# Patient Record
Sex: FEMALE | Race: WHITE | NOT HISPANIC OR LATINO | Employment: OTHER | URBAN - METROPOLITAN AREA
[De-identification: names, ages, dates, MRNs, and addresses within clinical notes are randomized per-mention and may not be internally consistent; named-entity substitution may affect disease eponyms.]

---

## 2017-01-06 RX ORDER — ALPRAZOLAM 1 MG/1
1 TABLET ORAL NIGHTLY
COMMUNITY
End: 2017-01-10 | Stop reason: SDUPTHER

## 2017-01-06 RX ORDER — VALSARTAN 160 MG/1
160 TABLET ORAL DAILY
COMMUNITY
End: 2017-01-23 | Stop reason: SDUPTHER

## 2017-01-09 ENCOUNTER — ANESTHESIA EVENT (OUTPATIENT)
Dept: PERIOP | Facility: HOSPITAL | Age: 67
End: 2017-01-09

## 2017-01-09 ENCOUNTER — ANESTHESIA (OUTPATIENT)
Dept: PERIOP | Facility: HOSPITAL | Age: 67
End: 2017-01-09

## 2017-01-09 ENCOUNTER — APPOINTMENT (OUTPATIENT)
Dept: GENERAL RADIOLOGY | Facility: HOSPITAL | Age: 67
End: 2017-01-09

## 2017-01-09 ENCOUNTER — HOSPITAL ENCOUNTER (OUTPATIENT)
Facility: HOSPITAL | Age: 67
Setting detail: HOSPITAL OUTPATIENT SURGERY
Discharge: HOME | End: 2017-01-09
Attending: UROLOGY | Admitting: UROLOGY

## 2017-01-09 VITALS
DIASTOLIC BLOOD PRESSURE: 54 MMHG | OXYGEN SATURATION: 93 % | WEIGHT: 205.3 LBS | TEMPERATURE: 97.7 F | HEART RATE: 67 BPM | BODY MASS INDEX: 32.22 KG/M2 | RESPIRATION RATE: 16 BRPM | HEIGHT: 67 IN | SYSTOLIC BLOOD PRESSURE: 122 MMHG

## 2017-01-09 DIAGNOSIS — N20.0 RENAL STONES: ICD-10-CM

## 2017-01-09 DIAGNOSIS — R52 PAIN: ICD-10-CM

## 2017-01-09 PROCEDURE — 25010000002 PROPOFOL 10 MG/ML EMULSION: Performed by: ANESTHESIOLOGY

## 2017-01-09 PROCEDURE — 25010000002 PHENYLEPHRINE PER 1 ML: Performed by: ANESTHESIOLOGY

## 2017-01-09 PROCEDURE — 25010000002 DEXAMETHASONE PER 1 MG: Performed by: ANESTHESIOLOGY

## 2017-01-09 PROCEDURE — 25010000002 GENTAMICIN PER 80 MG: Performed by: ANESTHESIOLOGY

## 2017-01-09 PROCEDURE — 76000 FLUOROSCOPY <1 HR PHYS/QHP: CPT

## 2017-01-09 PROCEDURE — 25010000002 FENTANYL CITRATE (PF) 100 MCG/2ML SOLUTION: Performed by: ANESTHESIOLOGY

## 2017-01-09 PROCEDURE — 74000 HC ABDOMEN KUB: CPT

## 2017-01-09 PROCEDURE — 25010000002 MIDAZOLAM PER 1 MG: Performed by: ANESTHESIOLOGY

## 2017-01-09 PROCEDURE — 74425 UROGRAPHY ANTEGRADE RS&I: CPT

## 2017-01-09 PROCEDURE — C1769 GUIDE WIRE: HCPCS | Performed by: UROLOGY

## 2017-01-09 PROCEDURE — C2617 STENT, NON-COR, TEM W/O DEL: HCPCS | Performed by: UROLOGY

## 2017-01-09 PROCEDURE — 25010000002 NEOSTIGMINE 10 MG/10ML SOLUTION: Performed by: ANESTHESIOLOGY

## 2017-01-09 PROCEDURE — 82360 CALCULUS ASSAY QUANT: CPT | Performed by: UROLOGY

## 2017-01-09 PROCEDURE — 25010000002 ONDANSETRON PER 1 MG: Performed by: ANESTHESIOLOGY

## 2017-01-09 DEVICE — URETERAL STENT
Type: IMPLANTABLE DEVICE | Site: KIDNEY | Status: FUNCTIONAL
Brand: CONTOUR™

## 2017-01-09 RX ORDER — GENTAMICIN SULFATE 40 MG/ML
INJECTION, SOLUTION INTRAMUSCULAR; INTRAVENOUS AS NEEDED
Status: DISCONTINUED | OUTPATIENT
Start: 2017-01-09 | End: 2017-01-09 | Stop reason: SURG

## 2017-01-09 RX ORDER — DIPHENHYDRAMINE HYDROCHLORIDE 50 MG/ML
12.5 INJECTION INTRAMUSCULAR; INTRAVENOUS
Status: DISCONTINUED | OUTPATIENT
Start: 2017-01-09 | End: 2017-01-09 | Stop reason: HOSPADM

## 2017-01-09 RX ORDER — ONDANSETRON 2 MG/ML
4 INJECTION INTRAMUSCULAR; INTRAVENOUS ONCE AS NEEDED
Status: DISCONTINUED | OUTPATIENT
Start: 2017-01-09 | End: 2017-01-09 | Stop reason: HOSPADM

## 2017-01-09 RX ORDER — NALOXONE HCL 0.4 MG/ML
0.2 VIAL (ML) INJECTION AS NEEDED
Status: DISCONTINUED | OUTPATIENT
Start: 2017-01-09 | End: 2017-01-09 | Stop reason: HOSPADM

## 2017-01-09 RX ORDER — SODIUM CHLORIDE 0.9 % (FLUSH) 0.9 %
1-10 SYRINGE (ML) INJECTION AS NEEDED
Status: DISCONTINUED | OUTPATIENT
Start: 2017-01-09 | End: 2017-01-09 | Stop reason: HOSPADM

## 2017-01-09 RX ORDER — PROMETHAZINE HYDROCHLORIDE 25 MG/ML
12.5 INJECTION, SOLUTION INTRAMUSCULAR; INTRAVENOUS ONCE AS NEEDED
Status: DISCONTINUED | OUTPATIENT
Start: 2017-01-09 | End: 2017-01-09 | Stop reason: HOSPADM

## 2017-01-09 RX ORDER — FENTANYL CITRATE 50 UG/ML
50 INJECTION, SOLUTION INTRAMUSCULAR; INTRAVENOUS
Status: DISCONTINUED | OUTPATIENT
Start: 2017-01-09 | End: 2017-01-09 | Stop reason: HOSPADM

## 2017-01-09 RX ORDER — HYDRALAZINE HYDROCHLORIDE 20 MG/ML
5 INJECTION INTRAMUSCULAR; INTRAVENOUS
Status: DISCONTINUED | OUTPATIENT
Start: 2017-01-09 | End: 2017-01-09 | Stop reason: HOSPADM

## 2017-01-09 RX ORDER — PROMETHAZINE HYDROCHLORIDE 25 MG/1
25 TABLET ORAL ONCE AS NEEDED
Status: DISCONTINUED | OUTPATIENT
Start: 2017-01-09 | End: 2017-01-09 | Stop reason: HOSPADM

## 2017-01-09 RX ORDER — LABETALOL HYDROCHLORIDE 5 MG/ML
5 INJECTION, SOLUTION INTRAVENOUS
Status: DISCONTINUED | OUTPATIENT
Start: 2017-01-09 | End: 2017-01-09 | Stop reason: HOSPADM

## 2017-01-09 RX ORDER — MIDAZOLAM HYDROCHLORIDE 1 MG/ML
2 INJECTION INTRAMUSCULAR; INTRAVENOUS
Status: DISCONTINUED | OUTPATIENT
Start: 2017-01-09 | End: 2017-01-09 | Stop reason: HOSPADM

## 2017-01-09 RX ORDER — LIDOCAINE HYDROCHLORIDE 20 MG/ML
INJECTION, SOLUTION INFILTRATION; PERINEURAL AS NEEDED
Status: DISCONTINUED | OUTPATIENT
Start: 2017-01-09 | End: 2017-01-09 | Stop reason: SURG

## 2017-01-09 RX ORDER — SODIUM CHLORIDE, SODIUM LACTATE, POTASSIUM CHLORIDE, CALCIUM CHLORIDE 600; 310; 30; 20 MG/100ML; MG/100ML; MG/100ML; MG/100ML
9 INJECTION, SOLUTION INTRAVENOUS CONTINUOUS
Status: DISCONTINUED | OUTPATIENT
Start: 2017-01-09 | End: 2017-01-09 | Stop reason: HOSPADM

## 2017-01-09 RX ORDER — PROPOFOL 10 MG/ML
VIAL (ML) INTRAVENOUS AS NEEDED
Status: DISCONTINUED | OUTPATIENT
Start: 2017-01-09 | End: 2017-01-09 | Stop reason: SURG

## 2017-01-09 RX ORDER — GENTAMICIN SULFATE 80 MG/100ML
INJECTION, SOLUTION INTRAVENOUS
Status: DISCONTINUED
Start: 2017-01-09 | End: 2017-01-09 | Stop reason: HOSPADM

## 2017-01-09 RX ORDER — FLUMAZENIL 0.1 MG/ML
0.2 INJECTION INTRAVENOUS AS NEEDED
Status: DISCONTINUED | OUTPATIENT
Start: 2017-01-09 | End: 2017-01-09 | Stop reason: HOSPADM

## 2017-01-09 RX ORDER — GENTAMICIN SULFATE 40 MG/ML
INJECTION, SOLUTION INTRAMUSCULAR; INTRAVENOUS AS NEEDED
Status: DISCONTINUED | OUTPATIENT
Start: 2017-01-09 | End: 2017-01-09

## 2017-01-09 RX ORDER — FAMOTIDINE 10 MG/ML
20 INJECTION, SOLUTION INTRAVENOUS ONCE
Status: DISCONTINUED | OUTPATIENT
Start: 2017-01-09 | End: 2017-01-09 | Stop reason: HOSPADM

## 2017-01-09 RX ORDER — PROMETHAZINE HYDROCHLORIDE 25 MG/1
25 SUPPOSITORY RECTAL ONCE AS NEEDED
Status: DISCONTINUED | OUTPATIENT
Start: 2017-01-09 | End: 2017-01-09 | Stop reason: HOSPADM

## 2017-01-09 RX ORDER — GLYCOPYRROLATE 0.2 MG/ML
INJECTION INTRAMUSCULAR; INTRAVENOUS AS NEEDED
Status: DISCONTINUED | OUTPATIENT
Start: 2017-01-09 | End: 2017-01-09 | Stop reason: SURG

## 2017-01-09 RX ORDER — LIDOCAINE HYDROCHLORIDE 40 MG/ML
SOLUTION TOPICAL AS NEEDED
Status: DISCONTINUED | OUTPATIENT
Start: 2017-01-09 | End: 2017-01-09 | Stop reason: SURG

## 2017-01-09 RX ORDER — GENTAMICIN SULFATE 80 MG/100ML
80 INJECTION, SOLUTION INTRAVENOUS ONCE
Status: DISCONTINUED | OUTPATIENT
Start: 2017-01-09 | End: 2017-01-09 | Stop reason: HOSPADM

## 2017-01-09 RX ORDER — NEOSTIGMINE METHYLSULFATE 1 MG/ML
INJECTION, SOLUTION INTRAVENOUS AS NEEDED
Status: DISCONTINUED | OUTPATIENT
Start: 2017-01-09 | End: 2017-01-09 | Stop reason: SURG

## 2017-01-09 RX ORDER — MIDAZOLAM HYDROCHLORIDE 1 MG/ML
1 INJECTION INTRAMUSCULAR; INTRAVENOUS
Status: DISCONTINUED | OUTPATIENT
Start: 2017-01-09 | End: 2017-01-09 | Stop reason: HOSPADM

## 2017-01-09 RX ORDER — DEXAMETHASONE SODIUM PHOSPHATE 10 MG/ML
INJECTION INTRAMUSCULAR; INTRAVENOUS AS NEEDED
Status: DISCONTINUED | OUTPATIENT
Start: 2017-01-09 | End: 2017-01-09 | Stop reason: SURG

## 2017-01-09 RX ORDER — ROCURONIUM BROMIDE 10 MG/ML
INJECTION, SOLUTION INTRAVENOUS AS NEEDED
Status: DISCONTINUED | OUTPATIENT
Start: 2017-01-09 | End: 2017-01-09 | Stop reason: SURG

## 2017-01-09 RX ORDER — OMEGA-3 FATTY ACIDS/FISH OIL 300-1000MG
1000 CAPSULE ORAL 2 TIMES DAILY
COMMUNITY
End: 2017-06-13

## 2017-01-09 RX ORDER — ONDANSETRON 2 MG/ML
INJECTION INTRAMUSCULAR; INTRAVENOUS AS NEEDED
Status: DISCONTINUED | OUTPATIENT
Start: 2017-01-09 | End: 2017-01-09 | Stop reason: SURG

## 2017-01-09 RX ORDER — MAGNESIUM HYDROXIDE 1200 MG/15ML
LIQUID ORAL AS NEEDED
Status: DISCONTINUED | OUTPATIENT
Start: 2017-01-09 | End: 2017-01-09 | Stop reason: HOSPADM

## 2017-01-09 RX ADMIN — LIDOCAINE HYDROCHLORIDE 80 MG: 20 INJECTION, SOLUTION INFILTRATION; PERINEURAL at 09:04

## 2017-01-09 RX ADMIN — EPHEDRINE SULFATE 20 MG: 50 INJECTION INTRAMUSCULAR; INTRAVENOUS; SUBCUTANEOUS at 09:18

## 2017-01-09 RX ADMIN — FENTANYL CITRATE 25 MCG: 50 INJECTION INTRAMUSCULAR; INTRAVENOUS at 10:06

## 2017-01-09 RX ADMIN — EPHEDRINE SULFATE 20 MG: 50 INJECTION INTRAMUSCULAR; INTRAVENOUS; SUBCUTANEOUS at 09:20

## 2017-01-09 RX ADMIN — EPHEDRINE SULFATE 10 MG: 50 INJECTION INTRAMUSCULAR; INTRAVENOUS; SUBCUTANEOUS at 10:04

## 2017-01-09 RX ADMIN — FENTANYL CITRATE 50 MCG: 50 INJECTION INTRAMUSCULAR; INTRAVENOUS at 09:04

## 2017-01-09 RX ADMIN — EPHEDRINE SULFATE 10 MG: 50 INJECTION INTRAMUSCULAR; INTRAVENOUS; SUBCUTANEOUS at 09:10

## 2017-01-09 RX ADMIN — NEOSTIGMINE METHYLSULFATE 5 MG: 1 INJECTION INTRAVENOUS at 09:59

## 2017-01-09 RX ADMIN — GENTAMICIN SULFATE 80 MG: 40 INJECTION, SOLUTION INTRAMUSCULAR; INTRAVENOUS at 09:04

## 2017-01-09 RX ADMIN — DEXAMETHASONE SODIUM PHOSPHATE 8 MG: 10 INJECTION INTRAMUSCULAR; INTRAVENOUS at 09:20

## 2017-01-09 RX ADMIN — SODIUM CHLORIDE, POTASSIUM CHLORIDE, SODIUM LACTATE AND CALCIUM CHLORIDE 9 ML/HR: 600; 310; 30; 20 INJECTION, SOLUTION INTRAVENOUS at 08:40

## 2017-01-09 RX ADMIN — LIDOCAINE HYDROCHLORIDE 1 EACH: 40 SPRAY LARYNGEAL; TRANSTRACHEAL at 09:07

## 2017-01-09 RX ADMIN — ROCURONIUM BROMIDE 50 MG: 10 INJECTION INTRAVENOUS at 09:04

## 2017-01-09 RX ADMIN — GLYCOPYRROLATE 1 MG: 0.2 INJECTION INTRAMUSCULAR; INTRAVENOUS at 09:59

## 2017-01-09 RX ADMIN — PHENYLEPHRINE HYDROCHLORIDE 100 MCG: 10 INJECTION INTRAVENOUS at 10:04

## 2017-01-09 RX ADMIN — SODIUM CHLORIDE, POTASSIUM CHLORIDE, SODIUM LACTATE AND CALCIUM CHLORIDE: 600; 310; 30; 20 INJECTION, SOLUTION INTRAVENOUS at 08:56

## 2017-01-09 RX ADMIN — FENTANYL CITRATE 25 MCG: 50 INJECTION INTRAMUSCULAR; INTRAVENOUS at 10:04

## 2017-01-09 RX ADMIN — EPHEDRINE SULFATE 20 MG: 50 INJECTION INTRAMUSCULAR; INTRAVENOUS; SUBCUTANEOUS at 10:00

## 2017-01-09 RX ADMIN — ONDANSETRON 4 MG: 2 INJECTION INTRAMUSCULAR; INTRAVENOUS at 09:59

## 2017-01-09 RX ADMIN — MIDAZOLAM HYDROCHLORIDE 2 MG: 1 INJECTION, SOLUTION INTRAMUSCULAR; INTRAVENOUS at 08:39

## 2017-01-09 RX ADMIN — EPHEDRINE SULFATE 20 MG: 50 INJECTION INTRAMUSCULAR; INTRAVENOUS; SUBCUTANEOUS at 09:39

## 2017-01-09 RX ADMIN — PROPOFOL 160 MG: 10 INJECTION, EMULSION INTRAVENOUS at 09:04

## 2017-01-09 NOTE — ANESTHESIA PROCEDURE NOTES
Airway  Urgency: elective    Airway not difficult    General Information and Staff    Patient location during procedure: OR  Anesthesiologist: NELIA BLAIR    Indications and Patient Condition  Indications for airway management: airway protection    Preoxygenated: yes  MILS maintained throughout  Mask difficulty assessment: 1 - vent by mask    Final Airway Details  Final airway type: endotracheal airway      Successful airway: ETT and reinforced tube  Cuffed: yes   Successful intubation technique: direct laryngoscopy  Endotracheal tube insertion site: oral  Blade: Cj  Blade size: #3  ETT size: 7.0 mm  Cormack-Lehane Classification: grade I - full view of glottis  Placement verified by: chest auscultation and capnometry   Measured from: lips  ETT to lips (cm): 21  Number of attempts at approach: 1

## 2017-01-09 NOTE — OP NOTE
DATE OF PROCEDURE:  01/09/2017    POSTOPERATIVE DIAGNOSIS: Left renal lower pole calculi.     POSTOPERATIVE DIAGNOSIS: Left renal lower pole calculi.     PROCEDURES PERFORMED:  1.  Percutaneous nephrostolithotomy - 2nd look  with laser lithotripsy and basket extraction of fragments.   2.  Antegrade left stent placement.     SURGEON: Mati Villegas MD     ANESTHESIA: General.     DRAINS: A 6-Yakut x 24 cm double-J stent.     COMPLICATIONS: None.      SPECIMENS:  Stone fragments.    INDICATIONS: A 66-year-old female with a large partial staghorn calculus involving the pelvis in the lower pole kidney. She has had several rounds of lithotripsy which failed to break her stone and decrease her stone burden. Last week she underwent percutaneous nephrostolithotomy which we debulked most of the lower pole stone in the pelvis. She will undergo a 2nd look procedure as there appears to be some fragments in the lower pole calyx.     DESCRIPTION OF THE PROCEDURE: The patient was taken to the operative suite and given general anesthesia. She was placed in a comfortable supine position, intubated, and then flipped over to prone. Her nephrostomy tube was removed. She was prepped and draped in sterile fashion. The flexible nephroscope was inserted into the tract in the pelvis. The upper pole was visualized and again was unremarkable. I could see down the UPJ. There was no stone fragments in the UPJ. I found the lower pole calyx with some moderate difficulty and was able to get into where I found several stone fragments. One of them was fairly sizable so we passed a 200 micron holmium fiber and the stone was then fragmented into multiple pieces that were all manageable. These were then basket extracted with a Nitinol 0 tip basket. I looked back in and could not confirm any residual stones. I saw nothing on fluoroscopy. A guidewire was passed down to the renal pelvis  down the ureter to the bladder. Using fluoroscopic guidance  a 6-Swazi x 24 cm double-J stent was then placed in an antegrade fashion. There was good distal curl.  The proximal curl was a little bit short, but I was a little concerned it was trying to push outside the kidney so I kept it on the short side and it hooked down to the lower pole. All the fluid was evacuated and the nephroscope was removed. Bulky dressing was placed in the flank incision where the nephrostomy tube was and she was awoken and taken to recovery in stable condition. She will follow up probably later this week to remove her stent. I told her she can get back on her Xarelto and aspirin once her hematuria clears.        Mati Villegas M.D.  SOTERO:sherwin  D:   01/09/2017 10:18:32  T:   01/09/2017 14:08:11  Job ID:   [id number]  Document ID:   [id number]  cc:

## 2017-01-09 NOTE — ANESTHESIA PREPROCEDURE EVALUATION
Anesthesia Evaluation      Airway   Mallampati: II  TM distance: >3 FB  Neck ROM: full  no difficulty expected  Dental - normal exam     Pulmonary - normal exam   Cardiovascular   (+) hypertension well controlled, dysrhythmias Atrial Fib,     Rhythm: irregular    Neuro/Psych  (+) psychiatric history,    GI/Hepatic/Renal/Endo    (+) obesity, morbid obesity, GERD, chronic renal disease, diabetes mellitus,     Musculoskeletal     Abdominal   (+) obese,    Substance History      OB/GYN          Other   (+) arthritis                          Anesthesia Plan    ASA 3     general     intravenous induction   Anesthetic plan and risks discussed with patient.

## 2017-01-09 NOTE — PLAN OF CARE
Problem: Patient Care Overview (Adult)  Goal: Plan of Care Review  Outcome: Ongoing (interventions implemented as appropriate)    01/09/17 0736   Coping/Psychosocial Response Interventions   Plan Of Care Reviewed With patient   Patient Care Overview   Progress no change       Goal: Adult Individualization and Mutuality  Outcome: Ongoing (interventions implemented as appropriate)                        01/09/17 0736   Individualization   Patient Specific Preferences Prefers to be called Danielle

## 2017-01-09 NOTE — H&P
[facility] History and Physical    Patient Care Team:  Ashleigh Hernandez PA-C as PCP - General (Family Medicine)  Jeremy Orozco MD as Consulting Physician (Cardiology)    Chief complaint left renal stones    Subjective     Patient is a 66 y.o. female presents with multiple stones left kidney s/p staged left PCNL for second look. Onset of symptoms was gradual starting 1 year ago.   Associated symptoms include pain and pressure.      Review of Systems   Pertinent items are noted in HPI    Past Medical History   Diagnosis Date   • Arthritis    • Controlled type 2 diabetes with renal manifestation      states borderline    • Hyperlipidemia    • Hypertension    • Kidney stone      left   • Menopausal disorder 1998     She went through menopause in 1998   • Obesity    • PAF (paroxysmal atrial fibrillation)      in the setting of urosepsis, 9/2016   • Reflux esophagitis 8/19/2016   • Sepsis due to urinary tract infection    • Urinary tract infection      Past Surgical History   Procedure Laterality Date   • Ankle surgery       Ankle Repair / Description: ORif the left ankle in July 2010. Secondary to fall   • Cholecystectomy     • Colonoscopy  2014     Done 2004 normal recheck in 10 years. Repeated February 2014 colonoscopy was normal and to be repeated in 10 years.   • Hemorrhoidectomy     • Other surgical history       Tubal Ligation   • Tubal abdominal ligation     • Cystoscopy bladder stone lithotripsy     • Breast surgery       biopsy    • Breast biopsy Right      benign   • Percutaneous nephrostolithotomy Left 12/2016     Family History   Problem Relation Age of Onset   • COPD Mother    • Heart attack Father      acute myocardial infarction   • Heart attack Son    • Kidney disease Maternal Grandmother      Social History   Substance Use Topics   • Smoking status: Never Smoker   • Smokeless tobacco: Never Used      Comment: caffeine use    • Alcohol use Yes      Comment: She uses alcohol once or twice a  year.     Prescriptions Prior to Admission   Medication Sig Dispense Refill Last Dose   • acetaminophen (TYLENOL) 650 MG 8 hr tablet Take 1 tablet by mouth every 6 (six) hours as needed. for pain   1/8/2017 at 2200   • ALPRAZolam (XANAX) 1 MG tablet Take 1 mg by mouth Every Night. anxiety   1/7/2017 at 2200   • aspirin 81 MG tablet Take 81 mg by mouth Daily.   12/21/2016   • cefuroxime (CEFTIN) 250 MG tablet Take 250 mg by mouth Daily.  3 1/8/2017 at 1730   • citalopram (CeleXA) 40 MG tablet Take 1 tablet by mouth daily. 90 tablet 3 1/8/2017 at 1000   • Metoprolol Tartrate 75 MG tablet Take 75 mg by mouth Every 12 (Twelve) Hours. 60 tablet 3 1/9/2017 at 0600   • Multiple Vitamin tablet Take 1 tablet by mouth Daily.   Past Week at Unknown time   • Omega 3 1000 MG capsule Take 1,000 mg by mouth 2 (Two) Times a Day.   12/21/2016   • omeprazole OTC (PriLOSEC OTC) 20 MG EC tablet Take 20 mg by mouth 2 (two) times a day.   1/9/2017 at 0600   • Probiotic Product (PROBIOTIC ADVANCED PO) Take 1 tablet by mouth Daily.   1/8/2017 at 1730   • rivaroxaban (XARELTO) 15 MG tablet Take 1 tablet by mouth Daily. 30 tablet 0 12/21/2016   • simvastatin (ZOCOR) 80 MG tablet Take 1 tablet by mouth daily. (Patient taking differently: Take 80 mg by mouth every night.) 90 tablet 2 1/8/2017 at 2200   • valsartan (DIOVAN) 160 MG tablet Take 160 mg by mouth Daily.   1/8/2017 at 1000   • nystatin (MYCOSTATIN) powder Apply 1 application topically 3 (Three) Times a Day As Needed. Apply sparingly to affected area(s) 3 times a day   More than a month at Unknown time     Allergies:  Morphine and related; Ciprofloxacin-ciproflox hcl er; Flomax [tamsulosin hcl]; Hydrocodone; Latex; Lortab [hydrocodone-acetaminophen]; Penicillins; Phenergan [promethazine hcl]; Sulfa antibiotics; and Amoxicillin    Objective     Vital Signs  Temp:  [98 °F (36.7 °C)] 98 °F (36.7 °C)  Heart Rate:  [63] 63  Resp:  [20] 20  BP: (140)/(70) 140/70  No intake or output data in  "the 24 hours ending 01/09/17 0819  Flowsheet Rows         First Filed Value    Admission Height  66\" (167.6 cm) Documented at 01/06/2017 1138    Admission Weight  202 lb (91.6 kg) Documented at 01/06/2017 1138           Physical Exam:      General Appearance:    Alert, cooperative, in no acute distress   Head:    Normocephalic, without obvious abnormality, atraumatic   Eyes:            Lids and lashes normal, conjunctivae and sclerae normal, no   icterus, no pallor, corneas clear, PERRLA   Ears:    Ears appear intact with no abnormalities noted   Throat:   No oral lesions, no thrush, oral mucosa moist   Neck:   No adenopathy, supple, trachea midline, no thyromegaly, no     carotid bruit, no JVD   Back:     No kyphosis present, no scoliosis present, no skin lesions,       erythema or scars, no tenderness to percussion or                   palpation,   range of motion normal   Lungs:     Clear to auscultation,respirations regular, even and                   unlabored    Heart:    Regular rhythm and normal rate, normal S1 and S2, no            murmur, no gallop, no rub, no click   Breast Exam:    Deferred   Abdomen:     Normal bowel sounds, no masses, no organomegaly, soft        non-tender, non-distended, no guarding, no rebound                 tenderness   Genitalia:    Deferred   Extremities:   Moves all extremities well, no edema, no cyanosis, no              redness   Pulses:   Pulses palpable and equal bilaterally   Skin:   No bleeding, bruising or rash   Lymph nodes:   No palpable adenopathy   Neurologic:   Cranial nerves 2 - 12 grossly intact, sensation intact, DTR        present and equal bilaterally     Results Review:    I reviewed the patient's new clinical results.  Results for orders placed or performed during the hospital encounter of 11/05/16   Urine Culture   Result Value Ref Range    Urine Culture 50,000 CFU/mL Escherichia coli (A)        Susceptibility    Escherichia coli - FARIHA     Ampicillin 8 " Susceptible ug/ml     Ampicillin + Sulbactam 4 Susceptible ug/ml     Cefazolin <=4 Susceptible ug/ml     Cefepime <=1 Susceptible ug/ml     Ceftriaxone <=1 Susceptible ug/ml     Ciprofloxacin <=0.25 Susceptible ug/ml     Ertapenem <=0.5 Susceptible ug/ml     Gentamicin <=1 Susceptible ug/ml     Levofloxacin <=0.12 Susceptible ug/ml     Nitrofurantoin <=16 Susceptible ug/ml     Piperacillin + Tazobactam <=4 Susceptible ug/ml     Tetracycline 2 Susceptible ug/ml     Trimethoprim + Sulfamethoxazole <=20 Susceptible ug/ml   Blood Culture   Result Value Ref Range    Blood Culture No growth at 5 days    Urine Culture   Result Value Ref Range    Urine Culture No growth    Comprehensive Metabolic Panel   Result Value Ref Range    Glucose 113 (H) 65 - 99 mg/dL    BUN 23 8 - 23 mg/dL    Creatinine 1.21 (H) 0.57 - 1.00 mg/dL    Sodium 136 136 - 145 mmol/L    Potassium 4.2 3.5 - 5.2 mmol/L    Chloride 100 98 - 107 mmol/L    CO2 20.9 (L) 22.0 - 29.0 mmol/L    Calcium 9.0 8.8 - 10.5 mg/dL    Total Protein 7.3 6.0 - 8.5 g/dL    Albumin 3.50 3.50 - 5.20 g/dL    ALT (SGPT) 12 5 - 33 U/L    AST (SGOT) 13 5 - 32 U/L    Alkaline Phosphatase 88 40 - 129 U/L    Total Bilirubin 0.5 0.2 - 1.2 mg/dL    eGFR Non African Amer 44 (L) >60 mL/min/1.73    Globulin 3.8 gm/dL    A/G Ratio 0.9 g/dL    BUN/Creatinine Ratio 19.0 7.0 - 25.0    Anion Gap 15.1 mmol/L   Lipase   Result Value Ref Range    Lipase 45 13 - 60 U/L   Urinalysis With / Culture If Indicated   Result Value Ref Range    Color, UA Yellow Yellow, Straw    Appearance, UA Cloudy (A) Clear    pH, UA 5.5 4.5 - 8.0    Specific Gravity, UA 1.015 1.003 - 1.030    Glucose, UA Negative Negative    Ketones, UA Negative Negative    Bilirubin, UA Negative Negative    Blood, UA Large (3+) (A) Negative    Protein, UA Trace (A) Negative    Leuk Esterase, UA Large (3+) (A) Negative    Nitrite, UA Negative Negative    Urobilinogen, UA 0.2 E.U./dL 0.2 E.U./dL, 1.0 E.U./dL   Lactic Acid, Plasma    Result Value Ref Range    Lactate 1.8 0.5 - 2.0 mmol/L   CBC Auto Differential   Result Value Ref Range    WBC 16.61 (H) 4.80 - 10.80 10*3/mm3    RBC 4.15 (L) 4.20 - 5.40 10*6/mm3    Hemoglobin 10.2 (L) 12.0 - 16.0 g/dL    Hematocrit 34.0 (L) 37.0 - 47.0 %    MCV 81.9 81.0 - 99.0 fL    MCH 24.6 (L) 27.0 - 31.0 pg    MCHC 30.0 (L) 31.0 - 37.0 g/dL    RDW 15.8 (H) 11.5 - 14.5 %    RDW-SD 47.1 37.0 - 54.0 fl    MPV 9.4 7.4 - 10.4 fL    Platelets 354 140 - 500 10*3/mm3    Neutrophil % 79.6 (H) 45.0 - 70.0 %    Lymphocyte % 9.7 (L) 20.0 - 45.0 %    Monocyte % 9.9 (H) 3.0 - 8.0 %    Eosinophil % 0.0 0.0 - 4.0 %    Basophil % 0.3 0.0 - 2.0 %    Immature Grans % 0.5 0.0 - 0.5 %    Neutrophils, Absolute 13.23 (H) 1.50 - 8.30 10*3/mm3    Lymphocytes, Absolute 1.61 0.60 - 4.80 10*3/mm3    Monocytes, Absolute 1.64 (H) 0.00 - 1.00 10*3/mm3    Eosinophils, Absolute 0.00 (L) 0.10 - 0.30 10*3/mm3    Basophils, Absolute 0.05 0.00 - 0.20 10*3/mm3    Immature Grans, Absolute 0.08 (H) 0.00 - 0.03 10*3/mm3    nRBC 0.0 0.0 - 0.0 /100 WBC   Urinalysis, Microscopic Only   Result Value Ref Range    RBC, UA 3-5 (A) None Seen /HPF    WBC, UA 31-50 (A) None Seen /HPF    Bacteria, UA Trace (A) None Seen /HPF    Squamous Epithelial Cells, UA 3-6 (A) None Seen, 0-2 /HPF    Hyaline Casts, UA None Seen None Seen /LPF    Methodology Manual Light Microscopy    Troponin   Result Value Ref Range    Troponin T <0.010 0.000 - 0.030 ng/mL   Troponin   Result Value Ref Range    Troponin T <0.010 0.000 - 0.030 ng/mL   Troponin   Result Value Ref Range    Troponin T <0.010 0.000 - 0.030 ng/mL   Basic Metabolic Panel   Result Value Ref Range    Glucose 107 (H) 65 - 99 mg/dL    BUN 16 8 - 23 mg/dL    Creatinine 1.05 (H) 0.57 - 1.00 mg/dL    Sodium 138 136 - 145 mmol/L    Potassium 4.1 3.5 - 5.2 mmol/L    Chloride 102 98 - 107 mmol/L    CO2 24.0 22.0 - 29.0 mmol/L    Calcium 8.4 (L) 8.8 - 10.5 mg/dL    eGFR Non African Amer 52 (L) >60 mL/min/1.73     BUN/Creatinine Ratio 15.2 7.0 - 25.0    Anion Gap 12.0 mmol/L   CBC (No Diff)   Result Value Ref Range    WBC 10.71 4.80 - 10.80 10*3/mm3    RBC 3.68 (L) 4.20 - 5.40 10*6/mm3    Hemoglobin 9.2 (L) 12.0 - 16.0 g/dL    Hematocrit 30.0 (L) 37.0 - 47.0 %    MCV 81.5 81.0 - 99.0 fL    MCH 25.0 (L) 27.0 - 31.0 pg    MCHC 30.7 (L) 31.0 - 37.0 g/dL    RDW 15.9 (H) 11.5 - 14.5 %    RDW-SD 48.0 37.0 - 54.0 fl    MPV 9.2 7.4 - 10.4 fL    Platelets 291 140 - 500 10*3/mm3   Urinalysis With / Culture If Indicated   Result Value Ref Range    Color, UA Yellow Yellow, Straw    Appearance, UA Slightly Cloudy (A) Clear    pH, UA 5.5 4.5 - 8.0    Specific Gravity, UA 1.020 1.003 - 1.030    Glucose, UA Negative Negative    Ketones, UA Negative Negative    Bilirubin, UA Negative Negative    Blood, UA Large (3+) (A) Negative    Protein, UA Negative Negative    Leuk Esterase, UA Moderate (2+) (A) Negative    Nitrite, UA Negative Negative    Urobilinogen, UA 0.2 E.U./dL 0.2 E.U./dL, 1.0 E.U./dL   Urinalysis, Microscopic Only   Result Value Ref Range    RBC, UA Too Numerous to Count (A) None Seen /HPF    WBC, UA Too Numerous to Count (A) None Seen /HPF    Bacteria, UA 1+ (A) None Seen /HPF    Squamous Epithelial Cells, UA 0-2 None Seen, 0-2 /HPF    Renal Epithelial Cells, UA 3-6 (A) 0 - 2 /HPF    Hyaline Casts, UA None Seen None Seen /LPF    Methodology Manual Light Microscopy    Troponin   Result Value Ref Range    Troponin T <0.010 0.000 - 0.030 ng/mL   Comprehensive Metabolic Panel   Result Value Ref Range    Glucose 125 (H) 65 - 99 mg/dL    BUN 16 8 - 23 mg/dL    Creatinine 1.05 (H) 0.57 - 1.00 mg/dL    Sodium 137 136 - 145 mmol/L    Potassium 4.1 3.5 - 5.2 mmol/L    Chloride 99 98 - 107 mmol/L    CO2 18.5 (L) 22.0 - 29.0 mmol/L    Calcium 9.0 8.8 - 10.5 mg/dL    Total Protein 6.7 6.0 - 8.5 g/dL    Albumin 3.00 (L) 3.50 - 5.20 g/dL    ALT (SGPT) 17 5 - 33 U/L    AST (SGOT) 14 5 - 32 U/L    Alkaline Phosphatase 82 40 - 129 U/L    Total  Bilirubin 0.2 0.2 - 1.2 mg/dL    eGFR Non African Amer 52 (L) >60 mL/min/1.73    Globulin 3.7 gm/dL    A/G Ratio 0.8 g/dL    BUN/Creatinine Ratio 15.2 7.0 - 25.0    Anion Gap 19.5 mmol/L   CBC Auto Differential   Result Value Ref Range    WBC 8.41 4.80 - 10.80 10*3/mm3    RBC 3.74 (L) 4.20 - 5.40 10*6/mm3    Hemoglobin 9.2 (L) 12.0 - 16.0 g/dL    Hematocrit 31.3 (L) 37.0 - 47.0 %    MCV 83.7 81.0 - 99.0 fL    MCH 24.6 (L) 27.0 - 31.0 pg    MCHC 29.4 (L) 31.0 - 37.0 g/dL    RDW 16.0 (H) 11.5 - 14.5 %    RDW-SD 49.0 37.0 - 54.0 fl    MPV 9.8 7.4 - 10.4 fL    Platelets 300 140 - 500 10*3/mm3    Neutrophil % 66.3 45.0 - 70.0 %    Lymphocyte % 22.2 20.0 - 45.0 %    Monocyte % 8.6 (H) 3.0 - 8.0 %    Eosinophil % 2.1 0.0 - 4.0 %    Basophil % 0.4 0.0 - 2.0 %    Immature Grans % 0.4 0.0 - 0.5 %    Neutrophils, Absolute 5.58 1.50 - 8.30 10*3/mm3    Lymphocytes, Absolute 1.87 0.60 - 4.80 10*3/mm3    Monocytes, Absolute 0.72 0.00 - 1.00 10*3/mm3    Eosinophils, Absolute 0.18 0.10 - 0.30 10*3/mm3    Basophils, Absolute 0.03 0.00 - 0.20 10*3/mm3    Immature Grans, Absolute 0.03 0.00 - 0.03 10*3/mm3    nRBC 0.0 0.0 - 0.0 /100 WBC        Assessment/Plan:  Assessment/Plan     Active Problems:    * No active hospital problems. *      Left Renal Stones    Plan: second look PCNL- R/C/B discussed    I discussed the patients findings and my recommendations with patient.     Mati Villegas MD  01/09/17  8:19 AM

## 2017-01-09 NOTE — IP AVS SNAPSHOT
AFTER VISIT SUMMARY             Krys Mayes           About your hospitalization     You were admitted on:  January 9, 2017 You last received care in the:  [facility] MAIN OR       Procedures & Surgeries      Procedure(s) (LRB):  NEPHROLITHOTOMY PERCUTANEOUS SECOND LOOK WITH STENT PLACEMENT AND LASER LITHOTRIPSY (Left)     1/9/2017     Surgeon(s):  Mati Villegas MD  -------------------      Medications    If you or your caregiver advised us that you are currently taking a medication and that medication is marked below as “Resume”, this simply indicates that we have reviewed those medications to make sure our new therapy recommendations do not interfere.  If you have concerns about medications other than those new ones which we are prescribing today, please consult the physician who prescribed them (or your primary physician).  Our review of your home medications is not meant to indicate that we are directing their use.             Your Medications      CHANGE how you take these medications     simvastatin 80 MG tablet   Take 1 tablet by mouth daily.   According to our records, you may have been taking this medication differently.   Commonly known as:  ZOCOR   What changed:  when to take this             CONTINUE taking these medications     acetaminophen 650 MG 8 hr tablet   Take 1 tablet by mouth every 6 (six) hours as needed. for pain   Commonly known as:  TYLENOL           ALPRAZolam 1 MG tablet   Take 1 mg by mouth Every Night. anxiety   Commonly known as:  XANAX           aspirin 81 MG tablet   Take 81 mg by mouth Daily.           cefuroxime 250 MG tablet   Take 250 mg by mouth Daily.   Commonly known as:  CEFTIN           citalopram 40 MG tablet   Take 1 tablet by mouth daily.   Commonly known as:  CeleXA           Metoprolol Tartrate 75 MG tablet   Take 75 mg by mouth Every 12 (Twelve) Hours.           Multiple Vitamin tablet   Take 1 tablet by mouth Daily.           nystatin  250046 UNIT/GM powder   Apply 1 application topically 3 (Three) Times a Day As Needed. Apply sparingly to affected area(s) 3 times a day   Commonly known as:  MYCOSTATIN           Omega 3 1000 MG capsule   Take 1,000 mg by mouth 2 (Two) Times a Day.           omeprazole OTC 20 MG EC tablet   Take 20 mg by mouth 2 (two) times a day.   Commonly known as:  PriLOSEC OTC           PROBIOTIC ADVANCED PO   Take 1 tablet by mouth Daily.           rivaroxaban 15 MG tablet   Take 1 tablet by mouth Daily.   Commonly known as:  XARELTO           valsartan 160 MG tablet   Take 160 mg by mouth Daily.   Commonly known as:  DIOVAN                      Your Medications      Your Medication List           Morning Noon Evening Bedtime As Needed    acetaminophen 650 MG 8 hr tablet   Take 1 tablet by mouth every 6 (six) hours as needed. for pain   Commonly known as:  TYLENOL                                ALPRAZolam 1 MG tablet   Take 1 mg by mouth Every Night. anxiety   Commonly known as:  XANAX                                aspirin 81 MG tablet   Take 81 mg by mouth Daily.                                cefuroxime 250 MG tablet   Take 250 mg by mouth Daily.   Commonly known as:  CEFTIN                                citalopram 40 MG tablet   Take 1 tablet by mouth daily.   Commonly known as:  CeleXA                                Metoprolol Tartrate 75 MG tablet   Take 75 mg by mouth Every 12 (Twelve) Hours.                                Multiple Vitamin tablet   Take 1 tablet by mouth Daily.                                nystatin 408176 UNIT/GM powder   Apply 1 application topically 3 (Three) Times a Day As Needed. Apply sparingly to affected area(s) 3 times a day   Commonly known as:  MYCOSTATIN                                Omega 3 1000 MG capsule   Take 1,000 mg by mouth 2 (Two) Times a Day.                                omeprazole OTC 20 MG EC tablet   Take 20 mg by mouth 2 (two) times a day.   Commonly known as:  PriLOSEC  OTC                                PROBIOTIC ADVANCED PO   Take 1 tablet by mouth Daily.                                rivaroxaban 15 MG tablet   Take 1 tablet by mouth Daily.   Commonly known as:  XARELTO                                simvastatin 80 MG tablet   Take 1 tablet by mouth daily.   Commonly known as:  ZOCOR                                valsartan 160 MG tablet   Take 160 mg by mouth Daily.   Commonly known as:  DIOVAN                                         Instructions for After Discharge        Activity Instructions     Activity as Tolerated                 Other Instructions     Remove Dressing in 24 Hours       Change if needed- remove and shower in 24 hours and leave on vera-aid or light dressing if needed.               Discharge Instructions       Outpatient Surgery Guidelines, Adult  Outpatient procedures are those for which the person having the procedure is allowed to go home the same day as the procedure. Various procedures are done on an outpatient basis. You should follow some general guidelines if you will be having an outpatient procedure.  AFTER THE  PROCEDURE  After surgery, you will be taken to a recovery area, where your progress will be monitored. If there are no complications, you will be allowed to go home when you are awake, stable, and taking fluids well. You may have numbness around the surgical site. Healing will take some time. You will have tenderness at the surgical site and may have some swelling and bruising. You may also have some nausea.  HOME CARE INSTRUCTIONS  · Do not drive for 24 hours, or as directed by your health care provider. Do not drive while taking prescription pain medicines.  · Do not drink alcohol for 24 hours.  · Do not make important decisions or sign legal documents for 24 hours.  · Plan on having a responsible adult stay with your for 24 hours following your procedure.  · You may resume a normal diet and activities as directed.  · Do not lift  anything heavier than 10 pounds (4.5 kg) or play contact sports until your health care provider says it is okay.  · Only take over-the-counter or prescription medicines as directed by your health care provider.  · Follow up with your health care provider as directed.  SEEK MEDICAL CARE IF:  · You have increased bleeding (more than a small spot) from the surgical site.  · You have redness, swelling, or increasing pain in the wound.  · You see pus coming from the wound.  · You have a fever > 101.  · You notice a bad smell coming from the wound or dressing.  · You feel lightheaded or faint.  · You develop a rash.  · You have trouble breathing.  · You develop allergies.  MAKE SURE YOU:  · Understand these instructions.  · Will watch your condition.  · Will get help right away if you are not doing well or get worse.    Discharge References/Attachments     URETERAL STENT IMPLANTATION, CARE AFTER (ENGLISH)    LITHOTRIPSY, CARE AFTER (ENGLISH)    PERCUTANEOUS NEPHROLITHOTOMY (ENGLISH)       Follow-ups for After Discharge        Follow-up Information     Follow up with Ashleigh Hernandez PA-C .    Specialty:  Family Medicine    Contact information:    [address] [facility] [id number] [phone]          Follow up with Mati Villegas MD .    Specialty:  Urology    Why:  Dr. Villegas's office will call to set up an appointment for Thursday for stent removal.    Contact information:    [address] [facility] [address] [phone]        Referrals and Follow-ups to Schedule     Return to Clinic for Follow Up    As directed    My office will call to schedule follow-up   Follow Up Details:  This week to remove stent             Scheduled Appointments     Feb 09, 2017  9:50 AM EST   [facility] (--)    [address] [facility] [id number] [phone]            Feb 16, 2017 10:30 AM EST   Office Visit with Ashleigh MELENDEZ  MANI Hernandez   [facility] (--)    [address]  [place] [address] [phone]           Arrive 15 minutes prior to appointment.            Jun 13, 2017 11:15 AM EDT   Follow Up with Jeremy Orozco MD   [facility] CARDIOLOGY (--)    [address]  [place] [address] [phone]           Arrive 15 minutes prior to appointment.              [employer] Signup     Our records indicate that you have an active [employer] account.    You can view your After Visit Summary by going to [employer]erMe and logging in with your [employer] username and password.  If you don't have a [employer] username and password but a parent or guardian has access to your record, the parent or guardian should login with their own [employer] username and password and access your record to view the After Visit Summary.    If you have questions, you can email [email or web address] or call [phone] to talk to our [employer] staff.  Remember, [employer] is NOT to be used for urgent needs.  For medical emergencies, dial 911.           Summary of Your Hospitalization        Reason for Hospitalization     Your primary diagnosis was:  Kidney Stone      Care Providers     Provider Service Role Specialty    Mati Villegas MD Urology Attending Provider Urology    Mati Villegas MD Urology Surgeon  Urology      Your Allergies  Date Reviewed: 1/9/2017    Allergen Reactions    Morphine And Related GI Intolerance         Ciprofloxacin-Ciproflox Hcl Er Rash         Flomax (Tamsulosin Hcl) Rash         Hydrocodone Rash         Latex Rash         Lortab (Hydrocodone-Acetaminophen) Rash         Penicillins Rash         Phenergan (Promethazine Hcl) GI Intolerance         Sulfa Antibiotics Rash         Amoxicillin Rash      Pending Labs     Order Current Status    Stone Analysis In process      Patient Belongings Returned      Document Return of Belongings Flowsheet     Were the patient bedside belongings sent home?   --   Belongings Retrieved from Security & Sent Home   --    Belongings Sent to Safe   --   Medications Retrieved from Pharmacy & Sent Home   --              More Information      Ureteral Stent Implantation, Care After  Refer to this sheet in the next few weeks. These instructions provide you with information on caring for yourself after your procedure. Your health care provider may also give you more specific instructions. Your treatment has been planned according to current medical practices, but problems sometimes occur. Call your health care provider if you have any problems or questions after your procedure.  WHAT TO EXPECT AFTER THE PROCEDURE  You should be back to normal activity within 48 hours after the procedure. Nausea and vomiting may occur and are commonly the result of anesthesia.  It is common to experience sharp pain in the back or lower abdomen and penis with voiding. This is caused by movement of the ends of the stent with the act of urinating. It usually goes away within minutes after you have stopped urinating.  HOME CARE INSTRUCTIONS  Make sure to drink plenty of fluids. You may have small amounts of bleeding, causing your urine to be red. This is normal. Certain movements may trigger pain or a feeling that you need to urinate. You may be given medicines to prevent infection or bladder spasms. Be sure to take all medicines as directed. Only take over-the-counter or prescription medicines for pain, discomfort, or fever as directed by your health care provider. Do not take aspirin, as this can make bleeding worse.  Your stent will be left in until the blockage is resolved. This may take 2 weeks or longer, depending on the reason for stent implantation. You may have an X-ray exam to make sure your ureter is open and that the stent has not moved out of position (migrated). The stent can be removed by your  health care provider in the office. Medicines may be given for comfort while the stent is being removed. Be sure to keep all follow-up appointments so your health care provider can check that you are healing properly.  SEEK MEDICAL CARE IF:  · You experience increasing pain.  · Your pain medicine is not working.  SEEK IMMEDIATE MEDICAL CARE IF:  · Your urine is dark red or has blood clots.  · You are leaking urine (incontinent).  · You have a fever, chills, feeling sick to your stomach (nausea), or vomiting.  · Your pain is not relieved by pain medicine.  · The end of the stent comes out of the urethra.  · You are unable to urinate.     This information is not intended to replace advice given to you by your health care provider. Make sure you discuss any questions you have with your health care provider.     Document Released: 08/20/2014 Document Revised: 12/23/2014 Document Reviewed: 07/01/2016  [employer] Interactive Patient Education ©2016 [employer] Inc.          Lithotripsy, Care After  Refer to this sheet in the next few weeks. These instructions provide you with information on caring for yourself after your procedure. Your health care provider may also give you more specific instructions. Your treatment has been planned according to current medical practices, but problems sometimes occur. Call your health care provider if you have any problems or questions after your procedure.  WHAT TO EXPECT AFTER THE PROCEDURE   · Your urine may have a red tinge for a few days after treatment. Blood loss is usually minimal.  · You may have soreness in the back or flank area. This usually goes away after a few days. The procedure can cause blotches or bruises on the back where the pressure wave enters the skin. These marks usually cause only minimal discomfort and should disappear in a short time.  · Stone fragments should begin to pass within 24 hours of treatment. However, a delayed passage is not unusual.  · You may have  "pain, discomfort, and feel sick to your stomach (nauseated) when the crushed fragments of stone are passed down the tube from the kidney to the bladder. Stone fragments can pass soon after the procedure and may last for up to 4-8 weeks.  · A small number of patients may have severe pain when stone fragments are not able to pass, which leads to an obstruction.  · If your stone is greater than 1 inch (2.5 cm) in diameter or if you have multiple stones that have a combined diameter greater than 1 inch (2.5 cm), you may require more than one treatment.  · If you had a stent placed prior to your procedure, you may experience some discomfort, especially during urination. You may experience the pain or discomfort in your flank or back, or you may experience a sharp pain or discomfort at the base of your penis or in your lower abdomen. The discomfort usually lasts only a few minutes after urinating.  HOME CARE INSTRUCTIONS   · Rest at home until you feel your energy improving.  · Only take over-the-counter or prescription medicines for pain, discomfort, or fever as directed by your health care provider. Depending on the type of lithotripsy, you may need to take antibiotics and anti-inflammatory medicines for a few days.  · Drink enough water and fluids to keep your urine clear or pale yellow. This helps \"flush\" your kidneys. It helps pass any remaining pieces of stone and prevents stones from coming back.  · Most people can resume daily activities within 1-2 days after standard lithotripsy. It can take longer to recover from laser and percutaneous lithotripsy.  · Strain all urine through the provided strainer. Keep all particulate matter and stones for your health care provider to see. The stone may be as small as a grain of salt. It is very important to use the strainer each and every time you pass your urine. Any stones that are found can be sent to a medical lab for examination.  · Visit your health care provider for a " follow-up appointment in a few weeks. Your doctor may remove your stent if you have one. Your health care provider will also check to see whether stone particles still remain.  SEEK MEDICAL CARE IF:   · Your pain is not relieved by medicine.  · You have a lasting nauseous feeling.  · You feel there is too much blood in the urine.  · You develop persistent problems with frequent or painful urination that does not at least partially improve after 2 days following the procedure.  · You have a congested cough.  · You feel lightheaded.  · You develop a rash or any other signs that might suggest an allergic problem.  · You develop any reaction or side effects to your medicine(s).  SEEK IMMEDIATE MEDICAL CARE IF:   · You experience severe back or flank pain or both.  · You see nothing but blood when you urinate.  · You cannot pass any urine at all.  · You have a fever or shaking chills.  · You develop shortness of breath, difficulty breathing, or chest pain.  · You develop vomiting that will not stop after 6-8 hours.  · You have a fainting episode.     This information is not intended to replace advice given to you by your health care provider. Make sure you discuss any questions you have with your health care provider.     Document Released: 01/06/2009 Document Revised: 09/07/2016 Document Reviewed: 07/03/2014  [employer] Interactive Patient Education ©2016 [employer]          Percutaneous Nephrolithotomy      AFTER THE PROCEDURE  · You will stay in a recovery area until the anesthesia has worn off. Your blood pressure and pulse will be checked often. You might be given more pain medication.  · You will be moved to a hospital room for the rest of your stay.  · The catheter that is taking urine out of your body will be taken out within 24 hours.  · The day after your surgery, you should be able to walk around. Walking helps prevent blood clots (thick clumps that can block the flow of blood).  · You may be asked to do some  breathing exercises.  · You will be able to have only liquids for a day or two.  · Before you go home:    The catheter that is draining fluid from the kidney area is usually taken out.    You will be taught how to care for the incision. Be sure to ask how often the dressing should be changed and when it can get wet.    You also will be told what you should and should not do while your kidney and incisions heal. For instance, you may be urged to walk to prevent blood clots.     This information is not intended to replace advice given to you by your health care provider. Make sure you discuss any questions you have with your health care provider.     Document Released: 10/15/2010 Document Revised: 01/08/2016 Document Reviewed: 06/13/2016  [employer] Interactive Patient Education ©2016 Elsevier Inc.            SYMPTOMS OF A STROKE    Call 911 or have someone take you to the Emergency Department if you have any of the following:    · Sudden numbness or weakness of your face, arm or leg especially on one side of the body  · Sudden confusion, diffiiculty speaking or trouble understanding   · Changes in your vision or loss of sight in one eye  · Sudden severe headache with no known cause  · sudden dizziness, trouble walking, loss of balance or coordination    It is important to seek emergency care right away if you have further stroke symptoms. If you get emergency help quickly, the powerful clot-dissolving medicines can reduce the disabilities caused by a stroke.     For more information:    American Stroke Association  5-200-2-STROKE  www.strokeassociation.org           IF YOU SMOKE OR USE TOBACCO PLEASE READ THE FOLLOWING:    Why is smoking bad for me?  Smoking increases the risk of heart disease, lung disease, vascular disease, stroke, and cancer.     If you smoke, STOP!    If you would like more information on quitting smoking, please visit the [facility] website:  [email or web address]   This link will provide additional resources including the QUIT line and the Beat the Pack support groups.     For more information:    American Cancer Society  (419) 432-5284    American Heart Association  1-739.728.2131               YOU ARE THE MOST IMPORTANT FACTOR IN YOUR RECOVERY.     Follow all instructions carefully.     I have reviewed my discharge instructions with my nurse, including the following information, if applicable:     Information about my illness and diagnosis   Follow up appointments (including lab draws)   Wound Care   Equipment Needs   Medications (new and continuing) along with side effects   Preventative information such as vaccines and smoking cessations   Diet   Pain   I know when to contact my Doctor's office or seek emergency care      I want my nurse to describe the side effects of my medications: YES NO   If the answer is no, I understand the side effects of my medications: YES NO   My nurse described the side effects of my medications in a way that I could understand: YES NO   I have taken my personal belongings and my own medications with me at discharge: YES NO            I have received this information and my questions have been answered. I have discussed any concerns I see with this plan with the nurse or physician. I understand these instructions.    Signature of Patient or Responsible Person: _____________________________________    Date: _________________  Time: __________________    Signature of Healthcare Provider: _______________________________________  Date: _________________  Time: __________________

## 2017-01-09 NOTE — ANESTHESIA POSTPROCEDURE EVALUATION
Patient: Krys Mayes    Procedure Summary     Date Anesthesia Start Anesthesia Stop Room / Location    01/09/17 0856 1021  [facility] 02 / [facility]       Procedure Diagnosis Surgeon Provider    NEPHROLITHOTOMY PERCUTANEOUS SECOND LOOK WITH STENT PLACEMENT AND LASER LITHOTRIPSY (Left ) Renal calculus, left MD Troy Poon MD          Anesthesia Type: general  Last vitals  /54 (01/09/17 1200)    Temp      Pulse 67 (01/09/17 1200)   Resp 16 (01/09/17 1200)    SpO2 93 % (01/09/17 1200)      Post Anesthesia Care and Evaluation    Patient location during evaluation: PACU  Patient participation: complete - patient participated  Level of consciousness: sleepy but conscious  Pain score: 0  Pain management: adequate  Airway patency: patent  Anesthetic complications: No anesthetic complications  Cardiovascular status: acceptable  Respiratory status: acceptable  Hydration status: acceptable

## 2017-01-09 NOTE — PLAN OF CARE
Problem: Perioperative Period (Adult)  Goal: Signs and Symptoms of Listed Potential Problems Will be Absent or Manageable (Perioperative Period)  Outcome: Ongoing (interventions implemented as appropriate)

## 2017-01-09 NOTE — PLAN OF CARE
Problem: Perioperative Period (Adult)  Goal: Signs and Symptoms of Listed Potential Problems Will be Absent or Manageable (Perioperative Period)  Outcome: Ongoing (interventions implemented as appropriate)    01/09/17 0702   Perioperative Period   Problems Assessed (Perioperative Period) pain;infection

## 2017-01-09 NOTE — PLAN OF CARE
Problem: Patient Care Overview (Adult)  Goal: Plan of Care Review  Outcome: Outcome(s) achieved Date Met:  01/09/17 01/09/17 1226   Coping/Psychosocial Response Interventions   Plan Of Care Reviewed With patient;spouse   Patient Care Overview   Progress progress toward functional goals as expected   Outcome Evaluation   Outcome Summary/Follow up Plan ready for discharge       Goal: Adult Individualization and Mutuality  Outcome: Outcome(s) achieved Date Met:  01/09/17  Goal: Discharge Needs Assessment  Outcome: Outcome(s) achieved Date Met:  01/09/17    Problem: Perioperative Period (Adult)  Goal: Signs and Symptoms of Listed Potential Problems Will be Absent or Manageable (Perioperative Period)  Outcome: Outcome(s) achieved Date Met:  01/09/17 01/09/17 1226   Perioperative Period   Problems Assessed (Perioperative Period) pain;wound complications;hypoxia/hypoxemia;physiologic stress response;postoperative ileus;urinary retention;situational response;perioperative injury   Problems Present (Perioperative Period) none

## 2017-01-09 NOTE — DISCHARGE INSTRUCTIONS
Outpatient Surgery Guidelines, Adult  Outpatient procedures are those for which the person having the procedure is allowed to go home the same day as the procedure. Various procedures are done on an outpatient basis. You should follow some general guidelines if you will be having an outpatient procedure.  AFTER THE  PROCEDURE  After surgery, you will be taken to a recovery area, where your progress will be monitored. If there are no complications, you will be allowed to go home when you are awake, stable, and taking fluids well. You may have numbness around the surgical site. Healing will take some time. You will have tenderness at the surgical site and may have some swelling and bruising. You may also have some nausea.  HOME CARE INSTRUCTIONS  · Do not drive for 24 hours, or as directed by your health care provider. Do not drive while taking prescription pain medicines.  · Do not drink alcohol for 24 hours.  · Do not make important decisions or sign legal documents for 24 hours.  · Plan on having a responsible adult stay with your for 24 hours following your procedure.  · You may resume a normal diet and activities as directed.  · Do not lift anything heavier than 10 pounds (4.5 kg) or play contact sports until your health care provider says it is okay.  · Only take over-the-counter or prescription medicines as directed by your health care provider.  · Follow up with your health care provider as directed.  SEEK MEDICAL CARE IF:  · You have increased bleeding (more than a small spot) from the surgical site.  · You have redness, swelling, or increasing pain in the wound.  · You see pus coming from the wound.  · You have a fever > 101.  · You notice a bad smell coming from the wound or dressing.  · You feel lightheaded or faint.  · You develop a rash.  · You have trouble breathing.  · You develop allergies.  MAKE SURE YOU:  · Understand these instructions.  · Will watch your condition.  · Will get help right away if  you are not doing well or get worse.

## 2017-01-11 RX ORDER — ALPRAZOLAM 1 MG/1
TABLET ORAL
Qty: 30 TABLET | Refills: 0 | Status: SHIPPED | OUTPATIENT
Start: 2017-01-11 | End: 2017-02-10 | Stop reason: SDUPTHER

## 2017-01-17 LAB
CA PHOS CRY STONE QL IR: 10 %
COLOR STONE: NORMAL
COM CRY STONE QL IR: 90 %
COMPN STONE: NORMAL
CONV COMMENT: NORMAL
Lab: NORMAL
Lab: NORMAL
NIDUS STONE QL: NORMAL
PATH REPORT.COMMENTS IMP SPEC: NORMAL
SIZE STONE: NORMAL MM
WT STONE: 38 MG

## 2017-01-22 DIAGNOSIS — I10 ESSENTIAL HYPERTENSION: ICD-10-CM

## 2017-01-23 RX ORDER — VALSARTAN 160 MG/1
TABLET ORAL
Qty: 90 TABLET | Refills: 3 | Status: SHIPPED | OUTPATIENT
Start: 2017-01-23 | End: 2018-01-13 | Stop reason: SDUPTHER

## 2017-02-08 DIAGNOSIS — R74.01 ELEVATED ALANINE AMINOTRANSFERASE (ALT) LEVEL: ICD-10-CM

## 2017-02-08 DIAGNOSIS — E78.5 DYSLIPIDEMIA: ICD-10-CM

## 2017-02-08 DIAGNOSIS — E55.9 VITAMIN D DEFICIENCY: Primary | ICD-10-CM

## 2017-02-08 LAB
25(OH)D3+25(OH)D2 SERPL-MCNC: 33.3 NG/ML
ALBUMIN SERPL-MCNC: 3.6 G/DL (ref 3.5–5.2)
ALBUMIN/GLOB SERPL: 1 G/DL
ALP SERPL-CCNC: 97 U/L (ref 40–129)
ALT SERPL-CCNC: 8 U/L (ref 5–33)
AST SERPL-CCNC: 12 U/L (ref 5–32)
BASOPHILS # BLD AUTO: 0.07 10*3/MM3 (ref 0–0.2)
BASOPHILS NFR BLD AUTO: 0.6 % (ref 0–2)
BILIRUB SERPL-MCNC: 0.3 MG/DL (ref 0.2–1.2)
BUN SERPL-MCNC: 19 MG/DL (ref 8–23)
BUN/CREAT SERPL: 14.8 (ref 7–25)
CALCIUM SERPL-MCNC: 9.2 MG/DL (ref 8.8–10.5)
CHLORIDE SERPL-SCNC: 99 MMOL/L (ref 98–107)
CHOLEST SERPL-MCNC: 126 MG/DL (ref 0–200)
CO2 SERPL-SCNC: 24.6 MMOL/L (ref 22–29)
CREAT SERPL-MCNC: 1.28 MG/DL (ref 0.57–1)
DIFFERENTIAL COMMENT: NORMAL
EOSINOPHIL # BLD AUTO: 0.12 10*3/MM3 (ref 0.1–0.3)
EOSINOPHIL NFR BLD AUTO: 1 % (ref 0–4)
ERYTHROCYTE [DISTWIDTH] IN BLOOD BY AUTOMATED COUNT: 14.9 % (ref 11.5–14.5)
GLOBULIN SER CALC-MCNC: 3.5 GM/DL
GLUCOSE SERPL-MCNC: 109 MG/DL (ref 65–99)
HCT VFR BLD AUTO: 32.7 % (ref 37–47)
HDLC SERPL-MCNC: 44 MG/DL (ref 40–60)
HGB BLD-MCNC: 9.7 G/DL (ref 12–16)
IMM GRANULOCYTES # BLD: 0.05 10*3/MM3 (ref 0–0.03)
IMM GRANULOCYTES NFR BLD: 0.4 % (ref 0–0.5)
LDLC SERPL CALC-MCNC: 53 MG/DL (ref 0–100)
LDLC/HDLC SERPL: 1.2 {RATIO}
LYMPHOCYTES # BLD AUTO: 1.68 10*3/MM3 (ref 0.6–4.8)
LYMPHOCYTES NFR BLD AUTO: 14.5 % (ref 20–45)
MCH RBC QN AUTO: 24 PG (ref 27–31)
MCHC RBC AUTO-ENTMCNC: 29.7 G/DL (ref 31–37)
MCV RBC AUTO: 80.7 FL (ref 81–99)
MONOCYTES # BLD AUTO: 1.2 10*3/MM3 (ref 0–1)
MONOCYTES NFR BLD AUTO: 10.3 % (ref 3–8)
NEUTROPHILS # BLD AUTO: 8.48 10*3/MM3 (ref 1.5–8.3)
NEUTROPHILS NFR BLD AUTO: 73.2 % (ref 45–70)
NRBC BLD AUTO-RTO: 0 /100 WBC (ref 0–0)
PLATELET # BLD AUTO: 657 10*3/MM3 (ref 140–500)
PLATELET BLD QL SMEAR: NORMAL
POTASSIUM SERPL-SCNC: 4.9 MMOL/L (ref 3.5–5.2)
PROT SERPL-MCNC: 7.1 G/DL (ref 6–8.5)
RBC # BLD AUTO: 4.05 10*6/MM3 (ref 4.2–5.4)
RBC MORPH BLD: NORMAL
SODIUM SERPL-SCNC: 140 MMOL/L (ref 136–145)
TRIGL SERPL-MCNC: 145 MG/DL (ref 0–150)
VLDLC SERPL CALC-MCNC: 29 MG/DL (ref 7–27)
WBC # BLD AUTO: 11.6 10*3/MM3 (ref 4.8–10.8)

## 2017-02-09 LAB
FERRITIN SERPL-MCNC: 116.6 NG/ML (ref 13–150)
HBA1C MFR BLD: 5.8 % (ref 4.8–5.6)
IRON SATN MFR SERPL: 12 %
IRON SERPL-MCNC: 26 MCG/DL (ref 37–145)
Lab: NORMAL
TIBC SERPL-MCNC: 219 MCG/DL (ref 261–478)
UIBC SERPL-MCNC: 193 MCG/DL (ref 112–346)
WRITTEN AUTHORIZATION: NORMAL

## 2017-02-10 RX ORDER — ALPRAZOLAM 1 MG/1
TABLET ORAL
Qty: 30 TABLET | Refills: 0 | Status: SHIPPED | OUTPATIENT
Start: 2017-02-10 | End: 2017-03-16 | Stop reason: SDUPTHER

## 2017-02-15 ENCOUNTER — OFFICE VISIT (OUTPATIENT)
Dept: FAMILY MEDICINE CLINIC | Facility: CLINIC | Age: 67
End: 2017-02-15

## 2017-02-15 VITALS
WEIGHT: 203 LBS | DIASTOLIC BLOOD PRESSURE: 80 MMHG | HEART RATE: 63 BPM | HEIGHT: 67 IN | SYSTOLIC BLOOD PRESSURE: 126 MMHG | RESPIRATION RATE: 16 BRPM | TEMPERATURE: 98 F | OXYGEN SATURATION: 98 % | BODY MASS INDEX: 31.86 KG/M2

## 2017-02-15 DIAGNOSIS — F41.8 MIXED ANXIETY DEPRESSIVE DISORDER: ICD-10-CM

## 2017-02-15 DIAGNOSIS — D50.9 IRON DEFICIENCY ANEMIA, UNSPECIFIED IRON DEFICIENCY ANEMIA TYPE: ICD-10-CM

## 2017-02-15 DIAGNOSIS — F41.9 ANXIETY: ICD-10-CM

## 2017-02-15 DIAGNOSIS — D72.829 LEUKOCYTOSIS, UNSPECIFIED TYPE: ICD-10-CM

## 2017-02-15 DIAGNOSIS — N39.0 URINARY TRACT INFECTION, SITE UNSPECIFIED: ICD-10-CM

## 2017-02-15 DIAGNOSIS — K21.00 REFLUX ESOPHAGITIS: ICD-10-CM

## 2017-02-15 DIAGNOSIS — R73.03 PREDIABETES: ICD-10-CM

## 2017-02-15 DIAGNOSIS — E78.5 DYSLIPIDEMIA: Primary | ICD-10-CM

## 2017-02-15 DIAGNOSIS — T75.3XXD MOTION SICKNESS, SUBSEQUENT ENCOUNTER: ICD-10-CM

## 2017-02-15 PROBLEM — T75.3XXA MOTION SICKNESS: Status: ACTIVE | Noted: 2017-02-15

## 2017-02-15 LAB
BILIRUB BLD-MCNC: NEGATIVE MG/DL
CLARITY, POC: CLEAR
COLOR UR: YELLOW
GLUCOSE UR STRIP-MCNC: NEGATIVE MG/DL
KETONES UR QL: NEGATIVE
LEUKOCYTE EST, POC: ABNORMAL
NITRITE UR-MCNC: NEGATIVE MG/ML
PH UR: 6 [PH] (ref 5–8)
PROT UR STRIP-MCNC: NEGATIVE MG/DL
RBC # UR STRIP: ABNORMAL /UL
SP GR UR: 1.01 (ref 1–1.03)
UROBILINOGEN UR QL: NORMAL

## 2017-02-15 PROCEDURE — 81002 URINALYSIS NONAUTO W/O SCOPE: CPT | Performed by: PHYSICIAN ASSISTANT

## 2017-02-15 PROCEDURE — 99214 OFFICE O/P EST MOD 30 MIN: CPT | Performed by: PHYSICIAN ASSISTANT

## 2017-02-15 RX ORDER — NITROFURANTOIN 25; 75 MG/1; MG/1
100 CAPSULE ORAL 2 TIMES DAILY
Qty: 14 CAPSULE | Refills: 0 | Status: SHIPPED | OUTPATIENT
Start: 2017-02-15 | End: 2017-05-10

## 2017-02-15 RX ORDER — SCOLOPAMINE TRANSDERMAL SYSTEM 1 MG/1
1 PATCH, EXTENDED RELEASE TRANSDERMAL
Qty: 4 PATCH | Refills: 0 | Status: SHIPPED | OUTPATIENT
Start: 2017-02-15 | End: 2018-04-18

## 2017-02-15 RX ORDER — FERROUS SULFATE TAB EC 324 MG (65 MG FE EQUIVALENT) 324 (65 FE) MG
324 TABLET DELAYED RESPONSE ORAL
Qty: 30 TABLET | Refills: 6 | Status: SHIPPED | OUTPATIENT
Start: 2017-02-15 | End: 2017-04-11 | Stop reason: SDUPTHER

## 2017-02-15 NOTE — PROGRESS NOTES
Subjective   Krystor Mayes is a 67 y.o. female.   Chief Complaint   Patient presents with   • Prediabetes   • Follow-up     Lab review   • Anemia   • Hyperlipidemia       History of Present Illness       Danielle is a 67 year old female who presents for anemia, hyperlipidemia and prediabetes management.  States she'll be seeing her urologist tomorrow for follow-up for kidney issues with kidney stones.  States she has had urine frequency but denied any urgency, dysuria, back pain, fevers or chills.  She is doing well with her medications.  Still feels slightly fatigued.  Bowel movements have been daily without dark black tarry stools.  She is not craving ice.  Denied any chest pain, shortness of breath, dizziness, vision changes or swelling of her ankles.  Her appetite has been slightly healthy and sleep has been normal.  Danielle is traveling to [place] in the next few weeks .  She gets motion sickness when riding in card for prolong periods of time.  Request patches to help with motion sickness.    The following portions of the patient's history were reviewed and updated as appropriate: allergies, current medications, past family history, past medical history, past social history and past surgical history.    Review of Systems   Constitutional: Positive for fatigue. Negative for chills and fever.   HENT: Negative.    Eyes: Negative.    Respiratory: Negative.  Negative for cough, shortness of breath and wheezing.    Cardiovascular: Negative.  Negative for chest pain, palpitations and leg swelling.   Gastrointestinal: Negative.  Negative for abdominal pain, anal bleeding, blood in stool, constipation, diarrhea, nausea, rectal pain and vomiting.   Endocrine: Negative.    Genitourinary: Positive for frequency. Negative for dysuria, flank pain, hematuria and urgency.   Musculoskeletal: Negative.    Skin: Negative.    Allergic/Immunologic: Negative.    Neurological: Negative.  Negative for dizziness,  "light-headedness and headaches.   Hematological: Negative.    Psychiatric/Behavioral: Negative.  Negative for sleep disturbance and suicidal ideas.   All other systems reviewed and are negative.    Vitals:    02/15/17 1330   BP: 126/80   BP Location: Right arm   Patient Position: Sitting   Cuff Size: Adult   Pulse: 63   Resp: 16   Temp: 98 °F (36.7 °C)   TempSrc: Oral   SpO2: 98%   Weight: 203 lb (92.1 kg)   Height: 66.5\" (168.9 cm)     Wt Readings from Last 3 Encounters:   02/15/17 203 lb (92.1 kg)   01/09/17 205 lb 4.8 oz (93.1 kg)   12/13/16 209 lb (94.8 kg)       BP Readings from Last 3 Encounters:   02/15/17 126/80   01/09/17 122/54   12/13/16 106/54     Body mass index is 32.27 kg/(m^2).    Allergies   Allergen Reactions   • Morphine And Related GI Intolerance   • Ciprofloxacin-Ciproflox Hcl Er Rash   • Flomax [Tamsulosin Hcl] Rash   • Hydrocodone Rash   • Latex Rash   • Lortab [Hydrocodone-Acetaminophen] Rash   • Penicillins Rash   • Phenergan [Promethazine Hcl] GI Intolerance   • Sulfa Antibiotics Rash   • Amoxicillin Rash       Objective   Physical Exam   Constitutional: She is oriented to person, place, and time. Vital signs are normal. She appears well-developed and well-nourished.   Looks tried and pale   HENT:   Head: Normocephalic and atraumatic.   Right Ear: Hearing, tympanic membrane, external ear and ear canal normal.   Left Ear: Hearing, tympanic membrane, external ear and ear canal normal.   Nose: Nose normal. Right sinus exhibits no maxillary sinus tenderness and no frontal sinus tenderness. Left sinus exhibits no maxillary sinus tenderness and no frontal sinus tenderness.   Mouth/Throat: Uvula is midline, oropharynx is clear and moist and mucous membranes are normal.   Eyes: Conjunctivae, EOM and lids are normal. Pupils are equal, round, and reactive to light.   Neck: Trachea normal and phonation normal. Neck supple. Carotid bruit is not present. No edema present. No thyromegaly present. "   Cardiovascular: Normal rate, regular rhythm, S1 normal, S2 normal, normal heart sounds and normal pulses.    Pulmonary/Chest: Effort normal and breath sounds normal.   Abdominal: Soft. Normal appearance, normal aorta and bowel sounds are normal. There is no hepatomegaly. There is no tenderness.   Lymphadenopathy:     She has no cervical adenopathy.   Neurological: She is alert and oriented to person, place, and time.   Skin: Skin is warm, dry and intact.   Psychiatric: She has a normal mood and affect. Her speech is normal and behavior is normal. Judgment and thought content normal. Cognition and memory are normal.         Results for orders placed or performed in visit on 02/15/17   POC Urinalysis Dipstick   Result Value Ref Range    Color Yellow Yellow, Straw, Dark Yellow, Lin    Clarity, UA Clear Clear    Glucose, UA Negative Negative, 1000 mg/dL (3+) mg/dL    Bilirubin Negative Negative    Ketones, UA Negative Negative    Specific Gravity  1.010 1.005 - 1.030    Blood, UA Small (A) Negative    pH, Urine 6.0 5.0 - 8.0    Protein, POC Negative Negative mg/dL    Urobilinogen, UA Normal Normal    Leukocytes Small (1+) (A) Negative    Nitrite, UA Negative Negative     Assessment/Plan   Krys was seen today for prediabetes, follow-up, anemia and hyperlipidemia.    Diagnoses and all orders for this visit:    Dyslipidemia    Mixed anxiety depressive disorder    Reflux esophagitis    Anxiety    Iron deficiency anemia, unspecified iron deficiency anemia type  -     ferrous sulfate 324 (65 FE) MG tablet delayed-release EC tablet; Take 1 tablet by mouth Daily With Breakfast.  -     Iron Profile; Future  -     Ferritin; Future  -     CBC & Differential; Future    Motion sickness, subsequent encounter  -     Scopolamine (TRANSDERM-SCOP, 1.5 MG,) 1.5 MG/3DAYS patch; Place 1 patch on the skin Every 72 (Seventy-Two) Hours.    Prediabetes    Leukocytosis, unspecified type  -     POC Urinalysis Dipstick    Urinary tract  infection, site unspecified  -     nitrofurantoin, macrocrystal-monohydrate, (MACROBID) 100 MG capsule; Take 1 capsule by mouth 2 (Two) Times a Day.  -     Urine Culture    1.  Chronic dyslipidemia: I have reviewed her cholesterol results with her at today's office visit.  She will continue her current medication and try to eat healthy.  Plan to repeat her fasting blood work in 6 months.  2.  Stable anxiety: She is currently doing well with her current medication.  No refills are required at this time.  3.  Chronic reflux: Danielle has been doing well with her medications and trying to decrease caffeine intake.  No refills are required at this time of medication.  4.  New Iron deficiency anemia: I have discussed Danielle's lab work with her at today's office visit.  Her hemoglobin and hematocrit have improved very slightly.  She now has iron deficiency anemia.  I have prescribed iron pills to her local pharmacy.  She will return to office in 3 months for CBC, iron profile with ferritin.  If no improvement will schedule a referral to hematologist for further evaluation and treatment.  5.  Chronic Prediabetes: Danielle's hemoglobin arm with the was slightly elevated.  I have discussed with her decreasing her carbohydrates in diet and to increase her physical activity.  She voiced understanding.  We'll repeat fasting blood work in 6 months.  6.  New leukocytosis: Her white count was slightly elevated on lab work readings at today's office visit.  She had a urinalysis collected in office that showed hematuria and white cells.  A urine culture was sent to the laboratory for further evaluation.  She was diagnosed with a urinary tract infection.  Danielle has had ongoing kidney issues including infections and kidney stones.  She was instructed to keep her appointment with urologist tomorrow.  Danielle will be notified of urine culture report when completed.  7.  Chronic motion sickness:  Danielle will be going on a trip in car in a  few weeks.  She gets car sick on long drives.  I have prescribed Trans Derm Scop patches to her pharmacy.      Orders Only on 02/08/2017   Component Date Value Ref Range Status   • Total Cholesterol 02/08/2017 126  0 - 200 mg/dL Final   • Triglycerides 02/08/2017 145  0 - 150 mg/dL Final   • HDL Cholesterol 02/08/2017 44  40 - 60 mg/dL Final   • VLDL Cholesterol 02/08/2017 29* 7 - 27 mg/dL Final   • LDL Cholesterol  02/08/2017 53  0 - 100 mg/dL Final   • LDL/HDL Ratio 02/08/2017 1.20   Final   • Glucose 02/08/2017 109* 65 - 99 mg/dL Final   • BUN 02/08/2017 19  8 - 23 mg/dL Final   • Creatinine 02/08/2017 1.28* 0.57 - 1.00 mg/dL Final   • eGFR Non  Am 02/08/2017 41* >60 mL/min/1.73 Final   • eGFR African Am 02/08/2017 50* >60 mL/min/1.73 Final   • BUN/Creatinine Ratio 02/08/2017 14.8  7.0 - 25.0 Final   • Sodium 02/08/2017 140  136 - 145 mmol/L Final   • Potassium 02/08/2017 4.9  3.5 - 5.2 mmol/L Final   • Chloride 02/08/2017 99  98 - 107 mmol/L Final   • Total CO2 02/08/2017 24.6  22.0 - 29.0 mmol/L Final   • Calcium 02/08/2017 9.2  8.8 - 10.5 mg/dL Final   • Total Protein 02/08/2017 7.1  6.0 - 8.5 g/dL Final   • Albumin 02/08/2017 3.60  3.50 - 5.20 g/dL Final   • Globulin 02/08/2017 3.5  gm/dL Final   • A/G Ratio 02/08/2017 1.0  g/dL Final   • Total Bilirubin 02/08/2017 0.3  0.2 - 1.2 mg/dL Final   • Alkaline Phosphatase 02/08/2017 97  40 - 129 U/L Final   • AST (SGOT) 02/08/2017 12  5 - 32 U/L Final   • ALT (SGPT) 02/08/2017 8  5 - 33 U/L Final   • WBC 02/08/2017 11.60* 4.80 - 10.80 10*3/mm3 Final   • RBC 02/08/2017 4.05* 4.20 - 5.40 10*6/mm3 Final   • Hemoglobin 02/08/2017 9.7* 12.0 - 16.0 g/dL Final   • Hematocrit 02/08/2017 32.7* 37.0 - 47.0 % Final   • MCV 02/08/2017 80.7* 81.0 - 99.0 fL Final   • MCH 02/08/2017 24.0* 27.0 - 31.0 pg Final   • MCHC 02/08/2017 29.7* 31.0 - 37.0 g/dL Final   • RDW 02/08/2017 14.9* 11.5 - 14.5 % Final   • Platelets 02/08/2017 657* 140 - 500 10*3/mm3 Final   • Neutrophil  Rel % 02/08/2017 73.2* 45.0 - 70.0 % Final   • Lymphocyte Rel % 02/08/2017 14.5* 20.0 - 45.0 % Final   • Monocyte Rel % 02/08/2017 10.3* 3.0 - 8.0 % Final   • Eosinophil Rel % 02/08/2017 1.0  0.0 - 4.0 % Final   • Basophil Rel % 02/08/2017 0.6  0.0 - 2.0 % Final   • Neutrophils Absolute 02/08/2017 8.48* 1.50 - 8.30 10*3/mm3 Final   • Lymphocytes Absolute 02/08/2017 1.68  0.60 - 4.80 10*3/mm3 Final   • Monocytes Absolute 02/08/2017 1.20* 0.00 - 1.00 10*3/mm3 Final   • Eosinophils Absolute 02/08/2017 0.12  0.10 - 0.30 10*3/mm3 Final   • Basophils Absolute 02/08/2017 0.07  0.00 - 0.20 10*3/mm3 Final   • Immature Granulocyte Rel % 02/08/2017 0.4  0.0 - 0.5 % Final   • Immature Grans Absolute 02/08/2017 0.05* 0.00 - 0.03 10*3/mm3 Final   • nRBC 02/08/2017 0.0  0.0 - 0.0 /100 WBC Final   • 25 Hydroxy, Vitamin D 02/08/2017 33.3  ng/mL Final    Comment: Reference Range for Total Vitamin D 25(OH)  Deficiency    <20.0 ng/mL  Insufficiency 21-29 ng/mL  Sufficiency   30-74 ng/mL     • Differential Comment 02/08/2017 Comment   Final    WBC MORPHOLOGY               Normal                      Normal     N   • Comment 02/08/2017 Comment   Final    POIKILOCYTOSIS               Slight/1+                   None Seen  N   • Plt Comment 02/08/2017 Comment   Final    PLATELET ESTIMATE            Increased                   Normal     N   • TIBC 02/08/2017 219* 261 - 478 mcg/dL Final   • UIBC 02/08/2017 193  112 - 346 mcg/dL Final   • Iron 02/08/2017 26* 37 - 145 mcg/dL Final   • Iron Saturation 02/08/2017 12  % Final   • Hemoglobin A1C 02/08/2017 5.80* 4.80 - 5.60 % Final   • Please note 02/08/2017 Comment   Final    Comment: We have received your request for additional testing or test  verification.  You will be notified if we are unable to process  your request.     • Ferritin 02/08/2017 116.60  13.00 - 150.00 ng/mL Final   • Written Authorization 02/08/2017 Comment   Final    Comment: Written Authorization Received.  Authorization  received from written request 02-  Logged by Rebecca Earl transcription disclaimer    Much of this encounter note is an electronic transcription/translation of spoken language to printed text.  The electronic translation of spoken language may permit erroneous, or at times, nonsensical words or phrases to be inadvertently transcribed.  Although I have reviewed the note for such errors, some may still exist.    Ashleigh Hernandez PA-C  Family Practice

## 2017-02-16 ENCOUNTER — TRANSCRIBE ORDERS (OUTPATIENT)
Dept: ADMINISTRATIVE | Facility: HOSPITAL | Age: 67
End: 2017-02-16

## 2017-02-16 DIAGNOSIS — N23 RENAL COLIC: ICD-10-CM

## 2017-02-16 DIAGNOSIS — N20.0 CALCULUS OF KIDNEY: Primary | ICD-10-CM

## 2017-02-16 PROBLEM — N39.0 URINARY TRACT INFECTION: Status: ACTIVE | Noted: 2017-02-16

## 2017-02-16 PROBLEM — R73.03 PREDIABETES: Status: ACTIVE | Noted: 2017-02-16

## 2017-02-17 ENCOUNTER — TELEPHONE (OUTPATIENT)
Dept: FAMILY MEDICINE CLINIC | Facility: CLINIC | Age: 67
End: 2017-02-17

## 2017-02-17 LAB
BACTERIA UR CULT: NORMAL
BACTERIA UR CULT: NORMAL

## 2017-02-17 NOTE — TELEPHONE ENCOUNTER
----- Message from Ashleigh Hernandez PA-C sent at 2/17/2017  9:06 AM EST -----  Please notify patient her urine culture showed mixed organisms.  Please finish antibiotic.

## 2017-03-13 ENCOUNTER — TELEPHONE (OUTPATIENT)
Dept: CARDIOLOGY | Facility: CLINIC | Age: 67
End: 2017-03-13

## 2017-03-13 NOTE — TELEPHONE ENCOUNTER
Will you please have her call her prescription insurance plan and ask them to price out the following options:    Eliquis 5mg twice daily, #60 per month or 180 for 3 months    Pradaxa 150mg twice daily, #60 per month or 180 for 3 months    One may be preferred and may be less expensive.    rupa corea

## 2017-03-13 NOTE — TELEPHONE ENCOUNTER
Pt called and said that she went to  here xarelto today and that it was going to be $300 a month. She wanted to know if there was something equivalent to that that would be cheaper? Pls advise or call pt at [phone].

## 2017-03-16 RX ORDER — ALPRAZOLAM 1 MG/1
1 TABLET ORAL NIGHTLY PRN
Qty: 30 TABLET | Refills: 0 | OUTPATIENT
Start: 2017-03-16

## 2017-03-16 RX ORDER — ALPRAZOLAM 1 MG/1
1 TABLET ORAL NIGHTLY PRN
Qty: 30 TABLET | Refills: 0 | Status: SHIPPED | OUTPATIENT
Start: 2017-03-16 | End: 2017-03-20 | Stop reason: SDUPTHER

## 2017-03-16 NOTE — TELEPHONE ENCOUNTER
S/w pt. She checked with insurance and she said the eliquis and pradaxa was a tier 4 and the xarelto is tier 3. So she would be better off with the xarelto. She said the reason its expensive is because she has a deductible of 2500 and she hasn't reached it yet. Then it would be cheaper. Just keep her in samples until then?

## 2017-03-20 RX ORDER — ALPRAZOLAM 1 MG/1
1 TABLET ORAL NIGHTLY PRN
Qty: 30 TABLET | Refills: 0 | Status: SHIPPED | OUTPATIENT
Start: 2017-03-20 | End: 2017-04-11 | Stop reason: SDUPTHER

## 2017-04-10 RX ORDER — ALPRAZOLAM 1 MG/1
TABLET ORAL
Qty: 30 TABLET | Refills: 0 | Status: CANCELLED | OUTPATIENT
Start: 2017-04-10

## 2017-04-11 DIAGNOSIS — D50.9 IRON DEFICIENCY ANEMIA, UNSPECIFIED IRON DEFICIENCY ANEMIA TYPE: ICD-10-CM

## 2017-04-11 RX ORDER — FERROUS SULFATE TAB EC 324 MG (65 MG FE EQUIVALENT) 324 (65 FE) MG
324 TABLET DELAYED RESPONSE ORAL
Qty: 90 TABLET | Refills: 3 | Status: SHIPPED | OUTPATIENT
Start: 2017-04-11 | End: 2018-06-12 | Stop reason: ALTCHOICE

## 2017-04-11 RX ORDER — ALPRAZOLAM 1 MG/1
1 TABLET ORAL NIGHTLY PRN
Qty: 30 TABLET | Refills: 0 | Status: SHIPPED | OUTPATIENT
Start: 2017-04-11 | End: 2017-05-10 | Stop reason: SDUPTHER

## 2017-05-01 DIAGNOSIS — E78.5 DYSLIPIDEMIA: ICD-10-CM

## 2017-05-02 RX ORDER — SIMVASTATIN 80 MG
TABLET ORAL
Qty: 90 TABLET | Refills: 0 | Status: SHIPPED | OUTPATIENT
Start: 2017-05-02 | End: 2017-08-01 | Stop reason: SDUPTHER

## 2017-05-08 ENCOUNTER — HOSPITAL ENCOUNTER (OUTPATIENT)
Dept: CT IMAGING | Facility: HOSPITAL | Age: 67
Discharge: HOME | End: 2017-05-08
Attending: UROLOGY | Admitting: UROLOGY

## 2017-05-08 DIAGNOSIS — N23 RENAL COLIC: ICD-10-CM

## 2017-05-08 DIAGNOSIS — N20.0 CALCULUS OF KIDNEY: ICD-10-CM

## 2017-05-08 PROCEDURE — 74176 CT ABD & PELVIS W/O CONTRAST: CPT

## 2017-05-10 ENCOUNTER — OFFICE VISIT (OUTPATIENT)
Dept: FAMILY MEDICINE CLINIC | Facility: CLINIC | Age: 67
End: 2017-05-10

## 2017-05-10 VITALS
HEIGHT: 67 IN | DIASTOLIC BLOOD PRESSURE: 78 MMHG | SYSTOLIC BLOOD PRESSURE: 122 MMHG | BODY MASS INDEX: 32.43 KG/M2 | OXYGEN SATURATION: 96 % | WEIGHT: 206.6 LBS | HEART RATE: 62 BPM

## 2017-05-10 DIAGNOSIS — M79.604 LOWER EXTREMITY PAIN, RIGHT: ICD-10-CM

## 2017-05-10 DIAGNOSIS — E78.5 DYSLIPIDEMIA: ICD-10-CM

## 2017-05-10 DIAGNOSIS — N39.0 URINARY TRACT INFECTION, SITE UNSPECIFIED: ICD-10-CM

## 2017-05-10 DIAGNOSIS — Z00.00 MEDICARE ANNUAL WELLNESS VISIT, SUBSEQUENT: Primary | ICD-10-CM

## 2017-05-10 DIAGNOSIS — R35.0 URINE FREQUENCY: ICD-10-CM

## 2017-05-10 DIAGNOSIS — I10 ESSENTIAL HYPERTENSION: ICD-10-CM

## 2017-05-10 DIAGNOSIS — Z23 NEED FOR PNEUMOCOCCAL VACCINE: ICD-10-CM

## 2017-05-10 DIAGNOSIS — F41.9 ANXIETY: ICD-10-CM

## 2017-05-10 DIAGNOSIS — D50.9 IRON DEFICIENCY ANEMIA, UNSPECIFIED IRON DEFICIENCY ANEMIA TYPE: ICD-10-CM

## 2017-05-10 PROCEDURE — G0439 PPPS, SUBSEQ VISIT: HCPCS | Performed by: PHYSICIAN ASSISTANT

## 2017-05-10 PROCEDURE — 99214 OFFICE O/P EST MOD 30 MIN: CPT | Performed by: PHYSICIAN ASSISTANT

## 2017-05-10 PROCEDURE — 90471 IMMUNIZATION ADMIN: CPT | Performed by: PHYSICIAN ASSISTANT

## 2017-05-10 PROCEDURE — 90670 PCV13 VACCINE IM: CPT | Performed by: PHYSICIAN ASSISTANT

## 2017-05-10 PROCEDURE — 81002 URINALYSIS NONAUTO W/O SCOPE: CPT | Performed by: PHYSICIAN ASSISTANT

## 2017-05-10 RX ORDER — NITROFURANTOIN 25; 75 MG/1; MG/1
100 CAPSULE ORAL 2 TIMES DAILY
Qty: 14 CAPSULE | Refills: 0 | Status: SHIPPED | OUTPATIENT
Start: 2017-05-10 | End: 2017-06-13

## 2017-05-10 RX ORDER — ALPRAZOLAM 1 MG/1
TABLET ORAL
Qty: 30 TABLET | Refills: 0 | Status: SHIPPED | OUTPATIENT
Start: 2017-05-10 | End: 2017-07-13 | Stop reason: SDUPTHER

## 2017-05-10 RX ORDER — ALPRAZOLAM 1 MG/1
1 TABLET ORAL NIGHTLY PRN
Qty: 30 TABLET | Refills: 0 | Status: CANCELLED
Start: 2017-05-10

## 2017-05-11 PROBLEM — M79.604 LOWER EXTREMITY PAIN, RIGHT: Status: ACTIVE | Noted: 2017-05-11

## 2017-05-11 PROBLEM — Z00.00 MEDICARE ANNUAL WELLNESS VISIT, SUBSEQUENT: Status: ACTIVE | Noted: 2017-05-11

## 2017-05-11 PROBLEM — Z23 NEED FOR PNEUMOCOCCAL VACCINE: Status: ACTIVE | Noted: 2017-05-11

## 2017-05-11 RX ORDER — ALPRAZOLAM 1 MG/1
1 TABLET ORAL NIGHTLY PRN
Qty: 30 TABLET | Refills: 0
Start: 2017-05-11 | End: 2017-06-13 | Stop reason: SDUPTHER

## 2017-05-13 LAB
BACTERIA UR CULT: ABNORMAL
BACTERIA UR CULT: ABNORMAL
OTHER ANTIBIOTIC SUSC ISLT: ABNORMAL

## 2017-05-15 ENCOUNTER — RESULTS ENCOUNTER (OUTPATIENT)
Dept: FAMILY MEDICINE CLINIC | Facility: CLINIC | Age: 67
End: 2017-05-15

## 2017-05-15 DIAGNOSIS — D50.9 IRON DEFICIENCY ANEMIA, UNSPECIFIED IRON DEFICIENCY ANEMIA TYPE: ICD-10-CM

## 2017-05-16 ENCOUNTER — TELEPHONE (OUTPATIENT)
Dept: FAMILY MEDICINE CLINIC | Facility: CLINIC | Age: 67
End: 2017-05-16

## 2017-05-17 ENCOUNTER — HOSPITAL ENCOUNTER (OUTPATIENT)
Dept: ULTRASOUND IMAGING | Facility: HOSPITAL | Age: 67
Discharge: HOME | End: 2017-05-17
Admitting: PHYSICIAN ASSISTANT

## 2017-05-17 DIAGNOSIS — M79.604 LOWER EXTREMITY PAIN, RIGHT: ICD-10-CM

## 2017-05-17 PROCEDURE — 93971 EXTREMITY STUDY: CPT

## 2017-06-01 LAB
BASOPHILS # BLD AUTO: 0.04 10*3/MM3 (ref 0–0.2)
BASOPHILS NFR BLD AUTO: 0.6 % (ref 0–2)
EOSINOPHIL # BLD AUTO: 0.17 10*3/MM3 (ref 0.1–0.3)
EOSINOPHIL NFR BLD AUTO: 2.6 % (ref 0–4)
ERYTHROCYTE [DISTWIDTH] IN BLOOD BY AUTOMATED COUNT: 14.8 % (ref 11.5–14.5)
FERRITIN SERPL-MCNC: 69.09 NG/ML (ref 13–150)
HCT VFR BLD AUTO: 41.7 % (ref 37–47)
HGB BLD-MCNC: 13.2 G/DL (ref 12–16)
IMM GRANULOCYTES # BLD: 0.01 10*3/MM3 (ref 0–0.03)
IMM GRANULOCYTES NFR BLD: 0.2 % (ref 0–0.5)
IRON SATN MFR SERPL: 37 %
IRON SERPL-MCNC: 104 MCG/DL (ref 37–145)
LYMPHOCYTES # BLD AUTO: 1.57 10*3/MM3 (ref 0.6–4.8)
LYMPHOCYTES NFR BLD AUTO: 24.1 % (ref 20–45)
MCH RBC QN AUTO: 25 PG (ref 27–31)
MCHC RBC AUTO-ENTMCNC: 31.7 G/DL (ref 31–37)
MCV RBC AUTO: 79.1 FL (ref 81–99)
MONOCYTES # BLD AUTO: 0.74 10*3/MM3 (ref 0–1)
MONOCYTES NFR BLD AUTO: 11.4 % (ref 3–8)
NEUTROPHILS # BLD AUTO: 3.98 10*3/MM3 (ref 1.5–8.3)
NEUTROPHILS NFR BLD AUTO: 61.1 % (ref 45–70)
NRBC BLD AUTO-RTO: 0 /100 WBC (ref 0–0)
PLATELET # BLD AUTO: 292 10*3/MM3 (ref 140–500)
RBC # BLD AUTO: 5.27 10*6/MM3 (ref 4.2–5.4)
TIBC SERPL-MCNC: 280 MCG/DL (ref 261–478)
UIBC SERPL-MCNC: 176 MCG/DL (ref 112–346)
WBC # BLD AUTO: 6.51 10*3/MM3 (ref 4.8–10.8)

## 2017-06-12 RX ORDER — ALPRAZOLAM 1 MG/1
TABLET ORAL
Qty: 30 TABLET | Refills: 0 | Status: SHIPPED | OUTPATIENT
Start: 2017-06-12 | End: 2017-06-13 | Stop reason: SDUPTHER

## 2017-06-13 ENCOUNTER — TRANSCRIBE ORDERS (OUTPATIENT)
Dept: ADMINISTRATIVE | Facility: HOSPITAL | Age: 67
End: 2017-06-13

## 2017-06-13 ENCOUNTER — OFFICE VISIT (OUTPATIENT)
Dept: CARDIOLOGY | Facility: CLINIC | Age: 67
End: 2017-06-13

## 2017-06-13 ENCOUNTER — HOSPITAL ENCOUNTER (OUTPATIENT)
Dept: GENERAL RADIOLOGY | Facility: HOSPITAL | Age: 67
Discharge: HOME | End: 2017-06-13
Attending: UROLOGY | Admitting: UROLOGY

## 2017-06-13 VITALS
WEIGHT: 208.9 LBS | HEART RATE: 58 BPM | HEIGHT: 67 IN | BODY MASS INDEX: 32.79 KG/M2 | DIASTOLIC BLOOD PRESSURE: 74 MMHG | SYSTOLIC BLOOD PRESSURE: 120 MMHG

## 2017-06-13 DIAGNOSIS — I10 ESSENTIAL HYPERTENSION: ICD-10-CM

## 2017-06-13 DIAGNOSIS — N20.0 CALCULUS, RENAL: ICD-10-CM

## 2017-06-13 DIAGNOSIS — N20.0 CALCULUS, RENAL: Primary | ICD-10-CM

## 2017-06-13 DIAGNOSIS — I48.0 PAF (PAROXYSMAL ATRIAL FIBRILLATION) (HCC): Primary | ICD-10-CM

## 2017-06-13 PROBLEM — N39.0 URINARY TRACT INFECTION: Status: RESOLVED | Noted: 2017-02-16 | Resolved: 2017-06-13

## 2017-06-13 PROBLEM — D72.829 LEUKOCYTOSIS: Status: RESOLVED | Noted: 2017-02-15 | Resolved: 2017-06-13

## 2017-06-13 PROCEDURE — 74000 HC ABDOMEN KUB: CPT

## 2017-06-13 PROCEDURE — 93000 ELECTROCARDIOGRAM COMPLETE: CPT | Performed by: INTERNAL MEDICINE

## 2017-06-13 PROCEDURE — 99213 OFFICE O/P EST LOW 20 MIN: CPT | Performed by: INTERNAL MEDICINE

## 2017-06-13 RX ORDER — METOPROLOL TARTRATE 50 MG/1
TABLET, FILM COATED ORAL
Refills: 2 | COMMUNITY
Start: 2017-05-23 | End: 2018-04-18 | Stop reason: SDUPTHER

## 2017-06-13 NOTE — PROGRESS NOTES
Date of Office Visit: 2017  Encounter Provider: Jeremy Orozco MD  Place of Service: [facility] CARDIOLOGY  Patient Name: Krys Mayes  :1950    Chief Complaint   Patient presents with   • Atrial Fibrillation     6 MONTH FOLLOW UP    :     HPI: Krys Mayes is a 67 y.o. female who presents today to follow up.     Prior to 2016, she had not had any cardiac issues. At that time, she was admitted with urosepsis due to kidney stones. She went into atrial fibrillation in that setting. She initially converted to NSR but went back into atrial fibrillation. She was rate controlled and placed on rivaroxaban. An echocardiogram revealed normal LV size and systolic function (EF 56%). There was moderate TR and severe pulmonary hypertension as well. When I saw her in the office soon after, she was doing well.      She was readmitted just a few weeks later with urosepsis again. She was found to have a staghorn calculus.  She did have a brief episode of atrial fibrillation during that admission. Her metoprolol dose was increased, and her rivaroxaban dose was decreased due to STEVAN.      She is now doing really well. She denies palpitations, lightheadedness, edema, orthopnea, or chest pain. She denies bleeding.      Past Medical History:   Diagnosis Date   • Arthritis    • Controlled type 2 diabetes with renal manifestation     states borderline    • Hyperlipidemia    • Hypertension    • Kidney stone     recurrent, calcium oxalate   • Menopausal disorder     She went through menopause in    • Obesity    • PAF (paroxysmal atrial fibrillation)     in the setting of urosepsis, 2016   • Reflux esophagitis 2016   • Sepsis due to urinary tract infection    • Urinary tract infection        Past Surgical History:   Procedure Laterality Date   • ANKLE SURGERY      Ankle Repair / Description: ORif the left ankle in 2010. Secondary to fall   • BREAST BIOPSY Right      benign   • BREAST SURGERY      biopsy    • CHOLECYSTECTOMY     • COLONOSCOPY  2014    Done 2004 normal recheck in 10 years. Repeated February 2014 colonoscopy was normal and to be repeated in 10 years.   • CYSTOSCOPY BLADDER STONE LITHOTRIPSY     • HEMORRHOIDECTOMY     • NEPHROLITHOTOMY Left 1/9/2017    Procedure: NEPHROLITHOTOMY PERCUTANEOUS SECOND LOOK WITH STENT PLACEMENT AND LASER LITHOTRIPSY;  Surgeon: Mati Villegas MD;  Location: [facility];  Service:    • OTHER SURGICAL HISTORY      Tubal Ligation   • PERCUTANEOUS NEPHROSTOLITHOTOMY Left 12/2016   • TUBAL ABDOMINAL LIGATION         Social History     Social History   • Marital status:      Spouse name: N/A   • Number of children: N/A   • Years of education: N/A     Occupational History   • Not on file.     Social History Main Topics   • Smoking status: Never Smoker   • Smokeless tobacco: Never Used      Comment: caffeine use /soft drinks   • Alcohol use Yes      Comment: She uses alcohol once or twice a year.   • Drug use: No   • Sexual activity: Defer     Other Topics Concern   • Not on file     Social History Narrative       Family History   Problem Relation Age of Onset   • COPD Mother    • Heart attack Father      acute myocardial infarction   • Heart attack Son    • Kidney disease Maternal Grandmother        Review of Systems   Genitourinary: Positive for flank pain.   All other systems reviewed and are negative.      Allergies   Allergen Reactions   • Morphine And Related GI Intolerance   • Ciprofloxacin-Ciproflox Hcl Er Rash   • Flomax [Tamsulosin Hcl] Rash   • Hydrocodone Rash   • Latex Rash   • Lortab [Hydrocodone-Acetaminophen] Rash   • Penicillins Rash   • Phenergan [Promethazine Hcl] GI Intolerance   • Sulfa Antibiotics Rash   • Amoxicillin Rash   • Tamsulosin Rash         Current Outpatient Prescriptions:   •  acetaminophen (TYLENOL) 650 MG 8 hr tablet, Take 1 tablet by mouth every 6 (six) hours as needed. for pain, Disp: ,  "Rfl:   •  ALPRAZolam (XANAX) 1 MG tablet, TAKE 1 TABLET BY MOUTH EVERY NIGHT AT BEDTIME AS NEEDED FOR SLEEP/ANXIETY, Disp: 30 tablet, Rfl: 0  •  aspirin 81 MG tablet, Take 81 mg by mouth Daily., Disp: , Rfl:   •  citalopram (CeleXA) 40 MG tablet, Take 1 tablet by mouth daily., Disp: 90 tablet, Rfl: 3  •  ferrous sulfate 324 (65 FE) MG tablet delayed-release EC tablet, Take 1 tablet by mouth Daily With Breakfast., Disp: 90 tablet, Rfl: 3  •  metoprolol tartrate (LOPRESSOR) 50 MG tablet, TK 1 AND 1/2 TS PO BID, Disp: , Rfl: 2  •  Multiple Vitamin tablet, Take 1 tablet by mouth Daily., Disp: , Rfl:   •  omeprazole OTC (PriLOSEC OTC) 20 MG EC tablet, Take 20 mg by mouth 2 (two) times a day., Disp: , Rfl:   •  Probiotic Product (PROBIOTIC ADVANCED PO), Take 1 tablet by mouth Daily., Disp: , Rfl:   •  rivaroxaban (XARELTO) 15 MG tablet, Take 1 tablet by mouth Daily., Disp: 30 tablet, Rfl: 0  •  Scopolamine (TRANSDERM-SCOP, 1.5 MG,) 1.5 MG/3DAYS patch, Place 1 patch on the skin Every 72 (Seventy-Two) Hours., Disp: 4 patch, Rfl: 0  •  simvastatin (ZOCOR) 80 MG tablet, TAKE 1 TABLET BY MOUTH DAILY, Disp: 90 tablet, Rfl: 0  •  valsartan (DIOVAN) 160 MG tablet, TAKE 1 TABLET BY MOUTH DAILY, Disp: 90 tablet, Rfl: 3     Objective:     Vitals:    06/13/17 1114   BP: 120/74   Pulse: 58   Weight: 208 lb 14.4 oz (94.8 kg)   Height: 66.5\" (168.9 cm)     Body mass index is 33.21 kg/(m^2).    Physical Exam   Constitutional: She is oriented to person, place, and time. She appears well-developed and well-nourished.   HENT:   Head: Normocephalic.   Nose: Nose normal.   Mouth/Throat: Oropharynx is clear and moist.   Eyes: Conjunctivae and EOM are normal. Pupils are equal, round, and reactive to light.   Neck: Normal range of motion. No JVD present.   Cardiovascular: Normal rate, regular rhythm, normal heart sounds and intact distal pulses.    No murmur heard.  Pulmonary/Chest: Effort normal and breath sounds normal.   Abdominal: Soft. She " exhibits no mass. There is no tenderness.   Musculoskeletal: Normal range of motion. She exhibits no edema.   Lymphadenopathy:     She has no cervical adenopathy.   Neurological: She is alert and oriented to person, place, and time. No cranial nerve deficit.   Skin: Skin is warm and dry. No rash noted.   Psychiatric: She has a normal mood and affect. Her behavior is normal. Judgment and thought content normal.   Vitals reviewed.        ECG 12 Lead  Date/Time: 6/13/2017 12:48 PM  Performed by: JEREMY OROZCO  Authorized by: JEREMY OROZCO   Comparison: compared with previous ECG   Similar to previous ECG  Rhythm: sinus rhythm  Conduction: conduction normal  ST Segments: ST segments normal  T Waves: T waves normal  QRS axis: normal  Other: no other findings  Clinical impression: normal ECG              Assessment:       Diagnosis Plan   1. PAF (paroxysmal atrial fibrillation)     2. Essential hypertension            Plan:       1.  Atrial Fibrillation and Atrial Flutter  Assessment  • The patient has paroxysmal atrial fibrillation  • This is non-valvular in etiology  • The patient's CHADS2-VASc score is 3  • A [id number] score of 2 or more is considered a high risk for a thromboembolic event  • Rivaroxaban prescribed    Plan  • Attempt to maintain sinus rhythm  • Continue rivaroxaban for antithrombotic therapy, bleeding issues discussed  • Continue beta blocker for rhythm control    2.  Her BP is within goal.    As always, it has been a pleasure to participate in your patient's care.      Sincerely,       Jeremy Orozco MD

## 2017-07-13 RX ORDER — ALPRAZOLAM 1 MG/1
1 TABLET ORAL NIGHTLY PRN
Qty: 90 TABLET | Refills: 0 | Status: SHIPPED | OUTPATIENT
Start: 2017-07-13 | End: 2017-10-05 | Stop reason: SDUPTHER

## 2017-08-01 ENCOUNTER — PATIENT MESSAGE (OUTPATIENT)
Dept: FAMILY MEDICINE CLINIC | Facility: CLINIC | Age: 67
End: 2017-08-01

## 2017-08-01 DIAGNOSIS — E78.5 DYSLIPIDEMIA: ICD-10-CM

## 2017-08-01 RX ORDER — CITALOPRAM 40 MG/1
40 TABLET ORAL DAILY
Qty: 90 TABLET | Refills: 0 | Status: SHIPPED | OUTPATIENT
Start: 2017-08-01 | End: 2017-10-29 | Stop reason: SDUPTHER

## 2017-08-01 RX ORDER — SIMVASTATIN 80 MG
80 TABLET ORAL NIGHTLY
Qty: 90 TABLET | Refills: 0 | Status: SHIPPED | OUTPATIENT
Start: 2017-08-01 | End: 2017-10-29 | Stop reason: SDUPTHER

## 2017-08-01 NOTE — TELEPHONE ENCOUNTER
From: Krys Mayes  To: Ashleigh Hernandez PA-C  Sent: 8/1/2017 9:25 AM EDT  Subject: Prescription Question    I need a refill for my Citalopram and Simvastatin called in to [facility]

## 2017-10-05 RX ORDER — ALPRAZOLAM 1 MG/1
TABLET ORAL
Qty: 90 TABLET | Refills: 0 | OUTPATIENT
Start: 2017-10-05

## 2017-10-05 RX ORDER — ALPRAZOLAM 1 MG/1
TABLET ORAL
Qty: 90 TABLET | Refills: 0 | Status: SHIPPED | OUTPATIENT
Start: 2017-10-05 | End: 2017-12-24 | Stop reason: SDUPTHER

## 2017-10-11 DIAGNOSIS — D50.9 IRON DEFICIENCY ANEMIA, UNSPECIFIED IRON DEFICIENCY ANEMIA TYPE: Primary | ICD-10-CM

## 2017-10-11 DIAGNOSIS — Z79.899 HIGH RISK MEDICATION USE: ICD-10-CM

## 2017-10-11 DIAGNOSIS — E78.5 DYSLIPIDEMIA: ICD-10-CM

## 2017-10-11 LAB
ALBUMIN SERPL-MCNC: 4.2 G/DL (ref 3.5–5.2)
ALBUMIN/GLOB SERPL: 1.5 G/DL
ALP SERPL-CCNC: 66 U/L (ref 40–129)
ALT SERPL-CCNC: 17 U/L (ref 5–33)
AST SERPL-CCNC: 18 U/L (ref 5–32)
BASOPHILS # BLD AUTO: 0.03 10*3/MM3 (ref 0–0.2)
BASOPHILS NFR BLD AUTO: 0.5 % (ref 0–2)
BILIRUB SERPL-MCNC: 0.2 MG/DL (ref 0.2–1.2)
BUN SERPL-MCNC: 28 MG/DL (ref 8–23)
BUN/CREAT SERPL: 23.9 (ref 7–25)
CALCIUM SERPL-MCNC: 9.5 MG/DL (ref 8.8–10.5)
CHLORIDE SERPL-SCNC: 104 MMOL/L (ref 98–107)
CHOLEST SERPL-MCNC: 165 MG/DL (ref 0–200)
CO2 SERPL-SCNC: 25.3 MMOL/L (ref 22–29)
CREAT SERPL-MCNC: 1.17 MG/DL (ref 0.57–1)
EOSINOPHIL # BLD AUTO: 0.15 10*3/MM3 (ref 0.1–0.3)
EOSINOPHIL NFR BLD AUTO: 2.4 % (ref 0–4)
ERYTHROCYTE [DISTWIDTH] IN BLOOD BY AUTOMATED COUNT: 14.4 % (ref 11.5–14.5)
GLOBULIN SER CALC-MCNC: 2.8 GM/DL
GLUCOSE SERPL-MCNC: 124 MG/DL (ref 65–99)
HCT VFR BLD AUTO: 42.5 % (ref 37–47)
HDLC SERPL-MCNC: 64 MG/DL (ref 40–60)
HGB BLD-MCNC: 13.3 G/DL (ref 12–16)
IMM GRANULOCYTES # BLD: 0.02 10*3/MM3 (ref 0–0.03)
IMM GRANULOCYTES NFR BLD: 0.3 % (ref 0–0.5)
LDLC SERPL CALC-MCNC: 52 MG/DL (ref 0–100)
LDLC/HDLC SERPL: 0.81 {RATIO}
LYMPHOCYTES # BLD AUTO: 1.5 10*3/MM3 (ref 0.6–4.8)
LYMPHOCYTES NFR BLD AUTO: 23.6 % (ref 20–45)
MCH RBC QN AUTO: 26.2 PG (ref 27–31)
MCHC RBC AUTO-ENTMCNC: 31.3 G/DL (ref 31–37)
MCV RBC AUTO: 83.8 FL (ref 81–99)
MONOCYTES # BLD AUTO: 0.65 10*3/MM3 (ref 0–1)
MONOCYTES NFR BLD AUTO: 10.2 % (ref 3–8)
NEUTROPHILS # BLD AUTO: 4.01 10*3/MM3 (ref 1.5–8.3)
NEUTROPHILS NFR BLD AUTO: 63 % (ref 45–70)
NRBC BLD AUTO-RTO: 0 /100 WBC (ref 0–0)
PLATELET # BLD AUTO: 298 10*3/MM3 (ref 140–500)
POTASSIUM SERPL-SCNC: 4.7 MMOL/L (ref 3.5–5.2)
PROT SERPL-MCNC: 7 G/DL (ref 6–8.5)
RBC # BLD AUTO: 5.07 10*6/MM3 (ref 4.2–5.4)
SODIUM SERPL-SCNC: 143 MMOL/L (ref 136–145)
TRIGL SERPL-MCNC: 245 MG/DL (ref 0–150)
VLDLC SERPL CALC-MCNC: 49 MG/DL (ref 7–27)
WBC # BLD AUTO: 6.36 10*3/MM3 (ref 4.8–10.8)

## 2017-10-13 LAB
HBA1C MFR BLD: 5.5 % (ref 4.8–5.6)
HCV AB S/CO SERPL IA: <0.1 S/CO RATIO (ref 0–0.9)
WRITTEN AUTHORIZATION: NORMAL

## 2017-10-18 ENCOUNTER — OFFICE VISIT (OUTPATIENT)
Dept: FAMILY MEDICINE CLINIC | Facility: CLINIC | Age: 67
End: 2017-10-18

## 2017-10-18 VITALS
SYSTOLIC BLOOD PRESSURE: 128 MMHG | DIASTOLIC BLOOD PRESSURE: 72 MMHG | TEMPERATURE: 97.8 F | RESPIRATION RATE: 16 BRPM | HEART RATE: 63 BPM | WEIGHT: 216 LBS | BODY MASS INDEX: 33.9 KG/M2 | HEIGHT: 67 IN | OXYGEN SATURATION: 97 %

## 2017-10-18 DIAGNOSIS — I10 ESSENTIAL HYPERTENSION: ICD-10-CM

## 2017-10-18 DIAGNOSIS — R31.9 URINARY TRACT INFECTION WITH HEMATURIA, SITE UNSPECIFIED: ICD-10-CM

## 2017-10-18 DIAGNOSIS — R35.0 URINE FREQUENCY: ICD-10-CM

## 2017-10-18 DIAGNOSIS — F41.9 ANXIETY: ICD-10-CM

## 2017-10-18 DIAGNOSIS — R73.03 PREDIABETES: Primary | ICD-10-CM

## 2017-10-18 DIAGNOSIS — N39.0 URINARY TRACT INFECTION WITH HEMATURIA, SITE UNSPECIFIED: ICD-10-CM

## 2017-10-18 LAB
BILIRUB BLD-MCNC: NEGATIVE MG/DL
CLARITY, POC: CLEAR
COLOR UR: YELLOW
GLUCOSE UR STRIP-MCNC: NEGATIVE MG/DL
KETONES UR QL: NEGATIVE
LEUKOCYTE EST, POC: ABNORMAL
NITRITE UR-MCNC: NEGATIVE MG/ML
PH UR: 6 [PH] (ref 5–8)
PROT UR STRIP-MCNC: ABNORMAL MG/DL
RBC # UR STRIP: ABNORMAL /UL
SP GR UR: 1.02 (ref 1–1.03)
UROBILINOGEN UR QL: NORMAL

## 2017-10-18 PROCEDURE — 81002 URINALYSIS NONAUTO W/O SCOPE: CPT | Performed by: PHYSICIAN ASSISTANT

## 2017-10-18 PROCEDURE — 99214 OFFICE O/P EST MOD 30 MIN: CPT | Performed by: PHYSICIAN ASSISTANT

## 2017-10-18 RX ORDER — NITROFURANTOIN 25; 75 MG/1; MG/1
100 CAPSULE ORAL 2 TIMES DAILY
Qty: 14 CAPSULE | Refills: 0 | Status: SHIPPED | OUTPATIENT
Start: 2017-10-18 | End: 2017-11-13

## 2017-10-18 NOTE — PROGRESS NOTES
"Subjective   Krys Gerber is a 67 y.o. female.   Chief Complaint   Patient presents with   • Prediabetes     management   • Hypertension     management   • Anxiety     management       History of Present Illness     Danielle is a 67-year-old female who presents for Prediabetes,hypertension and anxiety management.  She has gained 8 pounds since June 13, 2017. Danielle has had increased urine frequency.  She denied any dysuria,hesitancy,or urgency.  She drinks a lot of water.  Diet has been poor and not healthy.  Sleep has been normal.  Danielle has not been exercising.  Doing well with her medications.  Denied any chest pain,shortness of air,wheezing,URI symptoms or swelling of ankles.  Doing well with the Xanax and Celexa medications.  Denied any suicidal or homicidal thoughts.    The following portions of the patient's history were reviewed and updated as appropriate: allergies, current medications, past family history, past medical history, past social history and past surgical history.    Review of Systems   Constitutional: Negative.    HENT: Negative.    Eyes: Negative.    Respiratory: Negative.  Negative for cough, shortness of breath and wheezing.    Cardiovascular: Negative.  Negative for chest pain, palpitations and leg swelling.   Gastrointestinal: Negative.    Endocrine: Negative.    Genitourinary: Positive for frequency. Negative for urgency and vaginal discharge.   Musculoskeletal: Negative.    Skin: Negative.    Allergic/Immunologic: Negative.    Neurological: Negative.    Hematological: Negative.    Psychiatric/Behavioral: Negative for sleep disturbance and suicidal ideas. The patient is nervous/anxious.    All other systems reviewed and are negative.    Vitals:    10/18/17 1036   BP: 128/72   BP Location: Left arm   Patient Position: Sitting   Cuff Size: Large Adult   Pulse: 63   Resp: 16   Temp: 97.8 °F (36.6 °C)   TempSrc: Oral   SpO2: 97%   Weight: 216 lb (98 kg)   Height: 66.5\" (168.9 cm)       Wt " Readings from Last 3 Encounters:   10/18/17 216 lb (98 kg)   06/13/17 208 lb 14.4 oz (94.8 kg)   05/10/17 206 lb 9.6 oz (93.7 kg)       BP Readings from Last 3 Encounters:   10/18/17 128/72   06/13/17 120/74   05/10/17 122/78     Body mass index is 34.34 kg/(m^2).  Allergies   Allergen Reactions   • Morphine And Related GI Intolerance   • Ciprofloxacin-Ciproflox Hcl Er Rash   • Flomax [Tamsulosin Hcl] Rash   • Hydrocodone Rash   • Latex Rash   • Lortab [Hydrocodone-Acetaminophen] Rash   • Penicillins Rash   • Phenergan [Promethazine Hcl] GI Intolerance   • Sulfa Antibiotics Rash   • Amoxicillin Rash   • Tamsulosin Rash       Objective   Physical Exam   Constitutional: She is oriented to person, place, and time. Vital signs are normal. She appears well-developed and well-nourished.   Neck: Trachea normal and phonation normal. Neck supple. Carotid bruit is not present. No edema present.   Cardiovascular: Normal rate, regular rhythm, S1 normal, S2 normal, normal heart sounds and normal pulses.    Pulmonary/Chest: Effort normal and breath sounds normal.   Abdominal: Soft. Normal appearance and bowel sounds are normal. There is no hepatomegaly. There is no tenderness.    Krys had a diabetic foot exam performed today.   During the foot exam she had a monofilament test performed.    Vascular Status -  Her exam exhibits right foot vasculature normal. Her exam exhibits no right foot edema. Her exam exhibits left foot vasculature normal. Her exam exhibits no left foot edema.   Skin Integrity  -  Her right foot skin is intact.     Krys 's left foot skin is intact. .  Neurological: She is alert and oriented to person, place, and time.   Skin: Skin is warm, dry and intact.   Psychiatric: She has a normal mood and affect. Her speech is normal and behavior is normal. Judgment and thought content normal. Cognition and memory are normal.     Physical Exam     Feet: Diabetic foot exam performed during this visit.     Monofilament test performed  Right foot - number of sites tested - 10  Right foot - number of sites sensed - 10  Left foot number of sites tested 10  Left foot - number of sites sensed 10.  Right foot first MTP joint ROM is normal.  Left foot first MTP joint ROM is normal.  Vascular Status: right foot vasculature normal.  Left foot vasculature normal.  Right foot skin intact. Left foot skin intact.    Results for orders placed or performed in visit on 10/18/17   POC Urinalysis Dipstick   Result Value Ref Range    Color Yellow Yellow, Straw, Dark Yellow, Lin    Clarity, UA Clear Clear    Glucose, UA Negative Negative, 1000 mg/dL (3+) mg/dL    Bilirubin Negative Negative    Ketones, UA Negative Negative    Specific Gravity  1.020 1.005 - 1.030    Blood, UA Trace (A) Negative    pH, Urine 6.0 5.0 - 8.0    Protein, POC Trace (A) Negative mg/dL    Urobilinogen, UA Normal Normal    Leukocytes Trace (A) Negative    Nitrite, UA Negative Negative     Assessment/Plan   Krys was seen today for prediabetes, hypertension and anxiety.    Diagnoses and all orders for this visit:    Prediabetes  -     Ambulatory Referral for Diabetic Eye Exam-Ophthalmology  -     MicroAlbumin, Urine, Random - Urine, Clean Catch    Essential hypertension    Urine frequency  -     POC Urinalysis Dipstick  -     Urine Culture - Urine, Urine, Clean Catch  -     nitrofurantoin, macrocrystal-monohydrate, (MACROBID) 100 MG capsule; Take 1 capsule by mouth 2 (Two) Times a Day.    Anxiety    Urinary tract infection with hematuria, site unspecified  -     Urine Culture - Urine, Urine, Clean Catch  -     nitrofurantoin, macrocrystal-monohydrate, (MACROBID) 100 MG capsule; Take 1 capsule by mouth 2 (Two) Times a Day.      1.  Stable prediabetes: I have reviewed her lab work with her at today's office visit.  Her A1c is in the normal range.  Encouraged Danielle to continue decreasing her carbohydrates in diet. She will schedule follow up blood work in 6  months with medicare physical.  She was instructed to decreased carbs in diet and increase physical exercise.I have given her an eye form to take to eye doctor in January 2018.  2.  Stable Hypertension:  I have rechecked Danielle's blood pressure at today's office visit and got 120/78 in left arm.  She will continue current medications at home.  3.  New urine frequency with UTI:  Danielle had an in office urine that showed trace WBC's.  A urine culture was sent to lab for testing.  I have prescribed Macrobid antibiotic to pharmacy.  Danielle will be notified of test results when completed.  4.  Stable anxiety:  She is doing well with her medications.  No refills are required at this time.  She will follow up in 4  months for her Xanax medication.          Office Visit on 10/18/2017   Component Date Value Ref Range Status   • Color 10/18/2017 Yellow  Yellow, Straw, Dark Yellow, Lin Final   • Clarity, UA 10/18/2017 Clear  Clear Final   • Glucose, UA 10/18/2017 Negative  Negative, 1000 mg/dL (3+) mg/dL Final   • Bilirubin 10/18/2017 Negative  Negative Final   • Ketones, UA 10/18/2017 Negative  Negative Final   • Specific Gravity  10/18/2017 1.020  1.005 - 1.030 Final   • Blood, UA 10/18/2017 Trace* Negative Final   • pH, Urine 10/18/2017 6.0  5.0 - 8.0 Final   • Protein, POC 10/18/2017 Trace* Negative mg/dL Final   • Urobilinogen, UA 10/18/2017 Normal  Normal Final   • Leukocytes 10/18/2017 Trace* Negative Final   • Nitrite, UA 10/18/2017 Negative  Negative Final   Orders Only on 10/11/2017   Component Date Value Ref Range Status   • WBC 10/11/2017 6.36  4.80 - 10.80 10*3/mm3 Final   • RBC 10/11/2017 5.07  4.20 - 5.40 10*6/mm3 Final   • Hemoglobin 10/11/2017 13.3  12.0 - 16.0 g/dL Final   • Hematocrit 10/11/2017 42.5  37.0 - 47.0 % Final   • MCV 10/11/2017 83.8  81.0 - 99.0 fL Final   • MCH 10/11/2017 26.2* 27.0 - 31.0 pg Final   • MCHC 10/11/2017 31.3  31.0 - 37.0 g/dL Final   • RDW 10/11/2017 14.4  11.5 - 14.5 % Final    • Platelets 10/11/2017 298  140 - 500 10*3/mm3 Final   • Neutrophil Rel % 10/11/2017 63.0  45.0 - 70.0 % Final   • Lymphocyte Rel % 10/11/2017 23.6  20.0 - 45.0 % Final   • Monocyte Rel % 10/11/2017 10.2* 3.0 - 8.0 % Final   • Eosinophil Rel % 10/11/2017 2.4  0.0 - 4.0 % Final   • Basophil Rel % 10/11/2017 0.5  0.0 - 2.0 % Final   • Neutrophils Absolute 10/11/2017 4.01  1.50 - 8.30 10*3/mm3 Final   • Lymphocytes Absolute 10/11/2017 1.50  0.60 - 4.80 10*3/mm3 Final   • Monocytes Absolute 10/11/2017 0.65  0.00 - 1.00 10*3/mm3 Final   • Eosinophils Absolute 10/11/2017 0.15  0.10 - 0.30 10*3/mm3 Final   • Basophils Absolute 10/11/2017 0.03  0.00 - 0.20 10*3/mm3 Final   • Immature Granulocyte Rel % 10/11/2017 0.3  0.0 - 0.5 % Final   • Immature Grans Absolute 10/11/2017 0.02  0.00 - 0.03 10*3/mm3 Final   • nRBC 10/11/2017 0.0  0.0 - 0.0 /100 WBC Final   • Glucose 10/11/2017 124* 65 - 99 mg/dL Final   • BUN 10/11/2017 28* 8 - 23 mg/dL Final   • Creatinine 10/11/2017 1.17* 0.57 - 1.00 mg/dL Final   • eGFR Non  Am 10/11/2017 46* >60 mL/min/1.73 Final   • eGFR African Am 10/11/2017 56* >60 mL/min/1.73 Final   • BUN/Creatinine Ratio 10/11/2017 23.9  7.0 - 25.0 Final   • Sodium 10/11/2017 143  136 - 145 mmol/L Final   • Potassium 10/11/2017 4.7  3.5 - 5.2 mmol/L Final   • Chloride 10/11/2017 104  98 - 107 mmol/L Final   • Total CO2 10/11/2017 25.3  22.0 - 29.0 mmol/L Final   • Calcium 10/11/2017 9.5  8.8 - 10.5 mg/dL Final   • Total Protein 10/11/2017 7.0  6.0 - 8.5 g/dL Final   • Albumin 10/11/2017 4.20  3.50 - 5.20 g/dL Final   • Globulin 10/11/2017 2.8  gm/dL Final   • A/G Ratio 10/11/2017 1.5  g/dL Final   • Total Bilirubin 10/11/2017 0.2  0.2 - 1.2 mg/dL Final   • Alkaline Phosphatase 10/11/2017 66  40 - 129 U/L Final   • AST (SGOT) 10/11/2017 18  5 - 32 U/L Final   • ALT (SGPT) 10/11/2017 17  5 - 33 U/L Final   • Total Cholesterol 10/11/2017 165  0 - 200 mg/dL Final   • Triglycerides 10/11/2017 245* 0 - 150  mg/dL Final   • HDL Cholesterol 10/11/2017 64* 40 - 60 mg/dL Final   • VLDL Cholesterol 10/11/2017 49* 7 - 27 mg/dL Final   • LDL Cholesterol  10/11/2017 52  0 - 100 mg/dL Final   • LDL/HDL Ratio 10/11/2017 0.81   Final   • Hemoglobin A1C 10/11/2017 5.50  4.80 - 5.60 % Final   • Hep C Virus Ab 10/11/2017 <0.1  0.0 - 0.9 s/co ratio Final    Comment:                                   Negative:     < 0.8                               Indeterminate: 0.8 - 0.9                                    Positive:     > 0.9   The CDC recommends that a positive HCV antibody result   be followed up with a HCV Nucleic Acid Amplification   test ([id number]).     • Written Authorization 10/11/2017 Comment   Final    Comment: Written Authorization Received.  Authorization received from [employer] [facility]-  Logged by Marie Earl transcription disclaimer    Much of this encounter note is an electronic transcription/translation of spoken language to printed text.  The electronic translation of spoken language may permit erroneous, or at times, nonsensical words or phrases to be inadvertently transcribed.  Although I have reviewed the note for such errors, some may still exist.    Ashleigh Hernandez PA-C  Family Practice

## 2017-10-19 ENCOUNTER — TELEPHONE (OUTPATIENT)
Dept: FAMILY MEDICINE CLINIC | Facility: CLINIC | Age: 67
End: 2017-10-19

## 2017-10-19 LAB — MICROALBUMIN UR-MCNC: 4.6 UG/ML

## 2017-10-19 NOTE — TELEPHONE ENCOUNTER
----- Message from Ashleigh Hernandez PA-C sent at 10/19/2017 12:16 PM EDT -----  Notify patient that urine microalbumin was normal.

## 2017-10-20 ENCOUNTER — TELEPHONE (OUTPATIENT)
Dept: FAMILY MEDICINE CLINIC | Facility: CLINIC | Age: 67
End: 2017-10-20

## 2017-10-20 LAB
BACTERIA UR CULT: NORMAL
BACTERIA UR CULT: NORMAL

## 2017-10-20 NOTE — TELEPHONE ENCOUNTER
----- Message from Ashleigh Hernandez PA-C sent at 10/20/2017  6:44 AM EDT -----  Please notify patient that her urine culture showed mixed organisms.  Please finish antibiotic

## 2017-10-27 ENCOUNTER — TRANSCRIBE ORDERS (OUTPATIENT)
Dept: FAMILY MEDICINE CLINIC | Facility: CLINIC | Age: 67
End: 2017-10-27

## 2017-10-27 DIAGNOSIS — Z12.39 SCREENING BREAST EXAMINATION: Primary | ICD-10-CM

## 2017-10-29 DIAGNOSIS — E78.5 DYSLIPIDEMIA: ICD-10-CM

## 2017-10-30 RX ORDER — CITALOPRAM 40 MG/1
TABLET ORAL
Qty: 90 TABLET | Refills: 3 | Status: SHIPPED | OUTPATIENT
Start: 2017-10-30 | End: 2018-10-07 | Stop reason: SDUPTHER

## 2017-10-30 RX ORDER — SIMVASTATIN 80 MG
TABLET ORAL
Qty: 90 TABLET | Refills: 3 | Status: SHIPPED | OUTPATIENT
Start: 2017-10-30 | End: 2018-10-07 | Stop reason: SDUPTHER

## 2017-11-02 RX ORDER — RIVAROXABAN 15 MG/1
TABLET, FILM COATED ORAL
Qty: 30 TABLET | Refills: 5 | Status: SHIPPED | OUTPATIENT
Start: 2017-11-02 | End: 2018-04-23 | Stop reason: SDUPTHER

## 2017-11-05 ENCOUNTER — HOSPITAL ENCOUNTER (EMERGENCY)
Facility: HOSPITAL | Age: 67
Discharge: HOME | End: 2017-11-05
Attending: EMERGENCY MEDICINE | Admitting: EMERGENCY MEDICINE

## 2017-11-05 ENCOUNTER — APPOINTMENT (OUTPATIENT)
Dept: CT IMAGING | Facility: HOSPITAL | Age: 67
End: 2017-11-05

## 2017-11-05 VITALS
OXYGEN SATURATION: 95 % | RESPIRATION RATE: 24 BRPM | SYSTOLIC BLOOD PRESSURE: 119 MMHG | HEART RATE: 64 BPM | TEMPERATURE: 97.9 F | DIASTOLIC BLOOD PRESSURE: 46 MMHG

## 2017-11-05 DIAGNOSIS — R10.32 ACUTE ABDOMINAL PAIN IN LEFT LOWER QUADRANT: ICD-10-CM

## 2017-11-05 DIAGNOSIS — K57.32 DIVERTICULITIS OF LARGE INTESTINE WITHOUT PERFORATION OR ABSCESS WITHOUT BLEEDING: Primary | ICD-10-CM

## 2017-11-05 LAB
ALBUMIN SERPL-MCNC: 3.5 G/DL (ref 3.5–5.2)
ALBUMIN/GLOB SERPL: 1 G/DL
ALP SERPL-CCNC: 55 U/L (ref 40–129)
ALT SERPL W P-5'-P-CCNC: 11 U/L (ref 5–33)
ANION GAP SERPL CALCULATED.3IONS-SCNC: 12.1 MMOL/L
AST SERPL-CCNC: 13 U/L (ref 5–32)
BACTERIA UR QL AUTO: ABNORMAL /HPF
BASOPHILS # BLD AUTO: 0.03 10*3/MM3 (ref 0–0.2)
BASOPHILS NFR BLD AUTO: 0.3 % (ref 0–2)
BILIRUB SERPL-MCNC: 0.5 MG/DL (ref 0.2–1.2)
BILIRUB UR QL STRIP: NEGATIVE
BUN BLD-MCNC: 17 MG/DL (ref 8–23)
BUN/CREAT SERPL: 14.9 (ref 7–25)
CALCIUM SPEC-SCNC: 8.9 MG/DL (ref 8.8–10.5)
CHLORIDE SERPL-SCNC: 102 MMOL/L (ref 98–107)
CLARITY UR: ABNORMAL
CO2 SERPL-SCNC: 24.9 MMOL/L (ref 22–29)
COLOR UR: ABNORMAL
CREAT BLD-MCNC: 1.14 MG/DL (ref 0.57–1)
DEPRECATED RDW RBC AUTO: 43.3 FL (ref 37–54)
EOSINOPHIL # BLD AUTO: 0.13 10*3/MM3 (ref 0.1–0.3)
EOSINOPHIL NFR BLD AUTO: 1.4 % (ref 0–4)
ERYTHROCYTE [DISTWIDTH] IN BLOOD BY AUTOMATED COUNT: 14.4 % (ref 11.5–14.5)
GFR SERPL CREATININE-BSD FRML MDRD: 47 ML/MIN/1.73
GLOBULIN UR ELPH-MCNC: 3.5 GM/DL
GLUCOSE BLD-MCNC: 143 MG/DL (ref 65–99)
GLUCOSE UR STRIP-MCNC: ABNORMAL MG/DL
HCT VFR BLD AUTO: 38 % (ref 37–47)
HGB BLD-MCNC: 11.9 G/DL (ref 12–16)
HGB UR QL STRIP.AUTO: NEGATIVE
HYALINE CASTS UR QL AUTO: ABNORMAL /LPF
IMM GRANULOCYTES # BLD: 0.02 10*3/MM3 (ref 0–0.03)
IMM GRANULOCYTES NFR BLD: 0.2 % (ref 0–0.5)
KETONES UR QL STRIP: NEGATIVE
LEUKOCYTE ESTERASE UR QL STRIP.AUTO: ABNORMAL
LIPASE SERPL-CCNC: 31 U/L (ref 13–60)
LYMPHOCYTES # BLD AUTO: 1.47 10*3/MM3 (ref 0.6–4.8)
LYMPHOCYTES NFR BLD AUTO: 15.5 % (ref 20–45)
MCH RBC QN AUTO: 26 PG (ref 27–31)
MCHC RBC AUTO-ENTMCNC: 31.3 G/DL (ref 31–37)
MCV RBC AUTO: 83.2 FL (ref 81–99)
MONOCYTES # BLD AUTO: 0.77 10*3/MM3 (ref 0–1)
MONOCYTES NFR BLD AUTO: 8.1 % (ref 3–8)
NEUTROPHILS # BLD AUTO: 7.09 10*3/MM3 (ref 1.5–8.3)
NEUTROPHILS NFR BLD AUTO: 74.5 % (ref 45–70)
NITRITE UR QL STRIP: NEGATIVE
NRBC BLD MANUAL-RTO: 0 /100 WBC (ref 0–0)
PH UR STRIP.AUTO: 5.5 [PH] (ref 4.5–8)
PLATELET # BLD AUTO: 228 10*3/MM3 (ref 140–500)
PMV BLD AUTO: 9.4 FL (ref 7.4–10.4)
POTASSIUM BLD-SCNC: 3.9 MMOL/L (ref 3.5–5.2)
PROT SERPL-MCNC: 7 G/DL (ref 6–8.5)
PROT UR QL STRIP: ABNORMAL
RBC # BLD AUTO: 4.57 10*6/MM3 (ref 4.2–5.4)
RBC # UR: ABNORMAL /HPF
REF LAB TEST METHOD: ABNORMAL
SODIUM BLD-SCNC: 139 MMOL/L (ref 136–145)
SP GR UR STRIP: 1.02 (ref 1–1.03)
SQUAMOUS #/AREA URNS HPF: ABNORMAL /HPF
TRANS CELLS #/AREA URNS HPF: ABNORMAL /HPF
UROBILINOGEN UR QL STRIP: ABNORMAL
WBC NRBC COR # BLD: 9.51 10*3/MM3 (ref 4.8–10.8)
WBC UR QL AUTO: ABNORMAL /HPF

## 2017-11-05 PROCEDURE — 83690 ASSAY OF LIPASE: CPT | Performed by: EMERGENCY MEDICINE

## 2017-11-05 PROCEDURE — 99284 EMERGENCY DEPT VISIT MOD MDM: CPT | Performed by: EMERGENCY MEDICINE

## 2017-11-05 PROCEDURE — 85025 COMPLETE CBC W/AUTO DIFF WBC: CPT | Performed by: EMERGENCY MEDICINE

## 2017-11-05 PROCEDURE — 80053 COMPREHEN METABOLIC PANEL: CPT | Performed by: EMERGENCY MEDICINE

## 2017-11-05 PROCEDURE — 81001 URINALYSIS AUTO W/SCOPE: CPT | Performed by: EMERGENCY MEDICINE

## 2017-11-05 PROCEDURE — 99283 EMERGENCY DEPT VISIT LOW MDM: CPT

## 2017-11-05 PROCEDURE — 74176 CT ABD & PELVIS W/O CONTRAST: CPT

## 2017-11-05 RX ORDER — METRONIDAZOLE 500 MG/1
500 TABLET ORAL ONCE
Status: COMPLETED | OUTPATIENT
Start: 2017-11-05 | End: 2017-11-05

## 2017-11-05 RX ORDER — SODIUM CHLORIDE 0.9 % (FLUSH) 0.9 %
10 SYRINGE (ML) INJECTION AS NEEDED
Status: DISCONTINUED | OUTPATIENT
Start: 2017-11-05 | End: 2017-11-05 | Stop reason: HOSPADM

## 2017-11-05 RX ORDER — DOCUSATE SODIUM 100 MG/1
100 CAPSULE, LIQUID FILLED ORAL 2 TIMES DAILY PRN
Qty: 30 CAPSULE | Refills: 0 | Status: SHIPPED | OUTPATIENT
Start: 2017-11-05 | End: 2018-04-18

## 2017-11-05 RX ORDER — METRONIDAZOLE 500 MG/1
TABLET ORAL
Qty: 21 TABLET | Refills: 0 | Status: SHIPPED | OUTPATIENT
Start: 2017-11-05 | End: 2017-11-13

## 2017-11-05 RX ADMIN — METRONIDAZOLE 500 MG: 500 TABLET ORAL at 13:47

## 2017-11-05 NOTE — ED PROVIDER NOTES
Subjective   History of Present Illness     History of Present Illness    Chief complaint: Abdominal pain    Location: Left lower quadrant    Quality/Severity:  Moderate    Timing/Duration: Gradually worsening since Friday; constantly present    Modifying Factors: None identified    Associated Symptoms: No dysuria, some loose stools today.  No fever, chest pain or shortness of breath    Narrative: 67-year-old female with a history of staghorn nephrolithiasis presents the emergency department with recurrence of similar symptoms with abdominal pain in left lower quadrant.  No nausea or vomiting reported    Review of Systems  The patient has had dark stools ever since she started on iron supplementation.  All other systems reviewed and otherwise negative.    Past Medical History:   Diagnosis Date   • Arthritis    • Controlled type 2 diabetes with renal manifestation     states borderline    • Hyperlipidemia    • Hypertension    • Kidney stone     recurrent, calcium oxalate   • Menopausal disorder 1998    She went through menopause in 1998   • Obesity    • PAF (paroxysmal atrial fibrillation)     in the setting of urosepsis, 9/2016   • Reflux esophagitis 8/19/2016   • Sepsis due to urinary tract infection    • Urinary tract infection        Allergies   Allergen Reactions   • Morphine And Related GI Intolerance   • Ciprofloxacin-Ciproflox Hcl Er Rash   • Flomax [Tamsulosin Hcl] Rash   • Hydrocodone Rash   • Latex Rash   • Lortab [Hydrocodone-Acetaminophen] Rash   • Penicillins Rash   • Phenergan [Promethazine Hcl] GI Intolerance   • Sulfa Antibiotics Rash   • Amoxicillin Rash   • Tamsulosin Rash       Past Surgical History:   Procedure Laterality Date   • ANKLE SURGERY      Ankle Repair / Description: ORif the left ankle in July 2010. Secondary to fall   • BREAST BIOPSY Right     benign   • BREAST SURGERY      biopsy    • CHOLECYSTECTOMY     • COLONOSCOPY  2014    Done 2004 normal recheck in 10 years. Repeated February  2014 colonoscopy was normal and to be repeated in 10 years.   • CYSTOSCOPY BLADDER STONE LITHOTRIPSY     • HEMORRHOIDECTOMY     • NEPHROLITHOTOMY Left 1/9/2017    Procedure: NEPHROLITHOTOMY PERCUTANEOUS SECOND LOOK WITH STENT PLACEMENT AND LASER LITHOTRIPSY;  Surgeon: Mati Villegas MD;  Location: [facility] OR;  Service:    • OTHER SURGICAL HISTORY      Tubal Ligation   • PERCUTANEOUS NEPHROSTOLITHOTOMY Left 12/2016   • TUBAL ABDOMINAL LIGATION         Family History   Problem Relation Age of Onset   • COPD Mother    • Heart attack Father      acute myocardial infarction   • Heart attack Son    • Kidney disease Maternal Grandmother        Social History     Social History   • Marital status:      Spouse name: N/A   • Number of children: N/A   • Years of education: N/A     Social History Main Topics   • Smoking status: Never Smoker   • Smokeless tobacco: Never Used      Comment: caffeine use /soft drinks   • Alcohol use Yes      Comment: She uses alcohol once or twice a year.   • Drug use: No   • Sexual activity: Defer     Other Topics Concern   • None     Social History Narrative     ED Triage Vitals   Temp Heart Rate Resp BP SpO2   11/05/17 1052 11/05/17 1052 11/05/17 1052 11/05/17 1052 11/05/17 1052   98.5 °F (36.9 °C) 69 16 121/51 96 %      Temp src Heart Rate Source Patient Position BP Location FiO2 (%)   11/05/17 1052 11/05/17 1052 11/05/17 1052 11/05/17 1052 --   Oral Monitor Lying Right arm        Objective   Physical Exam   Constitutional: She is oriented to person, place, and time. She appears well-developed. No distress.   Not overtly toxic   HENT:   Head: Normocephalic.   Mouth/Throat: Oropharynx is clear and moist.   Eyes: Conjunctivae are normal. No scleral icterus.   Neck: Neck supple.   Painless movement   Cardiovascular: Normal rate and regular rhythm.    Pulmonary/Chest: Effort normal and breath sounds normal. No respiratory distress.   Abdominal: Soft.   Very large habitus.  Soft  throughout.  There is tenderness throughout but most notably in the left lower quadrant.   Musculoskeletal:   MAEE, normal strength   Neurological: She is alert and oriented to person, place, and time.   Skin: Skin is warm and dry.   Psychiatric: She has a normal mood and affect. Thought content normal.   Nursing note and vitals reviewed.      Procedures         ED Course  ED Course   Comment By Time   Patient was previously offered pain medication the emergency department however she declined it.  CT and labs reviewed with patient.  Plan discharge and outpatient follow-up with primary care. Amos Villegas MD 11/05 1333   Patient cannot recall what her allergy to ciprofloxacin days.  We will try single antibiotic regimen and this patient with close outpatient follow-up. Amos Villegas MD 11/05 4369      Results for orders placed or performed during the hospital encounter of 11/05/17   Urinalysis With / Culture If Indicated - Urine, Clean Catch   Result Value Ref Range    Color, UA Other (A) Yellow, Straw    Appearance, UA Slightly Cloudy (A) Clear    pH, UA 5.5 4.5 - 8.0    Specific Gravity, UA 1.025 1.003 - 1.030    Glucose,  mg/dL (Trace) (A) Negative    Ketones, UA Negative Negative, 80 mg/dL (3+), >=160 mg/dL (4+)    Bilirubin, UA Negative Negative    Blood, UA Negative Negative    Protein, UA 30 mg/dL (1+) (A) Negative    Leuk Esterase, UA Trace (A) Negative    Nitrite, UA Negative Negative    Urobilinogen, UA 1.0 E.U./dL 0.2 - 1.0 E.U./dL   Comprehensive Metabolic Panel   Result Value Ref Range    Glucose 143 (H) 65 - 99 mg/dL    BUN 17 8 - 23 mg/dL    Creatinine 1.14 (H) 0.57 - 1.00 mg/dL    Sodium 139 136 - 145 mmol/L    Potassium 3.9 3.5 - 5.2 mmol/L    Chloride 102 98 - 107 mmol/L    CO2 24.9 22.0 - 29.0 mmol/L    Calcium 8.9 8.8 - 10.5 mg/dL    Total Protein 7.0 6.0 - 8.5 g/dL    Albumin 3.50 3.50 - 5.20 g/dL    ALT (SGPT) 11 5 - 33 U/L    AST (SGOT) 13 5 - 32 U/L    Alkaline Phosphatase 55 40 - 129  U/L    Total Bilirubin 0.5 0.2 - 1.2 mg/dL    eGFR Non African Amer 47 (L) >60 mL/min/1.73    Globulin 3.5 gm/dL    A/G Ratio 1.0 g/dL    BUN/Creatinine Ratio 14.9 7.0 - 25.0    Anion Gap 12.1 mmol/L   CBC Auto Differential   Result Value Ref Range    WBC 9.51 4.80 - 10.80 10*3/mm3    RBC 4.57 4.20 - 5.40 10*6/mm3    Hemoglobin 11.9 (L) 12.0 - 16.0 g/dL    Hematocrit 38.0 37.0 - 47.0 %    MCV 83.2 81.0 - 99.0 fL    MCH 26.0 (L) 27.0 - 31.0 pg    MCHC 31.3 31.0 - 37.0 g/dL    RDW 14.4 11.5 - 14.5 %    RDW-SD 43.3 37.0 - 54.0 fl    MPV 9.4 7.4 - 10.4 fL    Platelets 228 140 - 500 10*3/mm3    Neutrophil % 74.5 (H) 45.0 - 70.0 %    Lymphocyte % 15.5 (L) 20.0 - 45.0 %    Monocyte % 8.1 (H) 3.0 - 8.0 %    Eosinophil % 1.4 0.0 - 4.0 %    Basophil % 0.3 0.0 - 2.0 %    Immature Grans % 0.2 0.0 - 0.5 %    Neutrophils, Absolute 7.09 1.50 - 8.30 10*3/mm3    Lymphocytes, Absolute 1.47 0.60 - 4.80 10*3/mm3    Monocytes, Absolute 0.77 0.00 - 1.00 10*3/mm3    Eosinophils, Absolute 0.13 0.10 - 0.30 10*3/mm3    Basophils, Absolute 0.03 0.00 - 0.20 10*3/mm3    Immature Grans, Absolute 0.02 0.00 - 0.03 10*3/mm3    nRBC 0.0 0.0 - 0.0 /100 WBC   Lipase   Result Value Ref Range    Lipase 31 13 - 60 U/L   Urinalysis, Microscopic Only - Urine, Clean Catch   Result Value Ref Range    RBC, UA None Seen None Seen /HPF    WBC, UA 3-5 (A) None Seen /HPF    Bacteria, UA Trace (A) None Seen /HPF    Squamous Epithelial Cells, UA 3-6 (A) None Seen, 0-2 /HPF    Transitional Epithelial Cells, UA 3-6 (A) 0 - 2 /HPF    Hyaline Casts, UA None Seen None Seen /LPF    Methodology Manual Light Microscopy      RADIOLOGY        Study: CT abdomen and pelvis without contrast    Findings: Sigmoid diverticulitis without obstruction.  Small amount of fluid but not drainable.  No nondependent free air.  Known left kidney stones but no Hydro.  Cholecystectomy.    Interpreted contemporaneously with treatment by Dr. Santizo-radiologist,              MDM    Final  diagnoses:   Diverticulitis of large intestine without perforation or abscess without bleeding   Acute abdominal pain in left lower quadrant              Medication List      New Prescriptions          docusate sodium 100 MG capsule   Commonly known as:  COLACE   Take 1 capsule by mouth 2 (Two) Times a Day As Needed for Constipation.   Take 1 tablet by mouth 2 times daily as needed for constipation       metroNIDAZOLE 500 MG tablet   Commonly known as:  FLAGYL   1 tab po TID         Changed          XARELTO 15 MG tablet   Generic drug:  rivaroxaban   TAKE 1 TABLET BY MOUTH DAILY   What changed:  Another medication with the same name was removed. Continue   taking this medication, and follow the directions you see here.         Stop          nitrofurantoin (macrocrystal-monohydrate) 100 MG capsule   Commonly known as:  MACROBID           Follow-up Information     Follow up with Ashleigh Hernandez PA-C. Schedule an appointment as soon as possible for a visit in 3 days.    Specialty:  Family Medicine    Contact information:    [address] [facility] [address] [phone]                 Amos Villegas MD  11/05/17 9281

## 2017-11-05 NOTE — ED NOTES
Charted removal of incision in error  @[id number]. Meant to chart IV removal at that time.     Nava Villegas  11/05/17 5070

## 2017-11-13 ENCOUNTER — OFFICE VISIT (OUTPATIENT)
Dept: FAMILY MEDICINE CLINIC | Facility: CLINIC | Age: 67
End: 2017-11-13

## 2017-11-13 VITALS
OXYGEN SATURATION: 97 % | RESPIRATION RATE: 16 BRPM | DIASTOLIC BLOOD PRESSURE: 60 MMHG | HEIGHT: 67 IN | HEART RATE: 72 BPM | TEMPERATURE: 97.7 F | WEIGHT: 217 LBS | SYSTOLIC BLOOD PRESSURE: 112 MMHG | BODY MASS INDEX: 34.06 KG/M2

## 2017-11-13 DIAGNOSIS — K57.32 SIGMOID DIVERTICULITIS: Primary | ICD-10-CM

## 2017-11-13 PROCEDURE — 99213 OFFICE O/P EST LOW 20 MIN: CPT | Performed by: PHYSICIAN ASSISTANT

## 2017-11-13 NOTE — PROGRESS NOTES
"Subjective   Krys Gerber is a 67 y.o. female.   Chief Complaint   Patient presents with   • Abdominal Pain     ED followup       History of Present Illness     Danielle is a 67-year-old female who presents for follow-up from [facility] emergency room on November 5, 2017.  I have reviewed her hospital ER report, imaging studies and laboratory results with her at today's office visit.  She was diagnosed with sigmoid diverticulitis.  She was prescribed Cipro and Flagyl antibiotic.  Danielle states she is feeling better.  She has finished her antibiotic.  She sees Dr. Oro.  Bowel movements are normal for her.  She takes Iron pills daily and has dark black stools.  Denied any fevers,chills, nausea,vomiting,diarrhea, or abdomen.  Appetite has been normal.  Sleep has been normal.    The following portions of the patient's history were reviewed and updated as appropriate: allergies, current medications, past family history, past medical history, past social history and past surgical history.    Review of Systems   Constitutional: Negative.  Negative for chills, fatigue and fever.   HENT: Negative.    Eyes: Negative.    Respiratory: Negative.  Negative for cough, shortness of breath and wheezing.    Cardiovascular: Negative.  Negative for chest pain, palpitations and leg swelling.   Gastrointestinal: Negative.  Negative for abdominal pain, constipation, diarrhea, nausea and vomiting.   Endocrine: Negative.    Genitourinary: Negative.    Musculoskeletal: Negative.    Skin: Negative.    Allergic/Immunologic: Negative.    Neurological: Negative.    Hematological: Negative.    Psychiatric/Behavioral: Negative.    All other systems reviewed and are negative.    Vitals:    11/13/17 0750   BP: 112/60   BP Location: Left arm   Patient Position: Sitting   Cuff Size: Large Adult   Pulse: 72   Resp: 16   Temp: 97.7 °F (36.5 °C)   TempSrc: Oral   SpO2: 97%   Weight: 217 lb (98.4 kg)   Height: 66.5\" (168.9 cm) "       Wt Readings from Last 3 Encounters:   11/13/17 217 lb (98.4 kg)   10/18/17 216 lb (98 kg)   06/13/17 208 lb 14.4 oz (94.8 kg)       BP Readings from Last 3 Encounters:   11/13/17 112/60   11/05/17 119/46   10/18/17 128/72     Body mass index is 34.5 kg/(m^2).  Allergies   Allergen Reactions   • Morphine And Related GI Intolerance   • Ciprofloxacin-Ciproflox Hcl Er Rash   • Flomax [Tamsulosin Hcl] Rash   • Hydrocodone Rash   • Latex Rash   • Lortab [Hydrocodone-Acetaminophen] Rash   • Penicillins Rash   • Phenergan [Promethazine Hcl] GI Intolerance   • Sulfa Antibiotics Rash   • Amoxicillin Rash   • Tamsulosin Rash       Objective   Physical Exam   Constitutional: She is oriented to person, place, and time. Vital signs are normal. She appears well-developed and well-nourished.   Neck: Trachea normal and phonation normal. Neck supple. No edema present.   Cardiovascular: Normal rate, regular rhythm, S1 normal, S2 normal, normal heart sounds and normal pulses.    Pulmonary/Chest: Effort normal and breath sounds normal.   Abdominal: Soft. Normal appearance and bowel sounds are normal. There is no hepatomegaly. There is no tenderness.   Neurological: She is alert and oriented to person, place, and time.   Skin: Skin is warm, dry and intact.   Psychiatric: She has a normal mood and affect. Her speech is normal and behavior is normal. Judgment and thought content normal. Cognition and memory are normal.       Assessment/Plan   Krys was seen today for abdominal pain.    Diagnoses and all orders for this visit:    Sigmoid diverticulitis  -     Ambulatory Referral to Gastroenterology      Mrs. Danielle Mayes was seen in office today for follow-up from [facility] emergency room on November 5, 2017.  Danielle has finished her antibiotic.  Currently doing better.  I will schedule a referral to Dr. Oro, GI specialist, for further evaluation of her diverticulitis flare up and need for colonoscopy.   Danielle was instructed to eat healthy and to increase her water intake.  She will return to office as needed.        Dragon transcription disclaimer    Much of this encounter note is an electronic transcription/translation of spoken language to printed text.  The electronic translation of spoken language may permit erroneous, or at times, nonsensical words or phrases to be inadvertently transcribed.  Although I have reviewed the note for such errors, some may still exist.    Ashleigh Hernandez PA-C  Family Practice

## 2017-12-06 ENCOUNTER — TELEPHONE (OUTPATIENT)
Dept: FAMILY MEDICINE CLINIC | Facility: CLINIC | Age: 67
End: 2017-12-06

## 2017-12-06 ENCOUNTER — HOSPITAL ENCOUNTER (OUTPATIENT)
Dept: MAMMOGRAPHY | Facility: HOSPITAL | Age: 67
Discharge: HOME | End: 2017-12-06
Admitting: PHYSICIAN ASSISTANT

## 2017-12-06 ENCOUNTER — TRANSCRIBE ORDERS (OUTPATIENT)
Dept: ADMINISTRATIVE | Facility: HOSPITAL | Age: 67
End: 2017-12-06

## 2017-12-06 ENCOUNTER — HOSPITAL ENCOUNTER (OUTPATIENT)
Dept: GENERAL RADIOLOGY | Facility: HOSPITAL | Age: 67
Discharge: HOME | End: 2017-12-06
Attending: UROLOGY

## 2017-12-06 DIAGNOSIS — N20.0 URIC ACID NEPHROLITHIASIS: Primary | ICD-10-CM

## 2017-12-06 DIAGNOSIS — N20.0 URIC ACID NEPHROLITHIASIS: ICD-10-CM

## 2017-12-06 DIAGNOSIS — Z12.39 SCREENING BREAST EXAMINATION: ICD-10-CM

## 2017-12-06 PROCEDURE — G0202 SCR MAMMO BI INCL CAD: HCPCS

## 2017-12-06 PROCEDURE — 74000 HC ABDOMEN KUB: CPT

## 2017-12-06 PROCEDURE — 77063 BREAST TOMOSYNTHESIS BI: CPT

## 2017-12-06 NOTE — TELEPHONE ENCOUNTER
----- Message from Ashleigh Hernandez PA-C sent at 12/6/2017 12:42 PM EST -----  Notify patient that mammogram was normal.  Plan to repeat in one year.

## 2017-12-20 ENCOUNTER — OFFICE VISIT (OUTPATIENT)
Dept: FAMILY MEDICINE CLINIC | Facility: CLINIC | Age: 67
End: 2017-12-20

## 2017-12-20 VITALS
SYSTOLIC BLOOD PRESSURE: 128 MMHG | HEIGHT: 67 IN | WEIGHT: 216 LBS | DIASTOLIC BLOOD PRESSURE: 80 MMHG | RESPIRATION RATE: 16 BRPM | HEART RATE: 64 BPM | BODY MASS INDEX: 33.9 KG/M2 | OXYGEN SATURATION: 96 % | TEMPERATURE: 98.1 F

## 2017-12-20 DIAGNOSIS — N90.7 SEBACEOUS CYST OF LABIA: Primary | ICD-10-CM

## 2017-12-20 DIAGNOSIS — R73.03 PREDIABETES: Primary | ICD-10-CM

## 2017-12-20 PROCEDURE — 99213 OFFICE O/P EST LOW 20 MIN: CPT | Performed by: PHYSICIAN ASSISTANT

## 2017-12-20 RX ORDER — DOXYCYCLINE HYCLATE 100 MG
100 TABLET ORAL DAILY
Qty: 20 TABLET | Refills: 0 | Status: SHIPPED | OUTPATIENT
Start: 2017-12-20 | End: 2018-04-18

## 2017-12-20 NOTE — PROGRESS NOTES
"Subjective   Krys Gerber is a 67 y.o. female.   Chief Complaint   Patient presents with   • vaginal sores       History of Present Illness     Danielle is a 67-year-old female who presents with new \"knots like\" bumps/lesion on her labia majora.  These \"knots\" have been there for the past month.  States that the area is very itching hand has been tender.  Denied any vaginal discharge,dysuria,change in detergent/lotions/soaps/foods.  Appetite and sleep has been normal.    The following portions of the patient's history were reviewed and updated as appropriate: allergies, current medications, past family history, past medical history, past social history and past surgical history.    Review of Systems   Constitutional: Negative.    HENT: Negative.    Eyes: Negative.    Respiratory: Negative.    Cardiovascular: Negative.    Gastrointestinal: Negative.    Endocrine: Negative.    Genitourinary: Negative.    Musculoskeletal: Negative.    Skin: Negative.         Bump/lesions on labia   Allergic/Immunologic: Negative.    Neurological: Negative.    Hematological: Negative.    Psychiatric/Behavioral: Negative.    All other systems reviewed and are negative.    Vitals:    12/20/17 1115   BP: 128/80   BP Location: Left arm   Patient Position: Sitting   Cuff Size: Adult   Pulse: 64   Resp: 16   Temp: 98.1 °F (36.7 °C)   TempSrc: Oral   SpO2: 96%   Weight: 98 kg (216 lb)   Height: 168.9 cm (66.5\")     Body mass index is 34.34 kg/(m^2).  Allergies   Allergen Reactions   • Morphine And Related GI Intolerance   • Ciprofloxacin-Ciproflox Hcl Er Rash   • Flomax [Tamsulosin Hcl] Rash   • Hydrocodone Rash   • Latex Rash   • Lortab [Hydrocodone-Acetaminophen] Rash   • Penicillins Rash   • Phenergan [Promethazine Hcl] GI Intolerance   • Sulfa Antibiotics Rash   • Amoxicillin Rash   • Tamsulosin Rash     Wt Readings from Last 3 Encounters:   12/20/17 98 kg (216 lb)   11/13/17 98.4 kg (217 lb)   10/18/17 98 kg (216 lb)       BP Readings " from Last 3 Encounters:   12/20/17 128/80   11/13/17 112/60   11/05/17 119/46     Objective   Physical Exam   Constitutional: She is oriented to person, place, and time. Vital signs are normal. She appears well-developed and well-nourished.   Neck: Trachea normal and phonation normal. Neck supple.   Cardiovascular: Normal rate, regular rhythm, S1 normal, S2 normal, normal heart sounds and normal pulses.    Pulmonary/Chest: Effort normal and breath sounds normal.   Abdominal: Soft. Normal appearance and bowel sounds are normal. There is no hepatomegaly. There is no tenderness.   Genitourinary:       Pelvic exam was performed with patient supine. There is lesion on the right labia. There is lesion on the left labia.   Genitourinary Comments: Exam was chaperoned by Joselyn Mckeon LPN   Neurological: She is alert and oriented to person, place, and time.   Skin: Skin is warm, dry and intact.   Psychiatric: She has a normal mood and affect. Her speech is normal and behavior is normal. Judgment and thought content normal. Cognition and memory are normal.       Assessment/Plan   Krys was seen today for vaginal sores.    Diagnoses and all orders for this visit:    Sebaceous cyst of labia  -     doxycycline (VIBRAMYICN) 100 MG tablet; Take 1 tablet by mouth Daily.      Mrs. Danielle Mayes was seen in office today with new onset sebaceous cyst of labia minora.  I have prescribed doxycycline antibiotic to pharmacy.  Danielle was instructed to purchase over-the-counter witch hazel and do witch hazel soaks 2 times a day.  She has no improvement she will return to office.        Dragon transcription disclaimer    Much of this encounter note is an electronic transcription/translation of spoken language to printed text.  The electronic translation of spoken language may permit erroneous, or at times, nonsensical words or phrases to be inadvertently transcribed.  Although I have reviewed the note for such errors, some may still  exist.    Ashleigh Hernandez PA-C  Family Practice

## 2017-12-21 ENCOUNTER — OFFICE (OUTPATIENT)
Dept: URBAN - METROPOLITAN AREA OTHER 6 | Facility: OTHER | Age: 67
End: 2017-12-21

## 2017-12-21 VITALS
DIASTOLIC BLOOD PRESSURE: 62 MMHG | HEIGHT: 66 IN | SYSTOLIC BLOOD PRESSURE: 126 MMHG | HEART RATE: 68 BPM | WEIGHT: 214 LBS

## 2017-12-21 DIAGNOSIS — K57.92 DIVERTICULITIS OF INTESTINE, PART UNSPECIFIED, WITHOUT PERFO: ICD-10-CM

## 2017-12-21 PROCEDURE — 99202 OFFICE O/P NEW SF 15 MIN: CPT

## 2017-12-27 RX ORDER — ALPRAZOLAM 1 MG/1
TABLET ORAL
Qty: 30 TABLET | Refills: 5 | OUTPATIENT
Start: 2017-12-27 | End: 2018-07-09 | Stop reason: SDUPTHER

## 2018-01-13 DIAGNOSIS — I10 ESSENTIAL HYPERTENSION: ICD-10-CM

## 2018-01-14 DIAGNOSIS — I10 ESSENTIAL HYPERTENSION: ICD-10-CM

## 2018-01-15 RX ORDER — VALSARTAN 160 MG/1
TABLET ORAL
Qty: 90 TABLET | Refills: 0 | Status: SHIPPED | OUTPATIENT
Start: 2018-01-15 | End: 2018-04-14 | Stop reason: SDUPTHER

## 2018-01-15 RX ORDER — VALSARTAN 160 MG/1
TABLET ORAL
Qty: 90 TABLET | Refills: 0 | Status: SHIPPED | OUTPATIENT
Start: 2018-01-15 | End: 2018-04-18 | Stop reason: SDUPTHER

## 2018-02-12 RX ORDER — METOPROLOL TARTRATE 50 MG/1
TABLET, FILM COATED ORAL
Qty: 270 TABLET | Refills: 1 | Status: SHIPPED | OUTPATIENT
Start: 2018-02-12 | End: 2018-06-12 | Stop reason: SDUPTHER

## 2018-02-13 ENCOUNTER — OFFICE (OUTPATIENT)
Dept: URBAN - METROPOLITAN AREA OTHER 6 | Facility: OTHER | Age: 68
End: 2018-02-13

## 2018-02-13 VITALS
SYSTOLIC BLOOD PRESSURE: 120 MMHG | HEART RATE: 64 BPM | DIASTOLIC BLOOD PRESSURE: 76 MMHG | WEIGHT: 218 LBS | HEIGHT: 66 IN

## 2018-02-13 DIAGNOSIS — K57.30 DIVERTICULOSIS OF LARGE INTESTINE WITHOUT PERFORATION OR ABS: ICD-10-CM

## 2018-02-13 DIAGNOSIS — D50.9 IRON DEFICIENCY ANEMIA, UNSPECIFIED: ICD-10-CM

## 2018-02-13 PROCEDURE — 99213 OFFICE O/P EST LOW 20 MIN: CPT

## 2018-04-09 DIAGNOSIS — D50.9 IRON DEFICIENCY ANEMIA, UNSPECIFIED IRON DEFICIENCY ANEMIA TYPE: ICD-10-CM

## 2018-04-09 DIAGNOSIS — E55.9 VITAMIN D DEFICIENCY: ICD-10-CM

## 2018-04-09 DIAGNOSIS — R73.03 PREDIABETES: ICD-10-CM

## 2018-04-09 DIAGNOSIS — E78.5 DYSLIPIDEMIA: ICD-10-CM

## 2018-04-09 DIAGNOSIS — I10 ESSENTIAL HYPERTENSION: Primary | ICD-10-CM

## 2018-04-11 DIAGNOSIS — D50.9 IRON DEFICIENCY ANEMIA, UNSPECIFIED IRON DEFICIENCY ANEMIA TYPE: Primary | ICD-10-CM

## 2018-04-11 LAB
25(OH)D3+25(OH)D2 SERPL-MCNC: 34.9 NG/ML
ALBUMIN SERPL-MCNC: 4.2 G/DL (ref 3.5–5.2)
ALBUMIN/GLOB SERPL: 1.3 G/DL
ALP SERPL-CCNC: 65 U/L (ref 40–129)
ALT SERPL-CCNC: 17 U/L (ref 5–33)
AST SERPL-CCNC: 16 U/L (ref 5–32)
BASOPHILS # BLD AUTO: 0.04 10*3/MM3 (ref 0–0.2)
BASOPHILS NFR BLD AUTO: 0.7 % (ref 0–2)
BILIRUB SERPL-MCNC: 0.4 MG/DL (ref 0.2–1.2)
BUN SERPL-MCNC: 23 MG/DL (ref 8–23)
BUN/CREAT SERPL: 20.9 (ref 7–25)
CALCIUM SERPL-MCNC: 9.5 MG/DL (ref 8.8–10.5)
CHLORIDE SERPL-SCNC: 102 MMOL/L (ref 98–107)
CHOLEST SERPL-MCNC: 159 MG/DL (ref 0–200)
CO2 SERPL-SCNC: 27.9 MMOL/L (ref 22–29)
CREAT SERPL-MCNC: 1.1 MG/DL (ref 0.57–1)
EOSINOPHIL # BLD AUTO: 0.2 10*3/MM3 (ref 0.1–0.3)
EOSINOPHIL NFR BLD AUTO: 3.5 % (ref 0–4)
ERYTHROCYTE [DISTWIDTH] IN BLOOD BY AUTOMATED COUNT: 14.3 % (ref 11.5–14.5)
FERRITIN SERPL-MCNC: 180 NG/ML (ref 13–150)
GFR SERPLBLD CREATININE-BSD FMLA CKD-EPI: 49 ML/MIN/1.73
GFR SERPLBLD CREATININE-BSD FMLA CKD-EPI: 60 ML/MIN/1.73
GLOBULIN SER CALC-MCNC: 3.2 GM/DL
GLUCOSE SERPL-MCNC: 112 MG/DL (ref 65–99)
HBA1C MFR BLD: 5.8 % (ref 4.8–5.6)
HCT VFR BLD AUTO: 44.5 % (ref 37–47)
HDLC SERPL-MCNC: 62 MG/DL (ref 40–60)
HGB BLD-MCNC: 13.7 G/DL (ref 12–16)
IMM GRANULOCYTES # BLD: 0.01 10*3/MM3 (ref 0–0.03)
IMM GRANULOCYTES NFR BLD: 0.2 % (ref 0–0.5)
IRON SATN MFR SERPL: 38 %
IRON SERPL-MCNC: 113 MCG/DL (ref 37–145)
LDLC SERPL CALC-MCNC: 56 MG/DL (ref 0–100)
LDLC/HDLC SERPL: 0.91 {RATIO}
LYMPHOCYTES # BLD AUTO: 1.47 10*3/MM3 (ref 0.6–4.8)
LYMPHOCYTES NFR BLD AUTO: 25.4 % (ref 20–45)
MCH RBC QN AUTO: 26.1 PG (ref 27–31)
MCHC RBC AUTO-ENTMCNC: 30.8 G/DL (ref 31–37)
MCV RBC AUTO: 84.9 FL (ref 81–99)
MONOCYTES # BLD AUTO: 0.68 10*3/MM3 (ref 0–1)
MONOCYTES NFR BLD AUTO: 11.7 % (ref 3–8)
NEUTROPHILS # BLD AUTO: 3.39 10*3/MM3 (ref 1.5–8.3)
NEUTROPHILS NFR BLD AUTO: 58.5 % (ref 45–70)
NRBC BLD AUTO-RTO: 0 /100 WBC (ref 0–0)
PLATELET # BLD AUTO: 274 10*3/MM3 (ref 140–500)
POTASSIUM SERPL-SCNC: 4.5 MMOL/L (ref 3.5–5.2)
PROT SERPL-MCNC: 7.4 G/DL (ref 6–8.5)
RBC # BLD AUTO: 5.24 10*6/MM3 (ref 4.2–5.4)
SODIUM SERPL-SCNC: 142 MMOL/L (ref 136–145)
TIBC SERPL-MCNC: 297 MCG/DL (ref 261–478)
TRIGL SERPL-MCNC: 204 MG/DL (ref 0–150)
UIBC SERPL-MCNC: 184 MCG/DL (ref 112–346)
VLDLC SERPL CALC-MCNC: 40.8 MG/DL (ref 7–27)
WBC # BLD AUTO: 5.79 10*3/MM3 (ref 4.8–10.8)

## 2018-04-14 DIAGNOSIS — I10 ESSENTIAL HYPERTENSION: ICD-10-CM

## 2018-04-16 RX ORDER — VALSARTAN 160 MG/1
TABLET ORAL
Qty: 90 TABLET | Refills: 0 | Status: SHIPPED | OUTPATIENT
Start: 2018-04-16 | End: 2018-07-16 | Stop reason: RX

## 2018-04-18 ENCOUNTER — OFFICE VISIT (OUTPATIENT)
Dept: FAMILY MEDICINE CLINIC | Facility: CLINIC | Age: 68
End: 2018-04-18

## 2018-04-18 VITALS
OXYGEN SATURATION: 95 % | RESPIRATION RATE: 16 BRPM | DIASTOLIC BLOOD PRESSURE: 58 MMHG | BODY MASS INDEX: 33.9 KG/M2 | HEIGHT: 67 IN | SYSTOLIC BLOOD PRESSURE: 124 MMHG | HEART RATE: 54 BPM | WEIGHT: 216 LBS | TEMPERATURE: 98.3 F

## 2018-04-18 DIAGNOSIS — Z79.899 HIGH RISK MEDICATION USE: ICD-10-CM

## 2018-04-18 DIAGNOSIS — I10 ESSENTIAL HYPERTENSION: ICD-10-CM

## 2018-04-18 DIAGNOSIS — F41.9 ANXIETY: ICD-10-CM

## 2018-04-18 DIAGNOSIS — R73.03 PREDIABETES: ICD-10-CM

## 2018-04-18 DIAGNOSIS — E78.5 DYSLIPIDEMIA: Primary | ICD-10-CM

## 2018-04-18 PROBLEM — R31.9 URINARY TRACT INFECTION WITH HEMATURIA: Status: RESOLVED | Noted: 2017-10-18 | Resolved: 2018-04-18

## 2018-04-18 PROBLEM — R35.0 URINE FREQUENCY: Status: RESOLVED | Noted: 2017-05-10 | Resolved: 2018-04-18

## 2018-04-18 PROBLEM — N39.0 URINARY TRACT INFECTION WITH HEMATURIA: Status: RESOLVED | Noted: 2017-10-18 | Resolved: 2018-04-18

## 2018-04-18 PROCEDURE — 99213 OFFICE O/P EST LOW 20 MIN: CPT | Performed by: PHYSICIAN ASSISTANT

## 2018-04-18 NOTE — PROGRESS NOTES
Subjective   Krys Gerber is a 68 y.o. female.   Chief Complaint   Patient presents with   • Hypertension     management   • Hyperlipidemia     management       History of Present Illness     Danielle is a 68-year-old female who presents for hypertension and hyperlipidemia management. She has not gained any weight since her last office visit. She is doing well with the Celexa  And Xanax.  She takes the Xanax nightly.  Denied any suicidal or homicidal thoughts.  She will bee seeing Dr. Orozco in July.  She saw Dr. Oro recently for her GI issues.  He wants a copy of labs.  Denied any chest pain,shortness of air,wheezing or swelling of ankles.  Diet has been normal.  Sleep is normal.    The following portions of the patient's history were reviewed and updated as appropriate: allergies, current medications, past family history, past medical history, past social history and past surgical history.    Review of Systems   Constitutional: Negative.    HENT: Negative.    Eyes: Negative.    Respiratory: Negative.  Negative for cough, shortness of breath and wheezing.    Cardiovascular: Negative.  Negative for chest pain, palpitations and leg swelling.   Gastrointestinal: Negative.    Endocrine: Negative.    Genitourinary: Negative.    Musculoskeletal: Negative.    Skin: Negative.    Allergic/Immunologic: Negative.    Neurological: Negative.    Hematological: Negative.    Psychiatric/Behavioral: Negative for sleep disturbance and suicidal ideas. The patient is nervous/anxious.    All other systems reviewed and are negative.      Social History     Social History   • Marital status:      Social History Main Topics   • Smoking status: Never Smoker   • Smokeless tobacco: Never Used      Comment: caffeine use /soft drinks   • Alcohol use Yes      Comment: She uses alcohol once or twice a year.   • Drug use: No   • Sexual activity: Defer     Other Topics Concern   • Not on file     Vitals:    04/18/18 0957   BP: 124/58  "  BP Location: Left arm   Patient Position: Sitting   Cuff Size: Adult   Pulse: 54   Resp: 16   Temp: 98.3 °F (36.8 °C)   TempSrc: Oral   SpO2: 95%   Weight: 98 kg (216 lb)   Height: 168.9 cm (66.5\")       Wt Readings from Last 3 Encounters:   04/18/18 98 kg (216 lb)   12/20/17 98 kg (216 lb)   11/13/17 98.4 kg (217 lb)       BP Readings from Last 3 Encounters:   04/18/18 124/58   12/20/17 128/80   11/13/17 112/60     Body mass index is 34.34 kg/m².  Allergies   Allergen Reactions   • Morphine And Related GI Intolerance   • Ciprofloxacin-Ciproflox Hcl Er Rash   • Flomax [Tamsulosin Hcl] Rash   • Hydrocodone Rash   • Latex Rash   • Lortab [Hydrocodone-Acetaminophen] Rash   • Penicillins Rash   • Phenergan [Promethazine Hcl] GI Intolerance   • Sulfa Antibiotics Rash   • Amoxicillin Rash   • Tamsulosin Rash       Objective   Physical Exam   Constitutional: She is oriented to person, place, and time. Vital signs are normal. She appears well-developed and well-nourished.   Neck: Trachea normal and phonation normal. Neck supple. Carotid bruit is not present. No edema present.   Cardiovascular: Normal rate, regular rhythm, S1 normal, S2 normal, normal heart sounds and normal pulses.    Pulmonary/Chest: Effort normal and breath sounds normal.   Abdominal: Soft. Normal appearance and bowel sounds are normal. There is no hepatomegaly. There is no tenderness.   Neurological: She is alert and oriented to person, place, and time.   Skin: Skin is warm, dry and intact. Capillary refill takes less than 2 seconds.   Psychiatric: She has a normal mood and affect. Her speech is normal and behavior is normal. Judgment and thought content normal. Cognition and memory are normal.       Assessment/Plan   Krys was seen today for hypertension and hyperlipidemia.    Diagnoses and all orders for this visit:    Dyslipidemia    Essential hypertension    Prediabetes    Anxiety  -     [id number] [facility] -    High risk medication use  -     " 441399 7 [facility] -      1.  Chronic and stable hypertension: I have rechecked her blood pressure today's office visit and got 120/64 in left arm.  Danielle will continue her current medication at home as directed.  She will schedule follow-up appointment in 6 months for reevaluation.  2.  Chronic and stable dyslipidemia with prediabetes: I have reviewed Danielle's lab work with her at today's office visit.  She was encouraged to continue eating healthy and to increase her physical activity.  Her ferritin level was slightly elevated.  She was instructed to stop her iron supplements at this time.  Continue her other medications at home as directed.  Plan to repeat fasting blood work in 4 months.  3. Chronic and stable anxiety: Danielle is doing well with her medications.  States she does take the Xanax nightly for anxiety.  Danielle has signed the appropriate agreement and consent forms for the Xanax medication for the year.  No refills required at today's office visit.  She will have a urine drug screen collected at today's office visit for verification of medication usage.  She'll be notified of test results when completed.  Plan to follow-up in 4 months.          Orders Only on 04/11/2018   Component Date Value Ref Range Status   • TIBC 04/11/2018 297  261 - 478 mcg/dL Final   • UIBC 04/11/2018 184  112 - 346 mcg/dL Final   • Iron 04/11/2018 113  37 - 145 mcg/dL Final   • Iron Saturation 04/11/2018 38  % Final   • Ferritin 04/11/2018 180.00* 13.00 - 150.00 ng/mL Final   Orders Only on 04/09/2018   Component Date Value Ref Range Status   • WBC 04/11/2018 5.79  4.80 - 10.80 10*3/mm3 Final   • RBC 04/11/2018 5.24  4.20 - 5.40 10*6/mm3 Final   • Hemoglobin 04/11/2018 13.7  12.0 - 16.0 g/dL Final   • Hematocrit 04/11/2018 44.5  37.0 - 47.0 % Final   • MCV 04/11/2018 84.9  81.0 - 99.0 fL Final   • MCH 04/11/2018 26.1* 27.0 - 31.0 pg Final   • MCHC 04/11/2018 30.8* 31.0 - 37.0 g/dL Final   • RDW 04/11/2018 14.3  11.5 - 14.5 %  Final   • Platelets 04/11/2018 274  140 - 500 10*3/mm3 Final   • Neutrophil Rel % 04/11/2018 58.5  45.0 - 70.0 % Final   • Lymphocyte Rel % 04/11/2018 25.4  20.0 - 45.0 % Final   • Monocyte Rel % 04/11/2018 11.7* 3.0 - 8.0 % Final   • Eosinophil Rel % 04/11/2018 3.5  0.0 - 4.0 % Final   • Basophil Rel % 04/11/2018 0.7  0.0 - 2.0 % Final   • Neutrophils Absolute 04/11/2018 3.39  1.50 - 8.30 10*3/mm3 Final   • Lymphocytes Absolute 04/11/2018 1.47  0.60 - 4.80 10*3/mm3 Final   • Monocytes Absolute 04/11/2018 0.68  0.00 - 1.00 10*3/mm3 Final   • Eosinophils Absolute 04/11/2018 0.20  0.10 - 0.30 10*3/mm3 Final   • Basophils Absolute 04/11/2018 0.04  0.00 - 0.20 10*3/mm3 Final   • Immature Granulocyte Rel % 04/11/2018 0.2  0.0 - 0.5 % Final   • Immature Grans Absolute 04/11/2018 0.01  0.00 - 0.03 10*3/mm3 Final   • nRBC 04/11/2018 0.0  0.0 - 0.0 /100 WBC Final   • Glucose 04/11/2018 112* 65 - 99 mg/dL Final   • BUN 04/11/2018 23  8 - 23 mg/dL Final   • Creatinine 04/11/2018 1.10* 0.57 - 1.00 mg/dL Final   • eGFR Non African Am 04/11/2018 49* >60 mL/min/1.73 Final   • eGFR African Am 04/11/2018 60* >60 mL/min/1.73 Final   • BUN/Creatinine Ratio 04/11/2018 20.9  7.0 - 25.0 Final   • Sodium 04/11/2018 142  136 - 145 mmol/L Final   • Potassium 04/11/2018 4.5  3.5 - 5.2 mmol/L Final   • Chloride 04/11/2018 102  98 - 107 mmol/L Final   • Total CO2 04/11/2018 27.9  22.0 - 29.0 mmol/L Final   • Calcium 04/11/2018 9.5  8.8 - 10.5 mg/dL Final   • Total Protein 04/11/2018 7.4  6.0 - 8.5 g/dL Final   • Albumin 04/11/2018 4.20  3.50 - 5.20 g/dL Final   • Globulin 04/11/2018 3.2  gm/dL Final   • A/G Ratio 04/11/2018 1.3  g/dL Final   • Total Bilirubin 04/11/2018 0.4  0.2 - 1.2 mg/dL Final   • Alkaline Phosphatase 04/11/2018 65  40 - 129 U/L Final   • AST (SGOT) 04/11/2018 16  5 - 32 U/L Final   • ALT (SGPT) 04/11/2018 17  5 - 33 U/L Final   • Total Cholesterol 04/11/2018 159  0 - 200 mg/dL Final   • Triglycerides 04/11/2018 204* 0 -  150 mg/dL Final   • HDL Cholesterol 04/11/2018 62* 40 - 60 mg/dL Final   • VLDL Cholesterol 04/11/2018 40.8* 7 - 27 mg/dL Final   • LDL Cholesterol  04/11/2018 56  0 - 100 mg/dL Final   • LDL/HDL Ratio 04/11/2018 0.91   Final   • 25 Hydroxy, Vitamin D 04/11/2018 34.9  ng/ml Final    Comment: Reference Range for Total Vitamin D 25(OH)  Deficiency    <20.0 ng/mL  Insufficiency 21-29 ng/mL  Sufficiency   30-74 ng/mL     • Hemoglobin A1C 04/11/2018 5.80* 4.80 - 5.60 % Final

## 2018-04-23 LAB
AMPHETAMINES UR QL SCN: NEGATIVE NG/ML
BARBITURATES UR QL SCN: NEGATIVE NG/ML
BENZODIAZ UR QL: POSITIVE
BZE UR QL: NEGATIVE NG/ML
CANNABINOIDS UR QL SCN: NEGATIVE NG/ML
OPIATES UR QL: NEGATIVE NG/ML
PCP UR QL: NEGATIVE NG/ML

## 2018-04-23 RX ORDER — RIVAROXABAN 15 MG/1
TABLET, FILM COATED ORAL
Qty: 30 TABLET | Refills: 2 | Status: SHIPPED | OUTPATIENT
Start: 2018-04-23 | End: 2018-06-12 | Stop reason: SDUPTHER

## 2018-06-11 NOTE — PROGRESS NOTES
Date of Office Visit: 2018  Encounter Provider: Jeremy Orozco MD  Place of Service: [facility] CARDIOLOGY  Patient Name: Krys Mayes  :1950    Chief Complaint   Patient presents with   • Atrial Fibrillation     1 yr follow up    :     HPI: Krys Mayes is a 68 y.o. female who presents today to follow up.     Prior to 2016, she had not had any cardiac issues. At that time, she was admitted with urosepsis due to kidney stones. She went into atrial fibrillation in that setting. She initially converted to NSR but went back into atrial fibrillation. She was rate controlled and placed on rivaroxaban. An echocardiogram revealed normal LV size and systolic function (EF 56%). There was moderate TR and severe pulmonary hypertension as well. When I saw her in the office soon after, she was doing well.      She was readmitted just a few weeks later with urosepsis again. She was found to have a staghorn calculus.  She did have a brief episode of atrial fibrillation during that admission. Her metoprolol dose was increased, and her rivaroxaban dose was decreased due to STEVAN.      She is now doing really well. She denies palpitations, lightheadedness, edema, orthopnea, or chest pain. She denies bleeding.      Past Medical History:   Diagnosis Date   • Arthritis    • Controlled type 2 diabetes with renal manifestation     states borderline    • Hyperlipidemia    • Hypertension    • Kidney stone     recurrent, calcium oxalate   • Menopausal disorder     She went through menopause in    • Obesity    • PAF (paroxysmal atrial fibrillation)     in the setting of urosepsis, 2016   • Reflux esophagitis 2016   • Sepsis due to urinary tract infection    • Urinary tract infection        Past Surgical History:   Procedure Laterality Date   • ANKLE SURGERY      Ankle Repair / Description: ORif the left ankle in 2010. Secondary to fall   • BREAST BIOPSY Right      benign   • BREAST SURGERY      biopsy    • CHOLECYSTECTOMY     • COLONOSCOPY  2014    Done 2004 normal recheck in 10 years. Repeated February 2014 colonoscopy was normal and to be repeated in 10 years.   • CYSTOSCOPY BLADDER STONE LITHOTRIPSY     • HEMORRHOIDECTOMY     • NEPHROLITHOTOMY Left 1/9/2017    Procedure: NEPHROLITHOTOMY PERCUTANEOUS SECOND LOOK WITH STENT PLACEMENT AND LASER LITHOTRIPSY;  Surgeon: Mati Villegas MD;  Location: [facility];  Service:    • OTHER SURGICAL HISTORY      Tubal Ligation   • PERCUTANEOUS NEPHROSTOLITHOTOMY Left 12/2016   • TUBAL ABDOMINAL LIGATION         Social History     Social History   • Marital status:      Spouse name: N/A   • Number of children: N/A   • Years of education: N/A     Occupational History   • Not on file.     Social History Main Topics   • Smoking status: Never Smoker   • Smokeless tobacco: Never Used      Comment: caffeine use /soft drinks   • Alcohol use Yes      Comment: She uses alcohol once or twice a year.   • Drug use: No   • Sexual activity: Defer     Other Topics Concern   • Not on file     Social History Narrative   • No narrative on file       Family History   Problem Relation Age of Onset   • COPD Mother    • Heart attack Father         acute myocardial infarction   • Heart attack Son    • Kidney disease Maternal Grandmother        Review of Systems   Constitution: Negative for malaise/fatigue.   Cardiovascular: Positive for dyspnea on exertion. Negative for chest pain and palpitations.   Genitourinary: Positive for flank pain.   Neurological: Positive for light-headedness.   All other systems reviewed and are negative.      Allergies   Allergen Reactions   • Morphine And Related GI Intolerance   • Ciprofloxacin-Ciproflox Hcl Er Rash   • Flomax [Tamsulosin Hcl] Rash   • Hydrocodone Rash   • Latex Rash   • Lortab [Hydrocodone-Acetaminophen] Rash   • Penicillins Rash   • Phenergan [Promethazine Hcl] GI Intolerance   • Sulfa  "Antibiotics Rash   • Amoxicillin Rash   • Tamsulosin Rash         Current Outpatient Prescriptions:   •  acetaminophen (TYLENOL) 650 MG 8 hr tablet, Take 1 tablet by mouth every 6 (six) hours as needed. for pain, Disp: , Rfl:   •  ALPRAZolam (XANAX) 1 MG tablet, TAKE 1 TABLET BY MOUTH EVERY NIGHT AT BEDTIME AS NEEDED ANXIETY, Disp: 30 tablet, Rfl: 5  •  aspirin 81 MG tablet, Take 81 mg by mouth Daily., Disp: , Rfl:   •  citalopram (CeleXA) 40 MG tablet, TAKE 1 TABLET BY MOUTH DAILY, Disp: 90 tablet, Rfl: 3  •  HAVRIX 1440 EL U/ML vaccine, ADM 1ML IM UTD, Disp: , Rfl: 0  •  metoprolol tartrate (LOPRESSOR) 50 MG tablet, TAKE 1 AND 1/2 TABLETS BY MOUTH TWICE DAILY, Disp: 270 tablet, Rfl: 1  •  Multiple Vitamin tablet, Take 1 tablet by mouth Daily., Disp: , Rfl:   •  omeprazole OTC (PriLOSEC OTC) 20 MG EC tablet, Take 20 mg by mouth 2 (two) times a day., Disp: , Rfl:   •  Probiotic Product (PROBIOTIC ADVANCED PO), Take 1 tablet by mouth Daily., Disp: , Rfl:   •  simvastatin (ZOCOR) 80 MG tablet, TAKE 1 TABLET BY MOUTH EVERY NIGHT, Disp: 90 tablet, Rfl: 3  •  valsartan (DIOVAN) 160 MG tablet, TAKE 1 TABLET BY MOUTH DAILY, Disp: 90 tablet, Rfl: 0  •  XARELTO 15 MG tablet, TAKE 1 TABLET BY MOUTH DAILY, Disp: 30 tablet, Rfl: 2     Objective:     Vitals:    06/12/18 1138   BP: 104/62   BP Location: Right arm   Pulse: 52   Weight: 96.9 kg (213 lb 9.6 oz)   Height: 168.9 cm (66.5\")     Body mass index is 33.96 kg/m².    Physical Exam   Constitutional: She is oriented to person, place, and time. She appears well-developed and well-nourished.   HENT:   Head: Normocephalic.   Nose: Nose normal.   Mouth/Throat: Oropharynx is clear and moist.   Eyes: Conjunctivae and EOM are normal. Pupils are equal, round, and reactive to light.   Neck: Normal range of motion. No JVD present.   Cardiovascular: Normal rate, regular rhythm, normal heart sounds and intact distal pulses.    No murmur heard.  Pulmonary/Chest: Effort normal and breath " sounds normal.   Abdominal: Soft. She exhibits no mass. There is no tenderness.   Musculoskeletal: Normal range of motion. She exhibits no edema.   Lymphadenopathy:     She has no cervical adenopathy.   Neurological: She is alert and oriented to person, place, and time. No cranial nerve deficit.   Skin: Skin is warm and dry. No rash noted.   Psychiatric: She has a normal mood and affect. Her behavior is normal. Judgment and thought content normal.   Vitals reviewed.        ECG 12 Lead  Date/Time: 6/12/2018 1:16 PM  Performed by: JEREMY OROZCO  Authorized by: JEREMY OROZCO   Comparison: compared with previous ECG   Similar to previous ECG  Rhythm: sinus rhythm  Conduction: conduction normal  ST Segments: ST segments normal  T Waves: T waves normal  QRS axis: normal  Other: no other findings  Clinical impression: normal ECG              Assessment:       Diagnosis Plan   1. PAF (paroxysmal atrial fibrillation)     2. Essential hypertension            Plan:       1.  Atrial Fibrillation and Atrial Flutter  Assessment  • The patient has paroxysmal atrial fibrillation  • This is non-valvular in etiology  • The patient's CHADS2-VASc score is 3  • A WMP4ME3-GSOg score of 2 or more is considered a high risk for a thromboembolic event  • Rivaroxaban prescribed    Plan  • Attempt to maintain sinus rhythm  • Continue rivaroxaban for antithrombotic therapy, bleeding issues discussed  • Continue beta blocker for rhythm control  • Rivaroxaban is quite expensive; she's going to check an see if apixaban would be cheaper.  She is not interested in starting warfarin.    2.  Her BP is within goal.    Sincerely,       Jeremy Orozco MD

## 2018-06-12 ENCOUNTER — OFFICE VISIT (OUTPATIENT)
Dept: CARDIOLOGY | Facility: CLINIC | Age: 68
End: 2018-06-12

## 2018-06-12 VITALS
SYSTOLIC BLOOD PRESSURE: 104 MMHG | WEIGHT: 213.6 LBS | HEART RATE: 52 BPM | BODY MASS INDEX: 33.53 KG/M2 | DIASTOLIC BLOOD PRESSURE: 62 MMHG | HEIGHT: 67 IN

## 2018-06-12 DIAGNOSIS — I10 ESSENTIAL HYPERTENSION: ICD-10-CM

## 2018-06-12 DIAGNOSIS — I48.0 PAF (PAROXYSMAL ATRIAL FIBRILLATION) (HCC): Primary | ICD-10-CM

## 2018-06-12 PROCEDURE — 93000 ELECTROCARDIOGRAM COMPLETE: CPT | Performed by: INTERNAL MEDICINE

## 2018-06-12 PROCEDURE — 99213 OFFICE O/P EST LOW 20 MIN: CPT | Performed by: INTERNAL MEDICINE

## 2018-06-12 RX ORDER — METOPROLOL TARTRATE 50 MG/1
TABLET, FILM COATED ORAL
Qty: 270 TABLET | Refills: 3 | Status: SHIPPED | OUTPATIENT
Start: 2018-06-12 | End: 2019-01-15 | Stop reason: SDUPTHER

## 2018-06-12 RX ORDER — HEPATITIS A VACCINE 1440 [IU]/ML
INJECTION, SUSPENSION INTRAMUSCULAR
Refills: 0 | COMMUNITY
Start: 2018-05-11 | End: 2018-12-12

## 2018-07-08 RX ORDER — ALPRAZOLAM 1 MG/1
TABLET ORAL
Qty: 30 TABLET | Refills: 0 | Status: CANCELLED | OUTPATIENT
Start: 2018-07-08

## 2018-07-09 RX ORDER — ALPRAZOLAM 1 MG/1
1 TABLET ORAL NIGHTLY PRN
Qty: 30 TABLET | Refills: 0 | Status: SHIPPED | OUTPATIENT
Start: 2018-07-09 | End: 2018-08-09 | Stop reason: SDUPTHER

## 2018-07-16 DIAGNOSIS — I10 ESSENTIAL HYPERTENSION: Primary | ICD-10-CM

## 2018-07-16 RX ORDER — LOSARTAN POTASSIUM 100 MG/1
100 TABLET ORAL DAILY
Qty: 30 TABLET | Refills: 2 | Status: SHIPPED | OUTPATIENT
Start: 2018-07-16 | End: 2018-10-07 | Stop reason: SDUPTHER

## 2018-07-21 DIAGNOSIS — I10 ESSENTIAL HYPERTENSION: ICD-10-CM

## 2018-07-23 RX ORDER — VALSARTAN 160 MG/1
TABLET ORAL
Qty: 90 TABLET | Refills: 0 | Status: SHIPPED | OUTPATIENT
Start: 2018-07-23 | End: 2018-08-09 | Stop reason: ALTCHOICE

## 2018-07-23 RX ORDER — RIVAROXABAN 15 MG/1
TABLET, FILM COATED ORAL
Qty: 30 TABLET | Refills: 11 | Status: SHIPPED | OUTPATIENT
Start: 2018-07-23 | End: 2018-08-09 | Stop reason: SDUPTHER

## 2018-08-06 RX ORDER — METOPROLOL TARTRATE 50 MG/1
TABLET, FILM COATED ORAL
Qty: 270 TABLET | Refills: 0 | Status: SHIPPED | OUTPATIENT
Start: 2018-08-06 | End: 2018-08-09

## 2018-08-09 ENCOUNTER — OFFICE VISIT (OUTPATIENT)
Dept: FAMILY MEDICINE CLINIC | Facility: CLINIC | Age: 68
End: 2018-08-09

## 2018-08-09 VITALS
OXYGEN SATURATION: 94 % | SYSTOLIC BLOOD PRESSURE: 124 MMHG | HEART RATE: 56 BPM | DIASTOLIC BLOOD PRESSURE: 74 MMHG | WEIGHT: 217 LBS | HEIGHT: 67 IN | BODY MASS INDEX: 34.06 KG/M2

## 2018-08-09 DIAGNOSIS — Z79.899 HIGH RISK MEDICATION USE: ICD-10-CM

## 2018-08-09 DIAGNOSIS — Z00.00 MEDICARE ANNUAL WELLNESS VISIT, SUBSEQUENT: Primary | ICD-10-CM

## 2018-08-09 DIAGNOSIS — I10 ESSENTIAL HYPERTENSION: ICD-10-CM

## 2018-08-09 DIAGNOSIS — E78.5 DYSLIPIDEMIA: ICD-10-CM

## 2018-08-09 DIAGNOSIS — F41.9 ANXIETY: ICD-10-CM

## 2018-08-09 LAB
ALBUMIN SERPL-MCNC: 4.1 G/DL (ref 3.5–5.2)
ALBUMIN/GLOB SERPL: 1.5 G/DL
ALP SERPL-CCNC: 67 U/L (ref 40–129)
ALT SERPL-CCNC: 14 U/L (ref 5–33)
AST SERPL-CCNC: 17 U/L (ref 5–32)
BASOPHILS # BLD AUTO: 0.05 10*3/MM3 (ref 0–0.2)
BASOPHILS NFR BLD AUTO: 0.7 % (ref 0–2)
BILIRUB SERPL-MCNC: 0.4 MG/DL (ref 0.2–1.2)
BUN SERPL-MCNC: 21 MG/DL (ref 8–23)
BUN/CREAT SERPL: 19.3 (ref 7–25)
CALCIUM SERPL-MCNC: 9.6 MG/DL (ref 8.8–10.5)
CHLORIDE SERPL-SCNC: 102 MMOL/L (ref 98–107)
CHOLEST SERPL-MCNC: 139 MG/DL (ref 0–200)
CO2 SERPL-SCNC: 30.1 MMOL/L (ref 22–29)
CREAT SERPL-MCNC: 1.09 MG/DL (ref 0.57–1)
EOSINOPHIL # BLD AUTO: 0.16 10*3/MM3 (ref 0.1–0.3)
EOSINOPHIL NFR BLD AUTO: 2.3 % (ref 0–4)
ERYTHROCYTE [DISTWIDTH] IN BLOOD BY AUTOMATED COUNT: 14.3 % (ref 11.5–14.5)
GLOBULIN SER CALC-MCNC: 2.7 GM/DL
GLUCOSE SERPL-MCNC: 95 MG/DL (ref 65–99)
HCT VFR BLD AUTO: 40.6 % (ref 37–47)
HDLC SERPL-MCNC: 65 MG/DL (ref 40–60)
HGB BLD-MCNC: 12.4 G/DL (ref 12–16)
IMM GRANULOCYTES # BLD: 0.02 10*3/MM3 (ref 0–0.03)
IMM GRANULOCYTES NFR BLD: 0.3 % (ref 0–0.5)
LDLC SERPL CALC-MCNC: 46 MG/DL (ref 0–100)
LDLC/HDLC SERPL: 0.71 {RATIO}
LYMPHOCYTES # BLD AUTO: 1.49 10*3/MM3 (ref 0.6–4.8)
LYMPHOCYTES NFR BLD AUTO: 21 % (ref 20–45)
MCH RBC QN AUTO: 25.9 PG (ref 27–31)
MCHC RBC AUTO-ENTMCNC: 30.5 G/DL (ref 31–37)
MCV RBC AUTO: 84.9 FL (ref 81–99)
MONOCYTES # BLD AUTO: 0.78 10*3/MM3 (ref 0–1)
MONOCYTES NFR BLD AUTO: 11 % (ref 3–8)
NEUTROPHILS # BLD AUTO: 4.61 10*3/MM3 (ref 1.5–8.3)
NEUTROPHILS NFR BLD AUTO: 64.7 % (ref 45–70)
NRBC BLD AUTO-RTO: 0 /100 WBC (ref 0–0)
PLATELET # BLD AUTO: 265 10*3/MM3 (ref 140–500)
POTASSIUM SERPL-SCNC: 4.6 MMOL/L (ref 3.5–5.2)
PROT SERPL-MCNC: 6.8 G/DL (ref 6–8.5)
RBC # BLD AUTO: 4.78 10*6/MM3 (ref 4.2–5.4)
SODIUM SERPL-SCNC: 143 MMOL/L (ref 136–145)
TRIGL SERPL-MCNC: 139 MG/DL (ref 0–150)
VLDLC SERPL CALC-MCNC: 27.8 MG/DL (ref 7–27)
WBC # BLD AUTO: 7.11 10*3/MM3 (ref 4.8–10.8)

## 2018-08-09 PROCEDURE — 99213 OFFICE O/P EST LOW 20 MIN: CPT | Performed by: PHYSICIAN ASSISTANT

## 2018-08-09 PROCEDURE — G0439 PPPS, SUBSEQ VISIT: HCPCS | Performed by: PHYSICIAN ASSISTANT

## 2018-08-09 NOTE — PROGRESS NOTES
QUICK REFERENCE INFORMATION:  The ABCs of the Annual Wellness Visit    Subsequent Medicare Wellness Visit    HEALTH RISK ASSESSMENT    1950    Recent Hospitalizations:  No hospitalization(s) within the last year..        Current Medical Providers:  Patient Care Team:  Ashleigh Hernandez PA-C as PCP - General (Family Medicine)  Ashleigh Hernandez PA-C as PCP - Claims Attributed  Jeremy Orozco MD as Consulting Physician (Cardiology)  Joss Oro MD as Consulting Physician (Gastroenterology)        Smoking Status:  History   Smoking Status   • Never Smoker   Smokeless Tobacco   • Never Used     Comment: caffeine use /soft drinks       Alcohol Consumption:  History   Alcohol Use   • Yes     Comment: She uses alcohol once or twice a year.       Depression Screen:   PHQ-2/PHQ-9 Depression Screening 8/9/2018   Little interest or pleasure in doing things 0   Feeling down, depressed, or hopeless 0   Trouble falling or staying asleep, or sleeping too much -   Feeling tired or having little energy -   Poor appetite or overeating -   Feeling bad about yourself - or that you are a failure or have let yourself or your family down -   Trouble concentrating on things, such as reading the newspaper or watching television -   Moving or speaking so slowly that other people could have noticed. Or the opposite - being so fidgety or restless that you have been moving around a lot more than usual -   Thoughts that you would be better off dead, or of hurting yourself in some way -   Total Score 0   If you checked off any problems, how difficult have these problems made it for you to do your work, take care of things at home, or get along with other people? -       Health Habits and Functional and Cognitive Screening:  No flowsheet data found.        Does the patient have evidence of cognitive impairment? No    Aspirin use counseling: Taking ASA appropriately as indicated      Recent Lab Results:  CMP:  Lab Results    Component Value Date    GLU 95 08/09/2018    BUN 21 08/09/2018    CREATININE 1.09 (H) 08/09/2018    EGFRIFNONA 50 (L) 08/09/2018    EGFRIFAFRI 60 (L) 08/09/2018    BCR 19.3 08/09/2018     08/09/2018    K 4.6 08/09/2018    CO2 30.1 (H) 08/09/2018    CALCIUM 9.6 08/09/2018    PROTENTOTREF 6.8 08/09/2018    ALBUMIN 4.10 08/09/2018    LABGLOBREF 2.7 08/09/2018    LABIL2 1.5 08/09/2018    BILITOT 0.4 08/09/2018    ALKPHOS 67 08/09/2018    AST 17 08/09/2018    ALT 14 08/09/2018     Lipid Panel:  Lab Results   Component Value Date    TRIG 139 08/09/2018    HDL 65 (H) 08/09/2018    VLDL 27.8 (H) 08/09/2018    LDLHDL 0.71 08/09/2018     HbA1c:  Lab Results   Component Value Date    HGBA1C 5.80 (H) 04/11/2018       Visual Acuity:  No exam data present    Age-appropriate Screening Schedule:  Refer to the list below for future screening recommendations based on patient's age, sex and/or medical conditions. Orders for these recommended tests are listed in the plan section. The patient has been provided with a written plan.    Health Maintenance   Topic Date Due   • ZOSTER VACCINE (2 of 3) 02/26/2008   • PNEUMOCOCCAL VACCINES (65+ LOW/MEDIUM RISK) (2 of 2 - PPSV23) 05/10/2018   • DXA SCAN  07/19/2018   • INFLUENZA VACCINE  08/01/2018   • HEMOGLOBIN A1C  10/11/2018   • URINE MICROALBUMIN  10/18/2018   • DIABETIC FOOT EXAM  10/19/2018   • DIABETIC EYE EXAM  02/01/2019   • LIPID PANEL  08/09/2019   • MAMMOGRAM  12/06/2019   • TDAP/TD VACCINES (2 - Td) 11/21/2023   • COLONOSCOPY  02/26/2024          Chief Complaint   Patient presents with   • Hypertension     Follow  Up       • Anxiety     Follow Up   • Annual Exam     AWV       Subjective   History of Present Illness    Krys Gerber is a 68 y.o. female who presents for an Subsequent Wellness Visit. Danielle is a 68 year old female who presents for anxiety and hypertension management. Her Diovan got recall and she was started on Losartan.  States she is feeling good at  office visit today. Danielle takes xanax nightly for her anxiety.  Denied any Suicidal or homicidal thoughts.  States she thinks she's had both of her pneumonia shots.  Last one was at the [facility] pharmacy.  Denied any chest pain, shortness of air, dizziness, vision changes or swelling of ankles.  She is fasting at office visit today.  Danielle states appetite has been slightly healthy.  Sleep has been normal with the Xanax.      The following portions of the patient's history were reviewed and updated as appropriate: allergies, current medications, past family history, past medical history, past social history and past surgical history.    Outpatient Medications Prior to Visit   Medication Sig Dispense Refill   • ALPRAZolam (XANAX) 1 MG tablet Take 1 tablet by mouth At Night As Needed for Anxiety or Sleep. Needs an appointment in August 30 tablet 0   • aspirin 81 MG tablet Take 81 mg by mouth Daily.     • citalopram (CeleXA) 40 MG tablet TAKE 1 TABLET BY MOUTH DAILY 90 tablet 3   • losartan (COZAAR) 100 MG tablet Take 1 tablet by mouth Daily. 30 tablet 2   • metoprolol tartrate (LOPRESSOR) 50 MG tablet TAKE ONE AND ONE HALF TABLET BY MOUTH TWICE DAILY 270 tablet 3   • Multiple Vitamin tablet Take 1 tablet by mouth Daily.     • omeprazole OTC (PriLOSEC OTC) 20 MG EC tablet Take 20 mg by mouth 2 (two) times a day.     • Probiotic Product (PROBIOTIC ADVANCED PO) Take 1 tablet by mouth Daily.     • rivaroxaban (XARELTO) 15 MG tablet Take 1 tablet by mouth Daily. 90 tablet 3   • simvastatin (ZOCOR) 80 MG tablet TAKE 1 TABLET BY MOUTH EVERY NIGHT 90 tablet 3   • acetaminophen (TYLENOL) 650 MG 8 hr tablet Take 1 tablet by mouth every 6 (six) hours as needed. for pain     • HAVRIX 1440 EL U/ML vaccine ADM 1ML IM UTD  0   • metoprolol tartrate (LOPRESSOR) 50 MG tablet TAKE 1 AND 1/2 TABLETS BY MOUTH TWICE DAILY 270 tablet 0   • valsartan (DIOVAN) 160 MG tablet TAKE 1 TABLET BY MOUTH DAILY 90 tablet 0   • XARELTO 15 MG tablet  TAKE 1 TABLET BY MOUTH DAILY 30 tablet 11     No facility-administered medications prior to visit.        Patient Active Problem List   Diagnosis   • Elevated alanine aminotransferase (ALT) level   • Mixed anxiety depressive disorder   • Dyslipidemia   • Gastroesophageal reflux disease   • Essential hypertension   • Insomnia   • Microalbuminuria   • Adult body mass index greater than 30   • Proteinuria   • Vitamin D deficiency   • Post-menopausal   • History of fracture   • Reflux esophagitis   • Fatigue   • Calculus of kidney   • PAF (paroxysmal atrial fibrillation) (CMS/[facility])   • Abnormal blood creatinine level   • Pyelonephritis   • Anxiety   • Iron deficiency anemia   • Motion sickness   • Prediabetes   • Medicare annual wellness visit, subsequent   • Need for pneumococcal vaccine   • Lower extremity pain, right   • Sigmoid diverticulitis   • Sebaceous cyst of labia   • High risk medication use       Advance Care Planning:  has an advance directive - a copy HAS NOT been provided    Identification of Risk Factors:  Risk factors include: weight .    Review of Systems   Constitutional: Negative.    HENT: Negative.    Eyes: Negative.    Respiratory: Negative.  Negative for cough, shortness of breath and wheezing.    Cardiovascular: Negative.  Negative for chest pain, palpitations and leg swelling.   Gastrointestinal: Negative.    Endocrine: Negative.    Genitourinary: Negative.    Musculoskeletal: Negative.    Skin: Negative.    Allergic/Immunologic: Negative.    Neurological: Negative.    Hematological: Negative.    Psychiatric/Behavioral: Negative.  Negative for sleep disturbance and suicidal ideas.   All other systems reviewed and are negative.      Compared to one year ago, the patient feels her physical health is worse.  Compared to one year ago, the patient feels her mental health is the same.    Objective     Physical Exam   Constitutional: She is oriented to person, place, and time. Vital signs are normal.  "She appears well-developed and well-nourished.   Neck: Trachea normal and phonation normal. Neck supple. Carotid bruit is not present. No edema present.   Cardiovascular: Normal rate, regular rhythm, S1 normal, S2 normal, normal heart sounds and normal pulses.    Pulmonary/Chest: Effort normal and breath sounds normal.   Abdominal: Soft. Normal appearance, normal aorta and bowel sounds are normal. There is no hepatomegaly. There is no tenderness.   Neurological: She is alert and oriented to person, place, and time.   Skin: Skin is warm, dry and intact. Capillary refill takes less than 2 seconds.   Psychiatric: She has a normal mood and affect. Her speech is normal and behavior is normal. Judgment and thought content normal. Cognition and memory are normal.       Vitals:    08/09/18 1028   BP: 124/74   Pulse: 56   SpO2: 94%   Weight: 98.4 kg (217 lb)   Height: 168.9 cm (66.5\")     Wt Readings from Last 3 Encounters:   08/09/18 98.4 kg (217 lb)   06/12/18 96.9 kg (213 lb 9.6 oz)   04/18/18 98 kg (216 lb)       BP Readings from Last 3 Encounters:   08/09/18 124/74   06/12/18 104/62   04/18/18 124/58       Allergies   Allergen Reactions   • Morphine And Related GI Intolerance   • Ciprofloxacin-Ciproflox Hcl Er Rash   • Flomax [Tamsulosin Hcl] Rash   • Hydrocodone Rash   • Latex Rash   • Lortab [Hydrocodone-Acetaminophen] Rash   • Penicillins Rash   • Phenergan [Promethazine Hcl] GI Intolerance   • Sulfa Antibiotics Rash   • Amoxicillin Rash   • Tamsulosin Rash       Body mass index is 34.5 kg/m².  Patient's Body mass index is 34.5 kg/m². BMI is above normal parameters. Recommendations include: exercise counseling.      Assessment/Plan   Patient Self-Management and Personalized Health Advice  The patient has been provided with information about: diet, exercise and weight management and preventive services including:   · Pneumococcal vaccine .    Visit Diagnoses:    ICD-10-CM ICD-9-CM   1. Medicare annual wellness visit, " subsequent Z00.00 V70.0   2. Anxiety F41.9 300.00   3. Essential hypertension I10 401.9   4. Dyslipidemia E78.5 272.4   5. High risk medication use Z79.899 V58.69       Orders Placed This Encounter   Procedures   • Comprehensive Metabolic Panel     Order Specific Question:   [facility] Has the patient fasted?     Answer:   Yes   • Lipid Panel With LDL / HDL Ratio     Order Specific Question:   [facility] Has the patient fasted?     Answer:   Yes   • CBC & Differential     Order Specific Question:   Manual Differential     Answer:   No     Order Specific Question:   [facility] Has the patient fasted?     Answer:   Yes   1.  Medicare physical examination with hypertension: I have rechecked blood pressure at office visit today in got 120/70 in left arm.  She is doing well with the losartan medication.  No refills are required at this time.  2.  Chronic and stable anxiety: Doing well with the Xanax medication.  Dr. Lior Piña has approved a refill.  See below for Hector information.  Danielle has been taking her citalopram daily as well.  Will follow-up in 4 months for reevaluation.  3.  Chronic and stable dyslipidemia: Danielle is fasting will have a CBC, CMP and a lipid profile.  She'll be notified of test results when completed.  I've asked her to increase physical activity and to decrease fatty food and carbohydrates in diet.  She voiced understanding.    Outpatient Encounter Prescriptions as of 8/9/2018   Medication Sig Dispense Refill   • ALPRAZolam (XANAX) 1 MG tablet Take 1 tablet by mouth At Night As Needed for Anxiety or Sleep. Needs an appointment in August 30 tablet 0   • aspirin 81 MG tablet Take 81 mg by mouth Daily.     • citalopram (CeleXA) 40 MG tablet TAKE 1 TABLET BY MOUTH DAILY 90 tablet 3   • losartan (COZAAR) 100 MG tablet Take 1 tablet by mouth Daily. 30 tablet 2   • metoprolol tartrate (LOPRESSOR) 50 MG tablet TAKE ONE AND ONE HALF TABLET BY MOUTH TWICE DAILY 270 tablet 3   • Multiple Vitamin tablet Take  1 tablet by mouth Daily.     • omeprazole OTC (PriLOSEC OTC) 20 MG EC tablet Take 20 mg by mouth 2 (two) times a day.     • Probiotic Product (PROBIOTIC ADVANCED PO) Take 1 tablet by mouth Daily.     • rivaroxaban (XARELTO) 15 MG tablet Take 1 tablet by mouth Daily. 90 tablet 3   • simvastatin (ZOCOR) 80 MG tablet TAKE 1 TABLET BY MOUTH EVERY NIGHT 90 tablet 3   • acetaminophen (TYLENOL) 650 MG 8 hr tablet Take 1 tablet by mouth every 6 (six) hours as needed. for pain     • HAVRIX 1440 EL U/ML vaccine ADM 1ML IM UTD  0   • [DISCONTINUED] metoprolol tartrate (LOPRESSOR) 50 MG tablet TAKE 1 AND 1/2 TABLETS BY MOUTH TWICE DAILY 270 tablet 0   • [DISCONTINUED] valsartan (DIOVAN) 160 MG tablet TAKE 1 TABLET BY MOUTH DAILY 90 tablet 0   • [DISCONTINUED] XARELTO 15 MG tablet TAKE 1 TABLET BY MOUTH DAILY 30 tablet 11     No facility-administered encounter medications on file as of 8/9/2018.        Reviewed use of high risk medication in the elderly: yes  Reviewed for potential of harmful drug interactions in the elderly: yes    Follow Up:  No Follow-up on file.     An After Visit Summary and PPPS with all of these plans were given to the patient.            As part of this patient's treatment plan I am prescribing controlled substances.  The patient has been made aware of appropriate use of such medications, including potential risk of somnolence. Limited ability to drive and/or work safely, and potential for dependence or overdose.  It has also been made clear that these medications are for use by this patient only, without concomitant use of alcohol or other substances unless prescribed.  SHAREE report has been reviewed and scanned into the patient's chart.  Sharee number [id number] dated August 7, 2018.

## 2018-08-09 NOTE — PROGRESS NOTES
"Subjective   Krys Gerber is a 68 y.o. female.     Chief Complaint   Patient presents with   • Hypertension     Follow  Up       • Anxiety     Follow Up       History of Present Illness   Danielle is a 68 year old female who presents for anxiety and hypertension management. Her Diovan got recall and she was strattedd on losartan.  States she is feeling good at office visit today. Danielle takes xanax nightly for her anxiety.  Deneid any sucidial or homcidal thoguhts.          The following portions of the patient's history were reviewed and updated as appropriate: allergies, current medications, past family history, past medical history, past social history and past surgical history.    Review of Systems   All other systems reviewed and are negative.    Vitals:    08/09/18 1028   BP: 124/74   Pulse: 56   SpO2: 94%   Weight: 98.4 kg (217 lb)   Height: 168.9 cm (66.5\")       Wt Readings from Last 3 Encounters:   08/09/18 98.4 kg (217 lb)   06/12/18 96.9 kg (213 lb 9.6 oz)   04/18/18 98 kg (216 lb)       BP Readings from Last 3 Encounters:   08/09/18 124/74   06/12/18 104/62   04/18/18 124/58     Allergies   Allergen Reactions   • Morphine And Related GI Intolerance   • Ciprofloxacin-Ciproflox Hcl Er Rash   • Flomax [Tamsulosin Hcl] Rash   • Hydrocodone Rash   • Latex Rash   • Lortab [Hydrocodone-Acetaminophen] Rash   • Penicillins Rash   • Phenergan [Promethazine Hcl] GI Intolerance   • Sulfa Antibiotics Rash   • Amoxicillin Rash   • Tamsulosin Rash   Body mass index is 34.5 kg/m².  Objective      Physical Exam    Assessment/Plan   There are no diagnoses linked to this encounter.           "

## 2018-08-10 RX ORDER — ALPRAZOLAM 1 MG/1
1 TABLET ORAL NIGHTLY PRN
Qty: 30 TABLET | Refills: 3
Start: 2018-08-10 | End: 2018-12-05 | Stop reason: SDUPTHER

## 2018-08-10 NOTE — PROGRESS NOTES
I have reviewed the notes, assessments, and/or procedures performed by Ashleigh MOON, I concur with her/his documentation of Krys Mayes.

## 2018-09-19 ENCOUNTER — OFFICE VISIT (OUTPATIENT)
Dept: FAMILY MEDICINE CLINIC | Facility: CLINIC | Age: 68
End: 2018-09-19

## 2018-09-19 VITALS
HEART RATE: 73 BPM | TEMPERATURE: 98.7 F | RESPIRATION RATE: 16 BRPM | BODY MASS INDEX: 33.43 KG/M2 | WEIGHT: 213 LBS | HEIGHT: 67 IN | SYSTOLIC BLOOD PRESSURE: 130 MMHG | DIASTOLIC BLOOD PRESSURE: 70 MMHG | OXYGEN SATURATION: 98 %

## 2018-09-19 DIAGNOSIS — M79.604 LOWER EXTREMITY PAIN, RIGHT: Primary | ICD-10-CM

## 2018-09-19 DIAGNOSIS — D23.4 DERMOID CYST OF SCALP: ICD-10-CM

## 2018-09-19 PROCEDURE — 99213 OFFICE O/P EST LOW 20 MIN: CPT | Performed by: PHYSICIAN ASSISTANT

## 2018-09-19 NOTE — PROGRESS NOTES
"Subjective   Krys Gerber is a 68 y.o. female.   Chief Complaint   Patient presents with   • Pain     right leg and top of head       History of Present Illness     Danielle is a 68 year old female who presents with right leg pain for the past year off and on.  She thought it was varicose veins.  Describes the pain as a numbness and tingling in her mid to lower thigh to knee area.   Her leg has not given out on her.  Denied any recent falls, or injury.  Danielle has not taken ay OTC medication.  States the leg bothers her at night.  The leg wakes her up at night.    Danielle has had \"some knots and pain around the knots\". State the pain started about 2 months ago.  Denied ay recent injury/trauma to head.  Describes the pain as a \"medium pain\" that waxes and wanes.  She has had some of the \"knots\" removed in the past by a dermatologist.  Denied ay headaches,vision changes,nausea,vomiting or dizziness.      The following portions of the patient's history were reviewed and updated as appropriate: allergies, current medications, past family history, past medical history, past social history and past surgical history.    Review of Systems   Constitutional: Negative.    HENT: Negative.    Eyes: Negative.    Respiratory: Negative.  Negative for cough, shortness of breath and wheezing.    Cardiovascular: Negative.  Negative for chest pain, palpitations and leg swelling.   Gastrointestinal: Negative.    Endocrine: Negative.    Genitourinary: Negative.    Musculoskeletal: Negative.         Right lateral leg pain above knee   Skin: Negative.         \"knots on scalp\"   Allergic/Immunologic: Negative.    Neurological: Negative.    Hematological: Negative.    Psychiatric/Behavioral: Positive for sleep disturbance. Negative for suicidal ideas.   All other systems reviewed and are negative.      Social History   Substance Use Topics   • Smoking status: Never Smoker   • Smokeless tobacco: Never Used      Comment: caffeine use /soft " "drinks   • Alcohol use Yes      Comment: She uses alcohol once or twice a year.     Vitals:    09/19/18 1002   BP: 130/70   BP Location: Left arm   Patient Position: Sitting   Cuff Size: Adult   Pulse: 73   Resp: 16   Temp: 98.7 °F (37.1 °C)   TempSrc: Oral   SpO2: (!) 56%   Weight: 96.6 kg (213 lb)   Height: 168.9 cm (66.5\")       Wt Readings from Last 3 Encounters:   09/19/18 96.6 kg (213 lb)   08/09/18 98.4 kg (217 lb)   06/12/18 96.9 kg (213 lb 9.6 oz)       BP Readings from Last 3 Encounters:   09/19/18 130/70   08/09/18 124/74   06/12/18 104/62     Body mass index is 33.86 kg/m².  Allergies   Allergen Reactions   • Morphine And Related GI Intolerance   • Ciprofloxacin-Ciproflox Hcl Er Rash   • Flomax [Tamsulosin Hcl] Rash   • Hydrocodone Rash   • Latex Rash   • Lortab [Hydrocodone-Acetaminophen] Rash   • Penicillins Rash   • Phenergan [Promethazine Hcl] GI Intolerance   • Sulfa Antibiotics Rash   • Amoxicillin Rash   • Tamsulosin Rash       Objective   Physical Exam   Constitutional: She is oriented to person, place, and time. Vital signs are normal. She appears well-developed and well-nourished.   Obese female   Neck: Trachea normal and phonation normal. Neck supple. Carotid bruit is not present. No edema present.   Cardiovascular: Normal rate, regular rhythm, S1 normal, S2 normal, normal heart sounds and normal pulses.    Pulmonary/Chest: Effort normal and breath sounds normal.   Abdominal: Soft. Normal appearance and bowel sounds are normal. There is no hepatomegaly. There is no tenderness.   Neurological: She is alert and oriented to person, place, and time.   Skin: Skin is warm, dry and intact. Lesion noted.        Psychiatric: She has a normal mood and affect. Her speech is normal and behavior is normal. Judgment and thought content normal. Cognition and memory are normal.       Assessment/Plan   Krys was seen today for pain.    Diagnoses and all orders for this visit:    Lower extremity pain, " right  -     US Venous Doppler Lower Extremity Right (duplex); Future    Dermoid cyst of scalp  -     Ambulatory Referral to General Surgery      1.  Chronic right lower extremity pain: I will schedule an ultrasound of right lower extremity at [facility] for further evaluation of her symptoms.  I suspect this may be a varicose vein in nature.  She'll be notified of test results when completed.  I have discussed his gabapentin medication for her tingling and numbness and leg.  She refused medication at this time.  2.  New cyst of scalp: She has 3 epidermal cyst of her scalp.  I will schedule a referral to general surgeon for further evaluation and treatment.  Currently she is symptomatic with the cyst.  States she has had one removed in past by her dermatologist.

## 2018-09-20 ENCOUNTER — HOSPITAL ENCOUNTER (OUTPATIENT)
Dept: ULTRASOUND IMAGING | Facility: HOSPITAL | Age: 68
Discharge: HOME | End: 2018-09-20
Admitting: PHYSICIAN ASSISTANT

## 2018-09-20 DIAGNOSIS — M79.604 LOWER EXTREMITY PAIN, RIGHT: ICD-10-CM

## 2018-09-20 PROCEDURE — 93971 EXTREMITY STUDY: CPT

## 2018-09-21 DIAGNOSIS — M79.604 LOWER EXTREMITY PAIN, RIGHT: Primary | ICD-10-CM

## 2018-09-25 ENCOUNTER — OFFICE VISIT (OUTPATIENT)
Dept: SURGERY | Facility: CLINIC | Age: 68
End: 2018-09-25

## 2018-09-25 VITALS
SYSTOLIC BLOOD PRESSURE: 122 MMHG | WEIGHT: 213 LBS | HEIGHT: 67 IN | BODY MASS INDEX: 33.43 KG/M2 | DIASTOLIC BLOOD PRESSURE: 78 MMHG

## 2018-09-25 DIAGNOSIS — L72.3 SEBACEOUS CYST: Primary | ICD-10-CM

## 2018-09-25 PROCEDURE — 99202 OFFICE O/P NEW SF 15 MIN: CPT | Performed by: SURGERY

## 2018-09-25 NOTE — PROGRESS NOTES
PATIENT INFORMATION  Krys Condhiram  MASS ON SCALP X 3, PAIN AROUND AREAS, INCREASE IN SIZE     - 1950    CHIEF COMPLAINT  Chief Complaint   Patient presents with   • Cyst       HISTORY OF PRESENT ILLNESS  HPI she complains of 3 cysts on her scalp that have been increasing in size.  She says the one on the posterior scalp is painful.  She denies any drainage from the area or any history of infection.  She did have one cyst excised in the past.        REVIEW OF SYSTEMS  Review of Systems  He is on a Xarelto for paroxysmal atrial fibrillation.  She took dose yesterday.  She is also on aspirin.  Otherwise negative    ACTIVE PROBLEMS  Patient Active Problem List    Diagnosis   • Dermoid cyst of scalp [D23.4]   • High risk medication use [Z79.899]   • Sebaceous cyst of labia [N90.7]   • Sigmoid diverticulitis [K57.32]   • Medicare annual wellness visit, subsequent [Z00.00]   • Need for pneumococcal vaccine [Z23]   • Lower extremity pain, right [M79.604]   • Prediabetes [R73.03]   • Anxiety [F41.9]   • Iron deficiency anemia [D50.9]   • Motion sickness [T75.3XXA]   • Pyelonephritis [N12]   • Abnormal blood creatinine level [R79.9]   • PAF (paroxysmal atrial fibrillation) (CMS/[facility]) [I48.0]     Overview Note:     in the setting of urosepsis, 2016     • Fatigue [R53.83]   • Calculus of kidney [N20.0]   • Reflux esophagitis [K21.0]   • Post-menopausal [Z78.0]   • History of fracture [Z87.81]   • Elevated alanine aminotransferase (ALT) level [R74.0]   • Mixed anxiety depressive disorder [F41.8]   • Dyslipidemia [E78.5]   • Gastroesophageal reflux disease [K21.9]   • Essential hypertension [I10]   • Insomnia [G47.00]   • Microalbuminuria [R80.9]   • Adult body mass index greater than 30 [SXV2695]   • Proteinuria [R80.9]   • Vitamin D deficiency [E55.9]         PAST MEDICAL HISTORY  Past Medical History:   Diagnosis Date   • Arthritis    • Controlled type 2 diabetes with renal manifestation (CMS/[facility])     states  borderline    • Hyperlipidemia    • Hypertension    • Kidney stone     recurrent, calcium oxalate   • Menopausal disorder 1998    She went through menopause in 1998   • Obesity    • PAF (paroxysmal atrial fibrillation) (CMS/HCC)     in the setting of urosepsis, 9/2016   • Reflux esophagitis 8/19/2016   • Sepsis due to urinary tract infection (CMS/HCC)    • Urinary tract infection          SURGICAL HISTORY  Past Surgical History:   Procedure Laterality Date   • ANKLE SURGERY      Ankle Repair / Description: ORif the left ankle in July 2010. Secondary to fall   • BREAST BIOPSY Right     benign   • BREAST SURGERY      biopsy    • CHOLECYSTECTOMY     • COLONOSCOPY  2014    Done 2004 normal recheck in 10 years. Repeated February 2014 colonoscopy was normal and to be repeated in 10 years.   • CYSTOSCOPY BLADDER STONE LITHOTRIPSY     • HEMORRHOIDECTOMY     • NEPHROLITHOTOMY Left 1/9/2017    Procedure: NEPHROLITHOTOMY PERCUTANEOUS SECOND LOOK WITH STENT PLACEMENT AND LASER LITHOTRIPSY;  Surgeon: Mati Villegas MD;  Location: [facility];  Service:    • OTHER SURGICAL HISTORY      Tubal Ligation   • PERCUTANEOUS NEPHROSTOLITHOTOMY Left 12/2016   • TUBAL ABDOMINAL LIGATION           FAMILY HISTORY  Family History   Problem Relation Age of Onset   • COPD Mother    • Heart attack Father         acute myocardial infarction   • Heart attack Son    • Kidney disease Maternal Grandmother          SOCIAL HISTORY  Social History     Occupational History   • Not on file.     Social History Main Topics   • Smoking status: Never Smoker   • Smokeless tobacco: Never Used      Comment: caffeine use /soft drinks   • Alcohol use Yes      Comment: She uses alcohol once or twice a year.   • Drug use: No   • Sexual activity: Defer         CURRENT MEDICATIONS    Current Outpatient Prescriptions:   •  acetaminophen (TYLENOL) 650 MG 8 hr tablet, Take 1 tablet by mouth every 6 (six) hours as needed. for pain, Disp: , Rfl:   •  ALPRAZolam  (XANAX) 1 MG tablet, Take 1 tablet by mouth At Night As Needed for Anxiety or Sleep. Needs an appointment in August, Disp: 30 tablet, Rfl: 3  •  aspirin 81 MG tablet, Take 81 mg by mouth Daily., Disp: , Rfl:   •  citalopram (CeleXA) 40 MG tablet, TAKE 1 TABLET BY MOUTH DAILY, Disp: 90 tablet, Rfl: 3  •  HAVRIX 1440 EL U/ML vaccine, ADM 1ML IM UTD, Disp: , Rfl: 0  •  losartan (COZAAR) 100 MG tablet, Take 1 tablet by mouth Daily., Disp: 30 tablet, Rfl: 2  •  metoprolol tartrate (LOPRESSOR) 50 MG tablet, TAKE ONE AND ONE HALF TABLET BY MOUTH TWICE DAILY, Disp: 270 tablet, Rfl: 3  •  Multiple Vitamin tablet, Take 1 tablet by mouth Daily., Disp: , Rfl:   •  omeprazole OTC (PriLOSEC OTC) 20 MG EC tablet, Take 20 mg by mouth 2 (two) times a day., Disp: , Rfl:   •  Probiotic Product (PROBIOTIC ADVANCED PO), Take 1 tablet by mouth Daily., Disp: , Rfl:   •  rivaroxaban (XARELTO) 15 MG tablet, Take 1 tablet by mouth Daily., Disp: 90 tablet, Rfl: 3  •  simvastatin (ZOCOR) 80 MG tablet, TAKE 1 TABLET BY MOUTH EVERY NIGHT, Disp: 90 tablet, Rfl: 3    ALLERGIES  Morphine and related; Ciprofloxacin-ciproflox hcl er; Flomax [tamsulosin hcl]; Hydrocodone; Latex; Lortab [hydrocodone-acetaminophen]; Penicillins; Phenergan [promethazine hcl]; Sulfa antibiotics; Amoxicillin; and Tamsulosin    VITALS  There were no vitals filed for this visit.    LAST RESULTS   Office Visit on 08/09/2018   Component Date Value Ref Range Status   • WBC 08/09/2018 7.11  4.80 - 10.80 10*3/mm3 Final   • RBC 08/09/2018 4.78  4.20 - 5.40 10*6/mm3 Final   • Hemoglobin 08/09/2018 12.4  12.0 - 16.0 g/dL Final   • Hematocrit 08/09/2018 40.6  37.0 - 47.0 % Final   • MCV 08/09/2018 84.9  81.0 - 99.0 fL Final   • MCH 08/09/2018 25.9* 27.0 - 31.0 pg Final   • MCHC 08/09/2018 30.5* 31.0 - 37.0 g/dL Final   • RDW 08/09/2018 14.3  11.5 - 14.5 % Final   • Platelets 08/09/2018 265  140 - 500 10*3/mm3 Final   • Neutrophil Rel % 08/09/2018 64.7  45.0 - 70.0 % Final   •  Lymphocyte Rel % 08/09/2018 21.0  20.0 - 45.0 % Final   • Monocyte Rel % 08/09/2018 11.0* 3.0 - 8.0 % Final   • Eosinophil Rel % 08/09/2018 2.3  0.0 - 4.0 % Final   • Basophil Rel % 08/09/2018 0.7  0.0 - 2.0 % Final   • Neutrophils Absolute 08/09/2018 4.61  1.50 - 8.30 10*3/mm3 Final   • Lymphocytes Absolute 08/09/2018 1.49  0.60 - 4.80 10*3/mm3 Final   • Monocytes Absolute 08/09/2018 0.78  0.00 - 1.00 10*3/mm3 Final   • Eosinophils Absolute 08/09/2018 0.16  0.10 - 0.30 10*3/mm3 Final   • Basophils Absolute 08/09/2018 0.05  0.00 - 0.20 10*3/mm3 Final   • Immature Granulocyte Rel % 08/09/2018 0.3  0.0 - 0.5 % Final   • Immature Grans Absolute 08/09/2018 0.02  0.00 - 0.03 10*3/mm3 Final   • nRBC 08/09/2018 0.0  0.0 - 0.0 /100 WBC Final   • Glucose 08/09/2018 95  65 - 99 mg/dL Final   • BUN 08/09/2018 21  8 - 23 mg/dL Final   • Creatinine 08/09/2018 1.09* 0.57 - 1.00 mg/dL Final   • eGFR Non African Am 08/09/2018 50* >60 mL/min/1.73 Final   • eGFR African Am 08/09/2018 60* >60 mL/min/1.73 Final   • BUN/Creatinine Ratio 08/09/2018 19.3  7.0 - 25.0 Final   • Sodium 08/09/2018 143  136 - 145 mmol/L Final   • Potassium 08/09/2018 4.6  3.5 - 5.2 mmol/L Final   • Chloride 08/09/2018 102  98 - 107 mmol/L Final   • Total CO2 08/09/2018 30.1* 22.0 - 29.0 mmol/L Final   • Calcium 08/09/2018 9.6  8.8 - 10.5 mg/dL Final   • Total Protein 08/09/2018 6.8  6.0 - 8.5 g/dL Final   • Albumin 08/09/2018 4.10  3.50 - 5.20 g/dL Final   • Globulin 08/09/2018 2.7  gm/dL Final   • A/G Ratio 08/09/2018 1.5  g/dL Final   • Total Bilirubin 08/09/2018 0.4  0.2 - 1.2 mg/dL Final   • Alkaline Phosphatase 08/09/2018 67  40 - 129 U/L Final   • AST (SGOT) 08/09/2018 17  5 - 32 U/L Final   • ALT (SGPT) 08/09/2018 14  5 - 33 U/L Final   • Total Cholesterol 08/09/2018 139  0 - 200 mg/dL Final   • Triglycerides 08/09/2018 139  0 - 150 mg/dL Final   • HDL Cholesterol 08/09/2018 65* 40 - 60 mg/dL Final   • VLDL Cholesterol 08/09/2018 27.8* 7 - 27 mg/dL  Final   • LDL Cholesterol  08/09/2018 46  0 - 100 mg/dL Final   • LDL/HDL Ratio 08/09/2018 0.71   Final     Us Venous Doppler Lower Extremity Right (duplex)    Result Date: 9/20/2018  Narrative: VENOUS DOPPLER ULTRASOUND, RIGHT LOWER EXTREMITY, 9/20/2018  HISTORY: Right lower extremity pain for 6 months, worsening in the last 2 weeks. Evaluate for deep vein thrombosis.  TECHNIQUE: Venous Doppler ultrasound examination of the right leg was performed using grey-scale, spectral Doppler, and color flow Doppler ultrasound imaging.  FINDINGS: The examination is negative.  There is no evidence of deep venous thrombosis from the groin to the lower calf. The greater saphenous vein is also patent.      Impression: Negative examination.  No evidence of right lower extremity DVT.  This report was finalized on 9/20/2018 4:45 PM by Dr. Ariel Castañeda MD.        PHYSICAL EXAM  Physical Exam is an elderly white female in no active distress.  She has 3 cysts on her scalp one posteriorly and 2 on her crown the one posteriorly is 1 cm below the 2 on her crown her about 8 mm each.  None of these are indurated or have any evidence of abscess.    ASSESSMENT  Sebaceous cysts      PLAN  The risks benefits and options were discussed with the patient in detail.  We will take her off her Xarelto for 3 days prior and excise them in the office next week.

## 2018-10-02 ENCOUNTER — PROCEDURE VISIT (OUTPATIENT)
Dept: SURGERY | Facility: CLINIC | Age: 68
End: 2018-10-02

## 2018-10-02 VITALS
DIASTOLIC BLOOD PRESSURE: 72 MMHG | WEIGHT: 213 LBS | SYSTOLIC BLOOD PRESSURE: 100 MMHG | BODY MASS INDEX: 33.43 KG/M2 | HEIGHT: 67 IN

## 2018-10-02 DIAGNOSIS — L72.3 SEBACEOUS CYST: Primary | ICD-10-CM

## 2018-10-02 PROCEDURE — 11422 EXC H-F-NK-SP B9+MARG 1.1-2: CPT | Performed by: SURGERY

## 2018-10-02 PROCEDURE — 11421 EXC H-F-NK-SP B9+MARG 0.6-1: CPT | Performed by: SURGERY

## 2018-10-02 NOTE — PROGRESS NOTES
exc scalp mass x 3, no complaints  The cysts are as before.  All sites were prepped draped locally infiltrated 1% lidocaine with epinephrine for a total of 12 cc.  All were elliptically excised right top of head was 1.1 cm, left top of head was 9 mm, and the left posterior scalp was 1.7 cm.  All were consistent with epidermal inclusion cysts and  discarded per patient request skin was closed in interrupted simple fashion with use of 2-0 nylon.  She had mild increase bleeding secondary to her anticoagulation.  Wound care was discussed she tolerated the procedure well she can resume her Xarelto this evening.  I will see her back in the office in one week

## 2018-10-03 ENCOUNTER — TREATMENT (OUTPATIENT)
Dept: PHYSICAL THERAPY | Facility: CLINIC | Age: 68
End: 2018-10-03

## 2018-10-03 DIAGNOSIS — M25.551 PAIN OF RIGHT HIP JOINT: ICD-10-CM

## 2018-10-03 DIAGNOSIS — M54.10 RADICULAR PAIN OF RIGHT LOWER EXTREMITY: Primary | ICD-10-CM

## 2018-10-03 DIAGNOSIS — M25.561 CHRONIC PAIN OF RIGHT KNEE: ICD-10-CM

## 2018-10-03 DIAGNOSIS — G89.29 CHRONIC PAIN OF RIGHT KNEE: ICD-10-CM

## 2018-10-03 PROCEDURE — G8978 MOBILITY CURRENT STATUS: HCPCS | Performed by: PHYSICAL THERAPIST

## 2018-10-03 PROCEDURE — 97035 APP MDLTY 1+ULTRASOUND EA 15: CPT | Performed by: PHYSICAL THERAPIST

## 2018-10-03 PROCEDURE — 97162 PT EVAL MOD COMPLEX 30 MIN: CPT | Performed by: PHYSICAL THERAPIST

## 2018-10-03 PROCEDURE — 97110 THERAPEUTIC EXERCISES: CPT | Performed by: PHYSICAL THERAPIST

## 2018-10-03 PROCEDURE — G8979 MOBILITY GOAL STATUS: HCPCS | Performed by: PHYSICAL THERAPIST

## 2018-10-03 NOTE — PROGRESS NOTES
Physical Therapy Initial Evaluation and Plan of Care        Patient: Krys Mayes   : 1950  Diagnosis/ICD-10 Code:  Radicular pain of right lower extremity [M54.10]  Referring practitioner: Ashleigh Hernandez PA-C  Date of Initial Visit: 10/3/2018  Today's Date: 10/3/2018  Patient seen for 1 sessions           Subjective Questionnaire: LEFS: 41      Subjective Evaluation    History of Present Illness  Date of onset: 4/3/2018  Mechanism of injury: Started noticing right leg numbness with prolonged sitting - then progressed to  pins and needles when sitting - occasional right hip and knee pain with sitting - broke left ankle in 2010 plates and screws       Patient Occupation: retired - volunteer at hospital  Quality of life: excellent    Pain  Current pain ratin  At best pain ratin  At worst pain ratin  Location: right thigh  Quality: stabbing.  Relieving factors: change in position  Aggravating factors: prolonged positioning (sitting)    Social Support  Lives in: multiple-level home    Diagnostic Tests  Carotid study: normal (Doppler - negative)    Treatments  Current treatment: physical therapy  Patient Goals  Patient goals for therapy: decreased edema, decreased pain, improved balance, increased motion, increased strength, independence with ADLs/IADLs and return to sport/leisure activities             Objective       Palpation     Right   Hypertonic in the gluteus michael, piriformis and TFL. Tenderness of the obturator externus.     Active Range of Motion     Lumbar   Flexion: 100 degrees   Extension: 50 degrees with pain  Left lateral flexion: 75 degrees with pain  Right lateral flexion: 75 degrees   Left rotation: 100 degrees   Right rotation: 90 degrees with pain  Left Hip   External rotation (90/90): 30 degrees   Internal rotation (90/90): 40 degrees     Right Hip   External rotation (90/90): 30 degrees with pain  Internal rotation (90/90): 40 degrees     Strength/Myotome Testing      Left Hip   Planes of Motion   Flexion: 5  Left hip abductors strength: 5-/5.  Adduction: 5  Left hip external rotation strength: 5-/5.  Internal rotation: 5    Right Hip   Planes of Motion   Flexion: 4+  Abduction: 4+  Adduction: 5  External rotation: 4+  Right hip internal rotation strength: 5-/5.    Left Knee   Flexion: 5  Extension: 5    Right Knee   Right knee flexion strength: 5-/5.  Right knee extension strength: 5-/5.    Left Ankle/Foot   Dorsiflexion: 5  Plantar flexion: 5    Right Ankle/Foot   Dorsiflexion: 5  Plantar flexion: 5    Tests       Thoracic   Negative slump.     Lumbar     Left   Negative crossed SLR.     Right   Negative crossed SLR.     Left Hip   Negative Gillet's, SI compression and SI distraction.     Right Hip   Positive TAYLOR and scour.   Negative Gillet's, SI compression and SI distraction.          Assessment & Plan     Assessment  Impairments: abnormal gait, abnormal muscle tone, abnormal or restricted ROM, activity intolerance, impaired physical strength and pain with function  Assessment details: 68 y.o female presents with 1) increased tone and tenderness right Piriformis and ITB 2) decreased right hip AROM 3) radicular s/s right LE with sitting 4) decreased right LE strength   Prognosis: good  Functional Limitations: sleeping, walking, uncomfortable because of pain, sitting, standing and stooping  Goals  Plan Goals: SHORT TERM GOALS:  6 weeks       1. Pt independent with HEP  2. Pt to demonstrate oralia hip strength 5/5 or greater to improve stability with ambulation  3. Pt to report being able to sit for 10 minutes without increasing pain in the right hip    LONG TERM GOALS:   12 weeks  1. Pt to demonstrate ability to perform full functional squat with good form and control of the hips and without increasing pain  2. Pt to return to work full duty without increased pain in the right hip(s)   3. Pt to demonstrate ability to perform step up/down 8 inch step x10 safely and without  pain in the right hip(s)     Plan  Therapy options: will be seen for skilled physical therapy services  Planned modality interventions: cryotherapy, electrical stimulation/Armenian stimulation, ultrasound, iontophoresis and thermotherapy (hydrocollator packs)  Planned therapy interventions: flexibility, functional ROM exercises, home exercise program, joint mobilization, manual therapy, neuromuscular re-education, soft tissue mobilization, spinal/joint mobilization, strengthening, stretching and therapeutic activities  Frequency: 1x week  Duration in weeks: 20  Treatment plan discussed with: patient        Manual Therapy:         mins  87093;  Therapeutic Exercise:    20     mins  70032;     Neuromuscular Cem:        mins  13546;    Therapeutic Activity:          mins  48364;     Gait Training:           mins  60261;     Ultrasound:     8     mins  66682;    Electrical Stimulation:         mins  54036 ( );  Dry Needling          mins self-pay    Timed Treatment:   28   mins   Total Treatment:     58   mins    PT SIGNATURE: Irma Nichols, PT   DATE TREATMENT INITIATED: 10/3/2018    Medicare Initial Certification  Certification Period: 1/1/2019  I certify that the therapy services are furnished while this patient is under my care.  The services outlined above are required by this patient, and will be reviewed every 90 days.     PHYSICIAN: Ashleigh Hernandez PA-C      DATE:     Please sign and return via fax to [phone].. Thank you, [facility].

## 2018-10-07 DIAGNOSIS — E78.5 DYSLIPIDEMIA: ICD-10-CM

## 2018-10-07 DIAGNOSIS — I10 ESSENTIAL HYPERTENSION: ICD-10-CM

## 2018-10-08 DIAGNOSIS — I10 ESSENTIAL HYPERTENSION: ICD-10-CM

## 2018-10-08 RX ORDER — CITALOPRAM 40 MG/1
TABLET ORAL
Qty: 90 TABLET | Refills: 0 | Status: SHIPPED | OUTPATIENT
Start: 2018-10-08 | End: 2019-01-04 | Stop reason: SDUPTHER

## 2018-10-08 RX ORDER — LOSARTAN POTASSIUM 100 MG/1
100 TABLET ORAL DAILY
Qty: 30 TABLET | Refills: 0 | Status: SHIPPED | OUTPATIENT
Start: 2018-10-08 | End: 2018-10-08 | Stop reason: SDUPTHER

## 2018-10-08 RX ORDER — LOSARTAN POTASSIUM 100 MG/1
100 TABLET ORAL DAILY
Qty: 90 TABLET | Refills: 0 | Status: SHIPPED | OUTPATIENT
Start: 2018-10-08 | End: 2019-01-24 | Stop reason: SDUPTHER

## 2018-10-08 RX ORDER — SIMVASTATIN 80 MG
TABLET ORAL
Qty: 90 TABLET | Refills: 0 | Status: SHIPPED | OUTPATIENT
Start: 2018-10-08 | End: 2019-01-04 | Stop reason: SDUPTHER

## 2018-10-09 ENCOUNTER — OFFICE VISIT (OUTPATIENT)
Dept: SURGERY | Facility: CLINIC | Age: 68
End: 2018-10-09

## 2018-10-09 DIAGNOSIS — Z09 SURGICAL FOLLOW-UP CARE: Primary | ICD-10-CM

## 2018-10-09 PROCEDURE — 99024 POSTOP FOLLOW-UP VISIT: CPT | Performed by: SURGERY

## 2018-10-09 NOTE — PROGRESS NOTES
1 wk s/p exc scalp cyst x 3, suture removal, no complaints  She is without complaints.  She is back on her Xarelto.  Incisions are healing well there is some scab associated with 2-3 incisions.  Sutures were removed wound care was discussed and I will see her back when necessary

## 2018-10-11 ENCOUNTER — TREATMENT (OUTPATIENT)
Dept: PHYSICAL THERAPY | Facility: CLINIC | Age: 68
End: 2018-10-11

## 2018-10-11 DIAGNOSIS — M25.561 CHRONIC PAIN OF RIGHT KNEE: ICD-10-CM

## 2018-10-11 DIAGNOSIS — M25.551 PAIN OF RIGHT HIP JOINT: ICD-10-CM

## 2018-10-11 DIAGNOSIS — G89.29 CHRONIC PAIN OF RIGHT KNEE: ICD-10-CM

## 2018-10-11 DIAGNOSIS — M54.10 RADICULAR PAIN OF RIGHT LOWER EXTREMITY: Primary | ICD-10-CM

## 2018-10-11 PROCEDURE — 97110 THERAPEUTIC EXERCISES: CPT | Performed by: PHYSICAL THERAPIST

## 2018-10-11 PROCEDURE — 97530 THERAPEUTIC ACTIVITIES: CPT | Performed by: PHYSICAL THERAPIST

## 2018-10-11 NOTE — PROGRESS NOTES
Physical Therapy Daily Progress Note        Visit # : 2  rKys Mayes reports: I am feeling better - easier to get in/out car    Subjective     Objective       Palpation     Right   Hypertonic in the vastus lateralis. Tenderness of the rectus femoris.      See Exercise, Manual, and Modality Logs for complete treatment.       Assessment & Plan     Assessment  Assessment details: Patient presents with improved subjective reports and right LE radicular s/s. Tenderness persists medial muscle belly vastus lateralis. Improved transitional mobility. Tolerated stretching well - pain with SLR attempts. Cont PT 2-3x per week for strength and stability - next visit attempt quad stretch and/or SAQ if tolerated.         Progress strengthening /stabilization /functional activity           Manual Therapy:         mins  93228;  Therapeutic Exercise:    15     mins  46532;     Neuromuscular Cem:        mins  14765;    Therapeutic Activity:     10     mins  91892;     Gait Training:           mins  73339;     Ultrasound:     8     mins  26493;    Electrical Stimulation:         mins  47087 ( );  Dry Needling          mins self-pay    Timed Treatment:   33   mins   Total Treatment:     33   mins    Irma Nichols, PT  Physical Therapist  [place] License # [id number]

## 2018-10-18 ENCOUNTER — TREATMENT (OUTPATIENT)
Dept: PHYSICAL THERAPY | Facility: CLINIC | Age: 68
End: 2018-10-18

## 2018-10-18 DIAGNOSIS — M25.561 CHRONIC PAIN OF RIGHT KNEE: ICD-10-CM

## 2018-10-18 DIAGNOSIS — M25.551 PAIN OF RIGHT HIP JOINT: ICD-10-CM

## 2018-10-18 DIAGNOSIS — G89.29 CHRONIC PAIN OF RIGHT KNEE: ICD-10-CM

## 2018-10-18 DIAGNOSIS — M54.10 RADICULAR PAIN OF RIGHT LOWER EXTREMITY: Primary | ICD-10-CM

## 2018-10-18 PROCEDURE — 97530 THERAPEUTIC ACTIVITIES: CPT | Performed by: PHYSICAL THERAPIST

## 2018-10-18 PROCEDURE — 97110 THERAPEUTIC EXERCISES: CPT | Performed by: PHYSICAL THERAPIST

## 2018-10-18 NOTE — PROGRESS NOTES
Physical Therapy Daily Progress Note        Visit # : 3  Krys Mayes reports: I still feel unstable coming down steps - otherwise feeling better     Subjective     Objective       Palpation     Right   Hypertonic in the TFL.     Neurological Testing     Sensation     Hip   Left Hip   Intact: light touch    Right Hip   Intact: light touch     See Exercise, Manual, and Modality Logs for complete treatment.       Assessment & Plan     Assessment  Assessment details: Patient presents with improved subjective reports and right LE radicular s/s. Tenderness persists medial muscle belly vastus lateralis. Improved transitional mobility. Tolerated strength and stabilization progressions well. Cont PT 2-3x per week for strength and stability - next visit attempt resisted knee flex/ ext and/or NuStep if tolerated.         Progress strengthening /stabilization /functional activity           Manual Therapy:         mins  27946;  Therapeutic Exercise:    17     mins  59327;     Neuromuscular Cem:        mins  40435;    Therapeutic Activity:     10     mins  12992;     Gait Training:           mins  69747;     Ultrasound:     8     mins  47910;    Electrical Stimulation:         mins  71513 ( );  Dry Needling          mins self-pay    Timed Treatment:   35   mins   Total Treatment:     35   mins    Irma Nichols PT  Physical Therapist  [place] License # [id number]

## 2018-10-25 ENCOUNTER — TREATMENT (OUTPATIENT)
Dept: PHYSICAL THERAPY | Facility: CLINIC | Age: 68
End: 2018-10-25

## 2018-10-25 DIAGNOSIS — M54.10 RADICULAR PAIN OF RIGHT LOWER EXTREMITY: Primary | ICD-10-CM

## 2018-10-25 DIAGNOSIS — G89.29 CHRONIC PAIN OF RIGHT KNEE: ICD-10-CM

## 2018-10-25 DIAGNOSIS — M25.551 PAIN OF RIGHT HIP JOINT: ICD-10-CM

## 2018-10-25 DIAGNOSIS — M25.561 CHRONIC PAIN OF RIGHT KNEE: ICD-10-CM

## 2018-10-25 PROCEDURE — 97530 THERAPEUTIC ACTIVITIES: CPT | Performed by: PHYSICAL THERAPIST

## 2018-10-25 PROCEDURE — 97110 THERAPEUTIC EXERCISES: CPT | Performed by: PHYSICAL THERAPIST

## 2018-10-25 NOTE — PROGRESS NOTES
Physical Therapy Daily Progress Note        Visit # : 4  Krys Mayes reports: I think the pain has moved down a little bit - otherwise feel the same - sore and painful on the outside of my leg after prolonged sitting     Subjective     Objective       Tenderness     Right Knee   Tenderness in the ITB.     Additional Tenderness Details  Tender right distal medial right ITB     Tests     Right Hip   Positive Jammie.   Negative TAYLOR and Gaenslen's.      See Exercise, Manual, and Modality Logs for complete treatment.       Assessment & Plan     Assessment  Assessment details: Patient presents with persistent subjective reports of right LE tenderness and radicular s/s. Tenderness persists medial muscle belly vastus lateralis and distal ITB. Tolerated strength and stabilization progressions well. Cont PT 2-3x per week for strength and stability - next visit attempt standing ITB stretch and/or NuStep if tolerated.         Progress strengthening /stabilization /functional activity           Manual Therapy:         mins  60809;  Therapeutic Exercise:    25     mins  28644;     Neuromuscular Cem:        mins  46701;    Therapeutic Activity:     1     mins  28430;     Gait Training:           mins  55929;     Ultrasound:     8     mins  90436;    Electrical Stimulation:         mins  82836 ( );  Dry Needling          mins self-pay    Timed Treatment:   43   mins   Total Treatment:     43   mins    Irma Nichols, PT  Physical Therapist  [place] License # [id number]

## 2018-11-01 ENCOUNTER — TREATMENT (OUTPATIENT)
Dept: PHYSICAL THERAPY | Facility: CLINIC | Age: 68
End: 2018-11-01

## 2018-11-01 DIAGNOSIS — G89.29 CHRONIC PAIN OF RIGHT KNEE: ICD-10-CM

## 2018-11-01 DIAGNOSIS — M54.10 RADICULAR PAIN OF RIGHT LOWER EXTREMITY: Primary | ICD-10-CM

## 2018-11-01 DIAGNOSIS — M25.551 PAIN OF RIGHT HIP JOINT: ICD-10-CM

## 2018-11-01 DIAGNOSIS — M25.561 CHRONIC PAIN OF RIGHT KNEE: ICD-10-CM

## 2018-11-01 PROCEDURE — 97530 THERAPEUTIC ACTIVITIES: CPT | Performed by: PHYSICAL THERAPIST

## 2018-11-01 PROCEDURE — 97110 THERAPEUTIC EXERCISES: CPT | Performed by: PHYSICAL THERAPIST

## 2018-11-01 NOTE — PROGRESS NOTES
Physical Therapy Daily Progress Note        Visit # : 5  Krys Mayes reports: Much better than initially - still have occasional twinge of pain in anterior thigh     Subjective     Objective       Palpation     Right   Hypertonic in the rectus femoris and vastus lateralis.      See Exercise, Manual, and Modality Logs for complete treatment.       Assessment & Plan     Assessment  Assessment details: Patient presents with improved subjective reports of right LE tenderness. Tenderness persists muscle belly vastus lateralis and distal ITB. Tolerated strength and stabilization progressions well. Cont PT 2-3x per week for strength and stability - next visit attempt NuStep if tolerated.         Progress strengthening /stabilization /functional activity           Manual Therapy:         mins  46038;  Therapeutic Exercise:    24     mins  36770;     Neuromuscular Cem:        mins  75168;    Therapeutic Activity:     10     mins  05898;     Gait Training:           mins  14089;     Ultrasound:     8     mins  49020;    Electrical Stimulation:         mins  58438 ( );  Dry Needling          mins self-pay    Timed Treatment:   42   mins   Total Treatment:     42  mins    Irma Nichols, PT  Physical Therapist  [place] License # [id number]

## 2018-11-05 ENCOUNTER — TRANSCRIBE ORDERS (OUTPATIENT)
Dept: FAMILY MEDICINE CLINIC | Facility: CLINIC | Age: 68
End: 2018-11-05

## 2018-11-05 DIAGNOSIS — Z12.39 SCREENING BREAST EXAMINATION: Primary | ICD-10-CM

## 2018-11-13 ENCOUNTER — TREATMENT (OUTPATIENT)
Dept: PHYSICAL THERAPY | Facility: CLINIC | Age: 68
End: 2018-11-13

## 2018-11-13 DIAGNOSIS — M25.561 CHRONIC PAIN OF RIGHT KNEE: ICD-10-CM

## 2018-11-13 DIAGNOSIS — G89.29 CHRONIC PAIN OF RIGHT KNEE: ICD-10-CM

## 2018-11-13 DIAGNOSIS — M54.10 RADICULAR PAIN OF RIGHT LOWER EXTREMITY: Primary | ICD-10-CM

## 2018-11-13 DIAGNOSIS — M25.551 PAIN OF RIGHT HIP JOINT: ICD-10-CM

## 2018-11-13 PROCEDURE — 97112 NEUROMUSCULAR REEDUCATION: CPT | Performed by: PHYSICAL THERAPIST

## 2018-11-13 PROCEDURE — 97530 THERAPEUTIC ACTIVITIES: CPT | Performed by: PHYSICAL THERAPIST

## 2018-11-13 PROCEDURE — 97110 THERAPEUTIC EXERCISES: CPT | Performed by: PHYSICAL THERAPIST

## 2018-11-13 NOTE — PROGRESS NOTES
Physical Therapy Daily Progress Note        Visit # : 6  Krys Mayes reports: Overall leg feeling better however, I have a new bruise and knot - not sure how I got there     Subjective     Objective       Palpation     Right Tenderness of the vastus lateralis.     Additional Palpation Details  Bruising right ITB and distal vastus lateralis      See Exercise, Manual, and Modality Logs for complete treatment.       Assessment & Plan     Assessment  Assessment details: Patient presents with improved subjective reports of right LE strength. Tenderness persists muscle belly vastus lateralis and distal ITB. Tolerated strength and stabilization progressions well. Cont PT 2-3x per week for strength and stability - next visit attempt step activity if tolerated.         Progress strengthening /stabilization /functional activity           Manual Therapy:         mins  33478;  Therapeutic Exercise:    17     mins  25351;     Neuromuscular Cem:    10    mins  33565;    Therapeutic Activity:     10     mins  78583;     Gait Training:           mins  03065;     Ultrasound:     8     mins  62332;    Electrical Stimulation:         mins  99725 ( );  Dry Needling          mins self-pay    Timed Treatment:   45   mins   Total Treatment:     45   mins    Irma Nichols PT  Physical Therapist  [place] License # [id number]

## 2018-11-27 ENCOUNTER — TREATMENT (OUTPATIENT)
Dept: PHYSICAL THERAPY | Facility: CLINIC | Age: 68
End: 2018-11-27

## 2018-11-27 DIAGNOSIS — G89.29 CHRONIC PAIN OF RIGHT KNEE: ICD-10-CM

## 2018-11-27 DIAGNOSIS — M54.10 RADICULAR PAIN OF RIGHT LOWER EXTREMITY: Primary | ICD-10-CM

## 2018-11-27 DIAGNOSIS — M25.561 CHRONIC PAIN OF RIGHT KNEE: ICD-10-CM

## 2018-11-27 DIAGNOSIS — M25.551 PAIN OF RIGHT HIP JOINT: ICD-10-CM

## 2018-11-27 PROCEDURE — 97530 THERAPEUTIC ACTIVITIES: CPT | Performed by: PHYSICAL THERAPIST

## 2018-11-27 PROCEDURE — 97035 APP MDLTY 1+ULTRASOUND EA 15: CPT | Performed by: PHYSICAL THERAPIST

## 2018-11-27 PROCEDURE — 97110 THERAPEUTIC EXERCISES: CPT | Performed by: PHYSICAL THERAPIST

## 2018-11-27 NOTE — PROGRESS NOTES
Physical Therapy Daily Progress Note        Visit # : 7  Krys Mayes reports: The bruising is gone and my leg is feeling better     Subjective     Objective       Palpation     Right   Hypertonic in the rectus femoris and vastus lateralis.      See Exercise, Manual, and Modality Logs for complete treatment.       Assessment & Plan     Assessment  Assessment details: Patient presents with improved subjective reports of right LE strength. Tenderness persists muscle belly vastus lateralis and proximal ITB. Tolerated strength and stabilization progressions well. Cont PT 2-3x per week for strength and stability - next visit attempt step up and over if tolerated.         Progress per Plan of Care           Manual Therapy:         mins  79168;  Therapeutic Exercise:    22     mins  92393;     Neuromuscular Cem:        mins  21103;    Therapeutic Activity:     10     mins  01860;     Gait Training:           mins  70754;     Ultrasound:     8     mins  84478;    Electrical Stimulation:         mins  42874 ( );  Dry Needling          mins self-pay    Timed Treatment:   40   mins   Total Treatment:     40   mins    Irma Nichols, PT  Physical Therapist  [place] License # [id number]

## 2018-12-05 DIAGNOSIS — F41.9 ANXIETY: ICD-10-CM

## 2018-12-05 RX ORDER — ALPRAZOLAM 1 MG/1
1 TABLET ORAL NIGHTLY PRN
Qty: 30 TABLET | Refills: 5 | Status: SHIPPED | OUTPATIENT
Start: 2018-12-05 | End: 2019-06-06 | Stop reason: SDUPTHER

## 2018-12-10 ENCOUNTER — HOSPITAL ENCOUNTER (OUTPATIENT)
Dept: MAMMOGRAPHY | Facility: HOSPITAL | Age: 68
Discharge: HOME | End: 2018-12-10
Admitting: PHYSICIAN ASSISTANT

## 2018-12-10 DIAGNOSIS — Z12.39 SCREENING BREAST EXAMINATION: ICD-10-CM

## 2018-12-10 PROCEDURE — 77067 SCR MAMMO BI INCL CAD: CPT

## 2018-12-10 PROCEDURE — 77063 BREAST TOMOSYNTHESIS BI: CPT

## 2018-12-11 ENCOUNTER — TREATMENT (OUTPATIENT)
Dept: PHYSICAL THERAPY | Facility: CLINIC | Age: 68
End: 2018-12-11

## 2018-12-11 DIAGNOSIS — M25.561 CHRONIC PAIN OF RIGHT KNEE: ICD-10-CM

## 2018-12-11 DIAGNOSIS — M54.10 RADICULAR PAIN OF RIGHT LOWER EXTREMITY: Primary | ICD-10-CM

## 2018-12-11 DIAGNOSIS — G89.29 CHRONIC PAIN OF RIGHT KNEE: ICD-10-CM

## 2018-12-11 DIAGNOSIS — M25.551 PAIN OF RIGHT HIP JOINT: ICD-10-CM

## 2018-12-11 PROCEDURE — 97110 THERAPEUTIC EXERCISES: CPT | Performed by: PHYSICAL THERAPIST

## 2018-12-11 PROCEDURE — 97530 THERAPEUTIC ACTIVITIES: CPT | Performed by: PHYSICAL THERAPIST

## 2018-12-11 PROCEDURE — 97116 GAIT TRAINING THERAPY: CPT | Performed by: PHYSICAL THERAPIST

## 2018-12-11 NOTE — PROGRESS NOTES
Physical Therapy Daily Progress Note        Visit # : 8  Krys Mayes reports: My leg has felt really good - 0/10 - still have a small amount of tenderness     Subjective     Objective       Palpation     Right Tenderness of the rectus femoris and TFL.      See Exercise, Manual, and Modality Logs for complete treatment.       Assessment & Plan     Assessment  Assessment details: Patient presents with improved subjective reports of right pain and mobility. Minimal tenderness persists muscle belly vastus lateralis and proximal ITB. Tolerated strength and stabilization progressions well. Cont PT 2-3x per week for strength and stability - next visit anticipate DC to HEP.         Progress strengthening /stabilization /functional activity           Manual Therapy:        mins  68401;  Therapeutic Exercise:    22     mins  25902;     Neuromuscular Cem:        mins  60251;    Therapeutic Activity:     10     mins  16672;     Gait Trainin     mins  36502;     Ultrasound:     8     mins  97020;    Electrical Stimulation:         mins  34308 ( );  Dry Needling          mins self-pay    Timed Treatment:   48   mins   Total Treatment:     48   mins    Irma Nichols, PT  Physical Therapist  [place] License # [id number]

## 2018-12-12 ENCOUNTER — OFFICE VISIT (OUTPATIENT)
Dept: FAMILY MEDICINE CLINIC | Facility: CLINIC | Age: 68
End: 2018-12-12

## 2018-12-12 VITALS
WEIGHT: 215 LBS | TEMPERATURE: 97.5 F | HEART RATE: 55 BPM | RESPIRATION RATE: 16 BRPM | OXYGEN SATURATION: 97 % | DIASTOLIC BLOOD PRESSURE: 74 MMHG | HEIGHT: 67 IN | SYSTOLIC BLOOD PRESSURE: 120 MMHG | BODY MASS INDEX: 33.74 KG/M2

## 2018-12-12 DIAGNOSIS — F41.8 MIXED ANXIETY DEPRESSIVE DISORDER: Primary | ICD-10-CM

## 2018-12-12 PROCEDURE — 99212 OFFICE O/P EST SF 10 MIN: CPT | Performed by: PHYSICIAN ASSISTANT

## 2018-12-12 NOTE — PROGRESS NOTES
"Subjective   Krys Gerber is a 68 y.o. female presents for   Chief Complaint   Patient presents with   • Anxiety     management       History of Present Illness     Danielle is a 68-year-old female who presents for anxiety management.  States she has been taking the Celexa 40 mg daily and  Xanax nightly for her anxiety. States that she choe seen an improvement with her symptoms.  Denied any suicidal or homicidal thoughts.  Appetite and sleep has been normal.      The following portions of the patient's history were reviewed and updated as appropriate: allergies, current medications, past family history, past medical history, past social history, past surgical history and problem list.    Review of Systems   Constitutional: Negative.    HENT: Negative.    Eyes: Negative.    Respiratory: Negative.  Negative for cough, shortness of breath and wheezing.    Cardiovascular: Negative.  Negative for chest pain, palpitations and leg swelling.   Gastrointestinal: Negative.    Endocrine: Negative.    Genitourinary: Negative.    Musculoskeletal: Negative.    Skin: Negative.    Allergic/Immunologic: Negative.    Neurological: Negative.    Hematological: Negative.    Psychiatric/Behavioral: Negative for sleep disturbance and suicidal ideas. The patient is nervous/anxious.    All other systems reviewed and are negative.        Vitals:    12/12/18 1026   BP: 120/74   BP Location: Left arm   Patient Position: Sitting   Cuff Size: Adult   Pulse: 55   Resp: 16   Temp: 97.5 °F (36.4 °C)   TempSrc: Oral   SpO2: 97%   Weight: 97.5 kg (215 lb)   Height: 168.9 cm (66.5\")     Wt Readings from Last 3 Encounters:   12/12/18 97.5 kg (215 lb)   10/02/18 96.6 kg (213 lb)   09/25/18 96.6 kg (213 lb)       BP Readings from Last 3 Encounters:   12/12/18 120/74   10/02/18 100/72   09/25/18 122/78     Social History     Socioeconomic History   • Marital status:      Spouse name: Not on file   • Number of children: Not on file   • Years of " education: Not on file   • Highest education level: Not on file   Social Needs   • Financial resource strain: Not on file   • Food insecurity - worry: Not on file   • Food insecurity - inability: Not on file   • Transportation needs - medical: Not on file   • Transportation needs - non-medical: Not on file   Occupational History   • Not on file   Tobacco Use   • Smoking status: Never Smoker   • Smokeless tobacco: Never Used   • Tobacco comment: caffeine use /soft drinks   Substance and Sexual Activity   • Alcohol use: Yes     Comment: She uses alcohol once or twice a year.   • Drug use: No   • Sexual activity: Defer   Other Topics Concern   • Not on file   Social History Narrative   • Not on file       Allergies   Allergen Reactions   • Morphine And Related GI Intolerance   • Ciprofloxacin-Ciproflox Hcl Er Rash   • Flomax [Tamsulosin Hcl] Rash   • Hydrocodone Rash   • Latex Rash   • Lortab [Hydrocodone-Acetaminophen] Rash   • Penicillins Rash   • Phenergan [Promethazine Hcl] GI Intolerance   • Sulfa Antibiotics Rash   • Amoxicillin Rash   • Tamsulosin Rash       Body mass index is 34.18 kg/m².    Objective   Physical Exam   Constitutional: She is oriented to person, place, and time. Vital signs are normal. She appears well-developed and well-nourished.   Neck: Trachea normal and phonation normal. Neck supple. No edema present.   Cardiovascular: Normal rate, regular rhythm, S1 normal, S2 normal, normal heart sounds and normal pulses.   Pulmonary/Chest: Effort normal and breath sounds normal.   Abdominal: Soft. Normal appearance, normal aorta and bowel sounds are normal. There is no hepatomegaly. There is no tenderness.   Neurological: She is alert and oriented to person, place, and time.   Skin: Skin is warm, dry and intact. Capillary refill takes less than 2 seconds.   Psychiatric: She has a normal mood and affect. Her speech is normal and behavior is normal. Judgment and thought content normal. Cognition and memory  are normal.       Assessment/Plan   Krys was seen today for anxiety.    Diagnoses and all orders for this visit:    Mixed anxiety depressive disorder    Mrs. Danielle Mayes was at office visit today for chronic and stable anxiety with depression.  She is doing well with the citalopram is a Xanax medication. She doesn't require.medications at office visit today.  She will return to office in 4 months for reevaluation.  She voiced understanding.    Ashleigh Hernandez PA-C    [facility]  [address] [place] [id number]  Dept: [phone]  Dept Fax: [phone]  Loc: [phone]  Loc Fax: [phone]

## 2018-12-18 ENCOUNTER — TRANSCRIBE ORDERS (OUTPATIENT)
Dept: ADMINISTRATIVE | Facility: HOSPITAL | Age: 68
End: 2018-12-18

## 2018-12-18 ENCOUNTER — HOSPITAL ENCOUNTER (OUTPATIENT)
Dept: GENERAL RADIOLOGY | Facility: HOSPITAL | Age: 68
Discharge: HOME | End: 2018-12-18
Attending: UROLOGY | Admitting: UROLOGY

## 2018-12-18 DIAGNOSIS — N20.0 RENAL CALCULUS: ICD-10-CM

## 2018-12-18 DIAGNOSIS — N20.0 RENAL CALCULUS: Primary | ICD-10-CM

## 2018-12-18 PROCEDURE — 74018 RADEX ABDOMEN 1 VIEW: CPT

## 2018-12-27 ENCOUNTER — TREATMENT (OUTPATIENT)
Dept: PHYSICAL THERAPY | Facility: CLINIC | Age: 68
End: 2018-12-27

## 2018-12-27 DIAGNOSIS — M25.551 PAIN OF RIGHT HIP JOINT: ICD-10-CM

## 2018-12-27 DIAGNOSIS — G89.29 CHRONIC PAIN OF RIGHT KNEE: ICD-10-CM

## 2018-12-27 DIAGNOSIS — M25.561 CHRONIC PAIN OF RIGHT KNEE: ICD-10-CM

## 2018-12-27 DIAGNOSIS — M54.10 RADICULAR PAIN OF RIGHT LOWER EXTREMITY: Primary | ICD-10-CM

## 2018-12-27 PROCEDURE — 97112 NEUROMUSCULAR REEDUCATION: CPT | Performed by: PHYSICAL THERAPIST

## 2018-12-27 PROCEDURE — 97530 THERAPEUTIC ACTIVITIES: CPT | Performed by: PHYSICAL THERAPIST

## 2018-12-27 PROCEDURE — 97110 THERAPEUTIC EXERCISES: CPT | Performed by: PHYSICAL THERAPIST

## 2018-12-27 NOTE — PROGRESS NOTES
Physical Therapy Daily Progress Note      Visit # : 9  Krys Mayes reports: The only thing I have discomfort with is getting up from the floor - just lack of strength     Subjective     Objective       Palpation   Left   No palpable tenderness to the adductor brevis, adductor longus, adductor debby, rectus femoris and TFL.     Strength/Myotome Testing     Right Hip   Planes of Motion   Flexion: 4+  Abduction: 4+  Adduction: 4+     See Exercise, Manual, and Modality Logs for complete treatment.       Assessment & Plan     Assessment  Assessment details: Patient presents with improved subjective reports of right LE pain and mobility. Reports difficulty with sit to stand transitions and getting up from floor. No tenderness reported today - held US Tolerated strength and stabilization progressions well. Next visit anticipate DC to HEP if able to perform sit to quadruped to half kneel without difficulty..         Progress strengthening /stabilization /functional activity           Manual Therapy:         mins  88795;  Therapeutic Exercise:    25     mins  59053;     Neuromuscular Cem:    15    mins  42997;    Therapeutic Activity:     15     mins  62165;     Gait Training:           mins  29236;     Ultrasound:         mins  12729;    Electrical Stimulation:         mins  08701 ( );  Dry Needling          mins self-pay    Timed Treatment:   55   mins   Total Treatment:     55   mins    Irma Nichols, PT  Physical Therapist  [place] License # [id number]

## 2019-01-04 DIAGNOSIS — E78.5 DYSLIPIDEMIA: ICD-10-CM

## 2019-01-04 RX ORDER — SIMVASTATIN 80 MG
TABLET ORAL
Qty: 90 TABLET | Refills: 0 | Status: SHIPPED | OUTPATIENT
Start: 2019-01-04 | End: 2019-04-02 | Stop reason: SDUPTHER

## 2019-01-04 RX ORDER — CITALOPRAM 40 MG/1
TABLET ORAL
Qty: 90 TABLET | Refills: 0 | Status: SHIPPED | OUTPATIENT
Start: 2019-01-04 | End: 2019-04-02 | Stop reason: SDUPTHER

## 2019-01-10 ENCOUNTER — TREATMENT (OUTPATIENT)
Dept: PHYSICAL THERAPY | Facility: CLINIC | Age: 69
End: 2019-01-10

## 2019-01-10 DIAGNOSIS — M54.10 RADICULAR PAIN OF RIGHT LOWER EXTREMITY: Primary | ICD-10-CM

## 2019-01-10 DIAGNOSIS — G89.29 CHRONIC PAIN OF RIGHT KNEE: ICD-10-CM

## 2019-01-10 DIAGNOSIS — M25.551 PAIN OF RIGHT HIP JOINT: ICD-10-CM

## 2019-01-10 DIAGNOSIS — M25.561 CHRONIC PAIN OF RIGHT KNEE: ICD-10-CM

## 2019-01-10 PROCEDURE — 97110 THERAPEUTIC EXERCISES: CPT | Performed by: PHYSICAL THERAPIST

## 2019-01-10 PROCEDURE — 97530 THERAPEUTIC ACTIVITIES: CPT | Performed by: PHYSICAL THERAPIST

## 2019-01-10 NOTE — PROGRESS NOTES
Physical Therapy Daily Progress Note - Discharge        Visit # : 10  Krys Mayes reports: I feel really good - not having any problems - 0/10 current pain     Subjective     Objective       Palpation     Right   No palpable tenderness to the proximal biceps femoris, proximal semimembranosus, proximal semitendinosus, rectus femoris, vastus lateralis and vastus medialis.     Active Range of Motion     Right Knee   Flexion: 115 degrees   Extension: 0 degrees     Strength/Myotome Testing     Right Hip   Planes of Motion   Flexion: 5  Abduction: 5  Adduction: 5    Right Knee   Flexion: 5  Extension: 5     See Exercise, Manual, and Modality Logs for complete treatment.       Assessment & Plan     Assessment  Assessment details: Patient presents with improved subjective reports of right LE pain and mobility.  No tenderness or reports of pain today. Reviewed HEP and printed for patient. All STG and LTG's have been met at this time. D/C formal PT at this time - patient to continue HEP compliance.         Progress strengthening /stabilization /functional activity           Manual Therapy:         mins  93650;  Therapeutic Exercise:    25     mins  28664;     Neuromuscular Cem:        mins  25382;    Therapeutic Activity:     10     mins  86504;     Gait Training:           mins  78284;     Ultrasound:          mins  60157;    Electrical Stimulation:         mins  36476 ( );  Dry Needling          mins self-pay    Timed Treatment:   35   mins   Total Treatment:     35   mins    Irma Nichols, PT  Physical Therapist  [place] License # [id number]

## 2019-01-15 RX ORDER — METOPROLOL TARTRATE 50 MG/1
TABLET, FILM COATED ORAL
Qty: 270 TABLET | Refills: 1 | Status: SHIPPED | OUTPATIENT
Start: 2019-01-15 | End: 2019-08-01 | Stop reason: SDUPTHER

## 2019-01-24 DIAGNOSIS — I10 ESSENTIAL HYPERTENSION: ICD-10-CM

## 2019-01-24 RX ORDER — LOSARTAN POTASSIUM 100 MG/1
100 TABLET ORAL DAILY
Qty: 90 TABLET | Refills: 0 | Status: SHIPPED | OUTPATIENT
Start: 2019-01-24 | End: 2019-04-23 | Stop reason: SDUPTHER

## 2019-04-02 DIAGNOSIS — E78.5 DYSLIPIDEMIA: ICD-10-CM

## 2019-04-02 RX ORDER — SIMVASTATIN 80 MG
TABLET ORAL
Qty: 90 TABLET | Refills: 0 | Status: SHIPPED | OUTPATIENT
Start: 2019-04-02 | End: 2019-06-29 | Stop reason: SDUPTHER

## 2019-04-02 RX ORDER — CITALOPRAM 40 MG/1
TABLET ORAL
Qty: 90 TABLET | Refills: 0 | Status: SHIPPED | OUTPATIENT
Start: 2019-04-02 | End: 2019-06-29 | Stop reason: SDUPTHER

## 2019-04-06 ENCOUNTER — APPOINTMENT (OUTPATIENT)
Dept: CT IMAGING | Facility: HOSPITAL | Age: 69
End: 2019-04-06

## 2019-04-06 ENCOUNTER — HOSPITAL ENCOUNTER (EMERGENCY)
Facility: HOSPITAL | Age: 69
Discharge: HOME | End: 2019-04-06
Attending: EMERGENCY MEDICINE | Admitting: EMERGENCY MEDICINE

## 2019-04-06 VITALS
BODY MASS INDEX: 34.07 KG/M2 | RESPIRATION RATE: 22 BRPM | DIASTOLIC BLOOD PRESSURE: 56 MMHG | SYSTOLIC BLOOD PRESSURE: 140 MMHG | HEIGHT: 66 IN | WEIGHT: 212 LBS | HEART RATE: 60 BPM | TEMPERATURE: 97.6 F | OXYGEN SATURATION: 97 %

## 2019-04-06 DIAGNOSIS — R42 VERTIGO: Primary | ICD-10-CM

## 2019-04-06 LAB
ALBUMIN SERPL-MCNC: 3.8 G/DL (ref 3.5–5.2)
ALBUMIN/GLOB SERPL: 1.2 G/DL
ALP SERPL-CCNC: 63 U/L (ref 39–117)
ALT SERPL W P-5'-P-CCNC: 13 U/L (ref 1–33)
ANION GAP SERPL CALCULATED.3IONS-SCNC: 17.6 MMOL/L
AST SERPL-CCNC: 14 U/L (ref 1–32)
BASOPHILS # BLD AUTO: 0.05 10*3/MM3 (ref 0–0.2)
BASOPHILS NFR BLD AUTO: 0.8 % (ref 0–1.5)
BILIRUB SERPL-MCNC: 0.5 MG/DL (ref 0.2–1.2)
BUN BLD-MCNC: 26 MG/DL (ref 8–23)
BUN/CREAT SERPL: 26 (ref 7–25)
CALCIUM SPEC-SCNC: 9.4 MG/DL (ref 8.6–10.5)
CHLORIDE SERPL-SCNC: 102 MMOL/L (ref 98–107)
CO2 SERPL-SCNC: 19.4 MMOL/L (ref 22–29)
CREAT BLD-MCNC: 1 MG/DL (ref 0.57–1)
DEPRECATED RDW RBC AUTO: 41.1 FL (ref 37–54)
EOSINOPHIL # BLD AUTO: 0.1 10*3/MM3 (ref 0–0.4)
EOSINOPHIL NFR BLD AUTO: 1.6 % (ref 0.3–6.2)
ERYTHROCYTE [DISTWIDTH] IN BLOOD BY AUTOMATED COUNT: 13.9 % (ref 12.3–15.4)
GFR SERPL CREATININE-BSD FRML MDRD: 55 ML/MIN/1.73
GLOBULIN UR ELPH-MCNC: 3.2 GM/DL
GLUCOSE BLD-MCNC: 151 MG/DL (ref 65–99)
HCT VFR BLD AUTO: 40.5 % (ref 34–46.6)
HGB BLD-MCNC: 12.8 G/DL (ref 12–15.9)
IMM GRANULOCYTES # BLD AUTO: 0.02 10*3/MM3 (ref 0–0.05)
IMM GRANULOCYTES NFR BLD AUTO: 0.3 % (ref 0–0.5)
LYMPHOCYTES # BLD AUTO: 1.14 10*3/MM3 (ref 0.7–3.1)
LYMPHOCYTES NFR BLD AUTO: 18.6 % (ref 19.6–45.3)
MCH RBC QN AUTO: 25.9 PG (ref 26.6–33)
MCHC RBC AUTO-ENTMCNC: 31.6 G/DL (ref 31.5–35.7)
MCV RBC AUTO: 82 FL (ref 79–97)
MONOCYTES # BLD AUTO: 0.45 10*3/MM3 (ref 0.1–0.9)
MONOCYTES NFR BLD AUTO: 7.3 % (ref 5–12)
NEUTROPHILS # BLD AUTO: 4.38 10*3/MM3 (ref 1.4–7)
NEUTROPHILS NFR BLD AUTO: 71.4 % (ref 42.7–76)
NRBC BLD AUTO-RTO: 0 /100 WBC (ref 0–0)
PLATELET # BLD AUTO: 266 10*3/MM3 (ref 140–450)
PMV BLD AUTO: 9.6 FL (ref 6–12)
POTASSIUM BLD-SCNC: 3.9 MMOL/L (ref 3.5–5.2)
PROT SERPL-MCNC: 7 G/DL (ref 6–8.5)
RBC # BLD AUTO: 4.94 10*6/MM3 (ref 3.77–5.28)
SODIUM BLD-SCNC: 139 MMOL/L (ref 136–145)
TROPONIN T SERPL-MCNC: <0.01 NG/ML (ref 0–0.03)
WBC NRBC COR # BLD: 6.14 10*3/MM3 (ref 3.4–10.8)

## 2019-04-06 PROCEDURE — 96374 THER/PROPH/DIAG INJ IV PUSH: CPT

## 2019-04-06 PROCEDURE — 99284 EMERGENCY DEPT VISIT MOD MDM: CPT | Performed by: EMERGENCY MEDICINE

## 2019-04-06 PROCEDURE — 93005 ELECTROCARDIOGRAM TRACING: CPT | Performed by: EMERGENCY MEDICINE

## 2019-04-06 PROCEDURE — 25010000002 ONDANSETRON PER 1 MG: Performed by: EMERGENCY MEDICINE

## 2019-04-06 PROCEDURE — 70450 CT HEAD/BRAIN W/O DYE: CPT

## 2019-04-06 PROCEDURE — 80053 COMPREHEN METABOLIC PANEL: CPT | Performed by: EMERGENCY MEDICINE

## 2019-04-06 PROCEDURE — 85025 COMPLETE CBC W/AUTO DIFF WBC: CPT | Performed by: EMERGENCY MEDICINE

## 2019-04-06 PROCEDURE — 84484 ASSAY OF TROPONIN QUANT: CPT | Performed by: EMERGENCY MEDICINE

## 2019-04-06 PROCEDURE — 93010 ELECTROCARDIOGRAM REPORT: CPT | Performed by: INTERNAL MEDICINE

## 2019-04-06 PROCEDURE — 99283 EMERGENCY DEPT VISIT LOW MDM: CPT

## 2019-04-06 PROCEDURE — 96376 TX/PRO/DX INJ SAME DRUG ADON: CPT

## 2019-04-06 RX ORDER — ONDANSETRON 2 MG/ML
4 INJECTION INTRAMUSCULAR; INTRAVENOUS ONCE
Status: COMPLETED | OUTPATIENT
Start: 2019-04-06 | End: 2019-04-06

## 2019-04-06 RX ORDER — MECLIZINE HYDROCHLORIDE 25 MG/1
25 TABLET ORAL EVERY 6 HOURS PRN
Qty: 30 TABLET | Refills: 0 | Status: SHIPPED | OUTPATIENT
Start: 2019-04-06

## 2019-04-06 RX ORDER — MECLIZINE HYDROCHLORIDE 25 MG/1
25 TABLET ORAL ONCE
Status: COMPLETED | OUTPATIENT
Start: 2019-04-06 | End: 2019-04-06

## 2019-04-06 RX ORDER — ONDANSETRON 8 MG/1
8 TABLET, ORALLY DISINTEGRATING ORAL EVERY 8 HOURS PRN
Qty: 10 TABLET | Refills: 0 | Status: SHIPPED | OUTPATIENT
Start: 2019-04-06 | End: 2019-08-22

## 2019-04-06 RX ADMIN — ONDANSETRON 4 MG: 2 INJECTION, SOLUTION INTRAMUSCULAR; INTRAVENOUS at 11:16

## 2019-04-06 RX ADMIN — SODIUM CHLORIDE 1000 ML: 9 INJECTION, SOLUTION INTRAVENOUS at 11:21

## 2019-04-06 RX ADMIN — ONDANSETRON 4 MG: 2 SOLUTION INTRAMUSCULAR; INTRAVENOUS at 13:43

## 2019-04-06 RX ADMIN — MECLIZINE HYDROCHLORIDE 25 MG: 25 TABLET ORAL at 11:44

## 2019-04-06 NOTE — DISCHARGE INSTRUCTIONS
Drink clear liquids for 24 hours, then advance diet as tolerated.  Follow-up with  in 1 week.  Return to the emergency department if there is worsening dizziness, vomiting not controlled with Zofran, worse in any way at all.

## 2019-04-06 NOTE — ED PROVIDER NOTES
Subjective   History of Present Illness  History of Present Illness    Chief complaint: Dizzy    Location: Home    Quality/Severity: Feels like room is spinning and has nausea and vomiting    Timing/Onset/Duration: The dizziness started last night, the vomiting started this morning    Modifying Factors: Patient feels better to keep her eyes shut and stay still, worse when she opens her eyes and move    Associated Symptoms: No headache.  No fever chills or cough.  No sore throat earache or nasal congestion.  No chest pain or shortness of breath.  No diaphoresis.  The patient's had nausea and vomiting.  The emesis has been nonbloody and nonbilious.  No palpitations.  No abdominal pain.  No diarrhea or burning when she urinates.  No black or discolored stools.    Narrative: This 69-year-old white female presents with dizziness that started last night.  This morning she started having nausea and vomiting. No meds or dosage changes.  She has never had anything like this before.    PCP:  Mary      Review of Systems   Constitutional: Negative for chills and fever.   HENT: Negative for ear pain and sore throat.    Eyes: Negative for discharge and redness.   Respiratory: Negative for cough, chest tightness and shortness of breath.    Cardiovascular: Negative for chest pain, palpitations and leg swelling.   Gastrointestinal: Positive for nausea and vomiting. Negative for abdominal pain, blood in stool, constipation and diarrhea.   Genitourinary: Negative for difficulty urinating and dysuria.   Musculoskeletal: Negative for back pain.   Skin: Negative for rash.   Neurological: Positive for dizziness. Negative for speech difficulty, weakness, light-headedness, numbness and headaches.   Psychiatric/Behavioral: Negative for agitation and confusion.        Medication List      ASK your doctor about these medications    acetaminophen 650 MG 8 hr tablet  Commonly known as:  TYLENOL     ALPRAZolam 1 MG tablet  Commonly known as:   XANAX  Take 1 tablet by mouth At Night As Needed for Anxiety. Need an appointment   before next refill     aspirin 81 MG tablet     citalopram 40 MG tablet  Commonly known as:  CeleXA  TAKE 1 TABLET BY MOUTH DAILY     losartan 100 MG tablet  Commonly known as:  COZAAR  TAKE 1 TABLET BY MOUTH DAILY     metoprolol tartrate 50 MG tablet  Commonly known as:  LOPRESSOR  TAKE ONE AND ONE HALF TABLET BY MOUTH TWICE DAILY     Multiple Vitamin tablet     omeprazole OTC 20 MG EC tablet  Commonly known as:  PriLOSEC OTC     PROBIOTIC ADVANCED PO     rivaroxaban 15 MG tablet  Commonly known as:  XARELTO  Take 1 tablet by mouth Daily.     simvastatin 80 MG tablet  Commonly known as:  ZOCOR  TAKE 1 TABLET BY MOUTH EVERY NIGHT          Past Medical History:   Diagnosis Date   • Allergic    • Arthritis    • Controlled type 2 diabetes with renal manifestation (CMS/Formerly [facility])     states borderline    • Hyperlipidemia    • Hypertension    • Kidney stone     recurrent, calcium oxalate   • Menopausal disorder 1998    She went through menopause in 1998   • Obesity    • PAF (paroxysmal atrial fibrillation) (CMS/Formerly [facility])     in the setting of urosepsis, 9/2016   • Reflux esophagitis 8/19/2016   • Sepsis due to urinary tract infection (CMS/Formerly [facility])    • Shortness of breath    • Urinary tract infection        Allergies   Allergen Reactions   • Morphine And Related GI Intolerance   • Ciprofloxacin-Ciproflox Hcl Er Rash   • Flomax [Tamsulosin Hcl] Rash   • Hydrocodone Rash   • Latex Rash   • Lortab [Hydrocodone-Acetaminophen] Rash   • Penicillins Rash   • Phenergan [Promethazine Hcl] GI Intolerance   • Sulfa Antibiotics Rash   • Amoxicillin Rash   • Tamsulosin Rash       Past Surgical History:   Procedure Laterality Date   • ANKLE SURGERY      Ankle Repair / Description: ORif the left ankle in July 2010. Secondary to fall   • BREAST BIOPSY Right     benign   • BREAST SURGERY      biopsy    • CHOLECYSTECTOMY     • COLONOSCOPY  2014    Done 2004 normal  recheck in 10 years. Repeated February 2014 colonoscopy was normal and to be repeated in 10 years.   • CYSTOSCOPY BLADDER STONE LITHOTRIPSY     • HEMORRHOIDECTOMY     • NEPHROLITHOTOMY Left 1/9/2017    Procedure: NEPHROLITHOTOMY PERCUTANEOUS SECOND LOOK WITH STENT PLACEMENT AND LASER LITHOTRIPSY;  Surgeon: Mati Villegas MD;  Location: [facility];  Service:    • OTHER SURGICAL HISTORY      Tubal Ligation   • PERCUTANEOUS NEPHROSTOLITHOTOMY Left 12/2016   • TUBAL ABDOMINAL LIGATION         Family History   Problem Relation Age of Onset   • COPD Mother    • Heart attack Father         acute myocardial infarction   • Heart attack Son    • Kidney disease Maternal Grandmother        Social History     Socioeconomic History   • Marital status:      Spouse name: Not on file   • Number of children: Not on file   • Years of education: Not on file   • Highest education level: Not on file   Tobacco Use   • Smoking status: Never Smoker   • Smokeless tobacco: Never Used   • Tobacco comment: caffeine use /soft drinks   Substance and Sexual Activity   • Alcohol use: Yes     Comment: She uses alcohol once or twice a year.   • Drug use: No   • Sexual activity: Defer           Objective   Physical Exam   Constitutional: She is oriented to person, place, and time. She appears well-developed and well-nourished. No distress.   ED Triage Vitals (04/06/19 1041)  Temp: 97.6 °F (36.4 °C)  Heart Rate: 58  Resp: 22  BP: 134/93  SpO2: 100 %  Temp src: Oral  Heart Rate Source: Monitor  Patient Position: Lying  BP Location: Right arm  FiO2 (%): n/a    The patient's vitals were reviewed by me.  Unless otherwise noted they are within normal limits.     HENT:   Head: Normocephalic and atraumatic.   Right Ear: External ear normal.   Left Ear: External ear normal.   Nose: Nose normal.   Mouth/Throat: Oropharynx is clear and moist. No oropharyngeal exudate.   Eyes: Conjunctivae and EOM are normal. Pupils are equal, round, and  reactive to light. Right eye exhibits no discharge. Left eye exhibits no discharge. No scleral icterus.   Neck: Normal range of motion. Neck supple. No JVD present. No tracheal deviation present. No thyromegaly present.   Cardiovascular: Normal rate, regular rhythm, normal heart sounds and intact distal pulses. Exam reveals no gallop and no friction rub.   No murmur heard.  Pulmonary/Chest: Effort normal and breath sounds normal. No stridor. No respiratory distress. She has no wheezes. She has no rales. She exhibits no tenderness.   Abdominal: Soft. Bowel sounds are normal. She exhibits no distension and no mass. There is no tenderness. There is no rebound and no guarding. No hernia.   Musculoskeletal: Normal range of motion. She exhibits no edema, tenderness or deformity.   Lymphadenopathy:     She has no cervical adenopathy.   Neurological: She is alert and oriented to person, place, and time. No cranial nerve deficit or sensory deficit. She exhibits normal muscle tone. Coordination normal.   Skin: Skin is warm and dry. No rash noted. She is not diaphoretic. No erythema. No pallor.   Psychiatric: Her behavior is normal.   Nursing note and vitals reviewed.      Procedures           ED Course  ED Course as of Apr 06 1337   Sat Apr 06, 2019   1336 The laboratory values were reviewed by me.  The serum glucose is 151.  The BUN is 26.  The CO2 is 19.4.  GFR is 55.  The laboratory values are otherwise unremarkable.  [RC]      ED Course User Index  [RC] Josias Clayton MD        11:00 AM, 04/06/19:  The EKG was obtained at 1054.  EKG was read by me at 1056.  The EKG tracing shows a sinus bradycardia with a rate of 53.  There is a normal axis with no hypertrophy.  The TX, QRS, and QT intervals are unremarkable.  There is no ectopy.  There is no acute ST elevation or depression.  The EKG today was compared to an EKG dated June 12, 2018.  It is essentially unchanged.    1:38 PM, 04/06/19:  The orthostatics are  negative.    1:42 PM, 04/06/19:  The patient was reassessed.  Her dizziness is improved.  She tolerated liquids.  She does feel a little nauseated.  I will give the patient Dilaudid 4 mg of Zofran IV.  Her vital signs were reviewed and are stable.  Neurological exam: Conscious alert and oriented x4 with no focal deficit noted.    2:31 PM, 04/06/19:  The patient was reassessed.  She is smiling.  She feels much better.  Her nausea is improved.  She has no dizziness.  Vital signs were reviewed and are stable.  Neurological exam: Conscious alert oriented x4 with no focal deficits noted.  The patient has a normal station and gait.  2:31 PM, 04/06/19:  The patient's diagnosis of vertigo was discussed with her.  The patient will be given a prescription for Zofran and Antivert.  Patient should drink clear liquids for 24 hours and then advance diet as tolerated.  The patient should follow-up with Dr. Hernandez in 1 week, and return to the emergency department if there is worsening dizziness, vomiting not controlled with Zofran, worse in any way at all.  All the patient's questions were answered she will be discharged in good condition.          MDM    No orders to display     Labs Reviewed - No data to display  No results found.    Final diagnoses:   Vertigo         ED Medications:  Medications - No data to display    New Medications:     Medication List      ASK your doctor about these medications    acetaminophen 650 MG 8 hr tablet  Commonly known as:  TYLENOL     ALPRAZolam 1 MG tablet  Commonly known as:  XANAX  Take 1 tablet by mouth At Night As Needed for Anxiety. Need an appointment   before next refill     aspirin 81 MG tablet     citalopram 40 MG tablet  Commonly known as:  CeleXA  TAKE 1 TABLET BY MOUTH DAILY     losartan 100 MG tablet  Commonly known as:  COZAAR  TAKE 1 TABLET BY MOUTH DAILY     metoprolol tartrate 50 MG tablet  Commonly known as:  LOPRESSOR  TAKE ONE AND ONE HALF TABLET BY MOUTH TWICE DAILY      Multiple Vitamin tablet     omeprazole OTC 20 MG EC tablet  Commonly known as:  PriLOSEC OTC     PROBIOTIC ADVANCED PO     rivaroxaban 15 MG tablet  Commonly known as:  XARELTO  Take 1 tablet by mouth Daily.     simvastatin 80 MG tablet  Commonly known as:  ZOCOR  TAKE 1 TABLET BY MOUTH EVERY NIGHT          Stopped Medications:     Medication List      ASK your doctor about these medications    acetaminophen 650 MG 8 hr tablet  Commonly known as:  TYLENOL     ALPRAZolam 1 MG tablet  Commonly known as:  XANAX  Take 1 tablet by mouth At Night As Needed for Anxiety. Need an appointment   before next refill     aspirin 81 MG tablet     citalopram 40 MG tablet  Commonly known as:  CeleXA  TAKE 1 TABLET BY MOUTH DAILY     losartan 100 MG tablet  Commonly known as:  COZAAR  TAKE 1 TABLET BY MOUTH DAILY     metoprolol tartrate 50 MG tablet  Commonly known as:  LOPRESSOR  TAKE ONE AND ONE HALF TABLET BY MOUTH TWICE DAILY     Multiple Vitamin tablet     omeprazole OTC 20 MG EC tablet  Commonly known as:  PriLOSEC OTC     PROBIOTIC ADVANCED PO     rivaroxaban 15 MG tablet  Commonly known as:  XARELTO  Take 1 tablet by mouth Daily.     simvastatin 80 MG tablet  Commonly known as:  ZOCOR  TAKE 1 TABLET BY MOUTH EVERY NIGHT            Final diagnoses:   Vertigo            Josias Clayton MD  04/06/19 1435       Josias Clayton MD  04/06/19 1436

## 2019-04-19 ENCOUNTER — OFFICE VISIT (OUTPATIENT)
Dept: FAMILY MEDICINE CLINIC | Facility: CLINIC | Age: 69
End: 2019-04-19

## 2019-04-19 VITALS
BODY MASS INDEX: 34.39 KG/M2 | SYSTOLIC BLOOD PRESSURE: 118 MMHG | DIASTOLIC BLOOD PRESSURE: 72 MMHG | HEART RATE: 56 BPM | HEIGHT: 66 IN | RESPIRATION RATE: 16 BRPM | TEMPERATURE: 97.9 F | WEIGHT: 214 LBS | OXYGEN SATURATION: 93 %

## 2019-04-19 DIAGNOSIS — D50.9 IRON DEFICIENCY ANEMIA, UNSPECIFIED IRON DEFICIENCY ANEMIA TYPE: ICD-10-CM

## 2019-04-19 DIAGNOSIS — H61.22 IMPACTED CERUMEN OF LEFT EAR: ICD-10-CM

## 2019-04-19 DIAGNOSIS — R73.03 PREDIABETES: ICD-10-CM

## 2019-04-19 DIAGNOSIS — R42 DIZZINESS: ICD-10-CM

## 2019-04-19 DIAGNOSIS — I10 ESSENTIAL HYPERTENSION: Primary | ICD-10-CM

## 2019-04-19 DIAGNOSIS — F41.8 MIXED ANXIETY DEPRESSIVE DISORDER: ICD-10-CM

## 2019-04-19 DIAGNOSIS — Z79.899 HIGH RISK MEDICATION USE: ICD-10-CM

## 2019-04-19 DIAGNOSIS — E78.5 DYSLIPIDEMIA: ICD-10-CM

## 2019-04-19 DIAGNOSIS — F51.01 PRIMARY INSOMNIA: ICD-10-CM

## 2019-04-19 DIAGNOSIS — F41.9 ANXIETY: ICD-10-CM

## 2019-04-19 PROCEDURE — 99214 OFFICE O/P EST MOD 30 MIN: CPT | Performed by: PHYSICIAN ASSISTANT

## 2019-04-19 PROCEDURE — 69209 REMOVE IMPACTED EAR WAX UNI: CPT | Performed by: PHYSICIAN ASSISTANT

## 2019-04-19 NOTE — PROGRESS NOTES
Subjective   Krystor Mayes is a 69 y.o. female presents for   Chief Complaint   Patient presents with   • Anxiety     maint   • Dizziness     x2 weeks   • Hyperlipidemia     Maintenance   • Insomnia     Maintenance   • Prediabetes     Maintenance       History of Present Illness     Danielle is a 69-year-old female who presents for anxiety, insomnia, prediabetes, hyperlipidemia and follow-up from [facility][1] for dizziness.  I have reviewed Danielle's ER report from [facility] on April 6, 2019.  States she was having some dizziness when she got up in the morning.  States she felt wobbly and had some nausea and vomiting.  She went to the ER for further evaluation.  She was diagnosed with vertigo and prescribed meclizine.  States she has take this medication occasionally.  She is still having some dizziness off and on but not as bad.  Now she gets dizzy when she gets up from a lying position.  States the dizziness only lasts for a few minutes.  Denied any chest pain, shortness of air, headache, upper respiratory symptoms, nausea, vomiting, diarrhea, fevers or chills.  She has been doing well with her Xanax medication.  States she takes this nightly with dinner for her insomnia.  Doing well with her citalopram 40 mg for her depression and anxiety symptoms.  Denied any suicidal or homicidal ideation.  Recently saw her eye doctor.  The ophthalmologist wanted a current A1c.  She is fasting at office visit today.  Bowel movements have been daily without dark black tarry stools.  Denied any recent falls or injuries.  Appetite has been slightly healthy.  Sleep has been good with the Xanax medication.    The following portions of the patient's history were reviewed and updated as appropriate: allergies, current medications, past family history, past medical history, past social history, past surgical history and problem list.    Review of Systems   Constitutional: Negative.    HENT: Negative.    Eyes:  "Negative.    Respiratory: Negative.  Negative for shortness of breath and wheezing.    Cardiovascular: Negative.  Negative for chest pain, palpitations and leg swelling.   Gastrointestinal: Negative.  Negative for abdominal pain, constipation, diarrhea, nausea and vomiting.   Endocrine: Negative.    Genitourinary: Negative.    Musculoskeletal: Negative.  Negative for gait problem.   Skin: Negative.    Allergic/Immunologic: Negative.    Neurological: Positive for dizziness.   Hematological: Negative.    Psychiatric/Behavioral: Negative.  Negative for sleep disturbance and suicidal ideas.   All other systems reviewed and are negative.        Vitals:    04/19/19 1026   BP: 118/72   Pulse: 56   Resp: 16   Temp: 97.9 °F (36.6 °C)   SpO2: 93%   Weight: 97.1 kg (214 lb)   Height: 167.6 cm (66\")     Wt Readings from Last 3 Encounters:   04/19/19 97.1 kg (214 lb)   04/06/19 96.2 kg (212 lb)   12/12/18 97.5 kg (215 lb)     BP Readings from Last 3 Encounters:   04/19/19 118/72   04/06/19 140/56   12/12/18 120/74     Social History     Socioeconomic History   • Marital status:      Spouse name: Not on file   • Number of children: Not on file   • Years of education: Not on file   • Highest education level: Not on file   Tobacco Use   • Smoking status: Never Smoker   • Smokeless tobacco: Never Used   • Tobacco comment: caffeine use /soft drinks   Substance and Sexual Activity   • Alcohol use: Yes     Comment: She uses alcohol once or twice a year.   • Drug use: No   • Sexual activity: Defer       Allergies   Allergen Reactions   • Morphine And Related GI Intolerance   • Ciprofloxacin-Ciproflox Hcl Er Rash   • Flomax [Tamsulosin Hcl] Rash   • Hydrocodone Rash   • Latex Rash   • Lortab [Hydrocodone-Acetaminophen] Rash   • Penicillins Rash   • Phenergan [Promethazine Hcl] GI Intolerance   • Sulfa Antibiotics Rash   • Amoxicillin Rash   • Tamsulosin Rash       Body mass index is 34.54 kg/m².    Objective   Physical Exam "   Constitutional: She is oriented to person, place, and time. Vital signs are normal. She appears well-developed and well-nourished.   HENT:   Head: Normocephalic and atraumatic.   Right Ear: Hearing, tympanic membrane, external ear and ear canal normal.   Left Ear: Hearing, tympanic membrane and external ear normal.   Nose: Nose normal. Right sinus exhibits no maxillary sinus tenderness and no frontal sinus tenderness. Left sinus exhibits no maxillary sinus tenderness and no frontal sinus tenderness.   Mouth/Throat: Uvula is midline, oropharynx is clear and moist and mucous membranes are normal.   Left ear: Cerumen impaction is noted  Left ear lavage was performed by Marlene Fragoso, medical assistant.  I have reexamined ear after ear lavage.  She had slight improvement of cerumen impaction but not totally removed.   Eyes: Conjunctivae, EOM and lids are normal.   Neck: Trachea normal and phonation normal. Neck supple. Carotid bruit is not present. No edema present. No thyroid mass and no thyromegaly present.   Cardiovascular: Normal rate, regular rhythm, S1 normal, S2 normal, normal heart sounds and normal pulses.   No murmur heard.  Pulmonary/Chest: Effort normal and breath sounds normal.   Abdominal: Soft. Normal appearance, normal aorta and bowel sounds are normal. There is no hepatomegaly. There is no tenderness.   Lymphadenopathy:     She has no cervical adenopathy.   Neurological: She is alert and oriented to person, place, and time.   Skin: Skin is warm, dry and intact. Capillary refill takes less than 2 seconds.   Psychiatric: She has a normal mood and affect. Her speech is normal and behavior is normal. Judgment and thought content normal. Cognition and memory are normal.         Ear Cerumen Removal Instrumentation  Date/Time: 4/19/2019 10:49 AM  Performed by: Ashleigh Hernandez PA-C  Authorized by: Ashleigh Hernandez PA-C   Consent: Verbal consent obtained. Written consent not obtained.  Risks and  "benefits: risks, benefits and alternatives were discussed  Consent given by: patient  Patient understanding: patient states understanding of the procedure being performed  Patient consent: the patient's understanding of the procedure matches consent given  Procedure consent: procedure consent matches procedure scheduled  Patient identity confirmed: verbally with patient  Time out: Immediately prior to procedure a \"time out\" was called to verify the correct patient, procedure, equipment, support staff and site/side marked as required.    Anesthesia:  Local Anesthetic: none  Location details: left ear  Patient tolerance: Patient tolerated the procedure well with no immediate complications  Comments: Left ear lavage was performed by Marlene Fragoso, medical assistant office visit today.  I have reexamined Danielle is here after ear lavage.  She still had some cerumen in her ear.  She was instructed to purchase over-the-counter Debrox/mineral oil and use as directed.  Procedure type: irrigation   Sedation:  Patient sedated: no          Assessment/Plan   Krys was seen today for anxiety, dizziness, hyperlipidemia, insomnia and prediabetes.    Diagnoses and all orders for this visit:    Essential hypertension    Anxiety  -     Compliance Drug Analysis, Ur - Urine, Clean Catch    Dyslipidemia  -     Lipid Panel With LDL / HDL Ratio    Primary insomnia  -     Compliance Drug Analysis, Ur - Urine, Clean Catch    Mixed anxiety depressive disorder    High risk medication use  -     Compliance Drug Analysis, Ur - Urine, Clean Catch    Iron deficiency anemia, unspecified iron deficiency anemia type  -     CBC & Differential  -     Iron Profile    Prediabetes  -     Hemoglobin A1c  -     CBC & Differential    Impacted cerumen of left ear  -     Ear Cerumen Removal Instrumentation    Dizziness    1.  Chronic primary insomnia: Doing well with the Xanax medication.  She takes this each night.  Danielle has signed the appropriate " agreement and consent forms for the Xanax medication.  She will have a urine drug screen collected at office visit today for compliance purposes.  Plan to follow-up in 4 months.  No refills required at this time.  2.  Chronic and stable anxiety with depression: Doing well with her citalopram medication.  No refills are required at this time.  Will reevaluate at next office visit.  3.  New dyslipidemia: She is fasting will have a CMP and a lipid profile collected office visit today.  Danielle will be notified of test results when completed.  I have asked her to decrease her carbohydrates and fatty food in diet.  She voiced understanding.  4.  Chronic prediabetes: She will have a CMP, A1c and lipid profile collected with lab work today.  She will be notified of test results when completed.  Will mail copy of A1c to her ophthalmologist.  5.  New left cerumen impacted ear: She has given verbal consent and will receive a left ear lavage at office visit today by Marlene Fragoso, medical assistant.  I have reexamined Danielle's ears after ear lavage.  She still had some cerumen in her ear that is too far back to get with a curette.  Asked her to purchase over-the-counter Debrox and use as directed.  She voiced understanding.  6.  Chronic iron deficiency anemia: She has been taking her iron medications as directed.  Danielle will have a CBC and iron profile collected at office today.  She will be notified of test results when completed.  7.  Follow-up from the ER/Dizziness: Danielle was seen at [facility] on April 6, 2019 for vertigo/dizziness.  I reviewed her lab work and test results with her at office visit today from [facility] on April 6, 2019.  States she was prescribed meclizine to take.  States she takes this occasionally.  States her dizziness has improved somewhat.  Suspect she may have benign positional vertigo vs anemia.  She still gets lightheaded at times when she gets up from a lying position.  Will wait  on CBC to see if this is anemia related.  If normal results, will send to vestibular rehab.  She voiced understanding.      [facility]  [address] [place] [id number]  Dept: [phone]  Dept Fax: [phone]  Loc: [phone]  Loc Fax: [phone]

## 2019-04-20 LAB
BASOPHILS # BLD AUTO: 0 X10E3/UL (ref 0–0.2)
BASOPHILS NFR BLD AUTO: 1 %
CHOLEST SERPL-MCNC: 169 MG/DL (ref 100–199)
EOSINOPHIL # BLD AUTO: 0.2 X10E3/UL (ref 0–0.4)
EOSINOPHIL NFR BLD AUTO: 3 %
ERYTHROCYTE [DISTWIDTH] IN BLOOD BY AUTOMATED COUNT: 14.3 % (ref 12.3–15.4)
HBA1C MFR BLD: 5.6 % (ref 4.8–5.6)
HCT VFR BLD AUTO: 40.6 % (ref 34–46.6)
HDLC SERPL-MCNC: 60 MG/DL
HGB BLD-MCNC: 12.6 G/DL (ref 11.1–15.9)
IMM GRANULOCYTES # BLD AUTO: 0 X10E3/UL (ref 0–0.1)
IMM GRANULOCYTES NFR BLD AUTO: 0 %
IRON SATN MFR SERPL: 30 % (ref 15–55)
IRON SERPL-MCNC: 86 UG/DL (ref 27–139)
LDLC SERPL CALC-MCNC: 71 MG/DL (ref 0–99)
LDLC/HDLC SERPL: 1.2 RATIO (ref 0–3.2)
LYMPHOCYTES # BLD AUTO: 1.4 X10E3/UL (ref 0.7–3.1)
LYMPHOCYTES NFR BLD AUTO: 21 %
MCH RBC QN AUTO: 25.6 PG (ref 26.6–33)
MCHC RBC AUTO-ENTMCNC: 31 G/DL (ref 31.5–35.7)
MCV RBC AUTO: 83 FL (ref 79–97)
MONOCYTES # BLD AUTO: 0.8 X10E3/UL (ref 0.1–0.9)
MONOCYTES NFR BLD AUTO: 12 %
NEUTROPHILS # BLD AUTO: 4.2 X10E3/UL (ref 1.4–7)
NEUTROPHILS NFR BLD AUTO: 63 %
PLATELET # BLD AUTO: 255 X10E3/UL (ref 150–379)
RBC # BLD AUTO: 4.92 X10E6/UL (ref 3.77–5.28)
TIBC SERPL-MCNC: 290 UG/DL (ref 250–450)
TRIGL SERPL-MCNC: 191 MG/DL (ref 0–149)
UIBC SERPL-MCNC: 204 UG/DL (ref 118–369)
VLDLC SERPL CALC-MCNC: 38 MG/DL (ref 5–40)
WBC # BLD AUTO: 6.6 X10E3/UL (ref 3.4–10.8)

## 2019-04-23 DIAGNOSIS — I10 ESSENTIAL HYPERTENSION: ICD-10-CM

## 2019-04-23 RX ORDER — LOSARTAN POTASSIUM 100 MG/1
100 TABLET ORAL DAILY
Qty: 90 TABLET | Refills: 0 | Status: SHIPPED | OUTPATIENT
Start: 2019-04-23 | End: 2019-07-21 | Stop reason: SDUPTHER

## 2019-04-25 LAB — DRUGS UR: NORMAL

## 2019-06-05 DIAGNOSIS — F41.9 ANXIETY: ICD-10-CM

## 2019-06-05 RX ORDER — ALPRAZOLAM 1 MG/1
TABLET ORAL
Qty: 30 TABLET | Refills: 0 | Status: CANCELLED | OUTPATIENT
Start: 2019-06-05

## 2019-06-06 DIAGNOSIS — F41.9 ANXIETY: ICD-10-CM

## 2019-06-06 RX ORDER — ALPRAZOLAM 1 MG/1
1 TABLET ORAL NIGHTLY PRN
Qty: 30 TABLET | Refills: 5 | Status: SHIPPED | OUTPATIENT
Start: 2019-06-06 | End: 2019-11-30 | Stop reason: SDUPTHER

## 2019-06-11 ENCOUNTER — OFFICE VISIT (OUTPATIENT)
Dept: CARDIOLOGY | Facility: CLINIC | Age: 69
End: 2019-06-11

## 2019-06-11 VITALS
WEIGHT: 208.6 LBS | BODY MASS INDEX: 33.52 KG/M2 | SYSTOLIC BLOOD PRESSURE: 124 MMHG | DIASTOLIC BLOOD PRESSURE: 68 MMHG | HEART RATE: 51 BPM | HEIGHT: 66 IN

## 2019-06-11 DIAGNOSIS — I10 ESSENTIAL HYPERTENSION: ICD-10-CM

## 2019-06-11 DIAGNOSIS — I48.0 PAF (PAROXYSMAL ATRIAL FIBRILLATION) (HCC): Primary | ICD-10-CM

## 2019-06-11 PROBLEM — N90.7 SEBACEOUS CYST OF LABIA: Status: RESOLVED | Noted: 2017-12-20 | Resolved: 2019-06-11

## 2019-06-11 PROBLEM — T75.3XXA MOTION SICKNESS: Status: RESOLVED | Noted: 2017-02-15 | Resolved: 2019-06-11

## 2019-06-11 PROBLEM — M79.604 LOWER EXTREMITY PAIN, RIGHT: Status: RESOLVED | Noted: 2017-05-11 | Resolved: 2019-06-11

## 2019-06-11 PROBLEM — H61.22 IMPACTED CERUMEN OF LEFT EAR: Status: RESOLVED | Noted: 2019-04-19 | Resolved: 2019-06-11

## 2019-06-11 PROBLEM — K57.32 SIGMOID DIVERTICULITIS: Status: RESOLVED | Noted: 2017-11-13 | Resolved: 2019-06-11

## 2019-06-11 PROBLEM — R42 DIZZINESS: Status: RESOLVED | Noted: 2019-04-19 | Resolved: 2019-06-11

## 2019-06-11 PROCEDURE — 93000 ELECTROCARDIOGRAM COMPLETE: CPT | Performed by: INTERNAL MEDICINE

## 2019-06-11 PROCEDURE — 99213 OFFICE O/P EST LOW 20 MIN: CPT | Performed by: INTERNAL MEDICINE

## 2019-06-11 NOTE — PROGRESS NOTES
Date of Office Visit: 2019  Encounter Provider: Jeremy Orozco MD  Place of Service: [facility] CARDIOLOGY  Patient Name: Krys Mayes  :1950    Chief Complaint   Patient presents with   • Atrial Fibrillation   :     HPI: Krys Mayes is a 69 y.o. female who presents today to follow up.     Prior to 2016, she had not had any cardiac issues. At that time, she was admitted with urosepsis due to kidney stones. She went into atrial fibrillation in that setting. She initially converted to NSR but went back into atrial fibrillation. She was rate controlled and placed on rivaroxaban. An echocardiogram revealed normal LV size and systolic function (EF 56%). There was moderate TR and severe pulmonary hypertension as well. When I saw her in the office soon after, she was doing well.      She was readmitted just a few weeks later with urosepsis again. She was found to have a staghorn calculus.  She did have a brief episode of atrial fibrillation during that admission. Her metoprolol dose was increased, and her rivaroxaban dose was decreased due to STEVAN.      She is now doing really well. She denies palpitations, lightheadedness, edema, orthopnea, or chest pain. She denies bleeding.      Past Medical History:   Diagnosis Date   • Allergic    • Arthritis    • Controlled type 2 diabetes with renal manifestation (CMS/[facility])     states borderline    • Gastroesophageal reflux disease 2016   • Hyperlipidemia    • Hypertension    • Kidney stone     recurrent, calcium oxalate   • Menopausal disorder     She went through menopause in    • Obesity    • PAF (paroxysmal atrial fibrillation) (CMS/HCC)     in the setting of urosepsis, 2016   • Reflux esophagitis 2016   • Sebaceous cyst of labia 2017   • Sepsis due to urinary tract infection (CMS/HCC)    • Sigmoid diverticulitis 2017   • Urinary tract infection    • Vitamin D deficiency 2016        Past Surgical History:   Procedure Laterality Date   • ANKLE SURGERY      Ankle Repair / Description: ORif the left ankle in July 2010. Secondary to fall   • BREAST BIOPSY Right     benign   • BREAST SURGERY      biopsy    • CHOLECYSTECTOMY     • COLONOSCOPY  2014    Done 2004 normal recheck in 10 years. Repeated February 2014 colonoscopy was normal and to be repeated in 10 years.   • CYSTOSCOPY BLADDER STONE LITHOTRIPSY     • HEMORRHOIDECTOMY     • NEPHROLITHOTOMY Left 1/9/2017    Procedure: NEPHROLITHOTOMY PERCUTANEOUS SECOND LOOK WITH STENT PLACEMENT AND LASER LITHOTRIPSY;  Surgeon: Mati Villegas MD;  Location: [facility];  Service:    • OTHER SURGICAL HISTORY      Tubal Ligation   • PERCUTANEOUS NEPHROSTOLITHOTOMY Left 12/2016   • TUBAL ABDOMINAL LIGATION         Social History     Socioeconomic History   • Marital status:      Spouse name: Not on file   • Number of children: Not on file   • Years of education: Not on file   • Highest education level: Not on file   Tobacco Use   • Smoking status: Never Smoker   • Smokeless tobacco: Never Used   • Tobacco comment: caffeine use /soft drinks   Substance and Sexual Activity   • Alcohol use: Yes     Binge frequency: Less than monthly     Comment: She uses alcohol once or twice a year.   • Drug use: No   • Sexual activity: Defer       Family History   Problem Relation Age of Onset   • COPD Mother    • Heart attack Father         acute myocardial infarction   • Heart attack Son    • Kidney disease Maternal Grandmother        Review of Systems   Constitution: Negative for malaise/fatigue.   Cardiovascular: Negative for chest pain, dyspnea on exertion and palpitations.   Genitourinary: Positive for flank pain.   Neurological: Positive for light-headedness.   All other systems reviewed and are negative.      Allergies   Allergen Reactions   • Morphine And Related GI Intolerance   • Ciprofloxacin-Ciproflox Hcl Er Rash   • Flomax [Tamsulosin Hcl]  "Rash   • Hydrocodone Rash   • Latex Rash   • Lortab [Hydrocodone-Acetaminophen] Rash   • Penicillins Rash   • Phenergan [Promethazine Hcl] GI Intolerance   • Sulfa Antibiotics Rash   • Amoxicillin Rash   • Tamsulosin Rash         Current Outpatient Medications:   •  acetaminophen (TYLENOL) 650 MG 8 hr tablet, Take 1 tablet by mouth every 6 (six) hours as needed. for pain, Disp: , Rfl:   •  ALPRAZolam (XANAX) 1 MG tablet, Take 1 tablet by mouth At Night As Needed for Anxiety. Need an appointment before next refill, Disp: 30 tablet, Rfl: 5  •  aspirin 81 MG tablet, Take 81 mg by mouth Daily., Disp: , Rfl:   •  citalopram (CeleXA) 40 MG tablet, TAKE 1 TABLET BY MOUTH DAILY, Disp: 90 tablet, Rfl: 0  •  losartan (COZAAR) 100 MG tablet, TAKE 1 TABLET BY MOUTH DAILY, Disp: 90 tablet, Rfl: 0  •  meclizine (ANTIVERT) 25 MG tablet, Take 1 tablet by mouth Every 6 (Six) Hours As Needed for dizziness or Nausea., Disp: 30 tablet, Rfl: 0  •  metoprolol tartrate (LOPRESSOR) 50 MG tablet, TAKE ONE AND ONE HALF TABLET BY MOUTH TWICE DAILY, Disp: 270 tablet, Rfl: 1  •  Multiple Vitamin tablet, Take 1 tablet by mouth Daily., Disp: , Rfl:   •  omeprazole OTC (PriLOSEC OTC) 20 MG EC tablet, Take 20 mg by mouth 2 (two) times a day., Disp: , Rfl:   •  Probiotic Product (PROBIOTIC ADVANCED PO), Take 1 tablet by mouth Daily., Disp: , Rfl:   •  rivaroxaban (XARELTO) 15 MG tablet, Take 1 tablet by mouth Daily., Disp: 90 tablet, Rfl: 3  •  simvastatin (ZOCOR) 80 MG tablet, TAKE 1 TABLET BY MOUTH EVERY NIGHT, Disp: 90 tablet, Rfl: 0  •  ondansetron ODT (ZOFRAN ODT) 8 MG disintegrating tablet, Take 1 tablet by mouth Every 8 (Eight) Hours As Needed for Nausea or Vomiting., Disp: 10 tablet, Rfl: 0     Objective:     Vitals:    06/11/19 1146   BP: 124/68   BP Location: Right arm   Pulse: 51   Weight: 94.6 kg (208 lb 9.6 oz)   Height: 167.6 cm (66\")     Body mass index is 33.67 kg/m².    Physical Exam   Constitutional: She is oriented to person, " place, and time. She appears well-developed and well-nourished.   HENT:   Head: Normocephalic.   Nose: Nose normal.   Mouth/Throat: Oropharynx is clear and moist.   Eyes: Conjunctivae and EOM are normal. Pupils are equal, round, and reactive to light.   Neck: Normal range of motion. No JVD present.   Cardiovascular: Normal rate, regular rhythm, normal heart sounds and intact distal pulses.   No murmur heard.  Pulmonary/Chest: Effort normal and breath sounds normal.   Abdominal: Soft. There is no tenderness.   Musculoskeletal: Normal range of motion. She exhibits no edema.   Lymphadenopathy:     She has no cervical adenopathy.   Neurological: She is alert and oriented to person, place, and time. No cranial nerve deficit.   Skin: Skin is warm and dry. No rash noted.   Psychiatric: She has a normal mood and affect. Her behavior is normal. Judgment and thought content normal.   Vitals reviewed.        ECG 12 Lead  Date/Time: 6/11/2019 1:25 PM  Performed by: Jeremy Orozco MD  Authorized by: Jeremy Orozco MD   Comparison: compared with previous ECG   Similar to previous ECG  Rhythm: sinus rhythm  Conduction: conduction normal  ST Segments: ST segments normal  T flattening: V3 and V4  QRS axis: normal  Other findings: non-specific ST-T wave changes    Clinical impression: non-specific ECG              Assessment:       Diagnosis Plan   1. PAF (paroxysmal atrial fibrillation) (CMS/HCC)     2. Essential hypertension            Plan:       1.  Atrial Fibrillation and Atrial Flutter  Assessment  • The patient has paroxysmal atrial fibrillation  • This is non-valvular in etiology  • The patient's CHADS2-VASc score is 3  • A [id number] score of 2 or more is considered a high risk for a thromboembolic event  • Rivaroxaban prescribed    Plan  • Attempt to maintain sinus rhythm  • Continue rivaroxaban for antithrombotic therapy, bleeding issues discussed  • Continue beta blocker for rhythm control    2.  Her BP is within  goal.    Sincerely,       Jeremy Orozco MD

## 2019-06-29 DIAGNOSIS — E78.5 DYSLIPIDEMIA: ICD-10-CM

## 2019-06-30 RX ORDER — SIMVASTATIN 80 MG
TABLET ORAL
Qty: 90 TABLET | Refills: 0 | Status: SHIPPED | OUTPATIENT
Start: 2019-06-30 | End: 2019-09-27 | Stop reason: SDUPTHER

## 2019-06-30 RX ORDER — CITALOPRAM 40 MG/1
TABLET ORAL
Qty: 90 TABLET | Refills: 0 | Status: SHIPPED | OUTPATIENT
Start: 2019-06-30 | End: 2019-09-27 | Stop reason: SDUPTHER

## 2019-07-21 DIAGNOSIS — I10 ESSENTIAL HYPERTENSION: ICD-10-CM

## 2019-07-22 DIAGNOSIS — I10 ESSENTIAL HYPERTENSION: ICD-10-CM

## 2019-07-22 RX ORDER — LOSARTAN POTASSIUM 100 MG/1
100 TABLET ORAL DAILY
Qty: 90 TABLET | Refills: 0 | Status: SHIPPED | OUTPATIENT
Start: 2019-07-22 | End: 2019-07-22 | Stop reason: SDUPTHER

## 2019-07-22 RX ORDER — LOSARTAN POTASSIUM 100 MG/1
100 TABLET ORAL DAILY
Qty: 90 TABLET | Refills: 0 | Status: SHIPPED | OUTPATIENT
Start: 2019-07-22 | End: 2019-11-06 | Stop reason: SDUPTHER

## 2019-08-01 RX ORDER — METOPROLOL TARTRATE 50 MG/1
TABLET, FILM COATED ORAL
Qty: 270 TABLET | Refills: 3 | Status: SHIPPED | OUTPATIENT
Start: 2019-08-01 | End: 2020-09-01

## 2019-08-22 ENCOUNTER — OFFICE VISIT (OUTPATIENT)
Dept: FAMILY MEDICINE CLINIC | Facility: CLINIC | Age: 69
End: 2019-08-22

## 2019-08-22 VITALS
SYSTOLIC BLOOD PRESSURE: 122 MMHG | RESPIRATION RATE: 16 BRPM | WEIGHT: 210.6 LBS | OXYGEN SATURATION: 97 % | TEMPERATURE: 97.6 F | HEART RATE: 59 BPM | HEIGHT: 66 IN | BODY MASS INDEX: 33.85 KG/M2 | DIASTOLIC BLOOD PRESSURE: 60 MMHG

## 2019-08-22 DIAGNOSIS — R73.9 HYPERGLYCEMIA: ICD-10-CM

## 2019-08-22 DIAGNOSIS — Z78.0 POSTMENOPAUSE: ICD-10-CM

## 2019-08-22 DIAGNOSIS — E66.9 OBESITY (BMI 30-39.9): ICD-10-CM

## 2019-08-22 DIAGNOSIS — F41.8 MIXED ANXIETY DEPRESSIVE DISORDER: ICD-10-CM

## 2019-08-22 DIAGNOSIS — E55.9 VITAMIN D DEFICIENCY: ICD-10-CM

## 2019-08-22 DIAGNOSIS — F41.9 ANXIETY: ICD-10-CM

## 2019-08-22 DIAGNOSIS — Z00.00 MEDICARE ANNUAL WELLNESS VISIT, SUBSEQUENT: Primary | ICD-10-CM

## 2019-08-22 DIAGNOSIS — D50.9 IRON DEFICIENCY ANEMIA, UNSPECIFIED IRON DEFICIENCY ANEMIA TYPE: ICD-10-CM

## 2019-08-22 DIAGNOSIS — I10 ESSENTIAL HYPERTENSION: ICD-10-CM

## 2019-08-22 DIAGNOSIS — E78.5 DYSLIPIDEMIA: ICD-10-CM

## 2019-08-22 LAB
25(OH)D3+25(OH)D2 SERPL-MCNC: 34.6 NG/ML (ref 30–100)
ALBUMIN SERPL-MCNC: 4.6 G/DL (ref 3.5–5.2)
ALBUMIN/GLOB SERPL: 1.8 G/DL
ALP SERPL-CCNC: 63 U/L (ref 39–117)
ALT SERPL-CCNC: 14 U/L (ref 1–33)
AST SERPL-CCNC: 17 U/L (ref 1–32)
BASOPHILS # BLD AUTO: 0.05 10*3/MM3 (ref 0–0.2)
BASOPHILS NFR BLD AUTO: 0.7 % (ref 0–1.5)
BILIRUB SERPL-MCNC: 0.4 MG/DL (ref 0.2–1.2)
BUN SERPL-MCNC: 28 MG/DL (ref 8–23)
BUN/CREAT SERPL: 22 (ref 7–25)
CALCIUM SERPL-MCNC: 9.4 MG/DL (ref 8.6–10.5)
CHLORIDE SERPL-SCNC: 102 MMOL/L (ref 98–107)
CHOLEST SERPL-MCNC: 142 MG/DL (ref 0–200)
CO2 SERPL-SCNC: 26.9 MMOL/L (ref 22–29)
CREAT SERPL-MCNC: 1.27 MG/DL (ref 0.57–1)
EOSINOPHIL # BLD AUTO: 0.13 10*3/MM3 (ref 0–0.4)
EOSINOPHIL NFR BLD AUTO: 1.8 % (ref 0.3–6.2)
ERYTHROCYTE [DISTWIDTH] IN BLOOD BY AUTOMATED COUNT: 14.3 % (ref 12.3–15.4)
GLOBULIN SER CALC-MCNC: 2.6 GM/DL
GLUCOSE SERPL-MCNC: 105 MG/DL (ref 65–99)
HBA1C MFR BLD: 5.6 % (ref 4.8–5.6)
HCT VFR BLD AUTO: 44.2 % (ref 34–46.6)
HDLC SERPL-MCNC: 62 MG/DL (ref 40–60)
HGB BLD-MCNC: 13.2 G/DL (ref 12–15.9)
IMM GRANULOCYTES # BLD AUTO: 0.03 10*3/MM3 (ref 0–0.05)
IMM GRANULOCYTES NFR BLD AUTO: 0.4 % (ref 0–0.5)
LDLC SERPL CALC-MCNC: 46 MG/DL (ref 0–100)
LDLC/HDLC SERPL: 0.75 {RATIO}
LYMPHOCYTES # BLD AUTO: 1.46 10*3/MM3 (ref 0.7–3.1)
LYMPHOCYTES NFR BLD AUTO: 20 % (ref 19.6–45.3)
MCH RBC QN AUTO: 25.2 PG (ref 26.6–33)
MCHC RBC AUTO-ENTMCNC: 29.9 G/DL (ref 31.5–35.7)
MCV RBC AUTO: 84.4 FL (ref 79–97)
MONOCYTES # BLD AUTO: 0.88 10*3/MM3 (ref 0.1–0.9)
MONOCYTES NFR BLD AUTO: 12 % (ref 5–12)
NEUTROPHILS # BLD AUTO: 4.76 10*3/MM3 (ref 1.7–7)
NEUTROPHILS NFR BLD AUTO: 65.1 % (ref 42.7–76)
NRBC BLD AUTO-RTO: 0 /100 WBC (ref 0–0.2)
PLATELET # BLD AUTO: 289 10*3/MM3 (ref 140–450)
POTASSIUM SERPL-SCNC: 4.6 MMOL/L (ref 3.5–5.2)
PROT SERPL-MCNC: 7.2 G/DL (ref 6–8.5)
RBC # BLD AUTO: 5.24 10*6/MM3 (ref 3.77–5.28)
SODIUM SERPL-SCNC: 142 MMOL/L (ref 136–145)
TRIGL SERPL-MCNC: 168 MG/DL (ref 0–150)
VLDLC SERPL CALC-MCNC: 33.6 MG/DL
WBC # BLD AUTO: 7.31 10*3/MM3 (ref 3.4–10.8)

## 2019-08-22 PROCEDURE — G0439 PPPS, SUBSEQ VISIT: HCPCS | Performed by: PHYSICIAN ASSISTANT

## 2019-08-22 NOTE — PROGRESS NOTES
The ABCs of the Annual Wellness Visit  Subsequent Medicare Wellness Visit    Chief Complaint   Patient presents with   • Medicare Wellness-subsequent     awv       Subjective   History of Present Illness:  Krys Mayes is a 69 y.o. female who presents for a Subsequent Medicare Wellness Visit.  Danielle states she is feeling well at office visit today.  States she has not been physically active.  Her diet has been slightly improved.  She is trying to cut back on carbohydrates and sugar.  Sleep is good with her next medication.  Doing well with the citalopram and the Xanax for her anxiety issues.  Denied any suicidal or homicidal ideation.  Danielle denied any chest pain, shortness of air, dizziness, vision changes or swelling of ankles.  Vision and exam is current.  She is updated on all immunizations.  She has not had a DEXA scan in several years.  Bowel movements have been daily without dark black tarry stools.  She is currently sees Dr. Joss Oro, gastroenterologist and Dr. Jeremy Orozco, cardiologist.  Danielle has no complaints at office visit today.    HEALTH RISK ASSESSMENT    Recent Hospitalizations:  No hospitalization(s) within the last year.    Current Medical Providers:  Patient Care Team:  Ashleigh Hernandez PA-C as PCP - General (Family Medicine)  Ashleigh Hernandez PA-C as PCP - Claims Attributed  Jeremy Orozco MD as Consulting Physician (Cardiology)  Joss Oro MD as Consulting Physician (Gastroenterology)    Smoking Status:  Social History     Tobacco Use   Smoking Status Never Smoker   Smokeless Tobacco Never Used   Tobacco Comment    caffeine use /soft drinks       Alcohol Consumption:  Social History     Substance and Sexual Activity   Alcohol Use Yes   • Binge frequency: Less than monthly    Comment: She uses alcohol once or twice a year.       Depression Screen:   PHQ-2/PHQ-9 Depression Screening 8/22/2019   Little interest or pleasure in doing things 1   Feeling down,  depressed, or hopeless 1   Trouble falling or staying asleep, or sleeping too much 0   Feeling tired or having little energy 0   Poor appetite or overeating 0   Feeling bad about yourself - or that you are a failure or have let yourself or your family down 1   Trouble concentrating on things, such as reading the newspaper or watching television 1   Moving or speaking so slowly that other people could have noticed. Or the opposite - being so fidgety or restless that you have been moving around a lot more than usual 0   Thoughts that you would be better off dead, or of hurting yourself in some way 0   Total Score 4   If you checked off any problems, how difficult have these problems made it for you to do your work, take care of things at home, or get along with other people? Not difficult at all       Fall Risk Screen:  JEREMY Fall Risk Assessment was completed, and patient is at LOW risk for falls.Assessment completed on:8/22/2019    Health Habits and Functional and Cognitive Screening:  No flowsheet data found.      Does the patient have evidence of cognitive impairment? No    Asprin use counseling:Taking ASA appropriately as indicated    Age-appropriate Screening Schedule:  Refer to the list below for future screening recommendations based on patient's age, sex and/or medical conditions. Orders for these recommended tests are listed in the plan section. The patient has been provided with a written plan.    Health Maintenance   Topic Date Due   • DXA SCAN  07/19/2018   • INFLUENZA VACCINE  09/16/2019 (Originally 8/1/2019)   • PNEUMOCOCCAL VACCINES (65+ LOW/MEDIUM RISK) (2 of 2 - PPSV23) 08/22/2029 (Originally 5/10/2018)   • HEMOGLOBIN A1C  02/22/2020   • DIABETIC EYE EXAM  04/18/2020   • LIPID PANEL  08/22/2020   • MAMMOGRAM  12/10/2020   • TDAP/TD VACCINES (2 - Td) 11/21/2023   • COLONOSCOPY  02/26/2024   • ZOSTER VACCINE  Completed   • DIABETIC FOOT EXAM  Discontinued   • URINE MICROALBUMIN  Discontinued           The following portions of the patient's history were reviewed and updated as appropriate:   She  has a past medical history of Allergic, Arthritis, Controlled type 2 diabetes with renal manifestation (CMS/[facility]), Gastroesophageal reflux disease (1/21/2016), Hyperlipidemia, Hypertension, Kidney stone, Menopausal disorder (1998), Obesity, PAF (paroxysmal atrial fibrillation) (CMS/[facility]), Reflux esophagitis (8/19/2016), Sebaceous cyst of labia (12/20/2017), Sepsis due to urinary tract infection (CMS/[facility]), Sigmoid diverticulitis (11/13/2017), Urinary tract infection, and Vitamin D deficiency (1/21/2016).  She does not have any pertinent problems on file.  She  has a past surgical history that includes Ankle surgery; Cholecystectomy; Colonoscopy (2014); Hemorrhoid surgery; Other surgical history; Tubal ligation; cystoscopy bladder stone lithotripsy; Breast surgery; Breast biopsy (Right); Percutaneous nephrostolithotomy (Left, 12/2016); and Nephrolithotomy (Left, 1/9/2017).  Her family history includes COPD in her mother; Heart attack in her father and son; Kidney disease in her maternal grandmother.  She  reports that she has never smoked. She has never used smokeless tobacco. She reports that she drinks alcohol. She reports that she does not use drugs.  Current Outpatient Medications   Medication Sig Dispense Refill   • acetaminophen (TYLENOL) 650 MG 8 hr tablet Take 1 tablet by mouth every 6 (six) hours as needed. for pain     • ALPRAZolam (XANAX) 1 MG tablet Take 1 tablet by mouth At Night As Needed for Anxiety. Need an appointment before next refill 30 tablet 5   • aspirin 81 MG tablet Take 81 mg by mouth Daily.     • citalopram (CeleXA) 40 MG tablet TAKE 1 TABLET BY MOUTH DAILY 90 tablet 0   • losartan (COZAAR) 100 MG tablet TAKE 1 TABLET BY MOUTH DAILY 90 tablet 0   • meclizine (ANTIVERT) 25 MG tablet Take 1 tablet by mouth Every 6 (Six) Hours As Needed for dizziness or Nausea. 30 tablet 0   • metoprolol tartrate  (LOPRESSOR) 50 MG tablet TAKE ONE AND ONE HALF TABLET BY MOUTH TWICE DAILY 270 tablet 3   • Multiple Vitamin tablet Take 1 tablet by mouth Daily.     • omeprazole OTC (PriLOSEC OTC) 20 MG EC tablet Take 20 mg by mouth 2 (two) times a day.     • Probiotic Product (PROBIOTIC ADVANCED PO) Take 1 tablet by mouth Daily.     • rivaroxaban (XARELTO) 15 MG tablet Take 1 tablet by mouth Daily. 90 tablet 3   • simvastatin (ZOCOR) 80 MG tablet TAKE 1 TABLET BY MOUTH EVERY NIGHT 90 tablet 0     No current facility-administered medications for this visit.      Current Outpatient Medications on File Prior to Visit   Medication Sig   • acetaminophen (TYLENOL) 650 MG 8 hr tablet Take 1 tablet by mouth every 6 (six) hours as needed. for pain   • ALPRAZolam (XANAX) 1 MG tablet Take 1 tablet by mouth At Night As Needed for Anxiety. Need an appointment before next refill   • aspirin 81 MG tablet Take 81 mg by mouth Daily.   • citalopram (CeleXA) 40 MG tablet TAKE 1 TABLET BY MOUTH DAILY   • losartan (COZAAR) 100 MG tablet TAKE 1 TABLET BY MOUTH DAILY   • meclizine (ANTIVERT) 25 MG tablet Take 1 tablet by mouth Every 6 (Six) Hours As Needed for dizziness or Nausea.   • metoprolol tartrate (LOPRESSOR) 50 MG tablet TAKE ONE AND ONE HALF TABLET BY MOUTH TWICE DAILY   • Multiple Vitamin tablet Take 1 tablet by mouth Daily.   • omeprazole OTC (PriLOSEC OTC) 20 MG EC tablet Take 20 mg by mouth 2 (two) times a day.   • Probiotic Product (PROBIOTIC ADVANCED PO) Take 1 tablet by mouth Daily.   • rivaroxaban (XARELTO) 15 MG tablet Take 1 tablet by mouth Daily.   • simvastatin (ZOCOR) 80 MG tablet TAKE 1 TABLET BY MOUTH EVERY NIGHT     No current facility-administered medications on file prior to visit.      She is allergic to morphine and related; ciprofloxacin-ciproflox hcl er; flomax [tamsulosin hcl]; hydrocodone; latex; lortab [hydrocodone-acetaminophen]; penicillins; phenergan [promethazine hcl]; sulfa antibiotics; amoxicillin; and  tamsulosin..    Outpatient Medications Prior to Visit   Medication Sig Dispense Refill   • acetaminophen (TYLENOL) 650 MG 8 hr tablet Take 1 tablet by mouth every 6 (six) hours as needed. for pain     • ALPRAZolam (XANAX) 1 MG tablet Take 1 tablet by mouth At Night As Needed for Anxiety. Need an appointment before next refill 30 tablet 5   • aspirin 81 MG tablet Take 81 mg by mouth Daily.     • citalopram (CeleXA) 40 MG tablet TAKE 1 TABLET BY MOUTH DAILY 90 tablet 0   • losartan (COZAAR) 100 MG tablet TAKE 1 TABLET BY MOUTH DAILY 90 tablet 0   • meclizine (ANTIVERT) 25 MG tablet Take 1 tablet by mouth Every 6 (Six) Hours As Needed for dizziness or Nausea. 30 tablet 0   • metoprolol tartrate (LOPRESSOR) 50 MG tablet TAKE ONE AND ONE HALF TABLET BY MOUTH TWICE DAILY 270 tablet 3   • Multiple Vitamin tablet Take 1 tablet by mouth Daily.     • omeprazole OTC (PriLOSEC OTC) 20 MG EC tablet Take 20 mg by mouth 2 (two) times a day.     • Probiotic Product (PROBIOTIC ADVANCED PO) Take 1 tablet by mouth Daily.     • rivaroxaban (XARELTO) 15 MG tablet Take 1 tablet by mouth Daily. 90 tablet 3   • simvastatin (ZOCOR) 80 MG tablet TAKE 1 TABLET BY MOUTH EVERY NIGHT 90 tablet 0   • ondansetron ODT (ZOFRAN ODT) 8 MG disintegrating tablet Take 1 tablet by mouth Every 8 (Eight) Hours As Needed for Nausea or Vomiting. 10 tablet 0     No facility-administered medications prior to visit.        Patient Active Problem List   Diagnosis   • Elevated alanine aminotransferase (ALT) level   • Mixed anxiety depressive disorder   • Dyslipidemia   • Gastroesophageal reflux disease   • Essential hypertension   • Insomnia   • Microalbuminuria   • Obesity (BMI 30-39.9)   • Proteinuria   • Vitamin D deficiency   • Postmenopause   • History of fracture   • Reflux esophagitis   • Calculus of kidney   • PAF (paroxysmal atrial fibrillation) (CMS/HCC)   • Pyelonephritis   • Anxiety   • Iron deficiency anemia   • Prediabetes   • Medicare annual  "wellness visit, subsequent   • Need for pneumococcal vaccine   • High risk medication use   • Dermoid cyst of scalp   • Hyperglycemia       Advanced Care Planning:  Patient has an advance directive - a copy has not been provided. Have asked the patient to send this to us to add to record    Review of Systems   Constitutional: Negative.  Negative for chills, fatigue and fever.   HENT: Negative.    Eyes: Negative.    Respiratory: Negative.  Negative for cough, shortness of breath and wheezing.    Cardiovascular: Negative.  Negative for chest pain, palpitations and leg swelling.   Gastrointestinal: Negative.  Negative for abdominal pain, anal bleeding, blood in stool, constipation, diarrhea, nausea and vomiting.   Endocrine: Negative.    Genitourinary: Negative for dysuria, frequency and urgency.   Musculoskeletal: Negative.    Skin: Negative.    Allergic/Immunologic: Negative.    Neurological: Negative.  Negative for dizziness, light-headedness and headaches.   Hematological: Negative.    Psychiatric/Behavioral: Negative.  Negative for sleep disturbance and suicidal ideas.   All other systems reviewed and are negative.      Compared to one year ago, the patient feels her physical health is the same.  Compared to one year ago, the patient feels her mental health is worse.    Reviewed chart for potential of high risk medication in the elderly: yes  Reviewed chart for potential of harmful drug interactions in the elderly:yes    Objective         Vitals:    08/22/19 1020   BP: 122/60   Pulse: 59   Resp: 16   Temp: 97.6 °F (36.4 °C)   SpO2: 97%   Weight: 95.5 kg (210 lb 9.6 oz)   Height: 167.6 cm (66\")       Body mass index is 33.99 kg/m².  Discussed the patient's BMI with her. The BMI is above average; BMI management plan is completed.    Physical Exam   Constitutional: She is oriented to person, place, and time. Vital signs are normal. She appears well-developed and well-nourished.   Obese female   Neck: Trachea normal, " normal range of motion, full passive range of motion without pain and phonation normal. Neck supple. Carotid bruit is not present. No edema present. No thyroid mass and no thyromegaly present.   Cardiovascular: Normal rate, regular rhythm, S1 normal, S2 normal, normal heart sounds and normal pulses.   No murmur heard.  Pulmonary/Chest: Effort normal and breath sounds normal.   Abdominal: Soft. Normal appearance, normal aorta and bowel sounds are normal. There is no hepatomegaly. There is no tenderness.   Neurological: She is alert and oriented to person, place, and time.   Skin: Skin is warm, dry and intact. Capillary refill takes less than 2 seconds.   Psychiatric: She has a normal mood and affect. Her speech is normal and behavior is normal. Judgment and thought content normal. Cognition and memory are normal.       Lab Results   Component Value Date     (H) 08/22/2019    CHLPL 142 08/22/2019    TRIG 168 (H) 08/22/2019    HDL 62 (H) 08/22/2019    LDL 46 08/22/2019    VLDL 33.6 08/22/2019    HGBA1C 5.60 08/22/2019        Patient has been erroneously marked as diabetic. Based on the available clinical information, she does not have diabetes and should therefore be excluded from diabetic health maintenance and quality measures for the remainder of the reporting period.  Assessment/Plan   Medicare Risks and Personalized Health Plan  CMS Preventative Services Quick Reference  dexa    The above risks/problems have been discussed with the patient.  Pertinent information has been shared with the patient in the After Visit Summary.  Follow up plans and orders are seen below in the Assessment/Plan Section.    Diagnoses and all orders for this visit:    1. Medicare annual wellness visit, subsequent (Primary)    2. Essential hypertension    3. Vitamin D deficiency  -     Vitamin D 25 Hydroxy    4. Iron deficiency anemia, unspecified iron deficiency anemia type  -     CBC & Differential    5. Mixed anxiety depressive  disorder    6. Anxiety    7. Dyslipidemia  -     Comprehensive Metabolic Panel  -     Hemoglobin A1c  -     Lipid Panel With LDL / HDL Ratio    8. Postmenopause  -     DEXA Bone Density Axial    9. Hyperglycemia  -     Hemoglobin A1c    10. Obesity (BMI 30-39.9)    1.  Annual sequential Medicare wellness examination with hyperglycemia: She is fasting will have a A1c and CMP collected at office visit today.  Danielle will be notified of test results when completed.  She will have her updated flu shot in office or at her local pharmacy when available.  2.  Chronic and stable hypertension: I have rechecked her blood pressure at office visit today and got 122/60 in left arm.  She will continue her current medications at home as directed.  3.  Chronic vitamin D deficiency: We will check a vitamin D level at office visit today.  Danielle will be notified of test results and any medication changes.  3.  History of iron deficiency anemia: We will check CBC at office visit today.  We will add iron profile with ferritin if warranted.  Encouraged her to increase her iron intake in foods.  4.  Chronic and stable anxiety: She takes Xanax and citalopram daily.  States this helps with her anxiety issues.  No refills are needed at this time.  She will follow-up in 6 months on her anxiety issues.  5.  Chronic and stable dyslipidemia: She is fasting will have a CMP and a lipid profile collected office visit.  I will send a courtesy copy of test results to her cardiologist for their review as well.  6.  Chronic Postmenopausal female: We will check a DEXA scan at the [place] office.  She was told to stop her over-the-counter calcium supplements 3 days prior to testing.  She will be notified of results when completed.  7.  Chronic obesity:  I have asked her to decreased her carb and sugar intake.  Is also encouraged to increase her physical activity.  Will reevaluate weight at next office visit.      Follow Up:  Return in about 6 months  (around 2/22/2020), or if symptoms worsen or fail to improve.     An After Visit Summary and PPPS were given to the patient.

## 2019-08-23 PROBLEM — R73.9 HYPERGLYCEMIA: Status: ACTIVE | Noted: 2019-08-23

## 2019-08-27 ENCOUNTER — APPOINTMENT (OUTPATIENT)
Dept: BONE DENSITY | Facility: HOSPITAL | Age: 69
End: 2019-08-27

## 2019-08-27 PROCEDURE — 77080 DXA BONE DENSITY AXIAL: CPT

## 2019-09-27 DIAGNOSIS — E78.5 DYSLIPIDEMIA: ICD-10-CM

## 2019-09-27 RX ORDER — SIMVASTATIN 80 MG
TABLET ORAL
Qty: 90 TABLET | Refills: 0 | Status: SHIPPED | OUTPATIENT
Start: 2019-09-27 | End: 2020-02-28 | Stop reason: SDUPTHER

## 2019-09-27 RX ORDER — CITALOPRAM 40 MG/1
TABLET ORAL
Qty: 90 TABLET | Refills: 0 | Status: SHIPPED | OUTPATIENT
Start: 2019-09-27 | End: 2019-11-29 | Stop reason: SDUPTHER

## 2019-10-21 DIAGNOSIS — I10 ESSENTIAL HYPERTENSION: ICD-10-CM

## 2019-10-21 RX ORDER — LOSARTAN POTASSIUM 100 MG/1
100 TABLET ORAL DAILY
Qty: 30 TABLET | Refills: 0 | Status: SHIPPED | OUTPATIENT
Start: 2019-10-21 | End: 2019-10-21 | Stop reason: SDUPTHER

## 2019-10-21 RX ORDER — LOSARTAN POTASSIUM 100 MG/1
100 TABLET ORAL DAILY
Qty: 90 TABLET | Refills: 0 | Status: SHIPPED | OUTPATIENT
Start: 2019-10-21 | End: 2019-11-30 | Stop reason: SDUPTHER

## 2019-10-22 RX ORDER — METOPROLOL TARTRATE 50 MG/1
TABLET, FILM COATED ORAL
Qty: 270 TABLET | Refills: 2 | Status: SHIPPED | OUTPATIENT
Start: 2019-10-22 | End: 2020-06-12 | Stop reason: SDUPTHER

## 2019-10-29 ENCOUNTER — TRANSCRIBE ORDERS (OUTPATIENT)
Dept: ADMINISTRATIVE | Facility: HOSPITAL | Age: 69
End: 2019-10-29

## 2019-10-29 DIAGNOSIS — Z12.31 VISIT FOR SCREENING MAMMOGRAM: Primary | ICD-10-CM

## 2019-11-06 DIAGNOSIS — I10 ESSENTIAL HYPERTENSION: ICD-10-CM

## 2019-11-06 RX ORDER — LOSARTAN POTASSIUM 100 MG/1
100 TABLET ORAL DAILY
Qty: 90 TABLET | Refills: 0 | Status: SHIPPED | OUTPATIENT
Start: 2019-11-06 | End: 2019-11-30 | Stop reason: SDUPTHER

## 2019-11-29 DIAGNOSIS — F41.9 ANXIETY: ICD-10-CM

## 2019-11-29 DIAGNOSIS — I10 ESSENTIAL HYPERTENSION: ICD-10-CM

## 2019-11-29 RX ORDER — ALPRAZOLAM 1 MG/1
TABLET ORAL
Qty: 30 TABLET | Refills: 0 | Status: CANCELLED | OUTPATIENT
Start: 2019-11-29

## 2019-11-30 DIAGNOSIS — F41.9 ANXIETY: ICD-10-CM

## 2019-11-30 RX ORDER — CITALOPRAM 40 MG/1
TABLET ORAL
Qty: 90 TABLET | Refills: 0 | Status: SHIPPED | OUTPATIENT
Start: 2019-11-30 | End: 2019-12-02 | Stop reason: SDUPTHER

## 2019-11-30 RX ORDER — LOSARTAN POTASSIUM 100 MG/1
100 TABLET ORAL DAILY
Qty: 30 TABLET | Refills: 3 | Status: SHIPPED | OUTPATIENT
Start: 2019-11-30 | End: 2019-12-02 | Stop reason: SDUPTHER

## 2019-12-02 DIAGNOSIS — F41.9 ANXIETY: Primary | ICD-10-CM

## 2019-12-02 DIAGNOSIS — I10 ESSENTIAL HYPERTENSION: ICD-10-CM

## 2019-12-02 RX ORDER — ALPRAZOLAM 1 MG/1
1 TABLET ORAL NIGHTLY PRN
Qty: 30 TABLET | Refills: 2 | Status: SHIPPED | OUTPATIENT
Start: 2019-12-02 | End: 2020-03-06 | Stop reason: SDUPTHER

## 2019-12-02 RX ORDER — CITALOPRAM 40 MG/1
40 TABLET ORAL DAILY
Qty: 90 TABLET | Refills: 0 | Status: SHIPPED | OUTPATIENT
Start: 2019-12-02 | End: 2020-02-27

## 2019-12-02 RX ORDER — LOSARTAN POTASSIUM 100 MG/1
100 TABLET ORAL DAILY
Qty: 30 TABLET | Refills: 3 | Status: SHIPPED | OUTPATIENT
Start: 2019-12-02 | End: 2020-01-02 | Stop reason: SDUPTHER

## 2019-12-16 ENCOUNTER — HOSPITAL ENCOUNTER (OUTPATIENT)
Dept: MAMMOGRAPHY | Facility: HOSPITAL | Age: 69
Discharge: HOME | End: 2019-12-16
Admitting: PHYSICIAN ASSISTANT

## 2019-12-16 DIAGNOSIS — Z12.31 VISIT FOR SCREENING MAMMOGRAM: ICD-10-CM

## 2019-12-16 PROCEDURE — 77067 SCR MAMMO BI INCL CAD: CPT

## 2019-12-16 PROCEDURE — 77063 BREAST TOMOSYNTHESIS BI: CPT

## 2019-12-17 ENCOUNTER — HOSPITAL ENCOUNTER (OUTPATIENT)
Dept: GENERAL RADIOLOGY | Facility: HOSPITAL | Age: 69
Discharge: HOME | End: 2019-12-17
Admitting: UROLOGY

## 2019-12-17 ENCOUNTER — TRANSCRIBE ORDERS (OUTPATIENT)
Dept: ADMINISTRATIVE | Facility: HOSPITAL | Age: 69
End: 2019-12-17

## 2019-12-17 DIAGNOSIS — N20.0 CALCULUS, RENAL: ICD-10-CM

## 2019-12-17 DIAGNOSIS — N20.0 CALCULUS, RENAL: Primary | ICD-10-CM

## 2019-12-17 PROCEDURE — 74018 RADEX ABDOMEN 1 VIEW: CPT

## 2020-01-02 DIAGNOSIS — I10 ESSENTIAL HYPERTENSION: ICD-10-CM

## 2020-01-02 RX ORDER — LOSARTAN POTASSIUM 100 MG/1
100 TABLET ORAL DAILY
Qty: 90 TABLET | Refills: 3 | Status: SHIPPED | OUTPATIENT
Start: 2020-01-02 | End: 2020-08-31

## 2020-01-26 ENCOUNTER — HOSPITAL ENCOUNTER (EMERGENCY)
Facility: HOSPITAL | Age: 70
Discharge: HOME | End: 2020-01-26
Attending: EMERGENCY MEDICINE | Admitting: EMERGENCY MEDICINE

## 2020-01-26 VITALS
WEIGHT: 205 LBS | TEMPERATURE: 97.6 F | HEIGHT: 66 IN | DIASTOLIC BLOOD PRESSURE: 59 MMHG | HEART RATE: 60 BPM | BODY MASS INDEX: 32.95 KG/M2 | OXYGEN SATURATION: 93 % | SYSTOLIC BLOOD PRESSURE: 117 MMHG | RESPIRATION RATE: 18 BRPM

## 2020-01-26 DIAGNOSIS — R42 DIZZY SPELLS: Primary | ICD-10-CM

## 2020-01-26 LAB
ALBUMIN SERPL-MCNC: 3.9 G/DL (ref 3.5–5.2)
ALBUMIN/GLOB SERPL: 1.1 G/DL
ALP SERPL-CCNC: 65 U/L (ref 39–117)
ALT SERPL W P-5'-P-CCNC: 13 U/L (ref 1–33)
ANION GAP SERPL CALCULATED.3IONS-SCNC: 14.5 MMOL/L (ref 5–15)
AST SERPL-CCNC: 17 U/L (ref 1–32)
BASOPHILS # BLD AUTO: 0.03 10*3/MM3 (ref 0–0.2)
BASOPHILS NFR BLD AUTO: 0.6 % (ref 0–1.5)
BILIRUB SERPL-MCNC: 0.4 MG/DL (ref 0.2–1.2)
BUN BLD-MCNC: 21 MG/DL (ref 8–23)
BUN/CREAT SERPL: 14.8 (ref 7–25)
CALCIUM SPEC-SCNC: 9.3 MG/DL (ref 8.6–10.5)
CHLORIDE SERPL-SCNC: 106 MMOL/L (ref 98–107)
CO2 SERPL-SCNC: 23.5 MMOL/L (ref 22–29)
CREAT BLD-MCNC: 1.42 MG/DL (ref 0.57–1)
DEPRECATED RDW RBC AUTO: 42.7 FL (ref 37–54)
EOSINOPHIL # BLD AUTO: 0.09 10*3/MM3 (ref 0–0.4)
EOSINOPHIL NFR BLD AUTO: 1.7 % (ref 0.3–6.2)
ERYTHROCYTE [DISTWIDTH] IN BLOOD BY AUTOMATED COUNT: 14 % (ref 12.3–15.4)
GFR SERPL CREATININE-BSD FRML MDRD: 36 ML/MIN/1.73
GLOBULIN UR ELPH-MCNC: 3.4 GM/DL
GLUCOSE BLD-MCNC: 122 MG/DL (ref 65–99)
HCT VFR BLD AUTO: 43.5 % (ref 34–46.6)
HGB BLD-MCNC: 13.3 G/DL (ref 12–15.9)
IMM GRANULOCYTES # BLD AUTO: 0.03 10*3/MM3 (ref 0–0.05)
IMM GRANULOCYTES NFR BLD AUTO: 0.6 % (ref 0–0.5)
LYMPHOCYTES # BLD AUTO: 0.81 10*3/MM3 (ref 0.7–3.1)
LYMPHOCYTES NFR BLD AUTO: 14.9 % (ref 19.6–45.3)
MCH RBC QN AUTO: 25.7 PG (ref 26.6–33)
MCHC RBC AUTO-ENTMCNC: 30.6 G/DL (ref 31.5–35.7)
MCV RBC AUTO: 84 FL (ref 79–97)
MONOCYTES # BLD AUTO: 0.46 10*3/MM3 (ref 0.1–0.9)
MONOCYTES NFR BLD AUTO: 8.5 % (ref 5–12)
NEUTROPHILS # BLD AUTO: 4 10*3/MM3 (ref 1.7–7)
NEUTROPHILS NFR BLD AUTO: 73.7 % (ref 42.7–76)
NRBC BLD AUTO-RTO: 0 /100 WBC (ref 0–0.2)
PLATELET # BLD AUTO: 280 10*3/MM3 (ref 140–450)
PMV BLD AUTO: 9.1 FL (ref 6–12)
POTASSIUM BLD-SCNC: 4.3 MMOL/L (ref 3.5–5.2)
PROT SERPL-MCNC: 7.3 G/DL (ref 6–8.5)
RBC # BLD AUTO: 5.18 10*6/MM3 (ref 3.77–5.28)
SODIUM BLD-SCNC: 144 MMOL/L (ref 136–145)
WBC NRBC COR # BLD: 5.42 10*3/MM3 (ref 3.4–10.8)

## 2020-01-26 PROCEDURE — 93005 ELECTROCARDIOGRAM TRACING: CPT | Performed by: EMERGENCY MEDICINE

## 2020-01-26 PROCEDURE — 99284 EMERGENCY DEPT VISIT MOD MDM: CPT | Performed by: EMERGENCY MEDICINE

## 2020-01-26 PROCEDURE — 85025 COMPLETE CBC W/AUTO DIFF WBC: CPT | Performed by: EMERGENCY MEDICINE

## 2020-01-26 PROCEDURE — 80053 COMPREHEN METABOLIC PANEL: CPT | Performed by: EMERGENCY MEDICINE

## 2020-01-26 PROCEDURE — 93010 ELECTROCARDIOGRAM REPORT: CPT | Performed by: INTERNAL MEDICINE

## 2020-01-26 PROCEDURE — 99283 EMERGENCY DEPT VISIT LOW MDM: CPT

## 2020-01-26 NOTE — ED NOTES
Pt up with minimal assist, ambulates well in er avelar,  Denies any c/o.     Jose Rafael Branch RN  01/26/20 [id number]

## 2020-01-26 NOTE — ED PROVIDER NOTES
Subjective     Dizziness   Quality:  Lightheadedness and room spinning  Severity:  Mild  Onset quality:  Gradual  Timing:  Intermittent  Progression:  Waxing and waning  Chronicity:  Recurrent  Context comment:  Says she has a h/o vertigo , today with sim symptoms, says she had a low bp reading at home and was concerned  Relieved by:  Being still  Worsened by:  Standing up  Ineffective treatments:  None tried  Associated symptoms: no blood in stool, no chest pain, no headaches, no shortness of breath, no vomiting and no weakness        Review of Systems   Respiratory: Negative for shortness of breath.    Cardiovascular: Negative for chest pain.   Gastrointestinal: Negative for blood in stool and vomiting.   Neurological: Positive for dizziness. Negative for weakness and headaches.   All other systems reviewed and are negative.      Past Medical History:   Diagnosis Date   • Allergic    • Arthritis    • Controlled type 2 diabetes with renal manifestation (CMS/[facility])     states borderline    • Gastroesophageal reflux disease 1/21/2016   • Hyperlipidemia    • Hypertension    • Kidney stone     recurrent, calcium oxalate   • Menopausal disorder 1998    She went through menopause in 1998   • Obesity    • PAF (paroxysmal atrial fibrillation) (CMS/[facility])     in the setting of urosepsis, 9/2016   • Reflux esophagitis 8/19/2016   • Sebaceous cyst of labia 12/20/2017   • Sepsis due to urinary tract infection (CMS/[facility])    • Sigmoid diverticulitis 11/13/2017   • Urinary tract infection    • Vitamin D deficiency 1/21/2016       Allergies   Allergen Reactions   • Morphine And Related GI Intolerance   • Ciprofloxacin-Ciproflox Hcl Er Rash   • Flomax [Tamsulosin Hcl] Rash   • Hydrocodone Rash   • Latex Rash   • Lortab [Hydrocodone-Acetaminophen] Rash   • Penicillins Rash   • Phenergan [Promethazine Hcl] GI Intolerance   • Sulfa Antibiotics Rash   • Amoxicillin Rash   • Tamsulosin Rash       Past Surgical History:   Procedure Laterality  Date   • ANKLE SURGERY      Ankle Repair / Description: ORif the left ankle in July 2010. Secondary to fall   • BREAST BIOPSY Right     benign   • BREAST SURGERY      biopsy    • CHOLECYSTECTOMY     • COLONOSCOPY  2014    Done 2004 normal recheck in 10 years. Repeated February 2014 colonoscopy was normal and to be repeated in 10 years.   • CYSTOSCOPY BLADDER STONE LITHOTRIPSY     • HEMORRHOIDECTOMY     • NEPHROLITHOTOMY Left 1/9/2017    Procedure: NEPHROLITHOTOMY PERCUTANEOUS SECOND LOOK WITH STENT PLACEMENT AND LASER LITHOTRIPSY;  Surgeon: Mati Villegas MD;  Location: [facility];  Service:    • OTHER SURGICAL HISTORY      Tubal Ligation   • PERCUTANEOUS NEPHROSTOLITHOTOMY Left 12/2016   • TUBAL ABDOMINAL LIGATION         Family History   Problem Relation Age of Onset   • COPD Mother    • Heart attack Father         acute myocardial infarction   • Heart attack Son    • Kidney disease Maternal Grandmother    • Breast cancer Neg Hx        Social History     Socioeconomic History   • Marital status:      Spouse name: Not on file   • Number of children: Not on file   • Years of education: Not on file   • Highest education level: Not on file   Tobacco Use   • Smoking status: Never Smoker   • Smokeless tobacco: Never Used   • Tobacco comment: caffeine use /soft drinks   Substance and Sexual Activity   • Alcohol use: Yes     Binge frequency: Less than monthly     Comment: She uses alcohol once or twice a year.   • Drug use: No   • Sexual activity: Defer           Objective   Physical Exam   Constitutional: She is oriented to person, place, and time. She appears well-developed and well-nourished. No distress.   HENT:   Head: Normocephalic and atraumatic.   Eyes: Pupils are equal, round, and reactive to light. Conjunctivae and EOM are normal.   Neck: Normal range of motion. Neck supple. No JVD present.   Cardiovascular: Normal rate, regular rhythm and normal heart sounds.   No murmur  heard.  Pulmonary/Chest: Effort normal and breath sounds normal.   Abdominal: Soft. Bowel sounds are normal. She exhibits no distension. There is no tenderness. There is no guarding.   Musculoskeletal: Normal range of motion. She exhibits no edema, tenderness or deformity.   Rt upper trap mild pain and spasm with palp and rom  No midline tend   Neurological: She is alert and oriented to person, place, and time. No cranial nerve deficit or sensory deficit. Coordination normal.   Skin: Skin is warm. She is not diaphoretic. No erythema. No pallor.   Psychiatric: She has a normal mood and affect. Her behavior is normal. Thought content normal.   Nursing note and vitals reviewed.      Procedures           ED Course  ED Course as of Jan 26 1300   Sun Jan 26, 2020   1249 Ekg by me nsr, qrs nml, no st elev    [BC]      ED Course User Index  [BC] Ang Garvin MD                Reeval, vss, appears well, w/o complaint  amb in ed ok  Ok with plan to f/u pcp                               MDM  Number of Diagnoses or Management Options  Dizzy spells:      Amount and/or Complexity of Data Reviewed  Clinical lab tests: ordered and reviewed    Risk of Complications, Morbidity, and/or Mortality  Presenting problems: moderate  Diagnostic procedures: moderate  Management options: moderate    Patient Progress  Patient progress: stable      Final diagnoses:   Dizzy spells            Ang Garvin MD  01/26/20 1302

## 2020-02-27 ENCOUNTER — OFFICE VISIT (OUTPATIENT)
Dept: FAMILY MEDICINE CLINIC | Facility: CLINIC | Age: 70
End: 2020-02-27

## 2020-02-27 VITALS
RESPIRATION RATE: 16 BRPM | DIASTOLIC BLOOD PRESSURE: 70 MMHG | OXYGEN SATURATION: 95 % | BODY MASS INDEX: 33.75 KG/M2 | HEIGHT: 66 IN | TEMPERATURE: 97.9 F | WEIGHT: 210 LBS | SYSTOLIC BLOOD PRESSURE: 130 MMHG | HEART RATE: 57 BPM

## 2020-02-27 DIAGNOSIS — I10 ESSENTIAL HYPERTENSION: Primary | ICD-10-CM

## 2020-02-27 DIAGNOSIS — F43.21 GRIEF: ICD-10-CM

## 2020-02-27 DIAGNOSIS — E78.5 DYSLIPIDEMIA: ICD-10-CM

## 2020-02-27 DIAGNOSIS — E78.2 MIXED HYPERLIPIDEMIA: ICD-10-CM

## 2020-02-27 DIAGNOSIS — Z79.899 LONG-TERM USE OF HIGH-RISK MEDICATION: ICD-10-CM

## 2020-02-27 DIAGNOSIS — E55.9 VITAMIN D DEFICIENCY: ICD-10-CM

## 2020-02-27 DIAGNOSIS — F41.9 ANXIETY: ICD-10-CM

## 2020-02-27 LAB
25(OH)D3+25(OH)D2 SERPL-MCNC: 38.9 NG/ML (ref 30–100)
ALBUMIN SERPL-MCNC: 4.4 G/DL (ref 3.5–5.2)
ALBUMIN/GLOB SERPL: 1.6 G/DL
ALP SERPL-CCNC: 66 U/L (ref 39–117)
ALT SERPL-CCNC: 15 U/L (ref 1–33)
AST SERPL-CCNC: 16 U/L (ref 1–32)
BASOPHILS # BLD AUTO: 0.04 10*3/MM3 (ref 0–0.2)
BASOPHILS NFR BLD AUTO: 0.7 % (ref 0–1.5)
BILIRUB SERPL-MCNC: 0.5 MG/DL (ref 0.2–1.2)
BUN SERPL-MCNC: 26 MG/DL (ref 8–23)
BUN/CREAT SERPL: 23.4 (ref 7–25)
CALCIUM SERPL-MCNC: 9.5 MG/DL (ref 8.6–10.5)
CHLORIDE SERPL-SCNC: 97 MMOL/L (ref 98–107)
CHOLEST SERPL-MCNC: 144 MG/DL (ref 0–200)
CO2 SERPL-SCNC: 26.1 MMOL/L (ref 22–29)
CREAT SERPL-MCNC: 1.11 MG/DL (ref 0.57–1)
EOSINOPHIL # BLD AUTO: 0.14 10*3/MM3 (ref 0–0.4)
EOSINOPHIL NFR BLD AUTO: 2.4 % (ref 0.3–6.2)
ERYTHROCYTE [DISTWIDTH] IN BLOOD BY AUTOMATED COUNT: 12.5 % (ref 12.3–15.4)
GLOBULIN SER CALC-MCNC: 2.7 GM/DL
GLUCOSE SERPL-MCNC: 101 MG/DL (ref 65–99)
HCT VFR BLD AUTO: 39.3 % (ref 34–46.6)
HDLC SERPL-MCNC: 60 MG/DL (ref 40–60)
HGB BLD-MCNC: 12.5 G/DL (ref 12–15.9)
IMM GRANULOCYTES # BLD AUTO: 0.01 10*3/MM3 (ref 0–0.05)
IMM GRANULOCYTES NFR BLD AUTO: 0.2 % (ref 0–0.5)
LDLC SERPL CALC-MCNC: 47 MG/DL (ref 0–100)
LDLC/HDLC SERPL: 0.78 {RATIO}
LYMPHOCYTES # BLD AUTO: 1.1 10*3/MM3 (ref 0.7–3.1)
LYMPHOCYTES NFR BLD AUTO: 18.7 % (ref 19.6–45.3)
MCH RBC QN AUTO: 25.6 PG (ref 26.6–33)
MCHC RBC AUTO-ENTMCNC: 31.8 G/DL (ref 31.5–35.7)
MCV RBC AUTO: 80.5 FL (ref 79–97)
MONOCYTES # BLD AUTO: 0.7 10*3/MM3 (ref 0.1–0.9)
MONOCYTES NFR BLD AUTO: 11.9 % (ref 5–12)
NEUTROPHILS # BLD AUTO: 3.88 10*3/MM3 (ref 1.7–7)
NEUTROPHILS NFR BLD AUTO: 66.1 % (ref 42.7–76)
NRBC BLD AUTO-RTO: 0 /100 WBC (ref 0–0.2)
PLATELET # BLD AUTO: 274 10*3/MM3 (ref 140–450)
POTASSIUM SERPL-SCNC: 4.2 MMOL/L (ref 3.5–5.2)
PROT SERPL-MCNC: 7.1 G/DL (ref 6–8.5)
RBC # BLD AUTO: 4.88 10*6/MM3 (ref 3.77–5.28)
SODIUM SERPL-SCNC: 137 MMOL/L (ref 136–145)
TRIGL SERPL-MCNC: 187 MG/DL (ref 0–150)
UNABLE TO VOID: NORMAL
VLDLC SERPL CALC-MCNC: 37.4 MG/DL
WBC # BLD AUTO: 5.87 10*3/MM3 (ref 3.4–10.8)

## 2020-02-27 PROCEDURE — 99214 OFFICE O/P EST MOD 30 MIN: CPT | Performed by: PHYSICIAN ASSISTANT

## 2020-02-27 NOTE — PROGRESS NOTES
Subjective   Krys Gerber is a 70 y.o. female presents for   Chief Complaint   Patient presents with   • Anxiety     management   • Hypertension     management   • Depression     grief       History of Present Illness     Danielle is a 70-year-old female who presents for hypertension and anxiety management.  She has gained 5 pounds since 2020.  Danielle states she has lost 2 close friends and a second cousin over the past 3 months.  Her second cousin drop dead of a heart attack suddenly at [facility] basketball game this month.  Two close friends have  in December and in January.  States she was very close with them.  She has noticed that she has been more depressed and having mood swings.  She has also been more tearful and short tempered with her .  She has been on the citalopram for many years.  She has been taking the Xanax nightly for years as well.  Denied any suicidal homicidal ideation.  She has seen Dr. Goodman, psychotherapist, in past which did help with her depression symptoms.    Danielle is doing well with her other medications.  Denied any chest pain, shortness of air, dizziness, vision changes or swelling of ankles.  She is fasting at office visit today.  Her appetite has been normal to slightly decreased.  She has not been as physically active.  Bowel movements have been daily without dark black tarry stools.    The following portions of the patient's history were reviewed and updated as appropriate: allergies, current medications, past family history, past medical history, past social history, past surgical history and problem list.    Review of Systems   Constitutional: Negative.    HENT: Negative.    Eyes: Negative.    Respiratory: Negative.  Negative for cough, chest tightness and shortness of breath.    Cardiovascular: Negative.  Negative for chest pain, palpitations and leg swelling.   Gastrointestinal: Negative.  Negative for abdominal pain, blood in stool,  "constipation, nausea and vomiting.   Endocrine: Negative.    Genitourinary: Negative.    Musculoskeletal: Negative.    Skin: Negative.    Allergic/Immunologic: Negative.    Neurological: Negative.  Negative for dizziness, light-headedness and headaches.   Hematological: Negative.    Psychiatric/Behavioral: Negative for sleep disturbance and suicidal ideas. The patient is nervous/anxious.         Grief depression   All other systems reviewed and are negative.        Vitals:    02/27/20 1044 02/27/20 1117   BP: 118/72 130/70   BP Location:  Left arm   Patient Position:  Sitting   Cuff Size:  Adult   Pulse: 57    Resp: 16    Temp: 97.9 °F (36.6 °C)    SpO2: 95%    Weight: 95.3 kg (210 lb)    Height: 167.6 cm (66\")      Wt Readings from Last 3 Encounters:   02/27/20 95.3 kg (210 lb)   01/26/20 93 kg (205 lb)   08/22/19 95.5 kg (210 lb 9.6 oz)     BP Readings from Last 3 Encounters:   02/27/20 130/70   01/26/20 117/59   08/22/19 122/60     Social History     Socioeconomic History   • Marital status:      Spouse name: Not on file   • Number of children: Not on file   • Years of education: Not on file   • Highest education level: Not on file   Tobacco Use   • Smoking status: Never Smoker   • Smokeless tobacco: Never Used   • Tobacco comment: caffeine use /soft drinks   Substance and Sexual Activity   • Alcohol use: Yes     Binge frequency: Less than monthly     Comment: She uses alcohol once or twice a year.   • Drug use: No   • Sexual activity: Defer       Allergies   Allergen Reactions   • Morphine And Related GI Intolerance   • Ciprofloxacin-Ciproflox Hcl Er Rash   • Flomax [Tamsulosin Hcl] Rash   • Hydrocodone Rash   • Latex Rash   • Lortab [Hydrocodone-Acetaminophen] Rash   • Penicillins Rash   • Phenergan [Promethazine Hcl] GI Intolerance   • Sulfa Antibiotics Rash   • Amoxicillin Rash   • Tamsulosin Rash       Body mass index is 33.89 kg/m².    Objective   Physical Exam   Constitutional: She is oriented to " person, place, and time. Vital signs are normal. She appears well-developed and well-nourished.   Obese female   Neck: Trachea normal and phonation normal. Neck supple. Normal carotid pulses, no hepatojugular reflux and no JVD present. No tracheal tenderness present. Carotid bruit is not present. No tracheal deviation and no edema present. No thyroid mass and no thyromegaly present.   Cardiovascular: Normal rate, regular rhythm, S1 normal, S2 normal, normal heart sounds and normal pulses.   No murmur heard.  Pulmonary/Chest: Effort normal and breath sounds normal.   Abdominal: Soft. Normal appearance, normal aorta and bowel sounds are normal. There is no hepatomegaly. There is no tenderness.   Neurological: She is alert and oriented to person, place, and time.   Skin: Skin is warm, dry and intact. Capillary refill takes less than 2 seconds.   Psychiatric: Her speech is normal and behavior is normal. Judgment and thought content normal. Cognition and memory are normal. She exhibits a depressed mood.       Assessment/Plan   Krys was seen today for anxiety, hypertension and depression.    Diagnoses and all orders for this visit:    Essential hypertension    Dyslipidemia  -     CBC & Differential  -     Lipid Panel With LDL / HDL Ratio  -     Comprehensive Metabolic Panel    Anxiety  -     Cancel: Compliance Drug Analysis, Ur - Urine, Clean Catch  -     sertraline (ZOLOFT) 50 MG tablet; Take 1 tablet by mouth Daily.    Grief  -     Cancel: Compliance Drug Analysis, Ur - Urine, Clean Catch  -     sertraline (ZOLOFT) 50 MG tablet; Take 1 tablet by mouth Daily.    Vitamin D deficiency  -     Vitamin D 25 Hydroxy    Mixed hyperlipidemia    Long-term use of high-risk medication  -     Cancel: Compliance Drug Analysis, Ur - Urine, Clean Catch    Other orders  -     Unable To Void      1.  Chronic and stable hypertension: I have rechecked blood pressure at office visit today and got 130/70 in left arm.  She will continue  her current medications at home as directed.  2.  Chronic and stable dyslipidemia: She is fasting will have a CBC, lipid profile and CMP at office visit today.  She will be notified of test results when completed.  3.  Chronic and stable vitamin D deficiency: I will check a vitamin D level at office visit today.  Danielle will be notified of results and any other medication changes.  4.  Chronic and uncontrolled anxiety with grief: She has been on the citalopram for many years.  She has had increase depression symptoms due to loss of 2 close friends and a second cousin over the past 3 months.  I have asked her to start weaning off of the citalopram by taking half a tablet daily for the next 5 days.  Will start sertraline 50 mg daily today.  She will use her Xanax as directed.  No refills are needed at this time.  Danielle has signed the appropriate agreement and consent forms for future medications of Xanax.  She was unable to leave a urine at office visit today for urine drug screen for compliance purposes.  We will collect this in future.  She will return to office in 1 month for reevaluation.  I have asked Danielle to schedule appoint with Dr. Goodman, psychotherapist, for further treatment of her depression and grieving.  She voiced understanding.      [facility]  [address] [place] [id number]  Dept: [phone]  Dept Fax: [phone]  Loc: [phone]  Loc Fax: [phone]

## 2020-02-28 DIAGNOSIS — E78.5 DYSLIPIDEMIA: ICD-10-CM

## 2020-02-28 RX ORDER — SIMVASTATIN 80 MG
80 TABLET ORAL NIGHTLY
Qty: 90 TABLET | Refills: 0 | Status: SHIPPED | OUTPATIENT
Start: 2020-02-28 | End: 2020-03-02 | Stop reason: SDUPTHER

## 2020-03-02 DIAGNOSIS — E78.5 DYSLIPIDEMIA: ICD-10-CM

## 2020-03-02 RX ORDER — SIMVASTATIN 80 MG
80 TABLET ORAL NIGHTLY
Qty: 90 TABLET | Refills: 0 | Status: SHIPPED | OUTPATIENT
Start: 2020-03-02 | End: 2020-03-02 | Stop reason: SDUPTHER

## 2020-03-02 RX ORDER — SIMVASTATIN 80 MG
80 TABLET ORAL NIGHTLY
Qty: 90 TABLET | Refills: 0 | Status: SHIPPED | OUTPATIENT
Start: 2020-03-02 | End: 2020-08-24

## 2020-03-06 DIAGNOSIS — F41.9 ANXIETY: ICD-10-CM

## 2020-03-06 RX ORDER — ALPRAZOLAM 1 MG/1
1 TABLET ORAL NIGHTLY PRN
Qty: 30 TABLET | Refills: 0 | Status: SHIPPED | OUTPATIENT
Start: 2020-03-06 | End: 2020-04-03 | Stop reason: SDUPTHER

## 2020-03-21 DIAGNOSIS — F43.21 GRIEF: ICD-10-CM

## 2020-03-21 DIAGNOSIS — F41.9 ANXIETY: ICD-10-CM

## 2020-03-23 DIAGNOSIS — F41.9 ANXIETY: ICD-10-CM

## 2020-03-23 DIAGNOSIS — F43.21 GRIEF: ICD-10-CM

## 2020-03-23 RX ORDER — ALPRAZOLAM 1 MG/1
1 TABLET ORAL NIGHTLY PRN
Qty: 30 TABLET | Refills: 0 | OUTPATIENT
Start: 2020-03-23

## 2020-03-23 NOTE — TELEPHONE ENCOUNTER
Please call patient about her Xanax medication.  This was refilled on March 6 2620.  She is not due until April

## 2020-04-03 DIAGNOSIS — F41.9 ANXIETY: ICD-10-CM

## 2020-04-03 RX ORDER — ALPRAZOLAM 1 MG/1
1 TABLET ORAL NIGHTLY PRN
Qty: 30 TABLET | Refills: 3 | Status: SHIPPED | OUTPATIENT
Start: 2020-04-03 | End: 2020-08-03 | Stop reason: SDUPTHER

## 2020-04-03 NOTE — TELEPHONE ENCOUNTER
Last OV:02/27/2020  Last UDS: 04/19/2019  Last Refill: 03/06/2020  Last Hector: 02/27/2020    Pt requests refills on script

## 2020-05-08 ENCOUNTER — OFFICE VISIT (OUTPATIENT)
Dept: FAMILY MEDICINE CLINIC | Facility: CLINIC | Age: 70
End: 2020-05-08

## 2020-05-08 ENCOUNTER — TELEPHONE (OUTPATIENT)
Dept: FAMILY MEDICINE CLINIC | Facility: CLINIC | Age: 70
End: 2020-05-08

## 2020-05-08 DIAGNOSIS — R35.0 URINE FREQUENCY: Primary | ICD-10-CM

## 2020-05-08 DIAGNOSIS — N39.0 FREQUENT UTI: ICD-10-CM

## 2020-05-08 PROCEDURE — 99441 PR PHYS/QHP TELEPHONE EVALUATION 5-10 MIN: CPT | Performed by: PHYSICIAN ASSISTANT

## 2020-05-08 RX ORDER — TRIAMCINOLONE ACETONIDE 1 MG/G
1 CREAM TOPICAL AS NEEDED
COMMUNITY
Start: 2020-03-25

## 2020-05-08 RX ORDER — NITROFURANTOIN 25; 75 MG/1; MG/1
100 CAPSULE ORAL 2 TIMES DAILY
Qty: 14 CAPSULE | Refills: 0 | Status: SHIPPED | OUTPATIENT
Start: 2020-05-08 | End: 2020-05-21

## 2020-05-08 NOTE — TELEPHONE ENCOUNTER
PT CALLING IN TO REPORT SOMETHING WHITE FLOATING IN HER URINE. SHE EXPERIENCED THIS BEFORE WHEN SHE WAS HOSPITALIZED WITH A UTI.  PT WOULD LIKE TO KNOW WHAT SHE NEEDS TO DO TO HEAD OFF GETTING ANOTHER UTI.    VERIFIED [phone]     PLEASE CALL AND ADVISE PT [phone]

## 2020-05-08 NOTE — PROGRESS NOTES
Subjective   Krystor Mayes is a 70 y.o. female presents for   Chief Complaint   Patient presents with   • Urinary Frequency     mucus-cloudy     You have chosen to receive care through a telephone visit. Do you consent to use a telephone visit for your medical care today? Yes  History of Present Illness     Danielle is a 70-year-old female who presents using telephone/telehealth medicine today.  She has noticed some urine frequency with some mucus in her urine.  Danielle states she has started having a low back pain that started today.  She has a history of frequent urinary tract infections.  She is afraid that she may be starting with urinary tract infection again.  Currently sees urologist, Dr. Mati Villegas for frequent UTI's and kidney stone issues.  Currently she denied any fevers, chills, abdominal pain, nausea, vomiting, diarrhea, vaginal discharge, dysuria, urine hesitancy, or urine urgency.  Appetite and sleep have been normal.    The following portions of the patient's history were reviewed and updated as appropriate: allergies, current medications, past family history, past medical history, past social history, past surgical history and problem list.    Review of Systems   Constitutional: Negative.  Negative for chills, fatigue and fever.   HENT: Negative.    Eyes: Negative.    Respiratory: Negative.    Cardiovascular: Negative.    Gastrointestinal: Negative.    Endocrine: Negative.    Genitourinary: Positive for frequency. Negative for decreased urine volume, dysuria, flank pain, hematuria, pelvic pain, urgency, vaginal bleeding, vaginal discharge and vaginal pain.   Musculoskeletal: Negative.    Skin: Negative.    Allergic/Immunologic: Negative.    Neurological: Negative for light-headedness and headaches.   Hematological: Negative.    Psychiatric/Behavioral: Negative.  Negative for confusion and sleep disturbance.   All other systems reviewed and are negative.    No vital signs were obtained due to  telehealth/telephone encounter.    There were no vitals filed for this visit.  Wt Readings from Last 3 Encounters:   02/27/20 95.3 kg (210 lb)   01/26/20 93 kg (205 lb)   08/22/19 95.5 kg (210 lb 9.6 oz)     BP Readings from Last 3 Encounters:   02/27/20 130/70   01/26/20 117/59   08/22/19 122/60     Social History     Socioeconomic History   • Marital status:      Spouse name: Not on file   • Number of children: Not on file   • Years of education: Not on file   • Highest education level: Not on file   Tobacco Use   • Smoking status: Never Smoker   • Smokeless tobacco: Never Used   • Tobacco comment: caffeine use /soft drinks   Substance and Sexual Activity   • Alcohol use: Yes     Binge frequency: Less than monthly     Comment: She uses alcohol once or twice a year.   • Drug use: No   • Sexual activity: Defer       Allergies   Allergen Reactions   • Morphine And Related GI Intolerance   • Ciprofloxacin-Ciproflox Hcl Er Rash   • Flomax [Tamsulosin Hcl] Rash   • Hydrocodone Rash   • Latex Rash   • Lortab [Hydrocodone-Acetaminophen] Rash   • Penicillins Rash   • Phenergan [Promethazine Hcl] GI Intolerance   • Sulfa Antibiotics Rash   • Amoxicillin Rash   • Tamsulosin Rash       There is no height or weight on file to calculate BMI.    Objective   Physical Exam   Constitutional: She is oriented to person, place, and time.   Alert and oriented female x3 in no respiratory distress   HENT:   Right Ear: Hearing normal.   Left Ear: Hearing normal.   Pulmonary/Chest: Breath sounds normal.   Wheezing sounds normal without any coughing, wheezing or breathing difficulty during our telephone encounter.   Neurological: She is alert and oriented to person, place, and time.   Psychiatric: She has a normal mood and affect. Her speech is normal and behavior is normal. Judgment and thought content normal. Cognition and memory are normal.     Physical exam was limited due to telephone/telehealth encounter.    Assessment/Plan  "  Krys was seen today for urinary frequency.    Diagnoses and all orders for this visit:    Urine frequency  -     nitrofurantoin, macrocrystal-monohydrate, (Macrobid) 100 MG capsule; Take 1 capsule by mouth 2 (Two) Times a Day.    Frequent UTI  -     nitrofurantoin, macrocrystal-monohydrate, (Macrobid) 100 MG capsule; Take 1 capsule by mouth 2 (Two) Times a Day.      Mrs. Marcano,\"Danielle\" Gerber was seen using telephone/telehealth encounter today for urine frequency with mucus in urine.  She has a history of frequent UTI's.  Under the care of Dr. Mati Villegas, urologist for kidney stones and UTI's.  I suspect she may be starting with new onset urinary tract symptoms.  Will send to pharmacy Macrobid 100 mg to take twice daily.  I have instructed Danielle if her symptoms worsen or do not improve, she will schedule appointment in the office for urinalysis and urine culture.  She will increase fluid intake and rest.  She voiced understanding.    I spent approximately 8-10 minutes discussing her urinary symptoms, history of kidney stones and medication management.    [facility]  [address] [place] [id number]  Dept: [phone]  Dept Fax: [phone]  Loc: [phone]  Loc Fax: [phone]               "

## 2020-05-21 ENCOUNTER — OFFICE VISIT (OUTPATIENT)
Dept: FAMILY MEDICINE CLINIC | Facility: CLINIC | Age: 70
End: 2020-05-21

## 2020-05-21 VITALS
OXYGEN SATURATION: 96 % | HEIGHT: 66 IN | WEIGHT: 210 LBS | BODY MASS INDEX: 33.75 KG/M2 | DIASTOLIC BLOOD PRESSURE: 74 MMHG | TEMPERATURE: 98.1 F | HEART RATE: 86 BPM | SYSTOLIC BLOOD PRESSURE: 120 MMHG | RESPIRATION RATE: 16 BRPM

## 2020-05-21 DIAGNOSIS — F43.21 GRIEF: ICD-10-CM

## 2020-05-21 DIAGNOSIS — F41.9 ANXIETY: Primary | ICD-10-CM

## 2020-05-21 DIAGNOSIS — Z79.899 LONG-TERM USE OF HIGH-RISK MEDICATION: ICD-10-CM

## 2020-05-21 PROCEDURE — 99213 OFFICE O/P EST LOW 20 MIN: CPT | Performed by: PHYSICIAN ASSISTANT

## 2020-05-21 NOTE — PROGRESS NOTES
"Subjective   Krys Gerber is a 70 y.o. female presents for   Chief Complaint   Patient presents with   • Anxiety       History of Present Illness     Danielle is a 70-year-old female who presents for anxiety management.  Seen in office and started on sertraline 50 mg daily for her anxiety.  She has been taking the Xanax nightly. She has  noticed a change for the better with her anxiety since starting the Sertraline.  Denied any suicidal or homicidal thoughts.  Appetite and sleep have been normal.  She has not been exercising.      The following portions of the patient's history were reviewed and updated as appropriate: allergies, current medications, past family history, past medical history, past social history, past surgical history and problem list.    Review of Systems   Constitutional: Negative.    HENT: Negative.    Eyes: Negative.    Respiratory: Negative.    Cardiovascular: Negative.    Gastrointestinal: Negative.    Endocrine: Negative.    Genitourinary: Negative.    Musculoskeletal: Negative.    Skin: Negative.    Allergic/Immunologic: Negative.    Neurological: Negative.    Hematological: Negative.    Psychiatric/Behavioral: Negative.  Negative for sleep disturbance and suicidal ideas.   All other systems reviewed and are negative.        Vitals:    05/21/20 1130   BP: 120/74   BP Location: Left arm   Patient Position: Sitting   Cuff Size: Adult   Pulse: 86   Resp: 16   Temp: 98.1 °F (36.7 °C)   SpO2: 96%   Weight: 95.3 kg (210 lb)   Height: 167.6 cm (66\")     Wt Readings from Last 3 Encounters:   05/21/20 95.3 kg (210 lb)   02/27/20 95.3 kg (210 lb)   01/26/20 93 kg (205 lb)     BP Readings from Last 3 Encounters:   05/21/20 120/74   02/27/20 130/70   01/26/20 117/59     Social History     Socioeconomic History   • Marital status:      Spouse name: Not on file   • Number of children: Not on file   • Years of education: Not on file   • Highest education level: Not on file   Tobacco Use   • " Smoking status: Never Smoker   • Smokeless tobacco: Never Used   • Tobacco comment: caffeine use /soft drinks   Substance and Sexual Activity   • Alcohol use: Yes     Binge frequency: Less than monthly     Comment: She uses alcohol once or twice a year.   • Drug use: No   • Sexual activity: Defer       Allergies   Allergen Reactions   • Morphine And Related GI Intolerance   • Ciprofloxacin-Ciproflox Hcl Er Rash   • Flomax [Tamsulosin Hcl] Rash   • Hydrocodone Rash   • Latex Rash   • Lortab [Hydrocodone-Acetaminophen] Rash   • Penicillins Rash   • Phenergan [Promethazine Hcl] GI Intolerance   • Sulfa Antibiotics Rash   • Amoxicillin Rash   • Tamsulosin Rash       Body mass index is 33.89 kg/m².    Objective   Physical Exam   Constitutional: She is oriented to person, place, and time. Vital signs are normal. She appears well-developed and well-nourished.   Patient sitting in chair in exam room wearing a fabric mask.   Neck: Phonation normal. Neck supple. No tracheal tenderness present. No tracheal deviation and no edema present.   Cardiovascular: Normal rate, regular rhythm, S1 normal, S2 normal, normal heart sounds and normal pulses.   No murmur heard.  Pulmonary/Chest: Effort normal and breath sounds normal.   Abdominal: Soft. Normal appearance, normal aorta and bowel sounds are normal. There is no hepatomegaly. There is no tenderness.   Neurological: She is alert and oriented to person, place, and time.   Skin: Skin is warm, dry and intact. Capillary refill takes less than 2 seconds.   Psychiatric: She has a normal mood and affect. Her speech is normal and behavior is normal. Judgment and thought content normal. Cognition and memory are normal.      I was wearing surgical mask during the entire office visit encounter.    Assessment/Plan   Krys was seen today for anxiety.    Diagnoses and all orders for this visit:    Anxiety  -     Compliance Drug Analysis, Ur - Urine, Clean Catch  -     sertraline (ZOLOFT) 50  "MG tablet; Take 1 tablet by mouth Daily.    Long-term use of high-risk medication  -     Compliance Drug Analysis, Ur - Urine, Clean Catch    Grief  -     sertraline (ZOLOFT) 50 MG tablet; Take 1 tablet by mouth Daily.      Mrs. Marcano,\"Danielle\" Gerber was seen in office today for chronic and stable anxiety, grief with long-term high risk medication usage: Doing well with her sertraline 50 mg medication daily.  I have sent a refill to her local pharmacy.  Danielle will have a urine drug screen collected for compliance purposes for the Xanax medication.  No refills of Xanax are needed at this time.  She will be notified of test results when completed.  I have asked her to schedule her Medicare wellness exam in August 2020.  She voiced understanding.  Will reevaluate her medicines at that time.    [facility]  [address] [place] [id number]  Dept: [phone]  Dept Fax: [phone]  Loc: [phone]  Loc Fax: [phone]               "

## 2020-05-25 LAB — DRUGS UR: NORMAL

## 2020-06-11 NOTE — PROGRESS NOTES
Date of Office Visit: 2020  Encounter Provider: AGUSTIN Lozada  Place of Service: [facility] CARDIOLOGY  Patient Name: Krys Mayes  :1950  Primary Cardiologist: Dr. Orozco    CC:  Annual cardiac evaluation    Dear Ashleigh    HPI: Krys Mayes is a pleasant 70 y.o. female who presents 2020 for cardiac follow up. She is a new patient to me and I have reviewed her past medical history.  She is a patient of Dr. Orozco    Prior to 2016, she had not had any cardiac issues. At that time, she was admitted with urosepsis due to kidney stones. She went into atrial fibrillation in that setting. She initially converted to NSR but went back into atrial fibrillation. She was rate controlled and placed on rivaroxaban. An echocardiogram revealed normal LV size and systolic function (EF 56%). There was moderate TR and severe pulmonary hypertension as well. When I saw her in the office soon after, she was doing well.      She was readmitted just a few weeks later with urosepsis again. She was found to have a staghorn calculus.  She did have a brief episode of atrial fibrillation during that admission. Her metoprolol dose was increased, and her rivaroxaban dose was decreased due to STEVAN.      She states she has had a good year.  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  She does miss that.  She denies any palpitations, shortness of breath, lower extremity edema.  She has had no episode of chest pain or chest tightness.  She denies any dizziness, lightheadedness, syncope or presyncopal episodes.  She denies any fatigue.  She is able to take care of herself and her needs.  She has had no leg pain, numbness or tingling her upper or lower extremities.  She states she does snore but denies any PND, cough, orthopnea.  She is taking her medications as directed.  She has had no unexplained bleeding.     Past Medical  History:   Diagnosis Date   • Allergic    • Arthritis    • Controlled type 2 diabetes with renal manifestation     states borderline    • Gastroesophageal reflux disease 1/21/2016   • Hyperlipidemia    • Hypertension    • Kidney stone     recurrent, calcium oxalate   • Menopausal disorder 1998    She went through menopause in 1998   • Obesity    • PAF (paroxysmal atrial fibrillation) (CMS/HCC)     in the setting of urosepsis, 9/2016   • Reflux esophagitis 8/19/2016   • Sebaceous cyst of labia 12/20/2017   • Sepsis due to urinary tract infection (CMS/HCC)    • Sigmoid diverticulitis 11/13/2017   • Urinary tract infection    • Vitamin D deficiency 1/21/2016       Past Surgical History:   Procedure Laterality Date   • ANKLE SURGERY      Ankle Repair / Description: ORif the left ankle in July 2010. Secondary to fall   • BREAST BIOPSY Right     benign   • BREAST SURGERY      biopsy    • CHOLECYSTECTOMY     • COLONOSCOPY  2014    Done 2004 normal recheck in 10 years. Repeated February 2014 colonoscopy was normal and to be repeated in 10 years.   • CYSTOSCOPY BLADDER STONE LITHOTRIPSY     • HEMORRHOIDECTOMY     • NEPHROLITHOTOMY Left 1/9/2017    Procedure: NEPHROLITHOTOMY PERCUTANEOUS SECOND LOOK WITH STENT PLACEMENT AND LASER LITHOTRIPSY;  Surgeon: Mati Villegas MD;  Location: [facility];  Service:    • OTHER SURGICAL HISTORY      Tubal Ligation   • PERCUTANEOUS NEPHROSTOLITHOTOMY Left 12/2016   • TUBAL ABDOMINAL LIGATION         Social History     Socioeconomic History   • Marital status:      Spouse name: Not on file   • Number of children: Not on file   • Years of education: Not on file   • Highest education level: Not on file   Tobacco Use   • Smoking status: Never Smoker   • Smokeless tobacco: Never Used   • Tobacco comment: caffeine use /soft drinks   Substance and Sexual Activity   • Alcohol use: Yes     Binge frequency: Less than monthly     Comment: She uses alcohol once or twice a year.   •  Drug use: No   • Sexual activity: Defer       Family History   Problem Relation Age of Onset   • COPD Mother    • Heart attack Father         acute myocardial infarction   • Heart attack Son    • Kidney disease Maternal Grandmother    • Breast cancer Neg Hx        The following portion of the patient's history were reviewed and updated as appropriate: past medical history, past surgical history, past social history, past family history, allergies, current medications, and problem list.    Review of Systems   Constitution: Negative for diaphoresis, fever and malaise/fatigue.   HENT: Negative for congestion, hearing loss, hoarse voice, nosebleeds and sore throat.    Eyes: Negative for photophobia, vision loss in left eye, vision loss in right eye and visual disturbance.   Cardiovascular: Negative for chest pain, dyspnea on exertion, irregular heartbeat, leg swelling, near-syncope, orthopnea, palpitations, paroxysmal nocturnal dyspnea and syncope.   Respiratory: Negative for cough, hemoptysis, shortness of breath, sleep disturbances due to breathing, snoring, sputum production and wheezing.    Endocrine: Negative for cold intolerance, heat intolerance, polydipsia, polyphagia and polyuria.   Hematologic/Lymphatic: Negative for bleeding problem. Does not bruise/bleed easily.   Skin: Negative for color change, dry skin, poor wound healing, rash and suspicious lesions.   Musculoskeletal: Negative for arthritis, back pain, falls, gout, joint pain, joint swelling, muscle cramps, muscle weakness and myalgias.   Gastrointestinal: Negative for bloating, abdominal pain, constipation, diarrhea, dysphagia, melena, nausea and vomiting.   Neurological: Negative for excessive daytime sleepiness, dizziness, headaches, light-headedness, loss of balance, numbness, paresthesias, seizures, vertigo and weakness.   Psychiatric/Behavioral: Negative for depression, memory loss and substance abuse. The patient is not nervous/anxious.   "      Allergies   Allergen Reactions   • Morphine And Related GI Intolerance   • Ciprofloxacin-Ciproflox Hcl Er Rash   • Flomax [Tamsulosin Hcl] Rash   • Hydrocodone Rash   • Latex Rash   • Lortab [Hydrocodone-Acetaminophen] Rash   • Penicillins Rash   • Phenergan [Promethazine Hcl] GI Intolerance   • Sulfa Antibiotics Rash   • Amoxicillin Rash   • Tamsulosin Rash         Current Outpatient Medications:   •  acetaminophen (TYLENOL) 650 MG 8 hr tablet, Take 1 tablet by mouth every 6 (six) hours as needed. for pain, Disp: , Rfl:   •  ALPRAZolam (XANAX) 1 MG tablet, Take 1 tablet by mouth At Night As Needed for Anxiety., Disp: 30 tablet, Rfl: 3  •  aspirin 81 MG tablet, Take 81 mg by mouth Daily., Disp: , Rfl:   •  losartan (COZAAR) 100 MG tablet, Take 1 tablet by mouth Daily., Disp: 90 tablet, Rfl: 3  •  meclizine (ANTIVERT) 25 MG tablet, Take 1 tablet by mouth Every 6 (Six) Hours As Needed for dizziness or Nausea., Disp: 30 tablet, Rfl: 0  •  metoprolol tartrate (LOPRESSOR) 50 MG tablet, TAKE ONE AND ONE HALF TABLET BY MOUTH TWICE DAILY, Disp: 270 tablet, Rfl: 3  •  Multiple Vitamin tablet, Take 1 tablet by mouth Daily., Disp: , Rfl:   •  omeprazole OTC (PriLOSEC OTC) 20 MG EC tablet, Take 20 mg by mouth 2 (two) times a day., Disp: , Rfl:   •  Probiotic Product (PROBIOTIC ADVANCED PO), Take 1 tablet by mouth Daily., Disp: , Rfl:   •  rivaroxaban (XARELTO) 15 MG tablet, Take 1 tablet by mouth Daily., Disp: 90 tablet, Rfl: 3  •  sertraline (ZOLOFT) 50 MG tablet, Take 1 tablet by mouth Daily., Disp: 90 tablet, Rfl: 1  •  simvastatin (ZOCOR) 80 MG tablet, Take 1 tablet by mouth Every Night., Disp: 90 tablet, Rfl: 0  •  triamcinolone (KENALOG) 0.1 % cream, APPLY TO AFFECTED AREA TWICE A DAY TO RASH FOR UP TO 2 WEEKS, Disp: , Rfl:         Objective:     Vitals:    06/12/20 1109   BP: 114/60   Pulse: 58   Resp: 16   SpO2: 93%   Weight: 95.3 kg (210 lb)   Height: 167.6 cm (66\")     Body mass index is 33.89 " kg/m².      Physical Exam   Constitutional: She is oriented to person, place, and time. She appears well-developed and well-nourished. No distress.   obese   HENT:   Head: Normocephalic and atraumatic.   Right Ear: External ear normal.   Left Ear: External ear normal.   Nose: Nose normal.   Eyes: Pupils are equal, round, and reactive to light. Conjunctivae are normal. Right eye exhibits no discharge. Left eye exhibits no discharge.   Neck: Normal range of motion. Neck supple. No JVD present. No tracheal deviation present. No thyromegaly present.   Cardiovascular: Normal rate, regular rhythm, normal heart sounds and intact distal pulses. Exam reveals no gallop and no friction rub.   No murmur heard.  Pulmonary/Chest: Effort normal and breath sounds normal. No respiratory distress. She has no wheezes. She has no rales. She exhibits no tenderness.   Abdominal: Soft. Bowel sounds are normal. She exhibits no distension. There is no tenderness.   Musculoskeletal: Normal range of motion. She exhibits no edema, tenderness or deformity.   Lymphadenopathy:     She has no cervical adenopathy.   Neurological: She is alert and oriented to person, place, and time. Coordination normal.   Skin: Skin is warm and dry. No rash noted. No erythema.   Psychiatric: She has a normal mood and affect. Her behavior is normal. Judgment and thought content normal.             ECG 12 Lead  Date/Time: 6/12/2020 11:16 AM  Performed by: Ashleigh Gonzalez APRN  Authorized by: Ashleigh Gonzalez APRN   Comparison: compared with previous ECG from 1/26/2020  Similar to previous ECG  Rhythm: sinus rhythm  Rate: normal  Conduction: conduction normal  ST Segments: ST segments normal  T Waves: T waves normal  QRS axis: normal    Clinical impression: normal ECG              Assessment:       Diagnosis Plan   1. PAF (paroxysmal atrial fibrillation) (CMS/HCC)     2. Essential hypertension     3. Mixed hyperlipidemia     4. Obesity (BMI 30-39.9)            Plan:        1. PAF - in SR, rate controlled.  She is on BB and Xarelto.    2. HTN - well controlled    3.  Hyperlipidemia - continue on lipid lowering therapy.  She is on simvastatin 80 mg without complaints    4. Obesity - she would benefit from a weight loss program.  She is sedentary, encouraged walking daily.    RTO in 1 year with JK    As always, it has been a pleasure to participate in your patient's care. Thank you.       Sincerely,       AGUSTIN Lozada      Current Outpatient Medications:   •  acetaminophen (TYLENOL) 650 MG 8 hr tablet, Take 1 tablet by mouth every 6 (six) hours as needed. for pain, Disp: , Rfl:   •  ALPRAZolam (XANAX) 1 MG tablet, Take 1 tablet by mouth At Night As Needed for Anxiety., Disp: 30 tablet, Rfl: 3  •  aspirin 81 MG tablet, Take 81 mg by mouth Daily., Disp: , Rfl:   •  losartan (COZAAR) 100 MG tablet, Take 1 tablet by mouth Daily., Disp: 90 tablet, Rfl: 3  •  meclizine (ANTIVERT) 25 MG tablet, Take 1 tablet by mouth Every 6 (Six) Hours As Needed for dizziness or Nausea., Disp: 30 tablet, Rfl: 0  •  metoprolol tartrate (LOPRESSOR) 50 MG tablet, TAKE ONE AND ONE HALF TABLET BY MOUTH TWICE DAILY, Disp: 270 tablet, Rfl: 3  •  Multiple Vitamin tablet, Take 1 tablet by mouth Daily., Disp: , Rfl:   •  omeprazole OTC (PriLOSEC OTC) 20 MG EC tablet, Take 20 mg by mouth 2 (two) times a day., Disp: , Rfl:   •  Probiotic Product (PROBIOTIC ADVANCED PO), Take 1 tablet by mouth Daily., Disp: , Rfl:   •  rivaroxaban (XARELTO) 15 MG tablet, Take 1 tablet by mouth Daily., Disp: 90 tablet, Rfl: 3  •  sertraline (ZOLOFT) 50 MG tablet, Take 1 tablet by mouth Daily., Disp: 90 tablet, Rfl: 1  •  simvastatin (ZOCOR) 80 MG tablet, Take 1 tablet by mouth Every Night., Disp: 90 tablet, Rfl: 0  •  triamcinolone (KENALOG) 0.1 % cream, APPLY TO AFFECTED AREA TWICE A DAY TO RASH FOR UP TO 2 WEEKS, Disp: , Rfl:     Dictated utilizing Dragon dictation

## 2020-06-12 ENCOUNTER — OFFICE VISIT (OUTPATIENT)
Dept: CARDIOLOGY | Facility: CLINIC | Age: 70
End: 2020-06-12

## 2020-06-12 VITALS
HEIGHT: 66 IN | RESPIRATION RATE: 16 BRPM | OXYGEN SATURATION: 93 % | WEIGHT: 210 LBS | HEART RATE: 58 BPM | BODY MASS INDEX: 33.75 KG/M2 | SYSTOLIC BLOOD PRESSURE: 114 MMHG | DIASTOLIC BLOOD PRESSURE: 60 MMHG

## 2020-06-12 DIAGNOSIS — I10 ESSENTIAL HYPERTENSION: ICD-10-CM

## 2020-06-12 DIAGNOSIS — I48.0 PAF (PAROXYSMAL ATRIAL FIBRILLATION) (HCC): Primary | ICD-10-CM

## 2020-06-12 DIAGNOSIS — E66.9 OBESITY (BMI 30-39.9): ICD-10-CM

## 2020-06-12 DIAGNOSIS — E78.2 MIXED HYPERLIPIDEMIA: ICD-10-CM

## 2020-06-12 PROCEDURE — 93000 ELECTROCARDIOGRAM COMPLETE: CPT | Performed by: NURSE PRACTITIONER

## 2020-06-12 PROCEDURE — 99214 OFFICE O/P EST MOD 30 MIN: CPT | Performed by: NURSE PRACTITIONER

## 2020-07-02 RX ORDER — RIVAROXABAN 15 MG/1
TABLET, FILM COATED ORAL
Qty: 90 TABLET | Refills: 3 | Status: SHIPPED | OUTPATIENT
Start: 2020-07-02 | End: 2021-06-15 | Stop reason: SDUPTHER

## 2020-08-03 DIAGNOSIS — F41.9 ANXIETY: ICD-10-CM

## 2020-08-03 RX ORDER — ALPRAZOLAM 1 MG/1
1 TABLET ORAL NIGHTLY PRN
Qty: 30 TABLET | Refills: 3 | Status: SHIPPED | OUTPATIENT
Start: 2020-08-03 | End: 2020-08-05 | Stop reason: SDUPTHER

## 2020-08-05 DIAGNOSIS — F41.9 ANXIETY: ICD-10-CM

## 2020-08-05 RX ORDER — ALPRAZOLAM 1 MG/1
1 TABLET ORAL NIGHTLY PRN
Qty: 30 TABLET | Refills: 3 | Status: SHIPPED | OUTPATIENT
Start: 2020-08-05 | End: 2020-12-21

## 2020-08-05 NOTE — TELEPHONE ENCOUNTER
Caller: Krys Mayes    Relationship: Self    Best call back number: [phone]    Medication needed:   Requested Prescriptions     Pending Prescriptions Disp Refills   • ALPRAZolam (XANAX) 1 MG tablet 30 tablet 3     Sig: Take 1 tablet by mouth At Night As Needed for Anxiety.       When do you need the refill by: 8/5/20    What details did the patient provide when requesting the medication: script went to the wrong pharmacy. Needs to go to [facility]. Patient is out of meds.     Does the patient have less than a 3 day supply:  [] Yes  [] No    What is the patient's preferred pharmacy: [id number] - [place], [place] - [address] [address] AT [address]CTION OF [address] - [phone]  - [phone] FX

## 2020-08-05 NOTE — TELEPHONE ENCOUNTER
Please notify patient that the refill request was sent to pharmacy on August 3, 2020 by Dr. Burks.

## 2020-08-20 DIAGNOSIS — I10 ESSENTIAL HYPERTENSION: ICD-10-CM

## 2020-08-20 DIAGNOSIS — E55.9 VITAMIN D DEFICIENCY: ICD-10-CM

## 2020-08-20 DIAGNOSIS — E78.2 MIXED HYPERLIPIDEMIA: ICD-10-CM

## 2020-08-20 DIAGNOSIS — R73.03 PREDIABETES: ICD-10-CM

## 2020-08-20 DIAGNOSIS — E78.5 DYSLIPIDEMIA: Primary | ICD-10-CM

## 2020-08-22 LAB
25(OH)D3+25(OH)D2 SERPL-MCNC: 38.3 NG/ML (ref 30–100)
ALBUMIN SERPL-MCNC: 4.4 G/DL (ref 3.5–5.2)
ALBUMIN/GLOB SERPL: 1.9 G/DL
ALP SERPL-CCNC: 59 U/L (ref 39–117)
ALT SERPL-CCNC: 20 U/L (ref 1–33)
AST SERPL-CCNC: 17 U/L (ref 1–32)
BASOPHILS # BLD AUTO: 0.04 10*3/MM3 (ref 0–0.2)
BASOPHILS NFR BLD AUTO: 0.6 % (ref 0–1.5)
BILIRUB SERPL-MCNC: 0.3 MG/DL (ref 0–1.2)
BUN SERPL-MCNC: 22 MG/DL (ref 8–23)
BUN/CREAT SERPL: 19.5 (ref 7–25)
CALCIUM SERPL-MCNC: 9.3 MG/DL (ref 8.6–10.5)
CHLORIDE SERPL-SCNC: 101 MMOL/L (ref 98–107)
CHOLEST SERPL-MCNC: 142 MG/DL (ref 0–200)
CO2 SERPL-SCNC: 24.2 MMOL/L (ref 22–29)
CREAT SERPL-MCNC: 1.13 MG/DL (ref 0.57–1)
EOSINOPHIL # BLD AUTO: 0.17 10*3/MM3 (ref 0–0.4)
EOSINOPHIL NFR BLD AUTO: 2.6 % (ref 0.3–6.2)
ERYTHROCYTE [DISTWIDTH] IN BLOOD BY AUTOMATED COUNT: 13.3 % (ref 12.3–15.4)
GLOBULIN SER CALC-MCNC: 2.3 GM/DL
GLUCOSE SERPL-MCNC: 99 MG/DL (ref 65–99)
HBA1C MFR BLD: 5.8 % (ref 4.8–5.6)
HCT VFR BLD AUTO: 41.7 % (ref 34–46.6)
HDLC SERPL-MCNC: 59 MG/DL (ref 40–60)
HGB BLD-MCNC: 12.6 G/DL (ref 12–15.9)
IMM GRANULOCYTES # BLD AUTO: 0.01 10*3/MM3 (ref 0–0.05)
IMM GRANULOCYTES NFR BLD AUTO: 0.2 % (ref 0–0.5)
LDLC SERPL CALC-MCNC: 35 MG/DL (ref 0–100)
LDLC/HDLC SERPL: 0.59 {RATIO}
LYMPHOCYTES # BLD AUTO: 1.29 10*3/MM3 (ref 0.7–3.1)
LYMPHOCYTES NFR BLD AUTO: 20.1 % (ref 19.6–45.3)
MCH RBC QN AUTO: 25.3 PG (ref 26.6–33)
MCHC RBC AUTO-ENTMCNC: 30.2 G/DL (ref 31.5–35.7)
MCV RBC AUTO: 83.7 FL (ref 79–97)
MONOCYTES # BLD AUTO: 0.81 10*3/MM3 (ref 0.1–0.9)
MONOCYTES NFR BLD AUTO: 12.6 % (ref 5–12)
NEUTROPHILS # BLD AUTO: 4.11 10*3/MM3 (ref 1.7–7)
NEUTROPHILS NFR BLD AUTO: 63.9 % (ref 42.7–76)
NRBC BLD AUTO-RTO: 0 /100 WBC (ref 0–0.2)
PLATELET # BLD AUTO: 286 10*3/MM3 (ref 140–450)
POTASSIUM SERPL-SCNC: 4.1 MMOL/L (ref 3.5–5.2)
PROT SERPL-MCNC: 6.7 G/DL (ref 6–8.5)
RBC # BLD AUTO: 4.98 10*6/MM3 (ref 3.77–5.28)
SODIUM SERPL-SCNC: 139 MMOL/L (ref 136–145)
TRIGL SERPL-MCNC: 240 MG/DL (ref 0–150)
TSH SERPL DL<=0.005 MIU/L-ACNC: 2.62 UIU/ML (ref 0.27–4.2)
VLDLC SERPL CALC-MCNC: 48 MG/DL (ref 5–40)
WBC # BLD AUTO: 6.43 10*3/MM3 (ref 3.4–10.8)

## 2020-08-23 DIAGNOSIS — E78.5 DYSLIPIDEMIA: ICD-10-CM

## 2020-08-24 RX ORDER — SIMVASTATIN 80 MG
TABLET ORAL
Qty: 90 TABLET | Refills: 0 | Status: SHIPPED | OUTPATIENT
Start: 2020-08-24 | End: 2020-11-04

## 2020-08-27 ENCOUNTER — OFFICE VISIT (OUTPATIENT)
Dept: FAMILY MEDICINE CLINIC | Facility: CLINIC | Age: 70
End: 2020-08-27

## 2020-08-27 VITALS
SYSTOLIC BLOOD PRESSURE: 128 MMHG | WEIGHT: 211 LBS | HEIGHT: 66 IN | DIASTOLIC BLOOD PRESSURE: 68 MMHG | OXYGEN SATURATION: 97 % | RESPIRATION RATE: 16 BRPM | HEART RATE: 67 BPM | BODY MASS INDEX: 33.91 KG/M2 | TEMPERATURE: 98 F

## 2020-08-27 DIAGNOSIS — E78.2 MIXED HYPERLIPIDEMIA: ICD-10-CM

## 2020-08-27 DIAGNOSIS — R73.03 PREDIABETES: ICD-10-CM

## 2020-08-27 DIAGNOSIS — Z00.00 MEDICARE ANNUAL WELLNESS VISIT, SUBSEQUENT: Primary | ICD-10-CM

## 2020-08-27 DIAGNOSIS — E66.9 OBESITY (BMI 30-39.9): ICD-10-CM

## 2020-08-27 DIAGNOSIS — E78.5 DYSLIPIDEMIA: ICD-10-CM

## 2020-08-27 DIAGNOSIS — Z23 NEED FOR PNEUMOCOCCAL VACCINATION: ICD-10-CM

## 2020-08-27 DIAGNOSIS — F41.9 ANXIETY: ICD-10-CM

## 2020-08-27 PROCEDURE — G0439 PPPS, SUBSEQ VISIT: HCPCS | Performed by: PHYSICIAN ASSISTANT

## 2020-08-27 PROCEDURE — 90732 PPSV23 VACC 2 YRS+ SUBQ/IM: CPT | Performed by: PHYSICIAN ASSISTANT

## 2020-08-27 PROCEDURE — G0009 ADMIN PNEUMOCOCCAL VACCINE: HCPCS | Performed by: PHYSICIAN ASSISTANT

## 2020-08-27 NOTE — PROGRESS NOTES
The ABCs of the Annual Wellness Visit  Subsequent Medicare Wellness Visit    Chief Complaint   Patient presents with   • Medicare Wellness-subsequent       Subjective   History of Present Illness:  Krys Mayes is a 70 y.o. female who presents for a Subsequent Medicare Wellness Visit.  Danielle states overall she is feeling well at office visit today.  Her diet has not been very healthy.  She has been eating more carbohydrates and sugars recently.  She does not exercise.  Sleep has been normal for her.  Bowel movements have been daily without dark black tarry stools.  Has been taking her Xanax nightly.  States she takes this with her supper.  She takes the sertraline daily as well.  Denied any suicidal or homicidal ideation.  The medication has helped with her anxiety symptoms.  Denied any current chest pain, shortness of air, dizziness, vision changes, fevers, chills, upper respiratory symptoms, or swelling of ankles.  States she will be getting a high-dose flu shot at the [facility] in September.  She has no complaints today.    HEALTH RISK ASSESSMENT    Recent Hospitalizations:  No hospitalization(s) within the last year.    Current Medical Providers:  Patient Care Team:  Ashleigh Hernandez PA-C as PCP - General (Family Medicine)  Ashleigh Hernandez PA-C as PCP - Claims Attributed  Jeremy Orozco MD as Consulting Physician (Cardiology)  Joss Oro MD as Consulting Physician (Gastroenterology)    Smoking Status:  Social History     Tobacco Use   Smoking Status Never Smoker   Smokeless Tobacco Never Used   Tobacco Comment    caffeine use /soft drinks       Alcohol Consumption:  Social History     Substance and Sexual Activity   Alcohol Use Yes   • Binge frequency: Less than monthly    Comment: She uses alcohol once or twice a year.       Depression Screen:   PHQ-2/PHQ-9 Depression Screening 8/27/2020   Little interest or pleasure in doing things 0   Feeling down, depressed, or  hopeless 0   Trouble falling or staying asleep, or sleeping too much 0   Feeling tired or having little energy 0   Poor appetite or overeating 0   Feeling bad about yourself - or that you are a failure or have let yourself or your family down 0   Trouble concentrating on things, such as reading the newspaper or watching television 0   Moving or speaking so slowly that other people could have noticed. Or the opposite - being so fidgety or restless that you have been moving around a lot more than usual 0   Thoughts that you would be better off dead, or of hurting yourself in some way 0   Total Score 0   If you checked off any problems, how difficult have these problems made it for you to do your work, take care of things at home, or get along with other people? -       Fall Risk Screen:  JEREMY Fall Risk Assessment was completed, and patient is at LOW risk for falls.Assessment completed on:8/27/2020    Health Habits and Functional and Cognitive Screening:  Functional & Cognitive Status 8/27/2020   Do you have difficulty preparing food and eating? No   Do you have difficulty bathing yourself, getting dressed or grooming yourself? No   Do you have difficulty using the toilet? No   Do you have difficulty moving around from place to place? No   Do you have trouble with steps or getting out of a bed or a chair? No   Current Diet Unhealthy Diet   Dental Exam Up to date   Eye Exam Up to date   Exercise (times per week) 0 times per week   Current Exercise Activities Include None   Do you need help using the phone?  No   Are you deaf or do you have serious difficulty hearing?  No   Do you need help with transportation? No   Do you need help shopping? No   Do you need help preparing meals?  No   Do you need help with housework?  No   Do you need help with laundry? No   Do you need help taking your medications? No   Do you need help managing money? No   Do you ever drive or ride in a car without wearing a seat belt? No   Have  you felt unusual stress, anger or loneliness in the last month? No   Who do you live with? Spouse   If you need help, do you have trouble finding someone available to you? No   Have you been bothered in the last four weeks by sexual problems? No   Do you have difficulty concentrating, remembering or making decisions? No         Does the patient have evidence of cognitive impairment? No    Asprin use counseling:Taking ASA appropriately as indicated    Age-appropriate Screening Schedule:  Refer to the list below for future screening recommendations based on patient's age, sex and/or medical conditions. Orders for these recommended tests are listed in the plan section. The patient has been provided with a written plan.    Health Maintenance   Topic Date Due   • INFLUENZA VACCINE  10/24/2020 (Originally 8/1/2020)   • HEMOGLOBIN A1C  02/21/2021   • DIABETIC EYE EXAM  08/14/2021   • LIPID PANEL  08/21/2021   • DXA SCAN  08/27/2021   • MAMMOGRAM  12/16/2021   • TDAP/TD VACCINES (2 - Td) 11/21/2023   • COLONOSCOPY  02/26/2024   • ZOSTER VACCINE  Completed   • DIABETIC FOOT EXAM  Discontinued   • URINE MICROALBUMIN  Discontinued          The following portions of the patient's history were reviewed and updated as appropriate:   She  has a past medical history of Allergic, Arthritis, Controlled type 2 diabetes with renal manifestation, Gastroesophageal reflux disease (1/21/2016), Hyperlipidemia, Hypertension, Kidney stone, Menopausal disorder (1998), Obesity, PAF (paroxysmal atrial fibrillation) (CMS/[facility]), Reflux esophagitis (8/19/2016), Sebaceous cyst of labia (12/20/2017), Sepsis due to urinary tract infection (CMS/[facility]), Sigmoid diverticulitis (11/13/2017), Urinary tract infection, and Vitamin D deficiency (1/21/2016).  She does not have any pertinent problems on file.  She  has a past surgical history that includes Ankle surgery; Cholecystectomy; Colonoscopy (2014); Hemorrhoid surgery; Other surgical history; Tubal  ligation; cystoscopy bladder stone lithotripsy; Breast surgery; Breast biopsy (Right); Percutaneous nephrostolithotomy (Left, 12/2016); and Nephrolithotomy (Left, 1/9/2017).  Her family history includes COPD in her mother; Heart attack in her father and son; Kidney disease in her maternal grandmother.  She  reports that she has never smoked. She has never used smokeless tobacco. She reports that she drinks alcohol. She reports that she does not use drugs.  Current Outpatient Medications   Medication Sig Dispense Refill   • acetaminophen (TYLENOL) 650 MG 8 hr tablet Take 1 tablet by mouth every 6 (six) hours as needed. for pain     • ALPRAZolam (XANAX) 1 MG tablet Take 1 tablet by mouth At Night As Needed for Anxiety. 30 tablet 3   • aspirin 81 MG tablet Take 81 mg by mouth Daily.     • losartan (COZAAR) 100 MG tablet Take 1 tablet by mouth Daily. 90 tablet 3   • meclizine (ANTIVERT) 25 MG tablet Take 1 tablet by mouth Every 6 (Six) Hours As Needed for dizziness or Nausea. 30 tablet 0   • metoprolol tartrate (LOPRESSOR) 50 MG tablet TAKE ONE AND ONE HALF TABLET BY MOUTH TWICE DAILY 270 tablet 3   • Multiple Vitamin tablet Take 1 tablet by mouth Daily.     • omeprazole OTC (PriLOSEC OTC) 20 MG EC tablet Take 20 mg by mouth 2 (two) times a day.     • Probiotic Product (PROBIOTIC ADVANCED PO) Take 1 tablet by mouth Daily.     • sertraline (ZOLOFT) 50 MG tablet Take 1 tablet by mouth Daily. 90 tablet 1   • simvastatin (ZOCOR) 80 MG tablet TAKE 1 TABLET BY MOUTH EVERY DAY AT NIGHT 90 tablet 0   • triamcinolone (KENALOG) 0.1 % cream APPLY TO AFFECTED AREA TWICE A DAY TO RASH FOR UP TO 2 WEEKS     • XARELTO 15 MG tablet TAKE 1 TABLET BY MOUTH EVERY DAY 90 tablet 3     No current facility-administered medications for this visit.      Current Outpatient Medications on File Prior to Visit   Medication Sig   • acetaminophen (TYLENOL) 650 MG 8 hr tablet Take 1 tablet by mouth every 6 (six) hours as needed. for pain   •  ALPRAZolam (XANAX) 1 MG tablet Take 1 tablet by mouth At Night As Needed for Anxiety.   • aspirin 81 MG tablet Take 81 mg by mouth Daily.   • losartan (COZAAR) 100 MG tablet Take 1 tablet by mouth Daily.   • meclizine (ANTIVERT) 25 MG tablet Take 1 tablet by mouth Every 6 (Six) Hours As Needed for dizziness or Nausea.   • metoprolol tartrate (LOPRESSOR) 50 MG tablet TAKE ONE AND ONE HALF TABLET BY MOUTH TWICE DAILY   • Multiple Vitamin tablet Take 1 tablet by mouth Daily.   • omeprazole OTC (PriLOSEC OTC) 20 MG EC tablet Take 20 mg by mouth 2 (two) times a day.   • Probiotic Product (PROBIOTIC ADVANCED PO) Take 1 tablet by mouth Daily.   • sertraline (ZOLOFT) 50 MG tablet Take 1 tablet by mouth Daily.   • simvastatin (ZOCOR) 80 MG tablet TAKE 1 TABLET BY MOUTH EVERY DAY AT NIGHT   • triamcinolone (KENALOG) 0.1 % cream APPLY TO AFFECTED AREA TWICE A DAY TO RASH FOR UP TO 2 WEEKS   • XARELTO 15 MG tablet TAKE 1 TABLET BY MOUTH EVERY DAY     No current facility-administered medications on file prior to visit.      She is allergic to morphine and related; ciprofloxacin-ciproflox hcl er; flomax [tamsulosin hcl]; hydrocodone; latex; lortab [hydrocodone-acetaminophen]; penicillins; phenergan [promethazine hcl]; sulfa antibiotics; amoxicillin; and tamsulosin..    Outpatient Medications Prior to Visit   Medication Sig Dispense Refill   • acetaminophen (TYLENOL) 650 MG 8 hr tablet Take 1 tablet by mouth every 6 (six) hours as needed. for pain     • ALPRAZolam (XANAX) 1 MG tablet Take 1 tablet by mouth At Night As Needed for Anxiety. 30 tablet 3   • aspirin 81 MG tablet Take 81 mg by mouth Daily.     • losartan (COZAAR) 100 MG tablet Take 1 tablet by mouth Daily. 90 tablet 3   • meclizine (ANTIVERT) 25 MG tablet Take 1 tablet by mouth Every 6 (Six) Hours As Needed for dizziness or Nausea. 30 tablet 0   • metoprolol tartrate (LOPRESSOR) 50 MG tablet TAKE ONE AND ONE HALF TABLET BY MOUTH TWICE DAILY 270 tablet 3   • Multiple  Vitamin tablet Take 1 tablet by mouth Daily.     • omeprazole OTC (PriLOSEC OTC) 20 MG EC tablet Take 20 mg by mouth 2 (two) times a day.     • Probiotic Product (PROBIOTIC ADVANCED PO) Take 1 tablet by mouth Daily.     • sertraline (ZOLOFT) 50 MG tablet Take 1 tablet by mouth Daily. 90 tablet 1   • simvastatin (ZOCOR) 80 MG tablet TAKE 1 TABLET BY MOUTH EVERY DAY AT NIGHT 90 tablet 0   • triamcinolone (KENALOG) 0.1 % cream APPLY TO AFFECTED AREA TWICE A DAY TO RASH FOR UP TO 2 WEEKS     • XARELTO 15 MG tablet TAKE 1 TABLET BY MOUTH EVERY DAY 90 tablet 3     No facility-administered medications prior to visit.        Patient Active Problem List   Diagnosis   • Elevated alanine aminotransferase (ALT) level   • Mixed anxiety depressive disorder   • Dyslipidemia   • Gastroesophageal reflux disease   • Essential hypertension   • Insomnia   • Microalbuminuria   • Obesity (BMI 30-39.9)   • Proteinuria   • Vitamin D deficiency   • Postmenopause   • History of fracture   • Reflux esophagitis   • Calculus of kidney   • PAF (paroxysmal atrial fibrillation) (CMS/[facility])   • Anxiety   • Iron deficiency anemia   • Prediabetes   • Urine frequency   • Medicare annual wellness visit, subsequent   • Need for pneumococcal vaccination   • High risk medication use   • Dermoid cyst of scalp   • Hyperglycemia   • Grief   • Mixed hyperlipidemia   • Long-term use of high-risk medication   • Frequent UTI       Advanced Care Planning:  ACP discussion was held with the patient during this visit. Patient has an advance directive (not in EMR), copy requested.    Review of Systems   Constitutional: Negative.    HENT: Negative.    Eyes: Negative.    Respiratory: Negative.  Negative for cough, chest tightness, shortness of breath and wheezing.    Cardiovascular: Negative.  Negative for chest pain, palpitations and leg swelling.   Gastrointestinal: Negative.  Negative for abdominal pain, constipation, diarrhea, nausea, rectal pain and vomiting.  "  Endocrine: Negative.    Genitourinary: Negative.  Negative for dysuria and urgency.   Musculoskeletal: Negative.    Skin: Negative.    Allergic/Immunologic: Negative.    Neurological: Negative.  Negative for dizziness, light-headedness and headaches.   Hematological: Negative.    Psychiatric/Behavioral: Negative.  Negative for sleep disturbance and suicidal ideas. The patient is not nervous/anxious.    All other systems reviewed and are negative.      Compared to one year ago, the patient feels her physical health is the same.  Compared to one year ago, the patient feels her mental health is the same.    Reviewed chart for potential of high risk medication in the elderly: yes  Reviewed chart for potential of harmful drug interactions in the elderly:yes    Objective         Vitals:    08/27/20 0942   BP: 128/68   Pulse: 67   Resp: 16   Temp: 98 °F (36.7 °C)   SpO2: 97%   Weight: 95.7 kg (211 lb)   Height: 167.6 cm (66\")       Body mass index is 34.06 kg/m².  Discussed the patient's BMI with her. The BMI is above average; BMI management plan is completed.    Physical Exam   Constitutional: She is oriented to person, place, and time. Vital signs are normal. She appears well-developed and well-nourished.   Obese female sitting on exam chair wearing facial mask   HENT:   Head: Normocephalic and atraumatic.   Neck: Trachea normal and phonation normal. Neck supple. Normal carotid pulses, no hepatojugular reflux and no JVD present. No tracheal tenderness present. Carotid bruit is not present. No tracheal deviation and no edema present. No thyroid mass and no thyromegaly present.   Cardiovascular: Normal rate, regular rhythm, S1 normal, S2 normal, normal heart sounds and normal pulses.   No murmur heard.  Pulmonary/Chest: Effort normal and breath sounds normal.   Abdominal: Soft. Normal appearance, normal aorta and bowel sounds are normal. There is no hepatomegaly. There is no tenderness.   Neurological: She is alert and " oriented to person, place, and time.   Skin: Skin is warm, dry and intact. Capillary refill takes less than 2 seconds.   Psychiatric: She has a normal mood and affect. Her speech is normal and behavior is normal. Judgment and thought content normal. Cognition and memory are normal.      I was wearing surgical mask during the entire office visit encounter.      Lab Results   Component Value Date    GLU 99 08/21/2020    CHLPL 142 08/21/2020    TRIG 240 (H) 08/21/2020    HDL 59 08/21/2020    LDL 35 08/21/2020    VLDL 48 (H) 08/21/2020    HGBA1C 5.80 (H) 08/21/2020        Assessment/Plan   Medicare Risks and Personalized Health Plan  CMS Preventative Services Quick Reference  Advance Directive Discussion  Immunizations Discussed/Encouraged (specific immunizations; Pneumococcal 23 )  Polypharmacy    The above risks/problems have been discussed with the patient.  Pertinent information has been shared with the patient in the After Visit Summary.  Follow up plans and orders are seen below in the Assessment/Plan Section.    Diagnoses and all orders for this visit:    1. Medicare annual wellness visit, subsequent (Primary)    2. Prediabetes    3. Obesity (BMI 30-39.9)    4. Dyslipidemia    5. Anxiety    6. Mixed hyperlipidemia    7. Need for pneumococcal vaccination  -     Pneumococcal Polysaccharide Vaccine 23-Valent Greater Than or Equal To 3yo Subcutaneous / IM    1.  Sequential Medicare wellness examination with prediabetes: I have reviewed her lab work that was collected on August 21, 2020 with her at office visit today.  Fasting blood sugar was 99, A1c 5.8, cholesterol 142, triglycerides 240, HDL 59 and LDL was 35.  I have stressed the importance of decreasing carbohydrates and sugar in diet.  Will repeat fasting lab work in 3 months.  2.  Chronic morbid obesity: She has gained 1 pound since last office visit on June 12, 2020.  Stressed the importance of trying to increase her physical activity to help lose weight.   Also stressed the importance of eating smaller portions and decreasing carbohydrates and sugar.  Will reevaluate her weight at next office visit.  She was given a Mediterranean diet to try at home.  3.  Chronic and stable dyslipidemia: I have reviewed her lab work from August 21, 2020.  Cholesterol was 142, triglycerides 240, HDL 59 and LDL was 35.  Rest the importance of decreasing her carbohydrates and sugar in diet.  She is currently on 80 mg of Zocor nightly.  I suspect her elevation in triglycerides is diet related.  We will recheck fasting lab work in 3 months.  4.  Chronic and stable anxiety: She does take Xanax nightly.  This has tended to help with her anxiety as well.  Taking sertraline daily.  We will continue to monitor this at next office visit in 3 months.  5.  Need for Pneumovax vaccination: She is given written consent to receive Pneumovax immunization today.  She will get her high-dose flu shot at the [facility] in September 2020.      Patient Counseling:  --Nutrition: Stressed importance of moderation in sodium/caffeine intake, saturated fat and cholesterol.  Discussed caloric balance, sufficient intake of fresh fruits, vegetables, fiber,   calcium, iron.  --Discussed the new recommendation against daily use of baby aspirin for primary prevention in low risk patients.  --Exercise: Stressed the importance of regular exercise by incorporating into daily routine.    --Substance Abuse: Discussed cessation/primary prevention of tobacco, alcohol, or other drug use; driving or other dangerous activities under the influence.    --Dental health: Discussed importance of regular tooth brushing, flossing, and dental visits.  -- Suggested having eyes and vision checked if needed or past due.  --Immunizations reviewed.  We will have updated Pneumovax today in office.  Will have high-dose flu shot at [facility] department in September.  --Discussed benefits of screening colonoscopy.  Colonoscopy is  up-to-date      Follow Up:  Return in about 6 months (around 2/27/2021).     An After Visit Summary and PPPS were given to the patient.      Orders Only on 08/20/2020   Component Date Value Ref Range Status   • WBC 08/21/2020 6.43  3.40 - 10.80 10*3/mm3 Final   • RBC 08/21/2020 4.98  3.77 - 5.28 10*6/mm3 Final   • Hemoglobin 08/21/2020 12.6  12.0 - 15.9 g/dL Final   • Hematocrit 08/21/2020 41.7  34.0 - 46.6 % Final   • MCV 08/21/2020 83.7  79.0 - 97.0 fL Final   • MCH 08/21/2020 25.3* 26.6 - 33.0 pg Final   • MCHC 08/21/2020 30.2* 31.5 - 35.7 g/dL Final   • RDW 08/21/2020 13.3  12.3 - 15.4 % Final   • Platelets 08/21/2020 286  140 - 450 10*3/mm3 Final   • Neutrophil Rel % 08/21/2020 63.9  42.7 - 76.0 % Final   • Lymphocyte Rel % 08/21/2020 20.1  19.6 - 45.3 % Final   • Monocyte Rel % 08/21/2020 12.6* 5.0 - 12.0 % Final   • Eosinophil Rel % 08/21/2020 2.6  0.3 - 6.2 % Final   • Basophil Rel % 08/21/2020 0.6  0.0 - 1.5 % Final   • Neutrophils Absolute 08/21/2020 4.11  1.70 - 7.00 10*3/mm3 Final   • Lymphocytes Absolute 08/21/2020 1.29  0.70 - 3.10 10*3/mm3 Final   • Monocytes Absolute 08/21/2020 0.81  0.10 - 0.90 10*3/mm3 Final   • Eosinophils Absolute 08/21/2020 0.17  0.00 - 0.40 10*3/mm3 Final   • Basophils Absolute 08/21/2020 0.04  0.00 - 0.20 10*3/mm3 Final   • Immature Granulocyte Rel % 08/21/2020 0.2  0.0 - 0.5 % Final   • Immature Grans Absolute 08/21/2020 0.01  0.00 - 0.05 10*3/mm3 Final   • nRBC 08/21/2020 0.0  0.0 - 0.2 /100 WBC Final   • Glucose 08/21/2020 99  65 - 99 mg/dL Final   • BUN 08/21/2020 22  8 - 23 mg/dL Final   • Creatinine 08/21/2020 1.13* 0.57 - 1.00 mg/dL Final   • eGFR Non African Am 08/21/2020 47* >60 mL/min/1.73 Final    Comment: The MDRD GFR formula is only valid for adults with stable  renal function between ages 18 and 70.     • eGFR  Am 08/21/2020 58* >60 mL/min/1.73 Final   • BUN/Creatinine Ratio 08/21/2020 19.5  7.0 - 25.0 Final   • Sodium 08/21/2020 139  136 - 145 mmol/L  Final   • Potassium 08/21/2020 4.1  3.5 - 5.2 mmol/L Final   • Chloride 08/21/2020 101  98 - 107 mmol/L Final   • Total CO2 08/21/2020 24.2  22.0 - 29.0 mmol/L Final   • Calcium 08/21/2020 9.3  8.6 - 10.5 mg/dL Final   • Total Protein 08/21/2020 6.7  6.0 - 8.5 g/dL Final   • Albumin 08/21/2020 4.40  3.50 - 5.20 g/dL Final   • Globulin 08/21/2020 2.3  gm/dL Final   • A/G Ratio 08/21/2020 1.9  g/dL Final   • Total Bilirubin 08/21/2020 0.3  0.0 - 1.2 mg/dL Final   • Alkaline Phosphatase 08/21/2020 59  39 - 117 U/L Final   • AST (SGOT) 08/21/2020 17  1 - 32 U/L Final   • ALT (SGPT) 08/21/2020 20  1 - 33 U/L Final   • Hemoglobin A1C 08/21/2020 5.80* 4.80 - 5.60 % Final    Comment: Hemoglobin A1C Ranges:  Increased Risk for Diabetes  5.7% to 6.4%  Diabetes                     >= 6.5%  Diabetic Goal                < 7.0%     • Total Cholesterol 08/21/2020 142  0 - 200 mg/dL Final   • Triglycerides 08/21/2020 240* 0 - 150 mg/dL Final   • HDL Cholesterol 08/21/2020 59  40 - 60 mg/dL Final   • VLDL Cholesterol 08/21/2020 48* 5 - 40 mg/dL Final   • LDL Cholesterol  08/21/2020 35  0 - 100 mg/dL Final   • LDL/HDL Ratio 08/21/2020 0.59   Final   • TSH 08/21/2020 2.620  0.270 - 4.200 uIU/mL Final   • 25 Hydroxy, Vitamin D 08/21/2020 38.3  30.0 - 100.0 ng/ml Final    Comment: Results may be falsely increased if patient taking Biotin.  Reference Range for Total Vitamin D 25(OH)  Deficiency <20.0 ng/mL  Insufficiency 21-29 ng/mL  Sufficiency  ng/mL  Toxicity >100 ng/ml     {

## 2020-08-31 DIAGNOSIS — I10 ESSENTIAL HYPERTENSION: ICD-10-CM

## 2020-08-31 RX ORDER — LOSARTAN POTASSIUM 100 MG/1
TABLET ORAL
Qty: 90 TABLET | Refills: 3 | Status: SHIPPED | OUTPATIENT
Start: 2020-08-31 | End: 2021-08-30

## 2020-09-01 RX ORDER — METOPROLOL TARTRATE 50 MG/1
TABLET, FILM COATED ORAL
Qty: 270 TABLET | Refills: 3 | Status: SHIPPED | OUTPATIENT
Start: 2020-09-01 | End: 2021-06-15 | Stop reason: SDUPTHER

## 2020-11-04 DIAGNOSIS — E78.5 DYSLIPIDEMIA: ICD-10-CM

## 2020-11-04 RX ORDER — SIMVASTATIN 80 MG
TABLET ORAL
Qty: 90 TABLET | Refills: 1 | Status: SHIPPED | OUTPATIENT
Start: 2020-11-04 | End: 2021-05-10

## 2020-11-17 ENCOUNTER — TRANSCRIBE ORDERS (OUTPATIENT)
Dept: ADMINISTRATIVE | Facility: HOSPITAL | Age: 70
End: 2020-11-17

## 2020-11-17 DIAGNOSIS — Z12.31 SCREENING MAMMOGRAM, ENCOUNTER FOR: Primary | ICD-10-CM

## 2020-12-04 DIAGNOSIS — F43.21 GRIEF: ICD-10-CM

## 2020-12-04 DIAGNOSIS — F41.9 ANXIETY: ICD-10-CM

## 2020-12-14 ENCOUNTER — TRANSCRIBE ORDERS (OUTPATIENT)
Dept: ADMINISTRATIVE | Facility: HOSPITAL | Age: 70
End: 2020-12-14

## 2020-12-14 ENCOUNTER — HOSPITAL ENCOUNTER (OUTPATIENT)
Dept: GENERAL RADIOLOGY | Facility: HOSPITAL | Age: 70
Discharge: HOME | End: 2020-12-14
Admitting: UROLOGY

## 2020-12-14 DIAGNOSIS — N20.0 URIC ACID NEPHROLITHIASIS: Primary | ICD-10-CM

## 2020-12-14 DIAGNOSIS — N20.0 URIC ACID NEPHROLITHIASIS: ICD-10-CM

## 2020-12-14 PROCEDURE — 74018 RADEX ABDOMEN 1 VIEW: CPT

## 2020-12-18 ENCOUNTER — HOSPITAL ENCOUNTER (OUTPATIENT)
Dept: MAMMOGRAPHY | Facility: HOSPITAL | Age: 70
Discharge: HOME | End: 2020-12-18
Admitting: PHYSICIAN ASSISTANT

## 2020-12-18 DIAGNOSIS — Z12.31 SCREENING MAMMOGRAM, ENCOUNTER FOR: ICD-10-CM

## 2020-12-18 PROCEDURE — 77063 BREAST TOMOSYNTHESIS BI: CPT

## 2020-12-18 PROCEDURE — 77067 SCR MAMMO BI INCL CAD: CPT

## 2020-12-21 DIAGNOSIS — F41.9 ANXIETY: ICD-10-CM

## 2020-12-21 RX ORDER — ALPRAZOLAM 1 MG/1
1 TABLET ORAL NIGHTLY PRN
Qty: 30 TABLET | Refills: 0 | Status: SHIPPED | OUTPATIENT
Start: 2020-12-21 | End: 2021-01-18 | Stop reason: SDUPTHER

## 2021-01-18 DIAGNOSIS — F41.9 ANXIETY: ICD-10-CM

## 2021-01-18 RX ORDER — ALPRAZOLAM 1 MG/1
1 TABLET ORAL NIGHTLY PRN
Qty: 30 TABLET | Refills: 0 | Status: SHIPPED | OUTPATIENT
Start: 2021-01-18 | End: 2021-02-25

## 2021-01-19 ENCOUNTER — IMMUNIZATION (OUTPATIENT)
Dept: VACCINE CLINIC | Facility: HOSPITAL | Age: 71
End: 2021-01-19

## 2021-01-19 PROCEDURE — 0011A: CPT | Performed by: OBSTETRICS & GYNECOLOGY

## 2021-01-19 PROCEDURE — 91301 HC SARSCO02 VAC 100MCG/0.5ML IM: CPT | Performed by: OBSTETRICS & GYNECOLOGY

## 2021-02-16 ENCOUNTER — TELEPHONE (OUTPATIENT)
Dept: FAMILY MEDICINE CLINIC | Facility: CLINIC | Age: 71
End: 2021-02-16

## 2021-02-16 DIAGNOSIS — E78.5 DYSLIPIDEMIA: ICD-10-CM

## 2021-02-17 ENCOUNTER — IMMUNIZATION (OUTPATIENT)
Dept: VACCINE CLINIC | Facility: HOSPITAL | Age: 71
End: 2021-02-17

## 2021-02-17 PROCEDURE — 91301 HC SARSCO02 VAC 100MCG/0.5ML IM: CPT | Performed by: OBSTETRICS & GYNECOLOGY

## 2021-02-17 PROCEDURE — 0012A: CPT | Performed by: OBSTETRICS & GYNECOLOGY

## 2021-02-25 ENCOUNTER — OFFICE VISIT (OUTPATIENT)
Dept: FAMILY MEDICINE CLINIC | Facility: CLINIC | Age: 71
End: 2021-02-25

## 2021-02-25 VITALS
WEIGHT: 211 LBS | BODY MASS INDEX: 33.91 KG/M2 | HEIGHT: 66 IN | OXYGEN SATURATION: 97 % | HEART RATE: 74 BPM | SYSTOLIC BLOOD PRESSURE: 120 MMHG | DIASTOLIC BLOOD PRESSURE: 60 MMHG

## 2021-02-25 DIAGNOSIS — F41.9 ANXIETY: ICD-10-CM

## 2021-02-25 DIAGNOSIS — I10 ESSENTIAL HYPERTENSION: Primary | ICD-10-CM

## 2021-02-25 DIAGNOSIS — Z79.899 LONG-TERM USE OF HIGH-RISK MEDICATION: ICD-10-CM

## 2021-02-25 DIAGNOSIS — E78.2 MIXED HYPERLIPIDEMIA: ICD-10-CM

## 2021-02-25 DIAGNOSIS — F51.01 PRIMARY INSOMNIA: ICD-10-CM

## 2021-02-25 DIAGNOSIS — F41.8 MIXED ANXIETY DEPRESSIVE DISORDER: ICD-10-CM

## 2021-02-25 DIAGNOSIS — R73.9 HYPERGLYCEMIA: ICD-10-CM

## 2021-02-25 PROCEDURE — 99214 OFFICE O/P EST MOD 30 MIN: CPT | Performed by: PHYSICIAN ASSISTANT

## 2021-02-25 RX ORDER — ALPRAZOLAM 1 MG/1
1 TABLET ORAL NIGHTLY PRN
Qty: 30 TABLET | Refills: 0 | Status: SHIPPED | OUTPATIENT
Start: 2021-02-25 | End: 2021-04-08

## 2021-02-25 NOTE — PROGRESS NOTES
"Chief Complaint  Anxiety (Management), Hyperlipidemia (Management), Hypertension (Management), and Insomnia (Management)    Subjective          Krys Gerber presents to [facility]  History of Present Illness  Danielle is a 71 year old female who presents for anxiety, insomnia, hypertension and hyperlipidemia management.  She is fasting today.  Danielle states overall she is feeling well at office visit today.  She has had her Covid vaccines.  Diet has not been as healthy.  She is not exercising.  Sleep has been good with the Xanax medication.  States she takes this nightly.  It helps calm her down and she gets good sleep.  Has been taking her sertraline medication as directed.  Denied any suicidal or homicidal ideation.  Had flu shot at the [facility] in October 2020.  Bowel movements have been daily without dark black tarry stools.  Denied any chest pain, shortness of air, dizziness, headache, or swelling of ankles.     Objective   Vital Signs:   /60 (BP Location: Left arm, Patient Position: Sitting)   Pulse 74   Ht 167.6 cm (66\")   Wt 95.7 kg (211 lb)   SpO2 97%   BMI 34.06 kg/m²     Physical Exam  Vitals signs and nursing note reviewed.   Constitutional:       Appearance: Normal appearance. She is well-developed and well-groomed. She is obese.      Interventions: Face mask in place.   HENT:      Head: Normocephalic and atraumatic.   Neck:      Musculoskeletal: Neck supple.      Thyroid: No thyroid mass, thyromegaly or thyroid tenderness.      Vascular: No carotid bruit.      Trachea: Trachea and phonation normal. No tracheal tenderness.   Cardiovascular:      Rate and Rhythm: Normal rate and regular rhythm.      Pulses: Normal pulses.      Heart sounds: Normal heart sounds, S1 normal and S2 normal. No murmur.   Pulmonary:      Effort: Pulmonary effort is normal.      Breath sounds: Normal breath sounds and air entry.   Abdominal:      General: Bowel " sounds are normal.      Palpations: Abdomen is soft. There is no hepatomegaly.      Tenderness: There is no abdominal tenderness.   Musculoskeletal:      Right lower leg: No edema.      Left lower leg: No edema.   Skin:     General: Skin is warm and dry.      Capillary Refill: Capillary refill takes less than 2 seconds.   Neurological:      Mental Status: She is alert and oriented to person, place, and time.   Psychiatric:         Attention and Perception: Attention and perception normal.         Mood and Affect: Mood and affect normal.         Speech: Speech normal.         Behavior: Behavior normal. Behavior is cooperative.         Thought Content: Thought content normal.         Cognition and Memory: Cognition and memory normal.         Judgment: Judgment normal.     I was wearing a surgical mask and face shield during the entire office visit/encounter.     Result Review :                 Assessment and Plan    Diagnoses and all orders for this visit:    1. Essential hypertension (Primary)    2. Mixed hyperlipidemia  -     CBC & Differential  -     Comprehensive Metabolic Panel  -     Lipid Panel With LDL / HDL Ratio    3. Long-term use of high-risk medication  -     Compliance Drug Analysis, Ur - Urine, Clean Catch    4. Mixed anxiety depressive disorder  -     Compliance Drug Analysis, Ur - Urine, Clean Catch    5. Primary insomnia  -     Compliance Drug Analysis, Ur - Urine, Clean Catch    6. Hyperglycemia  -     Hemoglobin A1c    7. Anxiety  -     ALPRAZolam (XANAX) 1 MG tablet; Take 1 tablet by mouth At Night As Needed for Anxiety.  Dispense: 30 tablet; Refill: 0      1.  Chronic and stable hypertension: I have rechecked blood pressure at office visit today and got 120/60 in left arm.  Doing well with her current medications.  She will keep follow-up appointments with cardiology as well.  Will return to office in 6 months for Medicare wellness exam.  2.   Chronic and stable hyperlipidemia and hyperglycemia:  She is fasting and will have CBC, CMP, A1c and lipid profile today.  Will be notified of test results when completed.  3.  Chronic and stable mixed anxiety depressive disorder with primary insomnia: Doing well with her sertraline and Xanax medication.  She will have a urine drug screen collected for compliance purposes today for the Xanax prescription.  Danielle has signed the appropriate agreement and consent forms for the medication.  See below for Sharee information.  I have sent a refill of her Xanax to pharmacy.  Will return to office in 6 months for reevaluation.      As part of this patient's treatment plan I am prescribing controlled substances.  The patient has been made aware of appropriate use of such medications, including potential risk of somnolence. Limited ability to drive and/or work safely, and potential for dependence or overdose.  It has also been made clear that these medications are for use by this patient only, without concomitant use of alcohol or other substances unless prescribed.  SHAREE report has been reviewed and scanned into the patient's chart.  Sharee number is [id number] dated February 24, 2021      Follow Up   No follow-ups on file.  Patient was given instructions and counseling regarding her condition or for health maintenance advice. Please see specific information pulled into the AVS if appropriate.     MANI [facility]  [address] [place] [id number]  Dept: [phone]  Dept Fax: [phone]  Loc: [phone]  Loc Fax: [phone]

## 2021-02-26 LAB
ALBUMIN SERPL-MCNC: 4.1 G/DL (ref 3.7–4.7)
ALBUMIN/GLOB SERPL: 1.4 {RATIO} (ref 1.2–2.2)
ALP SERPL-CCNC: 69 IU/L (ref 39–117)
ALT SERPL-CCNC: 16 IU/L (ref 0–32)
AST SERPL-CCNC: 20 IU/L (ref 0–40)
BASOPHILS # BLD AUTO: 0 X10E3/UL (ref 0–0.2)
BASOPHILS NFR BLD AUTO: 1 %
BILIRUB SERPL-MCNC: 0.3 MG/DL (ref 0–1.2)
BUN SERPL-MCNC: 25 MG/DL (ref 8–27)
BUN/CREAT SERPL: 23 (ref 12–28)
CALCIUM SERPL-MCNC: 9.6 MG/DL (ref 8.7–10.3)
CHLORIDE SERPL-SCNC: 105 MMOL/L (ref 96–106)
CHOLEST SERPL-MCNC: 149 MG/DL (ref 100–199)
CO2 SERPL-SCNC: 24 MMOL/L (ref 20–29)
CREAT SERPL-MCNC: 1.1 MG/DL (ref 0.57–1)
EOSINOPHIL # BLD AUTO: 0.2 X10E3/UL (ref 0–0.4)
EOSINOPHIL NFR BLD AUTO: 4 %
ERYTHROCYTE [DISTWIDTH] IN BLOOD BY AUTOMATED COUNT: 13 % (ref 11.7–15.4)
GLOBULIN SER CALC-MCNC: 2.9 G/DL (ref 1.5–4.5)
GLUCOSE SERPL-MCNC: 99 MG/DL (ref 65–99)
HBA1C MFR BLD: 5.8 % (ref 4.8–5.6)
HCT VFR BLD AUTO: 40.7 % (ref 34–46.6)
HDLC SERPL-MCNC: 60 MG/DL
HGB BLD-MCNC: 13 G/DL (ref 11.1–15.9)
IMM GRANULOCYTES # BLD AUTO: 0 X10E3/UL (ref 0–0.1)
IMM GRANULOCYTES NFR BLD AUTO: 0 %
LDLC SERPL CALC-MCNC: 60 MG/DL (ref 0–99)
LDLC/HDLC SERPL: 1 RATIO (ref 0–3.2)
LYMPHOCYTES # BLD AUTO: 1.2 X10E3/UL (ref 0.7–3.1)
LYMPHOCYTES NFR BLD AUTO: 22 %
MCH RBC QN AUTO: 25.6 PG (ref 26.6–33)
MCHC RBC AUTO-ENTMCNC: 31.9 G/DL (ref 31.5–35.7)
MCV RBC AUTO: 80 FL (ref 79–97)
MONOCYTES # BLD AUTO: 0.5 X10E3/UL (ref 0.1–0.9)
MONOCYTES NFR BLD AUTO: 10 %
NEUTROPHILS # BLD AUTO: 3.6 X10E3/UL (ref 1.4–7)
NEUTROPHILS NFR BLD AUTO: 63 %
PLATELET # BLD AUTO: 275 X10E3/UL (ref 150–450)
POTASSIUM SERPL-SCNC: 4.5 MMOL/L (ref 3.5–5.2)
PROT SERPL-MCNC: 7 G/DL (ref 6–8.5)
RBC # BLD AUTO: 5.07 X10E6/UL (ref 3.77–5.28)
SODIUM SERPL-SCNC: 141 MMOL/L (ref 134–144)
TRIGL SERPL-MCNC: 178 MG/DL (ref 0–149)
VLDLC SERPL CALC-MCNC: 29 MG/DL (ref 5–40)
WBC # BLD AUTO: 5.6 X10E3/UL (ref 3.4–10.8)

## 2021-02-26 NOTE — PROGRESS NOTES
I, Dr. Lior Piña, have reviewed the notes, assessments, and/or procedures performed by Ashleigh MOON, that occurred within our practice at [facility] location, I concur with her documentation of Krys Mayes. I have a current collaborative medical agreement with Ashleigh MOON.

## 2021-03-03 LAB — DRUGS UR: NORMAL

## 2021-04-08 DIAGNOSIS — F41.9 ANXIETY: ICD-10-CM

## 2021-04-08 RX ORDER — ALPRAZOLAM 1 MG/1
1 TABLET ORAL NIGHTLY PRN
Qty: 30 TABLET | Refills: 0 | Status: SHIPPED | OUTPATIENT
Start: 2021-04-08 | End: 2021-05-27

## 2021-05-10 DIAGNOSIS — E78.5 DYSLIPIDEMIA: ICD-10-CM

## 2021-05-10 RX ORDER — SIMVASTATIN 80 MG
TABLET ORAL
Qty: 90 TABLET | Refills: 2 | Status: SHIPPED | OUTPATIENT
Start: 2021-05-10 | End: 2021-06-15 | Stop reason: SDUPTHER

## 2021-05-27 DIAGNOSIS — F43.21 GRIEF: ICD-10-CM

## 2021-05-27 DIAGNOSIS — F41.9 ANXIETY: ICD-10-CM

## 2021-05-27 RX ORDER — ALPRAZOLAM 1 MG/1
1 TABLET ORAL NIGHTLY PRN
Qty: 30 TABLET | Refills: 0 | Status: SHIPPED | OUTPATIENT
Start: 2021-05-27 | End: 2021-06-28

## 2021-06-15 ENCOUNTER — OFFICE VISIT (OUTPATIENT)
Dept: CARDIOLOGY | Facility: CLINIC | Age: 71
End: 2021-06-15

## 2021-06-15 VITALS
BODY MASS INDEX: 34.22 KG/M2 | HEIGHT: 66 IN | WEIGHT: 212.9 LBS | SYSTOLIC BLOOD PRESSURE: 118 MMHG | DIASTOLIC BLOOD PRESSURE: 70 MMHG | HEART RATE: 57 BPM

## 2021-06-15 DIAGNOSIS — R06.02 SHORTNESS OF BREATH: ICD-10-CM

## 2021-06-15 DIAGNOSIS — I10 ESSENTIAL HYPERTENSION: ICD-10-CM

## 2021-06-15 DIAGNOSIS — I48.0 PAF (PAROXYSMAL ATRIAL FIBRILLATION) (HCC): Primary | ICD-10-CM

## 2021-06-15 PROCEDURE — 99214 OFFICE O/P EST MOD 30 MIN: CPT | Performed by: INTERNAL MEDICINE

## 2021-06-15 PROCEDURE — 93000 ELECTROCARDIOGRAM COMPLETE: CPT | Performed by: INTERNAL MEDICINE

## 2021-06-15 RX ORDER — SIMVASTATIN 80 MG
80 TABLET ORAL EVERY EVENING
Qty: 90 TABLET | Refills: 3 | Status: SHIPPED | OUTPATIENT
Start: 2021-06-15 | End: 2022-09-09

## 2021-06-15 RX ORDER — METOPROLOL TARTRATE 50 MG/1
TABLET, FILM COATED ORAL
Qty: 270 TABLET | Refills: 3 | Status: SHIPPED | OUTPATIENT
Start: 2021-06-15 | End: 2021-09-24 | Stop reason: SDUPTHER

## 2021-06-15 NOTE — PROGRESS NOTES
Date of Office Visit: 06/15/2021  Encounter Provider: Jeremy Orozco MD  Place of Service: [facility] CARDIOLOGY  Patient Name: Krys Mayes  :1950  Primary Cardiologist: Dr. Orozco    HPI: Krys Mayes is a pleasant 71 y.o. female who presents to follow up. I have reviewed prior notes and there are no changes except for any new updates described below. I have also reviewed any information entered into the medical record by the patient or by ancillary staff.     Prior to 2016, she had not had any cardiac issues. At that time, she was admitted with urosepsis due to kidney stones. She went into atrial fibrillation in that setting. She initially converted to NSR but went back into atrial fibrillation. She was rate controlled and placed on rivaroxaban. An echocardiogram revealed normal LV size and systolic function (EF 56%). There was moderate TR and severe pulmonary hypertension as well. When I saw her in the office soon after, she was doing well.      She was readmitted just a few weeks later with urosepsis again. She was found to have a staghorn calculus.  She did have a brief episode of atrial fibrillation during that admission. Her metoprolol dose was increased, and her rivaroxaban dose was decreased due to decreased GFR.      She's doing pretty well! She has rare palpitations that last a few seconds and resolve. She reports some exertional dyspnea but no dyspnea at rest or orthopnea or leg swelling. She does endorse being sedentary, especially so during the pandemic. She denies chest pain, lightheadedness, or syncope. She denies leg swelling. She denies bleeding, but does bruise easily.     Past Medical History:   Diagnosis Date   • Allergic    • Arthritis    • Controlled type 2 diabetes with renal manifestation     states borderline    • Gastroesophageal reflux disease 2016   • Hyperlipidemia    • Hypertension    • Kidney stone     recurrent, calcium  oxalate   • Menopausal disorder 1998    She went through menopause in 1998   • Obesity    • PAF (paroxysmal atrial fibrillation) (CMS/HCC)     in the setting of urosepsis, 9/2016   • Reflux esophagitis 8/19/2016   • Sebaceous cyst of labia 12/20/2017   • Sepsis due to urinary tract infection (CMS/HCC)    • Sigmoid diverticulitis 11/13/2017   • Urinary tract infection    • Vitamin D deficiency 1/21/2016       Past Surgical History:   Procedure Laterality Date   • ANKLE SURGERY      Ankle Repair / Description: ORif the left ankle in July 2010. Secondary to fall   • BREAST BIOPSY Right     benign   • BREAST SURGERY      biopsy    • CHOLECYSTECTOMY     • COLONOSCOPY  2014    Done 2004 normal recheck in 10 years. Repeated February 2014 colonoscopy was normal and to be repeated in 10 years.   • CYSTOSCOPY BLADDER STONE LITHOTRIPSY     • HEMORRHOIDECTOMY     • NEPHROLITHOTOMY Left 1/9/2017    Procedure: NEPHROLITHOTOMY PERCUTANEOUS SECOND LOOK WITH STENT PLACEMENT AND LASER LITHOTRIPSY;  Surgeon: Mati Villegas MD;  Location: [facility];  Service:    • OTHER SURGICAL HISTORY      Tubal Ligation   • PERCUTANEOUS NEPHROSTOLITHOTOMY Left 12/2016   • TUBAL ABDOMINAL LIGATION         Social History     Socioeconomic History   • Marital status:      Spouse name: Not on file   • Number of children: Not on file   • Years of education: Not on file   • Highest education level: Not on file   Tobacco Use   • Smoking status: Never Smoker   • Smokeless tobacco: Never Used   • Tobacco comment: caffeine use /soft drinks   Vaping Use   • Vaping Use: Never used   Substance and Sexual Activity   • Alcohol use: Yes     Comment: She uses alcohol once or twice a year.   • Drug use: No   • Sexual activity: Defer       Family History   Problem Relation Age of Onset   • COPD Mother    • Heart attack Father         acute myocardial infarction   • Heart attack Son    • Kidney disease Maternal Grandmother    • Breast cancer Neg Hx         The following portion of the patient's history were reviewed and updated as appropriate: past medical history, past surgical history, past social history, past family history, allergies, current medications, and problem list.    Review of Systems   Constitutional: Negative for diaphoresis, fever and malaise/fatigue.   HENT: Negative for congestion, hearing loss, hoarse voice, nosebleeds and sore throat.    Eyes: Negative for photophobia, vision loss in left eye, vision loss in right eye and visual disturbance.   Cardiovascular: Positive for dyspnea on exertion. Negative for chest pain, irregular heartbeat, leg swelling, near-syncope, orthopnea, palpitations, paroxysmal nocturnal dyspnea and syncope.   Respiratory: Negative for cough, hemoptysis, sleep disturbances due to breathing, snoring, sputum production and wheezing.    Endocrine: Negative for cold intolerance, heat intolerance, polydipsia, polyphagia and polyuria.   Hematologic/Lymphatic: Negative for bleeding problem. Bruises/bleeds easily.   Skin: Negative for color change, dry skin, poor wound healing, rash and suspicious lesions.   Musculoskeletal: Positive for joint pain. Negative for arthritis, back pain, falls, gout, joint swelling, muscle cramps, muscle weakness and myalgias.   Gastrointestinal: Negative for bloating, abdominal pain, constipation, diarrhea, dysphagia, melena, nausea and vomiting.   Neurological: Negative for excessive daytime sleepiness, dizziness, headaches, light-headedness, loss of balance, numbness, paresthesias, seizures, vertigo and weakness.   Psychiatric/Behavioral: Negative for depression, memory loss and substance abuse. The patient is not nervous/anxious.    All other systems reviewed and are negative.      Allergies   Allergen Reactions   • Morphine And Related GI Intolerance   • Ciprofloxacin-Ciproflox Hcl Er Rash   • Flomax [Tamsulosin Hcl] Rash   • Hydrocodone Rash   • Latex Rash   • Lortab  "[Hydrocodone-Acetaminophen] Rash   • Penicillins Rash   • Phenergan [Promethazine Hcl] GI Intolerance   • Sulfa Antibiotics Rash   • Amoxicillin Rash   • Tamsulosin Rash         Current Outpatient Medications:   •  acetaminophen (TYLENOL) 650 MG 8 hr tablet, Take 1 tablet by mouth every 6 (six) hours as needed. for pain, Disp: , Rfl:   •  ALPRAZolam (XANAX) 1 MG tablet, TAKE 1 TABLET BY MOUTH AT NIGHT AS NEEDED FOR ANXIETY, Disp: 30 tablet, Rfl: 0  •  aspirin 81 MG tablet, Take 81 mg by mouth Daily., Disp: , Rfl:   •  losartan (COZAAR) 100 MG tablet, TAKE 1 TABLET BY MOUTH EVERY DAY, Disp: 90 tablet, Rfl: 3  •  meclizine (ANTIVERT) 25 MG tablet, Take 1 tablet by mouth Every 6 (Six) Hours As Needed for dizziness or Nausea., Disp: 30 tablet, Rfl: 0  •  metoprolol tartrate (LOPRESSOR) 50 MG tablet, TAKE 1.5 TABLETS BY MOUTH TWICE A DAY, Disp: 270 tablet, Rfl: 3  •  Multiple Vitamin tablet, Take 1 tablet by mouth Daily., Disp: , Rfl:   •  omeprazole OTC (PriLOSEC OTC) 20 MG EC tablet, Take 20 mg by mouth 2 (two) times a day., Disp: , Rfl:   •  Probiotic Product (PROBIOTIC ADVANCED PO), Take 1 tablet by mouth Daily., Disp: , Rfl:   •  sertraline (ZOLOFT) 50 MG tablet, Take 1 tablet by mouth once daily, Disp: 90 tablet, Rfl: 0  •  simvastatin (ZOCOR) 80 MG tablet, Take 1 tablet by mouth in the evening, Disp: 90 tablet, Rfl: 2  •  triamcinolone (KENALOG) 0.1 % cream, APPLY TO AFFECTED AREA TWICE A DAY TO RASH FOR UP TO 2 WEEKS, Disp: , Rfl:   •  XARELTO 15 MG tablet, TAKE 1 TABLET BY MOUTH EVERY DAY, Disp: 90 tablet, Rfl: 3        Objective:     Vitals:    06/15/21 1143 06/15/21 1217   BP: 118/56 118/70   BP Location: Right arm    Pulse: 57    Weight: 96.6 kg (212 lb 14.4 oz)    Height: 167.6 cm (66\")      Body mass index is 34.36 kg/m².      Physical Exam  Constitutional:       General: She is not in acute distress.     Appearance: She is well-developed.      Comments: Overweight   HENT:      Head: Normocephalic and " atraumatic.      Right Ear: External ear normal.      Left Ear: External ear normal.      Nose: Nose normal.      Mouth/Throat:      Comments: Masked  Eyes:      Conjunctiva/sclera: Conjunctivae normal.   Neck:      Thyroid: No thyromegaly.      Vascular: No JVD.      Trachea: No tracheal deviation.   Cardiovascular:      Rate and Rhythm: Regular rhythm. Bradycardia present.      Pulses: Normal pulses and intact distal pulses.      Heart sounds: Normal heart sounds. No murmur heard.   No friction rub. No gallop.    Pulmonary:      Effort: Pulmonary effort is normal. No respiratory distress.      Breath sounds: Normal breath sounds. No wheezing or rales.   Abdominal:      General: Bowel sounds are normal.      Palpations: Abdomen is soft.      Tenderness: There is no abdominal tenderness.   Musculoskeletal:         General: No tenderness or deformity. Normal range of motion.      Cervical back: Normal range of motion and neck supple.   Lymphadenopathy:      Cervical: No cervical adenopathy.   Skin:     General: Skin is warm and dry.      Findings: No erythema or rash.   Neurological:      General: No focal deficit present.      Mental Status: She is alert and oriented to person, place, and time.      Coordination: Coordination normal.   Psychiatric:         Mood and Affect: Mood normal.         Behavior: Behavior normal.         Thought Content: Thought content normal.               ECG 12 Lead    Date/Time: 6/15/2021 12:02 PM  Performed by: Jeremy Orozco MD  Authorized by: Jeremy Orozco MD   Comparison: compared with previous ECG   Similar to previous ECG  Rhythm: sinus bradycardia  Conduction: conduction normal  ST Segments: ST segments normal  T flattening: all  QRS axis: normal  Other findings: left atrial abnormality    Clinical impression: non-specific ECG              Assessment:       Diagnosis Plan   1. PAF (paroxysmal atrial fibrillation) (CMS/[facility])  ECG 12 Lead   2. Essential hypertension     3. Shortness  of breath            Plan:       1. Atrial Fibrillation and Atrial Flutter  Assessment  • The patient has paroxysmal atrial fibrillation  • The patient's CHADS2-VASc score is 3  • A KRY4QT2-KLCc score of 2 or more is considered a high risk for a thromboembolic event  • Rivaroxaban prescribed    Plan  • Attempt to maintain sinus rhythm  • Continue rivaroxaban for antithrombotic therapy, bleeding issues discussed  • Continue beta blocker for rhythm control    Her GFR is usually 45-50, so she will remain on 15mg of rivaroxaban.    2. HTN - well controlled    3. She has mild dyspnea, which we both suspect is due to deconditioning. However, I will get an echocardiogram.    I asked her to stop aspirin as I don't see a strong indication for it.     Sincerely,       Jeremy Orozco MD

## 2021-06-26 DIAGNOSIS — F41.9 ANXIETY: ICD-10-CM

## 2021-06-28 ENCOUNTER — HOSPITAL ENCOUNTER (OUTPATIENT)
Dept: CARDIOLOGY | Facility: HOSPITAL | Age: 71
Discharge: HOME | End: 2021-06-28
Admitting: INTERNAL MEDICINE

## 2021-06-28 VITALS
HEART RATE: 105 BPM | SYSTOLIC BLOOD PRESSURE: 131 MMHG | DIASTOLIC BLOOD PRESSURE: 63 MMHG | WEIGHT: 212 LBS | HEIGHT: 66 IN | BODY MASS INDEX: 34.07 KG/M2

## 2021-06-28 DIAGNOSIS — R06.02 SHORTNESS OF BREATH: ICD-10-CM

## 2021-06-28 LAB
AORTIC DIMENSIONLESS INDEX: 0.6 (DI)
BH CV ECHO MEAS - ACS: 1.9 CM
BH CV ECHO MEAS - AI DEC SLOPE: 227 CM/SEC^2
BH CV ECHO MEAS - AI MAX PG: 49.8 MMHG
BH CV ECHO MEAS - AI MAX VEL: 353 CM/SEC
BH CV ECHO MEAS - AI P1/2T: 455.5 MSEC
BH CV ECHO MEAS - AO MAX PG: 4 MMHG
BH CV ECHO MEAS - AO MEAN PG (FULL): 1 MMHG
BH CV ECHO MEAS - AO MEAN PG: 2 MMHG
BH CV ECHO MEAS - AO ROOT AREA (BSA CORRECTED): 1.3
BH CV ECHO MEAS - AO ROOT AREA: 5.7 CM^2
BH CV ECHO MEAS - AO ROOT DIAM: 2.7 CM
BH CV ECHO MEAS - AO V2 MAX: 97 CM/SEC
BH CV ECHO MEAS - AO V2 MEAN: 75.3 CM/SEC
BH CV ECHO MEAS - AO V2 VTI: 19.8 CM
BH CV ECHO MEAS - ASC AORTA: 3.2 CM
BH CV ECHO MEAS - AVA(I,A): 1.6 CM^2
BH CV ECHO MEAS - AVA(I,D): 1.6 CM^2
BH CV ECHO MEAS - BSA(HAYCOCK): 2.2 M^2
BH CV ECHO MEAS - BSA: 2.1 M^2
BH CV ECHO MEAS - BZI_BMI: 34.2 KILOGRAMS/M^2
BH CV ECHO MEAS - BZI_METRIC_HEIGHT: 167.6 CM
BH CV ECHO MEAS - BZI_METRIC_WEIGHT: 96.2 KG
BH CV ECHO MEAS - CONTRAST EF 4CH: 63 CM2
BH CV ECHO MEAS - EDV(CUBED): 57.1 ML
BH CV ECHO MEAS - EDV(MOD-SP2): 67.5 ML
BH CV ECHO MEAS - EDV(MOD-SP4): 82.1 ML
BH CV ECHO MEAS - EDV(TEICH): 63.9 ML
BH CV ECHO MEAS - EF(CUBED): 72 %
BH CV ECHO MEAS - EF(MOD-BP): 63.2 %
BH CV ECHO MEAS - EF(MOD-SP2): 55.7 %
BH CV ECHO MEAS - EF(MOD-SP4): 69.8 %
BH CV ECHO MEAS - EF(TEICH): 64.4 %
BH CV ECHO MEAS - ESV(CUBED): 16 ML
BH CV ECHO MEAS - ESV(MOD-SP2): 29.9 ML
BH CV ECHO MEAS - ESV(MOD-SP4): 24.8 ML
BH CV ECHO MEAS - ESV(TEICH): 22.8 ML
BH CV ECHO MEAS - FS: 34.5 %
BH CV ECHO MEAS - IVS/LVPW: 0.91
BH CV ECHO MEAS - IVSD: 1 CM
BH CV ECHO MEAS - LAT PEAK E' VEL: 12 CM/SEC
BH CV ECHO MEAS - LV DIASTOLIC VOL/BSA (35-75): 40 ML/M^2
BH CV ECHO MEAS - LV MASS(C)D: 130.2 GRAMS
BH CV ECHO MEAS - LV MASS(C)DI: 63.5 GRAMS/M^2
BH CV ECHO MEAS - LV MAX PG: 3 MMHG
BH CV ECHO MEAS - LV MEAN PG: 1 MMHG
BH CV ECHO MEAS - LV SYSTOLIC VOL/BSA (12-30): 12.1 ML/M^2
BH CV ECHO MEAS - LV V1 MAX: 81 CM/SEC
BH CV ECHO MEAS - LV V1 MEAN: 49.3 CM/SEC
BH CV ECHO MEAS - LV V1 VTI: 12.5 CM
BH CV ECHO MEAS - LVIDD: 3.9 CM
BH CV ECHO MEAS - LVIDS: 2.5 CM
BH CV ECHO MEAS - LVLD AP2: 6.3 CM
BH CV ECHO MEAS - LVLD AP4: 6.7 CM
BH CV ECHO MEAS - LVLS AP2: 5.5 CM
BH CV ECHO MEAS - LVLS AP4: 5 CM
BH CV ECHO MEAS - LVOT AREA (M): 2.5 CM^2
BH CV ECHO MEAS - LVOT AREA: 2.5 CM^2
BH CV ECHO MEAS - LVOT DIAM: 1.8 CM
BH CV ECHO MEAS - LVPWD: 1.1 CM
BH CV ECHO MEAS - MED PEAK E' VEL: 9.1 CM/SEC
BH CV ECHO MEAS - MV DEC SLOPE: 347 CM/SEC^2
BH CV ECHO MEAS - MV DEC TIME: 170 SEC
BH CV ECHO MEAS - MV E MAX VEL: 63.1 CM/SEC
BH CV ECHO MEAS - MV MEAN PG: 1 MMHG
BH CV ECHO MEAS - MV P1/2T MAX VEL: 83 CM/SEC
BH CV ECHO MEAS - MV P1/2T: 70.1 MSEC
BH CV ECHO MEAS - MV V2 MEAN: 49.9 CM/SEC
BH CV ECHO MEAS - MV V2 VTI: 15.3 CM
BH CV ECHO MEAS - MVA P1/2T LCG: 2.7 CM^2
BH CV ECHO MEAS - MVA(P1/2T): 3.1 CM^2
BH CV ECHO MEAS - MVA(VTI): 2.1 CM^2
BH CV ECHO MEAS - PA ACC TIME: 0.06 SEC
BH CV ECHO MEAS - PA MAX PG: 2.8 MMHG
BH CV ECHO MEAS - PA PR(ACCEL): 50.7 MMHG
BH CV ECHO MEAS - PA V2 MAX: 84.2 CM/SEC
BH CV ECHO MEAS - PULM DIAS VEL: 45.5 CM/SEC
BH CV ECHO MEAS - PULM S/D: 0.82
BH CV ECHO MEAS - PULM SYS VEL: 37.1 CM/SEC
BH CV ECHO MEAS - QP/QS: 0.34
BH CV ECHO MEAS - RAP SYSTOLE: 3 MMHG
BH CV ECHO MEAS - RV MEAN PG: 0 MMHG
BH CV ECHO MEAS - RV V1 MEAN: 25.8 CM/SEC
BH CV ECHO MEAS - RV V1 VTI: 6.1 CM
BH CV ECHO MEAS - RVOT AREA: 1.8 CM^2
BH CV ECHO MEAS - RVOT DIAM: 1.5 CM
BH CV ECHO MEAS - RVSP: 26 MMHG
BH CV ECHO MEAS - SI(AO): 55.3 ML/M^2
BH CV ECHO MEAS - SI(CUBED): 20 ML/M^2
BH CV ECHO MEAS - SI(LVOT): 15.5 ML/M^2
BH CV ECHO MEAS - SI(MOD-SP2): 18.3 ML/M^2
BH CV ECHO MEAS - SI(MOD-SP4): 27.9 ML/M^2
BH CV ECHO MEAS - SI(TEICH): 20.1 ML/M^2
BH CV ECHO MEAS - SV(AO): 113.4 ML
BH CV ECHO MEAS - SV(CUBED): 41.1 ML
BH CV ECHO MEAS - SV(LVOT): 31.8 ML
BH CV ECHO MEAS - SV(MOD-SP2): 37.6 ML
BH CV ECHO MEAS - SV(MOD-SP4): 57.3 ML
BH CV ECHO MEAS - SV(RVOT): 10.9 ML
BH CV ECHO MEAS - SV(TEICH): 41.1 ML
BH CV ECHO MEAS - TAPSE (>1.6): 1.5 CM
BH CV ECHO MEAS - TR MAX VEL: 239 CM/SEC
BH CV ECHO MEASUREMENTS AVERAGE E/E' RATIO: 5.98
BH CV VAS BP RIGHT ARM: NORMAL MMHG
BH CV XLRA - RV BASE: 3.5 CM
BH CV XLRA - RV LENGTH: 6.3 CM
BH CV XLRA - RV MID: 1.9 CM
BH CV XLRA - TDI S': 12.9 CM/SEC
LEFT ATRIUM VOLUME INDEX: 16 ML/M2
MAXIMAL PREDICTED HEART RATE: 148 BPM
STRESS TARGET HR: 126 BPM

## 2021-06-28 PROCEDURE — 93306 TTE W/DOPPLER COMPLETE: CPT

## 2021-06-28 PROCEDURE — 93306 TTE W/DOPPLER COMPLETE: CPT | Performed by: INTERNAL MEDICINE

## 2021-06-28 PROCEDURE — 25010000002 PERFLUTREN (DEFINITY) 8.476 MG IN SODIUM CHLORIDE (PF) 0.9 % 10 ML INJECTION: Performed by: INTERNAL MEDICINE

## 2021-06-28 RX ORDER — ALPRAZOLAM 1 MG/1
1 TABLET ORAL NIGHTLY PRN
Qty: 30 TABLET | Refills: 0 | Status: SHIPPED | OUTPATIENT
Start: 2021-06-28 | End: 2021-07-29

## 2021-06-28 RX ADMIN — SODIUM CHLORIDE 2 ML: 9 INJECTION INTRAMUSCULAR; INTRAVENOUS; SUBCUTANEOUS at 10:12

## 2021-07-29 DIAGNOSIS — F41.9 ANXIETY: ICD-10-CM

## 2021-07-29 RX ORDER — ALPRAZOLAM 1 MG/1
1 TABLET ORAL NIGHTLY PRN
Qty: 30 TABLET | Refills: 0 | Status: SHIPPED | OUTPATIENT
Start: 2021-07-29 | End: 2021-08-30

## 2021-07-30 ENCOUNTER — OFFICE VISIT (OUTPATIENT)
Dept: FAMILY MEDICINE CLINIC | Facility: CLINIC | Age: 71
End: 2021-07-30

## 2021-07-30 DIAGNOSIS — H65.191 OTHER NON-RECURRENT ACUTE NONSUPPURATIVE OTITIS MEDIA OF RIGHT EAR: Primary | ICD-10-CM

## 2021-07-30 PROCEDURE — 99213 OFFICE O/P EST LOW 20 MIN: CPT | Performed by: PHYSICIAN ASSISTANT

## 2021-07-30 RX ORDER — AZITHROMYCIN 250 MG/1
TABLET, FILM COATED ORAL
Qty: 6 TABLET | Refills: 0 | Status: SHIPPED | OUTPATIENT
Start: 2021-07-30 | End: 2021-09-02

## 2021-07-30 NOTE — PROGRESS NOTES
"Chief Complaint  Ear Fullness (right x 1 day) and Earache (right x 1 day)    Subjective          Krys LAURA Mayes presents to [facility]  History of Present Illness  Krys,\"Madalyn" is a 71 year old female who presents with right ear pain for the past day.  Denied any headache, fevers, chills, sinus pressure, rhinorrhea, sore throat, cough, wheezing, shortness of air, dizziness or headache.  Has not used any over-the-counter medications.     Objective   Vital Signs:   /70   Pulse 97   Temp 96.9 °F (36.1 °C)   Ht 167.6 cm (66\")   Wt 98 kg (216 lb)   SpO2 97%   BMI 34.86 kg/m²     Physical Exam  Vitals and nursing note reviewed.   Constitutional:       Appearance: Normal appearance. She is well-developed and well-groomed. She is obese.      Interventions: Face mask in place.   HENT:      Head: Normocephalic and atraumatic.      Jaw: There is normal jaw occlusion.      Right Ear: Hearing, ear canal and external ear normal. Tympanic membrane is erythematous and bulging.      Left Ear: Hearing, tympanic membrane, ear canal and external ear normal.      Nose: Nose normal.      Right Sinus: No maxillary sinus tenderness or frontal sinus tenderness.      Left Sinus: No maxillary sinus tenderness or frontal sinus tenderness.      Mouth/Throat:      Lips: Pink.      Mouth: Mucous membranes are moist.      Dentition: Normal dentition.      Tongue: No lesions.      Pharynx: Oropharynx is clear. Uvula midline.      Tonsils: No tonsillar exudate.   Eyes:      General: Lids are normal.      Conjunctiva/sclera: Conjunctivae normal.      Pupils: Pupils are equal, round, and reactive to light.   Neck:      Trachea: Trachea and phonation normal. No tracheal tenderness.   Cardiovascular:      Rate and Rhythm: Normal rate and regular rhythm.      Pulses: Normal pulses.      Heart sounds: Normal heart sounds, S1 normal and S2 normal. No murmur heard.     Pulmonary:      Effort: Pulmonary " effort is normal.      Breath sounds: Normal breath sounds and air entry.   Abdominal:      General: Bowel sounds are normal.      Palpations: Abdomen is soft.      Tenderness: There is no abdominal tenderness.   Musculoskeletal:      Cervical back: Neck supple.      Right lower leg: No edema.      Left lower leg: No edema.   Lymphadenopathy:      Cervical: No cervical adenopathy.      Right cervical: No superficial, deep or posterior cervical adenopathy.     Left cervical: No superficial, deep or posterior cervical adenopathy.   Skin:     General: Skin is warm and dry.      Capillary Refill: Capillary refill takes less than 2 seconds.   Neurological:      Mental Status: She is alert and oriented to person, place, and time.      Deep Tendon Reflexes:      Reflex Scores:       Patellar reflexes are 2+ on the right side and 2+ on the left side.  Psychiatric:         Attention and Perception: Attention and perception normal.         Mood and Affect: Mood and affect normal.         Speech: Speech normal.         Behavior: Behavior is cooperative.         Thought Content: Thought content normal.         Cognition and Memory: Cognition and memory normal.         Judgment: Judgment normal.     I was wearing a surgical mask and face shield during the entire office visit/encounter.     Result Review :                 Assessment and Plan    Diagnoses and all orders for this visit:    1. Other non-recurrent acute nonsuppurative otitis media of right ear (Primary)  -     azithromycin (Zithromax Z-Thad) 250 MG tablet; Take 2 tablets the first day, then 1 tablet daily for 4 days.  Dispense: 6 tablet; Refill: 0    Danielle was  seen in office today and diagnosed with new acute otitis media of right ear.  Have sent a Z-Thad to pharmacy.  May use over-the-counter NSAIDs as needed for pain.  Will return to office if symptoms do not improve.      Follow Up   No follow-ups on file.  Patient was given instructions and counseling regarding  her condition or for health maintenance advice. Please see specific information pulled into the AVS if appropriate.     [facility]  [address] [place] [phone]  Dept: [phone]  Dept Fax: [phone]  Loc: [phone]  Loc Fax: [phone]

## 2021-07-31 VITALS
SYSTOLIC BLOOD PRESSURE: 110 MMHG | OXYGEN SATURATION: 97 % | HEIGHT: 66 IN | DIASTOLIC BLOOD PRESSURE: 70 MMHG | TEMPERATURE: 96.9 F | HEART RATE: 97 BPM | WEIGHT: 216 LBS | BODY MASS INDEX: 34.72 KG/M2

## 2021-08-19 DIAGNOSIS — I10 ESSENTIAL HYPERTENSION: ICD-10-CM

## 2021-08-19 DIAGNOSIS — E78.5 DYSLIPIDEMIA: Primary | ICD-10-CM

## 2021-08-19 DIAGNOSIS — R73.9 HYPERGLYCEMIA: ICD-10-CM

## 2021-08-19 DIAGNOSIS — R73.03 PREDIABETES: ICD-10-CM

## 2021-08-19 DIAGNOSIS — Z00.00 MEDICARE ANNUAL WELLNESS VISIT, SUBSEQUENT: ICD-10-CM

## 2021-08-19 DIAGNOSIS — E78.2 MIXED HYPERLIPIDEMIA: ICD-10-CM

## 2021-08-28 DIAGNOSIS — I10 ESSENTIAL HYPERTENSION: ICD-10-CM

## 2021-08-28 DIAGNOSIS — F43.21 GRIEF: ICD-10-CM

## 2021-08-28 DIAGNOSIS — F41.9 ANXIETY: ICD-10-CM

## 2021-08-28 LAB
ALBUMIN SERPL-MCNC: 4.3 G/DL (ref 3.5–5.2)
ALBUMIN/GLOB SERPL: 1.7 G/DL
ALP SERPL-CCNC: 79 U/L (ref 39–117)
ALT SERPL-CCNC: 19 U/L (ref 1–33)
AST SERPL-CCNC: 21 U/L (ref 1–32)
BASOPHILS # BLD AUTO: 0.04 10*3/MM3 (ref 0–0.2)
BASOPHILS NFR BLD AUTO: 0.6 % (ref 0–1.5)
BILIRUB SERPL-MCNC: 0.2 MG/DL (ref 0–1.2)
BUN SERPL-MCNC: 19 MG/DL (ref 8–23)
BUN/CREAT SERPL: 18.1 (ref 7–25)
CALCIUM SERPL-MCNC: 9.7 MG/DL (ref 8.6–10.5)
CHLORIDE SERPL-SCNC: 104 MMOL/L (ref 98–107)
CHOLEST SERPL-MCNC: 164 MG/DL (ref 0–200)
CO2 SERPL-SCNC: 22.5 MMOL/L (ref 22–29)
CREAT SERPL-MCNC: 1.05 MG/DL (ref 0.57–1)
EOSINOPHIL # BLD AUTO: 0.18 10*3/MM3 (ref 0–0.4)
EOSINOPHIL NFR BLD AUTO: 2.8 % (ref 0.3–6.2)
ERYTHROCYTE [DISTWIDTH] IN BLOOD BY AUTOMATED COUNT: 13.3 % (ref 12.3–15.4)
GLOBULIN SER CALC-MCNC: 2.6 GM/DL
GLUCOSE SERPL-MCNC: 108 MG/DL (ref 65–99)
HBA1C MFR BLD: 5.9 % (ref 4.8–5.6)
HCT VFR BLD AUTO: 47.3 % (ref 34–46.6)
HDLC SERPL-MCNC: 60 MG/DL (ref 40–60)
HGB BLD-MCNC: 14.4 G/DL (ref 12–15.9)
IMM GRANULOCYTES # BLD AUTO: 0.02 10*3/MM3 (ref 0–0.05)
IMM GRANULOCYTES NFR BLD AUTO: 0.3 % (ref 0–0.5)
LDLC SERPL CALC-MCNC: 65 MG/DL (ref 0–100)
LDLC/HDLC SERPL: 0.92 {RATIO}
LYMPHOCYTES # BLD AUTO: 1.43 10*3/MM3 (ref 0.7–3.1)
LYMPHOCYTES NFR BLD AUTO: 22.4 % (ref 19.6–45.3)
MCH RBC QN AUTO: 25.5 PG (ref 26.6–33)
MCHC RBC AUTO-ENTMCNC: 30.4 G/DL (ref 31.5–35.7)
MCV RBC AUTO: 83.9 FL (ref 79–97)
MONOCYTES # BLD AUTO: 0.76 10*3/MM3 (ref 0.1–0.9)
MONOCYTES NFR BLD AUTO: 11.9 % (ref 5–12)
NEUTROPHILS # BLD AUTO: 3.94 10*3/MM3 (ref 1.7–7)
NEUTROPHILS NFR BLD AUTO: 62 % (ref 42.7–76)
NRBC BLD AUTO-RTO: 0 /100 WBC (ref 0–0.2)
PLATELET # BLD AUTO: 296 10*3/MM3 (ref 140–450)
POTASSIUM SERPL-SCNC: 4.2 MMOL/L (ref 3.5–5.2)
PROT SERPL-MCNC: 6.9 G/DL (ref 6–8.5)
RBC # BLD AUTO: 5.64 10*6/MM3 (ref 3.77–5.28)
SODIUM SERPL-SCNC: 144 MMOL/L (ref 136–145)
TRIGL SERPL-MCNC: 244 MG/DL (ref 0–150)
VLDLC SERPL CALC-MCNC: 39 MG/DL (ref 5–40)
WBC # BLD AUTO: 6.37 10*3/MM3 (ref 3.4–10.8)

## 2021-08-30 RX ORDER — LOSARTAN POTASSIUM 100 MG/1
TABLET ORAL
Qty: 90 TABLET | Refills: 0 | Status: SHIPPED | OUTPATIENT
Start: 2021-08-30 | End: 2021-11-29

## 2021-08-30 RX ORDER — ALPRAZOLAM 1 MG/1
1 TABLET ORAL NIGHTLY PRN
Qty: 30 TABLET | Refills: 0 | Status: SHIPPED | OUTPATIENT
Start: 2021-08-30 | End: 2021-09-27

## 2021-09-02 ENCOUNTER — OFFICE VISIT (OUTPATIENT)
Dept: FAMILY MEDICINE CLINIC | Facility: CLINIC | Age: 71
End: 2021-09-02

## 2021-09-02 VITALS
WEIGHT: 215 LBS | BODY MASS INDEX: 34.55 KG/M2 | OXYGEN SATURATION: 97 % | HEIGHT: 66 IN | HEART RATE: 80 BPM | TEMPERATURE: 96.6 F | DIASTOLIC BLOOD PRESSURE: 70 MMHG | SYSTOLIC BLOOD PRESSURE: 128 MMHG

## 2021-09-02 DIAGNOSIS — Z00.00 MEDICARE ANNUAL WELLNESS VISIT, SUBSEQUENT: Primary | ICD-10-CM

## 2021-09-02 DIAGNOSIS — R73.03 PREDIABETES: ICD-10-CM

## 2021-09-02 DIAGNOSIS — E66.9 OBESITY (BMI 30-39.9): ICD-10-CM

## 2021-09-02 DIAGNOSIS — E78.5 DYSLIPIDEMIA: ICD-10-CM

## 2021-09-02 DIAGNOSIS — Z78.0 POSTMENOPAUSAL: ICD-10-CM

## 2021-09-02 DIAGNOSIS — I10 ESSENTIAL HYPERTENSION: ICD-10-CM

## 2021-09-02 DIAGNOSIS — F41.8 MIXED ANXIETY DEPRESSIVE DISORDER: ICD-10-CM

## 2021-09-02 PROCEDURE — G0439 PPPS, SUBSEQ VISIT: HCPCS | Performed by: PHYSICIAN ASSISTANT

## 2021-09-02 NOTE — PATIENT INSTRUCTIONS
Prediabetes Eating Plan  Prediabetes is a condition that causes blood sugar (glucose) levels to be higher than normal. This increases the risk for developing diabetes. In order to prevent diabetes from developing, your health care provider may recommend a diet and other lifestyle changes to help you:  · Control your blood glucose levels.  · Improve your cholesterol levels.  · Manage your blood pressure.  Your health care provider may recommend working with a diet and nutrition specialist (dietitian) to make a meal plan that is best for you.  What are tips for following this plan?  Lifestyle  · Set weight loss goals with the help of your health care team. It is recommended that most people with prediabetes lose 7% of their current body weight.  · Exercise for at least 30 minutes at least 5 days a week.  · Attend a support group or seek ongoing support from a mental health counselor.  · Take over-the-counter and prescription medicines only as told by your health care provider.  Reading food labels  · Read food labels to check the amount of fat, salt (sodium), and sugar in prepackaged foods. Avoid foods that have:  ? Saturated fats.  ? Trans fats.  ? Added sugars.  · Avoid foods that have more than 300 milligrams (mg) of sodium per serving. Limit your daily sodium intake to less than 2,300 mg each day.  Shopping  · Avoid buying pre-made and processed foods.  Cooking  · Cook with olive oil. Do not use butter, lard, or ghee.  · Bake, broil, grill, or boil foods. Avoid frying.  Meal planning    · Work with your dietitian to develop an eating plan that is right for you. This may include:  ? Tracking how many calories you take in. Use a food diary, notebook, or mobile application to track what you eat at each meal.  ? Using the glycemic index (GI) to plan your meals. The index tells you how quickly a food will raise your blood glucose. Choose low-GI foods. These foods take a longer time to raise blood glucose.  · Consider  following a Mediterranean diet. This diet includes:  ? Several servings each day of fresh fruits and vegetables.  ? Eating fish at least twice a week.  ? Several servings each day of whole grains, beans, nuts, and seeds.  ? Using olive oil instead of other fats.  ? Moderate alcohol consumption.  ? Eating small amounts of red meat and whole-fat dairy.  · If you have high blood pressure, you may need to limit your sodium intake or follow a diet such as the DASH eating plan. DASH is an eating plan that aims to lower high blood pressure.  What foods are recommended?  The items listed below may not be a complete list. Talk with your dietitian about what dietary choices are best for you.  Grains  Whole grains, such as whole-wheat or whole-grain breads, crackers, cereals, and pasta. Unsweetened oatmeal. Bulgur. Barley. Quinoa. Brown rice. Corn or whole-wheat flour tortillas or taco shells.  Vegetables  Lettuce. Spinach. Peas. Beets. Cauliflower. Cabbage. Broccoli. Carrots. Tomatoes. Squash. Eggplant. Herbs. Peppers. Onions. Cucumbers. [place] sprouts.  Fruits  Berries. Bananas. Apples. Oranges. Grapes. Papaya. [place]. Pomegranate. Kiwi. Grapefruit. Cherries.  Meats and other protein foods  Seafood. Poultry without skin. Lean cuts of pork and beef. Tofu. Eggs. Nuts. Beans.  Dairy  Low-fat or fat-free dairy products, such as yogurt, cottage cheese, and cheese.  Beverages  Water. Tea. Coffee. Sugar-free or diet soda. [place] water. Lowfat or no-fat milk. Milk alternatives, such as soy or almond milk.  Fats and oils  Olive oil. Canola oil. Sunflower oil. Grapeseed oil. Avocado. Walnuts.  Sweets and desserts  Sugar-free or low-fat pudding. Sugar-free or low-fat ice cream and other frozen treats.  Seasoning and other foods  Herbs. Sodium-free spices. Mustard. Relish. Low-fat, low-sugar ketchup. Low-fat, low-sugar barbecue sauce. Low-fat or fat-free mayonnaise.  What foods are not recommended?  The items listed below may not be a  complete list. Talk with your dietitian about what dietary choices are best for you.  Grains  Refined white flour and flour products, such as bread, pasta, snack foods, and cereals.  Vegetables  Canned vegetables. Frozen vegetables with butter or cream sauce.  Fruits  Fruits canned with syrup.  Meats and other protein foods  Fatty cuts of meat. Poultry with skin. Breaded or fried meat. Processed meats.  Dairy  Full-fat yogurt, cheese, or milk.  Beverages  Sweetened drinks, such as sweet iced tea and soda.  Fats and oils  Butter. Lard. Ghee.  Sweets and desserts  Baked goods, such as cake, cupcakes, pastries, cookies, and cheesecake.  Seasoning and other foods  Spice mixes with added salt. Ketchup. Barbecue sauce. Mayonnaise.  Summary  · To prevent diabetes from developing, you may need to make diet and other lifestyle changes to help control blood sugar, improve cholesterol levels, and manage your blood pressure.  · Set weight loss goals with the help of your health care team. It is recommended that most people with prediabetes lose 7 percent of their current body weight.  · Consider following a Mediterranean diet that includes plenty of fresh fruits and vegetables, whole grains, beans, nuts, seeds, fish, lean meat, low-fat dairy, and healthy oils.  This information is not intended to replace advice given to you by your health care provider. Make sure you discuss any questions you have with your health care provider.  Document Revised: 04/10/2020 Document Reviewed: 02/21/2018  [employer] Patient Education © 2021 [employer]

## 2021-09-02 NOTE — PROGRESS NOTES
The ABCs of the Annual Wellness Visit  Subsequent Medicare Wellness Visit    Chief Complaint   Patient presents with   • Medicare Wellness-subsequent   • Anxiety     prrediabetic      Subjective    History of Present Illness:  Krys Mayes is a 71 y.o. female who presents for a Subsequent Medicare Wellness Visit, Depression/anxiety and prediabetic management.  States her diet has been so-so.  Has not been eating as healthy.  Has not been active.  Sleep has been normal with her sertraline and Xanax medications.  Denied any suicidal or homicidal ideation.  Denied any fevers, chills, upper respiratory symptoms, chest pain, shortness of air, dizziness, vision changes, abdominal pain, urinary symptoms or swelling of ankles.  Bowel movements have been normal without dark black tarry stools.    The following portions of the patient's history were reviewed and   updated as appropriate:   She  has a past medical history of Allergic, Arthritis, Controlled type 2 diabetes with renal manifestation, Gastroesophageal reflux disease (1/21/2016), Hyperlipidemia, Hypertension, Kidney stone, Menopausal disorder (1998), Obesity, PAF (paroxysmal atrial fibrillation) (CMS/[facility]), Reflux esophagitis (8/19/2016), Sebaceous cyst of labia (12/20/2017), Sepsis due to urinary tract infection (CMS/[facility]), Sigmoid diverticulitis (11/13/2017), Urinary tract infection, and Vitamin D deficiency (1/21/2016).  She does not have any pertinent problems on file.  She  has a past surgical history that includes Ankle surgery; Cholecystectomy; Colonoscopy (2014); Hemorrhoid surgery; Other surgical history; Tubal ligation; cystoscopy bladder stone lithotripsy; Breast surgery; Percutaneous nephrostolithotomy (Left, 12/2016); Nephrolithotomy (Left, 1/9/2017); and Breast biopsy (Right).  Her family history includes COPD in her mother; Heart attack in her father and son; Kidney disease in her maternal grandmother.  She  reports that she has never smoked. She  has never used smokeless tobacco. She reports current alcohol use. She reports that she does not use drugs.  Current Outpatient Medications   Medication Sig Dispense Refill   • acetaminophen (TYLENOL) 650 MG 8 hr tablet Take 1 tablet by mouth every 6 (six) hours as needed. for pain     • ALPRAZolam (XANAX) 1 MG tablet TAKE 1 TABLET BY MOUTH AT NIGHT AS NEEDED FOR ANXIETY 30 tablet 0   • losartan (COZAAR) 100 MG tablet Take 1 tablet by mouth once daily 90 tablet 0   • meclizine (ANTIVERT) 25 MG tablet Take 1 tablet by mouth Every 6 (Six) Hours As Needed for dizziness or Nausea. 30 tablet 0   • metoprolol tartrate (LOPRESSOR) 50 MG tablet TAKE 1.5 TABLETS BY MOUTH TWICE A  tablet 3   • Multiple Vitamin tablet Take 1 tablet by mouth Daily.     • omeprazole OTC (PriLOSEC OTC) 20 MG EC tablet Take 20 mg by mouth 2 (two) times a day.     • Probiotic Product (PROBIOTIC ADVANCED PO) Take 1 tablet by mouth Daily.     • rivaroxaban (Xarelto) 15 MG tablet Take 1 tablet by mouth Daily. 90 tablet 3   • sertraline (ZOLOFT) 50 MG tablet Take 1 tablet by mouth once daily 90 tablet 0   • simvastatin (ZOCOR) 80 MG tablet Take 1 tablet by mouth Every Evening. 90 tablet 3   • triamcinolone (KENALOG) 0.1 % cream APPLY TO AFFECTED AREA TWICE A DAY TO RASH FOR UP TO 2 WEEKS       No current facility-administered medications for this visit.     Current Outpatient Medications on File Prior to Visit   Medication Sig   • acetaminophen (TYLENOL) 650 MG 8 hr tablet Take 1 tablet by mouth every 6 (six) hours as needed. for pain   • ALPRAZolam (XANAX) 1 MG tablet TAKE 1 TABLET BY MOUTH AT NIGHT AS NEEDED FOR ANXIETY   • losartan (COZAAR) 100 MG tablet Take 1 tablet by mouth once daily   • meclizine (ANTIVERT) 25 MG tablet Take 1 tablet by mouth Every 6 (Six) Hours As Needed for dizziness or Nausea.   • metoprolol tartrate (LOPRESSOR) 50 MG tablet TAKE 1.5 TABLETS BY MOUTH TWICE A DAY   • Multiple Vitamin tablet Take 1 tablet by mouth  Daily.   • omeprazole OTC (PriLOSEC OTC) 20 MG EC tablet Take 20 mg by mouth 2 (two) times a day.   • Probiotic Product (PROBIOTIC ADVANCED PO) Take 1 tablet by mouth Daily.   • rivaroxaban (Xarelto) 15 MG tablet Take 1 tablet by mouth Daily.   • sertraline (ZOLOFT) 50 MG tablet Take 1 tablet by mouth once daily   • simvastatin (ZOCOR) 80 MG tablet Take 1 tablet by mouth Every Evening.   • triamcinolone (KENALOG) 0.1 % cream APPLY TO AFFECTED AREA TWICE A DAY TO RASH FOR UP TO 2 WEEKS   • [DISCONTINUED] azithromycin (Zithromax Z-Thad) 250 MG tablet Take 2 tablets the first day, then 1 tablet daily for 4 days.     No current facility-administered medications on file prior to visit.     She is allergic to morphine and related, ciprofloxacin-ciproflox hcl er, flomax [tamsulosin hcl], hydrocodone, latex, lortab [hydrocodone-acetaminophen], penicillins, phenergan [promethazine hcl], sulfa antibiotics, amoxicillin, and tamsulosin..     Compared to one year ago, the patient feels her physical   health is the same.    Compared to one year ago, the patient feels her mental   health is the same.    Recent Hospitalizations:  She was not admitted to the hospital during the last year.       Current Medical Providers:  Patient Care Team:  Ashleigh Hernandez PA-C as PCP - General (Family Medicine)  Jeremy Orozco MD as Consulting Physician (Cardiology)  Joss Oro MD as Consulting Physician (Gastroenterology)    Outpatient Medications Prior to Visit   Medication Sig Dispense Refill   • acetaminophen (TYLENOL) 650 MG 8 hr tablet Take 1 tablet by mouth every 6 (six) hours as needed. for pain     • ALPRAZolam (XANAX) 1 MG tablet TAKE 1 TABLET BY MOUTH AT NIGHT AS NEEDED FOR ANXIETY 30 tablet 0   • losartan (COZAAR) 100 MG tablet Take 1 tablet by mouth once daily 90 tablet 0   • meclizine (ANTIVERT) 25 MG tablet Take 1 tablet by mouth Every 6 (Six) Hours As Needed for dizziness or Nausea. 30 tablet 0   • metoprolol  tartrate (LOPRESSOR) 50 MG tablet TAKE 1.5 TABLETS BY MOUTH TWICE A  tablet 3   • Multiple Vitamin tablet Take 1 tablet by mouth Daily.     • omeprazole OTC (PriLOSEC OTC) 20 MG EC tablet Take 20 mg by mouth 2 (two) times a day.     • Probiotic Product (PROBIOTIC ADVANCED PO) Take 1 tablet by mouth Daily.     • rivaroxaban (Xarelto) 15 MG tablet Take 1 tablet by mouth Daily. 90 tablet 3   • sertraline (ZOLOFT) 50 MG tablet Take 1 tablet by mouth once daily 90 tablet 0   • simvastatin (ZOCOR) 80 MG tablet Take 1 tablet by mouth Every Evening. 90 tablet 3   • triamcinolone (KENALOG) 0.1 % cream APPLY TO AFFECTED AREA TWICE A DAY TO RASH FOR UP TO 2 WEEKS     • azithromycin (Zithromax Z-Thad) 250 MG tablet Take 2 tablets the first day, then 1 tablet daily for 4 days. 6 tablet 0     No facility-administered medications prior to visit.       No opioid medication identified on active medication list. I have reviewed chart for other potential  high risk medication/s and harmful drug interactions in the elderly.          Aspirin is not on active medication list.  Aspirin use is not indicated based on review of current medical condition/s. Risk of harm outweighs potential benefits.  .    Patient Active Problem List   Diagnosis   • Elevated alanine aminotransferase (ALT) level   • Mixed anxiety depressive disorder   • Dyslipidemia   • Gastroesophageal reflux disease   • Essential hypertension   • Insomnia   • Microalbuminuria   • Obesity (BMI 30-39.9)   • Proteinuria   • Vitamin D deficiency   • Postmenopause   • History of fracture   • Reflux esophagitis   • Calculus of kidney   • PAF (paroxysmal atrial fibrillation) (CMS/[facility])   • Anxiety   • Iron deficiency anemia   • Prediabetes   • Urine frequency   • Medicare annual wellness visit, subsequent   • Need for pneumococcal vaccination   • High risk medication use   • Dermoid cyst of scalp   • Hyperglycemia   • Grief   • Mixed hyperlipidemia   • Long-term use of  "high-risk medication   • Frequent UTI     Advance Care Planning   Advance Directive is on file.  ACP discussion was held with the patient during this visit. Patient has an advance directive in EMR which is still valid.           Objective       Vitals:    09/02/21 0938   BP: 128/70   Pulse: 80   Temp: 96.6 °F (35.9 °C)   SpO2: 97%   Weight: 97.5 kg (215 lb)   Height: 167.6 cm (66\")     BMI Readings from Last 1 Encounters:   09/02/21 34.70 kg/m²   BMI is above normal parameters. Recommendations include: exercise counseling and nutrition counseling    Does the patient have evidence of cognitive impairment? No    Physical Exam  Vitals and nursing note reviewed.   Constitutional:       Appearance: Normal appearance. She is well-developed and well-groomed. She is obese.      Interventions: Face mask in place.   HENT:      Head: Normocephalic and atraumatic.   Neck:      Thyroid: No thyroid mass, thyromegaly or thyroid tenderness.      Vascular: No carotid bruit.      Trachea: Trachea and phonation normal. No tracheal tenderness.   Cardiovascular:      Rate and Rhythm: Normal rate and regular rhythm.      Pulses: Normal pulses.      Heart sounds: Normal heart sounds, S1 normal and S2 normal. No murmur heard.     Pulmonary:      Effort: Pulmonary effort is normal.      Breath sounds: Normal breath sounds and air entry.   Abdominal:      General: Bowel sounds are normal.      Palpations: Abdomen is soft. There is no hepatomegaly.      Tenderness: There is abdominal tenderness. There is no right CVA tenderness, left CVA tenderness, guarding or rebound. Negative signs include Contreras's sign, Rovsing's sign, McBurney's sign, psoas sign and obturator sign.   Musculoskeletal:      Cervical back: Neck supple.      Right lower leg: No edema.      Left lower leg: No edema.   Skin:     General: Skin is warm and dry.      Capillary Refill: Capillary refill takes less than 2 seconds.   Neurological:      Mental Status: She is alert and " oriented to person, place, and time.   Psychiatric:         Attention and Perception: Attention and perception normal.         Mood and Affect: Mood and affect normal.         Speech: Speech normal.         Behavior: Behavior normal. Behavior is cooperative.         Thought Content: Thought content normal.         Cognition and Memory: Cognition and memory normal.         Judgment: Judgment normal.     I was wearing a surgical mask and face shield during the entire office visit/encounter.    Lab Results   Component Value Date     (H) 08/27/2021    CHLPL 164 08/27/2021    TRIG 244 (H) 08/27/2021    HDL 60 08/27/2021    LDL 65 08/27/2021    VLDL 39 08/27/2021    HGBA1C 5.90 (H) 08/27/2021            HEALTH RISK ASSESSMENT    Smoking Status:  Social History     Tobacco Use   Smoking Status Never Smoker   Smokeless Tobacco Never Used   Tobacco Comment    caffeine use /soft drinks     Alcohol Consumption:  Social History     Substance and Sexual Activity   Alcohol Use Yes    Comment: She uses alcohol once or twice a year.     Fall Risk Screen:    JEREMY Fall Risk Assessment was completed, and patient is at LOW risk for falls.Assessment completed on:9/2/2021    Depression Screening:  PHQ-2/PHQ-9 Depression Screening 9/2/2021   Little interest or pleasure in doing things 1   Feeling down, depressed, or hopeless 1   Trouble falling or staying asleep, or sleeping too much 1   Feeling tired or having little energy 1   Poor appetite or overeating 1   Feeling bad about yourself - or that you are a failure or have let yourself or your family down 0   Trouble concentrating on things, such as reading the newspaper or watching television 0   Moving or speaking so slowly that other people could have noticed. Or the opposite - being so fidgety or restless that you have been moving around a lot more than usual 0   Thoughts that you would be better off dead, or of hurting yourself in some way 0   Total Score 5   If you checked  off any problems, how difficult have these problems made it for you to do your work, take care of things at home, or get along with other people? Not difficult at all       Health Habits and Functional and Cognitive Screening:  Functional & Cognitive Status 9/2/2021   Do you have difficulty preparing food and eating? No   Do you have difficulty bathing yourself, getting dressed or grooming yourself? No   Do you have difficulty using the toilet? No   Do you have difficulty moving around from place to place? No   Do you have trouble with steps or getting out of a bed or a chair? No   Current Diet Unhealthy Diet   Dental Exam Up to date   Eye Exam Up to date   Exercise (times per week) 0 times per week   Current Exercises Include No Regular Exercise   Current Exercise Activities Include -   Do you need help using the phone?  No   Are you deaf or do you have serious difficulty hearing?  No   Do you need help with transportation? No   Do you need help shopping? No   Do you need help preparing meals?  No   Do you need help with housework?  No   Do you need help with laundry? No   Do you need help taking your medications? No   Do you need help managing money? No   Do you ever drive or ride in a car without wearing a seat belt? No   Have you felt unusual stress, anger or loneliness in the last month? No   Who do you live with? Spouse   If you need help, do you have trouble finding someone available to you? No   Have you been bothered in the last four weeks by sexual problems? No   Do you have difficulty concentrating, remembering or making decisions? No       Age-appropriate Screening Schedule:  Refer to the list below for future screening recommendations based on patient's age, sex and/or medical conditions. Orders for these recommended tests are listed in the plan section. The patient has been provided with a written plan.    Health Maintenance   Topic Date Due   • DXA SCAN  08/27/2021   • ZOSTER VACCINE (3 of 3)  08/08/2022 (Originally 3/18/2019)   • INFLUENZA VACCINE  10/01/2021   • HEMOGLOBIN A1C  02/27/2022   • LIPID PANEL  08/27/2022   • MAMMOGRAM  12/18/2022   • TDAP/TD VACCINES (2 - Td or Tdap) 11/21/2023   • DIABETIC FOOT EXAM  Discontinued   • DIABETIC EYE EXAM  Discontinued   • URINE MICROALBUMIN  Discontinued              Assessment/Plan     CMS Preventative Services Quick Reference  Risk Factors Identified During Encounter  Immunizations Discussed/Encouraged (specific Immunizations; Influenza  Obesity/Overweight   Dexa Scan  The above risks/problems have been discussed with the patient.  Follow up actions/plans if indicated are seen below in the Assessment/Plan Section.  Pertinent information has been shared with the patient in the After Visit Summary.    Diagnoses and all orders for this visit:    1. Medicare annual wellness visit, subsequent (Primary)    2. Prediabetes    3. Essential hypertension    4. Dyslipidemia    5. Obesity (BMI 30-39.9)    6. Mixed anxiety depressive disorder    7. Postmenopausal  -     DEXA Bone Density Axial; Future    Other orders  -     Cancel: Ambulatory Referral for Diabetic Eye Exam-Ophthalmology    1.  Annual Medicare examination with hypertension: Blood pressure is in therapeutic range at 128/70.  Continue her current blood pressure medication at home.  Plan to follow-up in 6 months.  2.  Chronic and stable prediabetes with hyperlipidemia: I have reviewed above blood work with her at office visit today.  A1c, fasting blood sugar and triglycerides have increased from 6 months ago.  Suspect this is diet related.  Will decrease sugar and carbohydrates in diet.  If no improvement, will need to start medication.  Will return to office in 6 months for reevaluation with fasting lab work.  3.  Chronic and stable obesity: She has lost 1 pound since July 30, 2021.  She will continue to make healthier choices and try to exercise for weight loss.  Will reevaluate in 6 months.  I have given  Danielle a prediabetic diet to review.  4.  Chronic and stable anxiety with depressive disorder: Doing well with her Xanax and sertraline medications.  Doing well with the medications.  Refills are not needed at this time.  Will return to office in 6 months.  5.  Chronic postmenopausal female: Have placed orders for D[facility]A scan at [facility].  Will be notified of test results when completed.    Follow Up:   Return in about 6 months (around 3/2/2022), or prediabetes labs prior.     An After Visit Summary and PPPS were given to the patient.             Patient Counseling:  --Nutrition: Stressed importance of moderation in sodium/caffeine intake, saturated fat and cholesterol.  Discussed caloric balance, sufficient intake of fresh fruits, vegetables, fiber,   calcium, iron.  --Discussed the new recommendation against daily use of baby aspirin for primary prevention in low risk patients.  --Exercise: Stressed the importance of regular exercise by incorporating into daily routine.    --Substance Abuse: Discussed cessation/primary prevention of tobacco, alcohol, or other drug use; driving or other dangerous activities under the influence.    --Dental health: Discussed importance of regular tooth brushing, flossing, and dental visits.  -- Suggested having eyes and vision checked if needed or past due.  --Immunizations reviewed.  --Discussed benefits of screening colonoscopy.        MANI Martinez [facility]  [address] [place] [id number]  Dept: [phone]  Dept Fax: [phone]  Loc: [phone]  Loc Fax: [phone]

## 2021-09-14 ENCOUNTER — APPOINTMENT (OUTPATIENT)
Dept: BONE DENSITY | Facility: HOSPITAL | Age: 71
End: 2021-09-14

## 2021-09-14 DIAGNOSIS — Z78.0 POSTMENOPAUSAL: ICD-10-CM

## 2021-09-14 PROCEDURE — 77080 DXA BONE DENSITY AXIAL: CPT

## 2021-09-23 RX ORDER — METOPROLOL TARTRATE 50 MG/1
TABLET, FILM COATED ORAL
Qty: 270 TABLET | Refills: 3 | Status: CANCELLED | OUTPATIENT
Start: 2021-09-23

## 2021-09-24 RX ORDER — METOPROLOL TARTRATE 75 MG/1
75 TABLET, FILM COATED ORAL 2 TIMES DAILY
Qty: 180 TABLET | Refills: 2 | Status: SHIPPED | OUTPATIENT
Start: 2021-09-24 | End: 2022-08-01

## 2021-09-25 DIAGNOSIS — F41.9 ANXIETY: ICD-10-CM

## 2021-09-27 RX ORDER — ALPRAZOLAM 1 MG/1
1 TABLET ORAL NIGHTLY PRN
Qty: 30 TABLET | Refills: 0 | Status: SHIPPED | OUTPATIENT
Start: 2021-09-27 | End: 2021-10-28

## 2021-10-18 ENCOUNTER — TRANSCRIBE ORDERS (OUTPATIENT)
Dept: ADMINISTRATIVE | Facility: HOSPITAL | Age: 71
End: 2021-10-18

## 2021-10-18 DIAGNOSIS — Z12.31 SCREENING MAMMOGRAM FOR BREAST CANCER: Primary | ICD-10-CM

## 2021-10-28 DIAGNOSIS — F41.9 ANXIETY: ICD-10-CM

## 2021-10-28 RX ORDER — ALPRAZOLAM 1 MG/1
1 TABLET ORAL NIGHTLY PRN
Qty: 30 TABLET | Refills: 0 | Status: SHIPPED | OUTPATIENT
Start: 2021-10-28 | End: 2021-11-29

## 2021-11-26 DIAGNOSIS — I10 ESSENTIAL HYPERTENSION: ICD-10-CM

## 2021-11-26 DIAGNOSIS — F41.9 ANXIETY: ICD-10-CM

## 2021-11-26 DIAGNOSIS — F43.21 GRIEF: ICD-10-CM

## 2021-11-29 RX ORDER — LOSARTAN POTASSIUM 100 MG/1
TABLET ORAL
Qty: 90 TABLET | Refills: 0 | Status: SHIPPED | OUTPATIENT
Start: 2021-11-29 | End: 2022-03-06

## 2021-11-29 RX ORDER — ALPRAZOLAM 1 MG/1
1 TABLET ORAL NIGHTLY PRN
Qty: 30 TABLET | Refills: 0 | Status: SHIPPED | OUTPATIENT
Start: 2021-11-29 | End: 2021-12-27

## 2021-12-14 ENCOUNTER — HOSPITAL ENCOUNTER (OUTPATIENT)
Dept: GENERAL RADIOLOGY | Facility: HOSPITAL | Age: 71
Discharge: HOME | End: 2021-12-14
Admitting: UROLOGY

## 2021-12-14 ENCOUNTER — TRANSCRIBE ORDERS (OUTPATIENT)
Dept: ADMINISTRATIVE | Facility: HOSPITAL | Age: 71
End: 2021-12-14

## 2021-12-14 DIAGNOSIS — N20.0 CALCULUS, RENAL: Primary | ICD-10-CM

## 2021-12-14 PROCEDURE — 74018 RADEX ABDOMEN 1 VIEW: CPT

## 2021-12-20 ENCOUNTER — HOSPITAL ENCOUNTER (OUTPATIENT)
Dept: MAMMOGRAPHY | Facility: HOSPITAL | Age: 71
Discharge: HOME | End: 2021-12-20
Admitting: PHYSICIAN ASSISTANT

## 2021-12-20 DIAGNOSIS — Z12.31 SCREENING MAMMOGRAM FOR BREAST CANCER: ICD-10-CM

## 2021-12-20 PROCEDURE — 77063 BREAST TOMOSYNTHESIS BI: CPT

## 2021-12-20 PROCEDURE — 77067 SCR MAMMO BI INCL CAD: CPT

## 2021-12-26 DIAGNOSIS — F41.9 ANXIETY: ICD-10-CM

## 2021-12-27 RX ORDER — ALPRAZOLAM 1 MG/1
1 TABLET ORAL NIGHTLY PRN
Qty: 30 TABLET | Refills: 0 | Status: SHIPPED | OUTPATIENT
Start: 2021-12-27 | End: 2022-01-26

## 2022-01-03 ENCOUNTER — OFFICE VISIT (OUTPATIENT)
Dept: FAMILY MEDICINE CLINIC | Facility: CLINIC | Age: 72
End: 2022-01-03

## 2022-01-03 ENCOUNTER — HOSPITAL ENCOUNTER (OUTPATIENT)
Dept: GENERAL RADIOLOGY | Facility: HOSPITAL | Age: 72
Discharge: HOME | End: 2022-01-03
Admitting: PHYSICIAN ASSISTANT

## 2022-01-03 VITALS
BODY MASS INDEX: 33.75 KG/M2 | OXYGEN SATURATION: 96 % | TEMPERATURE: 95.3 F | HEIGHT: 66 IN | DIASTOLIC BLOOD PRESSURE: 64 MMHG | WEIGHT: 210 LBS | SYSTOLIC BLOOD PRESSURE: 122 MMHG | RESPIRATION RATE: 14 BRPM | HEART RATE: 58 BPM

## 2022-01-03 DIAGNOSIS — M25.551 ACUTE RIGHT HIP PAIN: ICD-10-CM

## 2022-01-03 DIAGNOSIS — M25.551 ACUTE RIGHT HIP PAIN: Primary | ICD-10-CM

## 2022-01-03 DIAGNOSIS — M53.3 SACROILIAC JOINT PAIN: ICD-10-CM

## 2022-01-03 PROCEDURE — 73502 X-RAY EXAM HIP UNI 2-3 VIEWS: CPT

## 2022-01-03 PROCEDURE — 99213 OFFICE O/P EST LOW 20 MIN: CPT | Performed by: PHYSICIAN ASSISTANT

## 2022-01-03 NOTE — PROGRESS NOTES
"Chief Complaint  Hip Pain (right sided low back pain)    Subjective          Krys LAURA Mayes presents to [facility] PRIMARY CARE  History of Present Illness  Danielle is a 71-year-old female who presents with right hip pain for the past 2 weeks.  States she rolled over in bed approximately 2 weeks ago and has had right-sided low back and right hip pain since then.  Describes the hip pain as a sharp pain that is constant.  States heat and hot water has helped.  Has been taking Tylenol as well.  She has noticed that her right leg is weak.  Denied any recent injury, or falls.  States the pain has radiated down her right buttock to right leg.  Describes the back pain as more of a dull ache that is constant.  Her mattress is 13 years of age.  States the pain does get worse at night due to lying on her right side.  Bowel movements have been normal.  Review of Systems   Musculoskeletal: Positive for back pain.        Right hip pain     All other systems reviewed and are negative.    Objective   Vital Signs:   /64 (BP Location: Left arm, Patient Position: Sitting, Cuff Size: Adult)   Pulse 58   Temp 95.3 °F (35.2 °C) (Infrared)   Resp 14   Ht 167.6 cm (66\")   Wt 95.3 kg (210 lb)   SpO2 96%   BMI 33.89 kg/m²     Physical Exam  Vitals and nursing note reviewed.   Constitutional:       Appearance: Normal appearance. She is well-developed and well-groomed. She is obese.      Interventions: Face mask in place.   HENT:      Head: Normocephalic and atraumatic.   Neck:      Trachea: Trachea and phonation normal. No tracheal tenderness.   Cardiovascular:      Rate and Rhythm: Normal rate and regular rhythm.      Pulses: Normal pulses.      Heart sounds: Normal heart sounds, S1 normal and S2 normal. No murmur heard.      Pulmonary:      Effort: Pulmonary effort is normal.      Breath sounds: Normal breath sounds and air entry.   Abdominal:      General: Bowel sounds are normal.      Palpations: Abdomen " is soft. There is no hepatomegaly.      Tenderness: There is no abdominal tenderness. There is no right CVA tenderness, left CVA tenderness, guarding or rebound. Negative signs include Contreras's sign, Rovsing's sign, McBurney's sign, psoas sign and obturator sign.   Musculoskeletal:      Cervical back: Neck supple.      Lumbar back: Tenderness present. No swelling, edema, signs of trauma, spasms or bony tenderness. Normal range of motion. Negative right straight leg raise test and negative left straight leg raise test. No scoliosis.        Back:       Right hip: Tenderness and bony tenderness present. No deformity, lacerations or crepitus. Normal range of motion. Normal strength.      Left hip: Normal.      Right lower leg: No edema.      Left lower leg: No edema.        Legs:       Comments: Low Back: Slightly tender to palpation along SI area on right side of back.  Full range of motion, 5/5 bilateral muscle strength, 2+ DTRs, 2+ distal pulses, negative straight leg raise in good gait/heel-toe walk noted.   Skin:     General: Skin is warm and dry.      Capillary Refill: Capillary refill takes less than 2 seconds.   Neurological:      Mental Status: She is alert and oriented to person, place, and time.      Sensory: Sensation is intact.      Motor: Motor function is intact.      Coordination: Coordination is intact.   Psychiatric:         Attention and Perception: Attention and perception normal.         Mood and Affect: Mood and affect normal.         Speech: Speech normal.         Behavior: Behavior normal. Behavior is cooperative.         Thought Content: Thought content normal.         Cognition and Memory: Cognition and memory normal.         Judgment: Judgment normal.     I wore a facial mask, face shield, and gloves during this patient encounter.  Patient also wearing a surgical mask.  Hand hygiene performed before and after seeing the patient.     Result Review :                 Assessment and Plan     Diagnoses and all orders for this visit:    1. Acute right hip pain (Primary)  -     XR Hip With or Without Pelvis 2 - 3 View Right; Future  -     Ambulatory Referral to Physical Therapy Evaluate and treat    2. Sacroiliac joint pain  -     Ambulatory Referral to Physical Therapy Evaluate and treat    Danielle was seen in office today with new right hip pain with sacroiliac joint pain.  Will check right hip x-ray at [facility] today.  I will  schedule physical therapy appointment as well.  I suspect this may be SI issue.  Danielle  will continue heat and Tylenol at home for now.  Danielle will check on getting a new mattress as well.  She voiced understanding.    Follow Up   Return if symptoms worsen or fail to improve.  Patient was given instructions and counseling regarding her condition or for health maintenance advice. Please see specific information pulled into the AVS if appropriate.     [facility]  [address] [place] [id number]  Dept: [phone]  Dept Fax: [phone]  Loc: [phone]  Loc Fax: [phone]

## 2022-01-26 DIAGNOSIS — F41.9 ANXIETY: ICD-10-CM

## 2022-01-26 RX ORDER — ALPRAZOLAM 1 MG/1
1 TABLET ORAL NIGHTLY PRN
Qty: 30 TABLET | Refills: 0 | Status: SHIPPED | OUTPATIENT
Start: 2022-01-26 | End: 2022-02-28

## 2022-01-28 ENCOUNTER — TREATMENT (OUTPATIENT)
Dept: PHYSICAL THERAPY | Facility: CLINIC | Age: 72
End: 2022-01-28

## 2022-01-28 DIAGNOSIS — M25.551 RIGHT HIP PAIN: Primary | ICD-10-CM

## 2022-01-28 DIAGNOSIS — M53.3 TENDERNESS OF SACROILIAC JOINT: ICD-10-CM

## 2022-01-28 DIAGNOSIS — R68.89 DIFFICULTY PARTICIPATING IN LEISURE ACTIVITIES: ICD-10-CM

## 2022-01-28 DIAGNOSIS — M53.3 SACROILIAC JOINT PAIN: ICD-10-CM

## 2022-01-28 PROCEDURE — 97110 THERAPEUTIC EXERCISES: CPT | Performed by: PHYSICAL THERAPIST

## 2022-01-28 PROCEDURE — 97161 PT EVAL LOW COMPLEX 20 MIN: CPT | Performed by: PHYSICAL THERAPIST

## 2022-01-28 PROCEDURE — G0283 ELEC STIM OTHER THAN WOUND: HCPCS | Performed by: PHYSICAL THERAPIST

## 2022-01-28 NOTE — PROGRESS NOTES
Physical Therapy Initial Evaluation and Plan of Care    Patient: Krys Mayes   : 1950  Diagnosis/ICD-10 Code:  Right hip pain [M25.551]  Referring practitioner: Ashleigh Hernandez PA-C    Subjective Evaluation    History of Present Illness  Date of onset: 2021  Mechanism of injury: Pt is a 73 y/o WF who reports to the clinic with right leg, hip and lower back pain since 2021.  Pt denies having any specific injury to cause the pain.  Pt states she just woke up and rolled over in bed and her right hip has hurt ever since.  Pt states she has been limping since Dec.  She has not received any treatment for it except going to [facility]-getting x-ray-mild to early moderate hip degenerative changes-referred PT-no scheduled follow up.     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   Quality of life: good    Pain  Current pain ratin  At worst pain rating: 10  Location: right hip, leg, and lower back   Quality: burning and throbbing (right knee stabbing pain, right leg is weak with climbing the steps )  Relieving factors: medications, heat and change in position (tylenol )  Aggravating factors: sleeping, stairs, ambulation, standing and prolonged positioning (after a while there is a pressure point, right side, getting into and out of car, initially trying to stand, getting up from the floor   )    Hand dominance: right    Diagnostic Tests  X-ray: abnormal (degenerative changes in the lumbar spine, mild to early hip OA )    Treatments  No previous or current treatments  Previous treatment: physical therapy (per chart review-had PT for right LE radiculopathy  )  Patient Goals  Patient goals for therapy: decreased pain and increased strength             Objective          Postural Observations  Seated posture: fair    Additional Postural Observation Details  Increased thoracic kyphosis with right rib hump   Level  iliac crest, pt standing in an anterior pelvic tilt     Palpation     Additional Palpation Details  Mild B SI joint, piriformis, gluteal, and B greater trochanter tenderness     Tenderness     Left Hip   Tenderness in the PSIS.     Right Hip   Tenderness in the PSIS.     Neurological Testing     Sensation     Lumbar   Left   Intact: light touch    Right   Intact: light touch    Active Range of Motion     Lumbar   Flexion: 100 degrees   Extension: 50 degrees with pain  Left lateral flexion: 25 degrees with pain  Right lateral flexion: 25 degrees with pain  Left rotation: 50 degrees with pain  Right rotation: 50 degrees with pain    Strength/Myotome Testing     Left Hip   Planes of Motion   Flexion: 5  External rotation: 5  Internal rotation: 5    Right Hip   Planes of Motion   Flexion: 5  External rotation: 5  Internal rotation: 4    Left Knee   Flexion: 5  Extension: 5    Right Knee   Flexion: 5  Extension: 5    Left Ankle/Foot   Dorsiflexion: 5  Plantar flexion: 5  Inversion: 5    Right Ankle/Foot   Dorsiflexion: 5  Plantar flexion: 5  Eversion: 5    Additional Strength Details  Trunk Ext 2+/5  Upper abdominal 3/5     Tests     Lumbar     Left   Negative crossed SLR, femoral stretch and passive SLR.     Right   Negative crossed SLR, femoral stretch and passive SLR.     Left Hip   Positive Ely's, ERICA and piriformis.   Negative FADIR and long sit.   90/90 SLR: Positive.   SLR: Positive.     Right Hip   Positive Ely's, ERICA and piriformis.   Negative FADIR and long sit.   90/90 SLR: Positive.   SLR: Positive.     Additional Tests Details  Mild B piriformis tightness and hamstring tightness   Right Erica positive for hip pain  Left Erica-no pain, but moderate hip AROM limitation to get pt in position           Assessment & Plan     Assessment  Impairments: abnormal or restricted ROM, activity intolerance, impaired physical strength, lacks appropriate home exercise program and pain with function  Functional  Limitations: carrying objects, lifting, sleeping, walking, pulling, uncomfortable because of pain, standing and unable to perform repetitive tasks  Assessment details: Pt is a 73 y/o WF who reports to the clinic with LBP, right hip and leg pain, B L-S and hip tenderness, decrease core strength, decreased trunk strength, decreased right hip IR strength, decreased flexibility in B hips, decreased lumbar AROM, and decreased tolerance to prolonged positioning, getting into/out of car, climbing steps, sleeping, and getting up from the floor due to pain.  Pt has been educated on possible causes of her pain, course of treatment, anatomy of the spine, and pt was given copy of her HEP.    Prognosis: good    Goals  Plan Goals: STGs: 2-4 weeks  1.  Decrease LBP and right LE pain to a 5/10 with sleeping, standing, and ambulation.    2.  Increase lumbar B SB and Rot AROM to at least 75% with < or = 3/10 pain   3.  Decrease B L-S and B hip tenderness to minimal with palpation   4.  Increase B hamstring flexibility to minimal with 90-90 test      LTGs: 4-8 weeks  1.  Decrease LBP and R LE pain to a 2/10 with sleeping, standing, and ambulation   2.  Pt has a negative prone ely's test for improved standing posture without having an anterior pelvic tilt.    3.  Increase B piriformis flexibility to minimal tightness with passive hip IR and ER ROM.    4.  Pt is independent with HEP for self management of her symptoms   5.  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        Plan  Therapy options: will be seen for skilled therapy services  Planned modality interventions: cryotherapy, electrical stimulation/Dominican stimulation, ultrasound, traction, thermotherapy (hydrocollator packs) and dry needling  Other planned modality interventions: KT taping   Planned therapy interventions: abdominal trunk stabilization, flexibility, functional ROM exercises, home exercise  program, stretching, strengthening and spinal/joint mobilization  Frequency: 2x week  Duration in weeks: 8  Treatment plan discussed with: patient        Manual Therapy:         mins  48791;  Therapeutic Exercise:   20      mins  81051;     Neuromuscular Cem:        mins  87081;    Therapeutic Activity:          mins  04120;     Gait Training:           mins  36066;     Ultrasound:          mins  16067;    Electrical Stimulation:  15       mins  90627 ( );   Traction                     ___  mins 22953     Timed Treatment: 20     mins   Total Treatment:     60   mins    PT SIGNATURE: Jolanta Dominguez PT [place] # [id number]  DATE TREATMENT INITIATED: 1/30/2022  Electronically signed 1/30/2022    Medicare Initial Certification  Certification Period: 1/30/2022 thru 4/29/2022  I certify that the therapy services are furnished while this patient is under my care.  The services outlined above are required by this patient, and will be reviewed every 90 days.     _______________________________________________________________________  PHYSICIAN: Ashleigh Hernandez PA-C      DATE:     Please sign and return via fax to [phone].. Thank you, [facility].

## 2022-02-07 ENCOUNTER — OFFICE VISIT (OUTPATIENT)
Dept: FAMILY MEDICINE CLINIC | Facility: CLINIC | Age: 72
End: 2022-02-07

## 2022-02-07 VITALS
BODY MASS INDEX: 32.49 KG/M2 | TEMPERATURE: 96.6 F | DIASTOLIC BLOOD PRESSURE: 70 MMHG | OXYGEN SATURATION: 97 % | SYSTOLIC BLOOD PRESSURE: 120 MMHG | HEART RATE: 69 BPM | RESPIRATION RATE: 15 BRPM | WEIGHT: 207 LBS | HEIGHT: 67 IN

## 2022-02-07 DIAGNOSIS — R31.1 BENIGN ESSENTIAL MICROSCOPIC HEMATURIA: Primary | ICD-10-CM

## 2022-02-07 DIAGNOSIS — N39.0 URINARY TRACT INFECTION WITHOUT HEMATURIA, SITE UNSPECIFIED: ICD-10-CM

## 2022-02-07 LAB
BILIRUB BLD-MCNC: NEGATIVE MG/DL
CLARITY, POC: ABNORMAL
COLOR UR: YELLOW
GLUCOSE UR STRIP-MCNC: NEGATIVE MG/DL
KETONES UR QL: NEGATIVE
LEUKOCYTE EST, POC: ABNORMAL
NITRITE UR-MCNC: NEGATIVE MG/ML
PH UR: 5 [PH] (ref 5–8)
PROT UR STRIP-MCNC: ABNORMAL MG/DL
RBC # UR STRIP: NEGATIVE /UL
SP GR UR: 1.02 (ref 1–1.03)
UROBILINOGEN UR QL: NORMAL

## 2022-02-07 PROCEDURE — 81002 URINALYSIS NONAUTO W/O SCOPE: CPT | Performed by: PHYSICIAN ASSISTANT

## 2022-02-07 PROCEDURE — 99213 OFFICE O/P EST LOW 20 MIN: CPT | Performed by: PHYSICIAN ASSISTANT

## 2022-02-07 RX ORDER — NITROFURANTOIN 25; 75 MG/1; MG/1
100 CAPSULE ORAL 2 TIMES DAILY
Qty: 10 CAPSULE | Refills: 0 | Status: SHIPPED | OUTPATIENT
Start: 2022-02-07 | End: 2022-03-10

## 2022-02-07 NOTE — PROGRESS NOTES
"Chief Complaint  UC follow-up (UTI)    Subjective          Krys LAURA Mayes presents to [facility] PR[facility]ARE  History of Present Illness    Krys,\"Danielle\" is a 72-year-old female who presents for urgent care follow-up for UTI.   She states she was having tingling sensation with urination and noticed some blood.  Has a history of kidney stone which is followed by the urologist.  She has had previous UTI's which became she became septic and was hospitalized.  She went to the urgent care for elevation.  She was diagnosed with a UTI and placed on Macrobid and Pyridium.  Urine culture showed mixed organisms. She has finished the antibiotic and is feeling better. Denied any urine frequency, dysuria, urine urgency, urine hesitancy, hematuria, fevers, chills or abdominal pain. She has been trying to drink more water. Diet and sleep have been normal.   Review of Systems   All other systems reviewed and are negative.    Objective   Vital Signs:   /70   Pulse 69   Temp 96.6 °F (35.9 °C)   Resp 15   Ht 168.9 cm (66.5\")   Wt 93.9 kg (207 lb)   SpO2 97%   BMI 32.91 kg/m²     Physical Exam  Vitals and nursing note reviewed.   Constitutional:       Appearance: Normal appearance. She is well-developed and well-groomed. She is obese.      Interventions: Face mask in place.   HENT:      Head: Normocephalic and atraumatic.   Neck:      Trachea: Trachea and phonation normal. No tracheal tenderness.   Cardiovascular:      Rate and Rhythm: Normal rate and regular rhythm.      Pulses: Normal pulses.      Heart sounds: Normal heart sounds, S1 normal and S2 normal. No murmur heard.      Pulmonary:      Effort: Pulmonary effort is normal.      Breath sounds: Normal breath sounds and air entry.   Abdominal:      General: Bowel sounds are normal.      Palpations: Abdomen is soft. There is no hepatomegaly.      Tenderness: There is no abdominal tenderness. There is no right CVA tenderness, guarding or " rebound. Negative signs include Contreras's sign, Rovsing's sign, McBurney's sign, psoas sign and obturator sign.   Musculoskeletal:      Cervical back: Neck supple.   Skin:     General: Skin is warm and dry.      Capillary Refill: Capillary refill takes less than 2 seconds.   Neurological:      Mental Status: She is alert and oriented to person, place, and time.   Psychiatric:         Attention and Perception: Attention and perception normal.         Mood and Affect: Mood and affect normal.         Speech: Speech normal.         Behavior: Behavior normal. Behavior is cooperative.         Thought Content: Thought content normal.         Cognition and Memory: Cognition and memory normal.         Judgment: Judgment normal.     I wore a facial mask, face shield, and gloves during this patient encounter.  Patient also wearing a surgical mask.  Hand hygiene performed before and after seeing the patient.     Result Review :     UA    Urinalysis 1/30/22 2/7/22   Ketones, UA Negative Negative   Leukocytes, UA Small (1+) (A) Trace (A)   (A) Abnormal value            Urine Culture    Urine Culture 1/30/22   Urine Culture Final report             Results for orders placed or performed in visit on 02/07/22   POC Urinalysis Dipstick    Specimen: Urine   Result Value Ref Range    Color Yellow Yellow, Straw, Dark Yellow, Lin    Clarity, UA Hazy (A) Clear    Glucose, UA Negative Negative, 1000 mg/dL (3+) mg/dL    Bilirubin Negative Negative    Ketones, UA Negative Negative    Specific Gravity  1.025 1.005 - 1.030    Blood, UA Negative Negative    pH, Urine 5.0 5.0 - 8.0    Protein, POC Trace (A) Negative mg/dL    Urobilinogen, UA Normal Normal    Leukocytes Trace (A) Negative    Nitrite, UA Negative Negative                 Assessment and Plan    Diagnoses and all orders for this visit:    1. Benign essential microscopic hematuria (Primary)  -     POC Urinalysis Dipstick    2. Urinary tract infection without hematuria, site  unspecified  -     Urine Culture - Urine, Urine, Clean Catch  -     nitrofurantoin, macrocrystal-monohydrate, (Macrobid) 100 MG capsule; Take 1 capsule by mouth 2 (Two) Times a Day.  Dispense: 10 capsule; Refill: 0    Danielle was seen in office today for follow-up from urgent care for UTI with hematuria. I have reviewed her urgent care culture and laboratory results with her today. Repeat urinalysis in office today showed trace amount of leukocytes. Urine culture was sent to the laboratory for further evaluation. I have sent Macrobid prescription to pharmacy to take twice daily for the next 5 days. Danielle was instructed to increase water intake. She will be notified of test results when completed.    Follow Up   Return if symptoms worsen or fail to improve.  Patient was given instructions and counseling regarding her condition or for health maintenance advice. Please see specific information pulled into the AVS if appropriate.     [facility]  [address] [address]  Dept: [phone]  Dept Fax: [phone]  Loc: [phone]  Loc Fax: [phone]

## 2022-02-08 ENCOUNTER — TREATMENT (OUTPATIENT)
Dept: PHYSICAL THERAPY | Facility: CLINIC | Age: 72
End: 2022-02-08

## 2022-02-08 DIAGNOSIS — M25.551 RIGHT HIP PAIN: Primary | ICD-10-CM

## 2022-02-08 DIAGNOSIS — M53.3 SACROILIAC JOINT PAIN: ICD-10-CM

## 2022-02-08 DIAGNOSIS — M53.3 TENDERNESS OF SACROILIAC JOINT: ICD-10-CM

## 2022-02-08 DIAGNOSIS — R68.89 DIFFICULTY PARTICIPATING IN LEISURE ACTIVITIES: ICD-10-CM

## 2022-02-08 PROCEDURE — G0283 ELEC STIM OTHER THAN WOUND: HCPCS | Performed by: PHYSICAL THERAPIST

## 2022-02-08 PROCEDURE — 97110 THERAPEUTIC EXERCISES: CPT | Performed by: PHYSICAL THERAPIST

## 2022-02-08 NOTE — PROGRESS NOTES
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       Subjective     Objective   See Exercise, Manual, and Modality Logs for complete treatment.       Assessment & Plan     Assessment    Assessment details: Pt is tolerating treatment fairly well.  Pt was able to perform all exercises and stretches without pain.  Added bridges, marches and shoulder flex stabilization exercises today for increased core strength.  Will continue to see pt 2x/week for stretching, strengthening, and modalities PRN for pain control.        Progress per Plan of Care         Manual Therapy:         mins  45521;  Therapeutic Exercise:   35      mins  77086;     Neuromuscular Cem:        mins  59400;    Therapeutic Activity:          mins  54991;     Gait Training:           mins  18115;     Ultrasound:          mins  28929;    Electrical Stimulation:    15     mins  26841 ( );  Dry Needling          mins self-pay    Timed Treatment:  35    mins   Total Treatment:    56   mins    Jolanta Dominguez, PT  Physical Therapist

## 2022-02-09 LAB
BACTERIA UR CULT: NORMAL
BACTERIA UR CULT: NORMAL

## 2022-02-10 ENCOUNTER — TREATMENT (OUTPATIENT)
Dept: PHYSICAL THERAPY | Facility: CLINIC | Age: 72
End: 2022-02-10

## 2022-02-10 DIAGNOSIS — M25.551 RIGHT HIP PAIN: Primary | ICD-10-CM

## 2022-02-10 DIAGNOSIS — M53.3 TENDERNESS OF SACROILIAC JOINT: ICD-10-CM

## 2022-02-10 DIAGNOSIS — M53.3 SACROILIAC JOINT PAIN: ICD-10-CM

## 2022-02-10 DIAGNOSIS — R68.89 DIFFICULTY PARTICIPATING IN LEISURE ACTIVITIES: ICD-10-CM

## 2022-02-10 PROCEDURE — 97110 THERAPEUTIC EXERCISES: CPT | Performed by: PHYSICAL THERAPIST

## 2022-02-10 PROCEDURE — G0283 ELEC STIM OTHER THAN WOUND: HCPCS | Performed by: PHYSICAL THERAPIST

## 2022-02-10 NOTE — PROGRESS NOTES
Physical Therapy Daily Progress Note        Patient: Krys Mayes   : 1950  Diagnosis/ICD-10 Code:  Right hip pain [M25.551]  Referring practitioner: Ashleigh Hernandez PA-C  Date of Initial Visit: Type: THERAPY  Noted: 2022  Today's Date: 2/10/2022  Patient seen for 3 sessions         Krys Mayes reports: her back is always stiff in the mornings after sleeping all night and it takes awhile to get going.  No pain with sitting but walking is a 7/10 today.         Subjective     Objective   See Exercise, Manual, and Modality Logs for complete treatment.       Assessment/Plan  Trial of LAD to assist with pain control and pt reported initial relief however after ~5 min reported if felt better when distraction was released.  Pt reported decreased pain with walking/standing after LAD.  Also added gentle hip strengthening with core stability without complaints.  Continued with estim to assist with pain relief.  Pt reported decreased symptoms at the end of the session including with standing and walking.      Progress per Plan of Care           Manual Therapy:    5     mins  66246;  Therapeutic Exercise:    31     mins  96991;     Neuromuscular Cem:        mins  53865;    Therapeutic Activity:          mins  11065;     Gait Training:           mins  01580;     Ultrasound:          mins  99204;    Electrical Stimulation:    15     mins  67958 ( );  Dry Needling          mins self-pay    Timed Treatment:   36   mins   Total Treatment:     52   mins    Flaca Villegas PTA  Physical Therapist Assistant  Jolanta Dominguez, PT   Physical Therapist

## 2022-02-15 ENCOUNTER — TREATMENT (OUTPATIENT)
Dept: PHYSICAL THERAPY | Facility: CLINIC | Age: 72
End: 2022-02-15

## 2022-02-15 DIAGNOSIS — M25.551 RIGHT HIP PAIN: Primary | ICD-10-CM

## 2022-02-15 DIAGNOSIS — R68.89 DIFFICULTY PARTICIPATING IN LEISURE ACTIVITIES: ICD-10-CM

## 2022-02-15 DIAGNOSIS — M53.3 SACROILIAC JOINT PAIN: ICD-10-CM

## 2022-02-15 DIAGNOSIS — M53.3 TENDERNESS OF SACROILIAC JOINT: ICD-10-CM

## 2022-02-15 PROCEDURE — 97140 MANUAL THERAPY 1/> REGIONS: CPT | Performed by: PHYSICAL THERAPIST

## 2022-02-15 PROCEDURE — 97110 THERAPEUTIC EXERCISES: CPT | Performed by: PHYSICAL THERAPIST

## 2022-02-15 PROCEDURE — G0283 ELEC STIM OTHER THAN WOUND: HCPCS | Performed by: PHYSICAL THERAPIST

## 2022-02-15 NOTE — PROGRESS NOTES
Physical Therapy Daily Progress Note        Patient: Krys Mayes   : 1950  Diagnosis/ICD-10 Code:  Right hip pain [M25.551]  Referring practitioner: Ashleigh Hernandez PA-C  Date of Initial Visit: Type: THERAPY  Noted: 2022  Today's Date: 2/15/2022  Patient seen for 4 sessions         Krys Mayes reports: she is about the same.  When she first wakes up she is stiff and it hurts the most at about a 7/10.  Now it is about a 4/10.  She said the pulling on her leg helped last time.         Subjective     Objective   See Exercise, Manual, and Modality Logs for complete treatment.       Assessment/Plan  Gentle progression of core strengthening this date without complaints.  Able to increase time of LAD without complaints and continued reports of relief.  Pt denied tenderness to piriformis however tenderness to palpation a greater trochanter and throughout IT band.  Tightness also noted in IT band thus added STM to address.  Instructed pt in IT band stretch and will add next session. Improved symptoms at end of session thus will continue to progress next session as tolerated.     Progress per Plan of Care           Manual Therapy:    13     mins  48180;  Therapeutic Exercise:    32     mins  01412;     Neuromuscular Cem:        mins  77771;    Therapeutic Activity:          mins  24682;     Gait Training:           mins  90932;     Ultrasound:          mins  84837;    Electrical Stimulation:    15     mins  01895 ( );  Dry Needling          mins self-pay    Timed Treatment:   45   mins   Total Treatment:     65   mins    Flaca Villegas PTA  Physical Therapist Assistant

## 2022-02-22 ENCOUNTER — TREATMENT (OUTPATIENT)
Dept: PHYSICAL THERAPY | Facility: CLINIC | Age: 72
End: 2022-02-22

## 2022-02-22 DIAGNOSIS — M25.551 RIGHT HIP PAIN: ICD-10-CM

## 2022-02-22 DIAGNOSIS — M53.3 TENDERNESS OF SACROILIAC JOINT: ICD-10-CM

## 2022-02-22 DIAGNOSIS — M53.3 SACROILIAC JOINT PAIN: Primary | ICD-10-CM

## 2022-02-22 DIAGNOSIS — R68.89 DIFFICULTY PARTICIPATING IN LEISURE ACTIVITIES: ICD-10-CM

## 2022-02-22 PROCEDURE — 97110 THERAPEUTIC EXERCISES: CPT | Performed by: PHYSICAL THERAPIST

## 2022-02-22 PROCEDURE — 97140 MANUAL THERAPY 1/> REGIONS: CPT | Performed by: PHYSICAL THERAPIST

## 2022-02-22 PROCEDURE — G0283 ELEC STIM OTHER THAN WOUND: HCPCS | Performed by: PHYSICAL THERAPIST

## 2022-02-22 NOTE — PROGRESS NOTES
Physical Therapy Daily Progress Note    Visit # : 5  Krys Mayes reports: she is feeling much better.  Reports the massage has really helped her right leg.  Pt still has pain with long periods of standing and walking, but it does not seem as bad.      Subjective     Objective   See Exercise, Manual, and Modality Logs for complete treatment.     Pt with mild right mid IT band tightness and tenderness with palpation     Assessment & Plan     Assessment    Assessment details: Pt continues to respond to treatment with decreasing c/o pain and tenderness over the right lateral hip and leg.  Pt tolerated the soft tissue massage with less c/o pain after treatment.  Pt was able to perform all exercises without pain and needed less cueing to perform PPT correctly.  Will continue to see pt 2x/week for stretching, strengthening, and modalities PRN for pain relief.        Progress per Plan of Care           Manual Therapy:    13     mins  55111;  Therapeutic Exercise:   20      mins  92252;     Neuromuscular Cem:        mins  43701;    Therapeutic Activity:          mins  11739;     Gait Training:           mins  12104;     Ultrasound:          mins  19124;    Electrical Stimulation:    15     mins  92445 ( );  Dry Needling          mins self-pay    Timed Treatment:  33    mins   Total Treatment:     66   mins    Jolanta Dominguez, PT  Physical Therapist

## 2022-02-23 DIAGNOSIS — F51.01 PRIMARY INSOMNIA: ICD-10-CM

## 2022-02-23 DIAGNOSIS — E78.5 DYSLIPIDEMIA: ICD-10-CM

## 2022-02-23 DIAGNOSIS — Z79.899 HIGH RISK MEDICATION USE: ICD-10-CM

## 2022-02-23 DIAGNOSIS — I10 ESSENTIAL HYPERTENSION: ICD-10-CM

## 2022-02-23 DIAGNOSIS — R73.9 HYPERGLYCEMIA: Primary | ICD-10-CM

## 2022-02-23 DIAGNOSIS — R73.03 PREDIABETES: ICD-10-CM

## 2022-02-23 DIAGNOSIS — E78.2 MIXED HYPERLIPIDEMIA: ICD-10-CM

## 2022-02-24 ENCOUNTER — TREATMENT (OUTPATIENT)
Dept: PHYSICAL THERAPY | Facility: CLINIC | Age: 72
End: 2022-02-24

## 2022-02-24 DIAGNOSIS — M25.551 RIGHT HIP PAIN: ICD-10-CM

## 2022-02-24 DIAGNOSIS — M53.3 SACROILIAC JOINT PAIN: Primary | ICD-10-CM

## 2022-02-24 DIAGNOSIS — M53.3 TENDERNESS OF SACROILIAC JOINT: ICD-10-CM

## 2022-02-24 PROCEDURE — 97112 NEUROMUSCULAR REEDUCATION: CPT | Performed by: PHYSICAL THERAPIST

## 2022-02-24 PROCEDURE — 97140 MANUAL THERAPY 1/> REGIONS: CPT | Performed by: PHYSICAL THERAPIST

## 2022-02-24 PROCEDURE — 97110 THERAPEUTIC EXERCISES: CPT | Performed by: PHYSICAL THERAPIST

## 2022-02-24 PROCEDURE — G0283 ELEC STIM OTHER THAN WOUND: HCPCS | Performed by: PHYSICAL THERAPIST

## 2022-02-24 NOTE — PROGRESS NOTES
Re-Assessment / Re-Certification        Patient: Krys Mayes   : 1950  Diagnosis/ICD-10 Code:  Sacroiliac joint pain [M53.3]  Referring practitioner: Ashleigh Hernandez PA-C  Date of Initial Visit: No linked episodes  Today's Date: 2022  Patient seen for Visit count could not be calculated. Make sure you are using a visit which is associated with an episode. sessions      Subjective:   Subjective Questionnaire: LEFS: 49/80  Clinical Progress: improved  Home Program Compliance: Yes  Treatment has included: therapeutic exercise, neuromuscular re-education, manual therapy, therapeutic activity, electrical stimulation and moist heat    Subjective Pt states the pain in the R lateral hip is about 50% better.  She sleeps on the R side and that wakes her at times.  Pain 8/10 at night and 3/10 currently. Not taking any meds for the pain. Pt reports a burning at the R later.  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  Decrease LBP and right LE pain to a 5/10 with sleeping, standing, and ambulation.  NOT MET  2.  Increase lumbar B SB and Rot AROM to at least 75% with < or = 3/10 pain NOT MET  3.  Decrease B L-S and B hip tenderness to minimal with palpation   4.  Increase B hamstring flexibility to minimal with 90-90 test PROGRESSING    LTGs: 4-8 weeks  1.  Decrease LBP and R LE pain to a 2/10 with sleeping, standing, and  ambulation   2.  Pt has a negative prone ely's test for improved standing posture without having an anterior pelvic tilt.    3.  Increase B piriformis flexibility to minimal tightness with passive hip IR and ER ROM.    4.  Pt is independent with [facility] for self management of her symptoms   5.  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    Signature: __________________________________  Ashleigh Hernandez PA-C    Manual Therapy:    23     mins  32773;  Therapeutic Exercise:    15     mins  00687;     Neuromuscular Cem:    10    mins  64573;    Therapeutic Activity:     0     mins  75875;     Gait Trainin     mins  73920;     Ultrasound:     0     mins  84237;    Electrical Stimulation:    15     mins  43753 ( );  Dry Needling     0     mins self-pay    Timed Treatment:   48   mins   Total Treatment:     63   mins

## 2022-02-26 DIAGNOSIS — F41.9 ANXIETY: ICD-10-CM

## 2022-02-28 RX ORDER — ALPRAZOLAM 1 MG/1
1 TABLET ORAL NIGHTLY PRN
Qty: 30 TABLET | Refills: 0 | Status: SHIPPED | OUTPATIENT
Start: 2022-02-28 | End: 2022-03-28

## 2022-03-01 ENCOUNTER — TREATMENT (OUTPATIENT)
Dept: PHYSICAL THERAPY | Facility: CLINIC | Age: 72
End: 2022-03-01

## 2022-03-01 DIAGNOSIS — M53.3 SACROILIAC JOINT PAIN: Primary | ICD-10-CM

## 2022-03-01 DIAGNOSIS — M25.551 RIGHT HIP PAIN: ICD-10-CM

## 2022-03-01 DIAGNOSIS — M53.3 TENDERNESS OF SACROILIAC JOINT: ICD-10-CM

## 2022-03-01 DIAGNOSIS — R68.89 DIFFICULTY PARTICIPATING IN LEISURE ACTIVITIES: ICD-10-CM

## 2022-03-01 PROCEDURE — G0283 ELEC STIM OTHER THAN WOUND: HCPCS | Performed by: PHYSICAL THERAPIST

## 2022-03-01 PROCEDURE — 97140 MANUAL THERAPY 1/> REGIONS: CPT | Performed by: PHYSICAL THERAPIST

## 2022-03-01 PROCEDURE — 97110 THERAPEUTIC EXERCISES: CPT | Performed by: PHYSICAL THERAPIST

## 2022-03-01 NOTE — PROGRESS NOTES
Physical Therapy Daily Progress Note        Patient: Krys Mayes   : 1950  Diagnosis/ICD-10 Code:  Sacroiliac joint pain [M53.3]  Referring practitioner: Ashleigh Hernandez PA-C  Date of Initial Visit: Type: THERAPY  Noted: 2022  Today's Date: 3/1/2022  Patient seen for 6 sessions         Krys Mayes reports: she is kind of sore.  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  She said it was bad this morning but as the day goes on it gets better.  Her pain is about a 6/10 right now.  She asked for advice on sleeping positions.  She said it starts to bother her at about 2-3AM.         Subjective     Objective   See Exercise, Manual, and Modality Logs for complete treatment.       Assessment/Plan  Discussed using pillow between knees and use of body pillow to allow pt to unload hip with sidelying. Pt verbalized understanding.  Maintained exercises and continued with IT band STM and LAD for pain relief d/t increased soreness/pain after walking more while volunteering yesterday.  Held clam shells, hip mobs, and lumbar gapping d/t time constraints.  Will continue to progress exercises as tolerated with focus on decreased pain with functional exercises and self management of condition.     Progress per Plan of Care           Manual Therapy:    14     mins  63831;  Therapeutic Exercise:    36     mins  48133;     Neuromuscular Cem:        mins  97927;    Therapeutic Activity:          mins  61637;     Gait Training:           mins  99242;     Ultrasound:          mins  79696;    Electrical Stimulation:    15     mins  54939 ( );  Dry Needling          mins self-pay    Timed Treatment:   50   mins   Total Treatment:     69   mins    Flaca Villegas PTA  Physical Therapist Assistant

## 2022-03-03 ENCOUNTER — TREATMENT (OUTPATIENT)
Dept: PHYSICAL THERAPY | Facility: CLINIC | Age: 72
End: 2022-03-03

## 2022-03-03 DIAGNOSIS — M25.551 RIGHT HIP PAIN: ICD-10-CM

## 2022-03-03 DIAGNOSIS — M53.3 SACROILIAC JOINT PAIN: Primary | ICD-10-CM

## 2022-03-03 DIAGNOSIS — R68.89 DIFFICULTY PARTICIPATING IN LEISURE ACTIVITIES: ICD-10-CM

## 2022-03-03 DIAGNOSIS — M53.3 TENDERNESS OF SACROILIAC JOINT: ICD-10-CM

## 2022-03-03 LAB
ALBUMIN SERPL-MCNC: 4.2 G/DL (ref 3.7–4.7)
ALBUMIN/GLOB SERPL: 1.4 {RATIO} (ref 1.2–2.2)
ALP SERPL-CCNC: 76 IU/L (ref 44–121)
ALT SERPL-CCNC: 12 IU/L (ref 0–32)
AST SERPL-CCNC: 17 IU/L (ref 0–40)
BASOPHILS # BLD AUTO: 0 X10E3/UL (ref 0–0.2)
BASOPHILS NFR BLD AUTO: 1 %
BILIRUB SERPL-MCNC: 0.4 MG/DL (ref 0–1.2)
BUN SERPL-MCNC: 22 MG/DL (ref 8–27)
BUN/CREAT SERPL: 21 (ref 12–28)
CALCIUM SERPL-MCNC: 9.8 MG/DL (ref 8.7–10.3)
CHLORIDE SERPL-SCNC: 102 MMOL/L (ref 96–106)
CHOLEST SERPL-MCNC: 164 MG/DL (ref 100–199)
CO2 SERPL-SCNC: 21 MMOL/L (ref 20–29)
CREAT SERPL-MCNC: 1.03 MG/DL (ref 0.57–1)
EGFR GENE MUT ANL BLD/T: 57 ML/MIN/1.73
EOSINOPHIL # BLD AUTO: 0.2 X10E3/UL (ref 0–0.4)
EOSINOPHIL NFR BLD AUTO: 3 %
ERYTHROCYTE [DISTWIDTH] IN BLOOD BY AUTOMATED COUNT: 12.8 % (ref 11.7–15.4)
GLOBULIN SER CALC-MCNC: 3 G/DL (ref 1.5–4.5)
GLUCOSE SERPL-MCNC: 106 MG/DL (ref 65–99)
HBA1C MFR BLD: 5.8 % (ref 4.8–5.6)
HCT VFR BLD AUTO: 45.6 % (ref 34–46.6)
HDLC SERPL-MCNC: 60 MG/DL
HGB BLD-MCNC: 14.1 G/DL (ref 11.1–15.9)
IMM GRANULOCYTES # BLD AUTO: 0 X10E3/UL (ref 0–0.1)
IMM GRANULOCYTES NFR BLD AUTO: 0 %
LDLC SERPL CALC-MCNC: 76 MG/DL (ref 0–99)
LDLC/HDLC SERPL: 1.3 RATIO (ref 0–3.2)
LYMPHOCYTES # BLD AUTO: 1.3 X10E3/UL (ref 0.7–3.1)
LYMPHOCYTES NFR BLD AUTO: 22 %
MCH RBC QN AUTO: 25.3 PG (ref 26.6–33)
MCHC RBC AUTO-ENTMCNC: 30.9 G/DL (ref 31.5–35.7)
MCV RBC AUTO: 82 FL (ref 79–97)
MONOCYTES # BLD AUTO: 0.7 X10E3/UL (ref 0.1–0.9)
MONOCYTES NFR BLD AUTO: 11 %
NEUTROPHILS # BLD AUTO: 3.9 X10E3/UL (ref 1.4–7)
NEUTROPHILS NFR BLD AUTO: 63 %
PLATELET # BLD AUTO: 318 X10E3/UL (ref 150–450)
POTASSIUM SERPL-SCNC: 4.5 MMOL/L (ref 3.5–5.2)
PROT SERPL-MCNC: 7.2 G/DL (ref 6–8.5)
RBC # BLD AUTO: 5.57 X10E6/UL (ref 3.77–5.28)
SODIUM SERPL-SCNC: 143 MMOL/L (ref 134–144)
TRIGL SERPL-MCNC: 167 MG/DL (ref 0–149)
VLDLC SERPL CALC-MCNC: 28 MG/DL (ref 5–40)
WBC # BLD AUTO: 6 X10E3/UL (ref 3.4–10.8)

## 2022-03-03 PROCEDURE — 97140 MANUAL THERAPY 1/> REGIONS: CPT | Performed by: PHYSICAL THERAPIST

## 2022-03-03 PROCEDURE — 97110 THERAPEUTIC EXERCISES: CPT | Performed by: PHYSICAL THERAPIST

## 2022-03-03 PROCEDURE — G0283 ELEC STIM OTHER THAN WOUND: HCPCS | Performed by: PHYSICAL THERAPIST

## 2022-03-03 NOTE — PROGRESS NOTES
Physical Therapy Daily Progress Note        Patient: Krys Mayes   : 1950  Diagnosis/ICD-10 Code:  Sacroiliac joint pain [M53.3]  Referring practitioner: Ashleigh Hernandez PA-C  Date of Initial Visit: Type: THERAPY  Noted: 2022  Today's Date: 3/3/2022  Patient seen for 7 sessions         Krys Mayes reports: she is doing pretty good today.  She said she has pain when she first stands up and takes a step but after that it goes away.         Subjective     Objective   See Exercise, Manual, and Modality Logs for complete treatment.       Assessment/Plan  Pt reported decreased pain today thus progressed to standing rows/shoulder ext to assist with core strengthening improve functional positions/activities.  She tolerated progressions well and if pain continues to be limited plan to add paloff press, sidestepping, and/or core stabilization with sit to stands.  Continued with STM to IT band d/t continued tenderness however pt reported decreased compared to previously.  Also continued with LAD for pain relief.  Will monitor tolerance to changes and progress as stated next session if able.     Progress per Plan of Care           Manual Therapy:    13     mins  18630;  Therapeutic Exercise:    35     mins  06502;     Neuromuscular Cem:        mins  82038;    Therapeutic Activity:          mins  58528;     Gait Training:           mins  09099;     Ultrasound:          mins  49417;    Electrical Stimulation:    15     mins  94975 ( );  Dry Needling          mins self-pay    Timed Treatment:   48   mins   Total Treatment:     68   mins    Flaca Villegas PTA  Physical Therapist Assistant

## 2022-03-05 DIAGNOSIS — F41.9 ANXIETY: ICD-10-CM

## 2022-03-05 DIAGNOSIS — I10 ESSENTIAL HYPERTENSION: ICD-10-CM

## 2022-03-05 DIAGNOSIS — F43.21 GRIEF: ICD-10-CM

## 2022-03-06 RX ORDER — LOSARTAN POTASSIUM 100 MG/1
TABLET ORAL
Qty: 90 TABLET | Refills: 0 | Status: SHIPPED | OUTPATIENT
Start: 2022-03-06 | End: 2022-06-06

## 2022-03-08 LAB — DRUGS UR: NORMAL

## 2022-03-09 ENCOUNTER — TREATMENT (OUTPATIENT)
Dept: PHYSICAL THERAPY | Facility: CLINIC | Age: 72
End: 2022-03-09

## 2022-03-09 DIAGNOSIS — M53.3 SACROILIAC JOINT PAIN: Primary | ICD-10-CM

## 2022-03-09 DIAGNOSIS — M25.551 RIGHT HIP PAIN: ICD-10-CM

## 2022-03-09 DIAGNOSIS — R68.89 DIFFICULTY PARTICIPATING IN LEISURE ACTIVITIES: ICD-10-CM

## 2022-03-09 DIAGNOSIS — M53.3 TENDERNESS OF SACROILIAC JOINT: ICD-10-CM

## 2022-03-09 PROCEDURE — 97110 THERAPEUTIC EXERCISES: CPT | Performed by: PHYSICAL THERAPIST

## 2022-03-09 NOTE — PROGRESS NOTES
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      Subjective     Objective          Tenderness     Right Hip   Tenderness in the greater trochanter.     Additional Tenderness Details  Mild tenderness inferior to the right greater trochanter/IT band with minimal palpable tightness in the IT band     Tests     Lumbar     Left   Negative passive SLR.     Right   Negative passive SLR.     Left Hip   Negative TAYLOR.   90/90 SLR: Positive.   SLR: Positive.     Right Hip   Negative TAYLOR.   90/90 SLR: Positive.   SLR: Positive.     Additional Tests Details  Minimal B hamstring tightness       See Exercise, Manual, and Modality Logs for complete treatment.       Assessment & Plan     Assessment    Assessment details: Pt is tolerating to treatment well, but continues to c/o pain with long periods of standing and ambulation.  Pt with isolated point tenderness over the right greater trochanter and lateral hip/IT band.  Question if pt's symptoms are caused by hip bursitis/IT band syndrome vs lumbar radiculitis.  Pt with negative SLR test.  Added continuous US to lateral hip and IT band to decrease pt's point tenderness and tightness over the right hip.  Pt tolerated paloff press and lateral stepping without difficulty.  Will see pt once per week for stretching, strengthening and modalities PRN for pain.  Will check pt's response of the US next visit.          Progress per Plan of Care           Manual Therapy:   5      mins  92663;  Therapeutic Exercise:   27      mins  18346;     Neuromuscular Cem:        mins  44540;    Therapeutic Activity:          mins  75583;     Gait Training:           mins  99701;     Ultrasound:     8     mins  86599;US performed by PT Surphace  Stimulation:         mins  59499 ( );  Dry Needling          mins self-pay    Timed Treatment:  32    mins   Total Treatment:     65   mins    Jolanta Dominguez, PT  Physical Therapist

## 2022-03-10 ENCOUNTER — OFFICE VISIT (OUTPATIENT)
Dept: FAMILY MEDICINE CLINIC | Facility: CLINIC | Age: 72
End: 2022-03-10

## 2022-03-10 VITALS
WEIGHT: 208 LBS | BODY MASS INDEX: 33.43 KG/M2 | OXYGEN SATURATION: 99 % | HEART RATE: 59 BPM | HEIGHT: 66 IN | SYSTOLIC BLOOD PRESSURE: 122 MMHG | RESPIRATION RATE: 15 BRPM | TEMPERATURE: 96.9 F | DIASTOLIC BLOOD PRESSURE: 78 MMHG

## 2022-03-10 DIAGNOSIS — E78.5 DYSLIPIDEMIA: ICD-10-CM

## 2022-03-10 DIAGNOSIS — R73.03 PREDIABETES: Primary | ICD-10-CM

## 2022-03-10 DIAGNOSIS — I10 ESSENTIAL HYPERTENSION: ICD-10-CM

## 2022-03-10 DIAGNOSIS — F41.8 MIXED ANXIETY DEPRESSIVE DISORDER: ICD-10-CM

## 2022-03-10 PROCEDURE — 99213 OFFICE O/P EST LOW 20 MIN: CPT | Performed by: PHYSICIAN ASSISTANT

## 2022-03-10 NOTE — PROGRESS NOTES
"Chief Complaint  Prediabetes (management), Hypertension (management), Hyperlipidemia (management), and Anxiety (management)    Subjective          Krys LAURA Mayes presents to [facility]  History of Present Illness  Krys,\"Danielle\" is a 72 year old female who presents for prediabetes,hypertension,hyperlipidemia and anxiety management.  Danielle is doing well overall.  States she has been trying to walk for exercise.  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  Sleep has been restless at times due to right hip pain.  Has been doing physical therapy at [place] which is helping.  Diet has been improving.  She has been trying to eat less sweets.  Doing well with the sertraline and Xanax medication.  Has helped with her anxiety and depression issues.  Denied any suicidal or homicidal ideation.  Denied any fevers, chills, upper respiratory symptoms, chest pain, shortness of air, dizziness, vision changes or swelling of ankles.  Has no complaints today.  Review of Systems   All other systems reviewed and are negative.    Objective   Vital Signs:   /78   Pulse 59   Temp 96.9 °F (36.1 °C)   Resp 15   Ht 168.9 cm (66.5\")   Wt 94.3 kg (208 lb)   SpO2 99%   BMI 33.07 kg/m²     Physical Exam  Vitals and nursing note reviewed.   Constitutional:       Appearance: Normal appearance. She is well-developed and well-groomed. She is obese.      Interventions: Face mask in place.   HENT:      Head: Normocephalic and atraumatic.   Neck:      Thyroid: No thyroid mass, thyromegaly or thyroid tenderness.      Vascular: No carotid bruit.      Trachea: Trachea and phonation normal. No tracheal tenderness.   Cardiovascular:      Rate and Rhythm: Normal rate and regular rhythm.      Pulses: Normal pulses.      Heart sounds: Normal heart sounds, S1 normal and S2 normal. No murmur heard.  Pulmonary:      Effort: Pulmonary effort is normal.      Breath sounds: Normal breath " sounds and air entry.   Abdominal:      General: Bowel sounds are normal.      Palpations: Abdomen is soft. There is no hepatomegaly.      Tenderness: There is no abdominal tenderness. There is no right CVA tenderness, left CVA tenderness, guarding or rebound. Negative signs include Contreras's sign, Rovsing's sign, McBurney's sign, psoas sign and obturator sign.   Musculoskeletal:      Cervical back: Neck supple.      Right lower leg: No edema.      Left lower leg: No edema.   Skin:     General: Skin is warm and dry.      Capillary Refill: Capillary refill takes less than 2 seconds.   Neurological:      Mental Status: She is alert and oriented to person, place, and time.   Psychiatric:         Attention and Perception: Attention and perception normal.         Mood and Affect: Mood and affect normal.         Speech: Speech normal.         Behavior: Behavior normal. Behavior is cooperative.         Thought Content: Thought content normal.         Cognition and Memory: Cognition and memory normal.         Judgment: Judgment normal.     I wore a facial mask, face shield, and gloves during this patient encounter.  Patient also wearing a surgical mask.  Hand hygiene performed before and after seeing the patient.     Result Review :     CMP    CMP 8/27/21 3/2/22   Glucose 108 (A) 106 (A)   BUN 19 22   Creatinine 1.05 (A) 1.03 (A)   eGFR Non  Am 52 (A)    eGFR African Am 62    Sodium 144 143   Potassium 4.2 4.5   Chloride 104 102   Calcium 9.7 9.8   Total Protein 6.9 7.2   Albumin 4.30 4.2   Globulin 2.6 3.0   Total Bilirubin 0.2 0.4   Alkaline Phosphatase 79 76   AST (SGOT) 21 17   ALT (SGPT) 19 12   (A) Abnormal value       Comments are available for some flowsheets but are not being displayed.           CBC w/diff    CBC w/Diff 8/27/21 3/2/22   WBC 6.37 6.0   RBC 5.64 (A) 5.57 (A)   Hemoglobin 14.4 14.1   Hematocrit 47.3 (A) 45.6   MCV 83.9 82   MCH 25.5 (A) 25.3 (A)   MCHC 30.4 (A) 30.9 (A)   RDW 13.3 12.8    Platelets 296 318   Neutrophil Rel % 62.0 63   Lymphocyte Rel % 22.4 22   Monocyte Rel % 11.9 11   Eosinophil Rel % 2.8 3   Basophil Rel % 0.6 1   (A) Abnormal value            Lipid Panel    Lipid Panel 8/27/21 3/2/22   Total Cholesterol 164 164   Triglycerides 244 (A) 167 (A)   HDL Cholesterol 60 60   VLDL Cholesterol 39 28   LDL Cholesterol  65 76   LDL/HDL Ratio 0.92 1.3   (A) Abnormal value       Comments are available for some flowsheets but are not being displayed.               Most Recent A1C    HGBA1C Most Recent 3/2/22   Hemoglobin A1C 5.8 (A)   (A) Abnormal value       Comments are available for some flowsheets but are not being displayed.                     Assessment and Plan    Diagnoses and all orders for this visit:    1. Prediabetes (Primary)    2. Dyslipidemia    3. Essential hypertension    4. Mixed anxiety depressive disorder    1.  Chronic and stable prediabetes with hyperlipidemia: I have reviewed above blood work with her.  Overall test results are improving.  Stressed the importance of continue to eat healthy and to increase her physical activity.  Will return to office in 6 months for Medicare wellness examination with fasting lab work that includes CBC, CMP, A1c and a lipid profile.   2.  Chronic and stable hypertension: I have rechecked blood pressure today and got 120/70 in left arm.  Doing well with her current medication.  She will keep follow-up appointments with cardiology.  3.  Chronic and stable mixed anxiety/depressive disorder: Doing well with her sertraline and Xanax prescription.  No refills are needed.  Will return to office in 6 months for reevaluation.      Follow Up   Return in about 6 months (around 9/10/2022), or labs prior, for Medicare Wellness.  Patient was given instructions and counseling regarding her condition or for health maintenance advice. Please see specific information pulled into the AVS if appropriate.     [facility]  [facility]  [address] [address]  Dept: [phone]  Dept Fax: [phone]  Loc: [phone]  Loc Fax: [phone]

## 2022-03-18 ENCOUNTER — TREATMENT (OUTPATIENT)
Dept: PHYSICAL THERAPY | Facility: CLINIC | Age: 72
End: 2022-03-18

## 2022-03-18 DIAGNOSIS — M53.3 SACROILIAC JOINT PAIN: Primary | ICD-10-CM

## 2022-03-18 DIAGNOSIS — M53.3 TENDERNESS OF SACROILIAC JOINT: ICD-10-CM

## 2022-03-18 DIAGNOSIS — M25.551 RIGHT HIP PAIN: ICD-10-CM

## 2022-03-18 DIAGNOSIS — R68.89 DIFFICULTY PARTICIPATING IN LEISURE ACTIVITIES: ICD-10-CM

## 2022-03-18 PROCEDURE — 97140 MANUAL THERAPY 1/> REGIONS: CPT | Performed by: PHYSICAL THERAPIST

## 2022-03-18 PROCEDURE — 97110 THERAPEUTIC EXERCISES: CPT | Performed by: PHYSICAL THERAPIST

## 2022-03-18 PROCEDURE — 97035 APP MDLTY 1+ULTRASOUND EA 15: CPT | Performed by: PHYSICAL THERAPIST

## 2022-03-18 NOTE — PROGRESS NOTES
Physical Therapy Daily Progress Note    Patient: Krys Mayes   : 1950  Diagnosis/ICD-10 Code:  Sacroiliac joint pain [M53.3]  Referring practitioner: Ashleigh Hernandez PA-C  Date of Initial Visit: Type: THERAPY  Noted: 2022  Today's Date: 3/18/2022  Patient seen for 9 sessions           Subjective It's getting better I think. I think the US was helpful and I've been using the heat like they said. The pain is there the most when I first start walking, 5/10 and then it eases as I go on.     Objective   See Exercise, Manual, and Modality Logs for complete treatment.       Assessment/Plan Continued US today and manual but also added trigger point release and myofascial release R piriformis and glut med.            Timed:    Manual Therapy:    11     mins  05214;  Therapeutic Exercise:    18     mins  41209;     Neuromuscular Cem:        mins  14237;    Therapeutic Activity:          mins  62363;     Gait Training:           mins  28768;     Ultrasound:     10     mins  64535;    Electrical Stimulation:         mins  45683 ( );  Iontophoresis         mins 04443;  Aquatic Therapy         mins 60991;  Dry Needling                   mins 19054/ 39811 (Self-pay)    Untimed:  Electrical Stimulation:         mins  14710 ( );  Traction:         mins  03967;     Timed Treatment:   39   mins   Total Treatment:     40   mins    Shama Nguyen PT  Physical Therapist    [place] License:[id number]

## 2022-03-24 ENCOUNTER — TREATMENT (OUTPATIENT)
Dept: PHYSICAL THERAPY | Facility: CLINIC | Age: 72
End: 2022-03-24

## 2022-03-24 DIAGNOSIS — M25.551 RIGHT HIP PAIN: ICD-10-CM

## 2022-03-24 DIAGNOSIS — R68.89 DIFFICULTY PARTICIPATING IN LEISURE ACTIVITIES: ICD-10-CM

## 2022-03-24 DIAGNOSIS — M53.3 SACROILIAC JOINT PAIN: Primary | ICD-10-CM

## 2022-03-24 DIAGNOSIS — M53.3 TENDERNESS OF SACROILIAC JOINT: ICD-10-CM

## 2022-03-24 PROCEDURE — 97530 THERAPEUTIC ACTIVITIES: CPT | Performed by: PHYSICAL THERAPIST

## 2022-03-24 PROCEDURE — 97110 THERAPEUTIC EXERCISES: CPT | Performed by: PHYSICAL THERAPIST

## 2022-03-24 NOTE — PROGRESS NOTES
Re-Assessment / Re-Certification        Patient: Krys Mayes   : 1950  Diagnosis/ICD-10 Code:  Sacroiliac joint pain [M53.3]  Referring practitioner: Ashleigh Hernandez PA-C  Date of Initial Visit: 3/24/2022  Today's Date: 3/24/2022  Patient seen for 10 sessions      Subjective:   Krys Mayes reports: She is still having the pain in just the hip mostly at night when she is trying to sleep.  It is still difficult to go from sit to stand, the first step is painful then when she gets going it improves.  She walks with a limp because it hurts from the hip to the knee, but denies having any N/T.  Pt did have X-rays and it shows degenerative changes to the right hip, but pt does not go back to the MD.  Pt states somedays it is good.  Pt describes her pain as throbbing and rates her pain a 7/10 at night and a 3/10 during the day.   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    Subjective Questionnaire: LEFS: 41/80  Clinical Progress: improved  Home Program Compliance: Yes  Treatment has included: therapeutic exercise, manual therapy, therapeutic activity, electrical stimulation, ultrasound, moist heat and pt has been instructed in a HEP.      Subjective   Objective          Tenderness     Right Hip   Tenderness in the greater trochanter.     Additional Tenderness Details  Mild tenderness over the right greater trochanter     Passive Range of Motion     Right Hip   External rotation (90/90): with pain  Internal rotation (90/90): with pain    Additional Passive Range of Motion Details  Passive right hip IR and ER-painful at end ranges, moderate limitation with IR     Strength/Myotome Testing     Right Hip   Planes of Motion   Flexion: 4  External rotation: 4  Internal rotation: 5    Additional Strength Details  Right hip pain with MMT during flex and ER     Tests     Left Hip   90/90 SLR: Negative.   SLR: Negative.     Right Hip   Positive TAYLOR and FADIR.   90/90 SLR: Negative.  SLR:  Negative.     Additional Tests Details  Painful right hip pain during TAYLOR and FADIR       Assessment & Plan     Assessment    Assessment details: Pt is improving some with PT, but feel pt's symptoms are more isolated to her right hip vs back with radicular leg pain.  Pt with isolated tenderness over the right greater trochanter, hip weakness and pain during hip flex and ER MMT, and pt with limited ROM on passive hip IR with a positive TAYLOR and FADIR test.  Pt has met 2/9 goals and is showing progress towards her other goals.  Changed exercises today to focus on right hip strengthening, stretching, continued with manual LAD, added a hip ER strengthening exercise, and continued with US treatments to decrease right hip pain and tightness.  Will continue to see pt once per week for one more month treating pt for right hip pain.        Progress toward previous goals: Partially Met      Recommendations: Continue with recommendations once per week for another 3-4 weeks focusing treatment more on the right hip vs back.    Timeframe: 1 month  Prognosis to achieve goals: good  Goals  Plan Goals: STGs: 2-4 weeks  1.  Decrease LBP and right LE pain to a 5/10 with sleeping, standing, and ambulation.  PARTIALLY MET, NOT MET FOR THE SLEEPING   2.  Increase lumbar B SB and Rot AROM to at least 75% with < or = 3/10 pain  NA   3.  Decrease B L-S and B hip tenderness to minimal with palpation  PARTIALLY MET -MILD TENDERNESS OVER RIGHT HIP   4.  Increase B hamstring flexibility to minimal with 90-90 test  MET     LTGs: 4-8 weeks  1.  Decrease LBP and R LE pain to a 2/10 with sleeping, standing, and ambulation  NOT MET   2.  Pt has a negative prone ely's test for improved standing posture without having an anterior pelvic tilt.  NT  3.  Increase B piriformis flexibility to minimal tightness with passive hip IR and ER ROM.  NOT MET   4.  Pt is independent with [facility] for self management of her symptoms  MET  5.  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      PT Signature: Jolanta Dominguez, PT [place] # [id number]  Electronically signed 3/24/2022       Manual Therapy:   5      mins  53625;  Therapeutic Exercise:   16      mins  74575;     Neuromuscular Cem:        mins  25644;    Therapeutic Activity:    10      mins  24598;     Gait Training:           mins  13510;     Ultrasound:     8     mins  91352;  US performed by PT tech   Electrical Stimulation:         mins  39387 ( );  Traction                       ___ mins  94744    Timed Treatment:  31    mins   Total Treatment:     55   mins

## 2022-03-27 DIAGNOSIS — F41.9 ANXIETY: ICD-10-CM

## 2022-03-28 RX ORDER — ALPRAZOLAM 1 MG/1
TABLET ORAL
Qty: 30 TABLET | Refills: 0 | Status: SHIPPED | OUTPATIENT
Start: 2022-03-28 | End: 2022-04-25

## 2022-03-31 ENCOUNTER — TREATMENT (OUTPATIENT)
Dept: PHYSICAL THERAPY | Facility: CLINIC | Age: 72
End: 2022-03-31

## 2022-03-31 DIAGNOSIS — R68.89 DIFFICULTY PARTICIPATING IN LEISURE ACTIVITIES: ICD-10-CM

## 2022-03-31 DIAGNOSIS — M53.3 TENDERNESS OF SACROILIAC JOINT: ICD-10-CM

## 2022-03-31 DIAGNOSIS — M25.551 RIGHT HIP PAIN: ICD-10-CM

## 2022-03-31 DIAGNOSIS — M53.3 SACROILIAC JOINT PAIN: Primary | ICD-10-CM

## 2022-03-31 PROCEDURE — 97110 THERAPEUTIC EXERCISES: CPT | Performed by: PHYSICAL THERAPIST

## 2022-04-05 ENCOUNTER — TREATMENT (OUTPATIENT)
Dept: PHYSICAL THERAPY | Facility: CLINIC | Age: 72
End: 2022-04-05

## 2022-04-05 DIAGNOSIS — M25.551 RIGHT HIP PAIN: ICD-10-CM

## 2022-04-05 DIAGNOSIS — M53.3 TENDERNESS OF SACROILIAC JOINT: ICD-10-CM

## 2022-04-05 DIAGNOSIS — R68.89 DIFFICULTY PARTICIPATING IN LEISURE ACTIVITIES: ICD-10-CM

## 2022-04-05 DIAGNOSIS — M53.3 SACROILIAC JOINT PAIN: Primary | ICD-10-CM

## 2022-04-05 PROCEDURE — 97140 MANUAL THERAPY 1/> REGIONS: CPT | Performed by: PHYSICAL THERAPIST

## 2022-04-05 PROCEDURE — 97110 THERAPEUTIC EXERCISES: CPT | Performed by: PHYSICAL THERAPIST

## 2022-04-05 NOTE — PROGRESS NOTES
Physical Therapy Daily Progress Note      Visit # : 12  Krys Mayes reports: her hip continues to hurt.  She has her good days and bad days.  It still hurts the most at night when she is trying to sleep.  Pt rates her hip pain a 7/10.      Subjective     Objective   See Exercise, Manual, and Modality Logs for complete treatment.     Moderate point tenderness over the right greater trochanter      Assessment & Plan     Assessment    Assessment details: Pt continues to c/o right hip pain and tenderness over the right greater trochanter with little improvements with PT.  Feel pt's symptoms are coming from right hip vs radicular from her low back.  Pt is tolerating all gentle ROM, strengthening, and stretching exercises.  Pt reports getting temporary relief from the US, but by night her hip will hurt again when she is trying to sleep.  Pt reports relief from long axis distraction of her right leg and gentle hip joint mobilizations.  Pt continues to ambulate with an antalgic gait right LE.  Recommend further testing or possibly pt could benefit from an injection into the right hip.  Pt is going to MD tomorrow and will continue as MD advises.        Progress per Plan of Care       Manual Therapy:    8     mins  86313;  Therapeutic Exercise:    36     mins  84418;     Neuromuscular Cem:        mins  65860;    Therapeutic Activity:          mins  89499;     Gait Training:           mins  60279;     Ultrasound:     8     mins  39426;    Electrical Stimulation:         mins  59977 ( );  Dry Needling          mins self-pay    Timed Treatment:  52    mins   Total Treatment:     52   mins    Jolanta Dominguez, PT  Physical Therapist

## 2022-04-06 ENCOUNTER — OFFICE VISIT (OUTPATIENT)
Dept: FAMILY MEDICINE CLINIC | Facility: CLINIC | Age: 72
End: 2022-04-06

## 2022-04-06 VITALS
HEIGHT: 67 IN | DIASTOLIC BLOOD PRESSURE: 80 MMHG | TEMPERATURE: 96.6 F | WEIGHT: 205 LBS | SYSTOLIC BLOOD PRESSURE: 138 MMHG | RESPIRATION RATE: 14 BRPM | HEART RATE: 57 BPM | BODY MASS INDEX: 32.18 KG/M2 | OXYGEN SATURATION: 96 %

## 2022-04-06 DIAGNOSIS — M25.551 CHRONIC RIGHT HIP PAIN: Primary | ICD-10-CM

## 2022-04-06 DIAGNOSIS — G89.29 CHRONIC RIGHT HIP PAIN: Primary | ICD-10-CM

## 2022-04-06 PROCEDURE — 99213 OFFICE O/P EST LOW 20 MIN: CPT | Performed by: PHYSICIAN ASSISTANT

## 2022-04-06 NOTE — PROGRESS NOTES
"Chief Complaint  Hip Pain (PT recommends seeing orthopedic / still having pain )    Subjective          Krys LAURA Mayes presents to [facility]  History of Present Illness  Krys,\"Danielle\" is a 72-year-old female who presents with chronic right hip pain.  States her pain started about 4 months ago.  Started doing physical therapy in January 2022.  She has had about 13 appointments.  States that physical therapy helps for short periods of time.  Physical therapist would like her to see orthopedist for possible bursitis.  She did have x-ray performed in early January which was normal.  States she has pain during day and night at lateral aspect of hip.  States the more she walks the more the pain occurs.  Denied any recent injuries, trauma or falls.  Has been using arthritis strength Tylenol with some relief.  Leg does not give out on her with ambulating.  Appetite has been normal.  Review of Systems   Musculoskeletal:        Right hip pain   All other systems reviewed and are negative.    Objective   Vital Signs:   /80 (BP Location: Left arm, Patient Position: Sitting, Cuff Size: Adult)   Pulse 57   Temp 96.6 °F (35.9 °C)   Resp 14   Ht 168.9 cm (66.5\")   Wt 93 kg (205 lb)   SpO2 96%   BMI 32.59 kg/m²     Physical Exam  Vitals and nursing note reviewed.   Constitutional:       Appearance: Normal appearance. She is well-developed and well-groomed. She is obese.      Interventions: Face mask in place.   HENT:      Head: Normocephalic and atraumatic.   Neck:      Trachea: Trachea and phonation normal. No tracheal tenderness.   Cardiovascular:      Rate and Rhythm: Normal rate and regular rhythm.      Pulses: Normal pulses.      Heart sounds: Normal heart sounds, S1 normal and S2 normal. No murmur heard.  Pulmonary:      Effort: Pulmonary effort is normal.      Breath sounds: Normal breath sounds and air entry.   Abdominal:      General: Bowel sounds are normal.      " Palpations: Abdomen is soft. There is no hepatomegaly.      Tenderness: There is no abdominal tenderness. There is no right CVA tenderness, left CVA tenderness, guarding or rebound. Negative signs include Contreras's sign, Rovsing's sign, McBurney's sign, psoas sign and obturator sign.   Musculoskeletal:      Cervical back: Neck supple.      Right hip: Bony tenderness present. No tenderness or crepitus. Normal range of motion. Normal strength.      Left hip: Normal.      Right lower leg: No edema.      Left lower leg: No edema.        Legs:    Skin:     General: Skin is warm and dry.      Capillary Refill: Capillary refill takes less than 2 seconds.   Neurological:      Mental Status: She is alert and oriented to person, place, and time.   Psychiatric:         Attention and Perception: Attention and perception normal.         Mood and Affect: Mood and affect normal.         Speech: Speech normal.         Behavior: Behavior normal. Behavior is cooperative.         Thought Content: Thought content normal.         Cognition and Memory: Cognition and memory normal.         Judgment: Judgment normal.     I wore a facial mask, face shield, and gloves during this patient encounter.  Patient also wearing a surgical mask.  Hand hygiene performed before and after seeing the patient.     Result Review :        XR Hip With or Without Pelvis 2 - 3 View Right (01/03/2022 09:24)               Assessment and Plan    Diagnoses and all orders for this visit:    1. Chronic right hip pain (Primary)  -     Ambulatory Referral to Orthopedic Surgery    Danielle was seen in office today for chronic right hip pain.  I reviewed her hip imaging from January 2022 with her at office visit today.  I have  reviewed her previous physical therapy notes.  I will proceed with orthopedic referral for possible bursitis.  I will hold any additional imaging at this time.  Has had 13 physical therapy appointments without improvement.  Will continue her  arthritis strength Tylenol at home for now.    Follow Up   Return if symptoms worsen or fail to improve.  Patient was given instructions and counseling regarding her condition or for health maintenance advice. Please see specific information pulled into the AVS if appropriate.     [facility]  [address] [place] [id number]  Dept: [phone]  Dept Fax: [phone]  Loc: [phone]  Loc Fax: [phone]

## 2022-04-13 ENCOUNTER — TREATMENT (OUTPATIENT)
Dept: PHYSICAL THERAPY | Facility: CLINIC | Age: 72
End: 2022-04-13

## 2022-04-13 DIAGNOSIS — M25.551 RIGHT HIP PAIN: ICD-10-CM

## 2022-04-13 DIAGNOSIS — M53.3 SACROILIAC JOINT PAIN: Primary | ICD-10-CM

## 2022-04-13 DIAGNOSIS — M53.3 TENDERNESS OF SACROILIAC JOINT: ICD-10-CM

## 2022-04-13 DIAGNOSIS — R68.89 DIFFICULTY PARTICIPATING IN LEISURE ACTIVITIES: ICD-10-CM

## 2022-04-13 PROCEDURE — 97140 MANUAL THERAPY 1/> REGIONS: CPT | Performed by: PHYSICAL THERAPIST

## 2022-04-13 PROCEDURE — 97110 THERAPEUTIC EXERCISES: CPT | Performed by: PHYSICAL THERAPIST

## 2022-04-13 PROCEDURE — 97035 APP MDLTY 1+ULTRASOUND EA 15: CPT | Performed by: PHYSICAL THERAPIST

## 2022-04-13 NOTE — PROGRESS NOTES
Physical Therapy Daily Progress Note    Visit # : 13  Krys Mayes reports: no changes since last visit, but still gets temporary relief after PT.      Subjective     Objective   See Exercise, Manual, and Modality Logs for complete treatment.     Mild tenderness over the right greater trochanter       Assessment & Plan     Assessment    Assessment details: Pt continues to c/o right hip pain and tenderness over the right greater trochanter with little improvements with PT.  Pt states she gets temporary relief from the exercises and US treatments.  Pt still with limited tolerance to standing and ambulation due to pain.  Pt ambulates with a moderate antalgic gait right LE and pain with Erica/Fadir.  Pt tolerates all OKC exercises, stretches, and LAD, but continues to c/o pain with limited mobility.  Pt sees Ortho next week, so will continue as MD advises.          Awaiting MD orders           Manual Therapy:   8      mins  52534;  Therapeutic Exercise:    37     mins  90731;     Neuromuscular Cem:        mins  65221;    Therapeutic Activity:          mins  85049;     Gait Training:           mins  98724;     Ultrasound:    8      mins  64055;    Electrical Stimulation:         mins  11028 ( );  Dry Needling          mins self-pay    Timed Treatment:  53    mins   Total Treatment:    57    mins    Jolanta Dominguez, PT  Physical Therapist

## 2022-04-19 ENCOUNTER — OFFICE VISIT (OUTPATIENT)
Dept: ORTHOPEDIC SURGERY | Facility: CLINIC | Age: 72
End: 2022-04-19

## 2022-04-19 ENCOUNTER — PATIENT ROUNDING (BHMG ONLY) (OUTPATIENT)
Dept: ORTHOPEDIC SURGERY | Facility: CLINIC | Age: 72
End: 2022-04-19

## 2022-04-19 VITALS
SYSTOLIC BLOOD PRESSURE: 143 MMHG | HEART RATE: 59 BPM | BODY MASS INDEX: 32.18 KG/M2 | HEIGHT: 67 IN | DIASTOLIC BLOOD PRESSURE: 77 MMHG | WEIGHT: 205 LBS

## 2022-04-19 DIAGNOSIS — M70.61 GREATER TROCHANTERIC BURSITIS OF RIGHT HIP: Primary | ICD-10-CM

## 2022-04-19 PROCEDURE — 99214 OFFICE O/P EST MOD 30 MIN: CPT | Performed by: NURSE PRACTITIONER

## 2022-04-19 PROCEDURE — 20610 DRAIN/INJ JOINT/BURSA W/O US: CPT | Performed by: NURSE PRACTITIONER

## 2022-04-19 RX ORDER — LIDOCAINE HYDROCHLORIDE 10 MG/ML
4 INJECTION, SOLUTION EPIDURAL; INFILTRATION; INTRACAUDAL; PERINEURAL
Status: COMPLETED | OUTPATIENT
Start: 2022-04-19 | End: 2022-04-19

## 2022-04-19 RX ORDER — TRIAMCINOLONE ACETONIDE 40 MG/ML
40 INJECTION, SUSPENSION INTRA-ARTICULAR; INTRAMUSCULAR
Status: COMPLETED | OUTPATIENT
Start: 2022-04-19 | End: 2022-04-19

## 2022-04-19 RX ADMIN — TRIAMCINOLONE ACETONIDE 40 MG: 40 INJECTION, SUSPENSION INTRA-ARTICULAR; INTRAMUSCULAR at 10:35

## 2022-04-19 RX ADMIN — LIDOCAINE HYDROCHLORIDE 4 ML: 10 INJECTION, SOLUTION EPIDURAL; INFILTRATION; INTRACAUDAL; PERINEURAL at 10:35

## 2022-04-19 NOTE — PROGRESS NOTES
April 19, 2022    Hello, may I speak with Krys LAURA Mayes?    My name is MICHAEL     I am  with [facility]  [address]  [place] [address] [phone].    Before we get started may I verify your date of birth? 1950    I am calling to officially welcome you to our practice and ask about your recent visit. Is this a good time to talk? YES    Tell me about your visit with us. What things went well?  EVERYTHING WENT FINE!       We're always looking for ways to make our patients' experiences even better. Do you have recommendations on ways we may improve?  NO    Overall were you satisfied with your first visit to our practice? YES       I appreciate you taking the time to speak with me today. Is there anything else I can do for you? NO      Thank you, and have a great day.

## 2022-04-19 NOTE — PROGRESS NOTES
Subjective:     Patient ID: Krys Mayes is a 72 y.o. female.    Chief Complaint:  Right lower extremity pain, new patient to examiner  History of Present Illness  Krys Mayes con a 32-year-old female presents to clinic for evaluation of her right lower extremity.  She is experiencing pain along the lateral aspect of the hip does radiate inferiorly down the lateral lower extremity stops at her knee.  She is experiencing pain also at the medial joint line of the knee but greater in the hip.  Denies known injury reports she woke up experiencing pain.  Pain is already.  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  Began experiencing pain back in December.  She did present to her primary care x-ray images were completed which were available.  In chart.  Rates discomfort an 8-9 out of a 10 describes as shooting, grinding, burning sensation.  Worse with standing, walking, climbing stairs.  Pain does not radiate into her groin.  She has started physical therapy and pain present along the lateral aspect of the hip.  Does feel weakness when she is ambulating.  Denies other concerns present this time.    Social History     Occupational History   • Not on file   Tobacco Use   • Smoking status: Never Smoker   • Smokeless tobacco: Never Used   • Tobacco comment: caffeine use /soft drinks   Vaping Use   • Vaping Use: Never used   Substance and Sexual Activity   • Alcohol use: Yes     Comment: She uses alcohol once or twice a year.   • Drug use: No   • Sexual activity: Defer      Past Medical History:   Diagnosis Date   • Allergic    • Arthritis    • Controlled type 2 diabetes with renal manifestation     states borderline    • Gastroesophageal reflux disease 1/21/2016   • Hyperlipidemia    • Hypertension    • Kidney stone     recurrent, calcium oxalate   • Menopausal disorder 1998    She went through menopause  "in 1998   • Obesity    • PAF (paroxysmal atrial fibrillation) (HCC)     in the setting of urosepsis, 9/2016   • Reflux esophagitis 8/19/2016   • Sebaceous cyst of labia 12/20/2017   • Sepsis due to urinary tract infection (HCC)    • Sigmoid diverticulitis 11/13/2017   • Urinary tract infection    • Vitamin D deficiency 1/21/2016     Past Surgical History:   Procedure Laterality Date   • ANKLE SURGERY      Ankle Repair / Description: ORif the left ankle in July 2010. Secondary to fall   • BREAST BIOPSY Right     benign   • BREAST SURGERY      biopsy    • CHOLECYSTECTOMY     • COLONOSCOPY  2014    Done 2004 normal recheck in 10 years. Repeated February 2014 colonoscopy was normal and to be repeated in 10 years.   • CYSTOSCOPY BLADDER STONE LITHOTRIPSY     • HEMORRHOIDECTOMY     • NEPHROLITHOTOMY Left 1/9/2017    Procedure: NEPHROLITHOTOMY PERCUTANEOUS SECOND LOOK WITH STENT PLACEMENT AND LASER LITHOTRIPSY;  Surgeon: Mati Villegas MD;  Location: [facility];  Service:    • OTHER SURGICAL HISTORY      Tubal Ligation   • PERCUTANEOUS NEPHROSTOLITHOTOMY Left 12/2016   • TUBAL ABDOMINAL LIGATION         Family History   Problem Relation Age of Onset   • COPD Mother    • Heart attack Father         acute myocardial infarction   • Heart attack Son    • Kidney disease Maternal Grandmother    • Breast cancer Neg Hx          Objective:  Physical Exam    Vital signs reviewed.   General: No acute distress.  Eyes: conjunctiva clear; pupils equally round and reactive  ENT: external ears and nose atraumatic; oropharynx clear  CV: no peripheral edema  Resp: normal respiratory effort  Skin: no rashes or wounds; normal turgor  Psych: mood and affect appropriate; recent and remote memory intact    Vitals:    04/19/22 1010   BP: 143/77   Pulse: 59   Weight: 93 kg (205 lb)   Height: 168.9 cm (66.5\")         04/19/22  1010   Weight: 93 kg (205 lb)     Body mass index is 32.59 kg/m².      Right Hip Exam     Tenderness   The " patient is experiencing tenderness in the greater trochanter.    Range of Motion   Abduction: 45   Adduction: 25   Extension: 0   Flexion: 110   External rotation: 50   Internal rotation: 25     Muscle Strength   Abduction: 4/5   Adduction: 4/5   Flexion: 4/5     Tests   TAYLOR: negative  Jammie: positive  Fadir:  Negative FADIR test    Other   Erythema: absent  Sensation: normal  Pulse: present    Comments:  Negative logroll exam  Negative Stinchfield exam                 Imaging:  XR Hip With or Without Pelvis 2 - 3 View Right    Result Date: 1/3/2022  Degenerative changes of the right hip. No fracture.  This report was finalized on 1/3/2022 9:55 AM by Dr. Eugene Aponte MD.    Independently reviewed three-view x-ray images right hip previously completed available for viewing in chart degenerative changes lumbar spine noted on x-ray, mild to moderate osteoarthritis right hip.  No acute fracture dislocation or other acute osseous abnormality    Assessment:        1. Greater trochanteric bursitis of right hip           Plan:    Large Joint Arthrocentesis: R greater trochanteric bursa  Date/Time: 4/19/2022 10:35 AM  Consent given by: patient  Site marked: site marked  Timeout: Immediately prior to procedure a time out was called to verify the correct patient, procedure, equipment, support staff and site/side marked as required   Supporting Documentation  Indications: pain   Procedure Details  Location: hip - R greater trochanteric bursa  Preparation: Patient was prepped and draped in the usual sterile fashion  Needle size: 22 G  Approach: lateral  Medications administered: 40 mg triamcinolone acetonide 40 MG/ML; 4 mL lidocaine PF 1% 1 %  Patient tolerance: patient tolerated the procedure well with no immediate complications          1. Discussed treatment options with patient.  Wishes to proceed with corticosteroid injection and drainage of enteric bursa of the right hip.  Please see her back clinic in 4 weeks to  reevaluate.  She does report likely osteoarthritis of the right knee but has not bothered her therefore she has not sought treatment.  We will continue with physical therapy for strengthening and decrease pain in the right lower extremity.  Plan to see her back in clinic if she continues to be symptomatic.  All questions answered.  Orders:  Orders Placed This Encounter   Procedures   • Large Joint Arthrocentesis: R greater trochanteric bursa     No orders of the defined types were placed in this encounter.          I ordered and reviewed the SHAREE today.

## 2022-04-21 ENCOUNTER — TREATMENT (OUTPATIENT)
Dept: PHYSICAL THERAPY | Facility: CLINIC | Age: 72
End: 2022-04-21

## 2022-04-21 DIAGNOSIS — M53.3 SACROILIAC JOINT PAIN: Primary | ICD-10-CM

## 2022-04-21 DIAGNOSIS — M53.3 TENDERNESS OF SACROILIAC JOINT: ICD-10-CM

## 2022-04-21 DIAGNOSIS — M25.551 RIGHT HIP PAIN: ICD-10-CM

## 2022-04-21 DIAGNOSIS — R68.89 DIFFICULTY PARTICIPATING IN LEISURE ACTIVITIES: ICD-10-CM

## 2022-04-21 PROCEDURE — 97110 THERAPEUTIC EXERCISES: CPT | Performed by: PHYSICAL THERAPIST

## 2022-04-21 PROCEDURE — 97035 APP MDLTY 1+ULTRASOUND EA 15: CPT | Performed by: PHYSICAL THERAPIST

## 2022-04-21 NOTE — PROGRESS NOTES
Physical Therapy Daily Progress Note    Visit # : 14  Krys Mayes reports: she is better and thinks the cortisone injection has helped.  Pt states she feels like she is walking better, but now her right knee is hurting more when she is walking than her hip.      Subjective     Objective   See Exercise, Manual, and Modality Logs for complete treatment.     Pt with minimal right greater trochanter tenderness.  Pt without tenderness over the right IT band      Assessment & Plan     Assessment    Assessment details: Pt with decreased pain and tenderness since her cortisone shot Tuesday.  Pt with minimal antalgic gait today.  Pt still ambulates with a short step length and stance on the right LE.  Pt tolerated all hip stretches and gentle strengthening exercises.  Added green TB to left sidelying clam.  Will continue to see once per week for stretches, strengthening exercises, and modalities PRN for pain relief.        Progress per Plan of Care         Manual Therapy:         mins  10015;  Therapeutic Exercise:   35      mins  47358;     Neuromuscular Cem:        mins  90899;    Therapeutic Activity:          mins  45079;     Gait Training:           mins  64738;     Ultrasound:     8     mins  82242;    Electrical Stimulation:         mins  92167 ( );  Dry Needling          mins self-pay    Timed Treatment:  43    mins   Total Treatment:     50   mins    Jolanta Dominguez, PT  Physical Therapist

## 2022-04-25 DIAGNOSIS — F41.9 ANXIETY: ICD-10-CM

## 2022-04-25 RX ORDER — ALPRAZOLAM 1 MG/1
TABLET ORAL
Qty: 30 TABLET | Refills: 0 | Status: SHIPPED | OUTPATIENT
Start: 2022-04-25 | End: 2022-05-16

## 2022-05-16 ENCOUNTER — OFFICE VISIT (OUTPATIENT)
Dept: FAMILY MEDICINE CLINIC | Facility: CLINIC | Age: 72
End: 2022-05-16

## 2022-05-16 VITALS
WEIGHT: 202 LBS | BODY MASS INDEX: 31.71 KG/M2 | SYSTOLIC BLOOD PRESSURE: 134 MMHG | HEIGHT: 67 IN | OXYGEN SATURATION: 99 % | DIASTOLIC BLOOD PRESSURE: 74 MMHG | TEMPERATURE: 97.8 F | HEART RATE: 94 BPM | RESPIRATION RATE: 16 BRPM

## 2022-05-16 DIAGNOSIS — F43.21 GRIEF: ICD-10-CM

## 2022-05-16 DIAGNOSIS — F41.9 ANXIETY: ICD-10-CM

## 2022-05-16 PROCEDURE — 99213 OFFICE O/P EST LOW 20 MIN: CPT | Performed by: NURSE PRACTITIONER

## 2022-05-16 RX ORDER — ALPRAZOLAM 1 MG/1
TABLET ORAL
Qty: 60 TABLET | Refills: 0 | Status: SHIPPED | OUTPATIENT
Start: 2022-05-16 | End: 2022-06-20

## 2022-05-16 RX ORDER — SERTRALINE HYDROCHLORIDE 100 MG/1
100 TABLET, FILM COATED ORAL DAILY
Qty: 90 TABLET | Refills: 0 | Status: SHIPPED | OUTPATIENT
Start: 2022-05-16 | End: 2022-08-25

## 2022-05-16 NOTE — PROGRESS NOTES
Patient ID: Krys Mayes is a 72 y.o. female     Patient Care Team:  Ashleigh Hernandez PA-C as PCP - General (Family Medicine)  Jeremy Orozco MD as Consulting Physician (Cardiology)  Joss Oro MD as Consulting Physician (Gastroenterology)    Subjective     Chief Complaint   Patient presents with   • Anxiety       Krys Myaes presents to [facility] today for follow-up on anxiety.     Anxiety  Presents for follow-up visit. Symptoms include decreased concentration, depressed mood, excessive worry, insomnia, nervous/anxious behavior and panic. Patient reports no hyperventilation, palpitations, shortness of breath or suicidal ideas. Primary symptoms comment: worried about  dying and living alone.  Wants to die before  due to fear of being alone.   is in good health at 74.  . Symptoms occur most days (worsened this past Friday). The severity of symptoms is causing significant distress. The patient sleeps 5 hours per night. The quality of sleep is poor. Nighttime awakenings: one to two (Tried Melatonin without improvement).     Compliance with medications is %.   Underwent therapy in the past.  Called therapist today and scheduled appt for 6/3/2022.        She denies any complaints of fever, chills, cough, chest pain, shortness of air, abdominal pain, nausea, or any other concerns.     The following portions of the patient's history were reviewed and updated as appropriate: allergies, current medications, past family history, past medical history, past social history, past surgical history and problem list.       Review of Systems   Cardiovascular: Negative for palpitations.   Respiratory: Negative for shortness of breath.    Psychiatric/Behavioral: Positive for decreased concentration. Negative for suicidal ideas. The patient has insomnia and is nervous/anxious.        Vitals:    05/16/22 1057   BP: 134/74   Pulse: 94   Resp: 16    Temp: 97.8 °F (36.6 °C)   SpO2: 99%       Documented weights    05/16/22 1057   Weight: 91.6 kg (202 lb)     Body mass index is 32.12 kg/m².    Results for orders placed or performed in visit on 02/23/22   Comprehensive Metabolic Panel    Specimen: Blood   Result Value Ref Range    Glucose 106 (H) 65 - 99 mg/dL    BUN 22 8 - 27 mg/dL    Creatinine 1.03 (H) 0.57 - 1.00 mg/dL    EGFR Result 57 (L) >59 mL/min/1.73    BUN/Creatinine Ratio 21 12 - 28    Sodium 143 134 - 144 mmol/L    Potassium 4.5 3.5 - 5.2 mmol/L    Chloride 102 96 - 106 mmol/L    Total CO2 21 20 - 29 mmol/L    Calcium 9.8 8.7 - 10.3 mg/dL    Total Protein 7.2 6.0 - 8.5 g/dL    Albumin 4.2 3.7 - 4.7 g/dL    Globulin 3.0 1.5 - 4.5 g/dL    A/G Ratio 1.4 1.2 - 2.2    Total Bilirubin 0.4 0.0 - 1.2 mg/dL    Alkaline Phosphatase 76 44 - 121 IU/L    AST (SGOT) 17 0 - 40 IU/L    ALT (SGPT) 12 0 - 32 IU/L   Lipid Panel With LDL / HDL Ratio    Specimen: Blood   Result Value Ref Range    Total Cholesterol 164 100 - 199 mg/dL    Triglycerides 167 (H) 0 - 149 mg/dL    HDL Cholesterol 60 >39 mg/dL    VLDL Cholesterol Kenneth 28 5 - 40 mg/dL    LDL Chol Calc (NIH) 76 0 - 99 mg/dL    LDL/HDL RATIO 1.3 0.0 - 3.2 ratio   Hemoglobin A1c    Specimen: Blood   Result Value Ref Range    Hemoglobin A1C 5.8 (H) 4.8 - 5.6 %   Compliance Drug Analysis, Ur - Urine, Clean Catch    Specimen: Urine, Clean Catch   Result Value Ref Range    Report Summary FINAL    CBC & Differential    Specimen: Blood   Result Value Ref Range    WBC 6.0 3.4 - 10.8 x10E3/uL    RBC 5.57 (H) 3.77 - 5.28 x10E6/uL    Hemoglobin 14.1 11.1 - 15.9 g/dL    Hematocrit 45.6 34.0 - 46.6 %    MCV 82 79 - 97 fL    MCH 25.3 (L) 26.6 - 33.0 pg    MCHC 30.9 (L) 31.5 - 35.7 g/dL    RDW 12.8 11.7 - 15.4 %    Platelets 318 150 - 450 x10E3/uL    Neutrophil Rel % 63 Not Estab. %    Lymphocyte Rel % 22 Not Estab. %    Monocyte Rel % 11 Not Estab. %    Eosinophil Rel % 3 Not Estab. %    Basophil Rel % 1 Not Estab. %     Neutrophils Absolute 3.9 1.4 - 7.0 x10E3/uL    Lymphocytes Absolute 1.3 0.7 - 3.1 x10E3/uL    Monocytes Absolute 0.7 0.1 - 0.9 x10E3/uL    Eosinophils Absolute 0.2 0.0 - 0.4 x10E3/uL    Basophils Absolute 0.0 0.0 - 0.2 x10E3/uL    Immature Granulocyte Rel % 0 Not Estab. %    Immature Grans Absolute 0.0 0.0 - 0.1 x10E3/uL           Objective     Physical Exam  Vitals reviewed.   Cardiovascular:      Rate and Rhythm: Normal rate.   Pulmonary:      Effort: Pulmonary effort is normal.   Neurological:      Mental Status: She is alert.   Psychiatric:         Attention and Perception: Attention normal.         Mood and Affect: Affect is tearful.         Speech: Speech normal.         Behavior: Behavior normal.         Thought Content: Thought content normal.         Cognition and Memory: Cognition normal.         Judgment: Judgment normal.            Assessment & Plan     Assessment/Plan     Diagnoses and all orders for this visit:    1. Anxiety  -     ALPRAZolam (XANAX) 1 MG tablet; Take 1/2 to 1 tablet every 12 hours as needed for anxiety or sleep.  Dispense: 60 tablet; Refill: 0    2. Grief    Other orders  -     sertraline (ZOLOFT) 100 MG tablet; Take 1 tablet by mouth Daily.  Dispense: 90 tablet; Refill: 0      Summary:  Krys SANCHEZ Gerber problems with increased anxiety which increased over the past few months however worsen this past Friday.  Mainly worried about her  passing away and her being left alone.  However he is healthy.  The worry keeps her from sleep some night.  Takes Xanax 1 mg nightly to help with sleep which usually helps.    Advised to increase sertraline to 100 mg daily.  May start taking Xanax 1/2 to 1 tablet every 12 hours as needed. Follow-up with therapist as scheduled.      In the meantime, instructed to contact us sooner for any problems or concerns.    Follow Up:  Return in about 4 weeks (around 6/13/2022) for Recheck on anxiety with Ashleigh  .    Patient was given instructions and  counseling regarding condition or for health maintenance advice.  Please see specific information pulled into the AVS if appropriate.      Patient was wearing facemask when I entered the room and throughout our encounter. Protective equipment was worn throughout this patient encounter including a face mask, eye protection,  and gloves. Hand hygiene was performed before donning protective equipment and after removal when leaving the room.     Pauline Enriquez, APRN  Family Medicine  [facility] [place]  05/16/22  11:17 EDT

## 2022-05-17 ENCOUNTER — OFFICE VISIT (OUTPATIENT)
Dept: ORTHOPEDIC SURGERY | Facility: CLINIC | Age: 72
End: 2022-05-17

## 2022-05-17 VITALS — HEIGHT: 67 IN | WEIGHT: 202 LBS | BODY MASS INDEX: 31.71 KG/M2

## 2022-05-17 DIAGNOSIS — M94.261 CHONDROMALACIA, RIGHT KNEE: Primary | ICD-10-CM

## 2022-05-17 DIAGNOSIS — M70.61 GREATER TROCHANTERIC BURSITIS OF RIGHT HIP: ICD-10-CM

## 2022-05-17 PROCEDURE — 99214 OFFICE O/P EST MOD 30 MIN: CPT | Performed by: NURSE PRACTITIONER

## 2022-05-17 PROCEDURE — 20610 DRAIN/INJ JOINT/BURSA W/O US: CPT | Performed by: NURSE PRACTITIONER

## 2022-05-17 PROCEDURE — 73562 X-RAY EXAM OF KNEE 3: CPT | Performed by: NURSE PRACTITIONER

## 2022-05-17 RX ORDER — TRIAMCINOLONE ACETONIDE 40 MG/ML
80 INJECTION, SUSPENSION INTRA-ARTICULAR; INTRAMUSCULAR
Status: COMPLETED | OUTPATIENT
Start: 2022-05-17 | End: 2022-05-17

## 2022-05-17 RX ORDER — LIDOCAINE HYDROCHLORIDE 10 MG/ML
8 INJECTION, SOLUTION EPIDURAL; INFILTRATION; INTRACAUDAL; PERINEURAL
Status: COMPLETED | OUTPATIENT
Start: 2022-05-17 | End: 2022-05-17

## 2022-05-17 RX ADMIN — TRIAMCINOLONE ACETONIDE 80 MG: 40 INJECTION, SUSPENSION INTRA-ARTICULAR; INTRAMUSCULAR at 11:09

## 2022-05-17 RX ADMIN — LIDOCAINE HYDROCHLORIDE 8 ML: 10 INJECTION, SOLUTION EPIDURAL; INFILTRATION; INTRACAUDAL; PERINEURAL at 11:09

## 2022-05-17 NOTE — PROGRESS NOTES
Subjective:     Patient ID: Krys Mayes is a 72 y.o. female.    Chief Complaint:  Right knee pain, new issue to examiner  History of Present Illness  Krys Mayes Presents to clinic for evaluation of her right knee.  Right knee pain on and off for the last several years reports maximal tenderness along the medial joint line as well as anterior aspect.  Denies that the knee is locking, catching or giving way.  She is experiencing pain with weightbearing including standing for extended periods of time, ambulating long distances.  She does feel a popping across the anterior aspect of the knee which she has felt for quite some time.  Not denies any prior x-rays denies any prior MRI or CT.  Denies any prior corticosteroid injections of the knees.  Does experience swelling from time to time.  She was previously seen for right hip bursa corticosteroid injection 4/19/2022 greater trochanteric bursa with significant symptom relief.  She has been able to sleep without any discomfort is continued weightbearing ambulating without feeling weak.  Denies any presence of numbness or tingling at the right lower extremity.  Denies other concerns present this time.     Social History     Occupational History   • Not on file   Tobacco Use   • Smoking status: Never Smoker   • Smokeless tobacco: Never Used   • Tobacco comment: caffeine use /soft drinks   Vaping Use   • Vaping Use: Never used   Substance and Sexual Activity   • Alcohol use: Yes     Comment: She uses alcohol once or twice a year.   • Drug use: No   • Sexual activity: Defer      Past Medical History:   Diagnosis Date   • Allergic    • Arthritis    • Controlled type 2 diabetes with renal manifestation     states borderline    • Gastroesophageal reflux disease 1/21/2016   • Hyperlipidemia    • Hypertension    • Kidney stone     recurrent, calcium oxalate   • Menopausal disorder 1998    She went through menopause in 1998   • Obesity    • PAF (paroxysmal atrial  "fibrillation) (HCC)     in the setting of urosepsis, 9/2016   • Reflux esophagitis 8/19/2016   • Sebaceous cyst of labia 12/20/2017   • Sepsis due to urinary tract infection (HCC)    • Sigmoid diverticulitis 11/13/2017   • Urinary tract infection    • Vitamin D deficiency 1/21/2016     Past Surgical History:   Procedure Laterality Date   • ANKLE SURGERY      Ankle Repair / Description: ORif the left ankle in July 2010. Secondary to fall   • BREAST BIOPSY Right     benign   • BREAST SURGERY      biopsy    • CHOLECYSTECTOMY     • COLONOSCOPY  2014    Done 2004 normal recheck in 10 years. Repeated February 2014 colonoscopy was normal and to be repeated in 10 years.   • CYSTOSCOPY BLADDER STONE LITHOTRIPSY     • HEMORRHOIDECTOMY     • NEPHROLITHOTOMY Left 1/9/2017    Procedure: NEPHROLITHOTOMY PERCUTANEOUS SECOND LOOK WITH STENT PLACEMENT AND LASER LITHOTRIPSY;  Surgeon: Mati Villegas MD;  Location: [facility];  Service:    • OTHER SURGICAL HISTORY      Tubal Ligation   • PERCUTANEOUS NEPHROSTOLITHOTOMY Left 12/2016   • TUBAL ABDOMINAL LIGATION         Family History   Problem Relation Age of Onset   • COPD Mother    • Heart attack Father         acute myocardial infarction   • Heart attack Son    • Kidney disease Maternal Grandmother    • Breast cancer Neg Hx                Objective:  Physical Exam    Vital signs reviewed.   General: No acute distress.  Eyes: conjunctiva clear; pupils equally round and reactive  ENT: external ears and nose atraumatic; oropharynx clear  CV: no peripheral edema  Resp: normal respiratory effort  Skin: no rashes or wounds; normal turgor  Psych: mood and affect appropriate; recent and remote memory intact    Vitals:    05/17/22 1031   Weight: 91.6 kg (202 lb)   Height: 168.9 cm (66.5\")         05/17/22  1031   Weight: 91.6 kg (202 lb)     Body mass index is 32.12 kg/m².      Right Knee Exam     Tenderness   The patient is experiencing tenderness in the medial joint line and " patella.    Range of Motion   Extension: 0   Flexion: 130     Tests   Rylie:  Medial - negative Lateral - negative  Varus: negative Valgus: negative  Lachman:  Anterior - 1+    Posterior - negative  Drawer:  Anterior - negative    Posterior - negative  Patellar apprehension: positive    Other   Erythema: absent  Sensation: normal  Pulse: present  Swelling: moderate  Effusion: no effusion present      Right Hip Exam     Tenderness   The patient is experiencing no tenderness.                  Imaging:  Right Knee X-Ray  Indication: Pain    AP, Lateral, and [place] views    Findings:  No fracture  No bony lesion  Normal soft tissues  Tricompartmental chondromalacia greatest patellofemoral joint with slight osteophyte formation of the patellofemoral and medial compartment    No prior studies were available for comparison.    Assessment:    Large Joint Arthrocentesis: R knee  Date/Time: 5/17/2022 11:09 AM  Consent given by: patient  Site marked: site marked  Timeout: Immediately prior to procedure a time out was called to verify the correct patient, procedure, equipment, support staff and site/side marked as required   Supporting Documentation  Indications: pain   Procedure Details  Location: knee - R knee  Preparation: Patient was prepped and draped in the usual sterile fashion  Needle size: 22 G  Approach: superior  Medications administered: 80 mg triamcinolone acetonide 40 MG/ML; 8 mL lidocaine PF 1% 1 %  Patient tolerance: patient tolerated the procedure well with no immediate complications              1. Chondromalacia, right knee    2. Greater trochanteric bursitis of right hip           Plan:  1. Discussed with patient.  Wishes to proceed with corticosteroid injection right knee.  Other options include MRI of the right knee.  We will hold off on any further imaging at this time.  We will proceed with corticosteroid injection right knee.  Discussed application of ice at injection site this evening plan to see  her back in clinic if not improving.  All questions answered.  Orders:  Orders Placed This Encounter   Procedures   • Large Joint Arthrocentesis: R knee   • XR Knee 3 View Right     No orders of the defined types were placed in this encounter.          I ordered and reviewed the SHAREE today.   Dragon Dictation utilized.

## 2022-05-17 NOTE — PROGRESS NOTES
Presents to clinic for evaluation of her right knee. Right knee pain on and off for the last several years reports maximal tenderness along the medial joint line as well as anterior aspect. Denies that the knee is locking, catching or giving way. She is experiencing pain with weightbearing including standing for extended periods of time, ambulating long distances. She does feel a popping across the anterior aspect of the knee which she has felt for quite some time. Not denies any prior x-rays denies any prior MRI or CT. Denies any prior corticosteroid injections of the knees. Does experience swelling from time to time. She was previously seen for right hip bursa corticosteroid injection 4/19/2022 greater trochanteric bursa with significant symptom relief. She has been able to sleep without any discomfort is continued weightbearing ambulating without feeling weak. Denies any presence of numbness or tingling at the right lower extremity. Denies other concerns present this time.

## 2022-06-05 DIAGNOSIS — I10 ESSENTIAL HYPERTENSION: ICD-10-CM

## 2022-06-06 RX ORDER — LOSARTAN POTASSIUM 100 MG/1
TABLET ORAL
Qty: 90 TABLET | Refills: 0 | Status: SHIPPED | OUTPATIENT
Start: 2022-06-06 | End: 2022-06-20

## 2022-06-07 ENCOUNTER — OFFICE VISIT (OUTPATIENT)
Dept: ORTHOPEDIC SURGERY | Facility: CLINIC | Age: 72
End: 2022-06-07

## 2022-06-07 VITALS — BODY MASS INDEX: 31.71 KG/M2 | HEIGHT: 67 IN | WEIGHT: 202 LBS

## 2022-06-07 DIAGNOSIS — M17.11 PRIMARY OSTEOARTHRITIS OF RIGHT KNEE: Primary | ICD-10-CM

## 2022-06-07 PROCEDURE — 20610 DRAIN/INJ JOINT/BURSA W/O US: CPT | Performed by: NURSE PRACTITIONER

## 2022-06-07 NOTE — PROGRESS NOTES
Subjective:     Patient ID: Krys Mayes is a 72 y.o. female.    Chief Complaint:  Follow-up DJD right knee   Corticosteroid injection 5/17/2022 right knee  History of Present Illness  Krys Mayes returns clinic for follow-up of her right knee.  Received a steroid injection last visit 5/17/2022 approximately 1 week symptom relief.  She reports significant onset of pain approximately 4 days ago when she was ambulating in the grocery store ran into get to items began experiencing severe pain and was unsure if she was going to make it out of the grocery store.  Maximal tenderness present at the medial compartment.  Rated pain at that time a 10 out of a 10 aching throbbing sharp shooting in nature.  Denies any known injury denies planting and twisting.  Has continued to experience pain along the medial joint line as well as the anterior aspect ever since.  Denies other concerns present time.    Social History     Occupational History   • Not on file   Tobacco Use   • Smoking status: Never Smoker   • Smokeless tobacco: Never Used   • Tobacco comment: caffeine use /soft drinks   Vaping Use   • Vaping Use: Never used   Substance and Sexual Activity   • Alcohol use: Yes     Comment: She uses alcohol once or twice a year.   • Drug use: No   • Sexual activity: Defer      Past Medical History:   Diagnosis Date   • Allergic    • Arthritis    • Controlled type 2 diabetes with renal manifestation     states borderline    • Gastroesophageal reflux disease 1/21/2016   • Hyperlipidemia    • Hypertension    • Kidney stone     recurrent, calcium oxalate   • Menopausal disorder 1998    She went through menopause in 1998   • Obesity    • PAF (paroxysmal atrial fibrillation) (HCC)     in the setting of urosepsis, 9/2016   • Reflux esophagitis 8/19/2016   • Sebaceous cyst of labia 12/20/2017   • Sepsis due to urinary tract infection (HCC)    • Sigmoid diverticulitis 11/13/2017   • Urinary tract infection    • Vitamin D  "deficiency 1/21/2016     Past Surgical History:   Procedure Laterality Date   • ANKLE SURGERY      Ankle Repair / Description: ORif the left ankle in July 2010. Secondary to fall   • BREAST BIOPSY Right     benign   • BREAST SURGERY      biopsy    • CHOLECYSTECTOMY     • COLONOSCOPY  2014    Done 2004 normal recheck in 10 years. Repeated February 2014 colonoscopy was normal and to be repeated in 10 years.   • CYSTOSCOPY BLADDER STONE LITHOTRIPSY     • HEMORRHOIDECTOMY     • NEPHROLITHOTOMY Left 1/9/2017    Procedure: NEPHROLITHOTOMY PERCUTANEOUS SECOND LOOK WITH STENT PLACEMENT AND LASER LITHOTRIPSY;  Surgeon: Mati Villegas MD;  Location: [facility];  Service:    • OTHER SURGICAL HISTORY      Tubal Ligation   • PERCUTANEOUS NEPHROSTOLITHOTOMY Left 12/2016   • TUBAL ABDOMINAL LIGATION         Family History   Problem Relation Age of Onset   • COPD Mother    • Heart attack Father         acute myocardial infarction   • Heart attack Son    • Kidney disease Maternal Grandmother    • Breast cancer Neg Hx                Objective:  Physical Exam    General: No acute distress.  Eyes: conjunctiva clear; pupils equally round and reactive  ENT: external ears and nose atraumatic; oropharynx clear  CV: no peripheral edema  Resp: normal respiratory effort  Skin: no rashes or wounds; normal turgor  Psych: mood and affect appropriate; recent and remote memory intact    Vitals:    06/07/22 1302   Weight: 91.6 kg (202 lb)   Height: 168.9 cm (66.5\")         06/07/22  1302   Weight: 91.6 kg (202 lb)     Body mass index is 32.12 kg/m².      Right Knee Exam     Tenderness   The patient is experiencing tenderness in the medial joint line.    Range of Motion   Extension: 0   Right knee flexion: 125.     Tests   Rylie:  Medial - negative Lateral - negative  Varus: negative Valgus: negative  Lachman:  Anterior - 1+    Posterior - negative  Drawer:  Anterior - negative    Posterior - negative  Patellar apprehension: " positive    Other   Erythema: absent  Sensation: normal  Pulse: present  Swelling: moderate  Effusion: no effusion present    Comments:  Positive active patellar compression test  Positive crepitus arc of motion  Quad strength 4 out of 5               Assessment:        1. Chondromalacia, right knee           Plan:    Large Joint Arthrocentesis: R knee  Date/Time: 6/7/2022 1:19 PM  Consent given by: patient  Site marked: site marked  Timeout: Immediately prior to procedure a time out was called to verify the correct patient, procedure, equipment, support staff and site/side marked as required   Supporting Documentation  Indications: pain   Procedure Details  Location: knee - R knee  Preparation: Patient was prepped and draped in the usual sterile fashion  Needle size: 20 G  Approach: superior  Medications administered: 30 mg Cross-Linked Hyaluronate 30 MG/3ML  Patient tolerance: patient tolerated the procedure well with no immediate complications          1. Discussed treatment options with patient.  Wishes to proceed with viscosupplementation injection right knee.  We will see her back in clinic in 6 weeks to reevaluate.  Discussed home strengthening exercises including quads, hamstring, calf muscles as demonstrated in clinic today.  May apply ice at the injection site this evening continue with range of motion as discussed.  All questions answered.  Orders:  Orders Placed This Encounter   Procedures   • Large Joint Arthrocentesis: R knee     No orders of the defined types were placed in this encounter.        Dragon Dictation utilized.

## 2022-06-13 ENCOUNTER — OFFICE VISIT (OUTPATIENT)
Dept: FAMILY MEDICINE CLINIC | Facility: CLINIC | Age: 72
End: 2022-06-13

## 2022-06-13 VITALS
BODY MASS INDEX: 31.08 KG/M2 | HEART RATE: 66 BPM | DIASTOLIC BLOOD PRESSURE: 70 MMHG | SYSTOLIC BLOOD PRESSURE: 110 MMHG | OXYGEN SATURATION: 94 % | RESPIRATION RATE: 15 BRPM | HEIGHT: 67 IN | TEMPERATURE: 96.9 F | WEIGHT: 198 LBS

## 2022-06-13 DIAGNOSIS — F41.1 GAD (GENERALIZED ANXIETY DISORDER): Primary | ICD-10-CM

## 2022-06-13 PROCEDURE — 99213 OFFICE O/P EST LOW 20 MIN: CPT | Performed by: PHYSICIAN ASSISTANT

## 2022-06-13 NOTE — PROGRESS NOTES
"Chief Complaint  Anxiety    Subjective          Krystor Mayes presents to [facility]  History of Present Illness   Krys,\"Danielle\" is a 72 year old female who presents for anxiety management. She has lost 4 pounds since June 7,2022.  He states she has been taking 1-1/2 tablets of  Xanax nightly.  States this has helped with her sleep.  She has seen an improvement with increased dose of sertraline as well.  Has seen her therapist twice in the past month.  He would like to see her about every 3 weeks now.  States that therapist has helped greatly.  Denied any suicidal or homicidal ideation.  Danielle has lost 4 pounds since last office visit.  She has been trying to eat healthier by decreasing sugar and carbohydrates in diet.  Has no complaints today.  Review of Systems   Psychiatric/Behavioral: The patient is nervous/anxious.    All other systems reviewed and are negative.    Objective   Vital Signs:   /70 (BP Location: Left arm, Patient Position: Sitting, Cuff Size: Adult)   Pulse 66   Temp 96.9 °F (36.1 °C)   Resp 15   Ht 168.9 cm (66.5\")   Wt 89.8 kg (198 lb)   SpO2 94%   BMI 31.48 kg/m²     Physical Exam  Vitals and nursing note reviewed.   Constitutional:       Appearance: Normal appearance. She is well-developed and well-groomed. She is obese.      Interventions: Face mask in place.   HENT:      Head: Normocephalic and atraumatic.   Neck:      Vascular: No carotid bruit.      Trachea: Trachea and phonation normal. No tracheal tenderness.   Cardiovascular:      Rate and Rhythm: Normal rate and regular rhythm.      Pulses: Normal pulses.      Heart sounds: Normal heart sounds, S1 normal and S2 normal. No murmur heard.  Pulmonary:      Effort: Pulmonary effort is normal.      Breath sounds: Normal breath sounds and air entry.   Abdominal:      General: Bowel sounds are normal.      Palpations: Abdomen is soft. There is no hepatomegaly.      Tenderness: There is no " abdominal tenderness.   Musculoskeletal:      Cervical back: Neck supple.      Right lower leg: No edema.      Left lower leg: No edema.   Skin:     General: Skin is warm and dry.      Capillary Refill: Capillary refill takes less than 2 seconds.   Neurological:      Mental Status: She is alert and oriented to person, place, and time.   Psychiatric:         Attention and Perception: Attention and perception normal.         Mood and Affect: Mood and affect normal.         Speech: Speech normal.         Behavior: Behavior normal. Behavior is cooperative.         Thought Content: Thought content normal.         Cognition and Memory: Cognition and memory normal.         Judgment: Judgment normal.     I wore a facial mask, face shield, and gloves during this patient encounter.  Patient also wearing a surgical mask.  Hand hygiene performed before and after seeing the patient.     Result Review :                 Assessment and Plan    Diagnoses and all orders for this visit:    1. COLTON (generalized anxiety disorder) (Primary)    Danielle was seen in office today for chronic and stable generalized anxiety disorder.  She is doing well with her increased dose of sertraline and Xanax.  She will continue the medication at home as directed.  No refills are needed at this time.  Will return to office in September for her Medicare wellness.  Will reevaluate at that time.  Stressed the importance of keeping her therapist appointments.    Follow Up   Return if symptoms worsen or fail to improve.  Patient was given instructions and counseling regarding her condition or for health maintenance advice. Please see specific information pulled into the AVS if appropriate.     [facility]  [address] [place] [id number]  Dept: [phone]  Dept Fax: [phone]  Loc: [phone]  Loc Fax: [phone]

## 2022-06-19 DIAGNOSIS — F41.9 ANXIETY: ICD-10-CM

## 2022-06-19 DIAGNOSIS — F43.21 GRIEF: ICD-10-CM

## 2022-06-19 DIAGNOSIS — I10 ESSENTIAL HYPERTENSION: ICD-10-CM

## 2022-06-20 RX ORDER — LOSARTAN POTASSIUM 100 MG/1
TABLET ORAL
Qty: 90 TABLET | Refills: 0 | Status: SHIPPED | OUTPATIENT
Start: 2022-06-20 | End: 2022-09-09

## 2022-06-20 RX ORDER — ALPRAZOLAM 1 MG/1
TABLET ORAL
Qty: 60 TABLET | Refills: 0 | Status: SHIPPED | OUTPATIENT
Start: 2022-06-20 | End: 2022-08-01

## 2022-06-23 ENCOUNTER — OFFICE VISIT (OUTPATIENT)
Dept: FAMILY MEDICINE CLINIC | Facility: CLINIC | Age: 72
End: 2022-06-23

## 2022-06-23 VITALS
DIASTOLIC BLOOD PRESSURE: 80 MMHG | SYSTOLIC BLOOD PRESSURE: 132 MMHG | HEIGHT: 67 IN | BODY MASS INDEX: 31.55 KG/M2 | OXYGEN SATURATION: 96 % | WEIGHT: 201 LBS | HEART RATE: 84 BPM | RESPIRATION RATE: 18 BRPM | TEMPERATURE: 97.6 F

## 2022-06-23 DIAGNOSIS — N39.0 URINARY TRACT INFECTION WITH HEMATURIA, SITE UNSPECIFIED: ICD-10-CM

## 2022-06-23 DIAGNOSIS — R31.9 URINARY TRACT INFECTION WITH HEMATURIA, SITE UNSPECIFIED: ICD-10-CM

## 2022-06-23 DIAGNOSIS — R30.0 DYSURIA: Primary | ICD-10-CM

## 2022-06-23 LAB
BILIRUB BLD-MCNC: NEGATIVE MG/DL
CLARITY, POC: ABNORMAL
COLOR UR: YELLOW
GLUCOSE UR STRIP-MCNC: NEGATIVE MG/DL
KETONES UR QL: NEGATIVE
LEUKOCYTE EST, POC: ABNORMAL
NITRITE UR-MCNC: NEGATIVE MG/ML
PH UR: 6 [PH] (ref 5–8)
PROT UR STRIP-MCNC: NEGATIVE MG/DL
RBC # UR STRIP: ABNORMAL /UL
SP GR UR: 1 (ref 1–1.03)
UROBILINOGEN UR QL: NORMAL

## 2022-06-23 PROCEDURE — 99213 OFFICE O/P EST LOW 20 MIN: CPT | Performed by: NURSE PRACTITIONER

## 2022-06-23 PROCEDURE — 81002 URINALYSIS NONAUTO W/O SCOPE: CPT | Performed by: NURSE PRACTITIONER

## 2022-06-23 RX ORDER — NITROFURANTOIN 25; 75 MG/1; MG/1
100 CAPSULE ORAL EVERY 12 HOURS SCHEDULED
Qty: 14 CAPSULE | Refills: 0 | Status: SHIPPED | OUTPATIENT
Start: 2022-06-23 | End: 2022-07-01

## 2022-06-23 NOTE — PROGRESS NOTES
"Chief Complaint   Patient presents with   • Difficulty Urinating       Urinary Tract Infection: Patient complains of burning with urination, dysuria, frequency and suprapubic pressure She has had symptoms for 1 day. Patient also complains of back pain. Patient denies back pain and fever. Patient does have a history of recurrent UTI.  Patient does not have a history of pyelonephritis.     The following portions of the patient's history were reviewed and updated as appropriate: allergies, current medications, past family history, past medical history, past social history, past surgical history and problem list.      Vitals:    06/23/22 1049   BP: 132/80   BP Location: Left arm   Patient Position: Sitting   Cuff Size: Adult   Pulse: 84   Resp: 18   Temp: 97.6 °F (36.4 °C)   SpO2: 96%   Weight: 91.2 kg (201 lb)   Height: 168.9 cm (66.5\")     Gen: Well appearing, alert  HEENT: WNL  Lung: Regular RR, no audible wheeze  Heart: RR without murmur  Skin: No rash, W/D  Abd: Suprapubic tenderness, non distended, positive bowel sounds  Flank: No CVA, no rash    In office urine dipstick results:  Brief Urine Lab Results  (Last result in the past 365 days)      Color   Clarity   Blood   Leuk Est   Nitrite   Protein   CREAT   Urine HCG        06/23/22 1101 Yellow   Hazy   1+   Small (1+)   Negative   Negative                     Assessment & Plan   Diagnoses and all orders for this visit:    1. Dysuria (Primary)  -     POCT urinalysis dipstick, manual    2. Urinary tract infection with hematuria, site unspecified  -     Urine Culture - Urine, Urine, Clean Catch  -     nitrofurantoin, macrocrystal-monohydrate, (Macrobid) 100 MG capsule; Take 1 capsule by mouth Every 12 (Twelve) Hours for 7 days.  Dispense: 14 capsule; Refill: 0             Tylenol or Advil as needed for pain, fever  Plenty of fluids  OTC Azo ok  Off work or school note given if needed.  Pros and cons of antibiotic use discussed      Patient was wearing face mask " when I entered the room and throughout our encounter. Protective equipment was worn throughout this patient encounter including a face mask, eye protection, and gloves. Hand hygiene was performed before donning protective equipment and after removal when leaving the room.     Pauline Enriquez, AGUSTIN  Family Practice  [facility]

## 2022-06-28 ENCOUNTER — OFFICE VISIT (OUTPATIENT)
Dept: CARDIOLOGY | Facility: CLINIC | Age: 72
End: 2022-06-28

## 2022-06-28 VITALS
HEART RATE: 84 BPM | BODY MASS INDEX: 31.66 KG/M2 | HEIGHT: 66 IN | RESPIRATION RATE: 16 BRPM | OXYGEN SATURATION: 97 % | WEIGHT: 197 LBS | SYSTOLIC BLOOD PRESSURE: 120 MMHG | DIASTOLIC BLOOD PRESSURE: 64 MMHG

## 2022-06-28 DIAGNOSIS — I48.0 PAF (PAROXYSMAL ATRIAL FIBRILLATION): Primary | ICD-10-CM

## 2022-06-28 DIAGNOSIS — I10 ESSENTIAL HYPERTENSION: ICD-10-CM

## 2022-06-28 DIAGNOSIS — E78.2 MIXED HYPERLIPIDEMIA: ICD-10-CM

## 2022-06-28 PROCEDURE — 99214 OFFICE O/P EST MOD 30 MIN: CPT | Performed by: NURSE PRACTITIONER

## 2022-06-28 PROCEDURE — 93000 ELECTROCARDIOGRAM COMPLETE: CPT | Performed by: NURSE PRACTITIONER

## 2022-06-28 NOTE — PROGRESS NOTES
Date of Office Visit: 2022  Encounter Provider: AGUSTIN Lozada  Place of Service: [facility] CARDIOLOGY  Patient Name: Krys Mayes  :1950  Primary Cardiologist: Dr. Orozco    CC:  Annual cardiac visit     Dear Ashleigh    HPI: Krys Mayes is a pleasant 72 y.o. female who presents 2022 for cardiac follow up.  I reviewed her past medical records including notes, labs and testing in preparation for today's visit.    Prior to 2016, she had not had any cardiac issues. At that time, she was admitted with urosepsis due to kidney stones. She went into atrial fibrillation in that setting. She initially converted to NSR but went back into atrial fibrillation. She was rate controlled and placed on rivaroxaban. An echocardiogram revealed normal LV size and systolic function (EF 56%). There was moderate TR and severe pulmonary hypertension as well. When I saw her in the office soon after, she was doing well.      She was readmitted just a few weeks later with urosepsis again. She was found to have a staghorn calculus.  She did have a brief episode of atrial fibrillation during that admission. Her metoprolol dose was increased, and her rivaroxaban dose was decreased due to decreased GFR.      She returns today for her annual cardiac evaluation.  She states she feels like she is doing great.  She cannot feel her heart racing or skipping.  She is actually in atrial fibrillation today and is unaware.  She has not missed any doses of her Xarelto or her Lopressor.  Her rate is controlled in the 80s.  Her blood pressure is well controlled.  She denies any shortness of breath, lower extremity edema, chest pain or chest pressure.  She states at times she has some intermittent dizziness but that is not very often and is really unchanged.  She does have a little bit of decreased activity level.  She states her left ankle that she broke 10 years ago still gives her some  problems and is currently seeing Ortho about her right knee.  She had labs performed 3/2/2022 that showed unremarkable CBC that is unchanged.  CMP shows a glucose of 106 and a creatinine 1.03.  Normal electrolytes and liver function test.  Her hemoglobin A1c was 5.8.  Past Medical History:   Diagnosis Date   • Allergic    • Arthritis    • Controlled type 2 diabetes with renal manifestation     states borderline    • Gastroesophageal reflux disease 1/21/2016   • Hyperlipidemia    • Hypertension    • Kidney stone     recurrent, calcium oxalate   • Menopausal disorder 1998    She went through menopause in 1998   • Obesity    • PAF (paroxysmal atrial fibrillation) (HCC)     in the setting of urosepsis, 9/2016   • Reflux esophagitis 8/19/2016   • Sebaceous cyst of labia 12/20/2017   • Sepsis due to urinary tract infection (HCC)    • Sigmoid diverticulitis 11/13/2017   • Urinary tract infection    • Vitamin D deficiency 1/21/2016       Past Surgical History:   Procedure Laterality Date   • ANKLE SURGERY      Ankle Repair / Description: ORif the left ankle in July 2010. Secondary to fall   • BREAST BIOPSY Right     benign   • BREAST SURGERY      biopsy    • CHOLECYSTECTOMY     • COLONOSCOPY  2014    Done 2004 normal recheck in 10 years. Repeated February 2014 colonoscopy was normal and to be repeated in 10 years.   • CYSTOSCOPY BLADDER STONE LITHOTRIPSY     • HEMORRHOIDECTOMY     • NEPHROLITHOTOMY Left 1/9/2017    Procedure: NEPHROLITHOTOMY PERCUTANEOUS SECOND LOOK WITH STENT PLACEMENT AND LASER LITHOTRIPSY;  Surgeon: Mati Villegas MD;  Location: [facility];  Service:    • OTHER SURGICAL HISTORY      Tubal Ligation   • PERCUTANEOUS NEPHROSTOLITHOTOMY Left 12/2016   • TUBAL ABDOMINAL LIGATION         Social History     Socioeconomic History   • Marital status:    Tobacco Use   • Smoking status: Never Smoker   • Smokeless tobacco: Never Used   • Tobacco comment: caffeine use /soft drinks   Vaping Use   •  Vaping Use: Never used   Substance and Sexual Activity   • Alcohol use: Yes     Comment: She uses alcohol once or twice a year.   • Drug use: No   • Sexual activity: Defer       Family History   Problem Relation Age of Onset   • COPD Mother    • Heart attack Father         acute myocardial infarction   • Heart attack Son    • Kidney disease Maternal Grandmother    • Breast cancer Neg Hx        The following portion of the patient's history were reviewed and updated as appropriate: past medical history, past surgical history, past social history, past family history, allergies, current medications, and problem list.    Review of Systems   Constitutional: Positive for malaise/fatigue. Negative for diaphoresis and fever.   HENT: Negative for congestion, hearing loss, hoarse voice, nosebleeds and sore throat.    Eyes: Negative for photophobia, vision loss in left eye, vision loss in right eye and visual disturbance.   Cardiovascular: Negative for chest pain, dyspnea on exertion, irregular heartbeat, leg swelling, near-syncope, orthopnea, palpitations, paroxysmal nocturnal dyspnea and syncope.   Respiratory: Negative for cough, hemoptysis, shortness of breath, sleep disturbances due to breathing, snoring, sputum production and wheezing.    Endocrine: Negative for cold intolerance, heat intolerance, polydipsia, polyphagia and polyuria.   Hematologic/Lymphatic: Negative for bleeding problem. Does not bruise/bleed easily.   Skin: Negative for color change, dry skin, poor wound healing, rash and suspicious lesions.   Musculoskeletal: Positive for joint pain. Negative for arthritis, back pain, falls, gout, joint swelling, muscle cramps, muscle weakness and myalgias.   Gastrointestinal: Negative for bloating, abdominal pain, constipation, diarrhea, dysphagia, melena, nausea and vomiting.   Neurological: Positive for dizziness (occ). Negative for excessive daytime sleepiness, headaches, light-headedness, loss of balance,  numbness, paresthesias, seizures, vertigo and weakness.   Psychiatric/Behavioral: Negative for depression, memory loss and substance abuse. The patient is not nervous/anxious.        Allergies   Allergen Reactions   • Morphine And Related GI Intolerance   • Ciprofloxacin-Ciproflox Hcl Er Rash   • Flomax [Tamsulosin Hcl] Rash   • Hydrocodone Rash   • Latex Rash   • Lortab [Hydrocodone-Acetaminophen] Rash   • Penicillins Rash   • Phenergan [Promethazine Hcl] GI Intolerance   • Sulfa Antibiotics Rash   • Amoxicillin Rash   • Tamsulosin Rash         Current Outpatient Medications:   •  acetaminophen (TYLENOL) 650 MG 8 hr tablet, Take 1 tablet by mouth every 6 (six) hours as needed. for pain, Disp: , Rfl:   •  ALPRAZolam (XANAX) 1 MG tablet, TAKE 1/2 TO 1 (ONE-HALF TO ONE) TABLET BY MOUTH EVERY 12 HOURS AS NEEDED FOR ANXIETY AND FOR SLEEP, Disp: 60 tablet, Rfl: 0  •  losartan (COZAAR) 100 MG tablet, Take 1 tablet by mouth once daily, Disp: 90 tablet, Rfl: 0  •  meclizine (ANTIVERT) 25 MG tablet, Take 1 tablet by mouth Every 6 (Six) Hours As Needed for dizziness or Nausea., Disp: 30 tablet, Rfl: 0  •  Metoprolol Tartrate 75 MG tablet, Take 75 mg by mouth 2 (two) times a day., Disp: 180 tablet, Rfl: 2  •  Multiple Vitamin tablet, Take 1 tablet by mouth Daily., Disp: , Rfl:   •  nitrofurantoin, macrocrystal-monohydrate, (Macrobid) 100 MG capsule, Take 1 capsule by mouth Every 12 (Twelve) Hours for 7 days., Disp: 14 capsule, Rfl: 0  •  omeprazole OTC (PriLOSEC OTC) 20 MG EC tablet, Take 20 mg by mouth 2 (two) times a day., Disp: , Rfl:   •  Probiotic Product (PROBIOTIC ADVANCED PO), Take 1 tablet by mouth Daily., Disp: , Rfl:   •  rivaroxaban (Xarelto) 15 MG tablet, Take 1 tablet by mouth Daily., Disp: 90 tablet, Rfl: 3  •  sertraline (ZOLOFT) 100 MG tablet, Take 1 tablet by mouth Daily., Disp: 90 tablet, Rfl: 0  •  simvastatin (ZOCOR) 80 MG tablet, Take 1 tablet by mouth Every Evening., Disp: 90 tablet, Rfl: 3  •   "triamcinolone (KENALOG) 0.1 % cream, APPLY TO AFFECTED AREA TWICE A DAY TO RASH FOR UP TO 2 WEEKS, Disp: , Rfl:         Objective:     Vitals:    06/28/22 1008   BP: 120/64   Pulse: 84   Resp: 16   SpO2: 97%   Weight: 89.4 kg (197 lb)   Height: 167.6 cm (66\")     Body mass index is 31.8 kg/m².      Vitals reviewed.   Constitutional:       General: Not in acute distress.     Appearance: Normal appearance. Well-developed, well-groomed and not in distress.   Eyes:      General:         Right eye: No discharge.         Left eye: No discharge.      Conjunctiva/sclera: Conjunctivae normal.   HENT:      Head: Normocephalic and atraumatic.      Right Ear: External ear normal.      Left Ear: External ear normal.      Nose: Nose normal.   Neck:      Thyroid: No thyromegaly.      Vascular: No JVD.      Trachea: No tracheal deviation.      Lymphadenopathy: No cervical adenopathy.   Pulmonary:      Effort: Pulmonary effort is normal. No respiratory distress.      Breath sounds: Normal breath sounds. No wheezing. No rales.   Chest:      Chest wall: Not tender to palpatation.   Cardiovascular:      Normal rate. Irregularly irregular rhythm.      No gallop.   Pulses:     Intact distal pulses.   Edema:     Peripheral edema absent.   Abdominal:      General: There is no distension.      Palpations: Abdomen is soft.      Tenderness: There is no abdominal tenderness.   Musculoskeletal: Normal range of motion.         General: No tenderness or deformity.      Cervical back: Normal range of motion and neck supple. Skin:     General: Skin is warm and dry.      Findings: No erythema or rash.   Neurological:      Mental Status: Alert and oriented to person, place, and time.      Coordination: Coordination normal.   Psychiatric:         Attention and Perception: Attention normal.         Mood and Affect: Mood normal.         Speech: Speech normal.         Behavior: Behavior normal. Behavior is cooperative.         Thought Content: Thought " content normal.         Cognition and Memory: Cognition normal.         Judgment: Judgment normal.               ECG 12 Lead    Date/Time: 6/28/2022 10:27 AM  Performed by: Ashleigh Gonzalez APRN  Authorized by: Ashleigh Gonzalez APRN   Comparison: compared with previous ECG from 6/15/2021  Rhythm: atrial fibrillation  Rate: normal  Conduction: conduction normal  ST Segments: ST segments normal  T Waves: T waves normal  QRS axis: normal    Clinical impression: abnormal EKG              Assessment:       Diagnosis Plan   1. PAF (paroxysmal atrial fibrillation) ([facility])     2. Essential hypertension     3. Mixed hyperlipidemia            Plan:       1. PAF -she is in rate controlled atrial fibrillation today.  She cannot feel her heart racing or skipping.  She is on Lopressor 75 mg twice daily and her Xarelto.  She has not missed any doses of either.     2. HTN -well controlled     3.  Hyperlipidemia -continue on lipid-lowering therapy.  She is currently on simvastatin 80 mg daily.  Labs as above.     4. Obesity -knee and ankle pain limits some of her mobility.  She does try to stay active.    RTO in 1 year with JK       As always, it has been a pleasure to participate in your patient's care. Thank you.       Sincerely,       AGUSTIN Lozada      Current Outpatient Medications:   •  acetaminophen (TYLENOL) 650 MG 8 hr tablet, Take 1 tablet by mouth every 6 (six) hours as needed. for pain, Disp: , Rfl:   •  ALPRAZolam (XANAX) 1 MG tablet, TAKE 1/2 TO 1 (ONE-HALF TO ONE) TABLET BY MOUTH EVERY 12 HOURS AS NEEDED FOR ANXIETY AND FOR SLEEP, Disp: 60 tablet, Rfl: 0  •  losartan (COZAAR) 100 MG tablet, Take 1 tablet by mouth once daily, Disp: 90 tablet, Rfl: 0  •  meclizine (ANTIVERT) 25 MG tablet, Take 1 tablet by mouth Every 6 (Six) Hours As Needed for dizziness or Nausea., Disp: 30 tablet, Rfl: 0  •  Metoprolol Tartrate 75 MG tablet, Take 75 mg by mouth 2 (two) times a day., Disp: 180 tablet, Rfl: 2  •  Multiple Vitamin tablet,  Take 1 tablet by mouth Daily., Disp: , Rfl:   •  nitrofurantoin, macrocrystal-monohydrate, (Macrobid) 100 MG capsule, Take 1 capsule by mouth Every 12 (Twelve) Hours for 7 days., Disp: 14 capsule, Rfl: 0  •  omeprazole OTC (PriLOSEC OTC) 20 MG EC tablet, Take 20 mg by mouth 2 (two) times a day., Disp: , Rfl:   •  Probiotic Product (PROBIOTIC ADVANCED PO), Take 1 tablet by mouth Daily., Disp: , Rfl:   •  rivaroxaban (Xarelto) 15 MG tablet, Take 1 tablet by mouth Daily., Disp: 90 tablet, Rfl: 3  •  sertraline (ZOLOFT) 100 MG tablet, Take 1 tablet by mouth Daily., Disp: 90 tablet, Rfl: 0  •  simvastatin (ZOCOR) 80 MG tablet, Take 1 tablet by mouth Every Evening., Disp: 90 tablet, Rfl: 3  •  triamcinolone (KENALOG) 0.1 % cream, APPLY TO AFFECTED AREA TWICE A DAY TO RASH FOR UP TO 2 WEEKS, Disp: , Rfl:       Dictated utilizing Dragon dictation

## 2022-06-29 LAB
BACTERIA UR CULT: ABNORMAL
OTHER ANTIBIOTIC SUSC ISLT: ABNORMAL

## 2022-07-01 ENCOUNTER — OFFICE VISIT (OUTPATIENT)
Dept: FAMILY MEDICINE CLINIC | Facility: CLINIC | Age: 72
End: 2022-07-01

## 2022-07-01 VITALS
SYSTOLIC BLOOD PRESSURE: 120 MMHG | OXYGEN SATURATION: 94 % | BODY MASS INDEX: 31.82 KG/M2 | TEMPERATURE: 97.5 F | DIASTOLIC BLOOD PRESSURE: 80 MMHG | WEIGHT: 198 LBS | HEART RATE: 78 BPM | HEIGHT: 66 IN

## 2022-07-01 DIAGNOSIS — N30.01 ACUTE CYSTITIS WITH HEMATURIA: ICD-10-CM

## 2022-07-01 DIAGNOSIS — N39.0 FREQUENT UTI: ICD-10-CM

## 2022-07-01 DIAGNOSIS — R35.0 URINE FREQUENCY: Primary | ICD-10-CM

## 2022-07-01 PROCEDURE — 99213 OFFICE O/P EST LOW 20 MIN: CPT | Performed by: PHYSICIAN ASSISTANT

## 2022-07-01 PROCEDURE — 81002 URINALYSIS NONAUTO W/O SCOPE: CPT | Performed by: PHYSICIAN ASSISTANT

## 2022-07-01 RX ORDER — NITROFURANTOIN 25; 75 MG/1; MG/1
100 CAPSULE ORAL 2 TIMES DAILY
Qty: 20 CAPSULE | Refills: 0 | Status: SHIPPED | OUTPATIENT
Start: 2022-07-01 | End: 2022-08-18

## 2022-07-01 NOTE — PROGRESS NOTES
"Chief Complaint  Urinary Tract Infection, Difficulty Urinating, and Urinary Frequency    Subjective          Krys LAURA Mayes presents to [facility]  History of Present Illness  Krys, \"Madalyn", is a 72-year-old female who presents with continuing urinary frequency, dysuria and recent UTI.  She has finished her Macrobid antibiotic but is still having symptoms.  States she finished her antibiotic yesterday.  States she has had a history of UTI's in the past and ended up in hospital due to sepsis.  States she gets nervous when she starts having symptoms.  She noticed yesterday she was still having the urine frequency, urgency and dysuria.  Her symptoms improved slightly with the antibiotic.  Has seen Dr. Mati Villegas in past for this.  Denied any current fevers, chills, hematuria or back pain.  Danielle states she takes baths about every other day.  Does not use bubble bath.  Review of Systems   Constitutional: Negative for chills and fever.   Gastrointestinal: Negative for abdominal pain, constipation, diarrhea, nausea and vomiting.   Genitourinary: Positive for dysuria, frequency and urgency. Negative for decreased urine volume, flank pain, hematuria, vaginal bleeding, vaginal discharge and vaginal pain.   All other systems reviewed and are negative.    Objective   Vital Signs:   /80   Pulse 78   Temp 97.5 °F (36.4 °C)   Ht 167.6 cm (66\")   Wt 89.8 kg (198 lb)   SpO2 94%   BMI 31.96 kg/m²     Physical Exam  Vitals and nursing note reviewed.   Constitutional:       Appearance: Normal appearance. She is well-developed and well-groomed. She is obese.      Interventions: Face mask in place.   HENT:      Head: Normocephalic and atraumatic.   Neck:      Vascular: No carotid bruit.      Trachea: Trachea and phonation normal. No tracheal tenderness.   Cardiovascular:      Rate and Rhythm: Normal rate. Rhythm irregularly irregular.      Pulses: Normal pulses.      Heart " sounds: Normal heart sounds, S1 normal and S2 normal. No murmur heard.  Pulmonary:      Effort: Pulmonary effort is normal.      Breath sounds: Normal breath sounds and air entry.   Abdominal:      General: Bowel sounds are normal.      Palpations: Abdomen is soft. There is no hepatomegaly.      Tenderness: There is no abdominal tenderness. There is no right CVA tenderness, left CVA tenderness, guarding or rebound. Negative signs include Contreras's sign, Rovsing's sign, McBurney's sign, psoas sign and obturator sign.   Musculoskeletal:      Cervical back: Neck supple.   Skin:     General: Skin is warm and dry.      Capillary Refill: Capillary refill takes less than 2 seconds.   Neurological:      Mental Status: She is alert and oriented to person, place, and time.   Psychiatric:         Attention and Perception: Attention and perception normal.         Mood and Affect: Mood and affect normal.         Speech: Speech normal.         Behavior: Behavior normal. Behavior is cooperative.         Thought Content: Thought content normal.         Cognition and Memory: Cognition and memory normal.         Judgment: Judgment normal.     I wore a facial mask, face shield, and gloves during this patient encounter.  Patient also wearing a surgical mask.  Hand hygiene performed before and after seeing the patient.     Result Review :          Office Visit on 07/01/2022   Component Date Value Ref Range Status   • Color 07/01/2022 Yellow  Yellow, Straw, Dark Yellow, Lin Final   • Clarity, UA 07/01/2022 Clear  Clear Final   • Glucose, UA 07/01/2022 Negative  Negative mg/dL Final   • Bilirubin 07/01/2022 Negative  Negative Final   • Ketones, UA 07/01/2022 Negative  Negative Final   • Specific Gravity  07/01/2022 1.010  1.005 - 1.030 Final   • Blood, UA 07/01/2022 Trace (A) Negative Final   • pH, Urine 07/01/2022 6.0  5.0 - 8.0 Final   • Protein, POC 07/01/2022 Negative  Negative mg/dL Final   • Urobilinogen, UA 07/01/2022 Normal   Normal Final   • Leukocytes 07/01/2022 Small (1+) (A) Negative Final   • Nitrite, UA 07/01/2022 Negative  Negative Final   Office Visit on 06/23/2022   Component Date Value Ref Range Status   • Color 06/23/2022 Yellow  Yellow, Straw, Dark Yellow, Lin Final   • Clarity, UA 06/23/2022 Hazy (A) Clear Final   • Glucose, UA 06/23/2022 Negative  Negative mg/dL Final   • Bilirubin 06/23/2022 Negative  Negative Final   • Ketones, UA 06/23/2022 Negative  Negative Final   • Specific Gravity  06/23/2022 1.005  1.005 - 1.030 Final   • Blood, UA 06/23/2022 1+ (A) Negative Final   • pH, Urine 06/23/2022 6.0  5.0 - 8.0 Final   • Protein, POC 06/23/2022 Negative  Negative mg/dL Final   • Urobilinogen, UA 06/23/2022 Normal  Normal Final   • Leukocytes 06/23/2022 Small (1+) (A) Negative Final   • Nitrite, UA 06/23/2022 Negative  Negative Final   • Urine Culture 06/23/2022 Final report (A)  Final   • Result 1 06/23/2022 Klebsiella pneumoniae (A)  Final    Comment: Cefazolin <=4 ug/mL  Cefazolin with an FARIHA <=16 predicts susceptibility to the oral agents  cefaclor, cefdinir, cefpodoxime, cefprozil, cefuroxime, cephalexin,  and loracarbef when used for therapy of uncomplicated urinary tract  infections due to E. coli, Klebsiella pneumoniae, and Proteus  mirabilis.  10,000-25,000 colony forming units per mL     • Result 2 06/23/2022 Comment   Final    Comment: Mixed urogenital pelon  50,000-100,000 colony forming units per mL     • Susceptibility Testing 06/23/2022 Comment   Final    Comment:       ** S = Susceptible; I = Intermediate; R = Resistant **                     P = Positive; N = Negative              MICS are expressed in micrograms per mL     Antibiotic                 RSLT#1    RSLT#2    RSLT#3    RSLT#4  Amoxicillin/Clavulanic Acid    S  Ampicillin                     R  Cefepime                       S  Ceftriaxone                    S  Cefuroxime                     S  Ciprofloxacin                  S  Ertapenem          "             S  Gentamicin                     S  Imipenem                       S  Levofloxacin                   S  Meropenem                      S  Nitrofurantoin                 S  Piperacillin/Tazobactam        S  Tetracycline                   S  Tobramycin                     S  Trimethoprim/Sulfa             S       Results for orders placed or performed in visit on 07/01/22   POC Urinalysis Dipstick    Specimen: Urine   Result Value Ref Range    Color Yellow Yellow, Straw, Dark Yellow, Lin    Clarity, UA Clear Clear    Glucose, UA Negative Negative mg/dL    Bilirubin Negative Negative    Ketones, UA Negative Negative    Specific Gravity  1.010 1.005 - 1.030    Blood, UA Trace (A) Negative    pH, Urine 6.0 5.0 - 8.0    Protein, POC Negative Negative mg/dL    Urobilinogen, UA Normal Normal    Leukocytes Small (1+) (A) Negative    Nitrite, UA Negative Negative              Assessment and Plan    Diagnoses and all orders for this visit:    1. Urine frequency (Primary)  -     POC Urinalysis Dipstick  -     Urine Culture - Urine, Urine, Clean Catch    2. Frequent UTI  -     POC Urinalysis Dipstick  -     Urine Culture - Urine, Urine, Clean Catch  -     nitrofurantoin, macrocrystal-monohydrate, (Macrobid) 100 MG capsule; Take 1 capsule by mouth 2 (Two) Times a Day.  Dispense: 20 capsule; Refill: 0  -     Urine Culture - Urine, Urine, Clean Catch; Future    3. Acute cystitis with hematuria  -     Urine Culture - Urine, Urine, Clean Catch  -     nitrofurantoin, macrocrystal-monohydrate, (Macrobid) 100 MG capsule; Take 1 capsule by mouth 2 (Two) Times a Day.  Dispense: 20 capsule; Refill: 0    Krys, \"Danielle\", was seen in office today for continuing urine frequency with frequent UTI's with acute cystitis with hematuria.  Above urinalysis showed leukocytes and red blood cells.  This has improved slightly over the past 8 days.  We will send urine culture to the laboratory for further evaluation.  Have refilled " her Macrobid prescription to pharmacy.  She will be notified of urine culture results when completed.  She may need follow-up appointments with her urologist, Dr. Villegas.  I have instructed her to decrease her bath taking.  Return to office in 2 weeks for urine culture.  She will continue her medication for A. fib.  She will keep her follow-up appointments with cardiology.    Follow Up   Return in about 2 weeks (around 7/15/2022), or recheck urine C&S.  Patient was given instructions and counseling regarding her condition or for health maintenance advice. Please see specific information pulled into the AVS if appropriate.     [facility]  [address] [address]  Dept: [phone]  Dept Fax: [phone]  Loc: [phone]  Loc Fax: [phone]

## 2022-07-02 ENCOUNTER — HOSPITAL ENCOUNTER (EMERGENCY)
Facility: HOSPITAL | Age: 72
Discharge: HOME | End: 2022-07-02
Attending: EMERGENCY MEDICINE | Admitting: EMERGENCY MEDICINE

## 2022-07-02 VITALS
HEART RATE: 69 BPM | TEMPERATURE: 97.6 F | DIASTOLIC BLOOD PRESSURE: 90 MMHG | HEIGHT: 66 IN | SYSTOLIC BLOOD PRESSURE: 133 MMHG | RESPIRATION RATE: 16 BRPM | WEIGHT: 198 LBS | OXYGEN SATURATION: 95 % | BODY MASS INDEX: 31.82 KG/M2

## 2022-07-02 DIAGNOSIS — N30.90 CYSTITIS: Primary | ICD-10-CM

## 2022-07-02 LAB
BACTERIA UR QL AUTO: ABNORMAL /HPF
BILIRUB UR QL STRIP: NEGATIVE
CLARITY UR: CLEAR
COLOR UR: YELLOW
GLUCOSE UR STRIP-MCNC: NEGATIVE MG/DL
HGB UR QL STRIP.AUTO: NEGATIVE
HYALINE CASTS UR QL AUTO: ABNORMAL /LPF
KETONES UR QL STRIP: NEGATIVE
LEUKOCYTE ESTERASE UR QL STRIP.AUTO: ABNORMAL
NITRITE UR QL STRIP: NEGATIVE
PH UR STRIP.AUTO: 5.5 [PH] (ref 4.5–8)
PROT UR QL STRIP: NEGATIVE
RBC # UR STRIP: ABNORMAL /HPF
REF LAB TEST METHOD: ABNORMAL
SP GR UR STRIP: 1.01 (ref 1–1.03)
SQUAMOUS #/AREA URNS HPF: ABNORMAL /HPF
UROBILINOGEN UR QL STRIP: ABNORMAL
WBC # UR STRIP: ABNORMAL /HPF

## 2022-07-02 PROCEDURE — 81001 URINALYSIS AUTO W/SCOPE: CPT | Performed by: EMERGENCY MEDICINE

## 2022-07-02 PROCEDURE — 99283 EMERGENCY DEPT VISIT LOW MDM: CPT

## 2022-07-02 RX ORDER — PHENAZOPYRIDINE HYDROCHLORIDE 100 MG/1
200 TABLET, FILM COATED ORAL ONCE
Status: COMPLETED | OUTPATIENT
Start: 2022-07-02 | End: 2022-07-02

## 2022-07-02 RX ORDER — PHENAZOPYRIDINE HYDROCHLORIDE 100 MG/1
100 TABLET, FILM COATED ORAL 3 TIMES DAILY PRN
Qty: 6 TABLET | Refills: 0 | Status: SHIPPED | OUTPATIENT
Start: 2022-07-02 | End: 2022-08-18

## 2022-07-02 RX ADMIN — PHENAZOPYRIDINE 200 MG: 100 TABLET ORAL at 16:00

## 2022-07-02 NOTE — ED PROVIDER NOTES
Subjective   History of Present Illness  72-year-old female with a history of frequent urinary tract infections presents to the emergency room complaining of suprapubic discomfort.  She says that she was seen by her primary care physician yesterday who put her on Macrobid because the urine culture from earlier in the month showed bacteria sensitive to Macrobid.  She has some spasm type sensations in her suprapubic region.  Not taking anything like Pyridium.  No fevers no chills no nausea no vomiting no other complaints.  Review of Systems   Constitutional: Negative.    Respiratory: Negative for cough and shortness of breath.    Gastrointestinal: Positive for abdominal pain. Negative for nausea and vomiting.   Genitourinary: Positive for dysuria and frequency. Negative for decreased urine volume, flank pain, urgency, vaginal bleeding and vaginal pain.       Past Medical History:   Diagnosis Date   • Allergic    • Arthritis    • Controlled type 2 diabetes with renal manifestation     states borderline    • Gastroesophageal reflux disease 1/21/2016   • Hyperlipidemia    • Hypertension    • Kidney stone     recurrent, calcium oxalate   • Menopausal disorder 1998    She went through menopause in 1998   • Obesity    • PAF (paroxysmal atrial fibrillation) (HCC)     in the setting of urosepsis, 9/2016   • Reflux esophagitis 8/19/2016   • Sebaceous cyst of labia 12/20/2017   • Sepsis due to urinary tract infection (HCC)    • Sigmoid diverticulitis 11/13/2017   • Urinary tract infection    • Vitamin D deficiency 1/21/2016       Allergies   Allergen Reactions   • Morphine And Related GI Intolerance   • Ciprofloxacin-Ciproflox Hcl Er Rash   • Flomax [Tamsulosin Hcl] Rash   • Hydrocodone Rash   • Latex Rash   • Lortab [Hydrocodone-Acetaminophen] Rash   • Penicillins Rash   • Phenergan [Promethazine Hcl] GI Intolerance   • Sulfa Antibiotics Rash   • Amoxicillin Rash   • Tamsulosin Rash       Past Surgical History:   Procedure  Laterality Date   • ANKLE SURGERY      Ankle Repair / Description: ORif the left ankle in July 2010. Secondary to fall   • BREAST BIOPSY Right     benign   • BREAST SURGERY      biopsy    • CHOLECYSTECTOMY     • COLONOSCOPY  2014    Done 2004 normal recheck in 10 years. Repeated February 2014 colonoscopy was normal and to be repeated in 10 years.   • CYSTOSCOPY BLADDER STONE LITHOTRIPSY     • HEMORRHOIDECTOMY     • NEPHROLITHOTOMY Left 1/9/2017    Procedure: NEPHROLITHOTOMY PERCUTANEOUS SECOND LOOK WITH STENT PLACEMENT AND LASER LITHOTRIPSY;  Surgeon: Mati Villegas MD;  Location: [facility];  Service:    • OTHER SURGICAL HISTORY      Tubal Ligation   • PERCUTANEOUS NEPHROSTOLITHOTOMY Left 12/2016   • TUBAL ABDOMINAL LIGATION         Family History   Problem Relation Age of Onset   • COPD Mother    • Heart attack Father         acute myocardial infarction   • Heart attack Son    • Kidney disease Maternal Grandmother    • Breast cancer Neg Hx        Social History     Socioeconomic History   • Marital status:    Tobacco Use   • Smoking status: Never Smoker   • Smokeless tobacco: Never Used   • Tobacco comment: caffeine use /soft drinks   Vaping Use   • Vaping Use: Never used   Substance and Sexual Activity   • Alcohol use: Yes     Comment: She uses alcohol once or twice a year.   • Drug use: No   • Sexual activity: Defer           Objective    ED Triage Vitals [07/02/22 1557]   Temp Heart Rate Resp BP SpO2   97.6 °F (36.4 °C) 68 17 (!) 138/110 95 %      Temp src Heart Rate Source Patient Position BP Location FiO2 (%)   Temporal Monitor -- -- --       Physical Exam  INITIAL VITAL SIGNS: Reviewed by me.  Pulse ox normal  GENERAL: Alert. Well developed and well nourished. No respiratory distress.  HEAD: Normocephalic.   EYES: No conjunctival injection.  ENT: Oral mucosa is moist.  RESPIRATORY: Non-labored respirations.  ABDOMEN: Soft, nondistended, mild suprapubic discomfort.  EXTREMITIES: No  deformity.  SKIN: Warm and dry. No rashes. No diaphoresis.  NEUROLOGIC: Alert. Normal gait    Procedures           ED Course  ED Course as of 07/02/22 1621   Sat Jul 02, 2022   1617 Very well-appearing 72-year-old female with some suprapubic discomfort currently being treated for UTI.  Sounds like she is having some bladder spasm.  On exam she is in no distress she is afebrile, not tachycardic has a soft nontender abdomen except for mild discomfort in the suprapubic region.   Urine shows white blood cells some bacteria some squamous.  She has a urine culture pending from her primary.  I do not think she needs any further work-up.  Given some Pyridium here we will send her home with Pyridium. [RO]      ED Course User Index  [RO] Navneet Lora MD                                           [facility]    Final diagnoses:   Cystitis       ED Disposition  ED Disposition     ED Disposition   Discharge    Condition   Good    Comment   --             Ashleigh Hernandez PA-C  [address] [id number] [phone]    Call   To schedule follow-up appointment         Medication List      New Prescriptions    phenazopyridine 100 MG tablet  Commonly known as: PYRIDIUM  Take 1 tablet by mouth 3 (Three) Times a Day As Needed for Bladder Spasms.           Where to Get Your Medications      These medications were sent to [id number] - [facility] [address] [facility] - [phone]  - [phone] [facility] [facility] [address]    Phone: [phone]   · phenazopyridine 100 MG tablet          Navneet Lora MD  07/02/22 1621

## 2022-07-02 NOTE — DISCHARGE INSTRUCTIONS
Please take medications as prescribed.  Please follow-up with your primary care physician as scheduled.  Return to the emergency room if you develop worsening symptoms or for any other concerns.

## 2022-07-03 LAB
BACTERIA UR CULT: NORMAL
BACTERIA UR CULT: NORMAL

## 2022-07-06 RX ORDER — RIVAROXABAN 15 MG/1
TABLET, FILM COATED ORAL
Qty: 90 TABLET | Refills: 0 | Status: SHIPPED | OUTPATIENT
Start: 2022-07-06 | End: 2022-10-11

## 2022-07-08 DIAGNOSIS — N39.0 FREQUENT UTI: Primary | ICD-10-CM

## 2022-07-16 LAB
APPEARANCE UR: CLEAR
BACTERIA #/AREA URNS HPF: ABNORMAL /[HPF]
BILIRUB UR QL STRIP: NEGATIVE
CASTS URNS QL MICRO: ABNORMAL /LPF
COLOR UR: YELLOW
EPI CELLS #/AREA URNS HPF: ABNORMAL /HPF (ref 0–10)
GLUCOSE UR QL STRIP: NEGATIVE
HGB UR QL STRIP: NEGATIVE
KETONES UR QL STRIP: NEGATIVE
LEUKOCYTE ESTERASE UR QL STRIP: ABNORMAL
MICRO URNS: ABNORMAL
NITRITE UR QL STRIP: NEGATIVE
PH UR STRIP: 6 [PH] (ref 5–7.5)
PROT UR QL STRIP: NEGATIVE
RBC #/AREA URNS HPF: ABNORMAL /HPF (ref 0–2)
SP GR UR STRIP: 1.01 (ref 1–1.03)
UROBILINOGEN UR STRIP-MCNC: 0.2 MG/DL (ref 0.2–1)
WBC #/AREA URNS HPF: ABNORMAL /HPF (ref 0–5)

## 2022-07-21 ENCOUNTER — OFFICE VISIT (OUTPATIENT)
Dept: ORTHOPEDIC SURGERY | Facility: CLINIC | Age: 72
End: 2022-07-21

## 2022-07-21 VITALS — BODY MASS INDEX: 31.82 KG/M2 | HEIGHT: 66 IN | WEIGHT: 198 LBS

## 2022-07-21 DIAGNOSIS — M19.172 POST-TRAUMATIC ARTHRITIS OF LEFT ANKLE: ICD-10-CM

## 2022-07-21 DIAGNOSIS — M17.11 PRIMARY OSTEOARTHRITIS OF RIGHT KNEE: ICD-10-CM

## 2022-07-21 DIAGNOSIS — M70.61 GREATER TROCHANTERIC BURSITIS OF RIGHT HIP: ICD-10-CM

## 2022-07-21 DIAGNOSIS — R52 PAIN: Primary | ICD-10-CM

## 2022-07-21 PROCEDURE — 99214 OFFICE O/P EST MOD 30 MIN: CPT | Performed by: NURSE PRACTITIONER

## 2022-07-21 PROCEDURE — 73610 X-RAY EXAM OF ANKLE: CPT | Performed by: NURSE PRACTITIONER

## 2022-07-21 RX ORDER — METHYLPREDNISOLONE 4 MG/1
TABLET ORAL
Qty: 21 TABLET | Refills: 0 | Status: SHIPPED | OUTPATIENT
Start: 2022-07-21 | End: 2022-08-18

## 2022-07-30 DIAGNOSIS — F41.9 ANXIETY: ICD-10-CM

## 2022-08-01 RX ORDER — ALPRAZOLAM 1 MG/1
TABLET ORAL
Qty: 60 TABLET | Refills: 0 | Status: SHIPPED | OUTPATIENT
Start: 2022-08-01 | End: 2022-09-09

## 2022-08-01 RX ORDER — METOPROLOL TARTRATE 75 MG/1
TABLET, FILM COATED ORAL
Qty: 180 TABLET | Refills: 0 | Status: SHIPPED | OUTPATIENT
Start: 2022-08-01 | End: 2022-11-02

## 2022-08-18 ENCOUNTER — OFFICE VISIT (OUTPATIENT)
Dept: ORTHOPEDIC SURGERY | Facility: CLINIC | Age: 72
End: 2022-08-18

## 2022-08-18 VITALS — BODY MASS INDEX: 31.82 KG/M2 | HEIGHT: 66 IN | WEIGHT: 198 LBS

## 2022-08-18 DIAGNOSIS — M17.11 PRIMARY OSTEOARTHRITIS OF RIGHT KNEE: Primary | ICD-10-CM

## 2022-08-18 DIAGNOSIS — M70.61 GREATER TROCHANTERIC BURSITIS OF RIGHT HIP: ICD-10-CM

## 2022-08-18 DIAGNOSIS — R52 PAIN: ICD-10-CM

## 2022-08-18 PROCEDURE — 73502 X-RAY EXAM HIP UNI 2-3 VIEWS: CPT | Performed by: NURSE PRACTITIONER

## 2022-08-18 PROCEDURE — 99213 OFFICE O/P EST LOW 20 MIN: CPT | Performed by: NURSE PRACTITIONER

## 2022-08-18 PROCEDURE — 20610 DRAIN/INJ JOINT/BURSA W/O US: CPT | Performed by: NURSE PRACTITIONER

## 2022-08-18 RX ORDER — TRIAMCINOLONE ACETONIDE 40 MG/ML
80 INJECTION, SUSPENSION INTRA-ARTICULAR; INTRAMUSCULAR
Status: COMPLETED | OUTPATIENT
Start: 2022-08-18 | End: 2022-08-18

## 2022-08-18 RX ORDER — LIDOCAINE HYDROCHLORIDE 10 MG/ML
8 INJECTION, SOLUTION EPIDURAL; INFILTRATION; INTRACAUDAL; PERINEURAL
Status: COMPLETED | OUTPATIENT
Start: 2022-08-18 | End: 2022-08-18

## 2022-08-18 RX ADMIN — TRIAMCINOLONE ACETONIDE 80 MG: 40 INJECTION, SUSPENSION INTRA-ARTICULAR; INTRAMUSCULAR at 14:44

## 2022-08-18 RX ADMIN — LIDOCAINE HYDROCHLORIDE 8 ML: 10 INJECTION, SOLUTION EPIDURAL; INFILTRATION; INTRACAUDAL; PERINEURAL at 14:44

## 2022-08-18 NOTE — PROGRESS NOTES
Subjective:     Patient ID: Krys Mayes is a 72 y.o. female.    Chief Complaint:  Follow-up DJD right knee   Corticosteroid injection 5/17/2022 right knee  Visco injection right knee 6/7/2022  History of Present Illness  Krys Mayes Returns to clinic for follow-up right knee.  She continues to experience pain from time to time with certain motions such as ambulating long distances standing for extended periods of time.  Pain also present from time to time with transitional activity such and seated standing attempting to walk.  She has received corticosteroid injection did report significant symptom relief also followed by viscosupplementation injection and again initially did receive significant symptom.  Pain present along the medial aspect of the knee.  Denies significant pain present radiating to the groin but is present on occasion.  She is experiencing pain into the medial thigh that does radiate to the knee.  She is complaining of pain across her low back both sides but denies any presence of numbness or tingling at the lower extremities.  She is at difficulty again with ambulating long distances.  Denies other concerns present.       Social History     Occupational History   • Not on file   Tobacco Use   • Smoking status: Never Smoker   • Smokeless tobacco: Never Used   • Tobacco comment: caffeine use /soft drinks   Vaping Use   • Vaping Use: Never used   Substance and Sexual Activity   • Alcohol use: Yes     Comment: She uses alcohol once or twice a year.   • Drug use: No   • Sexual activity: Defer      Past Medical History:   Diagnosis Date   • Allergic    • Arthritis    • Controlled type 2 diabetes with renal manifestation     states borderline    • Gastroesophageal reflux disease 1/21/2016   • Hyperlipidemia    • Hypertension    • Kidney stone     recurrent, calcium oxalate   • Menopausal disorder 1998    She went through menopause in 1998   • Obesity    • PAF (paroxysmal atrial  "fibrillation) (HCC)     in the setting of urosepsis, 9/2016   • Reflux esophagitis 8/19/2016   • Sebaceous cyst of labia 12/20/2017   • Sepsis due to urinary tract infection (HCC)    • Sigmoid diverticulitis 11/13/2017   • Urinary tract infection    • Vitamin D deficiency 1/21/2016     Past Surgical History:   Procedure Laterality Date   • ANKLE SURGERY      Ankle Repair / Description: ORif the left ankle in July 2010. Secondary to fall   • BREAST BIOPSY Right     benign   • BREAST SURGERY      biopsy    • CHOLECYSTECTOMY     • COLONOSCOPY  2014    Done 2004 normal recheck in 10 years. Repeated February 2014 colonoscopy was normal and to be repeated in 10 years.   • CYSTOSCOPY BLADDER STONE LITHOTRIPSY     • HEMORRHOIDECTOMY     • NEPHROLITHOTOMY Left 1/9/2017    Procedure: NEPHROLITHOTOMY PERCUTANEOUS SECOND LOOK WITH STENT PLACEMENT AND LASER LITHOTRIPSY;  Surgeon: Mati Villegas MD;  Location: [facility];  Service:    • OTHER SURGICAL HISTORY      Tubal Ligation   • PERCUTANEOUS NEPHROSTOLITHOTOMY Left 12/2016   • TUBAL ABDOMINAL LIGATION         Family History   Problem Relation Age of Onset   • COPD Mother    • Heart attack Father         acute myocardial infarction   • Heart attack Son    • Kidney disease Maternal Grandmother    • Breast cancer Neg Hx                Objective:  Physical Exam    General: No acute distress.  Eyes: conjunctiva clear; pupils equally round and reactive  ENT: external ears and nose atraumatic; oropharynx clear  CV: no peripheral edema  Resp: normal respiratory effort  Skin: no rashes or wounds; normal turgor  Psych: mood and affect appropriate; recent and remote memory intact    Vitals:    08/18/22 1334   Weight: 89.8 kg (198 lb)   Height: 167.6 cm (66\")         08/18/22  1334   Weight: 89.8 kg (198 lb)     Body mass index is 31.96 kg/m².      Ortho Exam        Right Knee Exam      Tenderness   The patient is experiencing tenderness in the medial joint line.     Range of " Motion   Extension: 0   Flexion: 120      Tests   Rylie:  Medial - negative Lateral - negative  Varus: negative Valgus: negative  Lachman:  Anterior - 1+    Posterior - negative  Drawer:  Anterior - negative    Posterior - negative  Patellar apprehension: positive     Other   Erythema: absent  Sensation: normal  Pulse: present  Swelling: moderate     Comments:    Positive crepitus throughout arc of motion  Positive active patella compression        Imaging:  Right Hip X-Ray  Indication: Pain  AP and Frog Leg views    Findings:  Mild-moderate acetabulofemoral narrowing  No bony lesion  Normal soft tissues  Normal joint spaces  prior studies were available for comparison.    Assessment:        1. Pain    2. Primary osteoarthritis of right knee    3. Greater trochanteric bursitis of right hip           Plan:  1.  Discussed plan of care with patient.  Discussed treatment options including repeat corticosteroid injection right knee versus a fluoroscopy guided injection into the right hip.  At this time wishes to hold off on referral for fluoroscopy guided injection to the hip does wish to proceed with corticosteroid injection right knee.  Discussed if not improving in the next 3 weeks may consider referral for hip injection under fluoroscopy.  At this time we will hold off on a total knee arthroplasty.  Orders:  Orders Placed This Encounter   Procedures   • Large Joint Arthrocentesis: R knee   • XR Hip With or Without Pelvis 2 - 3 View Right     No orders of the defined types were placed in this encounter.  Large Joint Arthrocentesis: R knee  Date/Time: 8/18/2022 2:44 PM  Consent given by: patient  Site marked: site marked  Timeout: Immediately prior to procedure a time out was called to verify the correct patient, procedure, equipment, support staff and site/side marked as required   Supporting Documentation  Indications: pain   Procedure Details  Location: knee - R knee  Preparation: Patient was prepped and draped  in the usual sterile fashion  Needle size: 22 G  Approach: superior  Medications administered: 8 mL lidocaine PF 1% 1 %; 80 mg triamcinolone acetonide 40 MG/ML  Patient tolerance: patient tolerated the procedure well with no immediate complications                Dragon dictation utilized.

## 2022-08-18 NOTE — PROGRESS NOTES
Returns to clinic for follow-up right knee. She continues to experience pain from time to time with certain motions such as ambulating long distances standing for extended periods of time. Pain also present from time to time with transitional activity such and seated standing attempting to walk. She has received corticosteroid injection did report significant symptom relief also followed by viscosupplementation injection and again initially did receive significant symptom. Pain present along the medial aspect of the knee. Denies significant pain present radiating to the groin but is present on occasion. She is experiencing pain into the medial thigh that does radiate to the knee. She is complaining of pain across her low back both sides but denies any presence of numbness or tingling at the lower extremities. She is at difficulty again with ambulating long distances. Denies other concerns present.    Plan:  1. Discussed plan of care with patient. Discussed treatment options including repeat corticosteroid injection right knee versus a fluoroscopy guided injection into the right hip. At this time wishes to hold off on referral for fluoroscopy guided injection to the hip does wish to proceed with corticosteroid injection right knee. Discussed if not improving in the next 3 weeks may consider referral for hip injection under fluoroscopy. At this time we will hold off on a total knee arthroplasty.

## 2022-08-25 RX ORDER — SERTRALINE HYDROCHLORIDE 100 MG/1
TABLET, FILM COATED ORAL
Qty: 90 TABLET | Refills: 0 | Status: SHIPPED | OUTPATIENT
Start: 2022-08-25 | End: 2022-11-28

## 2022-08-30 DIAGNOSIS — R73.03 PREDIABETES: ICD-10-CM

## 2022-08-30 DIAGNOSIS — E78.5 DYSLIPIDEMIA: ICD-10-CM

## 2022-09-03 LAB
ALBUMIN SERPL-MCNC: 4.4 G/DL (ref 3.5–5.2)
ALBUMIN/GLOB SERPL: 2 G/DL
ALP SERPL-CCNC: 65 U/L (ref 39–117)
ALT SERPL-CCNC: 10 U/L (ref 1–33)
AST SERPL-CCNC: 13 U/L (ref 1–32)
BASOPHILS # BLD AUTO: 0.05 10*3/MM3 (ref 0–0.2)
BASOPHILS NFR BLD AUTO: 0.8 % (ref 0–1.5)
BILIRUB SERPL-MCNC: 0.3 MG/DL (ref 0–1.2)
BUN SERPL-MCNC: 29 MG/DL (ref 8–23)
BUN/CREAT SERPL: 27.4 (ref 7–25)
CALCIUM SERPL-MCNC: 9.8 MG/DL (ref 8.6–10.5)
CHLORIDE SERPL-SCNC: 104 MMOL/L (ref 98–107)
CHOLEST SERPL-MCNC: 163 MG/DL (ref 0–200)
CO2 SERPL-SCNC: 29 MMOL/L (ref 22–29)
CREAT SERPL-MCNC: 1.06 MG/DL (ref 0.57–1)
EGFRCR-CYS SERPLBLD CKD-EPI 2021: 55.6 ML/MIN/1.73
EOSINOPHIL # BLD AUTO: 0.12 10*3/MM3 (ref 0–0.4)
EOSINOPHIL NFR BLD AUTO: 1.8 % (ref 0.3–6.2)
ERYTHROCYTE [DISTWIDTH] IN BLOOD BY AUTOMATED COUNT: 13.1 % (ref 12.3–15.4)
GLOBULIN SER CALC-MCNC: 2.2 GM/DL
GLUCOSE SERPL-MCNC: 90 MG/DL (ref 65–99)
HBA1C MFR BLD: 5.6 % (ref 4.8–5.6)
HCT VFR BLD AUTO: 46.5 % (ref 34–46.6)
HDLC SERPL-MCNC: 68 MG/DL (ref 40–60)
HGB BLD-MCNC: 14.2 G/DL (ref 12–15.9)
IMM GRANULOCYTES # BLD AUTO: 0.02 10*3/MM3 (ref 0–0.05)
IMM GRANULOCYTES NFR BLD AUTO: 0.3 % (ref 0–0.5)
LDLC SERPL CALC-MCNC: 72 MG/DL (ref 0–100)
LDLC/HDLC SERPL: 1.01 {RATIO}
LYMPHOCYTES # BLD AUTO: 1.32 10*3/MM3 (ref 0.7–3.1)
LYMPHOCYTES NFR BLD AUTO: 20.2 % (ref 19.6–45.3)
MCH RBC QN AUTO: 25.7 PG (ref 26.6–33)
MCHC RBC AUTO-ENTMCNC: 30.5 G/DL (ref 31.5–35.7)
MCV RBC AUTO: 84.1 FL (ref 79–97)
MONOCYTES # BLD AUTO: 0.73 10*3/MM3 (ref 0.1–0.9)
MONOCYTES NFR BLD AUTO: 11.2 % (ref 5–12)
NEUTROPHILS # BLD AUTO: 4.3 10*3/MM3 (ref 1.7–7)
NEUTROPHILS NFR BLD AUTO: 65.7 % (ref 42.7–76)
NRBC BLD AUTO-RTO: 0 /100 WBC (ref 0–0.2)
PLATELET # BLD AUTO: 280 10*3/MM3 (ref 140–450)
POTASSIUM SERPL-SCNC: 4.6 MMOL/L (ref 3.5–5.2)
PROT SERPL-MCNC: 6.6 G/DL (ref 6–8.5)
RBC # BLD AUTO: 5.53 10*6/MM3 (ref 3.77–5.28)
SODIUM SERPL-SCNC: 142 MMOL/L (ref 136–145)
TRIGL SERPL-MCNC: 133 MG/DL (ref 0–150)
VLDLC SERPL CALC-MCNC: 23 MG/DL (ref 5–40)
WBC # BLD AUTO: 6.54 10*3/MM3 (ref 3.4–10.8)

## 2022-09-09 ENCOUNTER — OFFICE VISIT (OUTPATIENT)
Dept: FAMILY MEDICINE CLINIC | Facility: CLINIC | Age: 72
End: 2022-09-09

## 2022-09-09 VITALS
TEMPERATURE: 97.1 F | RESPIRATION RATE: 15 BRPM | HEIGHT: 66 IN | DIASTOLIC BLOOD PRESSURE: 76 MMHG | HEART RATE: 54 BPM | WEIGHT: 199 LBS | BODY MASS INDEX: 31.98 KG/M2 | OXYGEN SATURATION: 97 % | SYSTOLIC BLOOD PRESSURE: 132 MMHG

## 2022-09-09 DIAGNOSIS — F41.1 GAD (GENERALIZED ANXIETY DISORDER): ICD-10-CM

## 2022-09-09 DIAGNOSIS — F41.8 MIXED ANXIETY DEPRESSIVE DISORDER: ICD-10-CM

## 2022-09-09 DIAGNOSIS — E78.2 MIXED HYPERLIPIDEMIA: ICD-10-CM

## 2022-09-09 DIAGNOSIS — R73.03 PREDIABETES: ICD-10-CM

## 2022-09-09 DIAGNOSIS — I10 ESSENTIAL HYPERTENSION: ICD-10-CM

## 2022-09-09 DIAGNOSIS — F41.9 ANXIETY: ICD-10-CM

## 2022-09-09 DIAGNOSIS — Z00.00 MEDICARE ANNUAL WELLNESS VISIT, SUBSEQUENT: Primary | ICD-10-CM

## 2022-09-09 PROCEDURE — 1126F AMNT PAIN NOTED NONE PRSNT: CPT | Performed by: PHYSICIAN ASSISTANT

## 2022-09-09 PROCEDURE — G0439 PPPS, SUBSEQ VISIT: HCPCS | Performed by: PHYSICIAN ASSISTANT

## 2022-09-09 PROCEDURE — 1160F RVW MEDS BY RX/DR IN RCRD: CPT | Performed by: PHYSICIAN ASSISTANT

## 2022-09-09 PROCEDURE — 1170F FXNL STATUS ASSESSED: CPT | Performed by: PHYSICIAN ASSISTANT

## 2022-09-09 RX ORDER — SIMVASTATIN 80 MG
80 TABLET ORAL EVERY EVENING
Qty: 90 TABLET | Refills: 3 | Status: SHIPPED | OUTPATIENT
Start: 2022-09-09

## 2022-09-09 RX ORDER — LOSARTAN POTASSIUM 100 MG/1
100 TABLET ORAL DAILY
Qty: 90 TABLET | Refills: 3 | Status: SHIPPED | OUTPATIENT
Start: 2022-09-09 | End: 2022-12-10 | Stop reason: HOSPADM

## 2022-09-09 RX ORDER — ALPRAZOLAM 1 MG/1
TABLET ORAL
Qty: 60 TABLET | Refills: 0 | Status: SHIPPED | OUTPATIENT
Start: 2022-09-09 | End: 2022-10-09

## 2022-09-09 NOTE — PROGRESS NOTES
The ABCs of the Annual Wellness Visit  Subsequent Medicare Wellness Visit    Chief Complaint   Patient presents with   • Medicare Wellness-subsequent   • Hypertension     management   • Hyperlipidemia     management   • Anxiety     management      Subjective    History of Present Illness:  Krys Mayes is a 72 y.o. female who presents for a Subsequent Medicare Wellness Visit,hypertension,hyperlipidemia,and anxiety management.  Her weight has been stable.  She has been making healthier choices by decreasing fatty food and carbohydrates.  Sleep has been good with the Xanax.  Currently takes 1-1/2 tablets daily.  Sees her therapist once monthly.  Denied any suicidal or homicidal ideation.  The sertraline has helped with her anxiety.  Bowel movements have been normal without dark black tarry stools.  Denied any fevers, chills, upper respiratory symptoms, chest pain, shortness of air, abdominal pain, GI upset, urinary symptoms or swelling of ankles.  Eye exam has been current.    The following portions of the patient's history were reviewed and   updated as appropriate:   She  has a past medical history of Allergic, Arthritis, Controlled type 2 diabetes with renal manifestation, Gastroesophageal reflux disease (1/21/2016), Hyperlipidemia, Hypertension, Kidney stone, Menopausal disorder (1998), Obesity, PAF (paroxysmal atrial fibrillation) ([facility]), Reflux esophagitis (8/19/2016), Sebaceous cyst of labia (12/20/2017), Sepsis due to urinary tract infection ([facility]), Sigmoid diverticulitis (11/13/2017), Urinary tract infection, and Vitamin D deficiency (1/21/2016).  She does not have any pertinent problems on file.  She  has a past surgical history that includes Ankle surgery; Cholecystectomy; Colonoscopy (2014); Hemorrhoid surgery; Other surgical history; Tubal ligation; cystoscopy bladder stone lithotripsy; Breast surgery; Percutaneous nephrostolithotomy (Left, 12/2016); Nephrolithotomy (Left, 1/9/2017); and Breast biopsy  (Right).  Her family history includes COPD in her mother; Heart attack in her father and son; Kidney disease in her maternal grandmother.  She  reports that she has never smoked. She has never used smokeless tobacco. She reports current alcohol use. She reports that she does not use drugs.  Current Outpatient Medications   Medication Sig Dispense Refill   • acetaminophen (TYLENOL) 650 MG 8 hr tablet Take 1 tablet by mouth every 6 (six) hours as needed. for pain     • ALPRAZolam (XANAX) 1 MG tablet Take 0.5 to one tablet twice a day as needed for anxiety/sleep 60 tablet 0   • losartan (COZAAR) 100 MG tablet Take 1 tablet by mouth Daily. 90 tablet 3   • meclizine (ANTIVERT) 25 MG tablet Take 1 tablet by mouth Every 6 (Six) Hours As Needed for dizziness or Nausea. 30 tablet 0   • Metoprolol Tartrate 75 MG tablet Take 1 tablet by mouth twice daily 180 tablet 0   • Multiple Vitamin tablet Take 1 tablet by mouth Daily.     • omeprazole OTC (PriLOSEC OTC) 20 MG EC tablet Take 20 mg by mouth 2 (two) times a day.     • Probiotic Product (PROBIOTIC ADVANCED PO) Take 1 tablet by mouth Daily.     • sertraline (ZOLOFT) 100 MG tablet Take 1 tablet by mouth once daily 90 tablet 0   • simvastatin (ZOCOR) 80 MG tablet Take 1 tablet by mouth Every Evening. 90 tablet 3   • triamcinolone (KENALOG) 0.1 % cream APPLY TO AFFECTED AREA TWICE A DAY TO RASH FOR UP TO 2 WEEKS     • Xarelto 15 MG tablet Take 1 tablet by mouth once daily 90 tablet 0     No current facility-administered medications for this visit.     Current Outpatient Medications on File Prior to Visit   Medication Sig   • acetaminophen (TYLENOL) 650 MG 8 hr tablet Take 1 tablet by mouth every 6 (six) hours as needed. for pain   • meclizine (ANTIVERT) 25 MG tablet Take 1 tablet by mouth Every 6 (Six) Hours As Needed for dizziness or Nausea.   • Metoprolol Tartrate 75 MG tablet Take 1 tablet by mouth twice daily   • Multiple Vitamin tablet Take 1 tablet by mouth Daily.   •  omeprazole OTC (PriLOSEC OTC) 20 MG EC tablet Take 20 mg by mouth 2 (two) times a day.   • Probiotic Product (PROBIOTIC ADVANCED PO) Take 1 tablet by mouth Daily.   • sertraline (ZOLOFT) 100 MG tablet Take 1 tablet by mouth once daily   • triamcinolone (KENALOG) 0.1 % cream APPLY TO AFFECTED AREA TWICE A DAY TO RASH FOR UP TO 2 WEEKS   • Xarelto 15 MG tablet Take 1 tablet by mouth once daily   • [DISCONTINUED] ALPRAZolam (XANAX) 1 MG tablet TAKE 1/2 TO 1 (ONE-HALF TO ONE) TABLET BY MOUTH EVERY 12 HOURS AS NEEDED FOR ANXIETY AND FOR SLEEP   • [DISCONTINUED] losartan (COZAAR) 100 MG tablet Take 1 tablet by mouth once daily   • [DISCONTINUED] simvastatin (ZOCOR) 80 MG tablet Take 1 tablet by mouth Every Evening.     No current facility-administered medications on file prior to visit.     She is allergic to morphine and related, ciprofloxacin-ciproflox hcl er, flomax [tamsulosin hcl], hydrocodone, latex, lortab [hydrocodone-acetaminophen], penicillins, phenergan [promethazine hcl], sulfa antibiotics, amoxicillin, and tamsulosin..    Compared to one year ago, the patient feels her physical   health is the same.    Compared to one year ago, the patient feels her mental   health is the same.    Recent Hospitalizations:  She was not admitted to the hospital during the last year.       Current Medical Providers:  Patient Care Team:  Ashleigh Hernandez PA-C as PCP - General (Family Medicine)  Jeremy Orozco MD as Consulting Physician (Cardiology)  Joss Oro MD as Consulting Physician (Gastroenterology)    Outpatient Medications Prior to Visit   Medication Sig Dispense Refill   • acetaminophen (TYLENOL) 650 MG 8 hr tablet Take 1 tablet by mouth every 6 (six) hours as needed. for pain     • meclizine (ANTIVERT) 25 MG tablet Take 1 tablet by mouth Every 6 (Six) Hours As Needed for dizziness or Nausea. 30 tablet 0   • Metoprolol Tartrate 75 MG tablet Take 1 tablet by mouth twice daily 180 tablet 0   • Multiple  Vitamin tablet Take 1 tablet by mouth Daily.     • omeprazole OTC (PriLOSEC OTC) 20 MG EC tablet Take 20 mg by mouth 2 (two) times a day.     • Probiotic Product (PROBIOTIC ADVANCED PO) Take 1 tablet by mouth Daily.     • sertraline (ZOLOFT) 100 MG tablet Take 1 tablet by mouth once daily 90 tablet 0   • triamcinolone (KENALOG) 0.1 % cream APPLY TO AFFECTED AREA TWICE A DAY TO RASH FOR UP TO 2 WEEKS     • Xarelto 15 MG tablet Take 1 tablet by mouth once daily 90 tablet 0   • ALPRAZolam (XANAX) 1 MG tablet TAKE 1/2 TO 1 (ONE-HALF TO ONE) TABLET BY MOUTH EVERY 12 HOURS AS NEEDED FOR ANXIETY AND FOR SLEEP 60 tablet 0   • losartan (COZAAR) 100 MG tablet Take 1 tablet by mouth once daily 90 tablet 0   • simvastatin (ZOCOR) 80 MG tablet Take 1 tablet by mouth Every Evening. 90 tablet 3     No facility-administered medications prior to visit.       No opioid medication identified on active medication list. I have reviewed chart for other potential  high risk medication/s and harmful drug interactions in the elderly.          Aspirin is not on active medication list.  Aspirin use is not indicated based on review of current medical condition/s. Risk of harm outweighs potential benefits.  .    Patient Active Problem List   Diagnosis   • Elevated alanine aminotransferase (ALT) level   • Mixed anxiety depressive disorder   • Gastroesophageal reflux disease   • Essential hypertension   • Insomnia   • Microalbuminuria   • Obesity (BMI 30-39.9)   • Proteinuria   • Vitamin D deficiency   • Postmenopause   • History of fracture   • Reflux esophagitis   • Calculus of kidney   • PAF (paroxysmal atrial fibrillation) ([facility])   • COLTON (generalized anxiety disorder)   • Iron deficiency anemia   • Prediabetes   • Urine frequency   • Medicare annual wellness visit, subsequent   • Need for pneumococcal vaccination   • High risk medication use   • Dermoid cyst of scalp   • Hyperglycemia   • Grief   • Mixed hyperlipidemia   • Long-term use of  "high-risk medication   • Frequent UTI   • Greater trochanteric bursitis of right hip   • Chondromalacia, right knee   • Post-traumatic arthritis of left ankle     Advance Care Planning  Advance Directive is on file.  ACP discussion was held with the patient during this visit. Patient has an advance directive in EMR which is still valid.     Review of Systems   Respiratory: Negative.    Cardiovascular: Negative.    Gastrointestinal: Negative.    Psychiatric/Behavioral: Negative for sleep disturbance and suicidal ideas.   All other systems reviewed and are negative.       Objective    Vitals:    09/09/22 0947 09/09/22 1019   BP: 140/70 132/76   BP Location:  Left arm   Patient Position:  Sitting   Cuff Size:  Adult   Pulse: 54    Resp: 15    Temp: 97.1 °F (36.2 °C)    SpO2: 97%    Weight: 90.3 kg (199 lb)    Height: 167.6 cm (66\")    PainSc: 0-No pain      Estimated body mass index is 32.12 kg/m² as calculated from the following:    Height as of this encounter: 167.6 cm (66\").    Weight as of this encounter: 90.3 kg (199 lb).    BMI is >= 30 and <35. (Class 1 Obesity). The following options were offered after discussion;: exercise counseling/recommendations      Does the patient have evidence of cognitive impairment? No    Physical Exam  Vitals and nursing note reviewed.   Constitutional:       Appearance: Normal appearance. She is well-developed and well-groomed. She is obese.      Interventions: Face mask in place.   HENT:      Head: Normocephalic and atraumatic.   Neck:      Thyroid: No thyroid mass, thyromegaly or thyroid tenderness.      Vascular: No carotid bruit.      Trachea: Trachea and phonation normal. No tracheal tenderness.   Cardiovascular:      Rate and Rhythm: Normal rate and regular rhythm.      Pulses: Normal pulses.      Heart sounds: Normal heart sounds, S1 normal and S2 normal. No murmur heard.  Pulmonary:      Effort: Pulmonary effort is normal.      Breath sounds: Normal breath sounds and air " entry.   Abdominal:      General: Bowel sounds are normal.      Palpations: Abdomen is soft. There is no hepatomegaly.      Tenderness: There is no abdominal tenderness.   Musculoskeletal:      Cervical back: Neck supple.      Right lower leg: No edema.      Left lower leg: No edema.   Skin:     General: Skin is warm and dry.      Capillary Refill: Capillary refill takes less than 2 seconds.   Neurological:      Mental Status: She is alert and oriented to person, place, and time.   Psychiatric:         Attention and Perception: Attention and perception normal.         Mood and Affect: Mood and affect normal.         Speech: Speech normal.         Behavior: Behavior normal. Behavior is cooperative.         Thought Content: Thought content normal.         Cognition and Memory: Cognition and memory normal.         Judgment: Judgment normal.     I wore a facial mask during this patient encounter.  Patient also wearing a N 95  surgical mask.  Hand hygiene performed before and after seeing the patient.    Lab Results   Component Value Date    CHLPL 163 09/02/2022    TRIG 133 09/02/2022    HDL 68 (H) 09/02/2022    LDL 72 09/02/2022    VLDL 23 09/02/2022    HGBA1C 5.60 09/02/2022          HEALTH RISK ASSESSMENT    Smoking Status:  Social History     Tobacco Use   Smoking Status Never Smoker   Smokeless Tobacco Never Used   Tobacco Comment    caffeine use /soft drinks     Alcohol Consumption:  Social History     Substance and Sexual Activity   Alcohol Use Yes    Comment: She uses alcohol once or twice a year.     Fall Risk Screen:    JEREMY Fall Risk Assessment was completed, and patient is at LOW risk for falls.Assessment completed on:9/9/2022    Depression Screening:  PHQ-2/PHQ-9 Depression Screening 9/9/2022   Retired PHQ-9 Total Score -   Retired Total Score -   Little Interest or Pleasure in Doing Things 0-->not at all   Feeling Down, Depressed or Hopeless 0-->not at all   PHQ-9: Brief Depression Severity Measure Score  0       Health Habits and Functional and Cognitive Screening:  Functional & Cognitive Status 9/9/2022   Do you have difficulty preparing food and eating? No   Do you have difficulty bathing yourself, getting dressed or grooming yourself? No   Do you have difficulty using the toilet? No   Do you have difficulty moving around from place to place? No   Do you have trouble with steps or getting out of a bed or a chair? No   Current Diet Unhealthy Diet   Dental Exam Up to date   Eye Exam Up to date   Exercise (times per week) 0 times per week   Current Exercises Include No Regular Exercise   Current Exercise Activities Include -   Do you need help using the phone?  No   Are you deaf or do you have serious difficulty hearing?  No   Do you need help with transportation? No   Do you need help shopping? No   Do you need help preparing meals?  No   Do you need help with housework?  No   Do you need help with laundry? No   Do you need help taking your medications? No   Do you need help managing money? No   Do you ever drive or ride in a car without wearing a seat belt? No   Have you felt unusual stress, anger or loneliness in the last month? No   Who do you live with? Spouse   If you need help, do you have trouble finding someone available to you? No   Have you been bothered in the last four weeks by sexual problems? No   Do you have difficulty concentrating, remembering or making decisions? Yes       Age-appropriate Screening Schedule:  Refer to the list below for future screening recommendations based on patient's age, sex and/or medical conditions. Orders for these recommended tests are listed in the plan section. The patient has been provided with a written plan.    Health Maintenance   Topic Date Due   • INFLUENZA VACCINE  10/01/2022   • HEMOGLOBIN A1C  03/02/2023   • LIPID PANEL  09/02/2023   • DXA SCAN  09/14/2023   • TDAP/TD VACCINES (2 - Td or Tdap) 11/21/2023   • MAMMOGRAM  12/20/2023   • ZOSTER VACCINE  Completed    • DIABETIC FOOT EXAM  Discontinued   • DIABETIC EYE EXAM  Discontinued   • URINE MICROALBUMIN  Discontinued              Assessment & Plan   CMS Preventative Services Quick Reference  Risk Factors Identified During Encounter  Cardiovascular Disease  Depression/Dysphoria  Obesity/Overweight   The above risks/problems have been discussed with the patient.  Follow up actions/plans if indicated are seen below in the Assessment/Plan Section.  Pertinent information has been shared with the patient in the After Visit Summary.    Diagnoses and all orders for this visit:    1. Medicare annual wellness visit, subsequent (Primary)    2. Essential hypertension  -     losartan (COZAAR) 100 MG tablet; Take 1 tablet by mouth Daily.  Dispense: 90 tablet; Refill: 3    3. Mixed hyperlipidemia  -     simvastatin (ZOCOR) 80 MG tablet; Take 1 tablet by mouth Every Evening.  Dispense: 90 tablet; Refill: 3    4. Prediabetes    5. COLTON (generalized anxiety disorder)    6. Mixed anxiety depressive disorder    7. Anxiety  -     ALPRAZolam (XANAX) 1 MG tablet; Take 0.5 to one tablet twice a day as needed for anxiety/sleep  Dispense: 60 tablet; Refill: 0      1.  Annual Medicare wellness exam with hypertension: I have rechecked blood pressure and got 132/76 in left arm.  Doing well with her current medication.  We will keep follow-up appointments with her cardiologist.  I have refilled her losartan medication to pharmacy.  Return to office in 6 months.  Above blood work has improved.  A1c and fasting blood sugar are normal.  2.  Chronic and stable hyperlipidemia with prediabetes: A1c and lipid profile results have improved.  She was given encouragement and praise.  She will keep up the good work by eating healthier and making healthy choices.  Return to office in 6 months.  I have refilled her cholesterol medicine to pharmacy.  3.  Chronic and stable generalized anxiety disorder with mixed depression disorder: Have refilled her Xanax to  pharmacy.  See below for Sharee information.  She will keep her therapy appointments.  Return to office in 6 months.  Follow Up:   No follow-ups on file.     An After Visit Summary and PPPS were made available to the patient.                     As part of this patient's treatment plan I am prescribing controlled substances.  The patient has been made aware of appropriate use of such medications, including potential risk of somnolence. Limited ability to drive and/or work safely, and potential for dependence or overdose.  It has also been made clear that these medications are for use by this patient only, without concomitant use of alcohol or other substances unless prescribed.  SHAREE report has been reviewed and scanned into the patient's chart.  Sharee number is [id number] dated July 30, 2022..    Patient Counseling:  --Nutrition: Stressed importance of moderation in sodium/caffeine intake, saturated fat and cholesterol.  Discussed caloric balance, sufficient intake of fresh fruits, vegetables, fiber,   calcium, iron.  --Discussed the new recommendation against daily use of baby aspirin for primary prevention in low risk patients.  --Exercise: Stressed the importance of regular exercise by incorporating into daily routine.    --Substance Abuse: Discussed cessation/primary prevention of tobacco, alcohol, or other drug use; driving or other dangerous activities under the influence.    --Dental health: Discussed importance of regular tooth brushing, flossing, and dental visits.  -- Suggested having eyes and vision checked if needed or past due.  --Immunizations reviewed.  --Discussed benefits of screening colonoscopy.    MANI Martinez [facility]  [address] [place] [id number]  Dept: [phone]  Dept Fax: [phone]  Loc: [phone]  Loc Fax: [phone]

## 2022-09-19 DIAGNOSIS — M25.551 RIGHT HIP PAIN: ICD-10-CM

## 2022-09-19 DIAGNOSIS — M70.61 GREATER TROCHANTERIC BURSITIS OF RIGHT HIP: Primary | ICD-10-CM

## 2022-09-27 ENCOUNTER — OFFICE VISIT (OUTPATIENT)
Dept: ORTHOPEDIC SURGERY | Facility: CLINIC | Age: 72
End: 2022-09-27

## 2022-09-27 VITALS — HEIGHT: 66 IN | BODY MASS INDEX: 31.98 KG/M2 | WEIGHT: 199 LBS

## 2022-09-27 DIAGNOSIS — M17.11 PRIMARY OSTEOARTHRITIS OF RIGHT KNEE: ICD-10-CM

## 2022-09-27 DIAGNOSIS — M70.61 GREATER TROCHANTERIC BURSITIS OF RIGHT HIP: Primary | ICD-10-CM

## 2022-09-27 PROCEDURE — 99213 OFFICE O/P EST LOW 20 MIN: CPT | Performed by: NURSE PRACTITIONER

## 2022-09-27 NOTE — PROGRESS NOTES
Subjective:     Patient ID: Krys Mayes is a 72 y.o. female.    Chief Complaint:  Follow-up DJD right knee  Follow-up right hip pain   History of Present Illness  Krys Mayes for follow-up of her right lower extremity.  She is experiencing pain along the right thigh with pain radiating to the groin.  Increased pain noted transitional activity such as from seated standing attempting to walk, ambulating long distances and standing for extended periods of time.  She is now began experiencing pain also at the ankle.  Has received corticosteroid injections at the knee, viscosupplementation injections and an injection along the greater trochanter of the right lower extremity in April all with symptom relief until recently.  She is experiencing pain with radiating into the groin with external rotation and transitional activities at the right lower extremity.  Denies any recent imaging is experiencing swelling also present in the right lower extremity at the medial aspect of the knee and into the ankle.  Denies other concerns present.       Social History     Occupational History   • Not on file   Tobacco Use   • Smoking status: Never Smoker   • Smokeless tobacco: Never Used   • Tobacco comment: caffeine use /soft drinks   Vaping Use   • Vaping Use: Never used   Substance and Sexual Activity   • Alcohol use: Yes     Comment: She uses alcohol once or twice a year.   • Drug use: No   • Sexual activity: Defer      Past Medical History:   Diagnosis Date   • Allergic    • Arthritis    • Controlled type 2 diabetes with renal manifestation     states borderline    • Gastroesophageal reflux disease 1/21/2016   • Hyperlipidemia    • Hypertension    • Kidney stone     recurrent, calcium oxalate   • Menopausal disorder 1998    She went through menopause in 1998   • Obesity    • PAF (paroxysmal atrial fibrillation) ([facility])     in the setting of urosepsis, 9/2016   • Reflux esophagitis 8/19/2016   • Sebaceous cyst of labia  "12/20/2017   • Sepsis due to urinary tract infection ([facility])    • Sigmoid diverticulitis 11/13/2017   • Urinary tract infection    • Vitamin D deficiency 1/21/2016     Past Surgical History:   Procedure Laterality Date   • ANKLE SURGERY      Ankle Repair / Description: ORif the left ankle in July 2010. Secondary to fall   • BREAST BIOPSY Right     benign   • BREAST SURGERY      biopsy    • CHOLECYSTECTOMY     • COLONOSCOPY  2014    Done 2004 normal recheck in 10 years. Repeated February 2014 colonoscopy was normal and to be repeated in 10 years.   • CYSTOSCOPY BLADDER STONE LITHOTRIPSY     • HEMORRHOIDECTOMY     • NEPHROLITHOTOMY Left 1/9/2017    Procedure: NEPHROLITHOTOMY PERCUTANEOUS SECOND LOOK WITH STENT PLACEMENT AND LASER LITHOTRIPSY;  Surgeon: Mati Villegas MD;  Location: [facility];  Service:    • OTHER SURGICAL HISTORY      Tubal Ligation   • PERCUTANEOUS NEPHROSTOLITHOTOMY Left 12/2016   • TUBAL ABDOMINAL LIGATION         Family History   Problem Relation Age of Onset   • COPD Mother    • Heart attack Father         acute myocardial infarction   • Heart attack Son    • Kidney disease Maternal Grandmother    • Breast cancer Neg Hx                Objective:  Physical Exam    General: No acute distress.  Eyes: conjunctiva clear; pupils equally round and reactive  ENT: external ears and nose atraumatic; oropharynx clear  CV: no peripheral edema  Resp: normal respiratory effort  Skin: no rashes or wounds; normal turgor  Psych: mood and affect appropriate; recent and remote memory intact    Vitals:    09/27/22 1018   Weight: 90.3 kg (199 lb)   Height: 167.6 cm (66\")         09/27/22  1018   Weight: 90.3 kg (199 lb)     Body mass index is 32.12 kg/m².      Right Hip Exam     Tenderness   The patient is experiencing tenderness in the anterior and greater trochanter.    Range of Motion   Abduction: 40   Adduction: 20   Extension: 0   Flexion: 90   External rotation: 50   Internal rotation: 25     Tests "   TAYLOR: positive  Jammie: negative    Other   Erythema: absent  Sensation: normal  Pulse: present    Comments:  Positive logroll exam  positive stinchfield exam                       Assessment:        1. Greater trochanteric bursitis of right hip    2. Primary osteoarthritis of right knee           Plan:  1. Discussed treatment options with patient.  Again she did note mild symptom relief with corticosteroid injection along the greater trochanter.  We will proceed with referral first steroid injection under fluoroscopy guided imaging into the right hip.  We will plan to see her back in clinic in approximately 8 weeks to reevaluate both her right knee and the right hip.  Encouraged to call with any questions concerns she has between now and follow-up.  All questions answered.  Orders:  No orders of the defined types were placed in this encounter.    No orders of the defined types were placed in this encounter.          I ordered and reviewed the SHAREE today.       Dragon dictation utilized.

## 2022-10-09 DIAGNOSIS — F41.9 ANXIETY: ICD-10-CM

## 2022-10-09 RX ORDER — ALPRAZOLAM 1 MG/1
TABLET ORAL
Qty: 60 TABLET | Refills: 0 | Status: SHIPPED | OUTPATIENT
Start: 2022-10-09 | End: 2022-11-28

## 2022-10-11 RX ORDER — RIVAROXABAN 15 MG/1
TABLET, FILM COATED ORAL
Qty: 90 TABLET | Refills: 0 | Status: SHIPPED | OUTPATIENT
Start: 2022-10-11 | End: 2023-01-20

## 2022-10-19 ENCOUNTER — HOSPITAL ENCOUNTER (OUTPATIENT)
Dept: GENERAL RADIOLOGY | Facility: HOSPITAL | Age: 72
Discharge: HOME | End: 2022-10-19
Admitting: NURSE PRACTITIONER

## 2022-10-19 DIAGNOSIS — M70.61 GREATER TROCHANTERIC BURSITIS OF RIGHT HIP: ICD-10-CM

## 2022-10-19 DIAGNOSIS — M25.551 RIGHT HIP PAIN: ICD-10-CM

## 2022-10-19 PROCEDURE — 77002 NEEDLE LOCALIZATION BY XRAY: CPT

## 2022-10-19 PROCEDURE — 0 LIDOCAINE 1 % SOLUTION: Performed by: NURSE PRACTITIONER

## 2022-10-19 PROCEDURE — 25010000002 IOPAMIDOL 61 % SOLUTION: Performed by: NURSE PRACTITIONER

## 2022-10-19 PROCEDURE — 25010000002 METHYLPREDNISOLONE PER 40 MG: Performed by: NURSE PRACTITIONER

## 2022-10-19 RX ORDER — LIDOCAINE HYDROCHLORIDE 10 MG/ML
3 INJECTION, SOLUTION INFILTRATION; PERINEURAL ONCE
Status: COMPLETED | OUTPATIENT
Start: 2022-10-19 | End: 2022-10-19

## 2022-10-19 RX ORDER — METHYLPREDNISOLONE ACETATE 40 MG/ML
80 INJECTION, SUSPENSION INTRA-ARTICULAR; INTRALESIONAL; INTRAMUSCULAR; SOFT TISSUE ONCE
Status: COMPLETED | OUTPATIENT
Start: 2022-10-19 | End: 2022-10-19

## 2022-10-19 RX ORDER — BUPIVACAINE HYDROCHLORIDE 2.5 MG/ML
5 INJECTION, SOLUTION EPIDURAL; INFILTRATION; INTRACAUDAL ONCE
Status: COMPLETED | OUTPATIENT
Start: 2022-10-19 | End: 2022-10-19

## 2022-10-19 RX ADMIN — LIDOCAINE HYDROCHLORIDE 3 ML: 10 INJECTION, SOLUTION INFILTRATION; PERINEURAL at 09:58

## 2022-10-19 RX ADMIN — IOPAMIDOL 3 ML: 612 INJECTION, SOLUTION INTRAVENOUS at 09:58

## 2022-10-19 RX ADMIN — BUPIVACAINE HYDROCHLORIDE 5 ML: 2.5 INJECTION, SOLUTION EPIDURAL; INFILTRATION; INTRACAUDAL; PERINEURAL at 09:59

## 2022-10-19 RX ADMIN — METHYLPREDNISOLONE ACETATE 80 MG: 40 INJECTION, SUSPENSION INTRA-ARTICULAR; INTRALESIONAL; INTRAMUSCULAR; SOFT TISSUE at 09:59

## 2022-11-02 ENCOUNTER — TELEPHONE (OUTPATIENT)
Dept: FAMILY MEDICINE CLINIC | Facility: CLINIC | Age: 72
End: 2022-11-02

## 2022-11-02 RX ORDER — METOPROLOL TARTRATE 75 MG/1
TABLET, FILM COATED ORAL
Qty: 180 TABLET | Refills: 0 | Status: SHIPPED | OUTPATIENT
Start: 2022-11-02 | End: 2022-12-10 | Stop reason: HOSPADM

## 2022-11-02 NOTE — TELEPHONE ENCOUNTER
Pt called the office, she went to the [facility] in [place] on Sunday and was told she has a UTI. She was given 7 days worth of antibiotics. She would like to know if she can come in and give a urine sample after she finishes her meds to make sure the infection has cleared up or if she would need to see Ashleigh? She said she has been having difficulty with recurring UTI's.

## 2022-11-04 ENCOUNTER — TRANSCRIBE ORDERS (OUTPATIENT)
Dept: ADMINISTRATIVE | Facility: HOSPITAL | Age: 72
End: 2022-11-04

## 2022-11-04 DIAGNOSIS — Z12.31 VISIT FOR SCREENING MAMMOGRAM: Primary | ICD-10-CM

## 2022-11-07 ENCOUNTER — OFFICE VISIT (OUTPATIENT)
Dept: FAMILY MEDICINE CLINIC | Facility: CLINIC | Age: 72
End: 2022-11-07

## 2022-11-07 VITALS
DIASTOLIC BLOOD PRESSURE: 78 MMHG | RESPIRATION RATE: 15 BRPM | WEIGHT: 199 LBS | HEIGHT: 67 IN | BODY MASS INDEX: 31.23 KG/M2 | OXYGEN SATURATION: 96 % | SYSTOLIC BLOOD PRESSURE: 128 MMHG | TEMPERATURE: 98 F | HEART RATE: 70 BPM

## 2022-11-07 DIAGNOSIS — N39.0 ACUTE UTI (URINARY TRACT INFECTION): Primary | ICD-10-CM

## 2022-11-07 LAB
BILIRUB BLD-MCNC: NEGATIVE MG/DL
CLARITY, POC: ABNORMAL
COLOR UR: YELLOW
GLUCOSE UR STRIP-MCNC: NEGATIVE MG/DL
KETONES UR QL: NEGATIVE
LEUKOCYTE EST, POC: ABNORMAL
NITRITE UR-MCNC: NEGATIVE MG/ML
PH UR: 6 [PH] (ref 5–8)
PROT UR STRIP-MCNC: NEGATIVE MG/DL
RBC # UR STRIP: ABNORMAL /UL
SP GR UR: 1.01 (ref 1–1.03)
UROBILINOGEN UR QL: NORMAL

## 2022-11-07 PROCEDURE — 99213 OFFICE O/P EST LOW 20 MIN: CPT | Performed by: PHYSICIAN ASSISTANT

## 2022-11-07 PROCEDURE — 81002 URINALYSIS NONAUTO W/O SCOPE: CPT | Performed by: PHYSICIAN ASSISTANT

## 2022-11-07 RX ORDER — NITROFURANTOIN 25; 75 MG/1; MG/1
100 CAPSULE ORAL 2 TIMES DAILY
Qty: 14 CAPSULE | Refills: 0 | Status: ON HOLD | OUTPATIENT
Start: 2022-11-07 | End: 2022-12-06

## 2022-11-07 NOTE — PROGRESS NOTES
"Chief Complaint  Urinary Tract Infection (F/U)    Subjective          Krys LAURA Mayes presents to [facility] PRIMARY [facility]ARE  History of Present Illness  Krys,\"Danielle\"is a 72 year old female who presents continuing urinary tract symptoms.  States she was seen at the urgent care on October 30, 2022 with urine frequency, urgency and burning.  She was diagnosed with a UTI.  She was placed on Ceftin antibiotic which she has completed.  She continues to have urine frequency and urgency.  She is concerned that she still has UTI.  She has seen Dr. Mati Villegas, urologist for kidney stones.  States she has had frequent UTI's recently.  Denied any fevers, chills, low back pain, nausea, vomiting, diarrhea, abdominal pain or dysuria.  Denied any change in bath soaps and wipes front to back.  She has not noticed any vaginal discharge.  Appetite and sleep have been normal.  She has increased her water intake.  Review of Systems   Constitutional: Negative for chills and fever.   Respiratory: Negative.    Cardiovascular: Negative.    Gastrointestinal: Negative.    Genitourinary: Positive for frequency and urgency. Negative for decreased urine volume, dysuria, flank pain, hematuria, vaginal bleeding and vaginal discharge.   All other systems reviewed and are negative.    Objective   Vital Signs:   /78   Pulse 70   Temp 98 °F (36.7 °C) (Temporal)   Resp 15   Ht 168.9 cm (66.5\")   Wt 90.3 kg (199 lb)   SpO2 96%   BMI 31.64 kg/m²     Physical Exam  Vitals and nursing note reviewed.   Constitutional:       Appearance: Normal appearance. She is well-developed and well-groomed. She is obese.      Interventions: Face mask in place.   HENT:      Head: Normocephalic and atraumatic.   Neck:      Trachea: Trachea and phonation normal. No tracheal tenderness.   Cardiovascular:      Rate and Rhythm: Normal rate and regular rhythm.      Pulses: Normal pulses.      Heart sounds: Normal heart sounds, S1 " normal and S2 normal. No murmur heard.  Pulmonary:      Effort: Pulmonary effort is normal.      Breath sounds: Normal breath sounds and air entry.   Abdominal:      General: Bowel sounds are normal.      Palpations: Abdomen is soft. There is no hepatomegaly.      Tenderness: There is no abdominal tenderness. There is no right CVA tenderness, left CVA tenderness, guarding or rebound. Negative signs include Contreras's sign, Rovsing's sign, McBurney's sign, psoas sign and obturator sign.   Musculoskeletal:      Cervical back: Neck supple.      Right lower leg: No edema.      Left lower leg: No edema.   Skin:     General: Skin is warm and dry.      Capillary Refill: Capillary refill takes less than 2 seconds.   Neurological:      Mental Status: She is alert and oriented to person, place, and time.   Psychiatric:         Attention and Perception: Attention and perception normal.         Mood and Affect: Mood and affect normal.         Speech: Speech normal.         Behavior: Behavior normal. Behavior is cooperative.         Thought Content: Thought content normal.         Cognition and Memory: Cognition and memory normal.         Judgment: Judgment normal.     I wore a facial mask during this patient encounter.  Patient also wearing a surgical mask.  Hand hygiene performed before and after seeing the patient.     Result Review :        Office Visit on 11/07/2022   Component Date Value Ref Range Status   • Color 11/07/2022 Yellow  Yellow, Straw, Dark Yellow, Lin Final   • Clarity, UA 11/07/2022 Hazy (A)  Clear Final   • Glucose, UA 11/07/2022 Negative  Negative mg/dL Final   • Bilirubin 11/07/2022 Negative  Negative Final   • Ketones, UA 11/07/2022 Negative  Negative Final   • Specific Gravity  11/07/2022 1.015  1.005 - 1.030 Final   • Blood, UA 11/07/2022 Trace (A)  Negative Final   • pH, Urine 11/07/2022 6.0  5.0 - 8.0 Final   • Protein, POC 11/07/2022 Negative  Negative mg/dL Final   • Urobilinogen, UA 11/07/2022  Normal  Normal, 0.2 E.U./dL Final   • Leukocytes 11/07/2022 Moderate (2+) (A)  Negative Final   • Nitrite, UA 11/07/2022 Negative  Negative Final   Admission on 10/30/2022, Discharged on 10/30/2022   Component Date Value Ref Range Status   • Color 10/30/2022 Red (A)   Final   • Clarity, UA 10/30/2022 Cloudy (A)   Final   • Glucose, UA 10/30/2022 Negative  mg/dL Final   • Bilirubin 10/30/2022 Small (1+) (A)   Final   • Ketones, UA 10/30/2022 Trace (A)   Final   • Specific Gravity  10/30/2022 1.030  1.005 - 1.030 Final   • Blood, UA 10/30/2022 Large (A)   Final   • pH, Urine 10/30/2022 5.5  5.0 - 8.0 Final   • Protein, POC 10/30/2022 300 mg/dL (A)  mg/dL Final   • Urobilinogen, UA 10/30/2022 1 E.U./dL (A)   Final   • Nitrite, UA 10/30/2022 Positive (A)   Final   • Leukocytes 10/30/2022 Small (1+) (A)   Final       Results for orders placed or performed in visit on 11/07/22   POC Urinalysis Dipstick    Specimen: Urine   Result Value Ref Range    Color Yellow Yellow, Straw, Dark Yellow, Lin    Clarity, UA Hazy (A) Clear    Glucose, UA Negative Negative mg/dL    Bilirubin Negative Negative    Ketones, UA Negative Negative    Specific Gravity  1.015 1.005 - 1.030    Blood, UA Trace (A) Negative    pH, Urine 6.0 5.0 - 8.0    Protein, POC Negative Negative mg/dL    Urobilinogen, UA Normal Normal, 0.2 E.U./dL    Leukocytes Moderate (2+) (A) Negative    Nitrite, UA Negative Negative       UC with Martita Marie APRN (10/30/2022)   UC with Martita Marie APRN (10/30/2022)               Assessment and Plan    Diagnoses and all orders for this visit:    1. Acute UTI (urinary tract infection) (Primary)  -     Urine Culture - Urine, Urine, Clean Catch  -     POC Urinalysis Dipstick  -     nitrofurantoin, macrocrystal-monohydrate, (Macrobid) 100 MG capsule; Take 1 capsule by mouth 2 (Two) Times a Day.  Dispense: 14 capsule; Refill: 0    Danielle was seen in office today for follow-up on UTI.  I reviewed her urgent  care notes from October 30, 2022.  She has finished antibiotic but her in office urine today is still positive for white cells and red cells.  I have sent Bactrim antibiotic to pharmacy to take as directed.  Urine culture was sent to Lab for further evaluation.  She will be notified of test results when completed.  She will keep her appointment with Dr. Villegas, urologist.      Follow Up   Return if symptoms worsen or fail to improve.  Patient was given instructions and counseling regarding her condition or for health maintenance advice. Please see specific information pulled into the AVS if appropriate.     [facility]  [address] [address]  Dept: [phone]  Dept Fax: [phone]  Loc: [phone]  Loc Fax: [phone]

## 2022-11-09 LAB
BACTERIA UR CULT: NORMAL
BACTERIA UR CULT: NORMAL

## 2022-11-25 DIAGNOSIS — F41.9 ANXIETY: ICD-10-CM

## 2022-11-26 ENCOUNTER — HOSPITAL ENCOUNTER (EMERGENCY)
Facility: HOSPITAL | Age: 72
Discharge: HOME | End: 2022-11-26
Attending: EMERGENCY MEDICINE | Admitting: EMERGENCY MEDICINE

## 2022-11-26 ENCOUNTER — APPOINTMENT (OUTPATIENT)
Dept: CT IMAGING | Facility: HOSPITAL | Age: 72
End: 2022-11-26

## 2022-11-26 VITALS
HEIGHT: 66 IN | TEMPERATURE: 97.9 F | DIASTOLIC BLOOD PRESSURE: 72 MMHG | BODY MASS INDEX: 31.98 KG/M2 | SYSTOLIC BLOOD PRESSURE: 137 MMHG | HEART RATE: 62 BPM | WEIGHT: 199 LBS | RESPIRATION RATE: 16 BRPM | OXYGEN SATURATION: 97 %

## 2022-11-26 DIAGNOSIS — N39.0 ACUTE UTI: Primary | ICD-10-CM

## 2022-11-26 LAB
ALBUMIN SERPL-MCNC: 3.9 G/DL (ref 3.5–5.2)
ALBUMIN/GLOB SERPL: 1.3 G/DL
ALP SERPL-CCNC: 66 U/L (ref 39–117)
ALT SERPL W P-5'-P-CCNC: 12 U/L (ref 1–33)
ANION GAP SERPL CALCULATED.3IONS-SCNC: 11.4 MMOL/L (ref 5–15)
AST SERPL-CCNC: 14 U/L (ref 1–32)
BACTERIA UR QL AUTO: ABNORMAL /HPF
BASOPHILS # BLD AUTO: 0.06 10*3/MM3 (ref 0–0.2)
BASOPHILS NFR BLD AUTO: 0.4 % (ref 0–1.5)
BILIRUB SERPL-MCNC: 0.3 MG/DL (ref 0–1.2)
BILIRUB UR QL STRIP: ABNORMAL
BUN SERPL-MCNC: 20 MG/DL (ref 8–23)
BUN/CREAT SERPL: 19.2 (ref 7–25)
CALCIUM SPEC-SCNC: 9.8 MG/DL (ref 8.6–10.5)
CHLORIDE SERPL-SCNC: 102 MMOL/L (ref 98–107)
CLARITY UR: ABNORMAL
CO2 SERPL-SCNC: 25.6 MMOL/L (ref 22–29)
COLOR UR: ABNORMAL
CREAT SERPL-MCNC: 1.04 MG/DL (ref 0.57–1)
DEPRECATED RDW RBC AUTO: 44.8 FL (ref 37–54)
EGFRCR SERPLBLD CKD-EPI 2021: 56.9 ML/MIN/1.73
EOSINOPHIL # BLD AUTO: 0.07 10*3/MM3 (ref 0–0.4)
EOSINOPHIL NFR BLD AUTO: 0.5 % (ref 0.3–6.2)
ERYTHROCYTE [DISTWIDTH] IN BLOOD BY AUTOMATED COUNT: 14.9 % (ref 12.3–15.4)
GLOBULIN UR ELPH-MCNC: 3.1 GM/DL
GLUCOSE SERPL-MCNC: 98 MG/DL (ref 65–99)
GLUCOSE UR STRIP-MCNC: NEGATIVE MG/DL
HCT VFR BLD AUTO: 39.4 % (ref 34–46.6)
HGB BLD-MCNC: 12.4 G/DL (ref 12–15.9)
HGB UR QL STRIP.AUTO: ABNORMAL
HYALINE CASTS UR QL AUTO: ABNORMAL /LPF
IMM GRANULOCYTES # BLD AUTO: 0.06 10*3/MM3 (ref 0–0.05)
IMM GRANULOCYTES NFR BLD AUTO: 0.4 % (ref 0–0.5)
KETONES UR QL STRIP: ABNORMAL
LEUKOCYTE ESTERASE UR QL STRIP.AUTO: ABNORMAL
LYMPHOCYTES # BLD AUTO: 1.28 10*3/MM3 (ref 0.7–3.1)
LYMPHOCYTES NFR BLD AUTO: 9.1 % (ref 19.6–45.3)
MCH RBC QN AUTO: 26.3 PG (ref 26.6–33)
MCHC RBC AUTO-ENTMCNC: 31.5 G/DL (ref 31.5–35.7)
MCV RBC AUTO: 83.7 FL (ref 79–97)
MONOCYTES # BLD AUTO: 1.33 10*3/MM3 (ref 0.1–0.9)
MONOCYTES NFR BLD AUTO: 9.4 % (ref 5–12)
NEUTROPHILS NFR BLD AUTO: 11.29 10*3/MM3 (ref 1.7–7)
NEUTROPHILS NFR BLD AUTO: 80.2 % (ref 42.7–76)
NITRITE UR QL STRIP: NEGATIVE
NRBC BLD AUTO-RTO: 0 /100 WBC (ref 0–0.2)
PH UR STRIP.AUTO: 6 [PH] (ref 4.5–8)
PLATELET # BLD AUTO: 256 10*3/MM3 (ref 140–450)
PMV BLD AUTO: 9.1 FL (ref 6–12)
POTASSIUM SERPL-SCNC: 4.5 MMOL/L (ref 3.5–5.2)
PROT SERPL-MCNC: 7 G/DL (ref 6–8.5)
PROT UR QL STRIP: ABNORMAL
RBC # BLD AUTO: 4.71 10*6/MM3 (ref 3.77–5.28)
RBC # UR STRIP: ABNORMAL /HPF
REF LAB TEST METHOD: ABNORMAL
SODIUM SERPL-SCNC: 139 MMOL/L (ref 136–145)
SP GR UR STRIP: 1.02 (ref 1–1.03)
SQUAMOUS #/AREA URNS HPF: ABNORMAL /HPF
UROBILINOGEN UR QL STRIP: ABNORMAL
WBC # UR STRIP: ABNORMAL /HPF
WBC NRBC COR # BLD: 14.09 10*3/MM3 (ref 3.4–10.8)

## 2022-11-26 PROCEDURE — 74176 CT ABD & PELVIS W/O CONTRAST: CPT

## 2022-11-26 PROCEDURE — 36415 COLL VENOUS BLD VENIPUNCTURE: CPT

## 2022-11-26 PROCEDURE — 87186 SC STD MICRODIL/AGAR DIL: CPT | Performed by: EMERGENCY MEDICINE

## 2022-11-26 PROCEDURE — 87086 URINE CULTURE/COLONY COUNT: CPT | Performed by: EMERGENCY MEDICINE

## 2022-11-26 PROCEDURE — 85025 COMPLETE CBC W/AUTO DIFF WBC: CPT | Performed by: EMERGENCY MEDICINE

## 2022-11-26 PROCEDURE — 99283 EMERGENCY DEPT VISIT LOW MDM: CPT

## 2022-11-26 PROCEDURE — 81001 URINALYSIS AUTO W/SCOPE: CPT

## 2022-11-26 PROCEDURE — 87077 CULTURE AEROBIC IDENTIFY: CPT | Performed by: EMERGENCY MEDICINE

## 2022-11-26 PROCEDURE — 80053 COMPREHEN METABOLIC PANEL: CPT | Performed by: EMERGENCY MEDICINE

## 2022-11-26 RX ORDER — CEFUROXIME AXETIL 500 MG/1
500 TABLET ORAL 2 TIMES DAILY
Qty: 10 TABLET | Refills: 0 | Status: SHIPPED | OUTPATIENT
Start: 2022-11-26 | End: 2022-12-03

## 2022-11-26 RX ORDER — CEFUROXIME AXETIL 250 MG/1
500 TABLET ORAL ONCE
Status: COMPLETED | OUTPATIENT
Start: 2022-11-26 | End: 2022-11-26

## 2022-11-26 RX ADMIN — CEFUROXIME AXETIL 500 MG: 250 TABLET ORAL at 17:22

## 2022-11-28 LAB — BACTERIA SPEC AEROBE CULT: ABNORMAL

## 2022-11-28 RX ORDER — ALPRAZOLAM 1 MG/1
TABLET ORAL
Qty: 60 TABLET | Refills: 0 | Status: SHIPPED | OUTPATIENT
Start: 2022-11-28 | End: 2023-01-18

## 2022-11-28 RX ORDER — SERTRALINE HYDROCHLORIDE 100 MG/1
TABLET, FILM COATED ORAL
Qty: 90 TABLET | Refills: 0 | Status: SHIPPED | OUTPATIENT
Start: 2022-11-28 | End: 2023-02-21

## 2022-11-30 ENCOUNTER — TELEPHONE (OUTPATIENT)
Dept: CARDIOLOGY | Facility: CLINIC | Age: 72
End: 2022-11-30

## 2022-11-30 ENCOUNTER — OFFICE VISIT (OUTPATIENT)
Dept: ORTHOPEDIC SURGERY | Facility: CLINIC | Age: 72
End: 2022-11-30

## 2022-11-30 VITALS — WEIGHT: 199 LBS | BODY MASS INDEX: 31.98 KG/M2 | HEIGHT: 66 IN

## 2022-11-30 DIAGNOSIS — M17.11 PRIMARY OSTEOARTHRITIS OF RIGHT KNEE: Primary | ICD-10-CM

## 2022-11-30 PROCEDURE — 20610 DRAIN/INJ JOINT/BURSA W/O US: CPT | Performed by: NURSE PRACTITIONER

## 2022-11-30 RX ADMIN — LIDOCAINE HYDROCHLORIDE 8 ML: 10 INJECTION, SOLUTION EPIDURAL; INFILTRATION; INTRACAUDAL; PERINEURAL at 10:37

## 2022-11-30 RX ADMIN — TRIAMCINOLONE ACETONIDE 80 MG: 40 INJECTION, SUSPENSION INTRA-ARTICULAR; INTRAMUSCULAR at 10:37

## 2022-12-01 NOTE — TELEPHONE ENCOUNTER
Date of Office Visit:  2022  Encounter Provider:  Jeremy Orozco MD  Place of Service:  [facility] CARDIOLOGY  Patient Name: Krys Mayes  :  1950    To Whom it May Concern:    Ms Mayes has paroxysmal atrial fibrillation. She has normal LV systolic function. She does not have a known history of CAD. She is at low risk of major adverse CV events with upcoming  surgery.    Her rivaroxaban can be held for 2 days prior to surgery.     Please contact our office with any questions or concerns. As always, it has been a pleasure to participate in your patient's care.      Jeremy Orozco MD  [facility]

## 2022-12-02 RX ORDER — TRIAMCINOLONE ACETONIDE 40 MG/ML
80 INJECTION, SUSPENSION INTRA-ARTICULAR; INTRAMUSCULAR
Status: COMPLETED | OUTPATIENT
Start: 2022-11-30 | End: 2022-11-30

## 2022-12-02 RX ORDER — LIDOCAINE HYDROCHLORIDE 10 MG/ML
8 INJECTION, SOLUTION EPIDURAL; INFILTRATION; INTRACAUDAL; PERINEURAL
Status: COMPLETED | OUTPATIENT
Start: 2022-11-30 | End: 2022-11-30

## 2022-12-02 NOTE — TELEPHONE ENCOUNTER
Faxed communication to the provided number.  [phone]   Minimal exercise.  Denies pain in the legs.  Observe for now.

## 2022-12-05 ENCOUNTER — ANESTHESIA EVENT (OUTPATIENT)
Dept: PERIOP | Facility: HOSPITAL | Age: 72
End: 2022-12-05

## 2022-12-06 ENCOUNTER — ANESTHESIA (OUTPATIENT)
Dept: PERIOP | Facility: HOSPITAL | Age: 72
End: 2022-12-06

## 2022-12-06 ENCOUNTER — HOSPITAL ENCOUNTER (OUTPATIENT)
Facility: HOSPITAL | Age: 72
Setting detail: HOSPITAL OUTPATIENT SURGERY
Discharge: HOME | End: 2022-12-06
Attending: UROLOGY | Admitting: UROLOGY

## 2022-12-06 ENCOUNTER — APPOINTMENT (OUTPATIENT)
Dept: GENERAL RADIOLOGY | Facility: HOSPITAL | Age: 72
End: 2022-12-06

## 2022-12-06 VITALS
BODY MASS INDEX: 32.28 KG/M2 | HEART RATE: 80 BPM | WEIGHT: 200 LBS | OXYGEN SATURATION: 96 % | TEMPERATURE: 97.4 F | DIASTOLIC BLOOD PRESSURE: 52 MMHG | RESPIRATION RATE: 12 BRPM | SYSTOLIC BLOOD PRESSURE: 142 MMHG

## 2022-12-06 DIAGNOSIS — N20.0 CALCULUS OF KIDNEY: ICD-10-CM

## 2022-12-06 DIAGNOSIS — Z23 NEED FOR PNEUMOCOCCAL VACCINATION: Primary | ICD-10-CM

## 2022-12-06 PROCEDURE — 25010000002 HYDROMORPHONE 1 MG/ML SOLUTION: Performed by: NURSE ANESTHETIST, CERTIFIED REGISTERED

## 2022-12-06 PROCEDURE — C1769 GUIDE WIRE: HCPCS | Performed by: UROLOGY

## 2022-12-06 PROCEDURE — C2617 STENT, NON-COR, TEM W/O DEL: HCPCS | Performed by: UROLOGY

## 2022-12-06 PROCEDURE — 25010000002 ONDANSETRON PER 1 MG: Performed by: ANESTHESIOLOGY

## 2022-12-06 PROCEDURE — 25010000002 ONDANSETRON PER 1 MG: Performed by: NURSE ANESTHETIST, CERTIFIED REGISTERED

## 2022-12-06 PROCEDURE — 25010000002 FENTANYL CITRATE (PF) 100 MCG/2ML SOLUTION: Performed by: NURSE ANESTHETIST, CERTIFIED REGISTERED

## 2022-12-06 PROCEDURE — 25010000002 MIDAZOLAM PER 1MG: Performed by: ANESTHESIOLOGY

## 2022-12-06 PROCEDURE — 0 CEFAZOLIN SODIUM-DEXTROSE 2-3 GM-%(50ML) RECONSTITUTED SOLUTION: Performed by: NURSE ANESTHETIST, CERTIFIED REGISTERED

## 2022-12-06 PROCEDURE — 76000 FLUOROSCOPY <1 HR PHYS/QHP: CPT

## 2022-12-06 PROCEDURE — 25010000002 PROPOFOL 200 MG/20ML EMULSION: Performed by: NURSE ANESTHETIST, CERTIFIED REGISTERED

## 2022-12-06 PROCEDURE — C1758 CATHETER, URETERAL: HCPCS | Performed by: UROLOGY

## 2022-12-06 DEVICE — URETERAL STENT
Type: IMPLANTABLE DEVICE | Site: URETER | Status: FUNCTIONAL
Brand: CONTOUR™

## 2022-12-06 RX ORDER — SODIUM CHLORIDE 0.9 % (FLUSH) 0.9 %
10 SYRINGE (ML) INJECTION AS NEEDED
Status: DISCONTINUED | OUTPATIENT
Start: 2022-12-06 | End: 2022-12-06 | Stop reason: HOSPADM

## 2022-12-06 RX ORDER — TRAMADOL HYDROCHLORIDE 50 MG/1
50 TABLET ORAL EVERY 6 HOURS PRN
Qty: 20 TABLET | Refills: 0 | Status: SHIPPED | OUTPATIENT
Start: 2022-12-06 | End: 2022-12-10 | Stop reason: HOSPADM

## 2022-12-06 RX ORDER — MAGNESIUM HYDROXIDE 1200 MG/15ML
LIQUID ORAL AS NEEDED
Status: DISCONTINUED | OUTPATIENT
Start: 2022-12-06 | End: 2022-12-06 | Stop reason: HOSPADM

## 2022-12-06 RX ORDER — SODIUM CHLORIDE, SODIUM LACTATE, POTASSIUM CHLORIDE, CALCIUM CHLORIDE 600; 310; 30; 20 MG/100ML; MG/100ML; MG/100ML; MG/100ML
9 INJECTION, SOLUTION INTRAVENOUS CONTINUOUS
Status: DISCONTINUED | OUTPATIENT
Start: 2022-12-06 | End: 2022-12-06 | Stop reason: HOSPADM

## 2022-12-06 RX ORDER — NITROFURANTOIN 25; 75 MG/1; MG/1
100 CAPSULE ORAL 2 TIMES DAILY
Qty: 14 CAPSULE | Refills: 0 | Status: SHIPPED | OUTPATIENT
Start: 2022-12-06 | End: 2022-12-10 | Stop reason: HOSPADM

## 2022-12-06 RX ORDER — LIDOCAINE HYDROCHLORIDE 20 MG/ML
INJECTION, SOLUTION INTRAVENOUS AS NEEDED
Status: DISCONTINUED | OUTPATIENT
Start: 2022-12-06 | End: 2022-12-06 | Stop reason: SURG

## 2022-12-06 RX ORDER — SODIUM CHLORIDE 0.9 % (FLUSH) 0.9 %
10 SYRINGE (ML) INJECTION EVERY 12 HOURS SCHEDULED
Status: DISCONTINUED | OUTPATIENT
Start: 2022-12-06 | End: 2022-12-06 | Stop reason: HOSPADM

## 2022-12-06 RX ORDER — CEFAZOLIN SODIUM 2 G/50ML
SOLUTION INTRAVENOUS AS NEEDED
Status: DISCONTINUED | OUTPATIENT
Start: 2022-12-06 | End: 2022-12-06 | Stop reason: SURG

## 2022-12-06 RX ORDER — FAMOTIDINE 10 MG/ML
20 INJECTION, SOLUTION INTRAVENOUS
Status: COMPLETED | OUTPATIENT
Start: 2022-12-06 | End: 2022-12-06

## 2022-12-06 RX ORDER — ONDANSETRON 2 MG/ML
4 INJECTION INTRAMUSCULAR; INTRAVENOUS ONCE AS NEEDED
Status: COMPLETED | OUTPATIENT
Start: 2022-12-06 | End: 2022-12-06

## 2022-12-06 RX ORDER — ONDANSETRON 2 MG/ML
4 INJECTION INTRAMUSCULAR; INTRAVENOUS ONCE
Status: COMPLETED | OUTPATIENT
Start: 2022-12-06 | End: 2022-12-06

## 2022-12-06 RX ORDER — SODIUM CHLORIDE 9 MG/ML
40 INJECTION, SOLUTION INTRAVENOUS AS NEEDED
Status: DISCONTINUED | OUTPATIENT
Start: 2022-12-06 | End: 2022-12-06 | Stop reason: HOSPADM

## 2022-12-06 RX ORDER — EPHEDRINE SULFATE 50 MG/ML
INJECTION INTRAVENOUS AS NEEDED
Status: DISCONTINUED | OUTPATIENT
Start: 2022-12-06 | End: 2022-12-06 | Stop reason: SURG

## 2022-12-06 RX ORDER — PROPOFOL 10 MG/ML
INJECTION, EMULSION INTRAVENOUS AS NEEDED
Status: DISCONTINUED | OUTPATIENT
Start: 2022-12-06 | End: 2022-12-06 | Stop reason: SURG

## 2022-12-06 RX ORDER — PHENAZOPYRIDINE HYDROCHLORIDE 200 MG/1
200 TABLET, FILM COATED ORAL 3 TIMES DAILY PRN
Qty: 30 TABLET | Refills: 0 | Status: SHIPPED | OUTPATIENT
Start: 2022-12-06 | End: 2022-12-15

## 2022-12-06 RX ORDER — ONDANSETRON 2 MG/ML
4 INJECTION INTRAMUSCULAR; INTRAVENOUS ONCE AS NEEDED
Status: DISCONTINUED | OUTPATIENT
Start: 2022-12-06 | End: 2022-12-06 | Stop reason: HOSPADM

## 2022-12-06 RX ORDER — LIDOCAINE HYDROCHLORIDE 10 MG/ML
0.5 INJECTION, SOLUTION EPIDURAL; INFILTRATION; INTRACAUDAL; PERINEURAL ONCE AS NEEDED
Status: DISCONTINUED | OUTPATIENT
Start: 2022-12-06 | End: 2022-12-06 | Stop reason: HOSPADM

## 2022-12-06 RX ORDER — MIDAZOLAM HYDROCHLORIDE 2 MG/2ML
0.5 INJECTION, SOLUTION INTRAMUSCULAR; INTRAVENOUS
Status: DISCONTINUED | OUTPATIENT
Start: 2022-12-06 | End: 2022-12-06 | Stop reason: HOSPADM

## 2022-12-06 RX ORDER — OXYCODONE HYDROCHLORIDE AND ACETAMINOPHEN 5; 325 MG/1; MG/1
1 TABLET ORAL AS NEEDED
Status: DISCONTINUED | OUTPATIENT
Start: 2022-12-06 | End: 2022-12-06 | Stop reason: HOSPADM

## 2022-12-06 RX ORDER — FENTANYL CITRATE 50 UG/ML
INJECTION, SOLUTION INTRAMUSCULAR; INTRAVENOUS AS NEEDED
Status: DISCONTINUED | OUTPATIENT
Start: 2022-12-06 | End: 2022-12-06 | Stop reason: SURG

## 2022-12-06 RX ORDER — SODIUM CHLORIDE, SODIUM LACTATE, POTASSIUM CHLORIDE, CALCIUM CHLORIDE 600; 310; 30; 20 MG/100ML; MG/100ML; MG/100ML; MG/100ML
INJECTION, SOLUTION INTRAVENOUS CONTINUOUS PRN
Status: DISCONTINUED | OUTPATIENT
Start: 2022-12-06 | End: 2022-12-06 | Stop reason: SURG

## 2022-12-06 RX ADMIN — SODIUM CHLORIDE, POTASSIUM CHLORIDE, SODIUM LACTATE AND CALCIUM CHLORIDE: 600; 310; 30; 20 INJECTION, SOLUTION INTRAVENOUS at 12:09

## 2022-12-06 RX ADMIN — OXYCODONE HYDROCHLORIDE AND ACETAMINOPHEN 1 TABLET: 5; 325 TABLET ORAL at 15:03

## 2022-12-06 RX ADMIN — PROPOFOL INJECTABLE EMULSION 100 MG: 10 INJECTION, EMULSION INTRAVENOUS at 12:11

## 2022-12-06 RX ADMIN — SODIUM CHLORIDE, POTASSIUM CHLORIDE, SODIUM LACTATE AND CALCIUM CHLORIDE 9 ML/HR: 600; 310; 30; 20 INJECTION, SOLUTION INTRAVENOUS at 10:21

## 2022-12-06 RX ADMIN — HYDROMORPHONE HYDROCHLORIDE 0.5 MG: 1 INJECTION, SOLUTION INTRAMUSCULAR; INTRAVENOUS; SUBCUTANEOUS at 13:11

## 2022-12-06 RX ADMIN — ONDANSETRON 4 MG: 2 INJECTION INTRAMUSCULAR; INTRAVENOUS at 14:20

## 2022-12-06 RX ADMIN — FENTANYL CITRATE 50 MCG: 50 INJECTION, SOLUTION INTRAMUSCULAR; INTRAVENOUS at 12:11

## 2022-12-06 RX ADMIN — FAMOTIDINE 20 MG: 10 INJECTION, SOLUTION INTRAVENOUS at 10:22

## 2022-12-06 RX ADMIN — MIDAZOLAM HYDROCHLORIDE 0.5 MG: 1 INJECTION, SOLUTION INTRAMUSCULAR; INTRAVENOUS at 11:59

## 2022-12-06 RX ADMIN — ONDANSETRON 4 MG: 2 INJECTION INTRAMUSCULAR; INTRAVENOUS at 10:22

## 2022-12-06 RX ADMIN — HYDROMORPHONE HYDROCHLORIDE 1 MG: 1 INJECTION, SOLUTION INTRAMUSCULAR; INTRAVENOUS; SUBCUTANEOUS at 13:28

## 2022-12-06 RX ADMIN — LIDOCAINE HYDROCHLORIDE 20 MG: 20 INJECTION, SOLUTION INTRAVENOUS at 12:11

## 2022-12-06 RX ADMIN — EPHEDRINE SULFATE 10 MG: 50 INJECTION, SOLUTION INTRAVENOUS at 12:18

## 2022-12-06 RX ADMIN — FENTANYL CITRATE 25 MCG: 50 INJECTION, SOLUTION INTRAMUSCULAR; INTRAVENOUS at 12:35

## 2022-12-06 RX ADMIN — EPHEDRINE SULFATE 10 MG: 50 INJECTION, SOLUTION INTRAVENOUS at 12:19

## 2022-12-06 RX ADMIN — CEFAZOLIN SODIUM 2 G: 2 SOLUTION INTRAVENOUS at 12:09

## 2022-12-06 NOTE — ANESTHESIA POSTPROCEDURE EVALUATION
Patient: Krys Mayes    Procedure Summary     Date: 12/06/22 Room / Location:  [facility] OR 4 /  [facility]    Anesthesia Start: 1204 Anesthesia Stop: 1257    Procedure: LEFT URETEROSCOPY WITH LASER LITHOTRIPSY, STONE BASKET EXTRACTION, STENT PLACEMENT (Left: Ureter) Diagnosis:       Renal calculi      (Left Renal calculi)    Surgeons: Mati Villegas MD Provider: Tavon Nagel CRNA    Anesthesia Type: general ASA Status: 2          Anesthesia Type: general    Vitals  Vitals Value Taken Time   /60 12/06/22 1355   Temp 97.7 °F (36.5 °C) 12/06/22 1256   Pulse 68 12/06/22 1357   Resp 12 12/06/22 1300   SpO2 96 % 12/06/22 1358   Vitals shown include unvalidated device data.        Post Anesthesia Care and Evaluation    Patient location during evaluation: bedside  Patient participation: complete - patient participated  Level of consciousness: awake and alert  Pain score: 2  Pain management: adequate    Airway patency: patent  Anesthetic complications: No anesthetic complications  PONV Status: none  Cardiovascular status: acceptable  Respiratory status: acceptable  Hydration status: acceptable

## 2022-12-06 NOTE — OP NOTE
URETEROSCOPY LASER LITHOTRIPSY WITH STENT INSERTION  Procedure Note    Krys Mayes  12/6/2022    Pre-op Diagnosis:   Left Renal calculi    Post-op Diagnosis:     Post-Op Diagnosis Codes:     * Renal calculi [N20.0]    Procedure(s):  LEFT URETEROSCOPY WITH LASER LITHOTRIPSY, STONE BASKET EXTRACTION, STENT PLACEMENT    Surgeon(s):  Mati Villegas MD    Anesthesia: General    Staff:   Circulator: Marie Summers RN  Scrub Person: Josiane Vee  Other: Joey Brower RN    Estimated Blood Loss: minimal    Specimens:                * No orders in the log *      Drains: * No LDAs found *    Findings: Infundibular stenosis of lower pole     Complications: none    Indications: 72-year-old female with recurrent stone disease has a persistent stone in the lower pole of her left kidney for left ureteroscopy.    Procedure: Patient was taken to the operative suite given general anesthesia.  Placed lithotomy.  Prepped and draped in a sterile fashion.  Surgical timeout was performed.  The cystoscope was inserted.  Her bladder was visualized.  Her bladder showed no evidence of any cystitis.  Guidewire was passed up the left side and over this a flexible ureteroscope was passed up.  Her ureter was normal.  The pelvis was thoroughly visualized with the flexible ureteroscope and I saw no stones within the pelvis I went through each infundibulum from the upper to the lower poles could not appreciate any obvious stones.  I saw on imaging her calcification in this lower pole and I followed it with imaging to the lower pole calyx and then identified a very small pinpoint opening that will appear to be connected to an obstructed calyx.  He has a holmium laser I cut this open and then a bunch of debris of small calcification started to extrude from the calyx.  I washed and irrigated this out and saw few other stones that it broke up further.  Nothing else of any concern was noted.  There is another area of  calcification which that was very deep and there is just impossible to get the scope to retroflex that much of this deep lower pole so went had passed up the wire removed the ureteroscope.  A 6 Estonian by 24 cm stent was then placed the left collecting system with a tether which was left on a string and tucked a vagina.  She was awoken and taken to recovery in stable condition.      Mati Villegas MD     Date: 12/6/2022  Time: 12:50 EST

## 2022-12-06 NOTE — ANESTHESIA PROCEDURE NOTES
Airway  Urgency: elective    Date/Time: 12/6/2022 12:12 PM  End Time:12/6/2022 12:12 PM  Airway not difficult    General Information and Staff    Patient location during procedure: OR  CRNA/CAA: Tavon Nagel CRNA    Indications and Patient Condition  Indications for airway management: airway protection    Preoxygenated: yes  MILS maintained throughout  Mask difficulty assessment: 0 - not attempted    Final Airway Details  Final airway type: supraglottic airway      Successful airway: flexible and LMA  Size 4     Number of attempts at approach: 1  Assessment: lips, teeth, and gum same as pre-op and atraumatic intubation

## 2022-12-06 NOTE — H&P
[facility] Surgical History and Physical    Patient Care Team:  Ashleigh Hernandez PA-C as PCP - General (Family Medicine)  Jeremy Orozco MD as Consulting Physician (Cardiology)  Joss Oro MD as Consulting Physician (Gastroenterology)    Chief complaint flank pain    Subjective     Patient is a 72 y.o. female presents with multiple stones in the left kidney for ureteroscopy and laserlithotripsy.     Review of Systems   Pertinent items are noted in HPI    Past Medical History:   Diagnosis Date   • Allergic    • Arthritis    • Gastroesophageal reflux disease 01/21/2016   • Hyperlipidemia    • Hypertension    • Kidney stone     recurrent, calcium oxalate   • Menopausal disorder 1998    She went through menopause in 1998   • Obesity    • PAF (paroxysmal atrial fibrillation) (HCC)     in the setting of urosepsis, 9/2016   • Reflux esophagitis 08/19/2016   • Sebaceous cyst of labia 12/20/2017   • Sepsis due to urinary tract infection (HCC)    • Sigmoid diverticulitis 11/13/2017   • Urinary tract infection    • Vitamin D deficiency 01/21/2016     Past Surgical History:   Procedure Laterality Date   • ANKLE SURGERY      Ankle Repair / Description: ORif the left ankle in July 2010. Secondary to fall   • BREAST BIOPSY Right     benign   • BREAST SURGERY      biopsy    • CHOLECYSTECTOMY     • COLONOSCOPY  2014    Done 2004 normal recheck in 10 years. Repeated February 2014 colonoscopy was normal and to be repeated in 10 years.   • CYSTOSCOPY BLADDER STONE LITHOTRIPSY     • HEMORRHOIDECTOMY     • NEPHROLITHOTOMY Left 1/9/2017    Procedure: NEPHROLITHOTOMY PERCUTANEOUS SECOND LOOK WITH STENT PLACEMENT AND LASER LITHOTRIPSY;  Surgeon: Mati Villegas MD;  Location: [facility];  Service:    • OTHER SURGICAL HISTORY      Tubal Ligation   • PERCUTANEOUS NEPHROSTOLITHOTOMY Left 12/2016   • TUBAL ABDOMINAL LIGATION       Family History   Problem Relation Age of Onset   • COPD Mother    • Heart  attack Father         acute myocardial infarction   • Heart attack Son    • Kidney disease Maternal Grandmother    • Breast cancer Neg Hx      Social History     Tobacco Use   • Smoking status: Never   • Smokeless tobacco: Never   • Tobacco comments:     caffeine use /soft drinks   Vaping Use   • Vaping Use: Never used   Substance Use Topics   • Alcohol use: Yes     Comment: She uses alcohol once or twice a year.   • Drug use: No       Meds:  Medications Prior to Admission   Medication Sig Dispense Refill Last Dose   • acetaminophen (TYLENOL) 650 MG 8 hr tablet Take 1 tablet by mouth every 6 (six) hours as needed. for pain   Past Month   • ALPRAZolam (XANAX) 1 MG tablet TAKE 1/2 TO 1 (ONE-HALF TO ONE) TABLET BY MOUTH TWICE DAILY FOR ANXIETY 60 tablet 0 12/5/2022   • losartan (COZAAR) 100 MG tablet Take 1 tablet by mouth Daily. 90 tablet 3 12/5/2022   • Metoprolol Tartrate 75 MG tablet Take 1 tablet by mouth twice daily 180 tablet 0 12/5/2022   • Multiple Vitamin tablet Take 1 tablet by mouth Daily.   12/5/2022   • omeprazole OTC (PriLOSEC OTC) 20 MG EC tablet Take 20 mg by mouth 2 (two) times a day.   12/5/2022   • Probiotic Product (PROBIOTIC ADVANCED PO) Take 1 tablet by mouth Daily.   12/5/2022   • sertraline (ZOLOFT) 100 MG tablet Take 1 tablet by mouth once daily 90 tablet 0 12/5/2022   • simvastatin (ZOCOR) 80 MG tablet Take 1 tablet by mouth Every Evening. 90 tablet 3 12/5/2022   • meclizine (ANTIVERT) 25 MG tablet Take 1 tablet by mouth Every 6 (Six) Hours As Needed for dizziness or Nausea. 30 tablet 0 More than a month   • triamcinolone (KENALOG) 0.1 % cream APPLY TO AFFECTED AREA TWICE A DAY TO RASH FOR UP TO 2 WEEKS      • Xarelto 15 MG tablet Take 1 tablet by mouth once daily 90 tablet 0 12/2/2022       Allergies:  Morphine and related, Ciprofloxacin-ciproflox hcl er, Flomax [tamsulosin hcl], Hydrocodone, Latex, Lortab [hydrocodone-acetaminophen], Penicillins, Phenergan [promethazine hcl], Sulfa  antibiotics, Amoxicillin, and Tamsulosin    Debilities:  none    Objective     Vital Signs  Temp:  [98.1 °F (36.7 °C)] 98.1 °F (36.7 °C)  Heart Rate:  [84] 84  Resp:  [18] 18  BP: (144)/(85) 144/85  No intake or output data in the 24 hours ending 12/06/22 1159  Flowsheet Rows    Flowsheet Row First Filed Value   Admission Height --   Admission Weight 90.7 kg (200 lb) Documented at 12/06/2022 1001           Physical Exam:     General Appearance:    Alert, cooperative, NAD   HEENT:    No trauma, pupils reactive, hearing intact   Back:     No CVA tenderness   Lungs:     Respirations unlabored, no wheezing    Heart:    RRR, intact peripheral pulses   Abdomen:     Soft, NDNT, no masses, no guarding   :    def   Extremities:   No edema, no deformity   Lymphatic:   No neck or groin LAD   Skin:   No bleeding, bruising or rashes   Neuro/Psych:   Orientation intact, mood/affect pleasant, no focal findings     Results Review:    I reviewed the patient's new clinical results.  Results for orders placed or performed during the hospital encounter of 11/26/22   Urine Culture - Urine, Urine, Clean Catch    Specimen: Urine, Clean Catch   Result Value Ref Range    Urine Culture (A)      >100,000 CFU/mL Klebsiella pneumoniae ssp pneumoniae       Susceptibility    Klebsiella pneumoniae ssp pneumoniae - FARIHA     Ampicillin  Resistant ug/ml     Ampicillin + Sulbactam  Susceptible ug/ml     Cefazolin  Susceptible ug/ml     Cefepime  Susceptible ug/ml     Ceftazidime  Susceptible ug/ml     Ceftriaxone  Susceptible ug/ml     Gentamicin  Susceptible ug/ml     Levofloxacin  Susceptible ug/ml     Nitrofurantoin  Intermediate ug/ml     Piperacillin + Tazobactam  Susceptible ug/ml     Trimethoprim + Sulfamethoxazole  Susceptible ug/ml   Urinalysis With Microscopic If Indicated (No Culture) - Urine, Clean Catch    Specimen: Urine, Clean Catch   Result Value Ref Range    Color, UA Other (A) Yellow, Straw    Appearance, UA Cloudy (A) Clear    pH,  UA 6.0 4.5 - 8.0    Specific Gravity, UA 1.025 1.003 - 1.030    Glucose, UA Negative Negative    Ketones, UA 15 mg/dL (1+) (A) Negative    Bilirubin, UA Small (1+) (A) Negative    Blood, UA Large (3+) (A) Negative    Protein, UA >=300 mg/dL (3+) (A) Negative    Leuk Esterase, UA Large (3+) (A) Negative    Nitrite, UA Negative Negative    Urobilinogen, UA 1.0 E.U./dL 0.2 - 1.0 E.U./dL   Urinalysis, Microscopic Only - Urine, Clean Catch    Specimen: Urine, Clean Catch   Result Value Ref Range    RBC, UA Too Numerous to Count (A) None Seen /HPF    WBC, UA Too Numerous to Count (A) None Seen /HPF    Bacteria, UA Trace (A) None Seen /HPF    Squamous Epithelial Cells, UA None Seen None Seen, 0-2 /HPF    Hyaline Casts, UA None Seen None Seen /LPF    Methodology Manual Light Microscopy    Comprehensive Metabolic Panel    Specimen: Arm, Right; Blood   Result Value Ref Range    Glucose 98 65 - 99 mg/dL    BUN 20 8 - 23 mg/dL    Creatinine 1.04 (H) 0.57 - 1.00 mg/dL    Sodium 139 136 - 145 mmol/L    Potassium 4.5 3.5 - 5.2 mmol/L    Chloride 102 98 - 107 mmol/L    CO2 25.6 22.0 - 29.0 mmol/L    Calcium 9.8 8.6 - 10.5 mg/dL    Total Protein 7.0 6.0 - 8.5 g/dL    Albumin 3.90 3.50 - 5.20 g/dL    ALT (SGPT) 12 1 - 33 U/L    AST (SGOT) 14 1 - 32 U/L    Alkaline Phosphatase 66 39 - 117 U/L    Total Bilirubin 0.3 0.0 - 1.2 mg/dL    Globulin 3.1 gm/dL    A/G Ratio 1.3 g/dL    BUN/Creatinine Ratio 19.2 7.0 - 25.0    Anion Gap 11.4 5.0 - 15.0 mmol/L    eGFR 56.9 (L) >60.0 mL/min/1.73   CBC Auto Differential    Specimen: Arm, Right; Blood   Result Value Ref Range    WBC 14.09 (H) 3.40 - 10.80 10*3/mm3    RBC 4.71 3.77 - 5.28 10*6/mm3    Hemoglobin 12.4 12.0 - 15.9 g/dL    Hematocrit 39.4 34.0 - 46.6 %    MCV 83.7 79.0 - 97.0 fL    MCH 26.3 (L) 26.6 - 33.0 pg    MCHC 31.5 31.5 - 35.7 g/dL    RDW 14.9 12.3 - 15.4 %    RDW-SD 44.8 37.0 - 54.0 fl    MPV 9.1 6.0 - 12.0 fL    Platelets 256 140 - 450 10*3/mm3    Neutrophil % 80.2 (H) 42.7 -  76.0 %    Lymphocyte % 9.1 (L) 19.6 - 45.3 %    Monocyte % 9.4 5.0 - 12.0 %    Eosinophil % 0.5 0.3 - 6.2 %    Basophil % 0.4 0.0 - 1.5 %    Immature Grans % 0.4 0.0 - 0.5 %    Neutrophils, Absolute 11.29 (H) 1.70 - 7.00 10*3/mm3    Lymphocytes, Absolute 1.28 0.70 - 3.10 10*3/mm3    Monocytes, Absolute 1.33 (H) 0.10 - 0.90 10*3/mm3    Eosinophils, Absolute 0.07 0.00 - 0.40 10*3/mm3    Basophils, Absolute 0.06 0.00 - 0.20 10*3/mm3    Immature Grans, Absolute 0.06 (H) 0.00 - 0.05 10*3/mm3    nRBC 0.0 0.0 - 0.2 /100 WBC        Assessment:  Left renal Stones    Plan:    Left ureteroscopy laserlithotripsy with stent placement    I discussed the patient's findings and my recommendations with patient.   Risks, complications, outcomes and alternatives discussed with the patient at the bedside and office.    Mati Villegas MD  12/06/22  11:59 EST

## 2022-12-08 ENCOUNTER — APPOINTMENT (OUTPATIENT)
Dept: GENERAL RADIOLOGY | Facility: HOSPITAL | Age: 72
End: 2022-12-08

## 2022-12-08 ENCOUNTER — HOSPITAL ENCOUNTER (OUTPATIENT)
Facility: HOSPITAL | Age: 72
Setting detail: OBSERVATION
Discharge: HOME HEALTH | End: 2022-12-10
Attending: EMERGENCY MEDICINE | Admitting: STUDENT IN AN ORGANIZED HEALTH CARE EDUCATION/TRAINING PROGRAM

## 2022-12-08 DIAGNOSIS — R53.81 PHYSICAL DECONDITIONING: ICD-10-CM

## 2022-12-08 DIAGNOSIS — N20.0 CALCULUS OF KIDNEY: ICD-10-CM

## 2022-12-08 DIAGNOSIS — M19.172 POST-TRAUMATIC ARTHRITIS OF LEFT ANKLE: ICD-10-CM

## 2022-12-08 DIAGNOSIS — N39.0 URINARY TRACT INFECTION IN ELDERLY PATIENT: ICD-10-CM

## 2022-12-08 DIAGNOSIS — R53.1 GENERALIZED WEAKNESS: Primary | ICD-10-CM

## 2022-12-08 LAB
ALBUMIN SERPL-MCNC: 3.1 G/DL (ref 3.5–5.2)
ALBUMIN/GLOB SERPL: 0.9 G/DL
ALP SERPL-CCNC: 96 U/L (ref 39–117)
ALT SERPL W P-5'-P-CCNC: 30 U/L (ref 1–33)
ANION GAP SERPL CALCULATED.3IONS-SCNC: 14.4 MMOL/L (ref 5–15)
AST SERPL-CCNC: 52 U/L (ref 1–32)
BACTERIA UR QL AUTO: ABNORMAL /HPF
BASOPHILS # BLD AUTO: 0.02 10*3/MM3 (ref 0–0.2)
BASOPHILS NFR BLD AUTO: 0.1 % (ref 0–1.5)
BILIRUB SERPL-MCNC: 0.4 MG/DL (ref 0–1.2)
BILIRUB UR QL STRIP: NEGATIVE
BUN SERPL-MCNC: 33 MG/DL (ref 8–23)
BUN/CREAT SERPL: 30.6 (ref 7–25)
CALCIUM SPEC-SCNC: 9 MG/DL (ref 8.6–10.5)
CHLORIDE SERPL-SCNC: 98 MMOL/L (ref 98–107)
CLARITY UR: CLEAR
CO2 SERPL-SCNC: 22.6 MMOL/L (ref 22–29)
COLOR UR: YELLOW
CREAT SERPL-MCNC: 1.08 MG/DL (ref 0.57–1)
DEPRECATED RDW RBC AUTO: 44.5 FL (ref 37–54)
EGFRCR SERPLBLD CKD-EPI 2021: 54.3 ML/MIN/1.73
EOSINOPHIL # BLD AUTO: 0.04 10*3/MM3 (ref 0–0.4)
EOSINOPHIL NFR BLD AUTO: 0.2 % (ref 0.3–6.2)
ERYTHROCYTE [DISTWIDTH] IN BLOOD BY AUTOMATED COUNT: 14.4 % (ref 12.3–15.4)
GLOBULIN UR ELPH-MCNC: 3.3 GM/DL
GLUCOSE SERPL-MCNC: 121 MG/DL (ref 65–99)
GLUCOSE UR STRIP-MCNC: NEGATIVE MG/DL
HCT VFR BLD AUTO: 35.7 % (ref 34–46.6)
HGB BLD-MCNC: 11.2 G/DL (ref 12–15.9)
HGB UR QL STRIP.AUTO: ABNORMAL
HYALINE CASTS UR QL AUTO: ABNORMAL /LPF
IMM GRANULOCYTES # BLD AUTO: 0.1 10*3/MM3 (ref 0–0.05)
IMM GRANULOCYTES NFR BLD AUTO: 0.5 % (ref 0–0.5)
KETONES UR QL STRIP: ABNORMAL
LEUKOCYTE ESTERASE UR QL STRIP.AUTO: ABNORMAL
LYMPHOCYTES # BLD AUTO: 0.76 10*3/MM3 (ref 0.7–3.1)
LYMPHOCYTES NFR BLD AUTO: 3.9 % (ref 19.6–45.3)
MCH RBC QN AUTO: 26.6 PG (ref 26.6–33)
MCHC RBC AUTO-ENTMCNC: 31.4 G/DL (ref 31.5–35.7)
MCV RBC AUTO: 84.8 FL (ref 79–97)
MONOCYTES # BLD AUTO: 1.05 10*3/MM3 (ref 0.1–0.9)
MONOCYTES NFR BLD AUTO: 5.4 % (ref 5–12)
NEUTROPHILS NFR BLD AUTO: 17.53 10*3/MM3 (ref 1.7–7)
NEUTROPHILS NFR BLD AUTO: 89.9 % (ref 42.7–76)
NITRITE UR QL STRIP: NEGATIVE
PH UR STRIP.AUTO: 5.5 [PH] (ref 4.5–8)
PLATELET # BLD AUTO: 177 10*3/MM3 (ref 140–450)
PMV BLD AUTO: 11.3 FL (ref 6–12)
POTASSIUM SERPL-SCNC: 5 MMOL/L (ref 3.5–5.2)
PROT SERPL-MCNC: 6.4 G/DL (ref 6–8.5)
PROT UR QL STRIP: ABNORMAL
RBC # BLD AUTO: 4.21 10*6/MM3 (ref 3.77–5.28)
RBC # UR STRIP: ABNORMAL /HPF
REF LAB TEST METHOD: ABNORMAL
SODIUM SERPL-SCNC: 135 MMOL/L (ref 136–145)
SP GR UR STRIP: 1.02 (ref 1–1.03)
SQUAMOUS #/AREA URNS HPF: ABNORMAL /HPF
UROBILINOGEN UR QL STRIP: ABNORMAL
WBC # UR STRIP: ABNORMAL /HPF
WBC NRBC COR # BLD: 19.5 10*3/MM3 (ref 3.4–10.8)

## 2022-12-08 PROCEDURE — 96375 TX/PRO/DX INJ NEW DRUG ADDON: CPT

## 2022-12-08 PROCEDURE — 81001 URINALYSIS AUTO W/SCOPE: CPT | Performed by: EMERGENCY MEDICINE

## 2022-12-08 PROCEDURE — 80053 COMPREHEN METABOLIC PANEL: CPT | Performed by: EMERGENCY MEDICINE

## 2022-12-08 PROCEDURE — 87086 URINE CULTURE/COLONY COUNT: CPT | Performed by: EMERGENCY MEDICINE

## 2022-12-08 PROCEDURE — 99284 EMERGENCY DEPT VISIT MOD MDM: CPT

## 2022-12-08 PROCEDURE — 96374 THER/PROPH/DIAG INJ IV PUSH: CPT

## 2022-12-08 PROCEDURE — 36415 COLL VENOUS BLD VENIPUNCTURE: CPT

## 2022-12-08 PROCEDURE — 85025 COMPLETE CBC W/AUTO DIFF WBC: CPT | Performed by: EMERGENCY MEDICINE

## 2022-12-08 PROCEDURE — 99285 EMERGENCY DEPT VISIT HI MDM: CPT

## 2022-12-08 PROCEDURE — 71046 X-RAY EXAM CHEST 2 VIEWS: CPT

## 2022-12-08 RX ORDER — CEFTRIAXONE 1 G/50ML
1 INJECTION, SOLUTION INTRAVENOUS ONCE
Status: COMPLETED | OUTPATIENT
Start: 2022-12-08 | End: 2022-12-09

## 2022-12-08 RX ORDER — SODIUM CHLORIDE 0.9 % (FLUSH) 0.9 %
10 SYRINGE (ML) INJECTION AS NEEDED
Status: DISCONTINUED | OUTPATIENT
Start: 2022-12-08 | End: 2022-12-10 | Stop reason: HOSPADM

## 2022-12-08 RX ORDER — SODIUM CHLORIDE 9 MG/ML
250 INJECTION, SOLUTION INTRAVENOUS CONTINUOUS
Status: DISCONTINUED | OUTPATIENT
Start: 2022-12-08 | End: 2022-12-09

## 2022-12-09 PROBLEM — R53.1 GENERALIZED WEAKNESS: Status: ACTIVE | Noted: 2022-12-09

## 2022-12-09 LAB
ANION GAP SERPL CALCULATED.3IONS-SCNC: 11.7 MMOL/L (ref 5–15)
BASOPHILS # BLD AUTO: 0.03 10*3/MM3 (ref 0–0.2)
BASOPHILS NFR BLD AUTO: 0.2 % (ref 0–1.5)
BUN SERPL-MCNC: 30 MG/DL (ref 8–23)
BUN/CREAT SERPL: 34.9 (ref 7–25)
CALCIUM SPEC-SCNC: 8.6 MG/DL (ref 8.6–10.5)
CHLORIDE SERPL-SCNC: 99 MMOL/L (ref 98–107)
CO2 SERPL-SCNC: 21.3 MMOL/L (ref 22–29)
CREAT SERPL-MCNC: 0.86 MG/DL (ref 0.57–1)
D-LACTATE SERPL-SCNC: 1.1 MMOL/L (ref 0.5–2)
DEPRECATED RDW RBC AUTO: 44.4 FL (ref 37–54)
EGFRCR SERPLBLD CKD-EPI 2021: 71.4 ML/MIN/1.73
EOSINOPHIL # BLD AUTO: 0.04 10*3/MM3 (ref 0–0.4)
EOSINOPHIL NFR BLD AUTO: 0.2 % (ref 0.3–6.2)
ERYTHROCYTE [DISTWIDTH] IN BLOOD BY AUTOMATED COUNT: 14.6 % (ref 12.3–15.4)
GLUCOSE BLDC GLUCOMTR-MCNC: 102 MG/DL (ref 70–130)
GLUCOSE SERPL-MCNC: 124 MG/DL (ref 65–99)
HCT VFR BLD AUTO: 34.9 % (ref 34–46.6)
HGB BLD-MCNC: 10.9 G/DL (ref 12–15.9)
IMM GRANULOCYTES # BLD AUTO: 0.15 10*3/MM3 (ref 0–0.05)
IMM GRANULOCYTES NFR BLD AUTO: 0.8 % (ref 0–0.5)
LYMPHOCYTES # BLD AUTO: 0.78 10*3/MM3 (ref 0.7–3.1)
LYMPHOCYTES NFR BLD AUTO: 4.4 % (ref 19.6–45.3)
MCH RBC QN AUTO: 26.3 PG (ref 26.6–33)
MCHC RBC AUTO-ENTMCNC: 31.2 G/DL (ref 31.5–35.7)
MCV RBC AUTO: 84.1 FL (ref 79–97)
MONOCYTES # BLD AUTO: 0.96 10*3/MM3 (ref 0.1–0.9)
MONOCYTES NFR BLD AUTO: 5.4 % (ref 5–12)
NEUTROPHILS NFR BLD AUTO: 15.86 10*3/MM3 (ref 1.7–7)
NEUTROPHILS NFR BLD AUTO: 89 % (ref 42.7–76)
NRBC BLD AUTO-RTO: 0 /100 WBC (ref 0–0.2)
PLATELET # BLD AUTO: 189 10*3/MM3 (ref 140–450)
PMV BLD AUTO: 9.8 FL (ref 6–12)
POTASSIUM SERPL-SCNC: 4.2 MMOL/L (ref 3.5–5.2)
RBC # BLD AUTO: 4.15 10*6/MM3 (ref 3.77–5.28)
SODIUM SERPL-SCNC: 132 MMOL/L (ref 136–145)
WBC NRBC COR # BLD: 17.82 10*3/MM3 (ref 3.4–10.8)

## 2022-12-09 PROCEDURE — 96361 HYDRATE IV INFUSION ADD-ON: CPT

## 2022-12-09 PROCEDURE — 87040 BLOOD CULTURE FOR BACTERIA: CPT | Performed by: EMERGENCY MEDICINE

## 2022-12-09 PROCEDURE — 97165 OT EVAL LOW COMPLEX 30 MIN: CPT

## 2022-12-09 PROCEDURE — G0378 HOSPITAL OBSERVATION PER HR: HCPCS

## 2022-12-09 PROCEDURE — 25010000002 CEFTRIAXONE SODIUM-DEXTROSE 1-3.74 GM-%(50ML) RECONSTITUTED SOLUTION: Performed by: HOSPITALIST

## 2022-12-09 PROCEDURE — 80048 BASIC METABOLIC PNL TOTAL CA: CPT | Performed by: HOSPITALIST

## 2022-12-09 PROCEDURE — 96365 THER/PROPH/DIAG IV INF INIT: CPT

## 2022-12-09 PROCEDURE — 97535 SELF CARE MNGMENT TRAINING: CPT

## 2022-12-09 PROCEDURE — 99219 PR INITIAL OBSERVATION CARE/DAY 50 MINUTES: CPT | Performed by: HOSPITALIST

## 2022-12-09 PROCEDURE — 96374 THER/PROPH/DIAG INJ IV PUSH: CPT

## 2022-12-09 PROCEDURE — 85025 COMPLETE CBC W/AUTO DIFF WBC: CPT | Performed by: HOSPITALIST

## 2022-12-09 PROCEDURE — 96366 THER/PROPH/DIAG IV INF ADDON: CPT

## 2022-12-09 PROCEDURE — 82962 GLUCOSE BLOOD TEST: CPT

## 2022-12-09 PROCEDURE — 83605 ASSAY OF LACTIC ACID: CPT | Performed by: EMERGENCY MEDICINE

## 2022-12-09 PROCEDURE — 97161 PT EVAL LOW COMPLEX 20 MIN: CPT

## 2022-12-09 PROCEDURE — 25010000002 CEFTRIAXONE SODIUM-DEXTROSE 1-3.74 GM-%(50ML) RECONSTITUTED SOLUTION: Performed by: EMERGENCY MEDICINE

## 2022-12-09 RX ORDER — ACETAMINOPHEN 500 MG
1000 TABLET ORAL EVERY 6 HOURS PRN
Status: DISCONTINUED | OUTPATIENT
Start: 2022-12-09 | End: 2022-12-10 | Stop reason: HOSPADM

## 2022-12-09 RX ORDER — ALPRAZOLAM 0.25 MG/1
1 TABLET ORAL 2 TIMES DAILY PRN
Status: DISCONTINUED | OUTPATIENT
Start: 2022-12-09 | End: 2022-12-10 | Stop reason: HOSPADM

## 2022-12-09 RX ORDER — PANTOPRAZOLE SODIUM 40 MG/1
40 TABLET, DELAYED RELEASE ORAL EVERY MORNING
Status: DISCONTINUED | OUTPATIENT
Start: 2022-12-09 | End: 2022-12-10 | Stop reason: HOSPADM

## 2022-12-09 RX ORDER — PHENAZOPYRIDINE HYDROCHLORIDE 100 MG/1
200 TABLET, FILM COATED ORAL 3 TIMES DAILY PRN
Status: DISCONTINUED | OUTPATIENT
Start: 2022-12-09 | End: 2022-12-10 | Stop reason: HOSPADM

## 2022-12-09 RX ORDER — ENOXAPARIN SODIUM 100 MG/ML
40 INJECTION SUBCUTANEOUS DAILY
Status: CANCELLED | OUTPATIENT
Start: 2022-12-09

## 2022-12-09 RX ORDER — SODIUM CHLORIDE 9 MG/ML
INJECTION, SOLUTION INTRAVENOUS
Status: COMPLETED
Start: 2022-12-09 | End: 2022-12-09

## 2022-12-09 RX ORDER — CEFTRIAXONE 1 G/50ML
1 INJECTION, SOLUTION INTRAVENOUS EVERY 24 HOURS
Status: DISCONTINUED | OUTPATIENT
Start: 2022-12-09 | End: 2022-12-10 | Stop reason: HOSPADM

## 2022-12-09 RX ORDER — MECLIZINE HYDROCHLORIDE 25 MG/1
25 TABLET ORAL EVERY 6 HOURS PRN
Status: DISCONTINUED | OUTPATIENT
Start: 2022-12-09 | End: 2022-12-10 | Stop reason: HOSPADM

## 2022-12-09 RX ORDER — ATORVASTATIN CALCIUM 40 MG/1
40 TABLET, FILM COATED ORAL DAILY
Status: DISCONTINUED | OUTPATIENT
Start: 2022-12-09 | End: 2022-12-10 | Stop reason: HOSPADM

## 2022-12-09 RX ORDER — SODIUM CHLORIDE 0.9 % (FLUSH) 0.9 %
10 SYRINGE (ML) INJECTION EVERY 12 HOURS SCHEDULED
Status: DISCONTINUED | OUTPATIENT
Start: 2022-12-09 | End: 2022-12-10 | Stop reason: HOSPADM

## 2022-12-09 RX ORDER — SODIUM CHLORIDE 9 MG/ML
40 INJECTION, SOLUTION INTRAVENOUS AS NEEDED
Status: DISCONTINUED | OUTPATIENT
Start: 2022-12-09 | End: 2022-12-10 | Stop reason: HOSPADM

## 2022-12-09 RX ORDER — SODIUM CHLORIDE 9 MG/ML
100 INJECTION, SOLUTION INTRAVENOUS CONTINUOUS
Status: DISCONTINUED | OUTPATIENT
Start: 2022-12-09 | End: 2022-12-10 | Stop reason: HOSPADM

## 2022-12-09 RX ORDER — SODIUM CHLORIDE 0.9 % (FLUSH) 0.9 %
10 SYRINGE (ML) INJECTION AS NEEDED
Status: DISCONTINUED | OUTPATIENT
Start: 2022-12-09 | End: 2022-12-10 | Stop reason: HOSPADM

## 2022-12-09 RX ORDER — TRIAMCINOLONE ACETONIDE 1 MG/G
1 CREAM TOPICAL AS NEEDED
Status: DISCONTINUED | OUTPATIENT
Start: 2022-12-09 | End: 2022-12-10 | Stop reason: HOSPADM

## 2022-12-09 RX ORDER — OXYCODONE HYDROCHLORIDE AND ACETAMINOPHEN 5; 325 MG/1; MG/1
0.5 TABLET ORAL EVERY 6 HOURS PRN
Status: DISCONTINUED | OUTPATIENT
Start: 2022-12-09 | End: 2022-12-10 | Stop reason: HOSPADM

## 2022-12-09 RX ORDER — ALPRAZOLAM 0.25 MG/1
1 TABLET ORAL 2 TIMES DAILY
Status: DISCONTINUED | OUTPATIENT
Start: 2022-12-09 | End: 2022-12-09

## 2022-12-09 RX ORDER — TRAMADOL HYDROCHLORIDE 50 MG/1
50 TABLET ORAL EVERY 6 HOURS PRN
Status: DISCONTINUED | OUTPATIENT
Start: 2022-12-09 | End: 2022-12-10 | Stop reason: HOSPADM

## 2022-12-09 RX ADMIN — ATORVASTATIN CALCIUM 40 MG: 40 TABLET, FILM COATED ORAL at 11:54

## 2022-12-09 RX ADMIN — TRAMADOL HYDROCHLORIDE 50 MG: 50 TABLET ORAL at 21:52

## 2022-12-09 RX ADMIN — ALPRAZOLAM 1 MG: 0.25 TABLET ORAL at 21:52

## 2022-12-09 RX ADMIN — Medication 10 ML: at 09:54

## 2022-12-09 RX ADMIN — PHENAZOPYRIDINE 200 MG: 100 TABLET ORAL at 11:53

## 2022-12-09 RX ADMIN — RIVAROXABAN 15 MG: 15 TABLET, FILM COATED ORAL at 11:53

## 2022-12-09 RX ADMIN — ACETAMINOPHEN 1000 MG: 500 TABLET, FILM COATED ORAL at 13:36

## 2022-12-09 RX ADMIN — Medication 10 ML: at 01:57

## 2022-12-09 RX ADMIN — CEFTRIAXONE 1 G: 1 INJECTION, SOLUTION INTRAVENOUS at 21:19

## 2022-12-09 RX ADMIN — SODIUM CHLORIDE 500 ML: 9 INJECTION, SOLUTION INTRAVENOUS at 00:24

## 2022-12-09 RX ADMIN — Medication 10 ML: at 21:52

## 2022-12-09 RX ADMIN — SODIUM CHLORIDE 100 ML/HR: 9 INJECTION, SOLUTION INTRAVENOUS at 01:55

## 2022-12-09 RX ADMIN — ALPRAZOLAM 1 MG: 0.25 TABLET ORAL at 11:53

## 2022-12-09 RX ADMIN — PANTOPRAZOLE SODIUM 40 MG: 40 TABLET, DELAYED RELEASE ORAL at 11:53

## 2022-12-09 RX ADMIN — CEFTRIAXONE 1 G: 1 INJECTION, SOLUTION INTRAVENOUS at 00:16

## 2022-12-09 RX ADMIN — SERTRALINE HYDROCHLORIDE 100 MG: 50 TABLET ORAL at 11:54

## 2022-12-09 NOTE — ED PROVIDER NOTES
Subjective     History provided by:  Patient and spouse    History of Present Illness    · Chief complaint: Weakness    · Location: Generalized    · Quality/Severity: The patient has been very weak requiring help with daily activities such as walking and going to the bathroom.  She is not been eating.    · Timing/Onset: Started Tuesday after her laser lithotripsy and basket extraction by Dr. Villegas for a staghorn calculus.    · Modifying Factors: The patient does not know why she is so weak.    · Associated symptoms: She has had anorexia.  She has been drinking fluids.  She does have dysuria, urinary frequency and hematuria.    · Narrative: The patient is a 72-year-old white female who is status post laser lithotripsy and stone basket extraction and stent placement by Dr. Villegas 2 days ago.  She states she has been profoundly weak since then requiring help to get up.  Tonight she was in the bathroom and was too weak to stand and fell forward.  Her  found her with her knees under her and her hands on the bathtub and she was too weak to stand.  She denies any injuries from the fall.  She denies a fever or urinary tract symptoms.  She does report dysuria, urinary frequency and hematuria.  She has continued back pain.    Review of Systems   Constitutional: Positive for activity change, appetite change and fatigue. Negative for chills and fever.   HENT: Negative for congestion, ear pain, rhinorrhea and sore throat.    Eyes: Negative for pain and visual disturbance.   Respiratory: Negative for cough and shortness of breath.    Cardiovascular: Negative for chest pain.   Gastrointestinal: Negative for abdominal pain, diarrhea, nausea and vomiting.   Genitourinary: Positive for dysuria, flank pain, frequency, hematuria and urgency. Negative for pelvic pain.   Musculoskeletal: Positive for back pain. Negative for neck pain and neck stiffness.   Skin: Negative for pallor.   Neurological: Positive for weakness. Negative  "for dizziness, numbness and headaches.   Psychiatric/Behavioral: Negative for confusion.     Past Medical History:   Diagnosis Date   • Allergic    • Arthritis    • Gastroesophageal reflux disease 01/21/2016   • Hyperlipidemia    • Hypertension    • Kidney stone     recurrent, calcium oxalate   • Menopausal disorder 1998    She went through menopause in 1998   • Obesity    • PAF (paroxysmal atrial fibrillation) (HCC)     in the setting of urosepsis, 9/2016   • Reflux esophagitis 08/19/2016   • Sebaceous cyst of labia 12/20/2017   • Sepsis due to urinary tract infection (HCC)    • Sigmoid diverticulitis 11/13/2017   • Urinary tract infection    • Vitamin D deficiency 01/21/2016     /76 (BP Location: Right arm, Patient Position: Lying)   Pulse 103   Temp 98.7 °F (37.1 °C) (Oral)   Resp 20   Ht 167.6 cm (66\")   Wt 113 kg (250 lb)   SpO2 92%   BMI 40.35 kg/m²     Past Medical History:   Diagnosis Date   • Allergic    • Arthritis    • Gastroesophageal reflux disease 01/21/2016   • Hyperlipidemia    • Hypertension    • Kidney stone     recurrent, calcium oxalate   • Menopausal disorder 1998    She went through menopause in 1998   • Obesity    • PAF (paroxysmal atrial fibrillation) (HCC)     in the setting of urosepsis, 9/2016   • Reflux esophagitis 08/19/2016   • Sebaceous cyst of labia 12/20/2017   • Sepsis due to urinary tract infection (HCC)    • Sigmoid diverticulitis 11/13/2017   • Urinary tract infection    • Vitamin D deficiency 01/21/2016       Allergies   Allergen Reactions   • Morphine And Related GI Intolerance   • Ciprofloxacin-Ciproflox Hcl Er Rash   • Flomax [Tamsulosin Hcl] Rash   • Hydrocodone Rash   • Latex Rash   • Lortab [Hydrocodone-Acetaminophen] Rash   • Penicillins Rash   • Phenergan [Promethazine Hcl] GI Intolerance   • Sulfa Antibiotics Rash   • Amoxicillin Rash   • Tamsulosin Rash       Past Surgical History:   Procedure Laterality Date   • ANKLE SURGERY      Ankle Repair / " Description: ORif the left ankle in July 2010. Secondary to fall   • BREAST BIOPSY Right     benign   • BREAST SURGERY      biopsy    • CHOLECYSTECTOMY     • COLONOSCOPY  2014    Done 2004 normal recheck in 10 years. Repeated February 2014 colonoscopy was normal and to be repeated in 10 years.   • CYSTOSCOPY BLADDER STONE LITHOTRIPSY     • HEMORRHOIDECTOMY     • NEPHROLITHOTOMY Left 1/9/2017    Procedure: NEPHROLITHOTOMY PERCUTANEOUS SECOND LOOK WITH STENT PLACEMENT AND LASER LITHOTRIPSY;  Surgeon: Mati Villegas MD;  Location: [facility] OR;  Service:    • OTHER SURGICAL HISTORY      Tubal Ligation   • PERCUTANEOUS NEPHROSTOLITHOTOMY Left 12/2016   • TUBAL ABDOMINAL LIGATION     • URETEROSCOPY LASER LITHOTRIPSY WITH STENT INSERTION Left 12/6/2022    Procedure: LEFT URETEROSCOPY WITH LASER LITHOTRIPSY, STONE BASKET EXTRACTION, STENT PLACEMENT;  Surgeon: Mati Villegas MD;  Location: [facility];  Service: Urology;  Laterality: Left;       Family History   Problem Relation Age of Onset   • COPD Mother    • Heart attack Father         acute myocardial infarction   • Heart attack Son    • Kidney disease Maternal Grandmother    • Breast cancer Neg Hx        Social History     Socioeconomic History   • Marital status:    Tobacco Use   • Smoking status: Never   • Smokeless tobacco: Never   • Tobacco comments:     caffeine use /soft drinks   Vaping Use   • Vaping Use: Never used   Substance and Sexual Activity   • Alcohol use: Yes     Comment: She uses alcohol once or twice a year.   • Drug use: No   • Sexual activity: Defer           Objective   Physical Exam  Vitals and nursing note reviewed.   Constitutional:       General: She is not in acute distress.     Appearance: Normal appearance. She is not toxic-appearing or diaphoretic.      Comments: The patient appears weak.  She does not appear toxic.  Review of her vital signs: She is afebrile with a temperature of 98.7, slightly tachypnea with a  respiratory rate of 21 with a slightly diminished room air oxygen saturation of 93%, heart rate slightly tachycardic 99, blood pressure normal 122/58.   HENT:      Head: Normocephalic and atraumatic.      Nose: Nose normal.      Mouth/Throat:      Mouth: Mucous membranes are moist.      Pharynx: Oropharynx is clear.   Eyes:      General: No scleral icterus.     Conjunctiva/sclera: Conjunctivae normal.      Pupils: Pupils are equal, round, and reactive to light.   Cardiovascular:      Rate and Rhythm: Tachycardia present. Rhythm irregular.      Heart sounds: No murmur heard.  Pulmonary:      Effort: Pulmonary effort is normal.      Breath sounds: Normal breath sounds.   Abdominal:      General: Bowel sounds are normal.      Palpations: Abdomen is soft.      Tenderness: There is no abdominal tenderness. There is no right CVA tenderness, left CVA tenderness, guarding or rebound.   Musculoskeletal:         General: No deformity or signs of injury.      Cervical back: Normal range of motion and neck supple. No tenderness.   Lymphadenopathy:      Cervical: No cervical adenopathy.   Skin:     General: Skin is warm and dry.      Capillary Refill: Capillary refill takes less than 2 seconds.      Findings: No rash.   Neurological:      General: No focal deficit present.      Mental Status: She is alert and oriented to person, place, and time.      Cranial Nerves: No cranial nerve deficit.      Sensory: No sensory deficit.      Motor: No weakness.   Psychiatric:      Comments: The patient is slow to respond like she is weak.  She is otherwise oriented and appropriate.         Procedures           ED Course  ED Course as of 12/09/22 0003   u Dec 08, 2022   4152 Review the patient's test results: Her CBC has an elevated white count at 19.5 with a left shift.  Her CMP has an elevated BUN of 33 and an elevated creatinine of 1.08 with a diminished GFR of 54.3 which is a slight worsening in her renal function in comparison to  11/26 when her BUN was 20 and creatinine 1.04.  Remainder of electrolytes essentially within normal limits.  Liver function test within normal limits.  Urinalysis had too numerous to count red blood cells, trace leukocyte esterases and 6-12 WBCs with 1+ bacteria consistent with hematuria likely due to her lithotripsy as well as a low-grade UTI.  Her chest x-ray was interpreted by me and the radiologist as no acute disease. [TP]   Fri Dec 09, 2022   0000 The etiology of the patient's profound weakness since her lithotripsy is unclear.  It may be due to her low-grade UTI.  She will be administered IV normal saline and IV Rocephin.  She is too weak to stand without assistance, hopefully she will strengthen with fluids and antibiotic administration.  At 23: 59 she was discussed with Dr. Coe, hospitalist, who agreed to admit the patient to observation. [TP]      ED Course User Index  [TP] Garry Caba MD                                           MDM  Number of Diagnoses or Management Options  Generalized weakness: new and requires workup  Urinary tract infection in elderly patient: new and requires workup     Amount and/or Complexity of Data Reviewed  Clinical lab tests: ordered and reviewed  Tests in the radiology section of CPT®: ordered and reviewed  Discuss the patient with other providers: yes    Risk of Complications, Morbidity, and/or Mortality  Presenting problems: high  Diagnostic procedures: high  Management options: high  General comments: My differential diagnosis includes but is not limited to generalized weakness, electrolyte abnormality, CVA, TIA, Bell's palsy, acute MI, GI bleed, urinary tract infection, systemic infections including sepsis, alcohol abuse, drug abuse including prescription and street drug.    Patient Progress  Patient progress: stable      Final diagnoses:   Generalized weakness   Urinary tract infection in elderly patient       ED Disposition  ED Disposition     ED Disposition    Decision to Admit    Condition   --    Comment   Level of Care: Med/Surg [1]   Diagnosis: Generalized weakness [915836]   Admitting Physician: ISAAC CORDOVA [[id number]]   Bed Request Comments: Severe generalized weakness and urinary tract infection               No follow-up provider specified.       Medication List      No changes were made to your prescriptions during this visit.         Labs Reviewed   COMPREHENSIVE METABOLIC PANEL - Abnormal; Notable for the following components:       Result Value    Glucose 121 (*)     BUN 33 (*)     Creatinine 1.08 (*)     Sodium 135 (*)     Albumin 3.10 (*)     AST (SGOT) 52 (*)     BUN/Creatinine Ratio 30.6 (*)     eGFR 54.3 (*)     All other components within normal limits    Narrative:     GFR Normal >60  Chronic Kidney Disease <60  Kidney Failure <15    The GFR formula is only valid for adults with stable renal function between ages 18 and 70.   URINALYSIS W/ MICROSCOPIC IF INDICATED (NO CULTURE) - Abnormal; Notable for the following components:    Ketones, UA Trace (*)     Blood, UA Large (3+) (*)     Protein, UA >=300 mg/dL (3+) (*)     Leuk Esterase, UA Small (1+) (*)     All other components within normal limits   CBC WITH AUTO DIFFERENTIAL - Abnormal; Notable for the following components:    WBC 19.50 (*)     Hemoglobin 11.2 (*)     MCHC 31.4 (*)     Neutrophil % 89.9 (*)     Lymphocyte % 3.9 (*)     Eosinophil % 0.2 (*)     Neutrophils, Absolute 17.53 (*)     Monocytes, Absolute 1.05 (*)     Immature Grans, Absolute 0.10 (*)     All other components within normal limits   URINALYSIS, MICROSCOPIC ONLY - Abnormal; Notable for the following components:    RBC, UA Too Numerous to Count (*)     WBC, UA 6-12 (*)     Bacteria, UA 1+ (*)     All other components within normal limits   BLOOD CULTURE   BLOOD CULTURE   URINE CULTURE   LACTIC ACID, PLASMA   CBC AND DIFFERENTIAL    Narrative:     The following orders were created for panel order CBC &  Differential.  Procedure                               Abnormality         Status                     ---------                               -----------         ------                     CBC Auto Differential[[id number]]        Abnormal            Final result                 Please view results for these tests on the individual orders.     XR Chest 2 View   Final Result   No acute cardiopulmonary findings.      Signer Name: Nithin Mondragon MD    Signed: 12/8/2022 11:26 PM    Workstation Name: [facility]     [facility]             Medication List      No changes were made to your prescriptions during this visit.              Garry Caba MD  12/09/22 0003

## 2022-12-09 NOTE — PROGRESS NOTES
Patient is postop left ureteroscopy with stone extraction and stent placement on 12 6 at home was developed orthostatic complaints and was admitted to this facility she had some urgency and frequency but no flank pain discomfort fever chills a urine culture from 1126 with Klebsiella resistant to ampicillin intermediate to Macrobid examined the patient is afebrile    Is nontender no CVA tenderness    The patient has appointment to see Dr. Rodriguez Villegas next week for stent removal I would recommend when she is discharged to change from ceftriaxone to oral cefdinir take this for approximately 7 total days no interventions necessary at this moment

## 2022-12-09 NOTE — THERAPY EVALUATION
Patient Name: Krys Mayes  : 1950    MRN: [id number]                              Today's Date: 2022       Admit Date: 2022    Visit Dx:     ICD-10-CM ICD-9-CM   1. Generalized weakness  R53.1 780.79   2. Urinary tract infection in elderly patient  N39.0 599.0     Patient Active Problem List   Diagnosis   • Elevated alanine aminotransferase (ALT) level   • Mixed anxiety depressive disorder   • Gastroesophageal reflux disease   • Essential hypertension   • Insomnia   • Microalbuminuria   • Obesity (BMI 30-39.9)   • Proteinuria   • Vitamin D deficiency   • Postmenopause   • History of fracture   • Reflux esophagitis   • Calculus of kidney   • PAF (paroxysmal atrial fibrillation) (HCC)   • COLTON (generalized anxiety disorder)   • Iron deficiency anemia   • Prediabetes   • Urine frequency   • Medicare annual wellness visit, subsequent   • Need for pneumococcal vaccination   • High risk medication use   • Dermoid cyst of scalp   • Hyperglycemia   • Grief   • Mixed hyperlipidemia   • Long-term use of high-risk medication   • Frequent UTI   • Greater trochanteric bursitis of right hip   • Chondromalacia, right knee   • Post-traumatic arthritis of left ankle   • Generalized weakness     Past Medical History:   Diagnosis Date   • A-fib ([facility])    • Allergic    • Arthritis    • Gastroesophageal reflux disease 2016   • Hyperlipidemia    • Hypertension    • Kidney stone     recurrent, calcium oxalate   • Menopausal disorder     She went through menopause in    • Obesity    • PAF (paroxysmal atrial fibrillation) ([facility])     in the setting of urosepsis, 2016   • Reflux esophagitis 2016   • Sebaceous cyst of labia 2017   • Sepsis due to urinary tract infection ([facility])    • Sigmoid diverticulitis 2017   • Urinary tract infection    • Vitamin D deficiency 2016     Past Surgical History:   Procedure Laterality Date   • ANKLE SURGERY      Ankle Repair / Description: ORif the  left ankle in July 2010. Secondary to fall   • BREAST BIOPSY Right     benign   • BREAST SURGERY      biopsy    • CHOLECYSTECTOMY     • COLONOSCOPY  2014    Done 2004 normal recheck in 10 years. Repeated February 2014 colonoscopy was normal and to be repeated in 10 years.   • CYSTOSCOPY BLADDER STONE LITHOTRIPSY     • HEMORRHOIDECTOMY     • NEPHROLITHOTOMY Left 1/9/2017    Procedure: NEPHROLITHOTOMY PERCUTANEOUS SECOND LOOK WITH STENT PLACEMENT AND LASER LITHOTRIPSY;  Surgeon: Mati Villegas MD;  Location: [facility] OR;  Service:    • OTHER SURGICAL HISTORY      Tubal Ligation   • PERCUTANEOUS NEPHROSTOLITHOTOMY Left 12/2016   • TUBAL ABDOMINAL LIGATION     • URETEROSCOPY LASER LITHOTRIPSY WITH STENT INSERTION Left 12/6/2022    Procedure: LEFT URETEROSCOPY WITH LASER LITHOTRIPSY, STONE BASKET EXTRACTION, STENT PLACEMENT;  Surgeon: Mati Villegas MD;  Location: [facility] OR;  Service: Urology;  Laterality: Left;      General Information     Row Name 12/09/22 1310          OT Time and Intention    Document Type evaluation  -SD     Mode of Treatment occupational therapy  -SD     Row Name 12/09/22 1317          General Information    Patient Profile Reviewed yes  Pt with recent lithotripsy (12-6-22) and c/o weakness since the procedure. Pt fell at home and was unable to get up on her own. She was brought to ER by EMS.  -SD     Prior Level of Function independent:;ADL's;all household mobility  -SD     Existing Precautions/Restrictions fall  -SD     Barriers to Rehab none identified  -SD     Row Name 12/09/22 1310          Occupational Profile    Reason for Services/Referral (Occupational Profile) decreased adl function  -SD     Successful Occupations (Occupational Profile) Pt I with adl's and HH mobility prior to recent sx  -SD     Environmental Supports and Barriers (Occupational Profile) lives with spouse  -SD     Patient Goals (Occupational Profile) go home  -SD     Row Name 12/09/22 1318          Living  Environment    People in Home spouse  -SD     Row Name 12/09/22 1310          Home Main Entrance    Number of Stairs, Main Entrance two  -SD     Stair Railings, Main Entrance --  1 hand rail  -SD     Row Name 12/09/22 1310          Stairs Within Home, Primary    Stairs, Within Home, Primary Pt sleeps in a bed room in the basement.  -SD     Row Name 12/09/22 1310          Cognition    Orientation Status (Cognition) oriented x 3  -SD           User Key  (r) = Recorded By, (t) = Taken By, (c) = Cosigned By    Initials Name Provider Type    SD Mike Quezada OTR Occupational Therapist                 Mobility/ADL's     Row Name 12/09/22 1318          Bed Mobility    Bed Mobility supine-sit;sit-supine  -SD     Supine-Sit [place] (Bed Mobility) supervision  -SD     Sit-Supine [place] (Bed Mobility) contact guard;minimum assist (75% patient effort)  -SD     Assistive Device (Bed Mobility) bed rails;lift device;head of bed elevated  -SD     Comment, (Bed Mobility) Pt states she sleeps in a high bed at home. Education provided regarding compensatory strateiges and environmental adaptations that can increase her functional independence.  -SD     Row Name 12/09/22 1318          Sit-Stand Transfer    Sit-Stand [place] (Transfers) supervision  -SD     Assistive Device (Sit-Stand Transfers) walker, front-wheeled  -[facility] Name 12/09/22 1318          Functional Mobility    Functional Mobility- Ind. Level supervision required  -SD     Functional Mobility- Device walker, front-wheeled  -SD     Functional Mobility-Distance (Feet) 100  -SD     Row Name 12/09/22 1318          Activities of Daily Living    BADL Assessment/Intervention lower body dressing  -[facility] Name 12/09/22 1318          Lower Body Dressing Assessment/Training    [place] Level (Lower Body Dressing) lower body dressing skills;minimum assist (75% patient effort)  -SD     Comment, (Lower Body Dressing) pt reports pain since recent surgery.  Education regarding compensatory strategies for LB adl management  -SD           User Key  (r) = Recorded By, (t) = Taken By, (c) = Cosigned By    Initials Name Provider Type    Mike Mandujano OTR Occupational Therapist               Obj/Interventions     Row Name 12/09/22 1321          Range of Motion Comprehensive    Comment, General Range of Motion BUE rom wfl  -SD     Row Name 12/09/22 1321          Strength Comprehensive (MMT)    Comment, General Manual Muscle Testing (MMT) Assessment BUE strength wfl  -SD     Row Name 12/09/22 1321          Balance    Comment, Balance I with sitting balance, supervision/SBA for standing balance  -SD           User Key  (r) = Recorded By, (t) = Taken By, (c) = Cosigned By    Initials Name Provider Type    Mike Mandujano OTR Occupational Therapist               Goals/Plan     Row Name 12/09/22 1331          Transfer Goal 1 (OT)    Activity/Assistive Device (Transfer Goal 1, OT) commode, 3-in-1  -SD     [place] Level/Cues Needed (Transfer Goal 1, OT) supervision required  -SD     Time Frame (Transfer Goal 1, OT) by discharge  -SD     Progress/Outcome (Transfer Goal 1, OT) new goal  -SD     Row Name 12/09/22 1331          Dressing Goal 1 (OT)    Activity/Device (Dressing Goal 1, OT) lower body dressing  -SD     [place]/Cues Needed (Dressing Goal 1, OT) modified independence  -SD     Strategies/Barriers (Dressing Goal 1, OT) education with compensatory strategies and use of AE  -SD     Progress/Outcome (Dressing Goal 1, OT) new goal  -SD     Row Name 12/09/22 1331          Therapy Assessment/Plan (OT)    Planned Therapy Interventions (OT) patient/caregiver education/training;transfer/mobility retraining;BADL retraining;adaptive equipment training  -SD           User Key  (r) = Recorded By, (t) = Taken By, (c) = Cosigned By    Initials Name Provider Type    Mike Mandujano OTR Occupational Therapist               Clinical Impression     Row Name  "12/09/22 1323          Pain Assessment    Pretreatment Pain Rating 7/10  -SD     Posttreatment Pain Rating 7/10  -SD     Pain Location - --  pt states her pain is \"inside\" from the lithotripsy sx  -SD     Pain Intervention(s) Repositioned;Ambulation/increased activity  nurse contacted to provide medication for pt  -SD     Row Name 12/09/22 1323          Plan of Care Review    Plan of Care Reviewed With patient;spouse  -SD     Outcome Evaluation Occupational therapy evaluation completed. Pt with recent lithotripsy procedure (12-6-22). She reports pain and weakness since the procedure. Pt stayed in bed most of time after returning home from the procedure, and she fell when going to the bathroom.Pt does report feeling better since admittance to the hospital.  Pt was able to manage sit to stand transfers with SBA from recliner and from EOB. She managed mobility (x 125 feet) with SBA using a walker for support. Pt required min assist for bed mobility. Education provided regarding compensatory strategies and environmental modifications at home to increase her independence/safety with bed mobility. Rec HH services upon discharge. Rec OT services to address safety/indepence with basic adl tasks and generalized strengthening.  -SD     Row Name 12/09/22 1323          Therapy Assessment/Plan (OT)    Patient/Family Therapy Goal Statement (OT) go home  -SD     Rehab Potential (OT) good, to achieve stated therapy goals  -SD     Criteria for Skilled Therapeutic Interventions Met (OT) skilled treatment is necessary;yes  -SD     Therapy Frequency (OT) other (see comments)  1-2 visits for education with compensatory strategies for daily tasks  -SD     Row Name 12/09/22 1323          Therapy Plan Review/Discharge Plan (OT)    Equipment Needs Upon Discharge (OT) walker, rolling  will assess neede for bathing/dressing AE  -SD     Anticipated Discharge Disposition (OT) home with assist;home with home health  -SD     Row Name 12/09/22 " 1323          Positioning and Restraints    Pre-Treatment Position in bed  -SD     Post Treatment Position chair  -SD     In Chair reclined;call light within reach;encouraged to call for assist;with family/caregiver;notified nsg  -SD           User Key  (r) = Recorded By, (t) = Taken By, (c) = Cosigned By    Initials Name Provider Type    Mike Mandujano, OTR Occupational Therapist               Outcome Measures     Row Name 12/09/22 1332          How much help from another is currently needed...    Putting on and taking off regular lower body clothing? 3  -SD     Bathing (including washing, rinsing, and drying) 3  -SD     Toileting (which includes using toilet bed pan or urinal) 3  -SD     Putting on and taking off regular upper body clothing 4  -SD     Taking care of personal grooming (such as brushing teeth) 4  -SD     Eating meals 4  -SD     AM-PAC 6 Clicks Score (OT) 21  -SD     Row Name 12/09/22 1030          How much help from another person do you currently need...    Turning from your back to your side while in flat bed without using bedrails? 3  -JW     Moving from lying on back to sitting on the side of a flat bed without bedrails? 3  -JW     Moving to and from a bed to a chair (including a wheelchair)? 3  -JW     Standing up from a chair using your arms (e.g., wheelchair, bedside chair)? 3  -JW     Climbing 3-5 steps with a railing? 3  -JW     To walk in hospital room? 3  -JW     AM-PAC 6 Clicks Score (PT) 18  -JW     Highest level of mobility 6 --> Walked 10 steps or more  -     Row Name 12/09/22 1332 12/09/22 1030       Functional Assessment    Outcome Measure Options AM-PAC 6 Clicks Daily Activity (OT)  -SD AM-PAC 6 Clicks Basic Mobility (PT)  -          User Key  (r) = Recorded By, (t) = Taken By, (c) = Cosigned By    Initials Name Provider Type    Mike Mandujano, OTR Occupational Therapist    Stephanie Mariscal, PT Physical Therapist                Occupational Therapy Education      Title: PT OT SLP Therapies (In Progress)     Topic: Occupational Therapy (Done)     Point: ADL training (Done)     Description:   Instruct learner(s) on proper safety adaptation and remediation techniques during self care or transfers.   Instruct in proper use of assistive devices.              Learning Progress Summary           Patient Acceptance, E, VU by SD at 12/9/2022 1333    Comment: Education with OT services, benefits of activity, compensatory strategies/environmental adaptations for bed mobility at home and safety with bed mobility/transfers/mobility.   Significant Other Acceptance, E, VU by SD at 12/9/2022 1333    Comment: Education with OT services, benefits of activity, compensatory strategies/environmental adaptations for bed mobility at home and safety with bed mobility/transfers/mobility.                               User Key     Initials Effective Dates Name Provider Type Discipline    SD 06/16/21 -  Mike Quezada OTR Occupational Therapist OT              OT Recommendation and Plan  Planned Therapy Interventions (OT): patient/caregiver education/training, transfer/mobility retraining, BADL retraining, adaptive equipment training  Therapy Frequency (OT): other (see comments) (1-2 visits for education with compensatory strategies for daily tasks)  Plan of Care Review  Plan of Care Reviewed With: patient, spouse  Outcome Evaluation: Occupational therapy evaluation completed. Pt with recent lithotripsy procedure (12-6-22). She reports pain and weakness since the procedure. Pt stayed in bed most of time after returning home from the procedure, and she fell when going to the bathroom.Pt does report feeling better since admittance to the hospital.  Pt was able to manage sit to stand transfers with SBA from recliner and from EOB. She managed mobility (x 125 feet) with SBA using a walker for support. Pt required min assist for bed mobility. Education provided regarding compensatory strategies and  environmental modifications at home to increase her independence/safety with bed mobility. Rec HH services upon discharge. Rec OT services to address safety/indepence with basic adl tasks and generalized strengthening.     Time Calculation:    Time Calculation- OT     Row Name 12/09/22 1334             Time Calculation- OT    OT Start Time 1030  -SD      OT Stop Time 1059  -SD      OT Time Calculation (min) 29 min  -SD         Untimed Charges    OT Eval/Re-eval Minutes 29  -SD         Total Minutes    Untimed Charges Total Minutes 29  -SD       Total Minutes 29  -SD            User Key  (r) = Recorded By, (t) = Taken By, (c) = Cosigned By    Initials Name Provider Type    Mike Mandujano, OTSALIMA Occupational Therapist              Therapy Charges for Today     Code Description Service Date Service Provider Modifiers Qty    [id number] HC OT EVAL LOW COMPLEXITY 2 12/9/2022 Mike Quezada OTR GO 1               JOLEEN Flowers  12/9/2022

## 2022-12-09 NOTE — THERAPY EVALUATION
Patient Name: Krys Mayes  : 1950    MRN: [id number]                              Today's Date: 2022       Admit Date: 2022    Visit Dx:     ICD-10-CM ICD-9-CM   1. Generalized weakness  R53.1 780.79   2. Urinary tract infection in elderly patient  N39.0 599.0     Patient Active Problem List   Diagnosis   • Elevated alanine aminotransferase (ALT) level   • Mixed anxiety depressive disorder   • Gastroesophageal reflux disease   • Essential hypertension   • Insomnia   • Microalbuminuria   • Obesity (BMI 30-39.9)   • Proteinuria   • Vitamin D deficiency   • Postmenopause   • History of fracture   • Reflux esophagitis   • Calculus of kidney   • PAF (paroxysmal atrial fibrillation) (HCC)   • COLTON (generalized anxiety disorder)   • Iron deficiency anemia   • Prediabetes   • Urine frequency   • Medicare annual wellness visit, subsequent   • Need for pneumococcal vaccination   • High risk medication use   • Dermoid cyst of scalp   • Hyperglycemia   • Grief   • Mixed hyperlipidemia   • Long-term use of high-risk medication   • Frequent UTI   • Greater trochanteric bursitis of right hip   • Chondromalacia, right knee   • Post-traumatic arthritis of left ankle   • Generalized weakness     Past Medical History:   Diagnosis Date   • A-fib ([facility])    • Allergic    • Arthritis    • Gastroesophageal reflux disease 2016   • Hyperlipidemia    • Hypertension    • Kidney stone     recurrent, calcium oxalate   • Menopausal disorder     She went through menopause in    • Obesity    • PAF (paroxysmal atrial fibrillation) ([facility])     in the setting of urosepsis, 2016   • Reflux esophagitis 2016   • Sebaceous cyst of labia 2017   • Sepsis due to urinary tract infection ([facility])    • Sigmoid diverticulitis 2017   • Urinary tract infection    • Vitamin D deficiency 2016     Past Surgical History:   Procedure Laterality Date   • ANKLE SURGERY      Ankle Repair / Description: ORif the  left ankle in July 2010. Secondary to fall   • BREAST BIOPSY Right     benign   • BREAST SURGERY      biopsy    • CHOLECYSTECTOMY     • COLONOSCOPY  2014    Done 2004 normal recheck in 10 years. Repeated February 2014 colonoscopy was normal and to be repeated in 10 years.   • CYSTOSCOPY BLADDER STONE LITHOTRIPSY     • HEMORRHOIDECTOMY     • NEPHROLITHOTOMY Left 1/9/2017    Procedure: NEPHROLITHOTOMY PERCUTANEOUS SECOND LOOK WITH STENT PLACEMENT AND LASER LITHOTRIPSY;  Surgeon: Mati Villegas MD;  Location: [facility] OR;  Service:    • OTHER SURGICAL HISTORY      Tubal Ligation   • PERCUTANEOUS NEPHROSTOLITHOTOMY Left 12/2016   • TUBAL ABDOMINAL LIGATION     • URETEROSCOPY LASER LITHOTRIPSY WITH STENT INSERTION Left 12/6/2022    Procedure: LEFT URETEROSCOPY WITH LASER LITHOTRIPSY, STONE BASKET EXTRACTION, STENT PLACEMENT;  Surgeon: Mati Villegas MD;  Location: [facility] OR;  Service: Urology;  Laterality: Left;      General Information     Row Name 12/09/22 1030          Physical Therapy Time and Intention    Document Type discharge evaluation/summary  -     Mode of Treatment physical therapy  -     Row Name 12/09/22 1030          General Information    Patient Profile Reviewed yes  -JW     Prior Level of Function independent:;all household mobility;community mobility  pt reports she is usually independent without use of device, reports increased weakness this week following lithrotripsy  -JW     Existing Precautions/Restrictions fall  -JW     Barriers to Rehab none identified  -     Row Name 12/09/22 1030          Living Environment    People in Home spouse  -     Row Name 12/09/22 1030          Home Main Entrance    Number of Stairs, Main Entrance two  -JW     Stair Railings, Main Entrance other (see comments)  1 handrail  -     Row Name 12/09/22 1030          Stairs Within Home, Primary    Stairs, Within Home, Primary pt reports she sleeps in a room in the basement while spouse sleeps in a  room on the main level, reports one flight of steps between floors  -     Row Name 12/09/22 1030          Cognition    Orientation Status (Cognition) oriented x 3  -           User Key  (r) = Recorded By, (t) = Taken By, (c) = Cosigned By    Initials Name Provider Type    Stephanie Mariscal, PT Physical Therapist               Mobility     Row Name 12/09/22 1030          Bed Mobility    Bed Mobility supine-sit;sit-supine  -     Supine-Sit Midnight (Bed Mobility) supervision  -     Sit-Supine Midnight (Bed Mobility) contact guard;minimum assist (75% patient effort)  -     Assistive Device (Bed Mobility) bed rails;lift device;head of bed elevated  -     Comment, (Bed Mobility) pt reports sleeping in a high bed at home, requires min assist for sit to supine when attempting in an elevated bed surface.  requires CGA when bed is in lower position.  patient and spouse report all beds in their home are elevated.  pt reports she feels her bed mobility is limited by pain at this time.  Education provided on strategies (using gait belt to assist LEs, using a stool/surface for feet once seated on EOB, using recliner to sleep in until patient better able to perform bed mobility)  -     Row Name 12/09/22 1030          Sit-Stand Transfer    Sit-Stand Midnight (Transfers) supervision  -     Assistive Device (Sit-Stand Transfers) walker, front-wheeled  -     Row Name 12/09/22 1030          Gait/Stairs (Locomotion)    Midnight Level (Gait) standby assist  -     Assistive Device (Gait) walker, front-wheeled  -     Distance in Feet (Gait) 125  -     Deviations/Abnormal Patterns (Gait) viry decreased  -     Bilateral Gait Deviations forward flexed posture  -     Comment, (Gait/Stairs) pt requires verbal cues for upright posture and proper distance from walker  -           User Key  (r) = Recorded By, (t) = Taken By, (c) = Cosigned By    Initials Name Provider Type    Stephanie Mariscal,  PT Physical Therapist               Obj/Interventions     Row Name 12/09/22 1030          Range of Motion Comprehensive    Comment, General Range of Motion functional within task  -     Row Name 12/09/22 1030          Strength Comprehensive (MMT)    Comment, General Manual Muscle Testing (MMT) Assessment functional within task  -           User Key  (r) = Recorded By, (t) = Taken By, (c) = Cosigned By    Initials Name Provider Type    Stephanie Mariscal, PT Physical Therapist               Goals/Plan     Row Name 12/09/22 1030          Bed Mobility Goal 1 (PT)    Activity/Assistive Device (Bed Mobility Goal 1, PT) bed mobility activities, all  -JW     [place] Level/Cues Needed (Bed Mobility Goal 1, PT) supervision required  -JW     Time Frame (Bed Mobility Goal 1, PT) 1 day  -JW     Progress/Outcomes (Bed Mobility Goal 1, PT) new goal  -[facility] Name 12/09/22 1030          Transfer Goal 1 (PT)    Activity/Assistive Device (Transfer Goal 1, PT) transfers, all  -JW     [place] Level/Cues Needed (Transfer Goal 1, PT) modified independence  -JW     Time Frame (Transfer Goal 1, PT) 1 day  -JW     Progress/Outcome (Transfer Goal 1, PT) new goal  -[facility] Name 12/09/22 1030          Gait Training Goal 1 (PT)    Activity/Assistive Device (Gait Training Goal 1, PT) gait (walking locomotion);assistive device use  -JW     [place] Level (Gait Training Goal 1, PT) supervision required  -JW     Distance (Gait Training Goal 1, PT) 150  -JW     Time Frame (Gait Training Goal 1, PT) 1 day  -JW     Progress/Outcome (Gait Training Goal 1, PT) new goal  -[facility] Name 12/09/22 1030          Therapy Assessment/Plan (PT)    Planned Therapy Interventions (PT) balance training;bed mobility training;gait training;home exercise program;strengthening;transfer training;patient/family education  -           User Key  (r) = Recorded By, (t) = Taken By, (c) = Cosigned By    Initials Name Provider Type    JENNIFER Nelson  Stephanie, PT Physical Therapist               Clinical Impression     Row Name 12/09/22 1030          Pain    Pretreatment Pain Rating --  did not rate pain  -     Posttreatment Pain Rating 7/10  following bed mobility activities  -     Pain Location - other (see comments)  generalized  -     Pain Intervention(s) Repositioned;Medication (See MAR)  -     Row Name 12/09/22 1030          Plan of Care Review    Plan of Care Reviewed With patient;spouse  -     Outcome Evaluation Physical therapy evaluation complete.  Patient oriented x3, performs sit to stand with SBA.  Patient performs gait with rolling walker x125 feet, SBA.  Patient and spouse express concern regarding patient's ability to perform bed mobility due to current environmental set up at home.  Patient performs supine to sit with SBA and sit to supine with CGA/min assist (depending on bed surface height).  Discussed multiple strategies to assist with independence with sit to supine transfer as well as sleeping in recliner lift chair until patient can consistently perform bed mobility independently.  Patient and spouse verbalized understanding of strategies.  Recommend rolling walker and home health PT at discharge.  Will follow up 1 additional visit to ensure consistency with mobility if patient remains in hospital overnight.  -     Row Name 12/09/22 1030          Therapy Assessment/Plan (PT)    Patient/Family Therapy Goals Statement (PT) go home  -     Rehab Potential (PT) good, to achieve stated therapy goals  -     Criteria for Skilled Interventions Met (PT) yes;meets criteria  -     Therapy Frequency (PT) other (see comments)  1 day  -     Predicted Duration of Therapy Intervention (PT) 1 day  -     Row Name 12/09/22 1030          Positioning and Restraints    Pre-Treatment Position sitting in chair/recliner  -     Post Treatment Position chair  -JW     In Chair reclined;call light within reach;encouraged to call for assist;with  family/caregiver;with nsg  -           User Key  (r) = Recorded By, (t) = Taken By, (c) = Cosigned By    Initials Name Provider Type    Stephanie Mariscal PT Physical Therapist               Outcome Measures     Row Name 12/09/22 1030          How much help from another person do you currently need...    Turning from your back to your side while in flat bed without using bedrails? 3  -JW     Moving from lying on back to sitting on the side of a flat bed without bedrails? 3  -JW     Moving to and from a bed to a chair (including a wheelchair)? 3  -JW     Standing up from a chair using your arms (e.g., wheelchair, bedside chair)? 3  -JW     Climbing 3-5 steps with a railing? 3  -JW     To walk in hospital room? 3  -JW     AM-PAC 6 Clicks Score (PT) 18  -JW     Highest level of mobility 6 --> Walked 10 steps or more  -     Row Name 12/09/22 1030          Functional Assessment    Outcome Measure Options AM-PAC 6 Clicks Basic Mobility (PT)  -           User Key  (r) = Recorded By, (t) = Taken By, (c) = Cosigned By    Initials Name Provider Type    Stephanie Mariscal PT Physical Therapist                             Physical Therapy Education     Title: PT OT SLP Therapies (In Progress)     Topic: Physical Therapy (In Progress)     Point: Mobility training (Done)     Learning Progress Summary           Patient Acceptance, E,TB, VU by  at 12/9/2022 1124                   Point: Home exercise program (Not Started)     Learner Progress:  Not documented in this visit.                      User Key     Initials Effective Dates Name Provider Type Discipline     06/16/21 -  Stephanie Nelson PT Physical Therapist PT              PT Recommendation and Plan  Planned Therapy Interventions (PT): balance training, bed mobility training, gait training, home exercise program, strengthening, transfer training, patient/family education  Plan of Care Reviewed With: patient, spouse  Outcome Evaluation: Physical therapy  evaluation complete.  Patient oriented x3, performs sit to stand with SBA.  Patient performs gait with rolling walker x125 feet, SBA.  Patient and spouse express concern regarding patient's ability to perform bed mobility due to current environmental set up at home.  Patient performs supine to sit with SBA and sit to supine with CGA/min assist (depending on bed surface height).  Discussed multiple strategies to assist with independence with sit to supine transfer as well as sleeping in recliner lift chair until patient can consistently perform bed mobility independently.  Patient and spouse verbalized understanding of strategies.  Recommend rolling walker and home health PT at discharge.  Will follow up 1 additional visit to ensure consistency with mobility if patient remains in hospital overnight.     Time Calculation:    PT Charges     Row Name 12/09/22 1125             Time Calculation    Start Time 1030  -JENNIFER      Stop Time 1059  -JENNIFER      Time Calculation (min) 29 min  -JENNIFER      PT Received On 12/09/22  -JENNIFER      PT - Next Appointment 12/10/22  -JENNIFER            User Key  (r) = Recorded By, (t) = Taken By, (c) = Cosigned By    Initials Name Provider Type    Stephanie Mariscal, KOSTA Physical Therapist              Therapy Charges for Today     Code Description Service Date Service Provider Modifiers Qty    [id number]  PT EVAL LOW COMPLEXITY 2 12/9/2022 Stephanie Nelson, PT GP 1          PT G-Codes  Outcome Measure Options: AM-PAC 6 Clicks Basic Mobility (PT)  AM-PAC 6 Clicks Score (PT): 18  PT Discharge Summary  Anticipated Discharge Disposition (PT): home with home health    Stephanie Nelson, KOSTA  12/9/2022

## 2022-12-09 NOTE — ED NOTES
"Pt stated \"I am only here to make him happy\" referring to her  sitting in the chair at this end of the bed.  "

## 2022-12-09 NOTE — H&P
[facility]      Patient Name: Krys Mayes  : 1950  MRN: [id number]  Primary Care Physician:  Ashleigh Hernandez PA-C  Date of admission: 2022      Subjective      Chief Complaint: Generalized weakness.    History of Present Illness: Krys Mayes is a 72 y.o. female who [facility] complaint of generalized weakness for last day or 2, patient says she underwent lithotripsy for kidney stone on the left side, since then she has started noticing some weakness.  Patient was prescribed the p.o. antibiotics Macrobid.  Patient underwent work-up revealing elevated WBC count around 19,000, UA revealed pyuria.  Patient noted slightly dehydrated.  Patient receiving IV fluids and IV antibiotics.  Following this asked with the patient for the further care.      Review of Systems   All other systems reviewed and are negative.       Personal History     Past Medical History:   Diagnosis Date   • Allergic    • Arthritis    • Gastroesophageal reflux disease 2016   • Hyperlipidemia    • Hypertension    • Kidney stone     recurrent, calcium oxalate   • Menopausal disorder     She went through menopause in    • Obesity    • PAF (paroxysmal atrial fibrillation) (HCC)     in the setting of urosepsis, 2016   • Reflux esophagitis 2016   • Sebaceous cyst of labia 2017   • Sepsis due to urinary tract infection (HCC)    • Sigmoid diverticulitis 2017   • Urinary tract infection    • Vitamin D deficiency 2016       Past Surgical History:   Procedure Laterality Date   • ANKLE SURGERY      Ankle Repair / Description: ORif the left ankle in 2010. Secondary to fall   • BREAST BIOPSY Right     benign   • BREAST SURGERY      biopsy    • CHOLECYSTECTOMY     • COLONOSCOPY      Done  normal recheck in 10 years. Repeated 2014 colonoscopy was normal and to be repeated in 10 years.   • CYSTOSCOPY  BLADDER STONE LITHOTRIPSY     • HEMORRHOIDECTOMY     • NEPHROLITHOTOMY Left 1/9/2017    Procedure: NEPHROLITHOTOMY PERCUTANEOUS SECOND LOOK WITH STENT PLACEMENT AND LASER LITHOTRIPSY;  Surgeon: Mati Villegas MD;  Location: [facility];  Service:    • OTHER SURGICAL HISTORY      Tubal Ligation   • PERCUTANEOUS NEPHROSTOLITHOTOMY Left 12/2016   • TUBAL ABDOMINAL LIGATION     • URETEROSCOPY LASER LITHOTRIPSY WITH STENT INSERTION Left 12/6/2022    Procedure: LEFT URETEROSCOPY WITH LASER LITHOTRIPSY, STONE BASKET EXTRACTION, STENT PLACEMENT;  Surgeon: Mati Villegas MD;  Location: [facility] OR;  Service: Urology;  Laterality: Left;       Family History: family history includes COPD in her mother; Heart attack in her father and son; Kidney disease in her maternal grandmother. Otherwise pertinent FHx was reviewed and not pertinent to current issue.    Social History:  reports that she has never smoked. She has never used smokeless tobacco. She reports current alcohol use. She reports that she does not use drugs.    Home Medications:  Prior to Admission Medications     Prescriptions Last Dose Informant Patient Reported? Taking?    acetaminophen (TYLENOL) 650 MG 8 hr tablet  Self Yes No    Take 1 tablet by mouth every 6 (six) hours as needed. for pain    ALPRAZolam (XANAX) 1 MG tablet   No No    TAKE 1/2 TO 1 (ONE-HALF TO ONE) TABLET BY MOUTH TWICE DAILY FOR ANXIETY    losartan (COZAAR) 100 MG tablet   No No    Take 1 tablet by mouth Daily.    meclizine (ANTIVERT) 25 MG tablet   No No    Take 1 tablet by mouth Every 6 (Six) Hours As Needed for dizziness or Nausea.    Metoprolol Tartrate 75 MG tablet   No No    Take 1 tablet by mouth twice daily    Multiple Vitamin tablet  Spouse/Significant Other Yes No    Take 1 tablet by mouth Daily.    nitrofurantoin, macrocrystal-monohydrate, (Macrobid) 100 MG capsule   No No    Take 1 capsule by mouth 2 (Two) Times a Day for 7 days.    omeprazole OTC (PriLOSEC OTC) 20 MG  EC tablet  Self Yes No    Take 20 mg by mouth 2 (two) times a day.    phenazopyridine (Pyridium) 200 MG tablet   No No    Take 1 tablet by mouth 3 (Three) Times a Day As Needed for Bladder Spasms.    Probiotic Product (PROBIOTIC ADVANCED PO)  Spouse/Significant Other Yes No    Take 1 tablet by mouth Daily.    sertraline (ZOLOFT) 100 MG tablet   No No    Take 1 tablet by mouth once daily    simvastatin (ZOCOR) 80 MG tablet   No No    Take 1 tablet by mouth Every Evening.    traMADol (Ultram) 50 MG tablet   No No    Take 1 tablet by mouth Every 6 (Six) Hours As Needed for Moderate Pain.    triamcinolone (KENALOG) 0.1 % cream   Yes No    APPLY TO AFFECTED AREA TWICE A DAY TO RASH FOR UP TO 2 WEEKS    Xarelto 15 MG tablet   No No    Take 1 tablet by mouth once daily            Allergies:  Allergies   Allergen Reactions   • Morphine And Related GI Intolerance   • Ciprofloxacin-Ciproflox Hcl Er Rash   • Flomax [Tamsulosin Hcl] Rash   • Hydrocodone Rash   • Latex Rash   • Lortab [Hydrocodone-Acetaminophen] Rash   • Penicillins Rash   • Phenergan [Promethazine Hcl] GI Intolerance   • Sulfa Antibiotics Rash   • Amoxicillin Rash   • Tamsulosin Rash       Objective      Vitals:   Temp:  [98.7 °F (37.1 °C)] 98.7 °F (37.1 °C)  Heart Rate:  [] 103  Resp:  [20-23] 20  BP: (111-125)/(56-76) 114/76    Physical Exam  Vitals and nursing note reviewed.   Constitutional:       General: She is not in acute distress.     Appearance: Normal appearance. She is well-developed. She is not ill-appearing, toxic-appearing or diaphoretic.   HENT:      Head: Normocephalic and atraumatic.      Right Ear: Ear canal and external ear normal.      Left Ear: Ear canal and external ear normal.      Nose: Nose normal. No congestion or rhinorrhea.      Mouth/Throat:      Mouth: Mucous membranes are moist.      Pharynx: No oropharyngeal exudate.   Eyes:      General: No scleral icterus.        Right eye: No discharge.         Left eye: No discharge.       Extraocular Movements: Extraocular movements intact.      Conjunctiva/sclera: Conjunctivae normal.      Pupils: Pupils are equal, round, and reactive to light.   Neck:      Thyroid: No thyromegaly.      Vascular: No carotid bruit or JVD.      Trachea: No tracheal deviation.   Cardiovascular:      Rate and Rhythm: Normal rate and regular rhythm.      Pulses: Normal pulses.      Heart sounds: Normal heart sounds. No murmur heard.    No friction rub. No gallop.   Pulmonary:      Effort: Pulmonary effort is normal. No respiratory distress.      Breath sounds: Normal breath sounds. No stridor. No wheezing, rhonchi or rales.   Chest:      Chest wall: No tenderness.   Abdominal:      General: Bowel sounds are normal. There is no distension.      Palpations: Abdomen is soft. There is no mass.      Tenderness: There is no abdominal tenderness. There is no guarding or rebound.      Hernia: No hernia is present.   Musculoskeletal:         General: No swelling, tenderness, deformity or signs of injury. Normal range of motion.      Cervical back: Normal range of motion and neck supple. No rigidity. No muscular tenderness.      Right lower leg: No edema.      Left lower leg: No edema.   Lymphadenopathy:      Cervical: No cervical adenopathy.   Skin:     General: Skin is warm and dry.      Coloration: Skin is not jaundiced or pale.      Findings: No bruising, erythema or rash.   Neurological:      General: No focal deficit present.      Mental Status: She is alert and oriented to person, place, and time. Mental status is at baseline.      Cranial Nerves: No cranial nerve deficit.      Sensory: No sensory deficit.      Motor: No weakness or abnormal muscle tone.      Coordination: Coordination normal.   Psychiatric:         Mood and Affect: Mood normal.         Behavior: Behavior normal.         Thought Content: Thought content normal.         Judgment: Judgment normal.          Result Review    Result Review:  I have  personally reviewed the results from the time of this admission to 12/9/2022 00:26 EST and agree with these findings:  [x]  Laboratory  [x]  Microbiology  [x]  Radiology  []  EKG/Telemetry   []  Cardiology/Vascular   []  Pathology  []  Old records  []  Other:  Most notable findings include:       Assessment & Plan        Active Hospital Problems:  Active Hospital Problems    Diagnosis    • **Generalized weakness      Plan:      Acute UTI, IV fluids, IV Rocephin, follow urine cultures.    Generalized weakness secondary to above, PT OT evaluate and treat    Recent lithotripsy for kidney stone, will consult urology service    Hypertension, will resume home regimen once medication reconciled    Dyslipidemia, noted on statin, monitor LFTs as needed    History of atrial fibrillation, patient noted on Xarelto and metoprolol, monitor heart rate.    DVT prophylaxis, already on Lovenox.      DVT prophylaxis:  No DVT prophylaxis order currently exists.    CODE STATUS:    Level Of Support Discussed With: Patient  Code Status (Patient has no pulse and is not breathing): CPR (Attempt to Resuscitate)  Medical Interventions (Patient has pulse or is breathing): Full Support    Admission Status:  I believe this patient meets observation status.    I discussed the patient's findings and my recommendations with patient.    This patient has been examined wearing appropriate Personal Protective Equipment and discussed with hospital infection control department. 12/09/22      Signature:

## 2022-12-09 NOTE — DISCHARGE PLACEMENT REQUEST
"Krys Mciknnon (72 y.o. Female)     Date of Birth   1950    Social Security Number       Address   [address] [place] [address]    Home Phone   [phone]    MRN   [id number]       Church   [employer]    Marital Status                               Admission Date   12/8/22    Admission Type   Emergency    Admitting Provider   Olga Inman MD    Attending Provider   Olga Inman MD    Department, Room/Bed   [facility] MED SURG, 1407/1       Discharge Date       Discharge Disposition       Discharge Destination                               Attending Provider: Olga Inman MD    Allergies: Morphine And Related, Ciprofloxacin-ciproflox Hcl Er, Flomax [Tamsulosin Hcl], Hydrocodone, Latex, Lortab [Hydrocodone-acetaminophen], Penicillins, Phenergan [Promethazine Hcl], Sulfa Antibiotics, Amoxicillin, Tamsulosin    Isolation: None   Infection: None   Code Status: CPR    Ht: 167.6 cm (66\")   Wt: 92.6 kg (204 lb 3.2 oz)    Admission Cmt: None   Principal Problem: Generalized weakness [R53.1]                 Active Insurance as of 12/8/2022     Primary Coverage     Payor Plan Insurance Group Employer/Plan Group    MEDICARE MEDICARE A & B      Payor Plan Address Payor Plan Phone Number Payor Plan Fax Number Effective Dates    [address] [phone]  1/1/2014 - None Entered    [facility] [address]       Subscriber Name Subscriber Birth Date Member ID       KRYS MCKINNON 1950 [id number]           Secondary Coverage     Payor Plan Insurance Group Employer/Plan Group    [employer] [place] [employer] [place]      Payor Plan Address Payor Plan Phone Number Payor Plan Fax Number Effective Dates    [address] [place] [address] [phone]  1/1/2014 - None Entered    [place] [address]       Subscriber Name Subscriber Birth Date Member ID       KRYS MCKINNON 1950 [id number]                 Emergency Contacts      (Rel.) Home Phone " Work Phone [employer] Phone    Joey Mayes (Spouse) -- -- [phone]    Nisa Jennings (Sister) -- -- [phone]    Migdalia Villegas (Relative) -- -- [phone]

## 2022-12-09 NOTE — PROGRESS NOTES
Patient improving rapidly with treatment.  The Macrobid she was on was of intermediate efficacy to the bacteria from her cultures.  She has been switched to Rocephin and has been given IV fluids and seems to be improving.    However, she has blood cultures here pending that have not yet resulted and her leukocytosis was still very significant this morning.  In addition she has an anemia and would like to see what her hemoglobin looks like in the morning.    PT/OT working with patient.  Will check magnesium and phosphate considering her weakness and continue to monitor her potassium and renal function with a metabolic panel.

## 2022-12-09 NOTE — CASE MANAGEMENT/SOCIAL WORK
"Continued Stay Note   Benita Molina     Patient Name: Krys Mayes  MRN: [id number]  Today's Date: 12/9/2022    Admit Date: 12/8/2022    Plan: plan home /possible HH   Discharge Plan     Row Name 12/09/22 2370       Plan    Plan plan home /possible HH    Plan Comments Referral to [facility] via [employer] and email to [place] liaison. CM will continue to follow to arrange HH & provide RW once ordered.    Row Name 12/09/22 2504       Plan    Plan plan home with / possible HH    Patient/Family in Agreement with Plan yes    Plan Comments Spoke with patient at bedside, she is sitting up in recliner. Face sheet verified. Patient lives at home with her  and is independent of ADLs including driving. She has a standard walker to use if needed. She has not used HH or inpatient rehab previously. SHe has a living will on file. She sees Dr Silver as PCP. She uses [facility] and denies issues obtaining medications. PT has recommended HH and a RW. Pateint is agreeable to HH, Discussed list of providers and patietn has no preference as long as covered by insurance. She would like a RW and states the standard walker at home is \"10-15 years old\". Message to Dr Purdy, will arrange HH and provide RW once orders rec'd. CM # placed on white board, will continue to follow.               Discharge Codes    No documentation.                     Colby Crawford RN    "

## 2022-12-09 NOTE — CASE MANAGEMENT/SOCIAL WORK
"Continued Stay Note  ISMAEL Vargas     Patient Name: Krys Mayes  MRN: [id number]  Today's Date: 12/9/2022    Admit Date: 12/8/2022    Plan: plan home with / possible HH   Discharge Plan     Row Name 12/09/22 0906       Plan    Plan plan home with / possible HH    Patient/Family in Agreement with Plan yes    Plan Comments Spoke with patient at bedside, she is sitting up in recliner. Face sheet verified. Patient lives at home with her  and is independent of ADLs including driving. She has a standard walker to use if needed. She has not used HH or inpatient rehab previously. She has a living will on file. She sees Dr Silver as PCP. She uses [facility] and denies issues obtaining medications. PT has recommended HH and a RW. Patient is agreeable to HH, Discussed list of providers and patient has no preference as long as covered by insurance. She would like a RW and states the standard walker at home is \"10-15 years old\". Message to Dr Purdy, will arrange HH and provide RW once orders rec'd. CM # placed on white board, will continue to follow.               Discharge Codes    No documentation.                     Colby Crawford RN    "

## 2022-12-09 NOTE — PLAN OF CARE
Goal Outcome Evaluation:  Plan of Care Reviewed With: patient, spouse           Outcome Evaluation: Physical therapy evaluation complete.  Patient oriented x3, performs sit to stand with SBA.  Patient performs gait with rolling walker x125 feet, SBA.  Patient and spouse express concern regarding patient's ability to perform bed mobility due to current environmental set up at home.  Patient performs supine to sit with SBA and sit to supine with CGA/min assist (depending on bed surface height).  Discussed multiple strategies to assist with independence with sit to supine transfer as well as sleeping in recliner lift chair until patient can consistently perform bed mobility independently.  Patient and spouse verbalized understanding of strategies.  Recommend rolling walker and home health PT at discharge.  Will follow up 1 additional visit to ensure consistency with mobility if patient remains in hospital overnight.

## 2022-12-09 NOTE — PLAN OF CARE
Problem: Adult Inpatient Plan of Care  Goal: Plan of Care Review  Recent Flowsheet Documentation  Taken 12/9/2022 1421 by Mike Quezada OTR  Plan of Care Reviewed With:   patient   spouse  Outcome Evaluation: OT: Pt reports pain since her lithotripsy with difficulty bending down for lb adl's. Education provided regarding compensatory strategies/use of AE for LB adl management. Pt has access to a long handled reacher at home to use as needed to extend her reach during adl activity. Pt provided with a long handled sponge to assist with lower body bathing.

## 2022-12-09 NOTE — PLAN OF CARE
Goal Outcome Evaluation:  Plan of Care Reviewed With: patient        Progress: no change  Outcome Evaluation: Patient admitted early this am from ER for observation. Vital signs stable. Paitent A&Ox4. Oxygen stable on room air. Patient denied any pain overnight. IVF currently infusing. Urology consult called, stated they will see her on a non-urgent basis, MD aware. Patient appears to be resting at this time.

## 2022-12-09 NOTE — PLAN OF CARE
Problem: Adult Inpatient Plan of Care  Goal: Plan of Care Review  Recent Flowsheet Documentation  Taken 12/9/2022 1323 by Mike Quezada OTR  Plan of Care Reviewed With:   patient   spouse  Outcome Evaluation: Occupational therapy evaluation completed. Pt with recent lithotripsy procedure (12-6-22). She reports pain and weakness since the procedure. Pt stayed in bed most of time after returning home from the procedure, and she fell when going to the bathroom. Pt does report feeling better since admittance to the hospital.  Pt was able to manage sit to stand transfers with SBA from recliner and from EOB. She managed mobility (x 125 feet) with SBA using a walker for support. Pt required min assist for bed mobility. Education provided regarding compensatory strategies and environmental modifications at home to increase her independence/safety with bed mobility. Rec HH services upon discharge. Rec OT services to address safety/indepence with basic adl tasks and generalized strengthening.

## 2022-12-09 NOTE — CONSULTS
Adult Nutrition  Assessment/PES    Patient Name:  Krys Mayes  YOB: 1950  MRN: [id number]  Admit Date:  12/8/2022    Assessment Date:  12/9/2022    Comments:  Agree with Reg diet. Encourage po and voice food prefs.  Pt declines snacks/supplements at this time.  Will cont to follow and monitor.      Reason for Assessment     Row Name 12/09/22 1210          Reason for Assessment    Reason For Assessment identified at risk by screening criteria     Diagnosis infection/sepsis  general weakness, UTI, HTN s/p recent stone extraction & stent ( 12/6)     Identified At Risk by Screening Criteria MST SCORE 2+                Nutrition/Diet History     Row Name 12/09/22 1210          Nutrition/Diet History    Typical Intake (Food/Fluid/EN/PN) Pt in chair, spouse present. NKFA. Reg diet PTA. NO appetite since Tues procedure. Doesn't like eggs but does like donuts. Denies wt loss                Labs/Tests/Procedures/Meds     Row Name 12/09/22 1211          Labs/Procedures/Meds    Lab Results Reviewed reviewed     Lab Results Comments Na 132 L, glu 124, Bun 30        Diagnostic Tests/Procedures    Diagnostic Test/Procedure Reviewed reviewed        Medications    Pertinent Medications Reviewed reviewed                  Estimated/Assessed Needs - Anthropometrics     Row Name 12/09/22 1211          Anthropometrics    Weight --  204.2#     Additional Documentation --  9/2022 199#        Estimated/Assessed Needs    Additional Documentation Estimated Calorie Needs (Group);Fluid Requirements (Group);Protein Requirements (Group)        Estimated Calorie Needs    Estimated Calorie Requirement (kcal/day) 1739 kcal ( miflfin 1.2 )     Estimated Calorie Need Method [place]-St Jeor        Protein Requirements    Est Protein Requirement Amount (gms/kg) 0.8 gm protein  74 gm pro        Fluid Requirements    Estimated Fluid Requirement Method RDA Method  1739 ml                Nutrition Prescription Ordered     Row Name  12/09/22 1212          Nutrition Prescription PO    Current PO Diet Regular                Evaluation of Received Nutrient/Fluid Intake     Row Name 12/09/22 1212          Fluid Intake Evaluation    Oral Fluid (mL) --  insufficient data        PO Evaluation    Number of Meals 1     % PO Intake 50                   Problem/Interventions:   Problem 1     Row Name 12/09/22 1212          Nutrition Diagnoses Problem 1    Problem 1 Inadequate Nutrient Intake     Etiology (related to) Medical Diagnosis;Factors Affecting Nutrition     Signs/Symptoms (evidenced by) Report/Observation                      Intervention Goal     Row Name 12/09/22 1213          Intervention Goal    General Meet nutritional needs for age/condition     PO Increase intake;PO intake (%)     PO Intake % 60 %  or greater                Nutrition Intervention     Row Name 12/09/22 1213          Nutrition Intervention    RD/Tech Action Follow Tx progress;Interview for preference;Encourage intake                  Education/Evaluation     Row Name 12/09/22 1213          Education    Education No discharge needs identified at this time;Other (comment)  spoke w pt about taking bites & pro when not feeling well.        Monitor/Evaluation    Monitor Per protocol;I&O;PO intake;Pertinent labs;Weight     Education Follow-up Other (comment)  addresses spouse questions about intake & pro bar                 Electronically signed by:  Puja Sequeira RD  12/09/22 12:14 EST

## 2022-12-10 ENCOUNTER — READMISSION MANAGEMENT (OUTPATIENT)
Dept: CALL CENTER | Facility: HOSPITAL | Age: 72
End: 2022-12-10

## 2022-12-10 VITALS
WEIGHT: 204.2 LBS | TEMPERATURE: 97.5 F | RESPIRATION RATE: 20 BRPM | DIASTOLIC BLOOD PRESSURE: 62 MMHG | OXYGEN SATURATION: 96 % | BODY MASS INDEX: 32.82 KG/M2 | HEIGHT: 66 IN | SYSTOLIC BLOOD PRESSURE: 117 MMHG | HEART RATE: 112 BPM

## 2022-12-10 LAB
ANION GAP SERPL CALCULATED.3IONS-SCNC: 10 MMOL/L (ref 5–15)
BACTERIA SPEC AEROBE CULT: NORMAL
BASOPHILS # BLD AUTO: 0.03 10*3/MM3 (ref 0–0.2)
BASOPHILS NFR BLD AUTO: 0.3 % (ref 0–1.5)
BUN SERPL-MCNC: 24 MG/DL (ref 8–23)
BUN/CREAT SERPL: 26.4 (ref 7–25)
CALCIUM SPEC-SCNC: 8.7 MG/DL (ref 8.6–10.5)
CHLORIDE SERPL-SCNC: 107 MMOL/L (ref 98–107)
CO2 SERPL-SCNC: 24 MMOL/L (ref 22–29)
CREAT SERPL-MCNC: 0.91 MG/DL (ref 0.57–1)
DEPRECATED RDW RBC AUTO: 44.5 FL (ref 37–54)
EGFRCR SERPLBLD CKD-EPI 2021: 66.8 ML/MIN/1.73
EOSINOPHIL # BLD AUTO: 0.17 10*3/MM3 (ref 0–0.4)
EOSINOPHIL NFR BLD AUTO: 1.5 % (ref 0.3–6.2)
ERYTHROCYTE [DISTWIDTH] IN BLOOD BY AUTOMATED COUNT: 14.4 % (ref 12.3–15.4)
GLUCOSE SERPL-MCNC: 98 MG/DL (ref 65–99)
HCT VFR BLD AUTO: 34.6 % (ref 34–46.6)
HGB BLD-MCNC: 10.6 G/DL (ref 12–15.9)
IMM GRANULOCYTES # BLD AUTO: 0.06 10*3/MM3 (ref 0–0.05)
IMM GRANULOCYTES NFR BLD AUTO: 0.5 % (ref 0–0.5)
LYMPHOCYTES # BLD AUTO: 0.73 10*3/MM3 (ref 0.7–3.1)
LYMPHOCYTES NFR BLD AUTO: 6.5 % (ref 19.6–45.3)
MAGNESIUM SERPL-MCNC: 1.8 MG/DL (ref 1.6–2.4)
MCH RBC QN AUTO: 25.7 PG (ref 26.6–33)
MCHC RBC AUTO-ENTMCNC: 30.6 G/DL (ref 31.5–35.7)
MCV RBC AUTO: 84 FL (ref 79–97)
MONOCYTES # BLD AUTO: 0.84 10*3/MM3 (ref 0.1–0.9)
MONOCYTES NFR BLD AUTO: 7.5 % (ref 5–12)
NEUTROPHILS NFR BLD AUTO: 83.7 % (ref 42.7–76)
NEUTROPHILS NFR BLD AUTO: 9.37 10*3/MM3 (ref 1.7–7)
PHOSPHATE SERPL-MCNC: 2.5 MG/DL (ref 2.5–4.5)
PLATELET # BLD AUTO: 204 10*3/MM3 (ref 140–450)
PMV BLD AUTO: 9.8 FL (ref 6–12)
POTASSIUM SERPL-SCNC: 4.3 MMOL/L (ref 3.5–5.2)
PROCALCITONIN SERPL-MCNC: 8.31 NG/ML (ref 0–0.25)
RBC # BLD AUTO: 4.12 10*6/MM3 (ref 3.77–5.28)
SODIUM SERPL-SCNC: 141 MMOL/L (ref 136–145)
WBC NRBC COR # BLD: 11.2 10*3/MM3 (ref 3.4–10.8)

## 2022-12-10 PROCEDURE — G0378 HOSPITAL OBSERVATION PER HR: HCPCS

## 2022-12-10 PROCEDURE — 99217 PR OBSERVATION CARE DISCHARGE MANAGEMENT: CPT | Performed by: STUDENT IN AN ORGANIZED HEALTH CARE EDUCATION/TRAINING PROGRAM

## 2022-12-10 PROCEDURE — 83735 ASSAY OF MAGNESIUM: CPT | Performed by: STUDENT IN AN ORGANIZED HEALTH CARE EDUCATION/TRAINING PROGRAM

## 2022-12-10 PROCEDURE — 97535 SELF CARE MNGMENT TRAINING: CPT

## 2022-12-10 PROCEDURE — 85025 COMPLETE CBC W/AUTO DIFF WBC: CPT | Performed by: HOSPITALIST

## 2022-12-10 PROCEDURE — 84100 ASSAY OF PHOSPHORUS: CPT | Performed by: STUDENT IN AN ORGANIZED HEALTH CARE EDUCATION/TRAINING PROGRAM

## 2022-12-10 PROCEDURE — 84145 PROCALCITONIN (PCT): CPT | Performed by: STUDENT IN AN ORGANIZED HEALTH CARE EDUCATION/TRAINING PROGRAM

## 2022-12-10 PROCEDURE — 80048 BASIC METABOLIC PNL TOTAL CA: CPT | Performed by: HOSPITALIST

## 2022-12-10 PROCEDURE — 97116 GAIT TRAINING THERAPY: CPT

## 2022-12-10 RX ORDER — ONDANSETRON 4 MG/1
4 TABLET, FILM COATED ORAL EVERY 6 HOURS PRN
Qty: 15 TABLET | Refills: 0 | Status: SHIPPED | OUTPATIENT
Start: 2022-12-10

## 2022-12-10 RX ORDER — OXYCODONE HYDROCHLORIDE AND ACETAMINOPHEN 5; 325 MG/1; MG/1
TABLET ORAL
Qty: 18 TABLET | Refills: 0 | Status: SHIPPED | OUTPATIENT
Start: 2022-12-10 | End: 2022-12-15

## 2022-12-10 RX ORDER — TRAMADOL HYDROCHLORIDE 50 MG/1
50 TABLET ORAL EVERY 6 HOURS PRN
Status: CANCELLED | OUTPATIENT
Start: 2022-12-10

## 2022-12-10 RX ORDER — CEFDINIR 300 MG/1
300 CAPSULE ORAL 2 TIMES DAILY
Qty: 14 CAPSULE | Refills: 0 | Status: SHIPPED | OUTPATIENT
Start: 2022-12-10 | End: 2022-12-17

## 2022-12-10 RX ADMIN — PANTOPRAZOLE SODIUM 40 MG: 40 TABLET, DELAYED RELEASE ORAL at 08:40

## 2022-12-10 RX ADMIN — Medication 10 ML: at 08:41

## 2022-12-10 RX ADMIN — SERTRALINE HYDROCHLORIDE 100 MG: 50 TABLET ORAL at 08:40

## 2022-12-10 RX ADMIN — RIVAROXABAN 15 MG: 15 TABLET, FILM COATED ORAL at 08:40

## 2022-12-10 RX ADMIN — ATORVASTATIN CALCIUM 40 MG: 40 TABLET, FILM COATED ORAL at 08:40

## 2022-12-10 NOTE — DISCHARGE INSTR - APPOINTMENTS
Patient needs to call Dr. Hernandez office to make one week follow up appointment       [phone]      Patient will need to call Dr. Villegas office to make 1 week follow up appointment/ stent removal         [phone]

## 2022-12-10 NOTE — CASE MANAGEMENT/SOCIAL WORK
Case Management Discharge Note      Final Note: Discharged home with [facility]         Selected Continued Care - Discharged on 12/10/2022 Admission date: 12/8/2022 - Discharge disposition: Home-Health Care Svc    Destination    No services have been selected for the patient.              Durable Medical Equipment     Service Provider Selected Services Address Phone Fax Patient Preferred    [facility] [address] [place] [address] [phone] --          Dialysis/Infusion    No services have been selected for the patient.              Home Medical Care     Service Provider Selected Services Address Phone Fax Patient Preferred     [facility] [address] [facility] [facility] [address] [address] [phone] --          Therapy    No services have been selected for the patient.              Community Resources    No services have been selected for the patient.              Community & DME    No services have been selected for the patient.                       Final Discharge Disposition Code: 06 - home with home health care

## 2022-12-10 NOTE — PLAN OF CARE
Goal Outcome Evaluation:  Plan of Care Reviewed With: patient           Outcome Evaluation: OT: Education provided regarding compensatory stratgies and activity modifications to increase her functional independence with adl's and bed mobility at home. Pt does not have concerns for discharge to home with support of spouse and HH services.

## 2022-12-10 NOTE — PLAN OF CARE
Goal Outcome Evaluation:              Outcome Evaluation: Pt rested off and on through the night. Asked for PRN pain medication, pt is c/o of pain from the lithotripsy.Removed the IV from right AC due to bleeding from the site. Pt looking forward to going home.

## 2022-12-10 NOTE — THERAPY DISCHARGE NOTE
Acute Care - Occupational Therapy Discharge   Benita Molina    Patient Name: Krys Mayes  : 1950    MRN: [id number]                              Today's Date: 12/10/2022       Admit Date: 2022    Visit Dx:     ICD-10-CM ICD-9-CM   1. Generalized weakness  R53.1 780.79   2. Urinary tract infection in elderly patient  N39.0 599.0     Patient Active Problem List   Diagnosis   • Elevated alanine aminotransferase (ALT) level   • Mixed anxiety depressive disorder   • Gastroesophageal reflux disease   • Essential hypertension   • Insomnia   • Microalbuminuria   • Obesity (BMI 30-39.9)   • Proteinuria   • Vitamin D deficiency   • Postmenopause   • History of fracture   • Reflux esophagitis   • Calculus of kidney   • PAF (paroxysmal atrial fibrillation) ([facility])   • COLTON (generalized anxiety disorder)   • Iron deficiency anemia   • Prediabetes   • Urine frequency   • Medicare annual wellness visit, subsequent   • Need for pneumococcal vaccination   • High risk medication use   • Dermoid cyst of scalp   • Hyperglycemia   • Grief   • Mixed hyperlipidemia   • Long-term use of high-risk medication   • Frequent UTI   • Greater trochanteric bursitis of right hip   • Chondromalacia, right knee   • Post-traumatic arthritis of left ankle   • Generalized weakness     Past Medical History:   Diagnosis Date   • A-fib ([facility])    • Allergic    • Arthritis    • Gastroesophageal reflux disease 2016   • Hyperlipidemia    • Hypertension    • Kidney stone     recurrent, calcium oxalate   • Menopausal disorder     She went through menopause in    • Obesity    • PAF (paroxysmal atrial fibrillation) ([facility])     in the setting of urosepsis, 2016   • Reflux esophagitis 2016   • Sebaceous cyst of labia 2017   • Sepsis due to urinary tract infection ([facility])    • Sigmoid diverticulitis 2017   • Urinary tract infection    • Vitamin D deficiency 2016     Past Surgical History:   Procedure Laterality Date    • ANKLE SURGERY      Ankle Repair / Description: ORif the left ankle in July 2010. Secondary to fall   • BREAST BIOPSY Right     benign   • BREAST SURGERY      biopsy    • CHOLECYSTECTOMY     • COLONOSCOPY  2014    Done 2004 normal recheck in 10 years. Repeated February 2014 colonoscopy was normal and to be repeated in 10 years.   • CYSTOSCOPY BLADDER STONE LITHOTRIPSY     • HEMORRHOIDECTOMY     • NEPHROLITHOTOMY Left 1/9/2017    Procedure: NEPHROLITHOTOMY PERCUTANEOUS SECOND LOOK WITH STENT PLACEMENT AND LASER LITHOTRIPSY;  Surgeon: Mati Villegas MD;  Location: [facility];  Service:    • OTHER SURGICAL HISTORY      Tubal Ligation   • PERCUTANEOUS NEPHROSTOLITHOTOMY Left 12/2016   • TUBAL ABDOMINAL LIGATION     • URETEROSCOPY LASER LITHOTRIPSY WITH STENT INSERTION Left 12/6/2022    Procedure: LEFT URETEROSCOPY WITH LASER LITHOTRIPSY, STONE BASKET EXTRACTION, STENT PLACEMENT;  Surgeon: Mati Villegas MD;  Location: [facility];  Service: Urology;  Laterality: Left;      General Information     Row Name 12/10/22 0845          OT Time and Intention    Document Type discharge evaluation/summary  -SD     Mode of Treatment occupational therapy  -SD           User Key  (r) = Recorded By, (t) = Taken By, (c) = Cosigned By    Initials Name Provider Type    SD Mike Quezada OTR Occupational Therapist               Mobility/ADL's     Row Name 12/10/22 8652          Bed Mobility    Comment, (Bed Mobility) Education provided regarding compensatory strategies and environmental adaptations to address concerns for bed mobility at home (bed is tall). Pt states she plans to sleep in her recliner initially. Plan is for  services upon discharge.  -SD     Row Name 12/10/22 1135          Lower Body Dressing Assessment/Training    Comment, (Lower Body Dressing) Education with compensatory strategies for adl tasks. Pt reports no concerns with support of spouse available at home.  -SD           User Key  (r) =  Recorded By, (t) = Taken By, (c) = Cosigned By    Initials Name Provider Type    Mike Mandujano OTR Occupational Therapist               Obj/Interventions    No documentation.                Goals/Plan     Row Name 12/10/22 1145          Transfer Goal 1 (OT)    Activity/Assistive Device (Transfer Goal 1, OT) commode, 3-in-1  -SD     Bimble Level/Cues Needed (Transfer Goal 1, OT) supervision required  -SD     Time Frame (Transfer Goal 1, OT) by discharge  -SD     Progress/Outcome (Transfer Goal 1, OT) goal met  per pt report  -SD     Row Name 12/10/22 1145          Dressing Goal 1 (OT)    Activity/Device (Dressing Goal 1, OT) lower body dressing  -SD     Bimble/Cues Needed (Dressing Goal 1, OT) modified independence  -SD     Strategies/Barriers (Dressing Goal 1, OT) education with compensatory strategies and use of AE  -SD     Progress/Outcome (Dressing Goal 1, OT) goal met  pt reports no conerns with use of AE and with support of spouse  -SD           User Key  (r) = Recorded By, (t) = Taken By, (c) = Cosigned By    Initials Name Provider Type    Mike Mandujano OTR Occupational Therapist               Clinical Impression     Row Name 12/10/22 0819          Pain Assessment    Pre/Posttreatment Pain Comment no c/o pain  -SD     Row Name 12/10/22 0855          Plan of Care Review    Plan of Care Reviewed With patient  -SD     Outcome Evaluation OT: Education provided regarding compensatory stratgies and activity modifications to increase her functional independence with adl's and bed mobility at home. Pt does not have concerns for discharge to home with support of spouse and  services.  -SD     Row Name 12/10/22 0855          Therapy Assessment/Plan (OT)    Patient/Family Therapy Goal Statement (OT) go home  -SD     Row Name 12/10/22 0834          Therapy Plan Review/Discharge Plan (OT)    Anticipated Discharge Disposition (OT) home with assist;home with home health  -SD     Row Name  12/10/22 0855          Positioning and Restraints    Pre-Treatment Position sitting in chair/recliner  -SD     Post Treatment Position chair  -SD     In Chair sitting;call light within reach;encouraged to call for assist  -SD           User Key  (r) = Recorded By, (t) = Taken By, (c) = Cosigned By    Initials Name Provider Type    Mike Mandujano OTR Occupational Therapist               Outcome Measures     Row Name 12/10/22 1146          How much help from another is currently needed...    Putting on and taking off regular lower body clothing? 3  -SD     Bathing (including washing, rinsing, and drying) 3  -SD     Toileting (which includes using toilet bed pan or urinal) 3  -SD     Putting on and taking off regular upper body clothing 4  -SD     Taking care of personal grooming (such as brushing teeth) 4  -SD     Eating meals 4  -SD     AM-PAC 6 Clicks Score (OT) 21  -SD     Row Name 12/10/22 0806          How much help from another person do you currently need...    Turning from your back to your side while in flat bed without using bedrails? 3  -KM     Moving from lying on back to sitting on the side of a flat bed without bedrails? 3  -KM     Moving to and from a bed to a chair (including a wheelchair)? 3  -KM     Standing up from a chair using your arms (e.g., wheelchair, bedside chair)? 3  -KM     Climbing 3-5 steps with a railing? 3  -KM     To walk in hospital room? 3  -KM     AM-PAC 6 Clicks Score (PT) 18  -KM     Highest level of mobility 6 --> Walked 10 steps or more  -KM     Row Name 12/10/22 0806          Functional Assessment    Outcome Measure Options AM-PAC 6 Clicks Basic Mobility (PT)  -KM           User Key  (r) = Recorded By, (t) = Taken By, (c) = Cosigned By    Initials Name Provider Type    Mike Mandujano OTR Occupational Therapist    Kassy Vazquez PTA Physical Therapist Assistant              Occupational Therapy Education     Title: PT OT SLP Therapies (Done)     Topic:  Occupational Therapy (Resolved)     Point: ADL training (Resolved)     Description:   Instruct learner(s) on proper safety adaptation and remediation techniques during self care or transfers.   Instruct in proper use of assistive devices.              Learning Progress Summary           Patient Acceptance, E, VU by SD at 12/10/2022 1146    Comment: Education with OT services, benefits of activity, compensatory strategies/environmental adaptations for bed mobility at home and safety with bed mobility/transfers/mobility. Pt reports no concerns for discharge to home with family/HH support    Acceptance, E, VU,DU by KM at 12/10/2022 0836    Comment: Progress transfers and mobility    Acceptance, E, VU by SD at 12/9/2022 1333    Comment: Education with OT services, benefits of activity, compensatory strategies/environmental adaptations for bed mobility at home and safety with bed mobility/transfers/mobility.   Significant Other Acceptance, E, VU by SD at 12/9/2022 1333    Comment: Education with OT services, benefits of activity, compensatory strategies/environmental adaptations for bed mobility at home and safety with bed mobility/transfers/mobility.                               User Key     Initials Effective Dates Name Provider Type Discipline    SD 06/16/21 -  Mike Quezada OTR Occupational Therapist OT     06/16/21 -  Kassy Patel PTA Physical Therapist Assistant PT              OT Recommendation and Plan  Planned Therapy Interventions (OT): patient/caregiver education/training, transfer/mobility retraining, BADL retraining, adaptive equipment training  Therapy Frequency (OT): other (see comments) (1-2 visits for education with compensatory strategies for daily tasks)  Plan of Care Review  Plan of Care Reviewed With: patient  Outcome Evaluation: OT: Education provided regarding compensatory stratgies and activity modifications to increase her functional independence with adl's and bed mobility at home. Pt  does not have concerns for discharge to home with support of spouse and  services.       Time Calculation:    Time Calculation- OT     Row Name 12/10/22 1148             Time Calculation- OT    OT Start Time 0840  -SD      OT Stop Time 0853  -SD      OT Time Calculation (min) 13 min  -SD         Timed Charges    62323 - OT Self Care/Mgmt Minutes 13  -SD         Total Minutes    Timed Charges Total Minutes 13  -SD       Total Minutes 13  -SD            User Key  (r) = Recorded By, (t) = Taken By, (c) = Cosigned By    Initials Name Provider Type    Mike Mandujano OTR Occupational Therapist              Therapy Charges for Today     Code Description Service Date Service Provider Modifiers Qty    [id number] HC OT SELF CARE/MGMT/TRAIN EA 15 MIN 12/10/2022 Mike Quezada OTR GO 1             OT Discharge Summary  Anticipated Discharge Disposition (OT): home with assist, home with home health  Reason for Discharge: All goals achieved  Outcomes Achieved: Able to achieve all goals within established timeline  Discharge Destination: Home with home health, Home with assist    JOLEEN Flowers  12/10/2022

## 2022-12-10 NOTE — DISCHARGE SUMMARY
Krys Mayes  1950  [id number]    Hospitalists Discharge Summary    Date of Admission: 12/8/2022  Date of Discharge:  12/10/2022    History of Present Illness from [facility] on admit: Krys Mayes is a 72 y.o. female who presented to [facility] on 12/8/2022 complaint of generalized weakness for last day or 2, patient says she underwent lithotripsy for kidney stone on the left side, since then she has started noticing some weakness.  Patient was prescribed the p.o. antibiotics Macrobid.  Patient underwent work-up revealing elevated WBC count around 19,000, UA revealed pyuria.  Patient noted slightly dehydrated.  Patient receiving IV fluids and IV antibiotics.  Following this asked with the patient for the further care.    Primary Discharge diagnoses: UTI, s/p laser lithotripsy and stone basket extraction with ureteral stent placement    Secondary Discharge Diagnoses: Severe generalized weakness    Hospital Course Summary: Patient was admitted to [facility] for severe generalized weakness due to a UTI after a urological procedure.  A urine culture obtained 11/26 grew Klebsiella resistant to ampicillin and intermediate to Macrobid which is what she was prescribed after her procedure.  She was given IV Rocephin and IV fluids after presentation and PT/OT were consulted. Urology was consulted and suggested that when patient ready for discharge she be sent on oral cefdinir for approximately seven days. The patient already has an appointment to follow-up with Dr. Rodriguez Villegas to have her ureteral stent removed. On 12/10/2022 patient's condition had improved. She was deemed stable for discharge. She was advised to take all medications as prescribed, follow up with PCP within 1 week of discharge and with Dr. Rodriguez Villegas (urology) for her appointment which has already been made for stent removal. If there are any issues patient should contact PCP, call urologist, and/or return to the ED for  follow up. She is being discharged with oral cefdinir for one week as per urology's recommendation. Metoprolol and losartan are being stopped on discharge due to low blood pressure. Tramadol was going to be sent for pain but patient is on Zoloft which interacts with Tramadol. Oxycodone 2.5mg PRN has been sent for pain as patient reports an allergy to hydrocodone (SHAREE reviewed).  Zofran PRN for nausea and vomiting has also been sent. Patient has been extensively counseled not to take Xanax with the oxycodone. She has also been counseled that oxycodone can increase her risk of falling. Patient was agreeable to the plan and subsequently discharged with home health at this time. She is being sent home with a rolling walker to assist in ambulation.    PCP  Patient Care Team:  Ashleigh Hernandez PA-C as PCP - General (Family Medicine)  Jeremy Orozco MD as Consulting Physician (Cardiology)  Joss Oro MD as Consulting Physician (Gastroenterology)    Consults:   Consults     Date and Time Order Name Status Description    12/9/2022  3:27 AM Inpatient Urology Consult            Operations and Procedures Performed:       CT Abdomen Pelvis Without Contrast    Result Date: 11/26/2022  Narrative: CT Abdomen Pelvis WO INDICATION: evaluate for kidney stone TECHNIQUE: CT of the abdomen and pelvis without IV contrast. Coronal and sagittal reconstructions were obtained.  Radiation dose reduction techniques included automated exposure control or exposure modulation based on body size. Count of known CT and cardiac nuc med studies performed in previous 12 months: 0. COMPARISON: CT abdomen and pelvis 11/5/2017. FINDINGS: Evaluation of abdominal viscera is limited due to lack of intravenous contrast. Lower chest: Small peripheral groundglass opacities in the visualized lung bases. Liver: Unremarkable. Gallbladder and bile ducts: Surgically absent gallbladder. No biliary dilatation. Spleen: Unremarkable. Pancreas:  Unremarkable. Adrenals: Unremarkable. Kidneys and ureters: Punctate nonobstructive right renal calculus. Multiple nonobstructive calculi in the small left kidney. No hydronephrosis. Mild prominence of the proximal left ureter. No ureteral calculus. Bowel: Small hiatal hernia. Predominantly sigmoid colonic diverticulosis without associated colonic wall thickening or surrounding stranding.Normal appendix. Bladder: Unremarkable. Reproductive organs: Unremarkable. Lymph nodes: Multiple small mesenteric lymph nodes, as before. Peritoneum: Unremarkable. Vessels: Mild atherosclerosis. Abdominal wall: Unremarkable. Bones: Abnormal mineralization of the visualized osseous structures, as before, may be due to underlying metabolic abnormality.     Impression: 1.  Bilateral predominantly left-sided nonobstructive renal calculi. No hydronephrosis. 2.  Mild prominence of the left proximal ureter without ureteral or bladder calculus. 3.  Small peripheral groundglass opacities in the visualized lower lungs may represent atypical or viral infectious/inflammatory process. 4.  Sigmoid diverticulosis without evidence of acute diverticulitis. Signer Name: Aditya Velazquez MD  Signed: 11/26/2022 4:24 PM  Workstation Name: [email or web address]  [facility]    XR Chest 2 View    Result Date: 12/8/2022  Narrative: CR Chest 2 Vws INDICATION:  Low oxygen saturation weakness COMPARISON:  None. FINDINGS: PA and lateral views of the chest.  Heart and mediastinal contours are normal. The lungs are clear. No pneumothorax or pleural effusion.      Impression: No acute cardiopulmonary findings. Signer Name: Nithin Mondragon MD  Signed: 12/8/2022 11:26 PM  Workstation Name: [email or web address]  [facility]    FL C Arm During Surgery    Result Date: 12/6/2022  Narrative: FLUOROSCOPY DURING CYSTOSCOPY AND LEFT LITHOTRIPSY PROCEDURE, 12/6/2022  HISTORY: Cystoscopy and left-sided lithotripsy procedure  REPORT: C-arm fluoroscopy was  provided by radiology personnel in the OR during cystoscopy and left-sided lithotripsy procedure. Fluoroscopy time was recorded as 58 seconds. 1 spot film image was recorded for documentation purposes. Please see operative note for details.  This report was finalized on 12/6/2022 2:44 PM by Dr. Eugene Aponte MD.        Allergies:  is allergic to morphine and related, ciprofloxacin-ciproflox hcl er, flomax [tamsulosin hcl], hydrocodone, latex, lortab [hydrocodone-acetaminophen], penicillins, phenergan [promethazine hcl], sulfa antibiotics, amoxicillin, and tamsulosin.    Hector  reviewed    Discharge Medications:     Discharge Medications      New Medications      Instructions Start Date   cefdinir 300 MG capsule  Commonly known as: OMNICEF   300 mg, Oral, 2 Times Daily      ondansetron 4 MG tablet  Commonly known as: Zofran   4 mg, Oral, Every 6 Hours PRN      oxyCODONE-acetaminophen 5-325 MG per tablet  Commonly known as: Percocet   Take half a tablet as needed for severe pain. May take the other half after 30 minutes if still in severe pain. Do not drive on this medication.         Changes to Medications      Instructions Start Date   ALPRAZolam 1 MG tablet  Commonly known as: XANAX  What changed:   · how much to take  · how to take this  · when to take this   TAKE 1/2 TO 1 (ONE-HALF TO ONE) TABLET BY MOUTH TWICE DAILY FOR ANXIETY         Continue These Medications      Instructions Start Date   acetaminophen 650 MG 8 hr tablet  Commonly known as: TYLENOL   1 tablet, Oral, Every 6 Hours PRN, for pain      meclizine 25 MG tablet  Commonly known as: ANTIVERT   25 mg, Oral, Every 6 Hours PRN      multivitamin tablet tablet  Commonly known as: THERAGRAN   1 tablet, Oral, Daily      omeprazole OTC 20 MG EC tablet  Commonly known as: PriLOSEC OTC   20 mg, Oral, 2 Times Daily      phenazopyridine 200 MG tablet  Commonly known as: Pyridium   200 mg, Oral, 3 Times Daily PRN      PROBIOTIC ADVANCED PO   1 tablet, Oral,  Daily      sertraline 100 MG tablet  Commonly known as: ZOLOFT   Take 1 tablet by mouth once daily      simvastatin 80 MG tablet  Commonly known as: ZOCOR   80 mg, Oral, Every Evening      triamcinolone 0.1 % cream  Commonly known as: KENALOG   1 application, As Needed      Xarelto 15 MG tablet  Generic drug: rivaroxaban   Take 1 tablet by mouth once daily         Stop These Medications    losartan 100 MG tablet  Commonly known as: COZAAR     Metoprolol Tartrate 75 MG tablet     nitrofurantoin (macrocrystal-monohydrate) 100 MG capsule  Commonly known as: Macrobid     traMADol 50 MG tablet  Commonly known as: Ultram            Last Lab Results:   Lab Results (most recent)     Procedure Component Value Units Date/Time    Urine Culture - Urine, Urine, Clean Catch [[id number]] Collected: 12/08/22 2306    Specimen: Urine, Clean Catch Updated: 12/10/22 1315     Urine Culture <25,000 CFU/mL Mixed Love Isolated    Narrative:      Specimen contains mixed organisms of questionable pathogenicity suggestive of contamination. If symptoms persist, suggest recollection.  Colonization of the urinary tract without infection is common. Treatment is discouraged unless the patient is symptomatic, pregnant, or undergoing an invasive urologic procedure.    Phosphorus [[id number]]  (Normal) Collected: 12/10/22 0426    Specimen: Blood Updated: 12/10/22 0931     Phosphorus 2.5 mg/dL     Magnesium [[id number]]  (Normal) Collected: 12/10/22 0426    Specimen: Blood Updated: 12/10/22 0931     Magnesium 1.8 mg/dL     Procalcitonin [[id number]]  (Abnormal) Collected: 12/10/22 0426    Specimen: Blood Updated: 12/10/22 0930     Procalcitonin 8.31 ng/mL     Narrative:      As a Marker for Sepsis (Non-Neonates):    1. <0.5 ng/mL represents a low risk of severe sepsis and/or septic shock.  2. >2 ng/mL represents a high risk of severe sepsis and/or septic shock.    As a Marker for Lower Respiratory Tract Infections that require antibiotic therapy:    PCT  "on Admission    Antibiotic Therapy       6-12 Hrs later    >0.5                Strongly Recommended  >0.25 - <0.5        Recommended   0.1 - 0.25          Discouraged              Remeasure/reassess PCT  <0.1                Strongly Discouraged     Remeasure/reassess PCT    As 28 day mortality risk marker: \"Change in Procalcitonin Result\" (>80% or <=80%) if Day 0 (or Day 1) and Day 4 values are available. Refer to http://[email or web address]-pct-[email or web address]    Change in PCT <=80%  A decrease of PCT levels below or equal to 80% defines a positive change in PCT test result representing a higher risk for 28-day all-cause mortality of patients diagnosed with severe sepsis for septic shock.    Change in PCT >80%  A decrease of PCT levels of more than 80% defines a negative change in PCT result representing a lower risk for 28-day all-cause mortality of patients diagnosed with severe sepsis or septic shock.       Basic Metabolic Panel [[id number]]  (Abnormal) Collected: 12/10/22 0426    Specimen: Blood Updated: 12/10/22 0533     Glucose 98 mg/dL      BUN 24 mg/dL      Creatinine 0.91 mg/dL      Sodium 141 mmol/L      Potassium 4.3 mmol/L      Chloride 107 mmol/L      CO2 24.0 mmol/L      Calcium 8.7 mg/dL      BUN/Creatinine Ratio 26.4     Anion Gap 10.0 mmol/L      eGFR 66.8 mL/min/1.73      Comment: National Kidney Foundation and American Society of Nephrology (ASN) Task Force recommended calculation based on the Chronic Kidney Disease Epidemiology Collaboration (CKD-EPI) equation refit without adjustment for race.       Narrative:      GFR Normal >60  Chronic Kidney Disease <60  Kidney Failure <15    The GFR formula is only valid for adults with stable renal function between ages 18 and 70.    CBC & Differential [[id number]]  (Abnormal) Collected: 12/10/22 0426    Specimen: Blood Updated: 12/10/22 0509    Narrative:      The following orders were created for panel order CBC & Differential.  Procedure                             "   Abnormality         Status                     ---------                               -----------         ------                     CBC Auto Differential[[id number]]        Abnormal            Final result                 Please view results for these tests on the individual orders.    CBC Auto Differential [[id number]]  (Abnormal) Collected: 12/10/22 0426    Specimen: Blood Updated: 12/10/22 0509     WBC 11.20 10*3/mm3      RBC 4.12 10*6/mm3      Hemoglobin 10.6 g/dL      Hematocrit 34.6 %      MCV 84.0 fL      MCH 25.7 pg      MCHC 30.6 g/dL      RDW 14.4 %      RDW-SD 44.5 fl      MPV 9.8 fL      Platelets 204 10*3/mm3      Neutrophil % 83.7 %      Lymphocyte % 6.5 %      Monocyte % 7.5 %      Eosinophil % 1.5 %      Basophil % 0.3 %      Immature Grans % 0.5 %      Neutrophils, Absolute 9.37 10*3/mm3      Lymphocytes, Absolute 0.73 10*3/mm3      Monocytes, Absolute 0.84 10*3/mm3      Eosinophils, Absolute 0.17 10*3/mm3      Basophils, Absolute 0.03 10*3/mm3      Immature Grans, Absolute 0.06 10*3/mm3     Blood Culture - Blood, Arm, Left [[id number]]  (Normal) Collected: 12/09/22 0014    Specimen: Blood from Arm, Left Updated: 12/10/22 0030     Blood Culture No growth at 24 hours    Blood Culture - Blood, Arm, Right [[id number]]  (Normal) Collected: 12/09/22 0014    Specimen: Blood from Arm, Right Updated: 12/10/22 0030     Blood Culture No growth at 24 hours    POC Glucose Once [[id number]]  (Normal) Collected: 12/09/22 0715    Specimen: Blood Updated: 12/09/22 0733     Glucose 102 mg/dL      Comment: Meter: [id number] : [id number] Aries Mcmullen NURSING ASSISTANT       Basic Metabolic Panel [[id number]]  (Abnormal) Collected: 12/09/22 0401    Specimen: Blood Updated: 12/09/22 0437     Glucose 124 mg/dL      BUN 30 mg/dL      Creatinine 0.86 mg/dL      Sodium 132 mmol/L      Potassium 4.2 mmol/L      Chloride 99 mmol/L      CO2 21.3 mmol/L      Calcium 8.6 mg/dL      BUN/Creatinine Ratio 34.9     Anion Gap 11.7  mmol/L      eGFR 71.4 mL/min/1.73      Comment: National Kidney Foundation and American Society of Nephrology (ASN) Task Force recommended calculation based on the Chronic Kidney Disease Epidemiology Collaboration (CKD-EPI) equation refit without adjustment for race.       Narrative:      GFR Normal >60  Chronic Kidney Disease <60  Kidney Failure <15    The GFR formula is only valid for adults with stable renal function between ages 18 and 70.    CBC & Differential [[id number]]  (Abnormal) Collected: 12/09/22 0401    Specimen: Blood Updated: 12/09/22 0422    Narrative:      The following orders were created for panel order CBC & Differential.  Procedure                               Abnormality         Status                     ---------                               -----------         ------                     CBC Auto Differential[[id number]]        Abnormal            Final result                 Please view results for these tests on the individual orders.    CBC Auto Differential [[id number]]  (Abnormal) Collected: 12/09/22 0401    Specimen: Blood Updated: 12/09/22 0422     WBC 17.82 10*3/mm3      RBC 4.15 10*6/mm3      Hemoglobin 10.9 g/dL      Hematocrit 34.9 %      MCV 84.1 fL      MCH 26.3 pg      MCHC 31.2 g/dL      RDW 14.6 %      RDW-SD 44.4 fl      MPV 9.8 fL      Platelets 189 10*3/mm3      Neutrophil % 89.0 %      Lymphocyte % 4.4 %      Monocyte % 5.4 %      Eosinophil % 0.2 %      Basophil % 0.2 %      Immature Grans % 0.8 %      Neutrophils, Absolute 15.86 10*3/mm3      Lymphocytes, Absolute 0.78 10*3/mm3      Monocytes, Absolute 0.96 10*3/mm3      Eosinophils, Absolute 0.04 10*3/mm3      Basophils, Absolute 0.03 10*3/mm3      Immature Grans, Absolute 0.15 10*3/mm3      nRBC 0.0 /100 WBC     Lactic Acid, Plasma [[id number]]  (Normal) Collected: 12/09/22 0014    Specimen: Blood from Arm, Left Updated: 12/09/22 0040     Lactate 1.1 mmol/L     Comprehensive Metabolic Panel [[id number]]  (Abnormal)  Collected: 12/08/22 2303    Specimen: Blood Updated: 12/08/22 2334     Glucose 121 mg/dL      BUN 33 mg/dL      Creatinine 1.08 mg/dL      Sodium 135 mmol/L      Potassium 5.0 mmol/L      Comment: Slight hemolysis detected by analyzer. Results may be affected.        Chloride 98 mmol/L      CO2 22.6 mmol/L      Calcium 9.0 mg/dL      Total Protein 6.4 g/dL      Albumin 3.10 g/dL      ALT (SGPT) 30 U/L      AST (SGOT) 52 U/L      Comment: Slight hemolysis detected by analyzer. Results may be affected.        Alkaline Phosphatase 96 U/L      Total Bilirubin 0.4 mg/dL      Globulin 3.3 gm/dL      A/G Ratio 0.9 g/dL      BUN/Creatinine Ratio 30.6     Anion Gap 14.4 mmol/L      eGFR 54.3 mL/min/1.73      Comment: National Kidney Foundation and American Society of Nephrology (ASN) Task Force recommended calculation based on the Chronic Kidney Disease Epidemiology Collaboration (CKD-EPI) equation refit without adjustment for race.       Narrative:      GFR Normal >60  Chronic Kidney Disease <60  Kidney Failure <15    The GFR formula is only valid for adults with stable renal function between ages 18 and 70.    Urinalysis, Microscopic Only - Urine, Clean Catch [[id number]]  (Abnormal) Collected: 12/08/22 2306    Specimen: Urine, Clean Catch Updated: 12/08/22 2325     RBC, UA Too Numerous to Count /HPF      WBC, UA 6-12 /HPF      Bacteria, UA 1+ /HPF      Squamous Epithelial Cells, UA 0-2 /HPF      Hyaline Casts, UA None Seen /LPF      Methodology Manual Light Microscopy    Urinalysis With Microscopic If Indicated (No Culture) - Urine, Clean Catch [[id number]]  (Abnormal) Collected: 12/08/22 2306    Specimen: Urine, Clean Catch Updated: 12/08/22 2314     Color, UA Yellow     Appearance, UA Clear     pH, UA 5.5     Specific Gravity, UA 1.020     Glucose, UA Negative     Ketones, UA Trace     Bilirubin, UA Negative     Blood, UA Large (3+)     Protein, UA >=300 mg/dL (3+)     Leuk Esterase, UA Small (1+)     Nitrite, UA Negative      Urobilinogen, UA 1.0 E.U./dL        Imaging Results (Most Recent)     Procedure Component Value Units Date/Time    XR Chest 2 View [[id number]] Collected: 12/08/22 2326     Updated: 12/08/22 2328    Narrative:      CR Chest 2 Vws    INDICATION:    Low oxygen saturation weakness    COMPARISON:    None.    FINDINGS:   PA and lateral views of the chest.  Heart and mediastinal contours are normal. The lungs are clear. No pneumothorax or pleural effusion.        Impression:      No acute cardiopulmonary findings.    Signer Name: Nithin Mondragon MD   Signed: 12/8/2022 11:26 PM   Workstation Name: [email or web address]    [facility]          PROCEDURES      Condition on Discharge:  Stable, Improved.     Physical Exam at Discharge  Vital Signs  Temp:  [97 °F (36.1 °C)-97.5 °F (36.4 °C)] 97.5 °F (36.4 °C)  Heart Rate:  [] 112  Resp:  [18-20] 20  BP: (117-123)/(55-78) 117/62    Physical Exam  General: Patient is a 72-year-old female, awake and alert. She is obese. In NAD on RA  HENT: Head is atraumatic, normocephalic. Hearing is grossly intact. Nose is without obvious congestion and appears patent. Neck is supple and trachea is midline.   Eyes: Wears glasses. Vision is grossly intact. Pupils appear equal and round.   Cardiovascular: Heart has regular rate and rhythm with no murmurs, rubs or gallops noted.   Respiratory: Lungs are clear to ausculation without wheezes, rhonchi or rales.   Abdominal/GI: Soft, nontender, bowel sounds present. No rebound or guarding present.   Extremities: No peripheral edema noted.   Musculoskeletal: Spontaneous movement of bilateral upper and lower extremities against gravity noted. No signs of injury or deformity noted.   Skin: Warm and dry.   Psych: Mood and affect are appropriate. Cooperative with exam.   Neuro: No facial asymmetry noted. No focal deficits noted, hearing and vision are grossly intact.    Discharge Disposition  Home with home health    Visiting Nurse:     No    Home PT/OT:  Yes    Home Safety Evaluation:  Yes    DME  Rolling Walker    Discharge Diet:      Dietary Orders (From admission, onward)     Start     Ordered    12/09/22 0132  Diet: Regular/House Diet; Texture: Regular Texture (IDDSI 7); Fluid Consistency: Thin (IDDSI 0)  Diet Effective Now        References:    Diet Order Crosswalk   Question Answer Comment   Diets: Regular/House Diet    Texture: Regular Texture (IDDSI 7)    Fluid Consistency: Thin (IDDSI 0)        12/09/22 0131                Activity at Discharge:  As per PT/OTs recommendations    Pre-discharge education  Opiate side effects and risks      Follow-up Appointments  Future Appointments   Date Time Provider Department Center   12/23/2022 11:00 AM [facility] 1 [facility]   3/1/2023  9:30 AM [facility]   3/8/2023 10:30 AM Ashleigh Hernandez PA-C [facility]   7/11/2023 10:00 AM Jeremy Orozco MD [facility]     Additional Instructions for the Follow-ups that You Need to Schedule     Ambulatory Referral to Home Health ([facility])   As directed      Face to Face Visit Date: 12/10/2022    Follow-up provider for Plan of Care?: I treated the patient in an acute care facility and will not continue treatment after discharge.    Follow-up provider: ASHLEIGH HERNANDEZ [[id number]]    Reason/Clinical Findings: Generalized deconditioning    Describe mobility limitations that make leaving home difficult: See above    Nursing/Therapeutic Services Requested: Physical Therapy Occupational Therapy    PT orders: Other (add comment) (Per therapist's recs) Home safety assessment Gait Training Strengthening    Weight Bearing Status: As Tolerated    Occupational orders: Energy conservation (Per therapist's recs) Strengthening Home safety assessment Activities of daily living    Frequency: 1 Week 1         Discharge Follow-up with PCP   As directed       Currently Documented PCP:    Ashleigh Hernandez PA-C    PCP Phone Number:     [phone]     Follow Up Details: Within one week, hospital follow-up         Discharge Follow-up with Specified Provider: Dr. Rodriguez Villegas; 1 Week   As directed      To: Dr. Rodriguez Villegas    Follow Up: 1 Week    Follow Up Details: Appointment for stent removal               Test Results Pending at Discharge  Pending Labs     Order Current Status    Blood Culture - Blood, Arm, Left Preliminary result    Blood Culture - Blood, Arm, Right Preliminary result          Discharge over 30 min (if over 30 minutes give explanation as to why it took greater than 30 minutes)  Secondary to:   Coordination of care/follow up  Medication reconciliation  D/W patient      At [facility], we believe that sharing information builds trust and better relationships. You are receiving this note because you recently visited [facility]. It is possible you will see health information before a provider has talked with you about it. This kind of information can be easy to misunderstand. To help you fully understand what it means for your health, we urge you to discuss this note with your provider.    Marlene Purdy, DO  12/10/22  15:36 EST

## 2022-12-10 NOTE — THERAPY TREATMENT NOTE
Acute Care - Physical Therapy Treatment Note  ISMAEL Vargas     Patient Name: Krys Mayes  : 1950  MRN: [id number]  Today's Date: 12/10/2022      Visit Dx:     ICD-10-CM ICD-9-CM   1. Generalized weakness  R53.1 780.79   2. Urinary tract infection in elderly patient  N39.0 599.0     Patient Active Problem List   Diagnosis   • Elevated alanine aminotransferase (ALT) level   • Mixed anxiety depressive disorder   • Gastroesophageal reflux disease   • Essential hypertension   • Insomnia   • Microalbuminuria   • Obesity (BMI 30-39.9)   • Proteinuria   • Vitamin D deficiency   • Postmenopause   • History of fracture   • Reflux esophagitis   • Calculus of kidney   • PAF (paroxysmal atrial fibrillation) ([facility])   • COLTON (generalized anxiety disorder)   • Iron deficiency anemia   • Prediabetes   • Urine frequency   • Medicare annual wellness visit, subsequent   • Need for pneumococcal vaccination   • High risk medication use   • Dermoid cyst of scalp   • Hyperglycemia   • Grief   • Mixed hyperlipidemia   • Long-term use of high-risk medication   • Frequent UTI   • Greater trochanteric bursitis of right hip   • Chondromalacia, right knee   • Post-traumatic arthritis of left ankle   • Generalized weakness     Past Medical History:   Diagnosis Date   • A-fib ([facility])    • Allergic    • Arthritis    • Gastroesophageal reflux disease 2016   • Hyperlipidemia    • Hypertension    • Kidney stone     recurrent, calcium oxalate   • Menopausal disorder     She went through menopause in    • Obesity    • PAF (paroxysmal atrial fibrillation) ([facility])     in the setting of urosepsis, 2016   • Reflux esophagitis 2016   • Sebaceous cyst of labia 2017   • Sepsis due to urinary tract infection ([facility])    • Sigmoid diverticulitis 2017   • Urinary tract infection    • Vitamin D deficiency 2016     Past Surgical History:   Procedure Laterality Date   • ANKLE SURGERY      Ankle Repair / Description: ORif  the left ankle in July 2010. Secondary to fall   • BREAST BIOPSY Right     benign   • BREAST SURGERY      biopsy    • CHOLECYSTECTOMY     • COLONOSCOPY  2014    Done 2004 normal recheck in 10 years. Repeated February 2014 colonoscopy was normal and to be repeated in 10 years.   • CYSTOSCOPY BLADDER STONE LITHOTRIPSY     • HEMORRHOIDECTOMY     • NEPHROLITHOTOMY Left 1/9/2017    Procedure: NEPHROLITHOTOMY PERCUTANEOUS SECOND LOOK WITH STENT PLACEMENT AND LASER LITHOTRIPSY;  Surgeon: Mati Villegas MD;  Location: [facility] OR;  Service:    • OTHER SURGICAL HISTORY      Tubal Ligation   • PERCUTANEOUS NEPHROSTOLITHOTOMY Left 12/2016   • TUBAL ABDOMINAL LIGATION     • URETEROSCOPY LASER LITHOTRIPSY WITH STENT INSERTION Left 12/6/2022    Procedure: LEFT URETEROSCOPY WITH LASER LITHOTRIPSY, STONE BASKET EXTRACTION, STENT PLACEMENT;  Surgeon: Mati Villegas MD;  Location: [facility] OR;  Service: Urology;  Laterality: Left;     PT Assessment (last 12 hours)     PT Evaluation and Treatment     Row Name 12/10/22 0806          Physical Therapy Time and Intention    Subjective Information no complaints  -KM     Document Type therapy note (daily note)  -KM     Mode of Treatment physical therapy  -KM     Row Name 12/10/22 0806          General Information    Existing Precautions/Restrictions fall  -KM     Row Name 12/10/22 0806          Cognition    Personal Safety Interventions gait belt;nonskid shoes/slippers when out of bed  -KM     Row Name 12/10/22 0806          Bed Mobility    Bed Mobility supine-sit  -KM     Supine-Sit [place] (Bed Mobility) contact guard  -KM     Assistive Device (Bed Mobility) bed rails;head of bed elevated  -KM     Row Name 12/10/22 0806          Sit-Stand Transfer    Sit-Stand [place] (Transfers) supervision  -KM     Assistive Device (Sit-Stand Transfers) walker, front-wheeled  -KM     Row Name 12/10/22 0806          Gait/Stairs (Locomotion)    [place] Level (Gait) standby  assist  -KM     Assistive Device (Gait) walker, front-wheeled  -KM     Distance in Feet (Gait) 100  -KM     Comment, (Gait/Stairs) Pt with slow gait speed but managed AD well  -KM     Row Name 12/10/22 0806          Plan of Care Review    Plan of Care Reviewed With patient  -KM     Outcome Evaluation PT: Pt willing to work with PT; states she is doing well and feels ready to go home. Pt reports her primary concern is getting in/out of bed, but she plans to use her lift chair when home. Pt completes supine >sit with CGA with HOB elevated and use of bedrails. Pt completes sit<> stand transfers with supervision and ambulates a total of 100 ft SBA with FWW. Pt with slow gait speed but managed AD well. Plan to discharge home with home health.  -KM     Row Name 12/10/22 0806          Positioning and Restraints    Pre-Treatment Position in bed  -KM     Post Treatment Position chair  -KM     In Chair sitting;call light within reach;encouraged to call for assist  -KM           User Key  (r) = Recorded By, (t) = Taken By, (c) = Cosigned By    Initials Name Provider Type     Kassy Patel PTA Physical Therapist Assistant                Physical Therapy Education     Title: PT OT SLP Therapies (Done)     Topic: Physical Therapy (Done)     Point: Mobility training (Done)     Learning Progress Summary           Patient Acceptance, E, VU,DU by  at 12/10/2022 0836    Comment: Progress transfers and mobility    Acceptance, E,TB, VU by  at 12/9/2022 1124                   Point: Home exercise program (Done)     Learning Progress Summary           Patient Acceptance, E, VU,DU by  at 12/10/2022 0836    Comment: Progress transfers and mobility                               User Key     Initials Effective Dates Name Provider Type Discipline     06/16/21 -  Stephanie Nelson, PT Physical Therapist PT     06/16/21 -  Kassy Patel PTA Physical Therapist Assistant PT              PT Recommendation and Plan     Plan of Care  Reviewed With: patient  Outcome Evaluation: PT: Pt willing to work with PT; states she is doing well and feels ready to go home. Pt reports her primary concern is getting in/out of bed, but she plans to use her lift chair when home. Pt completes supine >sit with CGA with HOB elevated and use of bedrails. Pt completes sit<> stand transfers with supervision and ambulates a total of 100 ft SBA with FWW. Pt with slow gait speed but managed AD well. Plan to discharge home with home health.   Outcome Measures     Row Name 12/10/22 0806             How much help from another person do you currently need...    Turning from your back to your side while in flat bed without using bedrails? 3  -KM      Moving from lying on back to sitting on the side of a flat bed without bedrails? 3  -KM      Moving to and from a bed to a chair (including a wheelchair)? 3  -KM      Standing up from a chair using your arms (e.g., wheelchair, bedside chair)? 3  -KM      Climbing 3-5 steps with a railing? 3  -KM      To walk in hospital room? 3  -KM      AM-PAC 6 Clicks Score (PT) 18  -KM         Functional Assessment    Outcome Measure Options AM-PAC 6 Clicks Basic Mobility (PT)  -KM            User Key  (r) = Recorded By, (t) = Taken By, (c) = Cosigned By    Initials Name Provider Type    Kassy Vazquez PTA Physical Therapist Assistant                 Time Calculation:    PT Charges     Row Name 12/10/22 0837             Time Calculation    Start Time 0806  -KM      Stop Time 0830  -KM      Time Calculation (min) 24 min  -KM            User Key  (r) = Recorded By, (t) = Taken By, (c) = Cosigned By    Initials Name Provider Type    Kassy Vazquez PTA Physical Therapist Assistant              Therapy Charges for Today     Code Description Service Date Service Provider Modifiers Qty    [id number] HC GAIT TRAINING EA 15 MIN 12/10/2022 Kassy Patel PTA GP 1          PT G-Codes  Outcome Measure Options: AM-PAC 6 Clicks Basic Mobility  (PT)  AM-PAC 6 Clicks Score (PT): 18  AM-PAC 6 Clicks Score (OT): 21    Kassy Patel, PTA  12/10/2022

## 2022-12-10 NOTE — OUTREACH NOTE
Prep Survey    Flowsheet Row Responses   [facility] facility patient discharged from? [place]   Is LACE score < 7 ? Yes   Emergency Room discharge w/ pulse ox? No   Eligibility [facility] [place]   Date of Admission 12/09/22   Date of Discharge 12/10/22   Discharge Disposition Home-Health Care Sv   Discharge diagnosis Generalized weakness   Does the patient have one of the following disease processes/diagnoses(primary or secondary)? Other   Does the patient have Home health ordered? Yes   What is the Home health agency?  [facility]   Is there a DME ordered? No   Prep survey completed? Yes          YOANDY SAEED - Registered Nurse

## 2022-12-10 NOTE — CASE MANAGEMENT/SOCIAL WORK
Continued Stay Note  ISMAEL Vargas     Patient Name: Krys Mayes  MRN: [id number]  Today's Date: 12/10/2022    Admit Date: 12/8/2022    Plan: Home with  and [facility]   Discharge Plan     Row Name 12/10/22 1334       Plan    Plan Home with  and [facility]    Patient/Family in Agreement with Plan yes    Plan Comments CM followed up with patient and her  to review discharge plan. Patient states she plans on returning home with her  to help. CM informed her that [facility]  could accept for PT and she is agreeable to that. CM delivered rolling walker to patient prior to discharge and paperwork was signed. Patient had no other questions or concerns regarding discharge plans. JEANNIE spoke with Adilene on call RN for [facility] and informed him patient was discharging today and I had faxed order and clinicals to [phone] and he states he will let the office know. CM will follow.               Discharge Codes    No documentation.               Expected Discharge Date and Time     Expected Discharge Date Expected Discharge Time    Dec 10, 2022             Alley Almendarez, RN

## 2022-12-10 NOTE — PLAN OF CARE
Goal Outcome Evaluation:  Plan of Care Reviewed With: patient           Outcome Evaluation: PT: Pt willing to work with PT; states she is doing well and feels ready to go home. Pt reports her primary concern is getting in/out of bed, but she plans to use her lift chair when home. Pt completes supine >sit with CGA with HOB elevated and use of bedrails. Pt completes sit<> stand transfers with supervision and ambulates a total of 100 ft SBA with FWW. Pt with slow gait speed but managed AD well. Plan to discharge home with home health.

## 2022-12-11 ENCOUNTER — HOME HEALTH ADMISSION (OUTPATIENT)
Dept: HOME HEALTH SERVICES | Facility: HOME HEALTHCARE | Age: 72
End: 2022-12-11

## 2022-12-12 ENCOUNTER — TRANSITIONAL CARE MANAGEMENT TELEPHONE ENCOUNTER (OUTPATIENT)
Dept: CALL CENTER | Facility: HOSPITAL | Age: 72
End: 2022-12-12

## 2022-12-12 ENCOUNTER — TELEPHONE (OUTPATIENT)
Dept: FAMILY MEDICINE CLINIC | Facility: CLINIC | Age: 72
End: 2022-12-12

## 2022-12-12 NOTE — TELEPHONE ENCOUNTER
Received a call from Mariah Redman [facility].  requesting verbal ok for PT eval.v      [phone]

## 2022-12-12 NOTE — OUTREACH NOTE
Call Center TCM Note    Flowsheet Row Responses   [facility] patient discharged from? [place]   Does the patient have one of the following disease processes/diagnoses(primary or secondary)? Other   TCM attempt successful? Yes   Call start time 1037   Call end time 1041   Discharge diagnosis Generalized weakness   Is patient permission given to speak with other caregiver? Yes   Person spoke with today (if not patient) and relationship Patient   Meds reviewed with patient/caregiver? Yes   Prescription comments Patient picked up Percocet, Zofran, and omnicef from [employer] and has no questions or concerns   Is the patient taking all medications as directed (includes completed medication regime)? Yes   Comments Scheduled hospital fu with pcp on 12/15 @ 1p   Does the patient have an appointment with their PCP within 7 days of discharge? No   Nursing Interventions Assisted patient with making appointment per protocol   What is the Home health agency?  [facility]   Has home health visited the patient within 72 hours of discharge? Yes   Home health comments Called patient today to set up appt time   Psychosocial issues? No   Nursing interventions Reviewed instructions with patient   What is the patient's perception of their health status since discharge? Improving   Is the patient/caregiver able to teach back signs and symptoms related to disease process for when to call PCP? Yes   Is the patient/caregiver able to teach back signs and symptoms related to disease process for when to call 911? Yes   Is the patient/caregiver able to teach back the hierarchy of who to call/visit for symptoms/problems? PCP, Specialist, Home health nurse, Urgent Care, ED, 911 Yes   TCM call completed? Yes   Wrap up additional comments Patient states she is doing well with no concerns.    Call end time 1041   Would this patient benefit from a Referral to [facility] Social Work? No   Is the patient interested in additional calls from an ambulatory  ?  NOTE:  applies to high risk patients requiring additional follow-up. No          Linnette Wade RN    12/12/2022, 10:43 EST       Dressing: pressure dressing with telfa

## 2022-12-14 ENCOUNTER — HOME CARE VISIT (OUTPATIENT)
Dept: HOME HEALTH SERVICES | Facility: HOME HEALTHCARE | Age: 72
End: 2022-12-14

## 2022-12-14 VITALS
RESPIRATION RATE: 18 BRPM | HEART RATE: 72 BPM | SYSTOLIC BLOOD PRESSURE: 120 MMHG | DIASTOLIC BLOOD PRESSURE: 62 MMHG | OXYGEN SATURATION: 97 % | TEMPERATURE: 98.1 F

## 2022-12-14 PROBLEM — Z09 HOSPITAL DISCHARGE FOLLOW-UP: Status: ACTIVE | Noted: 2022-12-14

## 2022-12-14 LAB
BACTERIA SPEC AEROBE CULT: NORMAL
BACTERIA SPEC AEROBE CULT: NORMAL

## 2022-12-14 NOTE — HOME HEALTH
"_________________________________________________  PHYSICAL THERAPY EVALUATION    REASON FOR REFERRAL:  72 yr old female hospitalized at [facility] 12/8/22 - 12/10/22 with UTI & generalized weakness after undergoing a (L) ureterscopy with laser lithotripsy, stone basket extraction & stent placement on 12/6/22.  Patient referred for home health PT services to address deficits in strength, balance & mobility.    Pt states she's been using a walker since she's been home from the hospital.  She lives in the lower level of her home with full bathroom.  Pt navigates 13 steps to reach kitchen for meals.  She has been  seeing an orthopedist (Dr. Trujillo & Patty Brown) for (R) LE/sciatica pain & (R) knee pain.    PERTINENT PAST MEDICAL HISTORY:    Arthritis     Eczema  Gastroesophageal reflux disease 01/21/2016  Hyperlipidemia    Hypertension    Kidney stone recurrent, calcium oxalate  Obesity    PAF (paroxysmal atrial fibrillation) in the setting of urosepsis, 9/2016  Reflux esophagitis 08/19/2016  Sebaceous cyst of labia 12/20/2017  Sepsis due to urinary tract infection (HCC)    Sigmoid diverticulitis 11/13/2017  Urinary tract infection    Vitamin D deficiency     PERTINENT PAST SURGICAL HISTORY:    ANKLE SURGERY- ORIF (L) ANKLE      CHOLECYSTECTOMY      CYSTOSCOPY BLADDER STONE LITHOTRIPSY      HEMORRHOIDECTOMY      LEFT NEPHROLITHOTOMY WITH STENT PLACEMENT AND LASER LITHOTRIPSY x 2  PERCUTANEOUS NEPHROSTOLITHOTOMY Left 12/2016  TUBAL ABDOMINAL LIGATION        PRIOR LEVEL OF FUNCTION:  Independent ambulation without an asst device.  Independent with ADL's & I-ADL's.  Driving.  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.      CAREGIVER:  Spouse- Joey Gerber    HOME ENVIRONMENT:  Lives with supportive spouse in patio home with2 steos + rail to exit.  Pt lives in finished lower level- 13 steps to navigate.      MENTAL STATUS:  Alert & Oriented x 4    PATIENT GOAL FOR EPISODE OF CARE:  \"To help me get my strength & " "confidence back\"    EDEMA:  None    WOUND / SKIN CONDITION:  Intact    POSTURE:  Moderate thoracic kyphosis with mild scoliosis    MUSCLE TONE/COORDINATION:  WNL    SENSATION/PROPRIOCEPTION:  Intact    DOMINANT SIDE:  RIght    STRENGTH GRADES  HIP's: 3 to 3+/5   KNEE's:  4/5   ANKLE's: 4+/5    30 SECOND SIT TO STAND: 8 - indicates increased risk for falls  (60-64: MEN<14, WOMEN<12  65-69: MEN<12. WOMEN<11  70-74: MEN<12, WOMEN<10  75-79: MEN<11, WOMEN<10  80-84: MEN<10, WOMEN<9  85-89: MEN<8, WOMEN<8  90-94: MEN<7, WOMEN<4)    TIMED UP AND GO:  25 seconds- indicates SLOWER MOBILITY    (TUG Mobility: High > 10 secTypical 10-19Slower 20-29Diminished>30    ASSESSMENT:  Patient determined at end of SOC, that she did not want to be \"homebound\".  Pt states she prefers to continue volunteering at [employer] [facility] [employer], as this is her main form of socialization.  She would like to attend outpatient therapy at [facility].  Pt not admitted for homecare services."

## 2022-12-15 ENCOUNTER — OFFICE VISIT (OUTPATIENT)
Dept: FAMILY MEDICINE CLINIC | Facility: CLINIC | Age: 72
End: 2022-12-15

## 2022-12-15 ENCOUNTER — TRANSCRIBE ORDERS (OUTPATIENT)
Dept: ADMINISTRATIVE | Facility: HOSPITAL | Age: 72
End: 2022-12-15

## 2022-12-15 VITALS
BODY MASS INDEX: 31.98 KG/M2 | RESPIRATION RATE: 15 BRPM | SYSTOLIC BLOOD PRESSURE: 120 MMHG | HEIGHT: 66 IN | OXYGEN SATURATION: 98 % | HEART RATE: 100 BPM | WEIGHT: 199 LBS | DIASTOLIC BLOOD PRESSURE: 60 MMHG | TEMPERATURE: 98.3 F

## 2022-12-15 DIAGNOSIS — I10 ESSENTIAL HYPERTENSION: ICD-10-CM

## 2022-12-15 DIAGNOSIS — N20.0 CALCULUS, RENAL: Primary | ICD-10-CM

## 2022-12-15 DIAGNOSIS — N20.0 CALCULUS OF KIDNEY: ICD-10-CM

## 2022-12-15 DIAGNOSIS — Z09 HOSPITAL DISCHARGE FOLLOW-UP: Primary | ICD-10-CM

## 2022-12-15 PROCEDURE — 1111F DSCHRG MED/CURRENT MED MERGE: CPT | Performed by: PHYSICIAN ASSISTANT

## 2022-12-15 PROCEDURE — 99495 TRANSJ CARE MGMT MOD F2F 14D: CPT | Performed by: PHYSICIAN ASSISTANT

## 2022-12-15 NOTE — PROGRESS NOTES
"Transitional Care Follow Up Visit  Subjective     Krys Mayes is a 72 y.o. female who presents for a transitional care management visit.    Within 48 business hours after discharge our office contacted her via telephone to coordinate her care and needs.      I reviewed and discussed the details of that call along with the discharge summary, hospital problems, inpatient lab results, inpatient diagnostic studies, and consultation reports with Krys.     Current outpatient and discharge medications have been reconciled for the patient.  Reviewed by: Ashleigh Hernandez PA-C      Date of TCM Phone Call 12/10/2022   [facility]   Date of Admission 12/9/2022   Date of Discharge 12/10/2022   Discharge Disposition Home-Health Care [facility]     Risk for Readmission (LACE) Score: 4 (12/10/2022  6:00 AM)      History of Present Illness   Course During Hospital Stay:  Krys,\"Danielle\", is a 72-year-old female who presents for hospital discharge follow-up for kidney stone.  States she had lithotripsy performed at [facility] on December 6, 2022.  States she was discharged home.  On December 8, 2022 she had a fall and has been was unable to get her up off floor.  EMS was called and she was sent to [facility].  There she was found to have increased generalized weakness due to a UTI.  She was admitted to hospital.  She had extensive work-up with antibiotic.  Her blood pressure medication was held due to low blood pressure readings while in hospital.  She was instructed to restart blood pressure medication after discharge.  She has been doing this.  She has been trying to drink more water.  She has had a follow-up appointment with Dr. Villegas since her discharge with removal of the stent.  She is due for repeat testing with scans in the next few weeks.  Overall she is doing better.  Danielle has refused home health care for now.  States she did not want to be \"homebound\".  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  Denied any current fevers, chills, abdominal pain, or urinary symptoms.  Appetite and sleep are returning to normal.     The following portions of the patient's history were reviewed and updated as appropriate:   She  has a past medical history of A-fib ([facility]), Allergic, Arthritis, Gastroesophageal reflux disease (01/21/2016), Hyperlipidemia, Hypertension, Kidney stone, Menopausal disorder (1998), Obesity, PAF (paroxysmal atrial fibrillation) ([facility]), Reflux esophagitis (08/19/2016), Sebaceous cyst of labia (12/20/2017), Sepsis due to urinary tract infection ([facility]), Sigmoid diverticulitis (11/13/2017), Urinary tract infection, and Vitamin D deficiency (01/21/2016).  She does not have any pertinent problems on file.  She  has a past surgical history that includes Ankle surgery; Cholecystectomy; Colonoscopy (2014); Hemorrhoid surgery; Other surgical history; Tubal ligation; cystoscopy bladder stone lithotripsy; Breast surgery; Percutaneous nephrostolithotomy (Left, 12/2016); Nephrolithotomy (Left, 1/9/2017); Breast biopsy (Right); and ureteroscopy laser lithotripsy with stent insertion (Left, 12/6/2022).  Her family history includes COPD in her mother; Heart attack in her father and son; Kidney disease in her maternal grandmother.  She  reports that she has never smoked. She has never used smokeless tobacco. She reports current alcohol use. She reports that she does not use drugs.  Current Outpatient Medications   Medication Sig Dispense Refill   • acetaminophen (TYLENOL) 650 MG 8 hr tablet Take 1 tablet by mouth every 6 (six) hours as needed. for pain     • ALPRAZolam (XANAX) 1 MG tablet TAKE 1/2 TO 1 (ONE-HALF TO ONE) TABLET BY MOUTH TWICE DAILY FOR ANXIETY (Patient taking differently: Take 1 mg by mouth 2 (Two) Times a Day. Taking 1.5 tabs at night) 60 tablet 0   • ascorbic acid (VITAMIN C) 1000 MG tablet Take 2,000 mg by mouth Daily.     • cefdinir (OMNICEF) 300 MG  capsule Take 1 capsule by mouth 2 (Two) Times a Day for 7 days. 14 capsule 0   • cyanocobalamin (VITAMIN B-12) 500 MCG tablet Take 500 mcg by mouth Daily.     • losartan (COZAAR) 100 MG tablet Take 100 mg by mouth Daily.     • meclizine (ANTIVERT) 25 MG tablet Take 1 tablet by mouth Every 6 (Six) Hours As Needed for dizziness or Nausea. 30 tablet 0   • metoprolol tartrate (LOPRESSOR) 50 MG tablet Take 75 mg by mouth 2 (Two) Times a Day.     • Multiple Vitamin tablet Take 1 tablet by mouth Daily.     • omeprazole OTC (PriLOSEC OTC) 20 MG EC tablet Take 20 mg by mouth 2 (two) times a day.     • ondansetron (Zofran) 4 MG tablet Take 1 tablet by mouth Every 6 (Six) Hours As Needed for Nausea or Vomiting. 15 tablet 0   • Probiotic Product (PROBIOTIC ADVANCED PO) Take 1 tablet by mouth Daily.     • sertraline (ZOLOFT) 100 MG tablet Take 1 tablet by mouth once daily (Patient taking differently: Take 100 mg by mouth Daily.) 90 tablet 0   • simvastatin (ZOCOR) 80 MG tablet Take 1 tablet by mouth Every Evening. 90 tablet 3   • triamcinolone (KENALOG) 0.1 % cream 1 application As Needed.     • Xarelto 15 MG tablet Take 1 tablet by mouth once daily 90 tablet 0     No current facility-administered medications for this visit.     Current Outpatient Medications on File Prior to Visit   Medication Sig   • acetaminophen (TYLENOL) 650 MG 8 hr tablet Take 1 tablet by mouth every 6 (six) hours as needed. for pain   • ALPRAZolam (XANAX) 1 MG tablet TAKE 1/2 TO 1 (ONE-HALF TO ONE) TABLET BY MOUTH TWICE DAILY FOR ANXIETY (Patient taking differently: Take 1 mg by mouth 2 (Two) Times a Day. Taking 1.5 tabs at night)   • ascorbic acid (VITAMIN C) 1000 MG tablet Take 2,000 mg by mouth Daily.   • cefdinir (OMNICEF) 300 MG capsule Take 1 capsule by mouth 2 (Two) Times a Day for 7 days.   • cyanocobalamin (VITAMIN B-12) 500 MCG tablet Take 500 mcg by mouth Daily.   • losartan (COZAAR) 100 MG tablet Take 100 mg by mouth Daily.   • meclizine  (ANTIVERT) 25 MG tablet Take 1 tablet by mouth Every 6 (Six) Hours As Needed for dizziness or Nausea.   • metoprolol tartrate (LOPRESSOR) 50 MG tablet Take 75 mg by mouth 2 (Two) Times a Day.   • Multiple Vitamin tablet Take 1 tablet by mouth Daily.   • omeprazole OTC (PriLOSEC OTC) 20 MG EC tablet Take 20 mg by mouth 2 (two) times a day.   • ondansetron (Zofran) 4 MG tablet Take 1 tablet by mouth Every 6 (Six) Hours As Needed for Nausea or Vomiting.   • Probiotic Product (PROBIOTIC ADVANCED PO) Take 1 tablet by mouth Daily.   • sertraline (ZOLOFT) 100 MG tablet Take 1 tablet by mouth once daily (Patient taking differently: Take 100 mg by mouth Daily.)   • simvastatin (ZOCOR) 80 MG tablet Take 1 tablet by mouth Every Evening.   • triamcinolone (KENALOG) 0.1 % cream 1 application As Needed.   • Xarelto 15 MG tablet Take 1 tablet by mouth once daily   • [DISCONTINUED] oxyCODONE-acetaminophen (Percocet) 5-325 MG per tablet Take half a tablet as needed for severe pain. May take the other half after 30 minutes if still in severe pain. Do not drive on this medication.   • [DISCONTINUED] phenazopyridine (Pyridium) 200 MG tablet Take 1 tablet by mouth 3 (Three) Times a Day As Needed for Bladder Spasms.     No current facility-administered medications on file prior to visit.     She is allergic to morphine and related, ciprofloxacin-ciproflox hcl er, flomax [tamsulosin hcl], hydrocodone, latex, lortab [hydrocodone-acetaminophen], penicillins, phenergan [promethazine hcl], sulfa antibiotics, amoxicillin, and tamsulosin..    Review of Systems   Constitutional: Negative.    HENT: Negative.    Eyes: Negative.    Respiratory: Negative.    Cardiovascular: Negative.    Gastrointestinal: Negative.    Endocrine: Negative.    Genitourinary: Negative.    Musculoskeletal: Negative.    Allergic/Immunologic: Negative.    Neurological: Negative.    Hematological: Negative.    Psychiatric/Behavioral: Negative.    All other systems reviewed  and are negative.      Current outpatient and discharge medications have been reconciled for the patient.  Reviewed by: Ashleigh Hernandez PA-C  Objective   Physical Exam  Vitals and nursing note reviewed.   Constitutional:       Appearance: Normal appearance. She is well-developed and well-groomed. She is obese.      Interventions: Face mask in place.      Comments: Ambulating with walker   HENT:      Head: Normocephalic and atraumatic.   Neck:      Vascular: No carotid bruit.      Trachea: Trachea and phonation normal. No tracheal tenderness.   Cardiovascular:      Rate and Rhythm: Normal rate and regular rhythm.      Pulses: Normal pulses.      Heart sounds: Normal heart sounds, S1 normal and S2 normal. No murmur heard.  Pulmonary:      Effort: Pulmonary effort is normal.      Breath sounds: Normal breath sounds and air entry.   Abdominal:      General: Bowel sounds are normal.      Palpations: Abdomen is soft. There is no hepatomegaly.      Tenderness: There is no abdominal tenderness. There is no right CVA tenderness, left CVA tenderness, guarding or rebound. Negative signs include Contreras's sign, Rovsing's sign, McBurney's sign, psoas sign and obturator sign.   Musculoskeletal:      Cervical back: Neck supple.      Right lower leg: No edema.      Left lower leg: No edema.   Skin:     General: Skin is warm and dry.      Capillary Refill: Capillary refill takes less than 2 seconds.   Neurological:      Mental Status: She is alert and oriented to person, place, and time.   Psychiatric:         Attention and Perception: Attention and perception normal.         Mood and Affect: Mood and affect normal.         Speech: Speech normal.         Behavior: Behavior normal. Behavior is cooperative.         Thought Content: Thought content normal.         Cognition and Memory: Cognition and memory normal.         Judgment: Judgment normal.     I wore a facial mask during this patient encounter.  Patient also wearing a  surgical mask.  Hand hygiene performed before and after seeing the patient.       ED to Hosp-Admission (Discharged) with Olga Inman MD; Garry Caba MD (12/08/2022)   CT Abdomen Pelvis Without Contrast (11/26/2022 15:39)       Assessment & Plan   Problems Addressed this Visit        Cardiac and Vasculature    Essential hypertension       Genitourinary and Reproductive     Calculus of kidney       Health Encounters    Hospital discharge follow-up - Primary   Diagnoses       Codes Comments    Hospital discharge follow-up    -  Primary ICD-10-CM: Z09  ICD-9-CM: V67.59     Calculus of kidney     ICD-10-CM: N20.0  ICD-9-CM: 592.0     Essential hypertension     ICD-10-CM: I10  ICD-9-CM: 401.9         1. New  Hospital discharge follow-up for kidney stone: I have reviewed hospital discharge paperwork, imaging, and laboratory results with her at office visit today.  Danielle  will keep follow-up appointments with her urologist.  She is will finish her antibiotic.  2.  Chronic and stable hypertension: I have rechecked blood pressure and got 120/60 in the left arm.  She will continue her current blood pressure medication at home as directed.         MANI Martinez [facility]  [address] [address]  Dept: [phone]  Dept Fax: [phone]  Loc: [phone]  Loc Fax: [phone]

## 2022-12-15 NOTE — PROGRESS NOTES
"Chief Complaint  Hospital Follow Up Visit    Subjective     {Problem List  Visit Diagnosis   Encounters  Notes  Medications  Labs  Result Review Imaging  Media :23}     Krys Mayes presents to [facility]  History of Present Illness       Objective   Vital Signs:   Pulse (!) 131   Temp 98.3 °F (36.8 °C)   Ht 167.6 cm (66\")   Wt 90.3 kg (199 lb)   SpO2 99%   BMI 32.12 kg/m²     Physical Exam   Result Review :{Labs  Result Review  Imaging  Med Tab  Media :23}   {The following data was reviewed by (Optional):10157}  {Ambulatory Labs (Optional):90161}  {Data reviewed (Optional):71978:::1}          Assessment and Plan {CC Problem List  Visit Diagnosis  ROS  Review (Popup)  Health Maintenance  Quality  BestPractice  Medications  SmartSets  SnapShot Encounters  Media :23}   Diagnoses and all orders for this visit:    1. Hospital discharge follow-up (Primary)      {Time Spent (Optional):34707}  Follow Up {Instructions Charge Capture  Follow-up Communications :23}  No follow-ups on file.  Patient was given instructions and counseling regarding her condition or for health maintenance advice. Please see specific information pulled into the AVS if appropriate.     MANI Martinez [facility]  [address] [address]  Dept: [phone]  Dept Fax: [phone]  Loc: [phone]  Loc Fax: [phone]        "

## 2022-12-23 ENCOUNTER — HOSPITAL ENCOUNTER (OUTPATIENT)
Dept: MAMMOGRAPHY | Facility: HOSPITAL | Age: 72
End: 2022-12-23

## 2023-01-13 ENCOUNTER — APPOINTMENT (OUTPATIENT)
Dept: MAMMOGRAPHY | Facility: HOSPITAL | Age: 73
End: 2023-01-13

## 2023-01-13 ENCOUNTER — HOSPITAL ENCOUNTER (OUTPATIENT)
Dept: MAMMOGRAPHY | Facility: HOSPITAL | Age: 73
Discharge: HOME | End: 2023-01-13
Admitting: PHYSICIAN ASSISTANT
Payer: MEDICARE

## 2023-01-13 DIAGNOSIS — Z12.31 VISIT FOR SCREENING MAMMOGRAM: ICD-10-CM

## 2023-01-13 PROCEDURE — 77063 BREAST TOMOSYNTHESIS BI: CPT

## 2023-01-13 PROCEDURE — 77067 SCR MAMMO BI INCL CAD: CPT

## 2023-01-17 ENCOUNTER — HOSPITAL ENCOUNTER (OUTPATIENT)
Dept: ULTRASOUND IMAGING | Facility: HOSPITAL | Age: 73
Discharge: HOME | End: 2023-01-17
Admitting: UROLOGY
Payer: MEDICARE

## 2023-01-17 DIAGNOSIS — N20.0 CALCULUS, RENAL: ICD-10-CM

## 2023-01-17 PROCEDURE — 76775 US EXAM ABDO BACK WALL LIM: CPT

## 2023-01-18 DIAGNOSIS — F41.9 ANXIETY: ICD-10-CM

## 2023-01-18 RX ORDER — ALPRAZOLAM 1 MG/1
TABLET ORAL
Qty: 60 TABLET | Refills: 0 | Status: SHIPPED | OUTPATIENT
Start: 2023-01-18 | End: 2023-02-27

## 2023-01-20 RX ORDER — RIVAROXABAN 15 MG/1
TABLET, FILM COATED ORAL
Qty: 90 TABLET | Refills: 0 | Status: SHIPPED | OUTPATIENT
Start: 2023-01-20

## 2023-02-06 RX ORDER — METOPROLOL TARTRATE 75 MG/1
75 TABLET, FILM COATED ORAL 2 TIMES DAILY
Qty: 180 TABLET | Refills: 2 | Status: SHIPPED | OUTPATIENT
Start: 2023-02-06

## 2023-02-21 RX ORDER — SERTRALINE HYDROCHLORIDE 100 MG/1
TABLET, FILM COATED ORAL
Qty: 90 TABLET | Refills: 0 | Status: SHIPPED | OUTPATIENT
Start: 2023-02-21

## 2023-02-22 DIAGNOSIS — E78.2 MIXED HYPERLIPIDEMIA: Primary | ICD-10-CM

## 2023-02-22 DIAGNOSIS — F41.1 GAD (GENERALIZED ANXIETY DISORDER): ICD-10-CM

## 2023-02-22 DIAGNOSIS — R73.03 PREDIABETES: ICD-10-CM

## 2023-02-22 DIAGNOSIS — Z79.899 LONG-TERM USE OF HIGH-RISK MEDICATION: ICD-10-CM

## 2023-02-22 DIAGNOSIS — I10 ESSENTIAL HYPERTENSION: ICD-10-CM

## 2023-02-25 DIAGNOSIS — F41.9 ANXIETY: ICD-10-CM

## 2023-02-27 RX ORDER — ALPRAZOLAM 1 MG/1
TABLET ORAL
Qty: 60 TABLET | Refills: 0 | Status: SHIPPED | OUTPATIENT
Start: 2023-02-27 | End: 2023-03-27 | Stop reason: SDUPTHER

## 2023-03-02 LAB
ALBUMIN SERPL-MCNC: 4.5 G/DL (ref 3.7–4.7)
ALBUMIN/GLOB SERPL: 1.6 {RATIO} (ref 1.2–2.2)
ALP SERPL-CCNC: 58 IU/L (ref 44–121)
ALT SERPL-CCNC: 10 IU/L (ref 0–32)
AST SERPL-CCNC: 16 IU/L (ref 0–40)
BASOPHILS # BLD AUTO: 0 X10E3/UL (ref 0–0.2)
BASOPHILS NFR BLD AUTO: 1 %
BILIRUB SERPL-MCNC: 0.3 MG/DL (ref 0–1.2)
BUN SERPL-MCNC: 23 MG/DL (ref 8–27)
BUN/CREAT SERPL: 19 (ref 12–28)
CALCIUM SERPL-MCNC: 9.6 MG/DL (ref 8.7–10.3)
CHLORIDE SERPL-SCNC: 100 MMOL/L (ref 96–106)
CHOLEST SERPL-MCNC: 184 MG/DL (ref 100–199)
CO2 SERPL-SCNC: 24 MMOL/L (ref 20–29)
CREAT SERPL-MCNC: 1.21 MG/DL (ref 0.57–1)
EGFRCR SERPLBLD CKD-EPI 2021: 47 ML/MIN/1.73
EOSINOPHIL # BLD AUTO: 0.2 X10E3/UL (ref 0–0.4)
EOSINOPHIL NFR BLD AUTO: 3 %
ERYTHROCYTE [DISTWIDTH] IN BLOOD BY AUTOMATED COUNT: 13.4 % (ref 11.7–15.4)
GLOBULIN SER CALC-MCNC: 2.8 G/DL (ref 1.5–4.5)
GLUCOSE SERPL-MCNC: 98 MG/DL (ref 70–99)
HBA1C MFR BLD: 5.6 % (ref 4.8–5.6)
HCT VFR BLD AUTO: 41 % (ref 34–46.6)
HDLC SERPL-MCNC: 68 MG/DL
HGB BLD-MCNC: 12.3 G/DL (ref 11.1–15.9)
IMM GRANULOCYTES # BLD AUTO: 0 X10E3/UL (ref 0–0.1)
IMM GRANULOCYTES NFR BLD AUTO: 0 %
LDLC SERPL CALC-MCNC: 79 MG/DL (ref 0–99)
LDLC/HDLC SERPL: 1.2 RATIO (ref 0–3.2)
LYMPHOCYTES # BLD AUTO: 1.6 X10E3/UL (ref 0.7–3.1)
LYMPHOCYTES NFR BLD AUTO: 25 %
MCH RBC QN AUTO: 25 PG (ref 26.6–33)
MCHC RBC AUTO-ENTMCNC: 30 G/DL (ref 31.5–35.7)
MCV RBC AUTO: 83 FL (ref 79–97)
MONOCYTES # BLD AUTO: 0.8 X10E3/UL (ref 0.1–0.9)
MONOCYTES NFR BLD AUTO: 12 %
NEUTROPHILS # BLD AUTO: 3.9 X10E3/UL (ref 1.4–7)
NEUTROPHILS NFR BLD AUTO: 59 %
PLATELET # BLD AUTO: 299 X10E3/UL (ref 150–450)
POTASSIUM SERPL-SCNC: 4.7 MMOL/L (ref 3.5–5.2)
PROT SERPL-MCNC: 7.3 G/DL (ref 6–8.5)
RBC # BLD AUTO: 4.92 X10E6/UL (ref 3.77–5.28)
SODIUM SERPL-SCNC: 138 MMOL/L (ref 134–144)
TRIGL SERPL-MCNC: 227 MG/DL (ref 0–149)
VLDLC SERPL CALC-MCNC: 37 MG/DL (ref 5–40)
WBC # BLD AUTO: 6.6 X10E3/UL (ref 3.4–10.8)

## 2023-03-10 LAB — DRUGS UR: NORMAL

## 2023-03-27 ENCOUNTER — OFFICE VISIT (OUTPATIENT)
Dept: FAMILY MEDICINE CLINIC | Facility: CLINIC | Age: 73
End: 2023-03-27
Payer: MEDICARE

## 2023-03-27 VITALS
RESPIRATION RATE: 15 BRPM | BODY MASS INDEX: 32.3 KG/M2 | SYSTOLIC BLOOD PRESSURE: 126 MMHG | OXYGEN SATURATION: 96 % | HEART RATE: 56 BPM | HEIGHT: 66 IN | TEMPERATURE: 97.7 F | WEIGHT: 201 LBS | DIASTOLIC BLOOD PRESSURE: 70 MMHG

## 2023-03-27 DIAGNOSIS — I10 ESSENTIAL HYPERTENSION: ICD-10-CM

## 2023-03-27 DIAGNOSIS — F41.8 MIXED ANXIETY DEPRESSIVE DISORDER: ICD-10-CM

## 2023-03-27 DIAGNOSIS — E78.2 MIXED HYPERLIPIDEMIA: ICD-10-CM

## 2023-03-27 DIAGNOSIS — F41.9 ANXIETY: ICD-10-CM

## 2023-03-27 DIAGNOSIS — R73.03 PREDIABETES: Primary | ICD-10-CM

## 2023-03-27 PROCEDURE — 99214 OFFICE O/P EST MOD 30 MIN: CPT | Performed by: PHYSICIAN ASSISTANT

## 2023-03-27 PROCEDURE — 3074F SYST BP LT 130 MM HG: CPT | Performed by: PHYSICIAN ASSISTANT

## 2023-03-27 PROCEDURE — 3078F DIAST BP <80 MM HG: CPT | Performed by: PHYSICIAN ASSISTANT

## 2023-03-27 PROCEDURE — 3044F HG A1C LEVEL LT 7.0%: CPT | Performed by: PHYSICIAN ASSISTANT

## 2023-03-27 RX ORDER — ALPRAZOLAM 1 MG/1
TABLET ORAL
Qty: 60 TABLET | Refills: 0 | Status: SHIPPED | OUTPATIENT
Start: 2023-03-27

## 2023-03-27 RX ORDER — ALPRAZOLAM 1 MG/1
TABLET ORAL
Qty: 60 TABLET | Refills: 0 | OUTPATIENT
Start: 2023-03-27

## 2023-03-27 RX ORDER — TRIMETHOPRIM 100 MG/1
1 TABLET ORAL DAILY
COMMUNITY
Start: 2023-01-25

## 2023-03-27 NOTE — PROGRESS NOTES
"Chief Complaint  Prediabetes (management), Hyperlipidemia (management), Depression (management), and Anxiety (Management)    Subjective          Krys LAURA Mayes presents to [facility]  History of Present Illness  Krys,\"Danielle\",is a 73 year old female who presents for prediabetes, hyperlipidemia and depression/anxiety management.  Danielle has gained 2 pounds since last office visit.  States her diet has not been as healthy.  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  Does not do formal exercise.  Sleep has been normal.  Doing well with the Xanax medication.  Takes daily as directed.  Denied any suicidal or homicidal ideation.  Bowel movements have been regular without dark black tarry stools.  Denied any chest pain, shortness of air, cough, wheezing, abdominal pain, GI upset, or swelling of ankles.  Review of Systems   Respiratory: Negative.    Cardiovascular: Negative.    Gastrointestinal: Negative.    Neurological: Negative.    Psychiatric/Behavioral: Negative.  Negative for sleep disturbance and suicidal ideas.   All other systems reviewed and are negative.    Objective   Vital Signs:   /70   Pulse 56   Temp 97.7 °F (36.5 °C)   Resp 15   Ht 167.6 cm (66\")   Wt 91.2 kg (201 lb)   SpO2 96%   BMI 32.44 kg/m²     Physical Exam  Vitals and nursing note reviewed.   Constitutional:       Appearance: Normal appearance. She is well-developed and well-groomed. She is obese.   HENT:      Head: Normocephalic and atraumatic.   Neck:      Thyroid: No thyroid mass, thyromegaly or thyroid tenderness.      Vascular: No carotid bruit.      Trachea: Trachea and phonation normal. No tracheal tenderness.   Cardiovascular:      Rate and Rhythm: Normal rate and regular rhythm.      Pulses: Normal pulses.      Heart sounds: Normal heart sounds, S1 normal and S2 normal. No murmur heard.  Pulmonary:      Effort: Pulmonary effort is normal.      Breath sounds: Normal breath " sounds and air entry.   Abdominal:      General: Bowel sounds are normal.      Palpations: Abdomen is soft. There is no hepatomegaly.      Tenderness: There is no abdominal tenderness.   Musculoskeletal:      Cervical back: Neck supple.      Right lower leg: No edema.      Left lower leg: No edema.   Skin:     General: Skin is warm and dry.      Capillary Refill: Capillary refill takes less than 2 seconds.   Neurological:      Mental Status: She is alert and oriented to person, place, and time.   Psychiatric:         Attention and Perception: Attention and perception normal.         Mood and Affect: Mood and affect normal.         Speech: Speech normal.         Behavior: Behavior normal. Behavior is cooperative.         Thought Content: Thought content normal.         Cognition and Memory: Cognition and memory normal.         Judgment: Judgment normal.        Result Review :     CMP    CMP 12/9/22 12/10/22 3/1/23   Glucose 124 (A) 98 98   BUN 30 (A) 24 (A) 23   Creatinine 0.86 0.91 1.21 (A)   eGFR 71.4 66.8    Sodium 132 (A) 141 138   Potassium 4.2 4.3 4.7   Chloride 99 107 100   Calcium 8.6 8.7 9.6   Total Protein   7.3   Albumin   4.5   Globulin   2.8   Total Bilirubin   0.3   Alkaline Phosphatase   58   AST (SGOT)   16   ALT (SGPT)   10   BUN/Creatinine Ratio 34.9 (A) 26.4 (A) 19   Anion Gap 11.7 10.0    (A) Abnormal value       Comments are available for some flowsheets but are not being displayed.           CBC w/diff    CBC w/Diff 12/9/22 12/10/22 3/1/23   WBC 17.82 (A) 11.20 (A) 6.6   RBC 4.15 4.12 4.92   Hemoglobin 10.9 (A) 10.6 (A) 12.3   Hematocrit 34.9 34.6 41.0   MCV 84.1 84.0 83   MCH 26.3 (A) 25.7 (A) 25.0 (A)   MCHC 31.2 (A) 30.6 (A) 30.0 (A)   RDW 14.6 14.4 13.4   Platelets 189 204 299   Neutrophil Rel % 89.0 (A) 83.7 (A) 59   Immature Granulocyte Rel % 0.8 (A) 0.5    Lymphocyte Rel % 4.4 (A) 6.5 (A) 25   Monocyte Rel % 5.4 7.5 12   Eosinophil Rel % 0.2 (A) 1.5 3   Basophil Rel % 0.2 0.3 1   (A)  Abnormal value            Lipid Panel    Lipid Panel 9/2/22 3/1/23   Total Cholesterol 163 184   Triglycerides 133 227 (A)   HDL Cholesterol 68 (A) 68   VLDL Cholesterol 23 37   LDL Cholesterol  72 79   LDL/HDL Ratio 1.01 1.2   (A) Abnormal value       Comments are available for some flowsheets but are not being displayed.               Most Recent A1C    HGBA1C Most Recent 3/1/23   Hemoglobin A1C 5.6      Comments are available for some flowsheets but are not being displayed.                     Assessment and Plan    Diagnoses and all orders for this visit:    1. Prediabetes (Primary)    2. Mixed hyperlipidemia    3. Essential hypertension    4. Mixed anxiety depressive disorder    5. Anxiety  -     ALPRAZolam (XANAX) 1 MG tablet; take 0.5 to one tablet twice a day as needed  Dispense: 60 tablet; Refill: 0      1.  Chronic and stable prediabetes with hyperlipidemia: I have reviewed above blood work with her today.  A1c has returned to normal.  Triglycerides were slightly elevated.  Stressed the importance of decreasing sugar and carbohydrates in diet.  We will recheck fasting lab work in 6 months with Medicare wellness exam.  2.  Chronic and stable hypertension: Have rechecked blood pressure and got 110/68 in left arm.  Continue current medication at home as directed.  3.  Chronic and stable mixed anxiety/depression disorder: Doing well with the Xanax medication.  Urine drug screen is current.  See below for Hector information.  Danielle has signed the appropriate agreement and consent forms for the Xanax medication.  I have sent a refill of Xanax to pharmacy.  Follow-up in 6 months.        As part of this patient's treatment plan I am prescribing controlled substances.  The patient has been made aware of appropriate use of such medications, including potential risk of somnolence. Limited ability to drive and/or work safely, and potential for dependence or overdose.  It has also been made clear that these  medications are for use by this patient only, without concomitant use of alcohol or other substances unless prescribed.  SHAREE report has been reviewed and scanned into the patient's chart.  Sharee number is [id number] dated March 27, 2023.    I spent 22 minutes caring for Krys on this date of service. This time includes time spent by me in the following activities:preparing for the visit, reviewing tests, obtaining and/or reviewing a separately obtained history, performing a medically appropriate examination and/or evaluation , counseling and educating the patient/family/caregiver, ordering medications, tests, or procedures and documenting information in the medical record  Follow Up   Return in about 6 months (around 9/27/2023), or labs prior, for Medicare Wellness.  Patient was given instructions and counseling regarding her condition or for health maintenance advice. Please see specific information pulled into the AVS if appropriate.     MANI Martinez [facility]  [address] [place] [id number]  Dept: [phone]  Dept Fax: [phone]  Loc: [phone]  Loc Fax: [phone]        Answers for HPI/ROS submitted by the patient on 3/20/2023  What is the primary reason for your visit?: Physical

## 2023-03-28 NOTE — PROGRESS NOTES
I, Dr. Lior Piña, have reviewed the notes, assessments, and/or procedures performed by Ashleigh MOON, that occurred within our practice at [facility] location, I concur with her documentation of Krys Mayes. I have a current collaborative medical agreement with Ashleigh MOON.

## 2023-04-20 RX ORDER — RIVAROXABAN 15 MG/1
TABLET, FILM COATED ORAL
Qty: 90 TABLET | Refills: 0 | Status: SHIPPED | OUTPATIENT
Start: 2023-04-20

## 2023-05-03 DIAGNOSIS — F41.9 ANXIETY: ICD-10-CM

## 2023-05-03 RX ORDER — ALPRAZOLAM 1 MG/1
TABLET ORAL
Qty: 60 TABLET | Refills: 0 | Status: SHIPPED | OUTPATIENT
Start: 2023-05-03

## 2023-05-03 RX ORDER — SERTRALINE HYDROCHLORIDE 100 MG/1
TABLET, FILM COATED ORAL
Qty: 90 TABLET | Refills: 2 | Status: SHIPPED | OUTPATIENT
Start: 2023-05-03

## 2023-05-22 ENCOUNTER — TRANSCRIBE ORDERS (OUTPATIENT)
Dept: ADMINISTRATIVE | Facility: HOSPITAL | Age: 73
End: 2023-05-22
Payer: MEDICARE

## 2023-05-22 ENCOUNTER — HOSPITAL ENCOUNTER (OUTPATIENT)
Dept: GENERAL RADIOLOGY | Facility: HOSPITAL | Age: 73
Discharge: HOME | End: 2023-05-22
Admitting: UROLOGY
Payer: MEDICARE

## 2023-05-22 DIAGNOSIS — N20.0 URIC ACID NEPHROLITHIASIS: Primary | ICD-10-CM

## 2023-05-22 DIAGNOSIS — N20.0 URIC ACID NEPHROLITHIASIS: ICD-10-CM

## 2023-05-22 PROCEDURE — 74018 RADEX ABDOMEN 1 VIEW: CPT

## 2023-07-11 PROBLEM — Z09 HOSPITAL DISCHARGE FOLLOW-UP: Status: RESOLVED | Noted: 2022-12-14 | Resolved: 2023-07-11

## 2023-08-01 DIAGNOSIS — F41.9 ANXIETY: ICD-10-CM

## 2023-08-02 RX ORDER — ALPRAZOLAM 1 MG/1
TABLET ORAL
Qty: 60 TABLET | Refills: 0 | Status: SHIPPED | OUTPATIENT
Start: 2023-08-02

## 2023-08-09 DIAGNOSIS — E78.2 MIXED HYPERLIPIDEMIA: ICD-10-CM

## 2023-08-09 RX ORDER — SIMVASTATIN 80 MG
TABLET ORAL
Qty: 90 TABLET | Refills: 0 | OUTPATIENT
Start: 2023-08-09

## 2023-08-16 DIAGNOSIS — E78.2 MIXED HYPERLIPIDEMIA: ICD-10-CM

## 2023-08-16 RX ORDER — SIMVASTATIN 80 MG
80 TABLET ORAL EVERY EVENING
Qty: 90 TABLET | Refills: 0 | Status: SHIPPED | OUTPATIENT
Start: 2023-08-16

## 2023-09-05 DIAGNOSIS — F41.9 ANXIETY: ICD-10-CM

## 2023-09-06 RX ORDER — ALPRAZOLAM 1 MG/1
TABLET ORAL
Qty: 60 TABLET | Refills: 0 | Status: SHIPPED | OUTPATIENT
Start: 2023-09-06

## 2023-09-13 ENCOUNTER — OFFICE VISIT (OUTPATIENT)
Dept: FAMILY MEDICINE CLINIC | Facility: CLINIC | Age: 73
End: 2023-09-13
Payer: MEDICARE

## 2023-09-13 VITALS
TEMPERATURE: 97.8 F | BODY MASS INDEX: 33.75 KG/M2 | WEIGHT: 210 LBS | HEART RATE: 94 BPM | HEIGHT: 66 IN | SYSTOLIC BLOOD PRESSURE: 140 MMHG | DIASTOLIC BLOOD PRESSURE: 80 MMHG | OXYGEN SATURATION: 97 %

## 2023-09-13 DIAGNOSIS — G57.01 PIRIFORMIS SYNDROME OF RIGHT SIDE: Primary | ICD-10-CM

## 2023-09-13 PROCEDURE — 3044F HG A1C LEVEL LT 7.0%: CPT | Performed by: FAMILY MEDICINE

## 2023-09-13 PROCEDURE — 1159F MED LIST DOCD IN RCRD: CPT | Performed by: FAMILY MEDICINE

## 2023-09-13 PROCEDURE — 99213 OFFICE O/P EST LOW 20 MIN: CPT | Performed by: FAMILY MEDICINE

## 2023-09-13 PROCEDURE — 3079F DIAST BP 80-89 MM HG: CPT | Performed by: FAMILY MEDICINE

## 2023-09-13 PROCEDURE — 1160F RVW MEDS BY RX/DR IN RCRD: CPT | Performed by: FAMILY MEDICINE

## 2023-09-13 PROCEDURE — 3077F SYST BP >= 140 MM HG: CPT | Performed by: FAMILY MEDICINE

## 2023-09-14 NOTE — PROGRESS NOTES
Subjective   Krystor Mayes is a 73 y.o. female with   Chief Complaint   Patient presents with    Back Pain     Low back pain started Monday afternoon.   .    Back Pain     73-year-old white female with 2-day history of low back pain primarily on the right.  There are no radicular symptoms.  Patient denies trauma or initiating event.  Back somewhat spontaneously began to hurt and she is trying to be reassured that this is not anything significant.  There is documented degenerative disc disease of the lumbar spine dating back to x-rays taken in 2016.  The following portions of the patient's history were reviewed and updated as appropriate: allergies, current medications, past family history, past medical history, past social history, past surgical history and problem list.    Review of Systems   Musculoskeletal:  Positive for back pain.     Objective     Vitals:    09/13/23 0934   BP: 140/80   Pulse: 94   Temp: 97.8 °F (36.6 °C)   SpO2: 97%       No results found for this or any previous visit (from the past 672 hour(s)).    Physical Exam  Vitals and nursing note reviewed.   Constitutional:       Appearance: Normal appearance. She is well-developed and well-groomed.   HENT:      Head: Normocephalic and atraumatic.   Musculoskeletal:      Cervical back: Neck supple.      Comments: Lumbar spine is nontender to palpation.  Point of maximal tenderness is in the right SI region and this pain is somewhat significant.  No evidence of sciatic notch tenderness.   Skin:     General: Skin is warm and dry.      Findings: No rash.   Neurological:      Mental Status: She is alert and oriented to person, place, and time.      Sensory: Sensation is intact.      Motor: Motor function is intact.      Deep Tendon Reflexes: Reflexes are normal and symmetric.   Psychiatric:         Attention and Perception: Attention and perception normal.         Mood and Affect: Mood and affect normal.         Speech: Speech normal.          Behavior: Behavior normal. Behavior is cooperative.         Thought Content: Thought content normal.         Cognition and Memory: Cognition and memory normal.         Judgment: Judgment normal.       Assessment & Plan   Diagnoses and all orders for this visit:    1. Piriformis syndrome of right side (Primary)  -     Ambulatory Referral to Physical Therapy Evaluate and treat    Piriformis stretching was taught in the office with patient voicing understanding and will try to comply at home pending physical therapy.    Return if symptoms worsen or fail to improve.

## 2023-09-22 DIAGNOSIS — E78.2 MIXED HYPERLIPIDEMIA: Primary | ICD-10-CM

## 2023-09-22 DIAGNOSIS — I10 ESSENTIAL HYPERTENSION: ICD-10-CM

## 2023-09-22 DIAGNOSIS — R73.03 PREDIABETES: ICD-10-CM

## 2023-10-01 DIAGNOSIS — F41.9 ANXIETY: ICD-10-CM

## 2023-10-02 RX ORDER — ALPRAZOLAM 1 MG/1
TABLET ORAL
Qty: 60 TABLET | Refills: 0 | Status: SHIPPED | OUTPATIENT
Start: 2023-10-02

## 2023-10-03 ENCOUNTER — TRANSCRIBE ORDERS (OUTPATIENT)
Dept: ADMINISTRATIVE | Facility: HOSPITAL | Age: 73
End: 2023-10-03
Payer: MEDICARE

## 2023-10-03 DIAGNOSIS — Z12.31 VISIT FOR SCREENING MAMMOGRAM: Primary | ICD-10-CM

## 2023-10-04 ENCOUNTER — OFFICE VISIT (OUTPATIENT)
Dept: FAMILY MEDICINE CLINIC | Facility: CLINIC | Age: 73
End: 2023-10-04
Payer: MEDICARE

## 2023-10-04 VITALS
HEIGHT: 66 IN | SYSTOLIC BLOOD PRESSURE: 120 MMHG | WEIGHT: 213 LBS | HEART RATE: 57 BPM | BODY MASS INDEX: 34.23 KG/M2 | DIASTOLIC BLOOD PRESSURE: 74 MMHG | TEMPERATURE: 98.2 F | OXYGEN SATURATION: 97 %

## 2023-10-04 DIAGNOSIS — E78.2 MIXED HYPERLIPIDEMIA: ICD-10-CM

## 2023-10-04 DIAGNOSIS — Z00.00 MEDICARE ANNUAL WELLNESS VISIT, SUBSEQUENT: Primary | ICD-10-CM

## 2023-10-04 DIAGNOSIS — R73.03 PREDIABETES: ICD-10-CM

## 2023-10-04 DIAGNOSIS — I10 ESSENTIAL HYPERTENSION: ICD-10-CM

## 2023-10-04 DIAGNOSIS — Z78.0 POSTMENOPAUSE: ICD-10-CM

## 2023-10-04 DIAGNOSIS — F41.8 MIXED ANXIETY DEPRESSIVE DISORDER: ICD-10-CM

## 2023-10-04 PROBLEM — Z23 NEED FOR PNEUMOCOCCAL VACCINATION: Status: RESOLVED | Noted: 2017-05-11 | Resolved: 2023-10-04

## 2023-10-04 PROCEDURE — 1170F FXNL STATUS ASSESSED: CPT | Performed by: PHYSICIAN ASSISTANT

## 2023-10-04 PROCEDURE — 3078F DIAST BP <80 MM HG: CPT | Performed by: PHYSICIAN ASSISTANT

## 2023-10-04 PROCEDURE — G0439 PPPS, SUBSEQ VISIT: HCPCS | Performed by: PHYSICIAN ASSISTANT

## 2023-10-04 PROCEDURE — 3044F HG A1C LEVEL LT 7.0%: CPT | Performed by: PHYSICIAN ASSISTANT

## 2023-10-04 PROCEDURE — 3074F SYST BP LT 130 MM HG: CPT | Performed by: PHYSICIAN ASSISTANT

## 2023-10-04 RX ORDER — PREDNISOLONE ACETATE 10 MG/ML
SUSPENSION/ DROPS OPHTHALMIC
COMMUNITY
Start: 2023-10-01

## 2023-10-04 RX ORDER — KETOROLAC TROMETHAMINE 5 MG/ML
SOLUTION OPHTHALMIC
COMMUNITY
Start: 2023-08-25

## 2023-10-04 RX ORDER — GABAPENTIN 300 MG/1
300 CAPSULE ORAL
COMMUNITY
Start: 2023-09-29

## 2023-10-04 NOTE — PROGRESS NOTES
I, Dr. Lior Piña, have reviewed the notes, assessments, and/or procedures performed by Ashleigh MOON, that occurred within our practice at [facility] location, I concur with her documentation of Krys Mayes. I have a current collaborative medical agreement with Ashleigh MOON.

## 2023-10-04 NOTE — PROGRESS NOTES
The ABCs of the Annual Wellness Visit  Subsequent Medicare Wellness Visit    Chief Complaint   Patient presents with    Medicare Wellness-subsequent    Prediabetes     management    Hyperlipidemia     management    Depression     management    Anxiety     management    Hypertension     management     Subjective      Krystor Mayes is a 73 y.o. female who presents for a Subsequent Medicare Wellness Visit, prediabetes, hyperlipidemia, hypertension, depression and anxiety.  She has gained 12 pounds since March 202 3 office visit.  Her diet has been decreasing portion sizes.  She has been trying to cut back on sugar and carbohydrates.  Does not exercise regularly.  Sleep has been normal.  Bowel movements are daily without dark black tarry stools.  Her vision and dental exams are current.  Will be having left cataract surgery on October 19, 2023 by Dr. Fagan.  Currently sees Dr. Valles, podiatrist for her feet issue.  Currently taking gabapentin.  Danielle denied any current chest pain, shortness of air, cough, wheezing, abdominal pain, fevers, chills, or swelling of ankles.  Does see Dr. Mati Villegas for chronic urinary issues.  Had flu shot recently at her pharmacy.  Will get updated COVID-vaccine today.  Doing well with her sertraline and Xanax medication.  Denied any suicidal or homicidal ideation.      The following portions of the patient's history were reviewed and   updated as appropriate: She  has a past medical history of Arthritis, Cholelithiasis (taken out 1/14/91), Diverticulosis, Gastroesophageal reflux disease (01/21/2016), Hyperlipidemia, Hypertension, Kidney stone, Menopausal disorder (1998), Obesity, PAF (paroxysmal atrial fibrillation), Reflux esophagitis (08/19/2016), Sebaceous cyst of labia (12/20/2017), Sepsis due to urinary tract infection, Sigmoid diverticulitis (11/13/2017), Type 2 diabetes mellitus, Urinary tract infection, Visual impairment (cataracts), and Vitamin D deficiency  (01/21/2016).  She does not have any pertinent problems on file.  She  has a past surgical history that includes Ankle surgery; Cholecystectomy; Colonoscopy (2014); Hemorrhoid surgery; Other surgical history; Tubal ligation; cystoscopy bladder stone lithotripsy; Breast surgery; Percutaneous nephrostolithotomy (Left, 12/2016); Nephrolithotomy (Left, 01/09/2017); Breast biopsy (Right); ureteroscopy laser lithotripsy with stent insertion (Left, 12/06/2022); Fracture surgery; and Cataract extraction (Right).  Her family history includes Anxiety disorder in her father; COPD in her mother; Depression in her father; Heart attack in her father and son; Kidney disease in her maternal grandmother.  She  reports that she has never smoked. She has never used smokeless tobacco. She reports current alcohol use. She reports that she does not use drugs.  Current Outpatient Medications   Medication Sig Dispense Refill    acetaminophen (TYLENOL) 650 MG 8 hr tablet Take 1 tablet by mouth Every 6 (Six) Hours As Needed. for pain      ALPRAZolam (XANAX) 1 MG tablet TAKE 1/2 TO 1 (ONE-HALF TO ONE) TABLET BY MOUTH TWICE DAILY AS NEEDED 60 tablet 0    ascorbic acid (VITAMIN C) 1000 MG tablet Take 2 tablets by mouth Daily.      cyanocobalamin (VITAMIN B-12) 500 MCG tablet Take 1 tablet by mouth Daily.      gabapentin (NEURONTIN) 300 MG capsule Take 1 capsule by mouth every night at bedtime.      ketorolac (ACULAR) 0.5 % ophthalmic solution INSTILL 1 DROP INTO RIGHT EYE 4 TIMES DAILY; START DROPS THREE DAYS PRIOR TO SURGERY AND CONTINUE ACCORDING TO POST-OPERATIVE INSTRUCTIONS      losartan (COZAAR) 100 MG tablet Take 1 tablet by mouth Daily.      meclizine (ANTIVERT) 25 MG tablet Take 1 tablet by mouth Every 6 (Six) Hours As Needed for dizziness or Nausea. 30 tablet 0    Metoprolol Tartrate 75 MG tablet Take 75 tablets by mouth 2 (Two) Times a Day. 180 tablet 2    Multiple Vitamin tablet Take 1 tablet by mouth Daily.      omeprazole OTC  (PriLOSEC OTC) 20 MG EC tablet Take 1 tablet by mouth 2 (Two) Times a Day.      ondansetron (Zofran) 4 MG tablet Take 1 tablet by mouth Every 6 (Six) Hours As Needed for Nausea or Vomiting. 15 tablet 0    prednisoLONE acetate (PRED FORTE) 1 % ophthalmic suspension INSTILL 1 DROP INTO THE RIGHT EYE 4 TIMES DAILY, START 3 DAYS PRIOR TO SURGERY AND CONTINUE ACCORDING TO POST-OP INSTRUCTIONS      Probiotic Product (PROBIOTIC ADVANCED PO) Take 1 tablet by mouth Daily.      rivaroxaban (Xarelto) 15 MG tablet Take 1 tablet by mouth Daily. 90 tablet 3    sertraline (ZOLOFT) 100 MG tablet Take 1 tablet by mouth once daily 90 tablet 2    simvastatin (ZOCOR) 80 MG tablet Take 1 tablet by mouth Every Evening. 90 tablet 0    triamcinolone (KENALOG) 0.1 % cream 1 application  As Needed.      trimethoprim (TRIMPEX) 100 MG tablet Take 1 tablet by mouth Daily.       No current facility-administered medications for this visit.     Current Outpatient Medications on File Prior to Visit   Medication Sig    acetaminophen (TYLENOL) 650 MG 8 hr tablet Take 1 tablet by mouth Every 6 (Six) Hours As Needed. for pain    ALPRAZolam (XANAX) 1 MG tablet TAKE 1/2 TO 1 (ONE-HALF TO ONE) TABLET BY MOUTH TWICE DAILY AS NEEDED    ascorbic acid (VITAMIN C) 1000 MG tablet Take 2 tablets by mouth Daily.    cyanocobalamin (VITAMIN B-12) 500 MCG tablet Take 1 tablet by mouth Daily.    gabapentin (NEURONTIN) 300 MG capsule Take 1 capsule by mouth every night at bedtime.    ketorolac (ACULAR) 0.5 % ophthalmic solution INSTILL 1 DROP INTO RIGHT EYE 4 TIMES DAILY; START DROPS THREE DAYS PRIOR TO SURGERY AND CONTINUE ACCORDING TO POST-OPERATIVE INSTRUCTIONS    losartan (COZAAR) 100 MG tablet Take 1 tablet by mouth Daily.    meclizine (ANTIVERT) 25 MG tablet Take 1 tablet by mouth Every 6 (Six) Hours As Needed for dizziness or Nausea.    Metoprolol Tartrate 75 MG tablet Take 75 tablets by mouth 2 (Two) Times a Day.    Multiple Vitamin tablet Take 1 tablet by  mouth Daily.    omeprazole OTC (PriLOSEC OTC) 20 MG EC tablet Take 1 tablet by mouth 2 (Two) Times a Day.    ondansetron (Zofran) 4 MG tablet Take 1 tablet by mouth Every 6 (Six) Hours As Needed for Nausea or Vomiting.    prednisoLONE acetate (PRED FORTE) 1 % ophthalmic suspension INSTILL 1 DROP INTO THE RIGHT EYE 4 TIMES DAILY, START 3 DAYS PRIOR TO SURGERY AND CONTINUE ACCORDING TO POST-OP INSTRUCTIONS    Probiotic Product (PROBIOTIC ADVANCED PO) Take 1 tablet by mouth Daily.    rivaroxaban (Xarelto) 15 MG tablet Take 1 tablet by mouth Daily.    sertraline (ZOLOFT) 100 MG tablet Take 1 tablet by mouth once daily    simvastatin (ZOCOR) 80 MG tablet Take 1 tablet by mouth Every Evening.    triamcinolone (KENALOG) 0.1 % cream 1 application  As Needed.    trimethoprim (TRIMPEX) 100 MG tablet Take 1 tablet by mouth Daily.     No current facility-administered medications on file prior to visit.     She is allergic to morphine and related, ciprofloxacin-ciproflox hcl er, flomax [tamsulosin hcl], hydrocodone, latex, lortab [hydrocodone-acetaminophen], penicillins, phenergan [promethazine hcl], sulfa antibiotics, amoxicillin, and tamsulosin..    Compared to one year ago, the patient feels her physical   health is worse.    Compared to one year ago, the patient feels her mental   health is the same.    Recent Hospitalizations:  This patient has had a [facility] admission record on file within the last 365 days.    Current Medical Providers:  Patient Care Team:  Ashleigh Hernandez PA-C as PCP - General (Family Medicine)  Jeremy Orozco MD as Consulting Physician (Cardiology)  Joss Oro MD as Consulting Physician (Gastroenterology)    Outpatient Medications Prior to Visit   Medication Sig Dispense Refill    acetaminophen (TYLENOL) 650 MG 8 hr tablet Take 1 tablet by mouth Every 6 (Six) Hours As Needed. for pain      ALPRAZolam (XANAX) 1 MG tablet TAKE 1/2 TO 1 (ONE-HALF TO ONE) TABLET BY MOUTH TWICE  DAILY AS NEEDED 60 tablet 0    ascorbic acid (VITAMIN C) 1000 MG tablet Take 2 tablets by mouth Daily.      cyanocobalamin (VITAMIN B-12) 500 MCG tablet Take 1 tablet by mouth Daily.      gabapentin (NEURONTIN) 300 MG capsule Take 1 capsule by mouth every night at bedtime.      ketorolac (ACULAR) 0.5 % ophthalmic solution INSTILL 1 DROP INTO RIGHT EYE 4 TIMES DAILY; START DROPS THREE DAYS PRIOR TO SURGERY AND CONTINUE ACCORDING TO POST-OPERATIVE INSTRUCTIONS      losartan (COZAAR) 100 MG tablet Take 1 tablet by mouth Daily.      meclizine (ANTIVERT) 25 MG tablet Take 1 tablet by mouth Every 6 (Six) Hours As Needed for dizziness or Nausea. 30 tablet 0    Metoprolol Tartrate 75 MG tablet Take 75 tablets by mouth 2 (Two) Times a Day. 180 tablet 2    Multiple Vitamin tablet Take 1 tablet by mouth Daily.      omeprazole OTC (PriLOSEC OTC) 20 MG EC tablet Take 1 tablet by mouth 2 (Two) Times a Day.      ondansetron (Zofran) 4 MG tablet Take 1 tablet by mouth Every 6 (Six) Hours As Needed for Nausea or Vomiting. 15 tablet 0    prednisoLONE acetate (PRED FORTE) 1 % ophthalmic suspension INSTILL 1 DROP INTO THE RIGHT EYE 4 TIMES DAILY, START 3 DAYS PRIOR TO SURGERY AND CONTINUE ACCORDING TO POST-OP INSTRUCTIONS      Probiotic Product (PROBIOTIC ADVANCED PO) Take 1 tablet by mouth Daily.      rivaroxaban (Xarelto) 15 MG tablet Take 1 tablet by mouth Daily. 90 tablet 3    sertraline (ZOLOFT) 100 MG tablet Take 1 tablet by mouth once daily 90 tablet 2    simvastatin (ZOCOR) 80 MG tablet Take 1 tablet by mouth Every Evening. 90 tablet 0    triamcinolone (KENALOG) 0.1 % cream 1 application  As Needed.      trimethoprim (TRIMPEX) 100 MG tablet Take 1 tablet by mouth Daily.       No facility-administered medications prior to visit.       No opioid medication identified on active medication list. I have reviewed chart for other potential  high risk medication/s and harmful drug interactions in the elderly.        Aspirin is not on  "active medication list.  Aspirin use is not indicated based on review of current medical condition/s. Risk of harm outweighs potential benefits.  .    Patient Active Problem List   Diagnosis    Elevated alanine aminotransferase (ALT) level    Mixed anxiety depressive disorder    Gastroesophageal reflux disease    Essential hypertension    Insomnia    Microalbuminuria    Obesity (BMI 30-39.9)    Proteinuria    Vitamin D deficiency    Postmenopause    History of fracture    Reflux esophagitis    Calculus of kidney    PAF (paroxysmal atrial fibrillation)    COLTON (generalized anxiety disorder)    Iron deficiency anemia    Prediabetes    Urine frequency    Medicare annual wellness visit, subsequent    High risk medication use    Dermoid cyst of scalp    Hyperglycemia    Grief    Mixed hyperlipidemia    Long-term use of high-risk medication    Frequent UTI    Greater trochanteric bursitis of right hip    Chondromalacia, right knee    Post-traumatic arthritis of left ankle    Generalized weakness     Advance Care Planning   Advance Care Planning     Advance Directive is on file.  ACP discussion was held with the patient during this visit. Patient has an advance directive in EMR which is still valid.      Objective    Vitals:    10/04/23 0934   BP: 120/74   BP Location: Left arm   Patient Position: Sitting   Cuff Size: Adult   Pulse: 57   Temp: 98.2 °F (36.8 °C)   SpO2: 97%   Weight: 96.6 kg (213 lb)   Height: 167.6 cm (66\")     Estimated body mass index is 34.38 kg/m² as calculated from the following:    Height as of this encounter: 167.6 cm (66\").    Weight as of this encounter: 96.6 kg (213 lb).       Physical Exam  Vitals and nursing note reviewed.   Constitutional:       Appearance: Normal appearance. She is well-developed and well-groomed. She is obese.   HENT:      Head: Normocephalic and atraumatic.   Neck:      Thyroid: No thyroid mass, thyromegaly or thyroid tenderness.      Vascular: No carotid bruit.      " Trachea: Trachea and phonation normal. No tracheal tenderness.   Cardiovascular:      Rate and Rhythm: Normal rate and regular rhythm.      Pulses: Normal pulses.      Heart sounds: Normal heart sounds, S1 normal and S2 normal. No murmur heard.  Pulmonary:      Effort: Pulmonary effort is normal.      Breath sounds: Normal breath sounds and air entry.   Abdominal:      General: Bowel sounds are normal.      Palpations: Abdomen is soft. There is no hepatomegaly.      Tenderness: There is no abdominal tenderness.   Musculoskeletal:      Cervical back: Neck supple.      Right lower leg: No edema.      Left lower leg: No edema.   Skin:     General: Skin is warm and dry.      Capillary Refill: Capillary refill takes less than 2 seconds.   Neurological:      Mental Status: She is alert and oriented to person, place, and time.   Psychiatric:         Attention and Perception: Attention and perception normal.         Mood and Affect: Mood and affect normal.         Speech: Speech normal.         Behavior: Behavior normal. Behavior is cooperative.         Thought Content: Thought content normal.         Cognition and Memory: Cognition and memory normal.         Judgment: Judgment normal.         Does the patient have evidence of cognitive impairment?   No    Lab Results   Component Value Date    CHLPL 148 09/26/2023    TRIG 127 09/26/2023    HDL 69 (H) 09/26/2023    LDL 57 09/26/2023    VLDL 22 09/26/2023    HGBA1C 5.80 (H) 09/26/2023          Results Encounter on 09/27/2023   Component Date Value Ref Range Status    Total Cholesterol 09/26/2023 148  0 - 200 mg/dL Final    Comment: Cholesterol Reference Ranges  (U.S. Department of Health and Human Services ATP III  Classifications)  Desirable          <200 mg/dL  Borderline High    200-239 mg/dL  High Risk          >240 mg/dL  Triglyceride Reference Ranges  (U.S. Department of Health and Human Services ATP III  Classifications)  Normal           <150 mg/dL  Borderline High   150-199 mg/dL  High             200-499 mg/dL  Very High        >500 mg/dL  HDL Reference Ranges  (U.S. Department of Health and Human Services ATP III  Classifications)  Low     <40 mg/dl (major risk factor for CHD)  High    >60 mg/dl ('negative' risk factor for CHD)  LDL Reference Ranges  (U.S. Department of Health and Human Services ATP III  Classifications)  Optimal          <100 mg/dL  Near Optimal     100-129 mg/dL  Borderline High  130-159 mg/dL  High             160-189 mg/dL  Very High        >189 mg/dL      Triglycerides 09/26/2023 127  0 - 150 mg/dL Final    HDL Cholesterol 09/26/2023 69 (H)  40 - 60 mg/dL Final    VLDL Cholesterol Kenneth 09/26/2023 22  5 - 40 mg/dL Final    LDL Chol Calc (NIH) 09/26/2023 57  0 - 100 mg/dL Final    LDL/HDL RATIO 09/26/2023 0.78   Final    Glucose 09/26/2023 122 (H)  65 - 99 mg/dL Final    BUN 09/26/2023 21  8 - 23 mg/dL Final    Creatinine 09/26/2023 1.02 (H)  0.57 - 1.00 mg/dL Final    EGFR Result 09/26/2023 57.8 (L)  >60.0 mL/min/1.73 Final    Comment: GFR Normal >60  Chronic Kidney Disease <60  Kidney Failure <15  The GFR formula is only valid for adults with stable renal  function between ages 18 and 70.      BUN/Creatinine Ratio 09/26/2023 20.6  7.0 - 25.0 Final    Sodium 09/26/2023 138  136 - 145 mmol/L Final    Potassium 09/26/2023 4.5  3.5 - 5.2 mmol/L Final    Chloride 09/26/2023 100  98 - 107 mmol/L Final    Total CO2 09/26/2023 27.7  22.0 - 29.0 mmol/L Final    Calcium 09/26/2023 9.8  8.6 - 10.5 mg/dL Final    Total Protein 09/26/2023 6.9  6.0 - 8.5 g/dL Final    Albumin 09/26/2023 4.6  3.5 - 5.2 g/dL Final    Globulin 09/26/2023 2.3  gm/dL Final    A/G Ratio 09/26/2023 2.0  g/dL Final    Total Bilirubin 09/26/2023 0.3  0.0 - 1.2 mg/dL Final    Alkaline Phosphatase 09/26/2023 59  39 - 117 U/L Final    AST (SGOT) 09/26/2023 15  1 - 32 U/L Final    ALT (SGPT) 09/26/2023 12  1 - 33 U/L Final    WBC 09/26/2023 7.31  3.40 - 10.80 10*3/mm3 Final    RBC 09/26/2023 4.70   3.77 - 5.28 10*6/mm3 Final    Hemoglobin 09/26/2023 11.9 (L)  12.0 - 15.9 g/dL Final    Hematocrit 09/26/2023 39.3  34.0 - 46.6 % Final    MCV 09/26/2023 83.6  79.0 - 97.0 fL Final    MCH 09/26/2023 25.3 (L)  26.6 - 33.0 pg Final    MCHC 09/26/2023 30.3 (L)  31.5 - 35.7 g/dL Final    RDW 09/26/2023 12.8  12.3 - 15.4 % Final    Platelets 09/26/2023 271  140 - 450 10*3/mm3 Final    Neutrophil Rel % 09/26/2023 66.6  42.7 - 76.0 % Final    Lymphocyte Rel % 09/26/2023 18.1 (L)  19.6 - 45.3 % Final    Monocyte Rel % 09/26/2023 10.9  5.0 - 12.0 % Final    Eosinophil Rel % 09/26/2023 3.0  0.3 - 6.2 % Final    Basophil Rel % 09/26/2023 1.0  0.0 - 1.5 % Final    Neutrophils Absolute 09/26/2023 4.87  1.70 - 7.00 10*3/mm3 Final    Lymphocytes Absolute 09/26/2023 1.32  0.70 - 3.10 10*3/mm3 Final    Monocytes Absolute 09/26/2023 0.80  0.10 - 0.90 10*3/mm3 Final    Eosinophils Absolute 09/26/2023 0.22  0.00 - 0.40 10*3/mm3 Final    Basophils Absolute 09/26/2023 0.07  0.00 - 0.20 10*3/mm3 Final    Immature Granulocyte Rel % 09/26/2023 0.4  0.0 - 0.5 % Final    Immature Grans Absolute 09/26/2023 0.03  0.00 - 0.05 10*3/mm3 Final    nRBC 09/26/2023 0.1  0.0 - 0.2 /100 WBC Final    Hemoglobin A1C 09/26/2023 5.80 (H)  4.80 - 5.60 % Final    Comment: Hemoglobin A1C Ranges:  Increased Risk for Diabetes  5.7% to 6.4%  Diabetes                     >= 6.5%  Diabetic Goal                < 7.0%           HEALTH RISK ASSESSMENT    Smoking Status:  Social History     Tobacco Use   Smoking Status Never   Smokeless Tobacco Never   Tobacco Comments    Never smoked ever     Alcohol Consumption:  Social History     Substance and Sexual Activity   Alcohol Use Yes    Comment: She uses alcohol once or twice a year.     Fall Risk Screen:    JEREMY Fall Risk Assessment was completed, and patient is at LOW risk for falls.Assessment completed on:10/4/2023    Depression Screening:      10/4/2023     9:43 AM   PHQ-2/PHQ-9 Depression Screening   Little  Interest or Pleasure in Doing Things 0-->not at all   Feeling Down, Depressed or Hopeless 0-->not at all   PHQ-9: Brief Depression Severity Measure Score 0       Health Habits and Functional and Cognitive Screening:      10/4/2023     9:40 AM   Functional & Cognitive Status   Do you have difficulty preparing food and eating? No   Do you have difficulty bathing yourself, getting dressed or grooming yourself? Yes   Do you have difficulty using the toilet? No   Do you have difficulty moving around from place to place? No   Do you have trouble with steps or getting out of a bed or a chair? No   Current Diet Unhealthy Diet   Dental Exam Up to date   Eye Exam Up to date   Exercise (times per week) 0 times per week   Current Exercises Include No Regular Exercise   Do you need help using the phone?  No   Are you deaf or do you have serious difficulty hearing?  No   Do you need help to go to places out of walking distance? No   Do you need help shopping? No   Do you need help preparing meals?  No   Do you need help with housework?  No   Do you need help with laundry? No   Do you need help taking your medications? No   Do you need help managing money? No   Do you ever drive or ride in a car without wearing a seat belt? No   Have you felt unusual stress, anger or loneliness in the last month? No   Who do you live with? Spouse   If you need help, do you have trouble finding someone available to you? No   Have you been bothered in the last four weeks by sexual problems? No   Do you have difficulty concentrating, remembering or making decisions? Yes       Age-appropriate Screening Schedule:  Refer to the list below for future screening recommendations based on patient's age, sex and/or medical conditions. Orders for these recommended tests are listed in the plan section. The patient has been provided with a written plan.    Health Maintenance   Topic Date Due    DXA SCAN  09/14/2023    COVID-19 Vaccine (6 - 2023-24 season)  10/06/2023 (Originally 9/1/2023)    TDAP/TD VACCINES (2 - Td or Tdap) 11/21/2023    COLORECTAL CANCER SCREENING  02/26/2024    HEMOGLOBIN A1C  03/26/2024    BMI FOLLOWUP  09/13/2024    LIPID PANEL  09/26/2024    ANNUAL WELLNESS VISIT  10/04/2024    MAMMOGRAM  01/13/2025    HEPATITIS C SCREENING  Completed    INFLUENZA VACCINE  Completed    Pneumococcal Vaccine 65+  Completed    ZOSTER VACCINE  Completed    DIABETIC FOOT EXAM  Discontinued    DIABETIC EYE EXAM  Discontinued    URINE MICROALBUMIN  Discontinued                  CMS Preventative Services Quick Reference  Risk Factors Identified During Encounter:    Depression/Dysphoria: Current medication is effective, no change recommended  Polypharmacy: Medication List reviewed    The above risks/problems have been discussed with the patient.  Pertinent information has been shared with the patient in the After Visit Summary.    Diagnoses and all orders for this visit:    1. Medicare annual wellness visit, subsequent (Primary)    2. Prediabetes    3. Essential hypertension    4. Mixed hyperlipidemia    5. Mixed anxiety depressive disorder    6. Postmenopause  -     DEXA Bone Density Axial; Future    1.  Annual Medicare wellness exam with hypertension: I have rechecked blood pressure and got 128/74 in left arm.  Continue her current medication.  Keep follow-up appointments with cardiology.  2.  Chronic and stable prediabetes with hyperlipidemia: I have reviewed above blood work with her today.  A1c has increased slightly as well as fasting blood sugar.  Cholesterol and triglycerides have improved.  Continue current medication at home as directed.  Encouraged her to continue eating healthy.  Try to exercise more.  3.  Chronic and stable mixed anxiety and depressive disorder: Doing well with her sertraline and Xanax medication.  No refills are needed.  Follow-up in 6 months.  4.  Chronic and stable postmenopausal: We will proceed with DEXA scan at [facility].    Follow Up:   Next Medicare Wellness visit to be scheduled in 1 year.      An After Visit Summary and PPPS were made available to the patient.      Patient Counseling:  --Nutrition: Stressed importance of moderation in sodium/caffeine intake, saturated fat and cholesterol.  Discussed caloric balance, sufficient intake of fresh fruits, vegetables, fiber,   calcium, iron.  --Discussed the new recommendation against daily use of baby aspirin for primary prevention in low risk patients.  --Exercise: Stressed the importance of regular exercise by incorporating into daily routine.    --Substance Abuse: Discussed cessation/primary prevention of tobacco, alcohol, or other drug use; driving or other dangerous activities under the influence.    --Dental health: Discussed importance of regular tooth brushing, flossing, and dental visits.  -- Suggested having eyes and vision checked if needed or past due.  --Immunizations reviewed.  --Discussed benefits of screening colonoscopy.    MANI Martinez [facility]  [address] [place] [id number]  Dept: [phone]  Dept Fax: [phone]  Loc: [phone]  Loc Fax: [phone]

## 2023-10-17 ENCOUNTER — OFFICE VISIT (OUTPATIENT)
Dept: FAMILY MEDICINE CLINIC | Facility: CLINIC | Age: 73
End: 2023-10-17
Payer: MEDICARE

## 2023-10-17 VITALS
TEMPERATURE: 97.6 F | BODY MASS INDEX: 34.55 KG/M2 | WEIGHT: 215 LBS | OXYGEN SATURATION: 97 % | SYSTOLIC BLOOD PRESSURE: 126 MMHG | HEIGHT: 66 IN | DIASTOLIC BLOOD PRESSURE: 76 MMHG | HEART RATE: 59 BPM

## 2023-10-17 DIAGNOSIS — R31.9 URINARY TRACT INFECTION WITH HEMATURIA, SITE UNSPECIFIED: ICD-10-CM

## 2023-10-17 DIAGNOSIS — R35.0 URINARY FREQUENCY: Primary | ICD-10-CM

## 2023-10-17 DIAGNOSIS — N39.0 URINARY TRACT INFECTION WITH HEMATURIA, SITE UNSPECIFIED: ICD-10-CM

## 2023-10-17 LAB
BILIRUB BLD-MCNC: NEGATIVE MG/DL
CLARITY, POC: ABNORMAL
COLOR UR: YELLOW
GLUCOSE UR STRIP-MCNC: NEGATIVE MG/DL
KETONES UR QL: NEGATIVE
LEUKOCYTE EST, POC: ABNORMAL
NITRITE UR-MCNC: NEGATIVE MG/ML
PH UR: 5 [PH] (ref 5–8)
PROT UR STRIP-MCNC: NEGATIVE MG/DL
RBC # UR STRIP: ABNORMAL /UL
SP GR UR: 1.01 (ref 1–1.03)
UROBILINOGEN UR QL: NORMAL

## 2023-10-17 PROCEDURE — 99213 OFFICE O/P EST LOW 20 MIN: CPT | Performed by: NURSE PRACTITIONER

## 2023-10-17 PROCEDURE — 3044F HG A1C LEVEL LT 7.0%: CPT | Performed by: NURSE PRACTITIONER

## 2023-10-17 PROCEDURE — 3074F SYST BP LT 130 MM HG: CPT | Performed by: NURSE PRACTITIONER

## 2023-10-17 PROCEDURE — 81002 URINALYSIS NONAUTO W/O SCOPE: CPT | Performed by: NURSE PRACTITIONER

## 2023-10-17 PROCEDURE — 3078F DIAST BP <80 MM HG: CPT | Performed by: NURSE PRACTITIONER

## 2023-10-17 RX ORDER — NITROFURANTOIN 25; 75 MG/1; MG/1
100 CAPSULE ORAL EVERY 12 HOURS SCHEDULED
Qty: 14 CAPSULE | Refills: 0 | Status: SHIPPED | OUTPATIENT
Start: 2023-10-17 | End: 2023-10-24

## 2023-10-17 RX ORDER — PHENAZOPYRIDINE HYDROCHLORIDE 200 MG/1
200 TABLET, FILM COATED ORAL 3 TIMES DAILY PRN
Qty: 6 TABLET | Refills: 0 | Status: SHIPPED | OUTPATIENT
Start: 2023-10-17

## 2023-10-17 NOTE — PROGRESS NOTES
"Chief Complaint   Patient presents with    Urinary Tract Infection       Urinary Tract Infection: Patient complains of dysuria, frequency, and urgency She has had symptoms for 1 day. Patient also complains of  none . Patient denies back pain and fever. Patient does not have a history of recurrent UTI.  Patient does not have a history of pyelonephritis.     The following portions of the patient's history were reviewed and updated as appropriate: allergies, current medications, past family history, past medical history, past social history, past surgical history and problem list.      Vitals:    10/17/23 1304   BP: 126/76   Pulse: 59   Temp: 97.6 °F (36.4 °C)   SpO2: 97%   Weight: 97.5 kg (215 lb)   Height: 167.6 cm (65.98\")     Gen: Well appearing, alert  HEENT: WNL  Lung: Regular RR, no audible wheeze  Heart: RR without murmur  Skin: No rash, W/D  Abd: Non tender, non distended, positive bowel sounds  Flank: No CVA, no rash    In office urine dipstick results:  Brief Urine Lab Results  (Last result in the past 365 days)        Color   Clarity   Blood   Leuk Est   Nitrite   Protein   CREAT   Urine HCG        10/17/23 1332 Yellow   Cloudy   Large   Large (3+)   Negative   Negative                       Assessment & Plan   Diagnoses and all orders for this visit:    1. Urinary frequency (Primary)  -     POCT urinalysis dipstick, manual    2. Urinary tract infection with hematuria, site unspecified  -     Urine Culture - Urine, Urine, Clean Catch  -     nitrofurantoin, macrocrystal-monohydrate, (Macrobid) 100 MG capsule; Take 1 capsule by mouth Every 12 (Twelve) Hours for 7 days.  Dispense: 14 capsule; Refill: 0  -     phenazopyridine (Pyridium) 200 MG tablet; Take 1 tablet by mouth 3 (Three) Times a Day As Needed for Bladder Spasms or Dysuria.  Dispense: 6 tablet; Refill: 0             Tylenol or Advil as needed for pain, fever  Plenty of fluids  OTC Azo ok  Off work or school note given if needed.  Pros and cons of " antibiotic use discussed    Pauline Enriquez, APRN  Family Practice  [facility] [place]

## 2023-10-18 ENCOUNTER — APPOINTMENT (OUTPATIENT)
Dept: BONE DENSITY | Facility: HOSPITAL | Age: 73
End: 2023-10-18
Payer: MEDICARE

## 2023-10-18 DIAGNOSIS — Z78.0 POSTMENOPAUSE: ICD-10-CM

## 2023-10-18 PROCEDURE — 77080 DXA BONE DENSITY AXIAL: CPT

## 2023-10-22 LAB
BACTERIA UR CULT: ABNORMAL
BACTERIA UR CULT: ABNORMAL
OTHER ANTIBIOTIC SUSC ISLT: ABNORMAL

## 2023-10-30 RX ORDER — METOPROLOL TARTRATE 75 MG/1
75 TABLET, FILM COATED ORAL 2 TIMES DAILY
Qty: 180 TABLET | Refills: 2 | Status: SHIPPED | OUTPATIENT
Start: 2023-10-30

## 2023-11-08 ENCOUNTER — OFFICE VISIT (OUTPATIENT)
Dept: ORTHOPEDIC SURGERY | Facility: CLINIC | Age: 73
End: 2023-11-08
Payer: MEDICARE

## 2023-11-08 VITALS — BODY MASS INDEX: 34.55 KG/M2 | WEIGHT: 215 LBS | HEIGHT: 66 IN

## 2023-11-08 DIAGNOSIS — M70.61 GREATER TROCHANTERIC BURSITIS OF RIGHT HIP: Primary | ICD-10-CM

## 2023-11-08 RX ORDER — LIDOCAINE HYDROCHLORIDE 10 MG/ML
4 INJECTION, SOLUTION EPIDURAL; INFILTRATION; INTRACAUDAL; PERINEURAL
Status: COMPLETED | OUTPATIENT
Start: 2023-11-08 | End: 2023-11-08

## 2023-11-08 RX ORDER — LOSARTAN POTASSIUM 100 MG/1
100 TABLET ORAL DAILY
Qty: 90 TABLET | Refills: 2 | Status: SHIPPED | OUTPATIENT
Start: 2023-11-08

## 2023-11-08 RX ORDER — TRIAMCINOLONE ACETONIDE 40 MG/ML
80 INJECTION, SUSPENSION INTRA-ARTICULAR; INTRAMUSCULAR
Status: COMPLETED | OUTPATIENT
Start: 2023-11-08 | End: 2023-11-08

## 2023-11-08 RX ADMIN — TRIAMCINOLONE ACETONIDE 80 MG: 40 INJECTION, SUSPENSION INTRA-ARTICULAR; INTRAMUSCULAR at 11:39

## 2023-11-08 RX ADMIN — LIDOCAINE HYDROCHLORIDE 4 ML: 10 INJECTION, SOLUTION EPIDURAL; INFILTRATION; INTRACAUDAL; PERINEURAL at 11:39

## 2023-11-08 NOTE — PROGRESS NOTES
Subjective:     Patient ID: Krys Mayes is a 73 y.o. female.    Chief Complaint:  Follow-up greater trochanteric bursitis, right hip  History of Present Illness  Krys Mayes Returns to clinic for follow-up of her right hip.  She is receiving a steroid injection and greater trochanter in the past with significant symptom relief she is not experiencing pain is rating to her groin or down the posterior aspect of the right lower extremity on occasion is experiencing pain that radiates down to the mid femur laterally.  Denies any presence of numbness or tingling radiating down the right lower extremity.  She does report 2 falls approximately 1 week ago could have potentially landed on the lateral aspect of the hip.  She has had cataract surgery in does notice it change with her balance since the surgery.  Denies any other concerns present.       Social History     Occupational History    Not on file   Tobacco Use    Smoking status: Never    Smokeless tobacco: Never    Tobacco comments:     Never smoked ever   Vaping Use    Vaping Use: Never used   Substance and Sexual Activity    Alcohol use: Yes     Comment: She uses alcohol once or twice a year.    Drug use: No    Sexual activity: Not Currently     Partners: Male     Birth control/protection: Post-menopausal      Past Medical History:   Diagnosis Date    Arthritis     Cholelithiasis taken out 1/14/91    Diverticulosis     Gastroesophageal reflux disease 01/21/2016    Hyperlipidemia     Hypertension     Kidney stone     recurrent, calcium oxalate    Menopausal disorder 1998    She went through menopause in 1998    Obesity     PAF (paroxysmal atrial fibrillation)     in the setting of urosepsis, 9/2016    Reflux esophagitis 08/19/2016    Sebaceous cyst of labia 12/20/2017    Sepsis due to urinary tract infection     Sigmoid diverticulitis 11/13/2017    Type 2 diabetes mellitus     Urinary tract infection     Visual impairment cataracts    Vitamin D  "deficiency 01/21/2016     Past Surgical History:   Procedure Laterality Date    ANKLE SURGERY      Ankle Repair / Description: ORif the left ankle in July 2010. Secondary to fall    BREAST BIOPSY Right     benign    BREAST SURGERY      biopsy     CATARACT EXTRACTION Right     CHOLECYSTECTOMY      COLONOSCOPY  2014    Done 2004 normal recheck in 10 years. Repeated February 2014 colonoscopy was normal and to be repeated in 10 years.    CYSTOSCOPY BLADDER STONE LITHOTRIPSY      FRACTURE SURGERY      HEMORRHOIDECTOMY      NEPHROLITHOTOMY Left 01/09/2017    Procedure: NEPHROLITHOTOMY PERCUTANEOUS SECOND LOOK WITH STENT PLACEMENT AND LASER LITHOTRIPSY;  Surgeon: Mati Villegas MD;  Location: [facility] OR;  Service:     OTHER SURGICAL HISTORY      Tubal Ligation    PERCUTANEOUS NEPHROSTOLITHOTOMY Left 12/2016    TUBAL ABDOMINAL LIGATION      URETEROSCOPY LASER LITHOTRIPSY WITH STENT INSERTION Left 12/06/2022    Procedure: LEFT URETEROSCOPY WITH LASER LITHOTRIPSY, STONE BASKET EXTRACTION, STENT PLACEMENT;  Surgeon: Mati Villegas MD;  Location: [facility] OR;  Service: Urology;  Laterality: Left;       Family History   Problem Relation Age of Onset    COPD Mother     Heart attack Father         acute myocardial infarction    Anxiety disorder Father     Depression Father     Heart attack Son     Kidney disease Maternal Grandmother         Born with 1 kidney    Breast cancer Neg Hx                Objective:  Physical Exam  General: No acute distress.  Eyes: conjunctiva clear; pupils equally round and reactive  ENT: external ears and nose atraumatic; oropharynx clear  CV: no peripheral edema  Resp: normal respiratory effort  Skin: no rashes or wounds; normal turgor  Psych: mood and affect appropriate; recent and remote memory intact    Vitals:    11/08/23 1103   Weight: 97.5 kg (215 lb)   Height: 167.6 cm (65.98\")         11/08/23  1103   Weight: 97.5 kg (215 lb)     Body mass index is 34.72 kg/m².      Ortho Exam  "    Right lower extremity examined:  Negative logroll exam  Negative Stinchfield exam  Negative TAYLOR  Negative FADIR exam  Positive sensation light touch all distributions right lower extremity  Positive tenderness to palpate along the greater trochanter    Imaging:  Right Hip X-Ray  Indication: Pain  AP and Frog Leg views    Findings:  No fracture  No bony lesion  Normal soft tissues  Mild right hip degeneration    prior studies were available for comparison.    Assessment:        1. Greater trochanteric bursitis of right hip           Plan:    - Large Joint Arthrocentesis: R greater trochanteric bursa on 11/8/2023 11:39 AM  Indications: pain  Details: 22 G needle, lateral approach  Medications: 80 mg triamcinolone acetonide 40 MG/ML; 4 mL lidocaine PF 1% 1 %  Outcome: tolerated well, no immediate complications  Consent was given by the patient. Immediately prior to procedure a time out was called to verify the correct patient, procedure, equipment, support staff and site/side marked as required. Patient was prepped and draped in the usual sterile fashion.         Discussed plan of care with patient.  We did discuss repeat corticosteroid injection greater trochanter of the right hip.  She does wish to proceed with injection today's visit we will plan to reevaluate in the next few weeks.  Encouraged to call with any questions or concerns gladly see her back in clinic as needed.  All questions answered.  Orders:  Orders Placed This Encounter   Procedures    - Large Joint Arthrocentesis: R greater trochanteric bursa    XR Hip With or Without Pelvis 2 - 3 View Right     No orders of the defined types were placed in this encounter.      Dragon dictation utilized

## 2023-11-17 ENCOUNTER — TELEPHONE (OUTPATIENT)
Dept: FAMILY MEDICINE CLINIC | Facility: CLINIC | Age: 73
End: 2023-11-17

## 2023-11-17 ENCOUNTER — OFFICE VISIT (OUTPATIENT)
Dept: FAMILY MEDICINE CLINIC | Facility: CLINIC | Age: 73
End: 2023-11-17
Payer: MEDICARE

## 2023-11-17 VITALS
OXYGEN SATURATION: 97 % | TEMPERATURE: 98.1 F | DIASTOLIC BLOOD PRESSURE: 80 MMHG | WEIGHT: 213 LBS | SYSTOLIC BLOOD PRESSURE: 130 MMHG | HEART RATE: 80 BPM | HEIGHT: 66 IN | BODY MASS INDEX: 34.23 KG/M2

## 2023-11-17 DIAGNOSIS — N39.0 URINARY TRACT INFECTION WITHOUT HEMATURIA, SITE UNSPECIFIED: ICD-10-CM

## 2023-11-17 DIAGNOSIS — R30.0 DYSURIA: Primary | ICD-10-CM

## 2023-11-17 LAB
BILIRUB BLD-MCNC: NEGATIVE MG/DL
CLARITY, POC: ABNORMAL
COLOR UR: YELLOW
GLUCOSE UR STRIP-MCNC: NEGATIVE MG/DL
KETONES UR QL: NEGATIVE
LEUKOCYTE EST, POC: ABNORMAL
NITRITE UR-MCNC: NEGATIVE MG/ML
PH UR: 6.5 [PH] (ref 5–8)
PROT UR STRIP-MCNC: ABNORMAL MG/DL
RBC # UR STRIP: ABNORMAL /UL
SP GR UR: 1.01 (ref 1–1.03)
UROBILINOGEN UR QL: NORMAL

## 2023-11-17 PROCEDURE — 3044F HG A1C LEVEL LT 7.0%: CPT | Performed by: PHYSICIAN ASSISTANT

## 2023-11-17 PROCEDURE — 3075F SYST BP GE 130 - 139MM HG: CPT | Performed by: PHYSICIAN ASSISTANT

## 2023-11-17 PROCEDURE — 81002 URINALYSIS NONAUTO W/O SCOPE: CPT | Performed by: PHYSICIAN ASSISTANT

## 2023-11-17 PROCEDURE — 99213 OFFICE O/P EST LOW 20 MIN: CPT | Performed by: PHYSICIAN ASSISTANT

## 2023-11-17 PROCEDURE — 3079F DIAST BP 80-89 MM HG: CPT | Performed by: PHYSICIAN ASSISTANT

## 2023-11-17 RX ORDER — NITROFURANTOIN 25; 75 MG/1; MG/1
100 CAPSULE ORAL 2 TIMES DAILY
Qty: 14 CAPSULE | Refills: 0 | Status: SHIPPED | OUTPATIENT
Start: 2023-11-17

## 2023-11-17 NOTE — TELEPHONE ENCOUNTER
I recommend patient be seen for her UTI symptoms.  We will need to do a urine culture before starting antibiotic.

## 2023-11-17 NOTE — PROGRESS NOTES
"Chief Complaint  Urinary Tract Infection and Difficulty Urinating    Subjective          Krys LAURA Mayes presents to [facility]  History of Present Illness  Krys,\"Danielle\", is a 73-year-old female who presents with dysuria, urine frequency and urine urgency.  States her symptoms started over the past day.  Has an appointment with her urologist next week for follow-up of her kidney stones.  Has not used any over-the-counter medications for her symptoms.  States last UTI was in October.  States she is concerned about the frequency of these occurring.  States she is staying hydrated and wiping correctly.  Denied any change in detergents, lotions, soaps or foods.  She has had no fevers, chills, abdominal pain or GI upset.  Appetite and sleep have been normal for her.     Objective   Vital Signs:   /80 (BP Location: Left arm, Patient Position: Sitting, Cuff Size: Adult)   Pulse 80   Temp 98.1 °F (36.7 °C)   Ht 167.6 cm (66\")   Wt 96.6 kg (213 lb)   SpO2 97%   BMI 34.38 kg/m²     Physical Exam  Vitals and nursing note reviewed.   Constitutional:       Appearance: Normal appearance. She is well-developed and well-groomed. She is obese.   HENT:      Head: Normocephalic and atraumatic.   Neck:      Trachea: Trachea and phonation normal. No tracheal tenderness.   Cardiovascular:      Rate and Rhythm: Normal rate and regular rhythm.      Pulses: Normal pulses.      Heart sounds: Normal heart sounds, S1 normal and S2 normal. No murmur heard.  Pulmonary:      Effort: Pulmonary effort is normal.      Breath sounds: Normal breath sounds and air entry.   Abdominal:      General: Bowel sounds are normal.      Palpations: Abdomen is soft. There is no hepatomegaly.      Tenderness: There is no abdominal tenderness. There is no right CVA tenderness, left CVA tenderness, guarding or rebound. Negative signs include Contreras's sign, Rovsing's sign, McBurney's sign, psoas sign and obturator " sign.   Musculoskeletal:      Cervical back: Neck supple.      Right lower leg: No edema.      Left lower leg: No edema.   Skin:     General: Skin is warm and dry.      Capillary Refill: Capillary refill takes less than 2 seconds.   Neurological:      Mental Status: She is alert and oriented to person, place, and time.   Psychiatric:         Attention and Perception: Attention and perception normal.         Mood and Affect: Mood and affect normal.         Speech: Speech normal.         Behavior: Behavior normal. Behavior is cooperative.         Thought Content: Thought content normal.         Cognition and Memory: Cognition and memory normal.         Judgment: Judgment normal.        Result Review :         Results for orders placed or performed in visit on 11/17/23   POC Urinalysis Dipstick    Specimen: Urine   Result Value Ref Range    Color Yellow Yellow, Straw, Dark Yellow, Lin    Clarity, UA Cloudy (A) Clear    Glucose, UA Negative Negative mg/dL    Bilirubin Negative Negative    Ketones, UA Negative Negative    Specific Gravity  1.010 1.005 - 1.030    Blood, UA Large (A) Negative    pH, Urine 6.5 5.0 - 8.0    Protein, POC 1+ (A) Negative mg/dL    Urobilinogen, UA Normal Normal, 0.2 E.U./dL    Leukocytes Moderate (2+) (A) Negative    Nitrite, UA Negative Negative                 Assessment and Plan    Diagnoses and all orders for this visit:    1. Dysuria (Primary)  -     POC Urinalysis Dipstick  -     Urine Culture - Urine, Urine, Clean Catch  -     nitrofurantoin, macrocrystal-monohydrate, (Macrobid) 100 MG capsule; Take 1 capsule by mouth 2 (Two) Times a Day.  Dispense: 14 capsule; Refill: 0    2. Urinary tract infection without hematuria, site unspecified  -     Urine Culture - Urine, Urine, Clean Catch  -     nitrofurantoin, macrocrystal-monohydrate, (Macrobid) 100 MG capsule; Take 1 capsule by mouth 2 (Two) Times a Day.  Dispense: 14 capsule; Refill: 0    Danielle was seen in office today with new dysuria  and urinary tract infection symptoms.  In office urinalysis was positive for red cells and white cells.  Urine culture sent to the laboratory for further evaluation.  I have placed her on Macrobid antibiotic.  She will be notified of test results when completed.  We will send courtesy copy of culture report to her urologist, Dr. Mati Villegas.    I spent 15 minutes caring for Krys on this date of service. This time includes time spent by me in the following activities:preparing for the visit, reviewing tests, obtaining and/or reviewing a separately obtained history, performing a medically appropriate examination and/or evaluation , counseling and educating the patient/family/caregiver, ordering medications, tests, or procedures, and documenting information in the medical record  Follow Up   Return if symptoms worsen or fail to improve.  Patient was given instructions and counseling regarding her condition or for health maintenance advice. Please see specific information pulled into the AVS if appropriate.     [facility]  [address] [place] [id number]  Dept: [phone]  Dept Fax: [phone]  Loc: [phone]  Loc Fax: [phone]

## 2023-11-19 LAB
BACTERIA UR CULT: NORMAL
BACTERIA UR CULT: NORMAL

## 2023-11-20 ENCOUNTER — HOSPITAL ENCOUNTER (OUTPATIENT)
Dept: GENERAL RADIOLOGY | Facility: HOSPITAL | Age: 73
Discharge: HOME | End: 2023-11-20
Admitting: UROLOGY
Payer: MEDICARE

## 2023-11-20 ENCOUNTER — TRANSCRIBE ORDERS (OUTPATIENT)
Dept: ADMINISTRATIVE | Facility: HOSPITAL | Age: 73
End: 2023-11-20
Payer: MEDICARE

## 2023-11-20 DIAGNOSIS — N20.0 URIC ACID NEPHROLITHIASIS: ICD-10-CM

## 2023-11-20 DIAGNOSIS — N20.0 URIC ACID NEPHROLITHIASIS: Primary | ICD-10-CM

## 2023-11-20 PROCEDURE — 74018 RADEX ABDOMEN 1 VIEW: CPT

## 2023-11-29 DIAGNOSIS — F41.9 ANXIETY: ICD-10-CM

## 2023-11-29 RX ORDER — ALPRAZOLAM 1 MG/1
TABLET ORAL
Qty: 60 TABLET | Refills: 0 | Status: SHIPPED | OUTPATIENT
Start: 2023-11-29

## 2023-12-15 DIAGNOSIS — E78.2 MIXED HYPERLIPIDEMIA: ICD-10-CM

## 2023-12-15 RX ORDER — SIMVASTATIN 80 MG
80 TABLET ORAL EVERY EVENING
Qty: 90 TABLET | Refills: 2 | Status: SHIPPED | OUTPATIENT
Start: 2023-12-15

## 2024-01-09 DIAGNOSIS — F41.9 ANXIETY: ICD-10-CM

## 2024-01-10 RX ORDER — ALPRAZOLAM 1 MG/1
TABLET ORAL
Qty: 60 TABLET | Refills: 0 | Status: SHIPPED | OUTPATIENT
Start: 2024-01-10

## 2024-01-16 ENCOUNTER — OFFICE VISIT (OUTPATIENT)
Dept: ORTHOPEDIC SURGERY | Facility: CLINIC | Age: 74
End: 2024-01-16
Payer: MEDICARE

## 2024-01-16 ENCOUNTER — HOSPITAL ENCOUNTER (OUTPATIENT)
Dept: MAMMOGRAPHY | Facility: HOSPITAL | Age: 74
Discharge: HOME | End: 2024-01-16
Admitting: PHYSICIAN ASSISTANT
Payer: MEDICARE

## 2024-01-16 VITALS — BODY MASS INDEX: 34.23 KG/M2 | HEIGHT: 66 IN | WEIGHT: 213 LBS

## 2024-01-16 DIAGNOSIS — Z12.31 VISIT FOR SCREENING MAMMOGRAM: ICD-10-CM

## 2024-01-16 DIAGNOSIS — R26.81 UNSTABLE GAIT: ICD-10-CM

## 2024-01-16 DIAGNOSIS — M94.261 CHONDROMALACIA, RIGHT KNEE: ICD-10-CM

## 2024-01-16 DIAGNOSIS — M17.11 PRIMARY OSTEOARTHRITIS OF RIGHT KNEE: Primary | ICD-10-CM

## 2024-01-16 DIAGNOSIS — M70.61 GREATER TROCHANTERIC BURSITIS OF RIGHT HIP: ICD-10-CM

## 2024-01-16 DIAGNOSIS — M19.172 POST-TRAUMATIC ARTHRITIS OF LEFT ANKLE: ICD-10-CM

## 2024-01-16 PROCEDURE — 77063 BREAST TOMOSYNTHESIS BI: CPT

## 2024-01-16 PROCEDURE — 77067 SCR MAMMO BI INCL CAD: CPT

## 2024-01-16 RX ORDER — GABAPENTIN 600 MG/1
600 TABLET ORAL
COMMUNITY
Start: 2023-12-29

## 2024-01-16 RX ADMIN — LIDOCAINE HYDROCHLORIDE 8 ML: 10 INJECTION, SOLUTION EPIDURAL; INFILTRATION; INTRACAUDAL; PERINEURAL at 10:55

## 2024-01-16 RX ADMIN — TRIAMCINOLONE ACETONIDE 80 MG: 40 INJECTION, SUSPENSION INTRA-ARTICULAR; INTRAMUSCULAR at 10:55

## 2024-01-16 NOTE — PROGRESS NOTES
Subjective:     Patient ID: Krys Mayes is a 74 y.o. female.    Chief Complaint:  Follow-up DJD right knee  History of Present Illness  Krys Mayes returns to clinic today for corticosteroid injection right knee.  She is experiencing pain at the anterior aspect of the knee medial lateral compartment.  Pain with transitional activity symptoms seated standing walk, ambulating short distances and standing for extended period of time.  Denies any other concerns present.       Social History     Occupational History    Not on file   Tobacco Use    Smoking status: Never    Smokeless tobacco: Never    Tobacco comments:     Never smoked ever   Vaping Use    Vaping Use: Never used   Substance and Sexual Activity    Alcohol use: Yes     Comment: She uses alcohol once or twice a year.    Drug use: No    Sexual activity: Not Currently     Partners: Male     Birth control/protection: Post-menopausal      Past Medical History:   Diagnosis Date    Arthritis     Cholelithiasis taken out 1/14/91    Diverticulosis     Gastroesophageal reflux disease 01/21/2016    Hyperlipidemia     Hypertension     Kidney stone     recurrent, calcium oxalate    Menopausal disorder 1998    She went through menopause in 1998    Neuropathy     Obesity     PAF (paroxysmal atrial fibrillation)     in the setting of urosepsis, 9/2016    Reflux esophagitis 08/19/2016    Sebaceous cyst of labia 12/20/2017    Sepsis due to urinary tract infection     Sigmoid diverticulitis 11/13/2017    Type 2 diabetes mellitus     Urinary tract infection     Visual impairment cataracts    Vitamin D deficiency 01/21/2016     Past Surgical History:   Procedure Laterality Date    ANKLE SURGERY      Ankle Repair / Description: ORif the left ankle in July 2010. Secondary to fall    BREAST BIOPSY Right     benign    BREAST SURGERY      biopsy     CATARACT EXTRACTION Right     CHOLECYSTECTOMY      COLONOSCOPY  2014    Done 2004 normal recheck in 10 years. Repeated  "February 2014 colonoscopy was normal and to be repeated in 10 years.    CYSTOSCOPY BLADDER STONE LITHOTRIPSY      FRACTURE SURGERY      HEMORRHOIDECTOMY      NEPHROLITHOTOMY Left 01/09/2017    Procedure: NEPHROLITHOTOMY PERCUTANEOUS SECOND LOOK WITH STENT PLACEMENT AND LASER LITHOTRIPSY;  Surgeon: Mati Villegas MD;  Location: [facility];  Service:     OTHER SURGICAL HISTORY      Tubal Ligation    PERCUTANEOUS NEPHROSTOLITHOTOMY Left 12/2016    TUBAL ABDOMINAL LIGATION      URETEROSCOPY LASER LITHOTRIPSY WITH STENT INSERTION Left 12/06/2022    Procedure: LEFT URETEROSCOPY WITH LASER LITHOTRIPSY, STONE BASKET EXTRACTION, STENT PLACEMENT;  Surgeon: Mati Villegas MD;  Location[facility] OR;  Service: Urology;  Laterality: Left;       Family History   Problem Relation Age of Onset    COPD Mother     Heart attack Father         acute myocardial infarction    Anxiety disorder Father     Depression Father     Heart attack Son     Kidney disease Maternal Grandmother         Born with 1 kidney    Breast cancer Neg Hx                Objective:  Physical Exam  General: No acute distress.  Eyes: conjunctiva clear; pupils equally round and reactive  ENT: external ears and nose atraumatic; oropharynx clear  CV: no peripheral edema  Resp: normal respiratory effort  Skin: no rashes or wounds; normal turgor  Psych: mood and affect appropriate; recent and remote memory intact    Vitals:    01/16/24 1018   Weight: 96.6 kg (213 lb)   Height: 167.6 cm (66\")         01/16/24  1018   Weight: 96.6 kg (213 lb)     Body mass index is 34.38 kg/m².      Right Knee Exam     Tenderness   The patient is experiencing tenderness in the medial joint line and patella.    Range of Motion   Right knee extension: 0.   Right knee flexion: 120.     Tests   Varus: negative Valgus: negative  Lachman:  Anterior - 1+      Patellar apprehension: positive    Other   Erythema: absent  Sensation: normal  Pulse: present  Swelling: " severe    Comments:  Positive active patellar compression test  Positive crepitus throughout the                   Assessment:        1. Primary osteoarthritis of right knee    2. Chondromalacia, right knee    3. Unstable gait    4. Greater trochanteric bursitis of right hip    5. Post-traumatic arthritis of left ankle           Plan:  1.  Discussed plan of care with patient wish to proceed corticosteroid injection anterior lateral approach I do recommend application of ice injection site this evening.  May take for 7 days before she begins experiencing significant symptom improvement.  Encouraged to call with any question concerns we will gladly see her back in clinic as needed.  All questions answered.  Large Joint Arthrocentesis: R knee  Date/Time: 1/16/2024 10:55 AM  Consent given by: patient  Site marked: site marked  Timeout: Immediately prior to procedure a time out was called to verify the correct patient, procedure, equipment, support staff and site/side marked as required   Supporting Documentation  Indications: pain   Procedure Details  Location: knee - R knee  Preparation: Patient was prepped and draped in the usual sterile fashion  Needle size: 22 G  Approach: anterolateral  Medications administered: 8 mL lidocaine PF 1% 1 %; 80 mg triamcinolone acetonide 40 MG/ML  Patient tolerance: patient tolerated the procedure well with no immediate complications          Orders:  Orders Placed This Encounter   Procedures    Large Joint Arthrocentesis: R knee    Ambulatory Referral to Physical Therapy     No orders of the defined types were placed in this encounter.          Rajat dictation utilized

## 2024-01-17 ENCOUNTER — TRANSCRIBE ORDERS (OUTPATIENT)
Dept: ADMINISTRATIVE | Facility: HOSPITAL | Age: 74
End: 2024-01-17
Payer: MEDICARE

## 2024-01-17 DIAGNOSIS — N20.0 CALCULUS, RENAL: Primary | ICD-10-CM

## 2024-01-18 RX ORDER — LIDOCAINE HYDROCHLORIDE 10 MG/ML
8 INJECTION, SOLUTION EPIDURAL; INFILTRATION; INTRACAUDAL; PERINEURAL
Status: COMPLETED | OUTPATIENT
Start: 2024-01-16 | End: 2024-01-16

## 2024-01-18 RX ORDER — TRIAMCINOLONE ACETONIDE 40 MG/ML
80 INJECTION, SUSPENSION INTRA-ARTICULAR; INTRAMUSCULAR
Status: COMPLETED | OUTPATIENT
Start: 2024-01-16 | End: 2024-01-16

## 2024-01-23 ENCOUNTER — TREATMENT (OUTPATIENT)
Dept: PHYSICAL THERAPY | Facility: CLINIC | Age: 74
End: 2024-01-23
Payer: MEDICARE

## 2024-01-23 DIAGNOSIS — M70.61 GREATER TROCHANTERIC BURSITIS OF RIGHT HIP: ICD-10-CM

## 2024-01-23 DIAGNOSIS — R53.81 PHYSICAL DECONDITIONING: ICD-10-CM

## 2024-01-23 DIAGNOSIS — G89.29 CHRONIC PAIN OF LEFT ANKLE: ICD-10-CM

## 2024-01-23 DIAGNOSIS — R26.81 UNSTABLE GAIT: ICD-10-CM

## 2024-01-23 DIAGNOSIS — M25.572 CHRONIC PAIN OF LEFT ANKLE: ICD-10-CM

## 2024-01-23 DIAGNOSIS — M19.172 POST-TRAUMATIC ARTHRITIS OF ANKLE, LEFT: ICD-10-CM

## 2024-01-23 DIAGNOSIS — M17.11 PRIMARY OSTEOARTHRITIS OF RIGHT KNEE: ICD-10-CM

## 2024-01-23 DIAGNOSIS — M25.551 HIP PAIN, RIGHT: ICD-10-CM

## 2024-01-23 DIAGNOSIS — G89.29 CHRONIC PAIN OF RIGHT KNEE: Primary | ICD-10-CM

## 2024-01-23 DIAGNOSIS — M25.561 CHRONIC PAIN OF RIGHT KNEE: Primary | ICD-10-CM

## 2024-01-23 PROCEDURE — 97530 THERAPEUTIC ACTIVITIES: CPT | Performed by: PHYSICAL THERAPIST

## 2024-01-23 PROCEDURE — 97162 PT EVAL MOD COMPLEX 30 MIN: CPT | Performed by: PHYSICAL THERAPIST

## 2024-01-23 PROCEDURE — 97110 THERAPEUTIC EXERCISES: CPT | Performed by: PHYSICAL THERAPIST

## 2024-01-23 NOTE — PROGRESS NOTES
[facility] [place]  [address] [address]                                                                                   Phone [phone]                                                                                    Fax [phone]    Physical Therapy Initial Evaluation and Plan of Care    Patient: Krys Mayes   : 1950  Diagnosis/ICD-10 Code:  Chronic pain of right knee [M25.561, G89.29]  Referring practitioner: AGUSTIN Vysa  NPI: [id number]                                      Date of Initial Visit: 2024  Today's Date: 2024  Patient seen for 1 session         Visit Diagnoses:    ICD-10-CM ICD-9-CM   1. Chronic pain of right knee  M25.561 719.46    G89.29 338.29   2. Hip pain, right  M25.551 719.45   3. Physical deconditioning  R53.81 799.3   4. Chronic pain of left ankle  M25.572 719.47    G89.29 338.29   5. Post-traumatic arthritis of ankle, left  M19.172 716.17   6. Greater trochanteric bursitis of right hip  M70.61 726.5   7. Primary osteoarthritis of right knee  M17.11 715.16   8. Unstable gait  R26.81 781.2         Subjective Questionnaire: LEFS:       Subjective Evaluation    History of Present Illness  Mechanism of injury: R knee OA, unstable gait, R hip GT, post traumatic arthritis L ankle -     No knee pain today.   Sometimes gets R posterior hip.    L ankle pain rated 5/10  LATEX ALLERGY    Pt reports that she is sedentary    Pt reports she has fallen 3 times with last time in December though didn't get injured.      Got injection in R knee on     I feel like I'm bent over when I'm walking and that makes me afraid of falling.    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           Objective          Palpation   Left   No palpable tenderness to the piriformis.   Tenderness of the gluteus medius.     Right   No palpable tenderness to the piriformis. Tenderness of the gluteus medius.     Tenderness     Left Hip   Tenderness in the greater trochanter.     Right Hip   Tenderness in the greater trochanter.     Right Knee   Tenderness in the lateral joint line. No tenderness in the medial joint line.   Left Ankle/Foot   Tenderness in the deltoid ligament, lateral malleolus and medial malleolus. No tenderness in the anterior talofibular ligament.     Additional Tenderness Details  B IT bands    Level ASIS    Neurological Testing     Sensation     Ankle/Foot   Left Ankle/Foot   Intact: light touch    Right Ankle/Foot   Intact: light touch     Active Range of Motion   Left Hip   Flexion: 90 degrees     Right Hip   Flexion: 94 degrees   Left Knee   Flexion: 117 degrees     Right Knee   Flexion: 116 degrees   Left Ankle/Foot   Dorsiflexion (ke): 3 degrees   Plantar flexion: 40 degrees   Inversion: 30 degrees   Eversion: 17 degrees     Right Ankle/Foot   Dorsiflexion (ke): 8 degrees     Passive Range of Motion   Left Hip   External rotation (90/90): 50 degrees   Internal rotation (90/90): 20 degrees     Right Hip   External rotation (90/90): 50 degrees   Internal rotation (90/90): 20 degrees     Strength/Myotome Testing     Left Hip   Planes of Motion   Flexion: 3+    Right Hip   Planes of Motion   Flexion: 3+  Abduction: 3  Adduction: 4+  External rotation: 4  Internal rotation: 4-    Left Knee   Flexion: 4+  Extension: 4+    Right Knee   Flexion: 4+  Extension: 4+    Left Ankle/Foot   Dorsiflexion: 4+  Plantar flexion: 4+  Inversion: 4+  Eversion: 4    Tests     Left Hip   SLR: Negative.     Right Hip   Negative TAYLOR.    SLR: Negative.     Right Knee   Negative lateral Rylie, medial Rylie, valgus stress test at 30 degrees and varus stress test at 30 degrees.   Left Ankle/Foot   Positive for syndesmosis squeeze.     Left Knee Flexibility Comments:   R HS 65 deg with SLR    Right Knee Flexibility Comments:   R HS 60 deg with SLR    Ambulation   Weight-Bearing Status   Assistive device used: none    Quality of Movement During Gait   Trunk    Trunk (Right): Positive lateral lean over stance limb.     Functional Assessment     Comments  5TSTS - 26.25 sec  TUG - 13.09 sec  Pt has difficulty rolling from supine to sidelying          Assessment & Plan       Assessment  Impairments: abnormal gait, abnormal or restricted ROM, activity intolerance, impaired physical strength, lacks appropriate home exercise program and pain with function   Functional limitations: walking, uncomfortable because of pain, moving in bed and standing   Assessment details: Krys Mayes is a 74 y.o. female referred to physical therapy for R knee OA, R greater trochanteric bursitis, unstable gait and post traumatic arthritis of L ankle. She presents with a evolving clinical presentation. She has comorbidities of type 2 diabetes mellitus, A tib, neuropathy, and personal factors of sedentary lifestyle that may affect her progress in the plan of care. Signs and symptoms are consistent with physical therapy diagnosis of deconditioning, chronic R hip/knee pain and L ankle pain. Pt was educated on course of treatment, anatomy and possible reasons for pain. Pt was given a copy of HEP. Patient is appropriate for skilled physical therapy in order to reduce pain, improve strength, build walking confidence and increase ease with daily mobility.         Prognosis: good    Goals  Plan Goals: STG In 4 weeks  - Pt to be independent/compliant with HEP without exacerbation of symptoms.  - Pt is able to ascend/descend 6 in therapy staircase reciprocally with single HR.    - Pain not > 5/10 with ADLs.   - Pt to exhibit B hip overall strength to >/= 4/5 with tolerable pain to allow for improved walking tolerance      LTG In 8 weeks  - TUG testing < 10 sec to demonstrate age related norms and reduce risk of falling.   - Pt to score >/= 40/80 on LEFS to demonstrate improved functional tolerance.   - Pt to exhibit 4+/5 strength throughout hip, knee and ankle musculature to allow for normalized gait and standing > 10 min.  - 5TSTS < 18 sec to demonstrate improved LE function as need for gait endurance      Plan  Therapy options: will be seen for skilled therapy services  Planned modality interventions: cryotherapy, ultrasound and thermotherapy (hydrocollator packs)  Planned therapy interventions: home exercise program and therapeutic activities  Frequency: 2x week  Duration in weeks: 8  Treatment plan discussed with: patient  Plan details: Access Code: [email or web address]  URL: [email or web address]  Date: 01/23/2024  Prepared by: Anahi Kramer    Exercises  - Supine Quadricep Sets  - 1 x daily - 1 sets - 10 reps - 5 hold  - Supine Straight Leg Raises  - 1 x daily - 1 sets - 5 reps  - Supine Hip Abduction  - 1 x daily - 7 x weekly - 1 sets - 10 reps  - Seated Long Arc Quad  - 1 x daily - 7 x weekly - 1 sets - 10 reps  - Seated Heel Toe Raises  - 1 x daily - 7 x weekly - 1 sets - 10 reps    Patient was educated on findings of evaluation, purpose of treatment, and goals for therapy.  Treatment options discussed and questions answered.  Patient was educated on exercises/self treatment/pain relief techniques.          History # of Personal Factors and/or Comorbidities: HIGH (3+)  Examination of Body System(s): # of elements: MODERATE (3)  Clinical Presentation: EVOLVING  Clinical Decision Making: MODERATE      Timed:         Manual Therapy:         mins  87292;     Therapeutic Exercise:   15      mins  19820;     Neuromuscular Cem:        mins  33553;    Therapeutic Activity:    8       mins  19196;     Gait Training:           mins  46722;     Ultrasound:          mins  29689;    Ionto                                   mins   53766  Self Care                            mins   62317    Un-Timed:  Electrical Stimulation:         mins  18240 ( );  Dry Needling   (1-2 muscles)       mins 11447 (Self-pay)  Dry Needling (3-4 muscles)        mins 22434 (Self-pay)  Dry Needling Trial          mins DRYNDLTRIAL  (No Charge)  Traction          mins 04256  Low Eval          Mins  50971  Mod Eval    33      Mins  25608  High Eval                            Mins  61155    Timed Treatment:  23    mins   Total Treatment:    56    mins    PT: Anahi Kramer PT     [place] License # [id number]    Electronically signed by Anahi Kramer PT, 01/23/24, 4:03 PM EST    Certification Period: 1/23/2024 thru 4/21/2024  I certify that the therapy services are furnished while this patient is under my care.  The services outlined above are required by this patient, and will be reviewed every 90 days.         Physician Signature:__________________________________________________    PHYSICIAN: Catherine Brown APRN  NPI: [id number]                                      DATE:  :     Please sign and return via fax to [phone].  Thank you, [facility]

## 2024-01-25 ENCOUNTER — TREATMENT (OUTPATIENT)
Dept: PHYSICAL THERAPY | Facility: CLINIC | Age: 74
End: 2024-01-25
Payer: MEDICARE

## 2024-01-25 DIAGNOSIS — M25.561 CHRONIC PAIN OF RIGHT KNEE: Primary | ICD-10-CM

## 2024-01-25 DIAGNOSIS — M25.572 CHRONIC PAIN OF LEFT ANKLE: ICD-10-CM

## 2024-01-25 DIAGNOSIS — M19.172 POST-TRAUMATIC ARTHRITIS OF ANKLE, LEFT: ICD-10-CM

## 2024-01-25 DIAGNOSIS — M70.61 GREATER TROCHANTERIC BURSITIS OF RIGHT HIP: ICD-10-CM

## 2024-01-25 DIAGNOSIS — M17.11 PRIMARY OSTEOARTHRITIS OF RIGHT KNEE: ICD-10-CM

## 2024-01-25 DIAGNOSIS — G89.29 CHRONIC PAIN OF LEFT ANKLE: ICD-10-CM

## 2024-01-25 DIAGNOSIS — R26.81 UNSTABLE GAIT: ICD-10-CM

## 2024-01-25 DIAGNOSIS — R53.81 PHYSICAL DECONDITIONING: ICD-10-CM

## 2024-01-25 DIAGNOSIS — G89.29 CHRONIC PAIN OF RIGHT KNEE: Primary | ICD-10-CM

## 2024-01-25 DIAGNOSIS — M25.551 HIP PAIN, RIGHT: ICD-10-CM

## 2024-01-25 PROCEDURE — 97110 THERAPEUTIC EXERCISES: CPT | Performed by: PHYSICAL THERAPIST

## 2024-01-25 NOTE — PROGRESS NOTES
Physical Therapy Daily Treatment Note      Patient: Krys Mayes   : 1950  Referring practitioner: AGUSTIN Vyas  Date of Initial Visit: Type: THERAPY  Noted: 2024  Today's Date: 2024  Patient seen for 2 sessions       Visit Diagnoses:    ICD-10-CM ICD-9-CM   1. Chronic pain of right knee  M25.561 719.46    G89.29 338.29   2. Hip pain, right  M25.551 719.45   3. Physical deconditioning  R53.81 799.3   4. Chronic pain of left ankle  M25.572 719.47    G89.29 338.29   5. Post-traumatic arthritis of ankle, left  M19.172 716.17   6. Greater trochanteric bursitis of right hip  M70.61 726.5   7. Primary osteoarthritis of right knee  M17.11 715.16   8. Unstable gait  R26.81 781.2       Subjective   Krys Mayes reports: I did well after the first visit.      Objective   See Exercise, Manual, and Modality Logs for complete treatment.       Assessment/Plan   Pt remains challenged with SLR exhibiting mild extensor lag particularly on LLE as she moves through her reps.  She was able to tolerate increased reps on several of her exercises as well as increased time on NuStep to build overall endurance.  She had some difficulty with HS stretching due to UE weakness and benefited from assistance at her heel.    Progress per Plan of Care             Timed:         Manual Therapy:         mins  03723;     Therapeutic Exercise:   27      mins  29612;     Neuromuscular Cem:   3     mins  31888;    Therapeutic Activity:          mins  59770;     Gait Training:           mins  78062;     Ultrasound:          mins  60270;    Ionto                                   mins   04981  Self Care                            mins   95127      Timed Treatment:  30    mins (decreased units billed to account for divided time)   Total Treatment:    40    mins    Anahi Kramer, PT  [place] License: #[id number]

## 2024-01-30 ENCOUNTER — TREATMENT (OUTPATIENT)
Dept: PHYSICAL THERAPY | Facility: CLINIC | Age: 74
End: 2024-01-30
Payer: MEDICARE

## 2024-01-30 DIAGNOSIS — M25.551 HIP PAIN, RIGHT: ICD-10-CM

## 2024-01-30 DIAGNOSIS — M19.172 POST-TRAUMATIC ARTHRITIS OF ANKLE, LEFT: ICD-10-CM

## 2024-01-30 DIAGNOSIS — R53.81 PHYSICAL DECONDITIONING: ICD-10-CM

## 2024-01-30 DIAGNOSIS — R26.81 UNSTABLE GAIT: ICD-10-CM

## 2024-01-30 DIAGNOSIS — M17.11 PRIMARY OSTEOARTHRITIS OF RIGHT KNEE: ICD-10-CM

## 2024-01-30 DIAGNOSIS — G89.29 CHRONIC PAIN OF LEFT ANKLE: ICD-10-CM

## 2024-01-30 DIAGNOSIS — M25.561 CHRONIC PAIN OF RIGHT KNEE: Primary | ICD-10-CM

## 2024-01-30 DIAGNOSIS — M70.61 GREATER TROCHANTERIC BURSITIS OF RIGHT HIP: ICD-10-CM

## 2024-01-30 DIAGNOSIS — G89.29 CHRONIC PAIN OF RIGHT KNEE: Primary | ICD-10-CM

## 2024-01-30 DIAGNOSIS — M25.572 CHRONIC PAIN OF LEFT ANKLE: ICD-10-CM

## 2024-01-30 PROCEDURE — 97530 THERAPEUTIC ACTIVITIES: CPT | Performed by: PHYSICAL THERAPIST

## 2024-01-30 PROCEDURE — 97110 THERAPEUTIC EXERCISES: CPT | Performed by: PHYSICAL THERAPIST

## 2024-01-30 NOTE — PROGRESS NOTES
Physical Therapy Daily Treatment Note      Patient: Krys Mayes   : 1950  Referring practitioner: AGUSTIN Vyas  Date of Initial Visit: Type: THERAPY  Noted: 2024  Today's Date: 2024  Patient seen for 3 sessions       Visit Diagnoses:    ICD-10-CM ICD-9-CM   1. Chronic pain of right knee  M25.561 719.46    G89.29 338.29   2. Hip pain, right  M25.551 719.45   3. Physical deconditioning  R53.81 799.3   4. Chronic pain of left ankle  M25.572 719.47    G89.29 338.29   5. Post-traumatic arthritis of ankle, left  M19.172 716.17   6. Greater trochanteric bursitis of right hip  M70.61 726.5   7. Primary osteoarthritis of right knee  M17.11 715.16   8. Unstable gait  R26.81 781.2       Subjective   Krys Mayes reports: My knee were a little sore after last visit but that's to be expected.  Not feeling too bad today although hard to get out of bed due to fatigue and gray weather.     Objective   See Exercise, Manual, and Modality Logs for complete treatment.       Assessment/Plan   Pt attempted standing R hip flex with knee ext but noted R LBP secondary to lack of core stability so held that exercise.  She was able to advance frontal plane activities in standing though with notable difficulty when standing on RLE.  She differed offer of ice following ex program.   Progress per Plan of Care             Timed:         Manual Therapy:         mins  54580;     Therapeutic Exercise:   30      mins  52082;     Neuromuscular Cem:        mins  29846;    Therapeutic Activity:    8      mins  63683;     Gait Training:           mins  13652;     Ultrasound:          mins  21431;    Ionto                                   mins   20099  Self Care                            mins   24822      Timed Treatment:  38    mins divided time  Total Treatment:    44    mins    Anahi Kramer, PT  [place] License: #[id number]

## 2024-02-01 ENCOUNTER — HOSPITAL ENCOUNTER (OUTPATIENT)
Dept: CT IMAGING | Facility: HOSPITAL | Age: 74
Discharge: HOME | End: 2024-02-01
Admitting: UROLOGY
Payer: MEDICARE

## 2024-02-01 ENCOUNTER — TREATMENT (OUTPATIENT)
Dept: PHYSICAL THERAPY | Facility: CLINIC | Age: 74
End: 2024-02-01
Payer: MEDICARE

## 2024-02-01 DIAGNOSIS — N20.0 CALCULUS, RENAL: ICD-10-CM

## 2024-02-01 DIAGNOSIS — M19.172 POST-TRAUMATIC ARTHRITIS OF ANKLE, LEFT: ICD-10-CM

## 2024-02-01 DIAGNOSIS — M17.11 PRIMARY OSTEOARTHRITIS OF RIGHT KNEE: ICD-10-CM

## 2024-02-01 DIAGNOSIS — M25.572 CHRONIC PAIN OF LEFT ANKLE: ICD-10-CM

## 2024-02-01 DIAGNOSIS — G89.29 CHRONIC PAIN OF LEFT ANKLE: ICD-10-CM

## 2024-02-01 DIAGNOSIS — M70.61 GREATER TROCHANTERIC BURSITIS OF RIGHT HIP: ICD-10-CM

## 2024-02-01 DIAGNOSIS — R26.81 UNSTABLE GAIT: ICD-10-CM

## 2024-02-01 DIAGNOSIS — R53.81 PHYSICAL DECONDITIONING: ICD-10-CM

## 2024-02-01 DIAGNOSIS — M25.551 HIP PAIN, RIGHT: ICD-10-CM

## 2024-02-01 DIAGNOSIS — M25.561 CHRONIC PAIN OF RIGHT KNEE: Primary | ICD-10-CM

## 2024-02-01 DIAGNOSIS — G89.29 CHRONIC PAIN OF RIGHT KNEE: Primary | ICD-10-CM

## 2024-02-01 PROCEDURE — 74176 CT ABD & PELVIS W/O CONTRAST: CPT

## 2024-02-01 PROCEDURE — 97110 THERAPEUTIC EXERCISES: CPT | Performed by: PHYSICAL THERAPIST

## 2024-02-01 NOTE — PROGRESS NOTES
Physical Therapy Daily Treatment Note      Patient: Krys Mayes   : 1950  Referring practitioner: AGUSTIN Vyas  Date of Initial Visit: Type: THERAPY  Noted: 2024  Today's Date: 2024  Patient seen for 4 sessions       Visit Diagnoses:    ICD-10-CM ICD-9-CM   1. Chronic pain of right knee  M25.561 719.46    G89.29 338.29   2. Hip pain, right  M25.551 719.45   3. Physical deconditioning  R53.81 799.3   4. Post-traumatic arthritis of ankle, left  M19.172 716.17   5. Chronic pain of left ankle  M25.572 719.47    G89.29 338.29   6. Greater trochanteric bursitis of right hip  M70.61 726.5   7. Unstable gait  R26.81 781.2   8. Primary osteoarthritis of right knee  M17.11 715.16       Subjective   Krys Mayes reports: no new complaints or episodes of loss of balance    Objective   See Exercise, Manual, and Modality Logs for complete treatment.       Assessment/Plan   Pt was able to perform SLR without PT assist this visit demonstrating improved quad function.  She is also better able to hold onto stretching strap allowing for independence with HS stretching exercise. She continues to benefit from frequent verbal cuing for proper exercise form.    Progress per Plan of Care             Timed:         Manual Therapy:         mins  00797;     Therapeutic Exercise:   25      mins  08623;     Neuromuscular Cem:        mins  60228;    Therapeutic Activity:         mins  32602;     Gait Training:           mins  31914;     Ultrasound:          mins  24917;    Ionto                                   mins   61349  Self Care                            mins   32736      Timed Treatment:  25    mins (decreased units billed to account for divided time)    Total Treatment:    47    mins    Anahi Kramer, PT  [place] License: #[id number]

## 2024-02-06 ENCOUNTER — TREATMENT (OUTPATIENT)
Dept: PHYSICAL THERAPY | Facility: CLINIC | Age: 74
End: 2024-02-06
Payer: MEDICARE

## 2024-02-06 DIAGNOSIS — M19.172 POST-TRAUMATIC ARTHRITIS OF ANKLE, LEFT: ICD-10-CM

## 2024-02-06 DIAGNOSIS — R53.81 PHYSICAL DECONDITIONING: ICD-10-CM

## 2024-02-06 DIAGNOSIS — M25.551 HIP PAIN, RIGHT: ICD-10-CM

## 2024-02-06 DIAGNOSIS — M25.561 CHRONIC PAIN OF RIGHT KNEE: Primary | ICD-10-CM

## 2024-02-06 DIAGNOSIS — G89.29 CHRONIC PAIN OF RIGHT KNEE: Primary | ICD-10-CM

## 2024-02-06 DIAGNOSIS — F41.9 ANXIETY: ICD-10-CM

## 2024-02-06 PROCEDURE — 97530 THERAPEUTIC ACTIVITIES: CPT | Performed by: PHYSICAL THERAPIST

## 2024-02-06 PROCEDURE — 97110 THERAPEUTIC EXERCISES: CPT | Performed by: PHYSICAL THERAPIST

## 2024-02-06 RX ORDER — SERTRALINE HYDROCHLORIDE 100 MG/1
TABLET, FILM COATED ORAL
Qty: 90 TABLET | Refills: 0 | Status: SHIPPED | OUTPATIENT
Start: 2024-02-06

## 2024-02-06 NOTE — PROGRESS NOTES
Physical Therapy Daily Treatment Note    [facility]  [address] [place] [address]  [phone] (phone)  [phone] (fax)    Patient: Krys Mayes   : 1950  Diagnosis/ICD-10 Code:  Chronic pain of right knee [M25.561, G89.29]  Referring practitioner: AGUSTIN Vyas  Date of Initial Visit: Type: THERAPY  Noted: 2024  Today's Date: 2024  Patient seen for 5 sessions           Subjective   Patient reports her L knee has been acting up and feeling weak (R knee feels better after having the shot). The rest of her is feeling pretty good overall.    Objective     See Exercise, Manual, and Modality Logs for complete treatment.     Assessment/Plan  Patient was able to progress to standing position for heel-toe raises. Continued to provide SBA for safety with all standing exercises but no LOB noted during today's session. She demonstrated improved muscle endurance during SLRs however she still required cueing to maintain longer hold time for LE stretches.          Timed:    Manual Therapy:         mins  28621;  Therapeutic Exercise:    25     mins  92980;     Neuromuscular Cem:        mins  78907;    Therapeutic Activity:     15     mins  92492;     Gait Training:           mins  29347;     Ultrasound:          mins  88816;    Electrical Stimulation:         mins  05496 ( );  Iontophoresis         mins 10481;  Aquatic Therapy         mins 35515;    Untimed:  Electrical Stimulation:         mins  44148 ( );  Traction:         mins  56345;   Dry Needling   (1-2 muscles)       mins  (Self-pay)  Dry Needling (3-4 muscles)        mins  (Self-pay)  Dry Needling Trial          mins DRYNDLTRIAL  (No Charge)    Timed Treatment:   40   mins   Total Treatment:     40   mins    Linnette De Guzman PT  Physical Therapist    [place] License:[id number]

## 2024-02-08 ENCOUNTER — TREATMENT (OUTPATIENT)
Dept: PHYSICAL THERAPY | Facility: CLINIC | Age: 74
End: 2024-02-08
Payer: MEDICARE

## 2024-02-08 DIAGNOSIS — G89.29 CHRONIC PAIN OF RIGHT KNEE: ICD-10-CM

## 2024-02-08 DIAGNOSIS — M25.561 CHRONIC PAIN OF RIGHT KNEE: ICD-10-CM

## 2024-02-08 DIAGNOSIS — R53.81 PHYSICAL DECONDITIONING: ICD-10-CM

## 2024-02-08 DIAGNOSIS — M25.551 HIP PAIN, RIGHT: Primary | ICD-10-CM

## 2024-02-08 DIAGNOSIS — M19.172 POST-TRAUMATIC ARTHRITIS OF ANKLE, LEFT: ICD-10-CM

## 2024-02-08 PROCEDURE — 97110 THERAPEUTIC EXERCISES: CPT | Performed by: PHYSICAL THERAPIST

## 2024-02-08 PROCEDURE — 97530 THERAPEUTIC ACTIVITIES: CPT | Performed by: PHYSICAL THERAPIST

## 2024-02-08 RX ORDER — ALPRAZOLAM 1 MG/1
TABLET ORAL
Qty: 60 TABLET | Refills: 0 | Status: SHIPPED | OUTPATIENT
Start: 2024-02-08

## 2024-02-08 NOTE — PROGRESS NOTES
"    8/6/2020         RE: Terrence Carter  [address] [place] [address]        Dear Colleague,    Thank you for referring your patient, Terrence Carter, to the [facility]. Please see a copy of my visit note below.    Terrence Carter is a 71 year old female who is being evaluated via a billable telephone visit.      The patient has been notified of following:     \"This telephone visit will be conducted via a call between you and your physician/provider. We have found that certain health care needs can be provided without the need for a physical exam.  This service lets us provide the care you need with a short phone conversation.  If a prescription is necessary we can send it directly to your pharmacy.  If lab work is needed we can place an order for that and you can then stop by our lab to have the test done at a later time.    Telephone visits are billed at different rates depending on your insurance coverage. During this emergency period, for some insurers they may be billed the same as an in-person visit.  Please reach out to your insurance provider with any questions.    If during the course of the call the physician/provider feels a telephone visit is not appropriate, you will not be charged for this service.\"    Patient has given verbal consent for Telephone visit?  Yes    What phone number would you like to be contacted at? [phone]    How would you like to obtain your AVS? Mail a copy    Phone call duration: 24 minutes, Start 09:10, End 09:34      Dear Rochelle Packer NP,    Thank you for referring your patient Terrence Carter to the Allergy/Immunology Clinic. Terrence Carter was seen in the Allergy Clinic at [facility].    Terrence Carter is a 71 year old  female being seen today at the request of Rochelle Packer NP, in consultation for cough. Her significant other Alexandr also participated in the visit. She states that she has had a cough for a couple of years however " Physical Therapy Daily Treatment Note    [facility]  [address] [place] [address]  [phone] (phone)  [phone] (fax)    Patient: Krys Mayes   : 1950  Diagnosis/ICD-10 Code:  Hip pain, right [M25.551]  Referring practitioner: AGUSTIN Vyas  Date of Initial Visit: Type: THERAPY  Noted: 2024  Today's Date: 2024  Patient seen for 6 sessions           Subjective   Danielle reports she is feeling good today.    Objective     See Exercise, Manual, and Modality Logs for complete treatment.     Assessment/Plan  Patient had her feet catch a few times while side stepping however there was no LOB. She demonstrated better muscle control with SLRs but there is still some muscle fatigue after ~5-8 reps. Focused on trying to maintain a level pelvis while weight shifting for marching in place. Will continue to progress B LE strength, stability, and ROM.          Timed:    Manual Therapy:         mins  64760;  Therapeutic Exercise:    20     mins  16752;     Neuromuscular Cem:        mins  90601;    Therapeutic Activity:     10     mins  33771;     Gait Training:           mins  02074;     Ultrasound:          mins  47036;    Electrical Stimulation:         mins  30170 ( );  Iontophoresis         mins 80860;  Aquatic Therapy         mins 30135;    Untimed:  Electrical Stimulation:         mins  35896 ( );  Traction:         mins  28208;   Dry Needling   (1-2 muscles)       mins  (Self-pay)  Dry Needling (3-4 muscles)        mins  (Self-pay)  Dry Needling Trial          mins DRYNDLTRIAL  (No Charge)    Timed Treatment:   30   mins   Total Treatment:     45   mins    Linnette De Guzman PT  Physical Therapist    [place] License:[id number]   "the symptoms are becoming worse. Her partner states that he had not noticed the cough until recently. Emma has been seen several times in the last 2 months due to the cough. She states that it is intermittent throughout the day and while it has been present at night as well her symptoms are not as significant at night. In the last month or two she has been waking up once a night to cough and has a coughing spell that can last up to 3 minutes. Once she clears her throat she can go back to sleep without issue. Emma reports that the cough is productive of clear or white sputum. There is no associated shortness of breath or wheezing but her partner states he hears a \"gurgling\" sound when she is sleeping. She wakes up because of the gurgling, coughs, and goes back to sleep.    When she was recently evaluated she was also noted to have wheezing on exam. In the last few months she has not improved with antibiotics or prednisone.     At night her significant other hears her breathing - states it's a \"wheezing or gurgle.\" She coughs up the gurgle, wakes up and goes to the bathroom and cough for several minutes and then goes back to bed and she's fine. Cough is productive of clear/white sputum after vigorous cough. The cough is not constant but occurs throughout the day. The cough is not worse with activity or after eating. She doesn't typically have shortness of breath but will have some difficulty if she is coughing vigorously.    Cetirizine prescribed in October - no improvement, azithromyciin and doxycyline in 6/2020 and 7/2020, Arnuity 6/2020, antibiotics did not help, prednisone did not help. Took arnuity for 1 month, didn't help. Albuterol also prescribed but she didn't use it. Feels like the cough originates from her neck to her upper chest.     No prior hisotry of asthma or allergies. Feels like there is something draining in the back of her throat.    Has not taken omeprazole in 6 months, cough began to get worse " around that time. Stopped the medicatoin because the symptoms of heartburn have resolved.       Chest Xray 6/26/20:  IMPRESSION:  There are no acute infiltrates. The cardiac silhouette is  not enlarged. Pulmonary vasculature is unremarkable.     Past Medical History:   Diagnosis Date     Adenomatous polyp of colon 5/1/1997     Complication of anesthesia      Alexandr reports heart stopped during colonoscopy     GI (gastrointestinal bleed) 1/1/1993    HGB to 4     Hypertension      Mixed hyperlipidemia     mild-elevated LDL chol, high HDL     Osteoporosis 12/1/2004     Ulcer 1/1/1993    large benign gastric     Family History   Problem Relation Age of Onset     Unknown/Adopted Other         Lost her family in [place] war     Glaucoma No family hx of      Macular Degeneration No family hx of      Past Surgical History:   Procedure Laterality Date     BUNIONECTOMY RT/LT  2/2000 - 11/2002    1 x RT /1 x  LT     C NONSPECIFIC PROCEDURE      vein stripping     C NONSPECIFIC PROCEDURE  12/01/01    ganglion, foot hardware     DISSECT LYMPH NODE AXILLA Right 3/21/2019    Procedure: Right  Axillary Lymph Node Dissection;  Surgeon: Amina Kat MD;  Location: [facility] OR     MASTECTOMY SIMPLE, SENTINEL NODE, COMBINED N/A 3/21/2019    Procedure: Bilateral Skin Sparing Mastectomy, Right Axillary [place] Lymph Node Biopsy;  Surgeon: Amina Kat MD;  Location: [facility] OR     RECONSTRUCT BREAST, INSERT TISSUE EXPANDER BILATERAL, COMBINED Bilateral 3/21/2019    Procedure: Bilateral Breast Reconstruction With Expanders, And SPY;  Surgeon: MAGALIE Cornejo MD;  Location: [facility] OR     REMOVE AND REPLACE BREAST IMPLANT PROSTHESIS Bilateral 5/22/2019    Procedure: Bilateral Breast Implant Exchange and Revision;  Surgeon: MAGALIE Cornejo MD;  Location:  OR     TONSILLECTOMY & ADENOIDECTOMY         ENVIRONMENTAL HISTORY: [place] The home is heated with a forced air.  They do have central air conditioning. The patient's bedroom is furnished with Indoor plants and carpeting in bedroom.  Pets inside the house include None. There is no history of cockroach or mice infestation. There is/are 0 smokers living in the house.  There is/are 0 who smoke ecigarettes/vape living in the house.The house does not have a damp basement.     SOCIAL HISTORY:   Terrence is retired, previously employed as  and . She lives with her spouse.  Her spouse is also retired.    REVIEW OF SYSTEMS:  General: negative for weight gain. negative for weight loss. negative for changes in sleep.   Eyes: negative for itching. negative for redness. negative for tearing/watering. negative for vision changes  Ears: negative for fullness. negative for hearing loss. negative for dizziness.   Nose: negative for snoring.negative for changes in smell. negative for drainage.   Throat: negative for hoarseness. negative for sore throat. positive  for trouble swallowing.   Lungs: positive  for cough. negative for shortness of breath.negative for wheezing. positive  for sputum production.   Cardiovascular: negative for chest pain. negative for swelling of ankles. negative for fast or irregular heartbeat.   Gastrointestinal: negative for nausea. negative for heartburn. negative for acid reflux.   Musculoskeletal: negative for joint pain. negative for joint stiffness. negative for joint swelling.   Neurologic: negative for seizures. negative for fainting. negative for weakness.   Psychiatric: negative for changes in mood. negative for anxiety.   Endocrine: negative for cold intolerance. negative for heat intolerance. negative for tremors.   Hematologic: negative for easy bruising. negative for easy bleeding.  Integumentary: negative for rash. negative for scaling. negative for nail changes.       Current Outpatient Medications:      amLODIPine (NORVASC) 10 MG tablet, Take 1 tablet (10 mg) by mouth daily,  Disp: 90 tablet, Rfl: 1     simvastatin (ZOCOR) 40 MG tablet, TAKE 1 TABLET (40 MG) BY MOUTH AT BEDTIME, Disp: 90 tablet, Rfl: 3     albuterol (PROAIR HFA/PROVENTIL HFA/VENTOLIN HFA) 108 (90 Base) MCG/ACT inhaler, Inhale 2 puffs into the lungs every 6 hours (Patient not taking: Reported on 8/6/2020), Disp: 8.5 g, Rfl: 0     anastrozole (ARIMIDEX) 1 MG tablet, Take 1 tablet (1 mg) by mouth daily (Patient not taking: Reported on 8/6/2020), Disp: 90 tablet, Rfl: 3     cetirizine (ZYRTEC) 10 MG tablet, Take 1 tablet (10 mg) by mouth daily (Patient not taking: Reported on 8/6/2020), Disp: 30 tablet, Rfl: 5     clotrimazole (LOTRIMIN) 1 % external cream, Apply 1 Application topically, Disp: , Rfl:      fluticasone (ARNUITY ELLIPTA) 100 MCG/ACT inhaler, Inhale 1 puff into the lungs daily (Patient not taking: Reported on 8/6/2020), Disp: 1 each, Rfl: 0     omeprazole (PRILOSEC) 20 MG DR capsule, TAKE 1 CAPSULE (20 MG) BY MOUTH DAILY (Patient not taking: Reported on 8/6/2020), Disp: 90 capsule, Rfl: 3     senna-docusate (SENNA S) 8.6-50 MG tablet, Take 1-2 tablets by mouth daily (Patient not taking: Reported on 8/6/2020), Disp: 60 tablet, Rfl: 11  No current facility-administered medications for this visit.     Facility-Administered Medications Ordered in Other Visits:      sodium chloride (PF) 0.9% PF flush 10 mL, 10 mL, Intravenous, Once, JACLYN Jay MD  Immunization History   Administered Date(s) Administered     Influenza (High Dose) 3 valent vaccine 10/23/2014, 09/21/2015, 11/01/2016, 09/06/2018, 10/07/2019     Influenza (IIV3) PF 10/26/2007, 09/20/2011, 09/24/2012     Influenza Vaccine IM > 6 months Valent IIV4 10/21/2013     Pneumo Conj 13-V (2010&after) 07/28/2016     Pneumococcal 23 valent 11/18/2013     TD (ADULT, 7+) 11/07/2008     TDAP Vaccine (Boostrix) 09/24/2012     Zoster vaccine, live 09/27/2012     No Known Allergies      EXAM:   There were no vitals taken for this visit.  GENERAL: alert,  cooperative and not in distress  LUNGS: speaking comfortably in full sentences, no cough or audible wheezing  NEURO: oriented for age x3  PSYCH: does not appear depressed or anxious    WORKUP: None    ASSESSMENT/PLAN:  Terrence Carter is a 71 year old female    ***      Thank you for allowing me to participate in the care of Terrence Carter.      Tong Maxwell MD, FAAAAI  Allergy/Immunology  [facility]'s      Chart documentation done in part with Dragon Voice Recognition Software. Although reviewed after completion, some word and grammatical errors may remain.    Again, thank you for allowing me to participate in the care of your patient.        Sincerely,        Tong Maxwell MD

## 2024-02-13 ENCOUNTER — TREATMENT (OUTPATIENT)
Dept: PHYSICAL THERAPY | Facility: CLINIC | Age: 74
End: 2024-02-13
Payer: MEDICARE

## 2024-02-13 DIAGNOSIS — M19.172 POST-TRAUMATIC ARTHRITIS OF ANKLE, LEFT: ICD-10-CM

## 2024-02-13 DIAGNOSIS — M25.572 CHRONIC PAIN OF LEFT ANKLE: ICD-10-CM

## 2024-02-13 DIAGNOSIS — G89.29 CHRONIC PAIN OF LEFT ANKLE: ICD-10-CM

## 2024-02-13 DIAGNOSIS — M70.61 GREATER TROCHANTERIC BURSITIS OF RIGHT HIP: ICD-10-CM

## 2024-02-13 DIAGNOSIS — G89.29 CHRONIC PAIN OF RIGHT KNEE: ICD-10-CM

## 2024-02-13 DIAGNOSIS — R26.81 UNSTABLE GAIT: ICD-10-CM

## 2024-02-13 DIAGNOSIS — M25.551 HIP PAIN, RIGHT: Primary | ICD-10-CM

## 2024-02-13 DIAGNOSIS — R53.81 PHYSICAL DECONDITIONING: ICD-10-CM

## 2024-02-13 DIAGNOSIS — M17.11 PRIMARY OSTEOARTHRITIS OF RIGHT KNEE: ICD-10-CM

## 2024-02-13 DIAGNOSIS — M25.561 CHRONIC PAIN OF RIGHT KNEE: ICD-10-CM

## 2024-02-13 PROCEDURE — 97530 THERAPEUTIC ACTIVITIES: CPT | Performed by: PHYSICAL THERAPIST

## 2024-02-13 PROCEDURE — 97110 THERAPEUTIC EXERCISES: CPT | Performed by: PHYSICAL THERAPIST

## 2024-02-15 ENCOUNTER — TREATMENT (OUTPATIENT)
Dept: PHYSICAL THERAPY | Facility: CLINIC | Age: 74
End: 2024-02-15
Payer: MEDICARE

## 2024-02-15 DIAGNOSIS — R26.81 UNSTABLE GAIT: ICD-10-CM

## 2024-02-15 DIAGNOSIS — R53.81 PHYSICAL DECONDITIONING: ICD-10-CM

## 2024-02-15 DIAGNOSIS — M25.561 CHRONIC PAIN OF RIGHT KNEE: ICD-10-CM

## 2024-02-15 DIAGNOSIS — M25.572 CHRONIC PAIN OF LEFT ANKLE: ICD-10-CM

## 2024-02-15 DIAGNOSIS — G89.29 CHRONIC PAIN OF LEFT ANKLE: ICD-10-CM

## 2024-02-15 DIAGNOSIS — G89.29 CHRONIC PAIN OF RIGHT KNEE: ICD-10-CM

## 2024-02-15 DIAGNOSIS — M19.172 POST-TRAUMATIC ARTHRITIS OF ANKLE, LEFT: ICD-10-CM

## 2024-02-15 DIAGNOSIS — M17.11 PRIMARY OSTEOARTHRITIS OF RIGHT KNEE: ICD-10-CM

## 2024-02-15 DIAGNOSIS — M70.61 GREATER TROCHANTERIC BURSITIS OF RIGHT HIP: ICD-10-CM

## 2024-02-15 DIAGNOSIS — M25.551 HIP PAIN, RIGHT: Primary | ICD-10-CM

## 2024-02-15 PROCEDURE — 97530 THERAPEUTIC ACTIVITIES: CPT | Performed by: PHYSICAL THERAPIST

## 2024-02-15 PROCEDURE — 97110 THERAPEUTIC EXERCISES: CPT | Performed by: PHYSICAL THERAPIST

## 2024-02-20 ENCOUNTER — TREATMENT (OUTPATIENT)
Dept: PHYSICAL THERAPY | Facility: CLINIC | Age: 74
End: 2024-02-20
Payer: MEDICARE

## 2024-02-20 DIAGNOSIS — G89.29 CHRONIC PAIN OF LEFT ANKLE: ICD-10-CM

## 2024-02-20 DIAGNOSIS — M25.561 CHRONIC PAIN OF RIGHT KNEE: ICD-10-CM

## 2024-02-20 DIAGNOSIS — R53.81 PHYSICAL DECONDITIONING: ICD-10-CM

## 2024-02-20 DIAGNOSIS — M25.551 HIP PAIN, RIGHT: Primary | ICD-10-CM

## 2024-02-20 DIAGNOSIS — M70.61 GREATER TROCHANTERIC BURSITIS OF RIGHT HIP: ICD-10-CM

## 2024-02-20 DIAGNOSIS — R26.81 UNSTABLE GAIT: ICD-10-CM

## 2024-02-20 DIAGNOSIS — M17.11 PRIMARY OSTEOARTHRITIS OF RIGHT KNEE: ICD-10-CM

## 2024-02-20 DIAGNOSIS — M19.172 POST-TRAUMATIC ARTHRITIS OF ANKLE, LEFT: ICD-10-CM

## 2024-02-20 DIAGNOSIS — G89.29 CHRONIC PAIN OF RIGHT KNEE: ICD-10-CM

## 2024-02-20 DIAGNOSIS — M25.572 CHRONIC PAIN OF LEFT ANKLE: ICD-10-CM

## 2024-02-20 PROCEDURE — 97110 THERAPEUTIC EXERCISES: CPT | Performed by: PHYSICAL THERAPIST

## 2024-02-20 PROCEDURE — 97530 THERAPEUTIC ACTIVITIES: CPT | Performed by: PHYSICAL THERAPIST

## 2024-02-20 NOTE — PROGRESS NOTES
[facility] Therapy [place]  [address] [address]                                                                                Phone [phone]                                                                                  Fax [phone]    Physical Therapy Reassessment  Patient: Krys Mayes   : 1950  Diagnosis/ICD-10 Code:  Hip pain, right [M25.551]  Referring practitioner: AGUSTIN Vyas  NPI: [id number]                                      Date of Initial Visit:  Type: THERAPY  Noted: 2024  Today's Date: 2024  Patient seen for 10 sessions         Visit Diagnoses:    ICD-10-CM ICD-9-CM   1. Hip pain, right  M25.551 719.45   2. Chronic pain of right knee  M25.561 719.46    G89.29 338.29   3. Physical deconditioning  R53.81 799.3   4. Post-traumatic arthritis of ankle, left  M19.172 716.17   5. Chronic pain of left ankle  M25.572 719.47    G89.29 338.29   6. Greater trochanteric bursitis of right hip  M70.61 726.5   7. Primary osteoarthritis of right knee  M17.11 715.16   8. Unstable gait  R26.81 781.2         Subjective Questionnaire: LEFS:  ( at [facility])  Clinical Progress: improved  Home Program Compliance: Yes  Treatment has included: therapeutic exercise and therapeutic activity      Subjective   Krys Mayes reports: My R knee and hip are doing well; my L knee has been bothering me.  Pain gets up to 6/10.     Objective          Tenderness   Left Knee   Tenderness in the lateral joint line, medial patella and medial retinaculum. No tenderness in the medial joint line.     Right Knee   Tenderness in the LCL (distal). No tenderness in the lateral joint line, medial joint line, medial patella and medial retinaculum.     Strength/Myotome Testing     Left Hip   Planes of Motion   Flexion: 4  Abduction: 3+  External rotation: 4  Internal rotation: 4+    Right Hip   Planes of Motion   Flexion: 3+  Abduction: 3  External rotation:  4+  Internal rotation: 4+    Left Knee   Flexion: 4  Extension: 5    Right Knee   Flexion: 5  Extension: 5    Muscle Activation     Additional Muscle Activation Details  Mild diastasis  Upper abdom 2/5    Tests     Right Hip   Negative TAYLOR.     Functional Assessment     Comments  5STS - 19.53 sec  TUG - 14.23 sec, 13.85 sec - 14.04 sec av          Assessment/Plan  Danielle has made good progress in PT with improved functional strength as noted with improved sit to stand testing though slightly decreased with time to up and go.  She continues to be challenged with transitional movements secondary to core weakness. She is having pain in her contralateral knee and notes that it's been awhile since she has had an injection in that knee. Pt would benefit from a continued course of skilled PT services in order to address listed impairments and increase tolerance to normal daily activities.    Goals  Plan Goals: STG In 4 weeks  - Pt to be independent/compliant with HEP without exacerbation of symptoms. - MET  - Pt is able to ascend/descend 6 in therapy staircase reciprocally with single HR. - NT secondary to contralateral knee pain  - Pain not > 5/10 with ADLs. - ONGOING/MET for R knee/hip only  - Pt to exhibit B hip overall strength to >/= 4/5 with tolerable pain to allow for improved walking tolerance - ONGOING      LTG In 8 weeks  - TUG testing < 10 sec to demonstrate age related norms and reduce risk of falling.  - UNMET  - Pt to score >/= 40/80 on LEFS to demonstrate improved functional tolerance. - MET   - Pt to exhibit 4+/5 strength throughout hip, knee and ankle musculature to allow for normalized gait and standing > 10 min. - ONGOING  - 5TSTS < 18 sec to demonstrate improved LE function as need for gait endurance - ONGOING     Recommendations: Continue with recommendations include core activities to address difficulty with transitional movements.   Timeframe:  6-8 weeks  Prognosis to achieve goals:  good      Timed:         Manual Therapy:         mins  25343;     Therapeutic Exercise:   20      mins  80643;     Neuromuscular Cem:        mins  84275;    Therapeutic Activity:    15      mins  09292;     Gait Training:           mins  52431;     Ultrasound:          mins  93911;    Ionto                                   mins   96977  Self Care                            mins   52294        Timed Treatment:  35    mins (decreased units billed to account for divided time)  Total Treatment:    64    mins          PT: Anahi Kramer PT     [place] License:  [id number]    Electronically signed by Anahi Kramer PT, 02/22/24, 12:17 PM EST    Certification Period: 2/22/2024 thru 5/21/2024  I certify that the therapy services are furnished while this patient is under my care.  The services outlined above are required by this patient, and will be reviewed every 90 days.         Physician Signature:__________________________________________________    PHYSICIAN: Catherine Brown APRN  NPI: [id number]                                      DATE:  :     Please sign and return via fax to [phone].  Thank you, [facility] Therapy

## 2024-02-20 NOTE — PROGRESS NOTES
Physical Therapy Daily Treatment Note      Patient: Krys Mayes   : 1950  Referring practitioner: AGUSTIN Vyas  Date of Initial Visit: Type: THERAPY  Noted: 2024  Today's Date: 2024  Patient seen for 9 sessions       Visit Diagnoses:    ICD-10-CM ICD-9-CM   1. Hip pain, right  M25.551 719.45   2. Chronic pain of right knee  M25.561 719.46    G89.29 338.29   3. Physical deconditioning  R53.81 799.3   4. Post-traumatic arthritis of ankle, left  M19.172 716.17   5. Chronic pain of left ankle  M25.572 719.47    G89.29 338.29   6. Greater trochanteric bursitis of right hip  M70.61 726.5   7. Primary osteoarthritis of right knee  M17.11 715.16   8. Unstable gait  R26.81 781.2       Subjective   Krys Mayes reports: I'm doing pretty well; my knees haven't been bothering me.     Objective   See Exercise, Manual, and Modality Logs for complete treatment.       Assessment/Plan   Danielle continues to benefit from assistance to move through her exercises.  She has difficulty with transitional movements due to core weakness so advanced exercises this visit. She was only able to lift her bottom about 1 inch with bridge activity but was able to activate Tranverse Abdominis with pelvic tilt after verbal and tactile cuing. She was able to add a second set of SLR so is developing improved quad endurance.   Progress per Plan of Care             Timed:         Manual Therapy:         mins  18614;     Therapeutic Exercise:   30      mins  87413;     Neuromuscular Cem:        mins  18589;    Therapeutic Activity:    10      mins  48061;     Gait Training:           mins  46935;     Ultrasound:          mins  77234;    Ionto                                   mins   36740  Self Care                            mins   33332      Timed Treatment:  40    mins   Total Treatment:     40   mins    KOSTA Song License: [id number]

## 2024-02-21 ENCOUNTER — TRANSCRIBE ORDERS (OUTPATIENT)
Dept: ADMINISTRATIVE | Facility: HOSPITAL | Age: 74
End: 2024-02-21
Payer: MEDICARE

## 2024-02-21 DIAGNOSIS — R31.9 HEMATURIA, UNSPECIFIED TYPE: Primary | ICD-10-CM

## 2024-02-21 DIAGNOSIS — N20.0 BILATERAL KIDNEY STONES: ICD-10-CM

## 2024-02-22 ENCOUNTER — TREATMENT (OUTPATIENT)
Dept: PHYSICAL THERAPY | Facility: CLINIC | Age: 74
End: 2024-02-22
Payer: MEDICARE

## 2024-02-22 DIAGNOSIS — G89.29 CHRONIC PAIN OF RIGHT KNEE: ICD-10-CM

## 2024-02-22 DIAGNOSIS — M70.61 GREATER TROCHANTERIC BURSITIS OF RIGHT HIP: ICD-10-CM

## 2024-02-22 DIAGNOSIS — M25.561 CHRONIC PAIN OF RIGHT KNEE: ICD-10-CM

## 2024-02-22 DIAGNOSIS — M19.172 POST-TRAUMATIC ARTHRITIS OF ANKLE, LEFT: ICD-10-CM

## 2024-02-22 DIAGNOSIS — G89.29 CHRONIC PAIN OF LEFT ANKLE: ICD-10-CM

## 2024-02-22 DIAGNOSIS — R53.81 PHYSICAL DECONDITIONING: ICD-10-CM

## 2024-02-22 DIAGNOSIS — M25.572 CHRONIC PAIN OF LEFT ANKLE: ICD-10-CM

## 2024-02-22 DIAGNOSIS — M17.11 PRIMARY OSTEOARTHRITIS OF RIGHT KNEE: ICD-10-CM

## 2024-02-22 DIAGNOSIS — R26.81 UNSTABLE GAIT: ICD-10-CM

## 2024-02-22 DIAGNOSIS — M25.551 HIP PAIN, RIGHT: Primary | ICD-10-CM

## 2024-02-22 PROCEDURE — 97110 THERAPEUTIC EXERCISES: CPT | Performed by: PHYSICAL THERAPIST

## 2024-02-22 PROCEDURE — 97530 THERAPEUTIC ACTIVITIES: CPT | Performed by: PHYSICAL THERAPIST

## 2024-02-22 NOTE — LETTER
[facility] Physical Therapy [place]  [address] [address]                                                                                Phone [phone]                                                                                  Fax [phone]    Physical Therapy Reassessment  Patient: Krys Mayes   : 1950  Diagnosis/ICD-10 Code:  Hip pain, right [M25.551]  Referring practitioner: AGUSTIN Vyas  NPI: [id number]                                      Date of Initial Visit:  Type: THERAPY  Noted: 2024  Today's Date: 2024  Patient seen for 10 sessions         Visit Diagnoses:    ICD-10-CM ICD-9-CM   1. Hip pain, right  M25.551 719.45   2. Chronic pain of right knee  M25.561 719.46    G89.29 338.29   3. Physical deconditioning  R53.81 799.3   4. Post-traumatic arthritis of ankle, left  M19.172 716.17   5. Chronic pain of left ankle  M25.572 719.47    G89.29 338.29   6. Greater trochanteric bursitis of right hip  M70.61 726.5   7. Primary osteoarthritis of right knee  M17.11 715.16   8. Unstable gait  R26.81 781.2         Subjective Questionnaire: LEFS:  ( at [facility])  Clinical Progress: improved  Home Program Compliance: Yes  Treatment has included: therapeutic exercise and therapeutic activity      Subjective   Krys Mayes reports: My R knee and hip are doing well; my L knee has been bothering me.  Pain gets up to 6/10.     Objective          Tenderness   Left Knee   Tenderness in the lateral joint line, medial patella and medial retinaculum. No tenderness in the medial joint line.     Right Knee   Tenderness in the LCL (distal). No tenderness in the lateral joint line, medial joint line, medial patella and medial retinaculum.     Strength/Myotome Testing     Left Hip   Planes of Motion   Flexion: 4  Abduction: 3+  External rotation: 4  Internal rotation: 4+    Right Hip   Planes of Motion   Flexion: 3+  Abduction: 3  External rotation:  4+  Internal rotation: 4+    Left Knee   Flexion: 4  Extension: 5    Right Knee   Flexion: 5  Extension: 5    Muscle Activation     Additional Muscle Activation Details  Mild diastasis  Upper abdom 2/5    Tests     Right Hip   Negative TAYLOR.     Functional Assessment     Comments  5STS - 19.53 sec  TUG - 14.23 sec, 13.85 sec - 14.04 sec av          Assessment/Plan  Danielle has made good progress in PT with improved functional strength as noted with improved sit to stand testing though slightly decreased with time to up and go.  She continues to be challenged with transitional movements secondary to core weakness. She is having pain in her contralateral knee and notes that it's been awhile since she has had an injection in that knee. Pt would benefit from a continued course of skilled PT services in order to address listed impairments and increase tolerance to normal daily activities.    Goals  Plan Goals: STG In 4 weeks  - Pt to be independent/compliant with HEP without exacerbation of symptoms. - MET  - Pt is able to ascend/descend 6 in therapy staircase reciprocally with single HR. - NT secondary to contralateral knee pain  - Pain not > 5/10 with ADLs. - ONGOING/MET for R knee/hip only  - Pt to exhibit B hip overall strength to >/= 4/5 with tolerable pain to allow for improved walking tolerance - ONGOING      LTG In 8 weeks  - TUG testing < 10 sec to demonstrate age related norms and reduce risk of falling.  - UNMET  - Pt to score >/= 40/80 on LEFS to demonstrate improved functional tolerance. - MET   - Pt to exhibit 4+/5 strength throughout hip, knee and ankle musculature to allow for normalized gait and standing > 10 min. - ONGOING  - 5TSTS < 18 sec to demonstrate improved LE function as need for gait endurance - ONGOING     Recommendations: Continue with recommendations include core activities to address difficulty with transitional movements.   Timeframe:  6-8 weeks  Prognosis to achieve goals:  good      Timed:         Manual Therapy:         mins  07369;     Therapeutic Exercise:   20      mins  98171;     Neuromuscular Cem:        mins  17398;    Therapeutic Activity:    15      mins  56593;     Gait Training:           mins  81110;     Ultrasound:          mins  62078;    Ionto                                   mins   45436  Self Care                            mins   68551        Timed Treatment:  35    mins (decreased units billed to account for divided time)  Total Treatment:    64    mins          PT: Anahi Kramer PT     [place] License:  [id number]    Electronically signed by Anahi Kramer PT, 02/22/24, 12:17 PM EST    Certification Period: 2/22/2024 thru 5/21/2024  I certify that the therapy services are furnished while this patient is under my care.  The services outlined above are required by this patient, and will be reviewed every 90 days.         Physician Signature:__________________________________________________    PHYSICIAN: Catherine Brown APRN  NPI: [id number]                                      DATE:  :     Please sign and return via fax to [phone].  Thank you, [facility]

## 2024-02-27 ENCOUNTER — HOSPITAL ENCOUNTER (OUTPATIENT)
Dept: CT IMAGING | Facility: HOSPITAL | Age: 74
Discharge: HOME | End: 2024-02-27
Admitting: NURSE PRACTITIONER
Payer: MEDICARE

## 2024-02-27 ENCOUNTER — TREATMENT (OUTPATIENT)
Dept: PHYSICAL THERAPY | Facility: CLINIC | Age: 74
End: 2024-02-27
Payer: MEDICARE

## 2024-02-27 DIAGNOSIS — G89.29 CHRONIC PAIN OF RIGHT KNEE: ICD-10-CM

## 2024-02-27 DIAGNOSIS — M25.572 CHRONIC PAIN OF LEFT ANKLE: ICD-10-CM

## 2024-02-27 DIAGNOSIS — M70.61 GREATER TROCHANTERIC BURSITIS OF RIGHT HIP: ICD-10-CM

## 2024-02-27 DIAGNOSIS — M25.561 CHRONIC PAIN OF RIGHT KNEE: ICD-10-CM

## 2024-02-27 DIAGNOSIS — M25.551 HIP PAIN, RIGHT: Primary | ICD-10-CM

## 2024-02-27 DIAGNOSIS — M19.172 POST-TRAUMATIC ARTHRITIS OF ANKLE, LEFT: ICD-10-CM

## 2024-02-27 DIAGNOSIS — N20.0 BILATERAL KIDNEY STONES: ICD-10-CM

## 2024-02-27 DIAGNOSIS — R31.9 HEMATURIA, UNSPECIFIED TYPE: ICD-10-CM

## 2024-02-27 DIAGNOSIS — G89.29 CHRONIC PAIN OF LEFT ANKLE: ICD-10-CM

## 2024-02-27 DIAGNOSIS — M17.11 PRIMARY OSTEOARTHRITIS OF RIGHT KNEE: ICD-10-CM

## 2024-02-27 DIAGNOSIS — R53.81 PHYSICAL DECONDITIONING: ICD-10-CM

## 2024-02-27 DIAGNOSIS — R26.81 UNSTABLE GAIT: ICD-10-CM

## 2024-02-27 LAB — CREAT BLDA-MCNC: 1.1 MG/DL (ref 0.6–1.3)

## 2024-02-27 PROCEDURE — 74178 CT ABD&PLV WO CNTR FLWD CNTR: CPT

## 2024-02-27 PROCEDURE — 97530 THERAPEUTIC ACTIVITIES: CPT | Performed by: PHYSICAL THERAPIST

## 2024-02-27 PROCEDURE — 25510000001 IOPAMIDOL PER 1 ML: Performed by: NURSE PRACTITIONER

## 2024-02-27 PROCEDURE — 82565 ASSAY OF CREATININE: CPT

## 2024-02-27 PROCEDURE — 97110 THERAPEUTIC EXERCISES: CPT | Performed by: PHYSICAL THERAPIST

## 2024-02-27 RX ADMIN — IOPAMIDOL 100 ML: 755 INJECTION, SOLUTION INTRAVENOUS at 15:20

## 2024-02-27 NOTE — PROGRESS NOTES
Physical Therapy Daily Treatment Note      Patient: Krys Mayes   : 1950  Referring practitioner: AGUSTIN Vyas  Date of Initial Visit: Type: THERAPY  Noted: 2024  Today's Date: 2024  Patient seen for 11 sessions       Visit Diagnoses:    ICD-10-CM ICD-9-CM   1. Hip pain, right  M25.551 719.45   2. Chronic pain of right knee  M25.561 719.46    G89.29 338.29   3. Physical deconditioning  R53.81 799.3   4. Chronic pain of left ankle  M25.572 719.47    G89.29 338.29   5. Post-traumatic arthritis of ankle, left  M19.172 716.17   6. Greater trochanteric bursitis of right hip  M70.61 726.5   7. Primary osteoarthritis of right knee  M17.11 715.16   8. Unstable gait  R26.81 781.2       Subjective   Krys Mayes reports: I'm doing fine and am working on my exercises at home.     Objective   See Exercise, Manual, and Modality Logs for complete treatment.       Assessment/Plan   Pt continues to benefit from verbal cuing to proper exercise form.  She was able to transition from supine to sitting with greater ease and without assistance.  She continues to have difficulty with standing L hip abduction due to lack of stability of R stance leg.   Progress per Plan of Care             Timed:         Manual Therapy:         mins  15171;     Therapeutic Exercise:   28      mins  33760;     Neuromuscular Cem:        mins  11037;    Therapeutic Activity:    10      mins  41841;     Gait Training:           mins  78877;     Ultrasound:          mins  22404;    Ionto                                   mins   29347  Self Care                            mins   39657      Timed Treatment:  38    mins direct  Total Treatment:    44    mins    Anahi Kramer, PT  [place] License: #[id number]

## 2024-02-29 ENCOUNTER — TREATMENT (OUTPATIENT)
Dept: PHYSICAL THERAPY | Facility: CLINIC | Age: 74
End: 2024-02-29
Payer: MEDICARE

## 2024-02-29 DIAGNOSIS — R53.81 PHYSICAL DECONDITIONING: ICD-10-CM

## 2024-02-29 DIAGNOSIS — M25.561 CHRONIC PAIN OF RIGHT KNEE: ICD-10-CM

## 2024-02-29 DIAGNOSIS — M19.172 POST-TRAUMATIC ARTHRITIS OF ANKLE, LEFT: ICD-10-CM

## 2024-02-29 DIAGNOSIS — R26.81 UNSTABLE GAIT: ICD-10-CM

## 2024-02-29 DIAGNOSIS — M25.551 HIP PAIN, RIGHT: Primary | ICD-10-CM

## 2024-02-29 DIAGNOSIS — G89.29 CHRONIC PAIN OF LEFT ANKLE: ICD-10-CM

## 2024-02-29 DIAGNOSIS — G89.29 CHRONIC PAIN OF RIGHT KNEE: ICD-10-CM

## 2024-02-29 DIAGNOSIS — M70.61 GREATER TROCHANTERIC BURSITIS OF RIGHT HIP: ICD-10-CM

## 2024-02-29 DIAGNOSIS — M17.11 PRIMARY OSTEOARTHRITIS OF RIGHT KNEE: ICD-10-CM

## 2024-02-29 DIAGNOSIS — M25.572 CHRONIC PAIN OF LEFT ANKLE: ICD-10-CM

## 2024-02-29 PROCEDURE — 97110 THERAPEUTIC EXERCISES: CPT | Performed by: PHYSICAL THERAPIST

## 2024-02-29 NOTE — PROGRESS NOTES
Physical Therapy Daily Treatment Note      Patient: Krys Mayes   : 1950  Referring practitioner: AGUSTIN Vyas  Date of Initial Visit: No linked episodes  Today's Date: 2024  Patient seen for Visit count could not be calculated. Make sure you are using a visit which is associated with an episode. sessions       Visit Diagnoses:    ICD-10-CM ICD-9-CM   1. Hip pain, right  M25.551 719.45   2. Chronic pain of right knee  M25.561 719.46    G89.29 338.29   3. Physical deconditioning  R53.81 799.3   4. Chronic pain of left ankle  M25.572 719.47    G89.29 338.29   5. Post-traumatic arthritis of ankle, left  M19.172 716.17   6. Greater trochanteric bursitis of right hip  M70.61 726.5   7. Primary osteoarthritis of right knee  M17.11 715.16   8. Unstable gait  R26.81 781.2       Subjective   Krys Mayes reports: my L knee is still sore and am going to see Catherine Brown soon for an injection.  My R knee and hip and feeling good.      Objective   See Exercise, Manual, and Modality Logs for complete treatment.       Assessment/Plan   Pt continues to benefit from assistance to move through her exercises.  She does have some challenges with bed mobility though improved since initiating core stabilization ex.    Progress per Plan of Care             Timed:         Manual Therapy:         mins  49090;     Therapeutic Exercise:  15       mins  74006;     Neuromuscular Cem:        mins  07573;    Therapeutic Activity:          mins  86757;     Gait Training:           mins  62330;     Ultrasound:          mins  40475;    Ionto                                   mins   39197  Self Care                            mins   25665      Timed Treatment:  15    mins (decreased units billed to account for divided time)  Total Treatment:     55   mins    Anahi Kramer, PT  [place] License: #[id number]

## 2024-03-06 ENCOUNTER — TRANSCRIBE ORDERS (OUTPATIENT)
Dept: ADMINISTRATIVE | Facility: HOSPITAL | Age: 74
End: 2024-03-06
Payer: MEDICARE

## 2024-03-06 ENCOUNTER — OFFICE VISIT (OUTPATIENT)
Dept: ORTHOPEDIC SURGERY | Facility: CLINIC | Age: 74
End: 2024-03-06
Payer: MEDICARE

## 2024-03-06 VITALS — BODY MASS INDEX: 32.95 KG/M2 | WEIGHT: 205 LBS | HEIGHT: 66 IN

## 2024-03-06 DIAGNOSIS — M25.562 LEFT KNEE PAIN, UNSPECIFIED CHRONICITY: ICD-10-CM

## 2024-03-06 DIAGNOSIS — M94.262 CHONDROMALACIA, LEFT KNEE: Primary | ICD-10-CM

## 2024-03-06 DIAGNOSIS — N20.0 CALCULUS, RENAL: Primary | ICD-10-CM

## 2024-03-06 RX ORDER — LIDOCAINE HYDROCHLORIDE 10 MG/ML
8 INJECTION, SOLUTION EPIDURAL; INFILTRATION; INTRACAUDAL; PERINEURAL
Status: COMPLETED | OUTPATIENT
Start: 2024-03-06 | End: 2024-03-06

## 2024-03-06 RX ORDER — TRIAMCINOLONE ACETONIDE 40 MG/ML
80 INJECTION, SUSPENSION INTRA-ARTICULAR; INTRAMUSCULAR
Status: COMPLETED | OUTPATIENT
Start: 2024-03-06 | End: 2024-03-06

## 2024-03-06 RX ADMIN — LIDOCAINE HYDROCHLORIDE 8 ML: 10 INJECTION, SOLUTION EPIDURAL; INFILTRATION; INTRACAUDAL; PERINEURAL at 14:58

## 2024-03-06 RX ADMIN — TRIAMCINOLONE ACETONIDE 80 MG: 40 INJECTION, SUSPENSION INTRA-ARTICULAR; INTRAMUSCULAR at 14:58

## 2024-03-06 NOTE — PROGRESS NOTES
Subjective:     Patient ID: Krys Mayes is a 74 y.o. female.    Chief Complaint:  Left knee pain, new patient to examiner  History of Present Illness  Krys Mayes presents today to clinic for evaluation of her left knee.    Maximal tenderness is present at the anterior aspect of the left knee. She experiences pain and fatigue with ascending stairs. She does feel weak when she is going upstairs. She rates her discomfort as 8/10, grinding, aching in nature. Positive for stiffness, feeling as if the knee is going to give way. Symptoms also present with working and ambulating long distances. She has tried rest without any significant symptom improvement. Denies any recent x-ray, MRI or CT.     Social History     Occupational History    Not on file   Tobacco Use    Smoking status: Never    Smokeless tobacco: Never    Tobacco comments:     Never smoked ever   Vaping Use    Vaping status: Never Used   Substance and Sexual Activity    Alcohol use: Yes     Comment: She uses alcohol once or twice a year.    Drug use: No    Sexual activity: Not Currently     Partners: Male     Birth control/protection: Post-menopausal      Past Medical History:   Diagnosis Date    Arthritis     Cholelithiasis taken out 1/14/91    Diverticulosis     Gastroesophageal reflux disease 01/21/2016    Hyperlipidemia     Hypertension     Kidney stone     recurrent, calcium oxalate    Menopausal disorder 1998    She went through menopause in 1998    Neuropathy     Obesity     PAF (paroxysmal atrial fibrillation)     in the setting of urosepsis, 9/2016    Reflux esophagitis 08/19/2016    Sebaceous cyst of labia 12/20/2017    Sepsis due to urinary tract infection     Sigmoid diverticulitis 11/13/2017    Type 2 diabetes mellitus     Urinary tract infection     Visual impairment cataracts    Vitamin D deficiency 01/21/2016     Past Surgical History:   Procedure Laterality Date    ANKLE SURGERY      Ankle Repair / Description: ORif the left  "ankle in July 2010. Secondary to fall    BREAST BIOPSY Right     benign    BREAST SURGERY      biopsy     CATARACT EXTRACTION Right     CHOLECYSTECTOMY      COLONOSCOPY  2014    Done 2004 normal recheck in 10 years. Repeated February 2014 colonoscopy was normal and to be repeated in 10 years.    CYSTOSCOPY BLADDER STONE LITHOTRIPSY      FRACTURE SURGERY      HEMORRHOIDECTOMY      NEPHROLITHOTOMY Left 01/09/2017    Procedure: NEPHROLITHOTOMY PERCUTANEOUS SECOND LOOK WITH STENT PLACEMENT AND LASER LITHOTRIPSY;  Surgeon: Mati Villegas MD;  Location: [facility] OR;  Service:     OTHER SURGICAL HISTORY      Tubal Ligation    PERCUTANEOUS NEPHROSTOLITHOTOMY Left 12/2016    TUBAL ABDOMINAL LIGATION      URETEROSCOPY LASER LITHOTRIPSY WITH STENT INSERTION Left 12/06/2022    Procedure: LEFT URETEROSCOPY WITH LASER LITHOTRIPSY, STONE BASKET EXTRACTION, STENT PLACEMENT;  Surgeon: Mati Villegas MD;  Location: [facility] OR;  Service: Urology;  Laterality: Left;       Family History   Problem Relation Age of Onset    COPD Mother     Heart attack Father         acute myocardial infarction    Anxiety disorder Father     Depression Father     Heart attack Son     Kidney disease Maternal Grandmother         Born with 1 kidney    Breast cancer Neg Hx                Objective:  Physical Exam  General: No acute distress.  Eyes: conjunctiva clear; pupils equally round and reactive  ENT: external ears and nose atraumatic; oropharynx clear  CV: no peripheral edema  Resp: normal respiratory effort  Skin: no rashes or wounds; normal turgor  Psych: mood and affect appropriate; recent and remote memory intact    Vitals:    03/06/24 1415   Weight: 93 kg (205 lb)   Height: 167.6 cm (66\")         03/06/24  1415   Weight: 93 kg (205 lb)     Body mass index is 33.09 kg/m².      Ortho Exam     Left knee examined  Knee range of motion 0 to 120 degrees  Stable to varus valgus stress at 0 degrees and 30 degrees  Maximal tenderness present " patellofemoral, positive tenderness to palpate along the medial joint line  Negative medial lateral Rylie's exam  Positive active patellar compression test with tenderness along the medial and lateral patellar facet  Positive crepitus throughout arc of motion  Quad strength 4-5  Positive sensation light touch all distributions left lower extremity  Can complete straight leg raise  1+ anterior Lachman exam    Imaging:  Left Knee X-Ray  Indication: Pain    AP, Lateral, and Zihlman views    Findings:  No fracture  No bony lesion  Normal soft tissues  Medial compartment narrowing patellofemoral narrowing, moderate with reactive osteophytes, slight    No prior studies were available for comparison.    Assessment:        1. Left knee pain, unspecified chronicity    2. Chondromalacia, left knee           Plan:    Large Joint Arthrocentesis: L knee  Date/Time: 3/6/2024 2:58 PM  Consent given by: patient  Site marked: site marked  Timeout: Immediately prior to procedure a time out was called to verify the correct patient, procedure, equipment, support staff and site/side marked as required   Supporting Documentation  Indications: pain   Procedure Details  Location: knee - L knee  Preparation: Patient was prepped and draped in the usual sterile fashion  Needle size: 22 G  Approach: anterolateral  Medications administered: 8 mL lidocaine PF 1% 1 %; 80 mg triamcinolone acetonide 40 MG/ML  Patient tolerance: patient tolerated the procedure well with no immediate complications      1. Left knee pain.  Discussed plan of care with patient. We did discuss proceeding with corticosteroid injection as this has provided her significant symptom improvement of the right knee in the past. I do recommend application of ice at the injection site this evening. It may take 37 days before she begins to experience any significant symptom improvement. I will gladly see her back in clinic if symptoms return. She was encouraged to call with any  questions or concerns. All questions answered.    Orders:  Orders Placed This Encounter   Procedures    Large Joint Arthrocentesis: L knee    XR Knee 3+ View With Narciso Pena Left     No orders of the defined types were placed in this encounter.          I ordered and reviewed the SHAREE today.       Dragon dictation utilized  We encourage all our patients to maintain a healthy weight - a Body Mass Index of 20-25. This, along with regular exercise and a balanced diet can lower your risk of many illnesses such as diabetes, heart disease, and stroke.    Transcribed from ambient dictation for AGUSTIN Jo by Leatha Bustamante.  03/06/24   16:41 EST    Patient or patient representative verbalized consent to the visit recording.  I have personally performed the services described in this document as transcribed by the above individual, and it is both accurate and complete.

## 2024-03-07 ENCOUNTER — TREATMENT (OUTPATIENT)
Dept: PHYSICAL THERAPY | Facility: CLINIC | Age: 74
End: 2024-03-07
Payer: MEDICARE

## 2024-03-07 DIAGNOSIS — G89.29 CHRONIC PAIN OF RIGHT KNEE: ICD-10-CM

## 2024-03-07 DIAGNOSIS — M19.172 POST-TRAUMATIC ARTHRITIS OF ANKLE, LEFT: ICD-10-CM

## 2024-03-07 DIAGNOSIS — M25.551 HIP PAIN, RIGHT: Primary | ICD-10-CM

## 2024-03-07 DIAGNOSIS — M25.561 CHRONIC PAIN OF RIGHT KNEE: ICD-10-CM

## 2024-03-07 DIAGNOSIS — M17.11 PRIMARY OSTEOARTHRITIS OF RIGHT KNEE: ICD-10-CM

## 2024-03-07 DIAGNOSIS — G89.29 CHRONIC PAIN OF LEFT ANKLE: ICD-10-CM

## 2024-03-07 DIAGNOSIS — R53.81 PHYSICAL DECONDITIONING: ICD-10-CM

## 2024-03-07 DIAGNOSIS — M70.61 GREATER TROCHANTERIC BURSITIS OF RIGHT HIP: ICD-10-CM

## 2024-03-07 DIAGNOSIS — M25.572 CHRONIC PAIN OF LEFT ANKLE: ICD-10-CM

## 2024-03-07 DIAGNOSIS — R26.81 UNSTABLE GAIT: ICD-10-CM

## 2024-03-07 PROCEDURE — 97530 THERAPEUTIC ACTIVITIES: CPT | Performed by: PHYSICAL THERAPIST

## 2024-03-07 PROCEDURE — 97110 THERAPEUTIC EXERCISES: CPT | Performed by: PHYSICAL THERAPIST

## 2024-03-07 NOTE — PROGRESS NOTES
Physical Therapy Daily Treatment Note      Patient: Krys Mayes   : 1950  Referring practitioner: AGUSTIN Vyas  Date of Initial Visit: Type: THERAPY  Noted: 2024  Today's Date: 3/7/2024  Patient seen for 12 sessions       Visit Diagnoses:    ICD-10-CM ICD-9-CM   1. Hip pain, right  M25.551 719.45   2. Chronic pain of right knee  M25.561 719.46    G89.29 338.29   3. Physical deconditioning  R53.81 799.3   4. Chronic pain of left ankle  M25.572 719.47    G89.29 338.29   5. Post-traumatic arthritis of ankle, left  M19.172 716.17   6. Greater trochanteric bursitis of right hip  M70.61 726.5   7. Primary osteoarthritis of right knee  M17.11 715.16   8. Unstable gait  R26.81 781.2       Subjective   Krys Mayes reports: I got an injection in my L knee and it feels so much better. I'm still having difficulty going up and down my stairs.     Objective   See Exercise, Manual, and Modality Logs for complete treatment.       Assessment/Plan   Notable difficulty with standing L hip abduction likely due to lack of stability of R knee.  She was able to advance her core/hip stabilization ex to resisted hooklying clamshells and marches. She continues to benefit from frequent tactile and verbal cuing for proper exercise form. She is able to transition from supine to sitting and sitting to supine on treatment table without assistance though is challenged.   Progress per Plan of Care         Timed:         Manual Therapy:         mins  08219;     Therapeutic Exercise:   30      mins  39200;     Neuromuscular Cem:        mins  45207;    Therapeutic Activity:    15      mins  58358;     Gait Training:           mins  90107;     Ultrasound:          mins  60581;    Ionto                                   mins   07846  Self Care                            mins   02915    Timed Treatment:  45    mins direct  Total Treatment:    53    mins    KOSTA Song License: #[id number]

## 2024-03-12 ENCOUNTER — TREATMENT (OUTPATIENT)
Dept: PHYSICAL THERAPY | Facility: CLINIC | Age: 74
End: 2024-03-12
Payer: MEDICARE

## 2024-03-12 DIAGNOSIS — M25.572 CHRONIC PAIN OF LEFT ANKLE: ICD-10-CM

## 2024-03-12 DIAGNOSIS — G89.29 CHRONIC PAIN OF LEFT ANKLE: ICD-10-CM

## 2024-03-12 DIAGNOSIS — R26.81 UNSTABLE GAIT: ICD-10-CM

## 2024-03-12 DIAGNOSIS — R53.81 PHYSICAL DECONDITIONING: ICD-10-CM

## 2024-03-12 DIAGNOSIS — M25.551 HIP PAIN, RIGHT: Primary | ICD-10-CM

## 2024-03-12 DIAGNOSIS — M25.561 CHRONIC PAIN OF RIGHT KNEE: ICD-10-CM

## 2024-03-12 DIAGNOSIS — G89.29 CHRONIC PAIN OF RIGHT KNEE: ICD-10-CM

## 2024-03-12 DIAGNOSIS — M17.11 PRIMARY OSTEOARTHRITIS OF RIGHT KNEE: ICD-10-CM

## 2024-03-12 DIAGNOSIS — M70.61 GREATER TROCHANTERIC BURSITIS OF RIGHT HIP: ICD-10-CM

## 2024-03-12 DIAGNOSIS — M19.172 POST-TRAUMATIC ARTHRITIS OF ANKLE, LEFT: ICD-10-CM

## 2024-03-12 PROCEDURE — 97110 THERAPEUTIC EXERCISES: CPT | Performed by: PHYSICAL THERAPIST

## 2024-03-12 PROCEDURE — 97530 THERAPEUTIC ACTIVITIES: CPT | Performed by: PHYSICAL THERAPIST

## 2024-03-12 NOTE — PROGRESS NOTES
Physical Therapy Daily Treatment Note      Patient: Krys Mayes   : 1950  Referring practitioner: AGUSTIN Vyas  Date of Initial Visit: Type: THERAPY  Noted: 2024  Today's Date: 3/12/2024  Patient seen for 13 sessions       Visit Diagnoses:    ICD-10-CM ICD-9-CM   1. Hip pain, right  M25.551 719.45   2. Chronic pain of right knee  M25.561 719.46    G89.29 338.29   3. Physical deconditioning  R53.81 799.3   4. Chronic pain of left ankle  M25.572 719.47    G89.29 338.29   5. Post-traumatic arthritis of ankle, left  M19.172 716.17   6. Greater trochanteric bursitis of right hip  M70.61 726.5   7. Primary osteoarthritis of right knee  M17.11 715.16   8. Unstable gait  R26.81 781.2       Subjective   Krys Mayes reports: I woke up one morning this week with RLE pain; I got up in the middle of the night to go to the bathroom and wonder if I slept on my leg wrong. No issues today.  I went to Catherine Brown for an injection in my L knee yesterday and it's feeling better today.     Objective   See Exercise, Manual, and Modality Logs for complete treatment.       Assessment/Plan   Pt continues to be challenged with standing R hip abduction due to decreased stability of LLE.  I added some single leg stance activities and it was more challenging on her L side.  I added some low level step activities for RLE today; held on L due to recent issues with L knee/recent injection.   Progress per Plan of Care             Timed:         Manual Therapy:         mins  98213;     Therapeutic Exercise:   35      mins  81590;     Neuromuscular Cem:        mins  57115;    Therapeutic Activity:    10      mins  29606;     Gait Training:           mins  08482;     Ultrasound:          mins  38538;    Ionto                                   mins   76964  Self Care                            mins   20508      Timed Treatment:  45    mins direct  Total Treatment:    66    mins    [employer] Song License:  #[id number]

## 2024-03-14 ENCOUNTER — TREATMENT (OUTPATIENT)
Dept: PHYSICAL THERAPY | Facility: CLINIC | Age: 74
End: 2024-03-14
Payer: MEDICARE

## 2024-03-14 DIAGNOSIS — M17.11 PRIMARY OSTEOARTHRITIS OF RIGHT KNEE: ICD-10-CM

## 2024-03-14 DIAGNOSIS — R53.81 PHYSICAL DECONDITIONING: ICD-10-CM

## 2024-03-14 DIAGNOSIS — M25.561 CHRONIC PAIN OF RIGHT KNEE: ICD-10-CM

## 2024-03-14 DIAGNOSIS — R26.81 UNSTABLE GAIT: ICD-10-CM

## 2024-03-14 DIAGNOSIS — M25.572 CHRONIC PAIN OF LEFT ANKLE: ICD-10-CM

## 2024-03-14 DIAGNOSIS — M19.172 POST-TRAUMATIC ARTHRITIS OF ANKLE, LEFT: ICD-10-CM

## 2024-03-14 DIAGNOSIS — G89.29 CHRONIC PAIN OF LEFT ANKLE: ICD-10-CM

## 2024-03-14 DIAGNOSIS — G89.29 CHRONIC PAIN OF RIGHT KNEE: ICD-10-CM

## 2024-03-14 DIAGNOSIS — M70.61 GREATER TROCHANTERIC BURSITIS OF RIGHT HIP: ICD-10-CM

## 2024-03-14 DIAGNOSIS — M25.551 HIP PAIN, RIGHT: Primary | ICD-10-CM

## 2024-03-14 PROCEDURE — 97530 THERAPEUTIC ACTIVITIES: CPT | Performed by: PHYSICAL THERAPIST

## 2024-03-14 PROCEDURE — 97110 THERAPEUTIC EXERCISES: CPT | Performed by: PHYSICAL THERAPIST

## 2024-03-14 NOTE — PROGRESS NOTES
Physical Therapy Daily Treatment Note      Patient: Krys Mayes   : 1950  Referring practitioner: AGUSTIN Vyas  Date of Initial Visit: Type: THERAPY  Noted: 2024  Today's Date: 3/14/2024  Patient seen for 14 sessions       Visit Diagnoses:    ICD-10-CM ICD-9-CM   1. Hip pain, right  M25.551 719.45   2. Physical deconditioning  R53.81 799.3   3. Chronic pain of right knee  M25.561 719.46    G89.29 338.29   4. Chronic pain of left ankle  M25.572 719.47    G89.29 338.29   5. Post-traumatic arthritis of ankle, left  M19.172 716.17   6. Greater trochanteric bursitis of right hip  M70.61 726.5   7. Primary osteoarthritis of right knee  M17.11 715.16   8. Unstable gait  R26.81 781.2       Subjective   Krys Mayes reports: my knees are still doing well since having the injection.  She reports that she has been working on her HEP but that standing hip abd remains the most difficult    Objective   See Exercise, Manual, and Modality Logs for complete treatment.       Assessment/Plan   Danielle continues to have difficulty transitioning onto the treatment table and benefits from guidance to move through her exercises.  Her pain is well controlled currently and needs to work on core/trunk stability to assist in bed mobility.   Progress per Plan of Care             Timed:         Manual Therapy:         mins  24697;     Therapeutic Exercise:   20      mins  20468;     Neuromuscular Cem:        mins  83318;    Therapeutic Activity:    15      mins  83874;     Gait Training:           mins  31976;     Ultrasound:          mins  03286;    Ionto                                   mins   83052  Self Care                            mins   65687    Timed Treatment:  35    mins (decreased units billed to account for divided time)    Total Treatment:    60    mins    Anahi Kramer, PT  [place] License: #[id number]

## 2024-03-19 ENCOUNTER — TREATMENT (OUTPATIENT)
Dept: PHYSICAL THERAPY | Facility: CLINIC | Age: 74
End: 2024-03-19
Payer: MEDICARE

## 2024-03-19 DIAGNOSIS — G89.29 CHRONIC PAIN OF LEFT ANKLE: ICD-10-CM

## 2024-03-19 DIAGNOSIS — M25.561 CHRONIC PAIN OF RIGHT KNEE: ICD-10-CM

## 2024-03-19 DIAGNOSIS — M17.11 PRIMARY OSTEOARTHRITIS OF RIGHT KNEE: ICD-10-CM

## 2024-03-19 DIAGNOSIS — G89.29 CHRONIC PAIN OF RIGHT KNEE: ICD-10-CM

## 2024-03-19 DIAGNOSIS — M70.61 GREATER TROCHANTERIC BURSITIS OF RIGHT HIP: ICD-10-CM

## 2024-03-19 DIAGNOSIS — R53.81 PHYSICAL DECONDITIONING: ICD-10-CM

## 2024-03-19 DIAGNOSIS — M25.572 CHRONIC PAIN OF LEFT ANKLE: ICD-10-CM

## 2024-03-19 DIAGNOSIS — M19.172 POST-TRAUMATIC ARTHRITIS OF ANKLE, LEFT: ICD-10-CM

## 2024-03-19 DIAGNOSIS — M25.551 HIP PAIN, RIGHT: Primary | ICD-10-CM

## 2024-03-19 DIAGNOSIS — R26.81 UNSTABLE GAIT: ICD-10-CM

## 2024-03-19 PROCEDURE — 97530 THERAPEUTIC ACTIVITIES: CPT | Performed by: PHYSICAL THERAPIST

## 2024-03-19 PROCEDURE — 97110 THERAPEUTIC EXERCISES: CPT | Performed by: PHYSICAL THERAPIST

## 2024-03-19 NOTE — PROGRESS NOTES
Physical Therapy Daily Treatment Note      Patient: Krys Mayes   : 1950  Referring practitioner: AGUSTIN Vyas  Date of Initial Visit: Type: THERAPY  Noted: 2024  Today's Date: 3/19/2024  Patient seen for 15 sessions       Visit Diagnoses:    ICD-10-CM ICD-9-CM   1. Hip pain, right  M25.551 719.45   2. Physical deconditioning  R53.81 799.3   3. Chronic pain of right knee  M25.561 719.46    G89.29 338.29   4. Chronic pain of left ankle  M25.572 719.47    G89.29 338.29   5. Post-traumatic arthritis of ankle, left  M19.172 716.17   6. Greater trochanteric bursitis of right hip  M70.61 726.5   7. Primary osteoarthritis of right knee  M17.11 715.16   8. Unstable gait  R26.81 781.2       Subjective   Krys Mayes reports: I'm feeling good; no issues with knee pain since the injection.      Objective   See Exercise, Manual, and Modality Logs for complete treatment.       Assessment/Plan   Danielle continues to be challenged with bed mobility/transitional movements so benefited from high/low table to allow for greater independence.  We focused on standing exercise in the earlier part of her appointment to manage fatigue.   Progress per Plan of Care         Timed:         Manual Therapy:         mins  33718;     Therapeutic Exercise:   15      mins  78085;     Neuromuscular Cem:        mins  56958;    Therapeutic Activity:    15      mins  13541;     Gait Training:           mins  94529;     Ultrasound:          mins  21577;    Ionto                                   mins   97949  Self Care                            mins   85956    Timed Treatment:  30    mins (decreased units billed to account for divided time)    Total Treatment:    60    mins      Anahi Kramer, PT  [place] License: #[id number]

## 2024-03-21 ENCOUNTER — TREATMENT (OUTPATIENT)
Dept: PHYSICAL THERAPY | Facility: CLINIC | Age: 74
End: 2024-03-21
Payer: MEDICARE

## 2024-03-21 DIAGNOSIS — M25.561 CHRONIC PAIN OF RIGHT KNEE: ICD-10-CM

## 2024-03-21 DIAGNOSIS — R53.81 PHYSICAL DECONDITIONING: ICD-10-CM

## 2024-03-21 DIAGNOSIS — G89.29 CHRONIC PAIN OF RIGHT KNEE: ICD-10-CM

## 2024-03-21 DIAGNOSIS — M25.572 CHRONIC PAIN OF LEFT ANKLE: ICD-10-CM

## 2024-03-21 DIAGNOSIS — M25.551 HIP PAIN, RIGHT: Primary | ICD-10-CM

## 2024-03-21 DIAGNOSIS — G89.29 CHRONIC PAIN OF LEFT ANKLE: ICD-10-CM

## 2024-03-21 PROCEDURE — 97112 NEUROMUSCULAR REEDUCATION: CPT | Performed by: PHYSICAL THERAPIST

## 2024-03-21 PROCEDURE — 97110 THERAPEUTIC EXERCISES: CPT | Performed by: PHYSICAL THERAPIST

## 2024-03-21 PROCEDURE — 97530 THERAPEUTIC ACTIVITIES: CPT | Performed by: PHYSICAL THERAPIST

## 2024-03-21 NOTE — PROGRESS NOTES
Physical Therapy Daily Treatment Note    [facility]  [address] [place] [address]  [phone] (phone)  [phone] (fax)    Patient: Krys Mayes   : 1950  Diagnosis/ICD-10 Code:  Hip pain, right [M25.551]  Referring practitioner: AGUSTIN Vyas  Date of Initial Visit: Type: THERAPY  Noted: 2024  Today's Date: 3/21/2024  Patient seen for 16 sessions           Subjective   Danielle reports she is feeling good today overall. Knee feels fine.    Objective     See Exercise, Manual, and Modality Logs for complete treatment.     Assessment/Plan  Danielle had more trouble performing standing hip abduction with L LE compared to right. She also has some mild compensated trendelenburg (bilaterally) when performing side stepping. She was able to perform bed mobility for sit to supine independently on lower mat table, however she required min A for supine to sit transition.        Timed:    Manual Therapy:         mins  15829;  Therapeutic Exercise:    30     mins  51075;     Neuromuscular Cem:    10    mins  25472;    Therapeutic Activity:     15     mins  86566;     Gait Training:           mins  68211;     Ultrasound:          mins  20375;    Electrical Stimulation:         mins  14510 ( );  Iontophoresis         mins 45930;  Aquatic Therapy         mins 00036;    Untimed:  Electrical Stimulation:         mins  02354 ( );  Traction:         mins  13863;   Dry Needling   (1-2 muscles)       mins 73871 (Self-pay)  Dry Needling (3-4 muscles)        mins  (Self-pay)  Dry Needling Trial          mins DRYNDLTRIAL  (No Charge)    Timed Treatment:   55   mins   Total Treatment:     55   mins    Linnette De Guzman PT  Physical Therapist    [place] License:[id number]

## 2024-03-25 NOTE — PROGRESS NOTES
[facility]  [address] [id number]                                                                                Phone [phone]                                                                                  Fax [phone]    Physical Therapy Reassessment  Patient: Krys Mayes   : 1950  Diagnosis/ICD-10 Code:  No primary diagnosis found.  Referring practitioner: AGUSTIN Vyas  NPI: [id number]                                      Date of Initial Visit:  No linked episodes  Today's Date: 3/25/2024  Patient seen for Visit count could not be calculated. Make sure you are using a visit which is associated with an episode. sessions         Visit Diagnoses:  No diagnosis found.        Subjective Questionnaire: LEFS:  ( at [facility])  Clinical Progress: improved  Home Program Compliance: Yes  Treatment has included: therapeutic exercise and therapeutic activity      Subjective   Krys Mayes reports:      Objective          Strength/Myotome Testing     Left Hip   Planes of Motion   Flexion: 4  Abduction: 3+  External rotation: 4  Internal rotation: 4+    Right Hip   Planes of Motion   Flexion: 3+  Abduction: 3  External rotation: 4+  Internal rotation: 4+    Left Knee   Flexion: 4  Extension: 5    Right Knee   Flexion: 5  Extension: 5    Muscle Activation     Additional Muscle Activation Details  Mild diastasis  Upper abdom 2/5    Functional Assessment     Comments  5STS - 19.53 sec  TUG - 14.23 sec, 13.85 sec - 14.04 sec av          Assessment/Plan  Danielle has made good progress in PT with improved functional strength as noted with improved sit to stand testing though slightly decreased with time to up and go.  She continues to be challenged with transitional movements secondary to core weakness. Her knee pain has improved since getting injection. Pt would benefit from a continued course of skilled PT services in order to address  listed impairments and increase tolerance to normal daily activities.    Goals  Plan Goals: STG In 4 weeks  - Pt to be independent/compliant with HEP without exacerbation of symptoms. - MET  - Pt is able to ascend/descend 6 in therapy staircase reciprocally with single HR. - NT secondary to contralateral knee pain  - Pain not > 5/10 with ADLs. - ONGOING/MET for R knee/hip only  - Pt to exhibit B hip overall strength to >/= 4/5 with tolerable pain to allow for improved walking tolerance - ONGOING      LTG In 8 weeks  - TUG testing < 10 sec to demonstrate age related norms and reduce risk of falling.  - UNMET  - Pt to score >/= 40/80 on LEFS to demonstrate improved functional tolerance. - MET   - Pt to exhibit 4+/5 strength throughout hip, knee and ankle musculature to allow for normalized gait and standing > 10 min. - ONGOING  - 5TSTS < 18 sec to demonstrate improved LE function as need for gait endurance - ONGOING     Recommendations: Continue with recommendations include core activities to address difficulty with transitional movements.   Timeframe:  6-8 weeks  Prognosis to achieve goals: good      Timed:         Manual Therapy:         mins  03367;     Therapeutic Exercise:   20      mins  48364;     Neuromuscular Cem:        mins  37263;    Therapeutic Activity:    15      mins  75794;     Gait Training:           mins  19041;     Ultrasound:          mins  43539;    Ionto                                   mins   41290  Self Care                            mins   75607        Timed Treatment:  35    mins (decreased units billed to account for divided time)  Total Treatment:    64    mins          PT: Anahi Kramer PT     [place] License:  [id number]    Electronically signed by Anahi Kramer PT, 03/26/24, 12:17 PM EST    Certification Period: 3/25/2024 thru 6/22/2024  I certify that the therapy services are furnished while this patient is under my care.  The services outlined above are required by this  patient, and will be reviewed every 90 days.         Physician Signature:__________________________________________________    PHYSICIAN: Catherine Brown APRN  NPI: [id number]                                      DATE:  :     Please sign and return via fax to [phone].  Thank you, [facility]

## 2024-03-26 ENCOUNTER — TREATMENT (OUTPATIENT)
Dept: PHYSICAL THERAPY | Facility: CLINIC | Age: 74
End: 2024-03-26
Payer: MEDICARE

## 2024-03-26 DIAGNOSIS — R53.81 PHYSICAL DECONDITIONING: ICD-10-CM

## 2024-03-26 DIAGNOSIS — G89.29 CHRONIC PAIN OF LEFT ANKLE: ICD-10-CM

## 2024-03-26 DIAGNOSIS — M25.551 HIP PAIN, RIGHT: Primary | ICD-10-CM

## 2024-03-26 DIAGNOSIS — M25.561 CHRONIC PAIN OF RIGHT KNEE: ICD-10-CM

## 2024-03-26 DIAGNOSIS — M25.572 CHRONIC PAIN OF LEFT ANKLE: ICD-10-CM

## 2024-03-26 DIAGNOSIS — G89.29 CHRONIC PAIN OF RIGHT KNEE: ICD-10-CM

## 2024-03-26 PROCEDURE — 97110 THERAPEUTIC EXERCISES: CPT | Performed by: PHYSICAL THERAPIST

## 2024-03-26 PROCEDURE — 97530 THERAPEUTIC ACTIVITIES: CPT | Performed by: PHYSICAL THERAPIST

## 2024-03-26 NOTE — PROGRESS NOTES
30-Day / 10-Visit Progress Note     [facility]  [address] [place] [address]  [phone] (phone)  [phone] (fax)    Patient: Krys Mayes   : 1950  Diagnosis/ICD-10 Code:  Hip pain, right [M25.551]  Referring practitioner: AGUSTIN Vyas  Date of Initial Visit: Type: THERAPY  Noted: 2024  Today's Date: 3/26/2024  Patient seen for 17 sessions      Subjective:     Subjective Questionnaire: LEFS:  ( at [facility])  Clinical Progress: improved  Home Program Compliance: Yes  Treatment has included: therapeutic exercise and therapeutic activity    Subjective   Danielle reports she got a new mattress because it has been hurting her R hip to lay on that side at night.     Objective     Tenderness   Left Knee   Tenderness in the lateral joint line, medial patella and medial retinaculum. No tenderness in the medial joint line.      Right Knee   Tenderness in the LCL (distal). No tenderness in the lateral joint line, medial joint line, medial patella and medial retinaculum.      Strength/Myotome Testing      Left Hip   Planes of Motion   Flexion: 4  Abduction: 4-  External rotation: 4+  Internal rotation: 4+     Right Hip   Planes of Motion   Flexion: 4-  Abduction: 3+  External rotation: 4+  Internal rotation: 4+     Left Knee   Flexion: 5  Extension: 5     Right Knee   Flexion: 5  Extension: 5     Muscle Activation      Additional Muscle Activation Details  Mild diastasis  Upper abdom 2/5     Tests      Right Hip   Negative TAYLOR.      Functional Assessment      Comments  5xSTS - 17.11 sec  TUG - 13.12 sec       See Exercise, Manual, and Modality Logs for complete treatment.       Assessment/Plan  Krys Mayes has been seen for 17 physical therapy sessions for R knee OA, R greater trochanteric bursitis, unstable gait, and arthritis of L ankle.  Progress to physical therapy goals is good. She recently underwent L knee injection which has helped address L  knee pain. PT has been more focused on generalized balance and functional strength which she has been making steady progress on. She still tends to struggle with transitional movements, especially with supine to sit.  She will benefit from continued skilled physical therapy to address remaining impairments and functional limitations.       Goals  Plan Goals: STG In 4 weeks  - Pt to be independent/compliant with HEP without exacerbation of symptoms. (MET)  - Pt is able to ascend/descend 6 in therapy staircase reciprocally with single HR. - (MET)  - Pain not > 5/10 with ADLs. (ONGOING, still challenging to put on shoes)  - Pt to exhibit B hip overall strength to >/= 4/5 with tolerable pain to allow for improved walking tolerance - (ONGOING)        LTG In 8 weeks  - TUG testing < 10 sec to demonstrate age related norms and reduce risk of falling.  - (UNMET)  - Pt to score >/= 40/80 on LEFS to demonstrate improved functional tolerance. - (MET)   - Pt to exhibit 4+/5 strength throughout hip, knee and ankle musculature to allow for normalized gait and standing > 10 min. - (ONGOING)  - 5TSTS < 18 sec to demonstrate improved LE function as need for gait endurance - (MET)                Recommendations: Continue with recommendations include core activities to address difficulty with transitional movements.   Timeframe:  6-8 weeks  Prognosis to achieve goals: good    PT Signature: Linnette De Guzman PT    [place] License Number: [id number]    Electronically signed by Linnette De Guzman PT, 03/26/24, 1:20 PM EDT      Based upon review of the patient's progress and continued therapy plan, it is my medical opinion that Krys Mayes should continue physical therapy treatment at [facility]  [address] [facility] [address]  [phone].    Signature: __________________________________  Catherine Brown APRN    Timed:  Manual Therapy:         mins  56388;  Therapeutic Exercise:    30      mins  00770;     Neuromuscular Cem:        mins  59863;    Therapeutic Activity:     10     mins  82216;     Gait Training:           mins  97698;     Ultrasound:          mins  82038;    Iontophoresis         mins 68861;    Untimed:  Electrical Stimulation:         mins  80546 ( );  Traction:         mins  46609;   Dry Needling   (1-2 muscles)       mins 20560 (Self-pay)  Dry Needling (3-4 muscles)        mins 20561 (Self-pay)  Dry Needling Trial          mins DRYNDLTRIAL  (No Charge)    Timed Treatment:   40   mins   Total Treatment:     50   mins

## 2024-03-28 ENCOUNTER — TREATMENT (OUTPATIENT)
Dept: PHYSICAL THERAPY | Facility: CLINIC | Age: 74
End: 2024-03-28
Payer: MEDICARE

## 2024-03-28 DIAGNOSIS — E78.2 MIXED HYPERLIPIDEMIA: ICD-10-CM

## 2024-03-28 DIAGNOSIS — R73.03 PREDIABETES: Primary | ICD-10-CM

## 2024-03-28 DIAGNOSIS — F41.1 GAD (GENERALIZED ANXIETY DISORDER): ICD-10-CM

## 2024-03-28 DIAGNOSIS — M25.551 HIP PAIN, RIGHT: Primary | ICD-10-CM

## 2024-03-28 DIAGNOSIS — E55.9 VITAMIN D DEFICIENCY: ICD-10-CM

## 2024-03-28 DIAGNOSIS — R53.81 PHYSICAL DECONDITIONING: ICD-10-CM

## 2024-03-28 DIAGNOSIS — Z79.899 HIGH RISK MEDICATION USE: ICD-10-CM

## 2024-03-28 PROCEDURE — 97110 THERAPEUTIC EXERCISES: CPT | Performed by: PHYSICAL THERAPIST

## 2024-03-28 PROCEDURE — 97530 THERAPEUTIC ACTIVITIES: CPT | Performed by: PHYSICAL THERAPIST

## 2024-03-28 NOTE — PROGRESS NOTES
Physical Therapy Daily Treatment Note/DISCHARGE    [facility]  [address], [place] [address] [phone] (phone)  [phone] (fax)    Patient: Krys Mayes   : 1950  Diagnosis/ICD-10 Code:  Hip pain, right [M25.551]  Referring practitioner: AGUSTIN Vyas  Date of Initial Visit: Type: THERAPY  Noted: 2024  Today's Date: 3/28/2024  Patient seen for 18 sessions           Subjective   Danielle reports she is doing well today.    Objective     See Exercise, Manual, and Modality Logs for complete treatment.     Assessment/Plan  Danielle demonstrates increased ease overall with sit to stand transfers, but bed mobility is still somewhat challenging for her. Majority of PT goals have been MET. At this time plan to encourage self management of condition with HEP. Will discharge skilled PT.    Goals  Plan Goals: STG In 4 weeks  - Pt to be independent/compliant with HEP without exacerbation of symptoms. (MET)  - Pt is able to ascend/descend 6 in therapy staircase reciprocally with single HR. - (MET)  - Pain not > 5/10 with ADLs. (NOT MET)  - Pt to exhibit B hip overall strength to >/= 4/5 with tolerable pain to allow for improved walking tolerance - (NOT MET)        LTG In 8 weeks  - TUG testing < 10 sec to demonstrate age related norms and reduce risk of falling.  - (NOT MET)  - Pt to score >/= 40/80 on LEFS to demonstrate improved functional tolerance. - (MET)   - Pt to exhibit 4+/5 strength throughout hip, knee and ankle musculature to allow for normalized gait and standing > 10 min. - (NOT MET)  - 5TSTS < 18 sec to demonstrate improved LE function as need for gait endurance - (MET)         Timed:    Manual Therapy:         mins  45382;  Therapeutic Exercise:    30     mins  23515;     Neuromuscular Cem:        mins  96877;    Therapeutic Activity:     10     mins  63568;     Gait Training:           mins  74495;     Ultrasound:          mins  96200;    Electrical  Stimulation:         mins  15715 ( );  Iontophoresis         mins 43436;  Aquatic Therapy         mins 59969;    Untimed:  Electrical Stimulation:         mins  94631 ( );  Traction:         mins  75516;   Dry Needling   (1-2 muscles)       mins 20560 (Self-pay)  Dry Needling (3-4 muscles)        mins 20561 (Self-pay)  Dry Needling Trial          mins DRYNDLTRIAL  (No Charge)    Timed Treatment:   40   mins   Total Treatment:     45   mins    Linnette De Guzman PT  Physical Therapist    [place] License:[id number]

## 2024-04-01 DIAGNOSIS — F41.9 ANXIETY: ICD-10-CM

## 2024-04-02 RX ORDER — ALPRAZOLAM 1 MG/1
TABLET ORAL
Qty: 60 TABLET | Refills: 0 | Status: SHIPPED | OUTPATIENT
Start: 2024-04-02

## 2024-04-10 ENCOUNTER — OFFICE VISIT (OUTPATIENT)
Dept: FAMILY MEDICINE CLINIC | Facility: CLINIC | Age: 74
End: 2024-04-10
Payer: MEDICARE

## 2024-04-10 VITALS
HEIGHT: 66 IN | OXYGEN SATURATION: 98 % | BODY MASS INDEX: 34.07 KG/M2 | TEMPERATURE: 96.4 F | DIASTOLIC BLOOD PRESSURE: 82 MMHG | SYSTOLIC BLOOD PRESSURE: 120 MMHG | HEART RATE: 81 BPM | WEIGHT: 212 LBS

## 2024-04-10 DIAGNOSIS — I10 ESSENTIAL HYPERTENSION: ICD-10-CM

## 2024-04-10 DIAGNOSIS — F41.1 GAD (GENERALIZED ANXIETY DISORDER): ICD-10-CM

## 2024-04-10 DIAGNOSIS — Z91.81 AT MODERATE RISK FOR FALL: ICD-10-CM

## 2024-04-10 DIAGNOSIS — R73.03 PREDIABETES: Primary | ICD-10-CM

## 2024-04-10 DIAGNOSIS — F32.0 MAJOR DEPRESSIVE DISORDER, SINGLE EPISODE, MILD: ICD-10-CM

## 2024-04-10 DIAGNOSIS — E03.9 ACQUIRED HYPOTHYROIDISM: ICD-10-CM

## 2024-04-10 DIAGNOSIS — Z79.899 HIGH RISK MEDICATION USE: ICD-10-CM

## 2024-04-10 DIAGNOSIS — E78.2 MIXED HYPERLIPIDEMIA: ICD-10-CM

## 2024-04-10 PROCEDURE — 3074F SYST BP LT 130 MM HG: CPT | Performed by: PHYSICIAN ASSISTANT

## 2024-04-10 PROCEDURE — 3044F HG A1C LEVEL LT 7.0%: CPT | Performed by: PHYSICIAN ASSISTANT

## 2024-04-10 PROCEDURE — 99214 OFFICE O/P EST MOD 30 MIN: CPT | Performed by: PHYSICIAN ASSISTANT

## 2024-04-10 PROCEDURE — 3079F DIAST BP 80-89 MM HG: CPT | Performed by: PHYSICIAN ASSISTANT

## 2024-04-10 RX ORDER — LEVOTHYROXINE SODIUM 0.03 MG/1
25 TABLET ORAL
Qty: 30 TABLET | Refills: 3 | Status: SHIPPED | OUTPATIENT
Start: 2024-04-10

## 2024-04-10 NOTE — PATIENT INSTRUCTIONS
Fall Prevention in the Home, Adult  Falls can cause injuries and affect people of all ages. There are many simple things that you can do to make your home safe and to help prevent falls.  If you need it, ask for help making these changes.  What actions can I take to prevent falls?  General information  Use good lighting in all rooms. Make sure to:  Replace any light bulbs that burn out.  Turn on lights if it is dark and use night-lights.  Keep items that you use often in easy-to-reach places. Lower the shelves around your home if needed.  Move furniture so that there are clear paths around it.  Do not keep throw rugs or other things on the floor that can make you trip.  If any of your floors are uneven, fix them.  Add color or contrast paint or tape to clearly odalis and help you see:  Grab bars or handrails.  First and last steps of staircases.  Where the edge of each step is.  If you use a ladder or stepladder:  Make sure that it is fully opened. Do not climb a closed ladder.  Make sure the sides of the ladder are locked in place.  Have someone hold the ladder while you use it.  Know where your pets are as you move through your home.  What can I do in the bathroom?         Keep the floor dry. Clean up any water that is on the floor right away.  Remove soap buildup in the bathtub or shower. Buildup makes bathtubs and showers slippery.  Use non-skid mats or decals on the floor of the bathtub or shower.  Attach bath mats securely with double-sided, non-slip rug tape.  If you need to sit down while you are in the shower, use a non-slip stool.  Install grab bars by the toilet and in the bathtub and shower. Do not use towel bars as grab bars.  What can I do in the bedroom?  Make sure that you have a light by your bed that is easy to reach.  Do not use any sheets or blankets on your bed that hang to the floor.  Have a firm bench or chair with side arms that you can use for support when you get dressed.  What can I do in  the kitchen?  Clean up any spills right away.  If you need to reach something above you, use a sturdy step stool that has a grab bar.  Keep electrical cables out of the way.  Do not use floor polish or wax that makes floors slippery.  What can I do with my stairs?  Do not leave anything on the stairs.  Make sure that you have a light switch at the top and the bottom of the stairs. Have them installed if you do not have them.  Make sure that there are handrails on both sides of the stairs. Fix handrails that are broken or loose. Make sure that handrails are as long as the staircases.  Install non-slip stair treads on all stairs in your home if they do not have carpet.  Avoid having throw rugs at the top or bottom of stairs, or secure the rugs with carpet tape to prevent them from moving.  Choose a carpet design that does not hide the edge of steps on the stairs. Make sure that carpet is firmly attached to the stairs. Fix any carpet that is loose or worn.  What can I do on the outside of my home?  Use bright outdoor lighting.  Repair the edges of walkways and driveways and fix any cracks. Clear paths of anything that can make you trip, such as tools or rocks.  Add color or contrast paint or tape to clearly odalis and help you see high doorway thresholds.  Trim any bushes or trees on the main path into your home.  Check that handrails are securely fastened and in good repair. Both sides of all steps should have handrails.  Install guardrails along the edges of any raised decks or porches.  Have leaves, snow, and ice cleared regularly. Use sand, salt, or ice melt on walkways during winter months if you live where there is ice and snow.  In the garage, clean up any spills right away, including grease or oil spills.  What other actions can I take?  Review your medicines with your health care provider. Some medicines can make you confused or feel dizzy. This can increase your chance of falling.  Wear closed-toe shoes that  fit well and support your feet. Wear shoes that have rubber soles and low heels.  Use a cane, walker, scooter, or crutches that help you move around if needed.  Talk with your provider about other ways that you can decrease your risk of falls. This may include seeing a physical therapist to learn to do exercises to improve movement and strength.  Where to find more information  Centers for Disease Control and Prevention, [place]: cdc.gov  National [place] on Aging: lai.nih.gov  National [place] on Aging: lai.nih.gov  Contact a health care provider if:  You are afraid of falling at home.  You feel weak, drowsy, or dizzy at home.  You fall at home.  Get help right away if you:  Lose consciousness or have trouble moving after a fall.  Have a fall that causes a head injury.  These symptoms may be an emergency. Get help right away. Call 911.  Do not wait to see if the symptoms will go away.  Do not drive yourself to the hospital.  This information is not intended to replace advice given to you by your health care provider. Make sure you discuss any questions you have with your health care provider.  Document Revised: 08/21/2023 Document Reviewed: 08/21/2023  Elsevier Patient Education © 2023 [employer]

## 2024-04-10 NOTE — PROGRESS NOTES
"Chief Complaint  Diabetes (Management), Anxiety (Management), Hypertension (Management), and Hyperlipidemia (Management)    Subjective          Krys LAURA Mayes presents to [facility]  History of Present Illness  Krys, \"Danielle\", is a 74-year-old female who presents with  for prediabetes, hypertension, hyperlipidemia and anxiety management.  She has lost 1 pound since last office visit in November 2023.  Currently states overall she is feeling well at office visit today.  She last fell about 5-6 months ago.  She has been undergoing physical therapy and was recently released at the end of March.  She has had no falls since then.  States she does not want a colonoscopy.  Bowel movements have been at least 4 times weekly without dark black tarry stools.  Diet has been so-so.  Still eats sugar and carbohydrates.  Does not exercise.  Sleep has been good mostly.  Some nights she gets hot and has difficulty sleeping.  Doing well with the sertraline and Xanax medication.  Takes Xanax 1.5 tablets nightly and 100 mg of  Sertraline  daily.  Denied any suicidal or homicidal ideation.  Denied any current chest pain, shortness of air, cough, wheezing, abdominal pain, GI upset or swelling of ankles.     Objective   Vital Signs:   /82 (BP Location: Left arm, Patient Position: Sitting, Cuff Size: Adult)   Pulse 81   Temp 96.4 °F (35.8 °C)   Ht 167.6 cm (66\")   Wt 96.2 kg (212 lb)   SpO2 98%   BMI 34.22 kg/m²     Physical Exam  Vitals and nursing note reviewed.   Constitutional:       Appearance: Normal appearance. She is well-developed and well-groomed. She is obese.      Comments:  is in exam room   HENT:      Head: Normocephalic and atraumatic.   Neck:      Thyroid: No thyroid mass, thyromegaly or thyroid tenderness.      Vascular: No carotid bruit.      Trachea: Trachea and phonation normal. No tracheal tenderness.   Cardiovascular:      Rate and Rhythm: Normal rate " and regular rhythm.      Pulses: Normal pulses.      Heart sounds: Normal heart sounds, S1 normal and S2 normal. No murmur heard.  Pulmonary:      Effort: Pulmonary effort is normal.      Breath sounds: Normal breath sounds and air entry.   Abdominal:      General: Bowel sounds are normal.      Palpations: Abdomen is soft. There is no hepatomegaly.      Tenderness: There is no abdominal tenderness.   Musculoskeletal:      Cervical back: Neck supple.      Right lower leg: No edema.      Left lower leg: No edema.   Skin:     General: Skin is warm and dry.      Capillary Refill: Capillary refill takes less than 2 seconds.   Neurological:      Mental Status: She is alert and oriented to person, place, and time.   Psychiatric:         Attention and Perception: Attention and perception normal.         Mood and Affect: Mood and affect normal.         Speech: Speech normal.         Behavior: Behavior normal. Behavior is cooperative.         Thought Content: Thought content normal.         Cognition and Memory: Cognition and memory normal.         Judgment: Judgment normal.        Result Review :         Results Encounter on 04/02/2024   Component Date Value Ref Range Status    WBC 04/03/2024 6.28  3.40 - 10.80 10*3/mm3 Final    RBC 04/03/2024 5.17  3.77 - 5.28 10*6/mm3 Final    Hemoglobin 04/03/2024 13.1  12.0 - 15.9 g/dL Final    Hematocrit 04/03/2024 42.5  34.0 - 46.6 % Final    MCV 04/03/2024 82.2  79.0 - 97.0 fL Final    MCH 04/03/2024 25.3 (L)  26.6 - 33.0 pg Final    MCHC 04/03/2024 30.8 (L)  31.5 - 35.7 g/dL Final    RDW 04/03/2024 13.1  12.3 - 15.4 % Final    Platelets 04/03/2024 388  140 - 450 10*3/mm3 Final    Hemoglobin A1C 04/03/2024 6.00 (H)  4.80 - 5.60 % Final    Comment: Hemoglobin A1C Ranges:  Increased Risk for Diabetes  5.7% to 6.4%  Diabetes                     >= 6.5%  Diabetic Goal                < 7.0%      Total Cholesterol 04/03/2024 174  0 - 200 mg/dL Final    Comment: Cholesterol Reference  Ranges  (U.S. Department of Health and Human Services ATP III  Classifications)  Desirable          <200 mg/dL  Borderline High    200-239 mg/dL  High Risk          >240 mg/dL  Triglyceride Reference Ranges  (U.S. Department of Health and Human Services ATP III  Classifications)  Normal           <150 mg/dL  Borderline High  150-199 mg/dL  High             200-499 mg/dL  Very High        >500 mg/dL  HDL Reference Ranges  (U.S. Department of Health and Human Services ATP III  Classifications)  Low     <40 mg/dl (major risk factor for CHD)  High    >60 mg/dl ('negative' risk factor for CHD)  LDL Reference Ranges  (U.S. Department of Health and Human Services ATP III  Classifications)  Optimal          <100 mg/dL  Near Optimal     100-129 mg/dL  Borderline High  130-159 mg/dL  High             160-189 mg/dL  Very High        >189 mg/dL      Triglycerides 04/03/2024 196 (H)  0 - 150 mg/dL Final    HDL Cholesterol 04/03/2024 68 (H)  40 - 60 mg/dL Final    VLDL Cholesterol Kenneth 04/03/2024 32  5 - 40 mg/dL Final    LDL Chol Calc (NIH) 04/03/2024 74  0 - 100 mg/dL Final    LDL/HDL RATIO 04/03/2024 0.98   Final    Glucose 04/03/2024 116 (H)  65 - 99 mg/dL Final    BUN 04/03/2024 16  8 - 23 mg/dL Final    Creatinine 04/03/2024 1.21 (H)  0.57 - 1.00 mg/dL Final    EGFR Result 04/03/2024 46.8 (L)  >60.0 mL/min/1.73 Final    Comment: GFR Normal >60  Chronic Kidney Disease <60  Kidney Failure <15  The GFR formula is only valid for adults with stable renal  function between ages 18 and 70.      BUN/Creatinine Ratio 04/03/2024 13.2  7.0 - 25.0 Final    Sodium 04/03/2024 139  136 - 145 mmol/L Final    Potassium 04/03/2024 5.0  3.5 - 5.2 mmol/L Final    Chloride 04/03/2024 103  98 - 107 mmol/L Final    Total CO2 04/03/2024 25.7  22.0 - 29.0 mmol/L Final    Calcium 04/03/2024 9.8  8.6 - 10.5 mg/dL Final    Total Protein 04/03/2024 6.9  6.0 - 8.5 g/dL Final    Albumin 04/03/2024 4.1  3.5 - 5.2 g/dL Final    Globulin 04/03/2024 2.8   gm/dL Final    A/G Ratio 04/03/2024 1.5  g/dL Final    Total Bilirubin 04/03/2024 0.3  0.0 - 1.2 mg/dL Final    Alkaline Phosphatase 04/03/2024 79  39 - 117 U/L Final    AST (SGOT) 04/03/2024 16  1 - 32 U/L Final    ALT (SGPT) 04/03/2024 15  1 - 33 U/L Final    TSH 04/03/2024 4.650 (H)  0.270 - 4.200 uIU/mL Final    25 Hydroxy, Vitamin D 04/03/2024 28.9 (L)  30.0 - 100.0 ng/ml Final    Comment: Reference Range for Total Vitamin D 25(OH)  Deficiency <20.0 ng/mL  Insufficiency 21-29 ng/mL  Sufficiency  ng/mL  Toxicity >100 ng/ml                   Assessment and Plan    Diagnoses and all orders for this visit:    1. Prediabetes (Primary)    2. Mixed hyperlipidemia    3. Essential hypertension    4. COLTON (generalized anxiety disorder)  -     Compliance Drug Analysis, Ur - Urine, Clean Catch; Future    5. Major depressive disorder, single episode, mild    6. At moderate risk for fall    7. High risk medication use  -     Compliance Drug Analysis, Ur - Urine, Clean Catch; Future    8. Acquired hypothyroidism  -     levothyroxine (SYNTHROID, LEVOTHROID) 25 MCG tablet; Take 1 tablet by mouth Every Morning.  Dispense: 30 tablet; Refill: 3  -     TSH; Future    Chronic and stable prediabetes with hyperlipidemia: I have reviewed above blood work with her today.  Triglycerides, A1c and fasting blood sugar have increased slightly over the past 6 months.  Stressed the importance of decreasing her sugar and carbohydrate intake.  Continue current medication at home as directed.  Chronic stable hypertension: I have rechecked blood pressure and got 120/78 in left arm.  Continue current medication at home as directed.  Keep follow-up appointments with cardiology.  Chronic and stable generalized anxiety disorder with high risk medication usage: Will have a urine drug screen collected today for compliance purposes.  Will be notified of test results when completed.  She has signed the appropriate agreement and consent forms for the  Xanax medication.  Chronic moderate high risk for fall: Have reviewed her physical therapy notes.  She has been cleared to do physical therapy at home.  Stressed the importance of doing her exercises that physical therapist gave her to help prevent falls.  New acquired hypothyroidism: TSH was elevated with current symptoms.  Will start levothyroxine 25 mcg once in a.m.  Will recheck TSH in 6/8 weeks.    I spent 25 minutes caring for Krys on this date of service. This time includes time spent by me in the following activities:preparing for the visit, reviewing tests, obtaining and/or reviewing a separately obtained history, performing a medically appropriate examination and/or evaluation , counseling and educating the patient/family/caregiver, ordering medications, tests, or procedures, and documenting information in the medical record  Follow Up   Return in 6 months (on 10/10/2024).  Patient was given instructions and counseling regarding her condition or for health maintenance advice. Please see specific information pulled into the AVS if appropriate.     MANI Martinez [facility]  [address] [place] [id number]  Dept: [phone]  Dept Fax: [phone]  Loc: [phone]  Loc Fax: [phone]

## 2024-04-28 RX ORDER — SERTRALINE HYDROCHLORIDE 100 MG/1
TABLET, FILM COATED ORAL
Qty: 90 TABLET | Refills: 0 | Status: SHIPPED | OUTPATIENT
Start: 2024-04-28

## 2024-05-09 DIAGNOSIS — F41.9 ANXIETY: ICD-10-CM

## 2024-05-10 RX ORDER — ALPRAZOLAM 1 MG/1
TABLET ORAL
Qty: 60 TABLET | Refills: 0 | Status: SHIPPED | OUTPATIENT
Start: 2024-05-10

## 2024-05-13 ENCOUNTER — TELEPHONE (OUTPATIENT)
Dept: FAMILY MEDICINE CLINIC | Facility: CLINIC | Age: 74
End: 2024-05-13

## 2024-05-13 NOTE — TELEPHONE ENCOUNTER
"UNABLE TO WARM TRANSFER     Caller: Krys Mayes \"WYATT\"    Relationship to patient: Self    Best call back number: [phone]     Patient is needing: TO SCHEDULE LAB APPOINTMENT         "

## 2024-05-16 DIAGNOSIS — E03.9 ACQUIRED HYPOTHYROIDISM: ICD-10-CM

## 2024-05-29 DIAGNOSIS — E03.9 ACQUIRED HYPOTHYROIDISM: ICD-10-CM

## 2024-05-29 LAB
DRUGS UR: NORMAL
TSH SERPL DL<=0.005 MIU/L-ACNC: 1.78 UIU/ML (ref 0.45–4.5)

## 2024-05-29 RX ORDER — LEVOTHYROXINE SODIUM 0.03 MG/1
25 TABLET ORAL
Qty: 90 TABLET | Refills: 0 | Status: SHIPPED | OUTPATIENT
Start: 2024-05-29

## 2024-05-31 ENCOUNTER — OFFICE VISIT (OUTPATIENT)
Dept: FAMILY MEDICINE CLINIC | Facility: CLINIC | Age: 74
End: 2024-05-31
Payer: MEDICARE

## 2024-05-31 VITALS
SYSTOLIC BLOOD PRESSURE: 144 MMHG | HEIGHT: 66 IN | DIASTOLIC BLOOD PRESSURE: 84 MMHG | HEART RATE: 72 BPM | TEMPERATURE: 97.7 F | OXYGEN SATURATION: 96 % | BODY MASS INDEX: 34.07 KG/M2 | WEIGHT: 212 LBS

## 2024-05-31 DIAGNOSIS — R23.3 EASY BRUISING: Primary | ICD-10-CM

## 2024-05-31 DIAGNOSIS — Z79.01 ANTICOAGULANT LONG-TERM USE: ICD-10-CM

## 2024-05-31 PROCEDURE — 3079F DIAST BP 80-89 MM HG: CPT | Performed by: NURSE PRACTITIONER

## 2024-05-31 PROCEDURE — 1159F MED LIST DOCD IN RCRD: CPT | Performed by: NURSE PRACTITIONER

## 2024-05-31 PROCEDURE — 1160F RVW MEDS BY RX/DR IN RCRD: CPT | Performed by: NURSE PRACTITIONER

## 2024-05-31 PROCEDURE — 1126F AMNT PAIN NOTED NONE PRSNT: CPT | Performed by: NURSE PRACTITIONER

## 2024-05-31 PROCEDURE — 99213 OFFICE O/P EST LOW 20 MIN: CPT | Performed by: NURSE PRACTITIONER

## 2024-05-31 PROCEDURE — 3077F SYST BP >= 140 MM HG: CPT | Performed by: NURSE PRACTITIONER

## 2024-05-31 PROCEDURE — 3044F HG A1C LEVEL LT 7.0%: CPT | Performed by: NURSE PRACTITIONER

## 2024-05-31 NOTE — PROGRESS NOTES
Patient ID: Krys Mayes is a 74 y.o. female     Patient Care Team:  Ashleigh Hernandez PA-C as PCP - General (Family Medicine)  Jeremy Orozco MD as Consulting Physician (Cardiology)  Joss Oro MD as Consulting Physician (Gastroenterology)  Catherine Brown APRN as Nurse Practitioner (Nurse Practitioner)  Mati Villegas MD as Consulting Physician (Urology)  Ajith Valles DPM as Consulting Physician (Podiatry)  Lyle Orellana MD as Consulting Physician (Ophthalmology)  Luan Marie MD as Consulting Physician (Dermatology)    Subjective     Chief Complaint   Patient presents with    Bleeding/Bruising     Right lower arm.       History of Present Illness    Krys Mayes presents to [facility] today for unusual bruising to right forearm.  Just recently noticed about 4 to 5 days ago.  Does not cause any issues or problems.  No bleeding.  No known injury.  She is on Xarelto 15 mg daily.    She denies any complaints of fever, chills, cough, chest pain, shortness of air, abdominal pain, nausea, or any other concerns.     The following portions of the patient's history were reviewed and updated as appropriate: allergies, current medications, past family history, past medical history, past social history, past surgical history and problem list.       ROS    Vitals:    05/31/24 1559   BP: 144/84   Pulse: 72   Temp: 97.7 °F (36.5 °C)   SpO2: 96%       Documented weights    05/31/24 1559   Weight: 96.2 kg (212 lb)     Body mass index is 34.22 kg/m².    Results for orders placed or performed in visit on 05/16/24   TSH    Specimen: Blood   Result Value Ref Range    TSH 1.780 0.450 - 4.500 uIU/mL   Compliance Drug Analysis, Ur -   Result Value Ref Range    Report Summary FINAL    CBC (No Diff) (04/03/2024 10:01)          Objective     Physical Exam  Vitals reviewed.   Constitutional:       General: She is not in acute  distress.  Cardiovascular:      Rate and Rhythm: Normal rate.   Pulmonary:      Effort: Pulmonary effort is normal.   Skin:     Comments: Red/purple bruise to right forearm.     Neurological:      Mental Status: She is alert.         Assessment & Plan     Assessment/Plan     Diagnoses and all orders for this visit:    1. Easy bruising (Primary)    2. Anticoagulant long-term use     Appears stable.  Related to being on blood thinner.  Advised to monitor and let us know if worsens.        Follow Up:  Return if symptoms worsen or fail to improve, for Next scheduled follow up.    In the meantime, instructed to contact us sooner for any problems or concerns.    Patient was given instructions and counseling regarding condition or for health maintenance advice.  Please see specific information pulled into the AVS if appropriate.          Pauline Enriquez, APRN  Family Medicine  [facility]  05/31/24  17:00 EDT

## 2024-05-31 NOTE — PROGRESS NOTES
"Subjective     Chief Complaint   Patient presents with    Bleeding/Bruising     Right lower arm.       Krys Mayes is a 74 y.o. female who presents for evaluation of a rash involving the {body part:63128}. Rash started {1-10:16215} {time; units:79739} ago. Lesions are {color:5006}, and {text:16712} in texture. Rash {has/not:06905} changed over time. Rash {rash dc:41501}. Associated symptoms: {rash assoc:53051}. Patient denies: {rash assoc:19565}. Patient {has/not:02722} had contacts with similar rash. Patient {has/not:73200} had new exposures (soaps, lotions, laundry detergents, foods, medications, plants, insects or animals).    {Common ambulatory SmartLinks:54688}    Review of Systems  {ros; complete:97980}     Objective   /84 (BP Location: Left arm, Patient Position: Sitting, Cuff Size: Adult)   Pulse 72   Temp 97.7 °F (36.5 °C)   Ht 167.6 cm (66\")   Wt 96.2 kg (212 lb)   SpO2 96%   BMI 34.22 kg/m²   General:  {gen appearance:36766::\"alert\",\"appears stated age\",\"cooperative\"}   Skin:  {skin exam:24182::\"normal\"}     Assessment & Plan   {derm diagnosis:62345}     {plan:42538}           "

## 2024-06-06 ENCOUNTER — OFFICE VISIT (OUTPATIENT)
Dept: ORTHOPEDIC SURGERY | Facility: CLINIC | Age: 74
End: 2024-06-06
Payer: MEDICARE

## 2024-06-06 VITALS — BODY MASS INDEX: 34.07 KG/M2 | WEIGHT: 212 LBS | HEIGHT: 66 IN

## 2024-06-06 DIAGNOSIS — M17.11 PRIMARY OSTEOARTHRITIS OF RIGHT KNEE: Primary | ICD-10-CM

## 2024-06-06 RX ORDER — LIDOCAINE HYDROCHLORIDE 10 MG/ML
8 INJECTION, SOLUTION EPIDURAL; INFILTRATION; INTRACAUDAL; PERINEURAL
Status: COMPLETED | OUTPATIENT
Start: 2024-06-06 | End: 2024-06-06

## 2024-06-06 RX ORDER — TRIAMCINOLONE ACETONIDE 40 MG/ML
80 INJECTION, SUSPENSION INTRA-ARTICULAR; INTRAMUSCULAR
Status: COMPLETED | OUTPATIENT
Start: 2024-06-06 | End: 2024-06-06

## 2024-06-06 RX ADMIN — LIDOCAINE HYDROCHLORIDE 8 ML: 10 INJECTION, SOLUTION EPIDURAL; INFILTRATION; INTRACAUDAL; PERINEURAL at 11:39

## 2024-06-06 RX ADMIN — TRIAMCINOLONE ACETONIDE 80 MG: 40 INJECTION, SUSPENSION INTRA-ARTICULAR; INTRAMUSCULAR at 11:39

## 2024-06-06 NOTE — PROGRESS NOTES
Subjective:     Patient ID: Krys Mayes is a 74 y.o. female.    Chief Complaint:  Follow-up DJD right knee  History of Present Illness  History of Present Illness    Krys Mayes returns to clinic today for follow-up of her right knee.  She is experiencing pain along the medial compartment anterior aspect.  She is experiencing stiffness with activities involving deep flexion, pain with transitional activities from seated standing attempting to walk, ambulating long distances and standing for extended periods of time.  She has received injections in the past with significant symptom improvement.  Denies any known recent injury.  He does feel as if the knee is going to give way.  Denies any other concerns present.       Social History     Occupational History    Not on file   Tobacco Use    Smoking status: Never    Smokeless tobacco: Never    Tobacco comments:     Never smoked ever   Vaping Use    Vaping status: Never Used   Substance and Sexual Activity    Alcohol use: Yes     Comment: She uses alcohol once or twice a year.    Drug use: No    Sexual activity: Not Currently     Partners: Male     Birth control/protection: Post-menopausal      Past Medical History:   Diagnosis Date    Arthritis     Cholelithiasis taken out 1/14/91    Diverticulosis     Gastroesophageal reflux disease 01/21/2016    Hyperlipidemia     Hypertension     Kidney stone     recurrent, calcium oxalate    Menopausal disorder 1998    She went through menopause in 1998    Neuropathy     Obesity     PAF (paroxysmal atrial fibrillation)     in the setting of urosepsis, 9/2016    Reflux esophagitis 08/19/2016    Sebaceous cyst of labia 12/20/2017    Sepsis due to urinary tract infection     Sigmoid diverticulitis 11/13/2017    Type 2 diabetes mellitus     Urinary tract infection     Visual impairment cataracts    Vitamin D deficiency 01/21/2016     Past Surgical History:   Procedure Laterality Date    ANKLE SURGERY      Ankle Repair /  "Description: ORif the left ankle in July 2010. Secondary to fall    BREAST BIOPSY Right     benign    BREAST SURGERY      biopsy     CATARACT EXTRACTION Right     CHOLECYSTECTOMY      COLONOSCOPY  2014    Done 2004 normal recheck in 10 years. Repeated February 2014 colonoscopy was normal and to be repeated in 10 years.    CYSTOSCOPY BLADDER STONE LITHOTRIPSY      FRACTURE SURGERY      HEMORRHOIDECTOMY      NEPHROLITHOTOMY Left 01/09/2017    Procedure: NEPHROLITHOTOMY PERCUTANEOUS SECOND LOOK WITH STENT PLACEMENT AND LASER LITHOTRIPSY;  Surgeon: Mati Villegas MD;  Location: [facility] OR;  Service:     OTHER SURGICAL HISTORY      Tubal Ligation    PERCUTANEOUS NEPHROSTOLITHOTOMY Left 12/2016    TUBAL ABDOMINAL LIGATION      URETEROSCOPY LASER LITHOTRIPSY WITH STENT INSERTION Left 12/06/2022    Procedure: LEFT URETEROSCOPY WITH LASER LITHOTRIPSY, STONE BASKET EXTRACTION, STENT PLACEMENT;  Surgeon: Mati Villegas MD;  Location: [facility] OR;  Service: Urology;  Laterality: Left;       Family History   Problem Relation Age of Onset    COPD Mother     Heart attack Father         acute myocardial infarction    Anxiety disorder Father     Depression Father     Heart attack Son     Kidney disease Maternal Grandmother         Born with 1 kidney    Breast cancer Neg Hx                Objective:  Physical Exam    Vital signs reviewed.   General: No acute distress.  Eyes: conjunctiva clear; pupils equally round and reactive  ENT: external ears and nose atraumatic; oropharynx clear  CV: no peripheral edema  Resp: normal respiratory effort  Skin: no rashes or wounds; normal turgor  Psych: mood and affect appropriate; recent and remote memory intact    Vitals:    06/06/24 1052   Weight: 96.2 kg (212 lb)   Height: 167.6 cm (66\")         06/06/24  1052   Weight: 96.2 kg (212 lb)     Body mass index is 34.22 kg/m².      Ortho Exam     Physical Exam  Right knee examined  Knee range of motion 0 to 220 degrees  Stable varus " valgus stress at 0 degrees and 30 degrees  1+ anterior Lachman exam  Positive active patellar compression test  Moderate to severe swelling, negative effusion  Negative medial Rylie, negative lateral Rylie's exam  Quad strength 4- out of 5  Positive sensation light touch all distributions right lower extremity    Imaging:  Right Knee X-Ray  Indication: Pain    AP, Lateral, and [place] views    Findings:  No fracture  No bony lesion  Normal soft tissues  Tract part minimal osteoarthritis medial compartment narrowing and patellofemoral narrowing greater, reactive osteophytes patellofemoral and medial tibial plateau    prior studies were available for comparison.    Assessment:        1. Primary osteoarthritis of right knee         Assessment & Plan      Plan:  1.  Discussed plan of care with patient.  We did discuss proceeding with corticosteroid injection right knee.  We can repeat injection 3-month interval I do recommend follow-up in 3 months.  I do recommend application of ice injection site this evening I will gladly see her back in clinic sooner if needed.  She has not received significant symptom improvement with viscosupplementation injections in the past therefore we will hold off for now.  We also discussed surgical intervention again we will hold off for now we will proceed with MRI if symptoms not improving to evaluate for surgery.  All questions answered.  - Large Joint Arthrocentesis: R knee on 6/6/2024 11:39 AM  Indications: pain  Details: 22 G needle, anterolateral approach  Medications: 8 mL lidocaine PF 1% 1 %; 80 mg triamcinolone acetonide 40 MG/ML  Outcome: tolerated well, no immediate complications  Procedure, treatment alternatives, risks and benefits explained, specific risks discussed. Consent was given by the patient. Immediately prior to procedure a time out was called to verify the correct patient, procedure, equipment, support staff and site/side marked as required. Patient was  prepped and draped in the usual sterile fashion.         Orders:  Orders Placed This Encounter   Procedures    - Large Joint Arthrocentesis: R knee    XR Knee 3+ View With [place] Right     No orders of the defined types were placed in this encounter.        Rajat dictation utilized

## 2024-06-16 DIAGNOSIS — F41.9 ANXIETY: ICD-10-CM

## 2024-06-18 RX ORDER — ALPRAZOLAM 1 MG/1
TABLET ORAL
Qty: 60 TABLET | Refills: 0 | Status: SHIPPED | OUTPATIENT
Start: 2024-06-18

## 2024-06-28 RX ORDER — RIVAROXABAN 15 MG/1
15 TABLET, FILM COATED ORAL DAILY
Qty: 90 TABLET | Refills: 0 | Status: SHIPPED | OUTPATIENT
Start: 2024-06-28

## 2024-07-15 NOTE — PROGRESS NOTES
RM:________     PCP: Mina, AGUSTIN Anglin    : 1950  AGE: 74 y.o.  EST PATIENT   REASON FOR VISIT/  CC:    Wt Readings from Last 3 Encounters:   24 96.2 kg (212 lb)   24 96.2 kg (212 lb)   04/10/24 96.2 kg (212 lb)      BP Readings from Last 3 Encounters:   24 144/84   04/10/24 120/82   23 130/80      WT: ____________ BP: __________L __________R HR______    ALLERGIES:Morphine and codeine, Ciprofloxacin-ciproflox hcl er, Flomax [tamsulosin hcl], Hydrocodone, Latex, Lortab [hydrocodone-acetaminophen], Penicillins, Phenergan [promethazine hcl], Sulfa antibiotics, Amoxicillin, and Tamsulosin SMOKING HISTORY:  Social History     Tobacco Use    Smoking status: Never    Smokeless tobacco: Never    Tobacco comments:     Never smoked ever   Vaping Use    Vaping status: Never Used   Substance Use Topics    Alcohol use: Yes     Comment: She uses alcohol once or twice a year.    Drug use: No     CAFFEINE USE_________________  ALCOHOL ______________________    Below is the patient's most recent value for Albumin, ALT, AST, BUN, Calcium, Chloride, Cholesterol, CO2, Creatinine, GFR, Glucose, HDL, Hematocrit, Hemoglobin, Hemoglobin A1C, LDL, Magnesium, Phosphorus, Platelets, Potassium, PSA, Sodium, Triglycerides, TSH and WBC.   Lab Results   Component Value Date    ALBUMIN 4.1 2024    ALT 15 2024    AST 16 2024    BUN 16 2024    CALCIUM 9.8 2024     2024    CO2 25.7 2024    CREATININE 1.21 (H) 2024    HDL 68 (H) 2024    HCT 42.5 2024    HGB 13.1 2024    HGBA1C 6.00 (H) 2024    LDL 74 2024    MG 1.8 12/10/2022    PHOS 2.5 12/10/2022     2024    K 5.0 2024     2024    TRIG 196 (H) 2024    TSH 1.780 2024    WBC 6.28 2024          NEW DIAGNOSIS/ SURGERY/ HOSP OR ED VISITS: ______________________    __________________________________________________________________      RECENT  LABS OR DIAGNOSTIC TESTING:  _____________________________    __________________________________________________________________      ASSESSMENT/ PLAN: _______________________________________________    __________________________________________________________________

## 2024-07-16 ENCOUNTER — OFFICE VISIT (OUTPATIENT)
Dept: CARDIOLOGY | Facility: CLINIC | Age: 74
End: 2024-07-16
Payer: MEDICARE

## 2024-07-16 VITALS
RESPIRATION RATE: 18 BRPM | BODY MASS INDEX: 34.04 KG/M2 | HEART RATE: 56 BPM | OXYGEN SATURATION: 96 % | SYSTOLIC BLOOD PRESSURE: 126 MMHG | HEIGHT: 66 IN | WEIGHT: 211.8 LBS | DIASTOLIC BLOOD PRESSURE: 60 MMHG

## 2024-07-16 DIAGNOSIS — I48.0 PAF (PAROXYSMAL ATRIAL FIBRILLATION): Primary | ICD-10-CM

## 2024-07-16 DIAGNOSIS — I10 ESSENTIAL HYPERTENSION: ICD-10-CM

## 2024-07-16 PROBLEM — R26.81 UNSTABLE GAIT: Status: RESOLVED | Noted: 2024-01-16 | Resolved: 2024-07-16

## 2024-07-16 PROCEDURE — 3074F SYST BP LT 130 MM HG: CPT | Performed by: INTERNAL MEDICINE

## 2024-07-16 PROCEDURE — 1160F RVW MEDS BY RX/DR IN RCRD: CPT | Performed by: INTERNAL MEDICINE

## 2024-07-16 PROCEDURE — 99213 OFFICE O/P EST LOW 20 MIN: CPT | Performed by: INTERNAL MEDICINE

## 2024-07-16 PROCEDURE — 93000 ELECTROCARDIOGRAM COMPLETE: CPT | Performed by: INTERNAL MEDICINE

## 2024-07-16 PROCEDURE — 1159F MED LIST DOCD IN RCRD: CPT | Performed by: INTERNAL MEDICINE

## 2024-07-16 PROCEDURE — 3078F DIAST BP <80 MM HG: CPT | Performed by: INTERNAL MEDICINE

## 2024-07-16 RX ORDER — MELATONIN
1000 DAILY
COMMUNITY

## 2024-07-16 RX ORDER — CHLORAL HYDRATE 500 MG
1000 CAPSULE ORAL
COMMUNITY

## 2024-07-16 NOTE — PROGRESS NOTES
Date of Office Visit: 2024  Encounter Provider: Jeremy Orozco MD  Place of Service: [facility] CARDIOLOGY  Patient Name: Krys Mayes  :1950    HPI: Krys Mayes is a pleasant 74 y.o. female who presents in follow up. I have reviewed prior notes and there are no changes except for any new updates described below. I have also reviewed any information entered into the medical record by the patient or by ancillary staff.     Prior to 2016, she had not had any cardiac issues. At that time, she was admitted with urosepsis due to kidney stones. She went into atrial fibrillation during that admission. She initially converted to NSR but went back into atrial fibrillation. She was rate controlled and placed on rivaroxaban. An echocardiogram revealed normal LV size and systolic function (EF 56%). There was moderate TR and severe pulmonary hypertension. When I saw her in the office soon after, she was doing well.      She was readmitted just a few weeks later with urosepsis again. She was found to have a staghorn calculus.  She did have a brief episode of atrial fibrillation during that admission. Her metoprolol dose was increased, and her rivaroxaban dose was decreased due to decreased GFR.      A follow-up echocardiogram in  showed normal LVSF, mild-moderate LA dilation, mild TR, and normal RVSP.     She's doing well! She has rare palpitations that last a few seconds and resolve. She has mild chronic exertional dyspnea but no dyspnea at rest or orthopnea or leg swelling. She denies chest pain, lightheadedness, or syncope. She denies leg swelling. She denies bleeding, but does bruise easily.     Past Medical History:   Diagnosis Date    Arthritis     Cholelithiasis taken out 91    Diverticulosis     Gastroesophageal reflux disease 2016    Hyperlipidemia     Hypertension     Kidney stone     recurrent, calcium oxalate    Menopausal disorder      She went through menopause in 1998    Neuropathy     Obesity     PAF (paroxysmal atrial fibrillation)     in the setting of urosepsis, 9/2016    Reflux esophagitis 08/19/2016    Sebaceous cyst of labia 12/20/2017    Sepsis due to urinary tract infection     Sigmoid diverticulitis 11/13/2017    Type 2 diabetes mellitus     Urinary tract infection     Visual impairment cataracts    Vitamin D deficiency 01/21/2016       Past Surgical History:   Procedure Laterality Date    ANKLE SURGERY      Ankle Repair / Description: ORif the left ankle in July 2010. Secondary to fall    BREAST BIOPSY Right     benign    BREAST SURGERY      biopsy     CATARACT EXTRACTION Right     CHOLECYSTECTOMY      COLONOSCOPY  2014    Done 2004 normal recheck in 10 years. Repeated February 2014 colonoscopy was normal and to be repeated in 10 years.    CYSTOSCOPY BLADDER STONE LITHOTRIPSY      FRACTURE SURGERY      HEMORRHOIDECTOMY      NEPHROLITHOTOMY Left 01/09/2017    Procedure: NEPHROLITHOTOMY PERCUTANEOUS SECOND LOOK WITH STENT PLACEMENT AND LASER LITHOTRIPSY;  Surgeon: Mati Villegas MD;  Location: [facility];  Service:     OTHER SURGICAL HISTORY      Tubal Ligation    PERCUTANEOUS NEPHROSTOLITHOTOMY Left 12/2016    TUBAL ABDOMINAL LIGATION      URETEROSCOPY LASER LITHOTRIPSY WITH STENT INSERTION Left 12/06/2022    Procedure: LEFT URETEROSCOPY WITH LASER LITHOTRIPSY, STONE BASKET EXTRACTION, STENT PLACEMENT;  Surgeon: Mati Villegas MD;  Location: [facility];  Service: Urology;  Laterality: Left;       Social History     Socioeconomic History    Marital status:    Tobacco Use    Smoking status: Never    Smokeless tobacco: Never    Tobacco comments:     Never smoked ever   Vaping Use    Vaping status: Never Used   Substance and Sexual Activity    Alcohol use: Yes     Comment: She uses alcohol once or twice a year.    Drug use: No    Sexual activity: Not Currently     Partners: Male     Birth control/protection:  Post-menopausal       Family History   Problem Relation Age of Onset    COPD Mother     Heart attack Father         acute myocardial infarction    Anxiety disorder Father     Depression Father     Heart attack Son     Kidney disease Maternal Grandmother         Born with 1 kidney    Breast cancer Neg Hx        The following portion of the patient's history were reviewed and updated as appropriate: past medical history, past surgical history, past social history, past family history, allergies, current medications, and problem list.    Review of Systems   Constitutional: Negative for diaphoresis, fever and malaise/fatigue.   HENT:  Negative for congestion, hearing loss, hoarse voice, nosebleeds and sore throat.    Eyes:  Negative for photophobia, vision loss in left eye, vision loss in right eye and visual disturbance.   Cardiovascular:  Positive for dyspnea on exertion. Negative for chest pain, irregular heartbeat, leg swelling, near-syncope, orthopnea, palpitations, paroxysmal nocturnal dyspnea and syncope.   Respiratory:  Negative for cough, hemoptysis, sleep disturbances due to breathing, snoring, sputum production and wheezing.    Endocrine: Negative for cold intolerance, heat intolerance, polydipsia, polyphagia and polyuria.   Hematologic/Lymphatic: Negative for bleeding problem. Bruises/bleeds easily.   Skin:  Negative for color change, dry skin, poor wound healing, rash and suspicious lesions.   Musculoskeletal:  Positive for joint pain. Negative for arthritis, back pain, falls, gout, joint swelling, muscle cramps, muscle weakness and myalgias.   Gastrointestinal:  Negative for bloating, abdominal pain, constipation, diarrhea, dysphagia, melena, nausea and vomiting.   Neurological:  Negative for excessive daytime sleepiness, dizziness, headaches, light-headedness, loss of balance, numbness, paresthesias, seizures, vertigo and weakness.   Psychiatric/Behavioral:  Negative for depression, memory loss and  substance abuse. The patient is not nervous/anxious.    All other systems reviewed and are negative.      Allergies   Allergen Reactions    Morphine And Codeine GI Intolerance    Ciprofloxacin-Ciproflox Hcl Er Rash    Flomax [Tamsulosin Hcl] Rash    Hydrocodone Rash    Latex Rash    Lortab [Hydrocodone-Acetaminophen] Rash    Penicillins Rash    Phenergan [Promethazine Hcl] GI Intolerance    Sulfa Antibiotics Rash    Amoxicillin Rash    Tamsulosin Rash         Current Outpatient Medications:     acetaminophen (TYLENOL) 650 MG 8 hr tablet, Take 1 tablet by mouth Every 6 (Six) Hours As Needed. for pain, Disp: , Rfl:     ALPRAZolam (XANAX) 1 MG tablet, TAKE 1/2 TO 1 (ONE-HALF TO ONE) TABLET BY MOUTH TWICE DAILY AS NEEDED, Disp: 60 tablet, Rfl: 0    ascorbic acid (VITAMIN C) 1000 MG tablet, Take 2 tablets by mouth Daily., Disp: , Rfl:     Cholecalciferol 25 MCG (1000 UT) tablet, Take 1 tablet by mouth Daily., Disp: , Rfl:     cyanocobalamin (VITAMIN B-12) 500 MCG tablet, Take 1 tablet by mouth Daily., Disp: , Rfl:     gabapentin (NEURONTIN) 600 MG tablet, Take 1 tablet by mouth every night at bedtime., Disp: , Rfl:     levothyroxine (SYNTHROID, LEVOTHROID) 25 MCG tablet, Take 1 tablet by mouth Every Morning., Disp: 90 tablet, Rfl: 0    losartan (COZAAR) 100 MG tablet, Take 1 tablet by mouth once daily, Disp: 90 tablet, Rfl: 2    Metoprolol Tartrate 75 MG tablet, Take 75 tablets by mouth 2 (Two) Times a Day., Disp: 180 tablet, Rfl: 2    Multiple Vitamin tablet, Take 1 tablet by mouth Daily., Disp: , Rfl:     Omega-3 Fatty Acids (fish oil) 1000 MG capsule capsule, Take 1 capsule by mouth Daily With Breakfast., Disp: , Rfl:     omeprazole OTC (PriLOSEC OTC) 20 MG EC tablet, Take 1 tablet by mouth 2 (Two) Times a Day., Disp: , Rfl:     Probiotic Product (PROBIOTIC ADVANCED PO), Take 1 tablet by mouth Daily., Disp: , Rfl:     sertraline (ZOLOFT) 100 MG tablet, Take 1 tablet by mouth once daily, Disp: 90 tablet, Rfl: 0     "simvastatin (ZOCOR) 80 MG tablet, TAKE 1 TABLET BY MOUTH ONCE DAILY IN THE EVENING, Disp: 90 tablet, Rfl: 2    triamcinolone (KENALOG) 0.1 % cream, 1 Application As Needed., Disp: , Rfl:     Xarelto 15 MG tablet, Take 1 tablet by mouth once daily, Disp: 90 tablet, Rfl: 0    meclizine (ANTIVERT) 25 MG tablet, Take 1 tablet by mouth Every 6 (Six) Hours As Needed for dizziness or Nausea., Disp: 30 tablet, Rfl: 0        Objective:     Vitals:    07/16/24 0945   BP: 126/60   BP Location: Right arm   Patient Position: Sitting   Cuff Size: Large Adult   Pulse: 56   Resp: 18   SpO2: 96%   Weight: 96.1 kg (211 lb 12.8 oz)   Height: 167.6 cm (66\")       Body mass index is 34.19 kg/m².      Physical Exam  Constitutional:       General: She is not in acute distress.     Appearance: She is well-developed.      Comments: Overweight   HENT:      Head: Normocephalic and atraumatic.      Right Ear: External ear normal.      Left Ear: External ear normal.      Nose: Nose normal.      Mouth/Throat:      Pharynx: Oropharynx is clear.   Eyes:      Conjunctiva/sclera: Conjunctivae normal.   Neck:      Thyroid: No thyromegaly.      Vascular: No JVD.      Trachea: No tracheal deviation.   Cardiovascular:      Rate and Rhythm: Regular rhythm. Bradycardia present.      Pulses: Normal pulses and intact distal pulses.      Heart sounds: Normal heart sounds.   Abdominal:      General: Bowel sounds are normal.      Palpations: Abdomen is soft.      Tenderness: There is no abdominal tenderness.   Musculoskeletal:         General: No tenderness. Normal range of motion.      Cervical back: Normal range of motion and neck supple.   Lymphadenopathy:      Cervical: No cervical adenopathy.   Skin:     General: Skin is warm and dry.   Neurological:      General: No focal deficit present.      Mental Status: She is alert.   Psychiatric:         Mood and Affect: Mood normal.               ECG 12 Lead    Date/Time: 7/16/2024 10:07 AM  Performed by: Ernesto" Jeremy GREGORIO MD    Authorized by: Jeremy Orozco MD  Comparison: compared with previous ECG   Similar to previous ECG  Rhythm: sinus rhythm  Conduction: conduction normal  ST Segments: ST segments normal  T Waves: T waves normal  QRS axis: normal  Other: no other findings    Clinical impression: normal ECG            Assessment:       Diagnosis Plan   1. PAF (paroxysmal atrial fibrillation)        2. Essential hypertension           Plan:       1. Atrial Fibrillation and Atrial Flutter  Assessment   The patient has paroxysmal atrial fibrillation   The patient's CHADS2-VASc score is 5   A [id number] score of 2 or more is considered a high risk for a thromboembolic event   Rivaroxaban prescribed    Plan   Attempt to maintain sinus rhythm   Continue rivaroxaban for antithrombotic therapy, bleeding issues discussed   Continue beta blocker for rhythm control    Her GFR is usually 45-50, so she will remain on 15mg of rivaroxaban.    If her HR persistently stays in the 50s, we may have to de-escalate her BB dose.     2. Her PB is well controlled.    Sincerely,       Jeremy Orozco MD

## 2024-07-18 DIAGNOSIS — F41.9 ANXIETY: ICD-10-CM

## 2024-07-18 RX ORDER — ALPRAZOLAM 1 MG/1
TABLET ORAL
Qty: 60 TABLET | Refills: 0 | Status: SHIPPED | OUTPATIENT
Start: 2024-07-18

## 2024-08-02 RX ORDER — SERTRALINE HYDROCHLORIDE 100 MG/1
TABLET, FILM COATED ORAL
Qty: 90 TABLET | Refills: 0 | Status: SHIPPED | OUTPATIENT
Start: 2024-08-02

## 2024-08-02 RX ORDER — LOSARTAN POTASSIUM 100 MG/1
100 TABLET ORAL DAILY
Qty: 90 TABLET | Refills: 0 | Status: SHIPPED | OUTPATIENT
Start: 2024-08-02

## 2024-08-02 RX ORDER — METOPROLOL TARTRATE 75 MG/1
1 TABLET, FILM COATED ORAL 2 TIMES DAILY
Qty: 180 TABLET | Refills: 0 | Status: SHIPPED | OUTPATIENT
Start: 2024-08-02

## 2024-08-13 ENCOUNTER — OFFICE VISIT (OUTPATIENT)
Dept: ORTHOPEDIC SURGERY | Facility: CLINIC | Age: 74
End: 2024-08-13
Payer: MEDICARE

## 2024-08-13 VITALS — BODY MASS INDEX: 33.91 KG/M2 | WEIGHT: 211 LBS | HEIGHT: 66 IN

## 2024-08-13 DIAGNOSIS — M70.61 GREATER TROCHANTERIC BURSITIS OF RIGHT HIP: Primary | ICD-10-CM

## 2024-08-13 RX ADMIN — LIDOCAINE HYDROCHLORIDE 4 ML: 10 INJECTION, SOLUTION EPIDURAL; INFILTRATION; INTRACAUDAL; PERINEURAL at 09:52

## 2024-08-13 RX ADMIN — TRIAMCINOLONE ACETONIDE 80 MG: 40 INJECTION, SUSPENSION INTRA-ARTICULAR; INTRAMUSCULAR at 09:52

## 2024-08-13 NOTE — PROGRESS NOTES
Subjective:     Patient ID: Krys Mayes is a 74 y.o. female.    Chief Complaint:  Follow-up greater trochanteric bursitis, right  History of Present Illness  History of Present Illness    Krys Mayes Returns to clinic today for follow-up of her right.  She is experiencing maximal tenderness along the lateral aspect of the hip, right side.  Pain has been present does decrease Rosendo after receiving steroid injections.  She is experiencing pain with a sit and descending stairs again localizes pain to the lateral aspect of the hip, right side.  Denies any radiating into her groin.  Denies any other concerns present.       Social History     Occupational History    Not on file   Tobacco Use    Smoking status: Never    Smokeless tobacco: Never    Tobacco comments:     Never smoked ever   Vaping Use    Vaping status: Never Used   Substance and Sexual Activity    Alcohol use: Yes     Comment: She uses alcohol once or twice a year.    Drug use: No    Sexual activity: Not Currently     Partners: Male     Birth control/protection: Post-menopausal      Past Medical History:   Diagnosis Date    Arthritis     Cholelithiasis taken out 1/14/91    Diverticulosis     Gastroesophageal reflux disease 01/21/2016    Hyperlipidemia     Hypertension     Kidney stone     recurrent, calcium oxalate    Menopausal disorder 1998    She went through menopause in 1998    Neuropathy     Obesity     PAF (paroxysmal atrial fibrillation)     in the setting of urosepsis, 9/2016    Reflux esophagitis 08/19/2016    Sebaceous cyst of labia 12/20/2017    Sepsis due to urinary tract infection     Sigmoid diverticulitis 11/13/2017    Type 2 diabetes mellitus     Urinary tract infection     Visual impairment cataracts    Vitamin D deficiency 01/21/2016     Past Surgical History:   Procedure Laterality Date    ANKLE SURGERY      Ankle Repair / Description: ORif the left ankle in July 2010. Secondary to fall    BREAST BIOPSY Right     benign     "BREAST SURGERY      biopsy     CATARACT EXTRACTION Right     CHOLECYSTECTOMY      COLONOSCOPY  2014    Done 2004 normal recheck in 10 years. Repeated February 2014 colonoscopy was normal and to be repeated in 10 years.    CYSTOSCOPY BLADDER STONE LITHOTRIPSY      FRACTURE SURGERY      HEMORRHOIDECTOMY      NEPHROLITHOTOMY Left 01/09/2017    Procedure: NEPHROLITHOTOMY PERCUTANEOUS SECOND LOOK WITH STENT PLACEMENT AND LASER LITHOTRIPSY;  Surgeon: Mati Villegas MD;  Location: [facility] OR;  Service:     OTHER SURGICAL HISTORY      Tubal Ligation    PERCUTANEOUS NEPHROSTOLITHOTOMY Left 12/2016    TUBAL ABDOMINAL LIGATION      URETEROSCOPY LASER LITHOTRIPSY WITH STENT INSERTION Left 12/06/2022    Procedure: LEFT URETEROSCOPY WITH LASER LITHOTRIPSY, STONE BASKET EXTRACTION, STENT PLACEMENT;  Surgeon: Mati Villegas MD;  Location: [facility] OR;  Service: Urology;  Laterality: Left;       Family History   Problem Relation Age of Onset    COPD Mother     Heart attack Father         acute myocardial infarction    Anxiety disorder Father     Depression Father     Heart attack Son     Kidney disease Maternal Grandmother         Born with 1 kidney    Breast cancer Neg Hx                Objective:  Physical Exam    General: No acute distress.  Eyes: conjunctiva clear; pupils equally round and reactive  ENT: external ears and nose atraumatic; oropharynx clear  CV: no peripheral edema  Resp: normal respiratory effort  Skin: no rashes or wounds; normal turgor  Psych: mood and affect appropriate; recent and remote memory intact    Vitals:    08/13/24 0950   Weight: 95.7 kg (211 lb)   Height: 167.6 cm (66\")         08/13/24  0950   Weight: 95.7 kg (211 lb)     Body mass index is 34.06 kg/m².      Ortho Exam     Physical Exam  Right lower extremity examined  Negative logroll exam  Negative Stinchfield exam  Maximal tenderness present to palpate along the lateral aspect of the hip  Positive sensation light touch all " distributions right lower extremity    Assessment:        1. Greater trochanteric bursitis of right hip         Assessment & Plan      Plan:  1.  Discussed with patient.  Will proceed with repeat corticosteroid injection along the greater trochanter of the right hip.  Can apply ice this evening if needed.  Will plan to see her back in clinic next month previously scheduled appointment for reevaluation of her right knee.  All questions were answered today's visit.  Large Joint Arthrocentesis: R greater trochanteric bursa  Date/Time: 8/13/2024 9:52 AM  Consent given by: patient  Site marked: site marked  Timeout: Immediately prior to procedure a time out was called to verify the correct patient, procedure, equipment, support staff and site/side marked as required   Supporting Documentation  Indications: pain   Procedure Details  Location: hip - R greater trochanteric bursa  Preparation: Patient was prepped and draped in the usual sterile fashion  Needle size: 22 G  Approach: lateral  Medications administered: 4 mL lidocaine PF 1% 1 %; 80 mg triamcinolone acetonide 40 MG/ML  Patient tolerance: patient tolerated the procedure well with no immediate complications        Orders:  No orders of the defined types were placed in this encounter.    No orders of the defined types were placed in this encounter.            Dragon dictation utilized

## 2024-08-14 RX ORDER — LIDOCAINE HYDROCHLORIDE 10 MG/ML
4 INJECTION, SOLUTION EPIDURAL; INFILTRATION; INTRACAUDAL; PERINEURAL
Status: COMPLETED | OUTPATIENT
Start: 2024-08-13 | End: 2024-08-13

## 2024-08-14 RX ORDER — TRIAMCINOLONE ACETONIDE 40 MG/ML
80 INJECTION, SUSPENSION INTRA-ARTICULAR; INTRAMUSCULAR
Status: COMPLETED | OUTPATIENT
Start: 2024-08-13 | End: 2024-08-13

## 2024-09-04 DIAGNOSIS — F41.9 ANXIETY: ICD-10-CM

## 2024-09-04 RX ORDER — ALPRAZOLAM 1 MG
TABLET ORAL
Qty: 60 TABLET | Refills: 0 | Status: SHIPPED | OUTPATIENT
Start: 2024-09-04

## 2024-09-04 NOTE — TELEPHONE ENCOUNTER
Pauline patient:    LV-5/31/24  NV-10/8/24  LF-7/18/24  UDS-5/17/24  CONTRACT-4/11/24    Please advise for refill.

## 2024-09-09 DIAGNOSIS — E78.2 MIXED HYPERLIPIDEMIA: ICD-10-CM

## 2024-09-09 RX ORDER — SIMVASTATIN 80 MG
80 TABLET ORAL EVERY EVENING
Qty: 90 TABLET | Refills: 0 | Status: SHIPPED | OUTPATIENT
Start: 2024-09-09

## 2024-09-12 ENCOUNTER — OFFICE VISIT (OUTPATIENT)
Dept: ORTHOPEDIC SURGERY | Facility: CLINIC | Age: 74
End: 2024-09-12
Payer: MEDICARE

## 2024-09-12 VITALS — HEIGHT: 66 IN | BODY MASS INDEX: 33.91 KG/M2 | WEIGHT: 211 LBS

## 2024-09-12 DIAGNOSIS — M17.12 PRIMARY OSTEOARTHRITIS OF LEFT KNEE: Primary | ICD-10-CM

## 2024-09-12 DIAGNOSIS — M17.11 PRIMARY OSTEOARTHRITIS OF RIGHT KNEE: ICD-10-CM

## 2024-09-12 RX ORDER — TRIAMCINOLONE ACETONIDE 40 MG/ML
80 INJECTION, SUSPENSION INTRA-ARTICULAR; INTRAMUSCULAR
Status: COMPLETED | OUTPATIENT
Start: 2024-09-12 | End: 2024-09-12

## 2024-09-12 RX ORDER — LIDOCAINE HYDROCHLORIDE 10 MG/ML
8 INJECTION, SOLUTION EPIDURAL; INFILTRATION; INTRACAUDAL; PERINEURAL
Status: COMPLETED | OUTPATIENT
Start: 2024-09-12 | End: 2024-09-12

## 2024-09-12 RX ADMIN — LIDOCAINE HYDROCHLORIDE 8 ML: 10 INJECTION, SOLUTION EPIDURAL; INFILTRATION; INTRACAUDAL; PERINEURAL at 10:23

## 2024-09-12 RX ADMIN — TRIAMCINOLONE ACETONIDE 80 MG: 40 INJECTION, SUSPENSION INTRA-ARTICULAR; INTRAMUSCULAR at 10:23

## 2024-09-12 RX ADMIN — TRIAMCINOLONE ACETONIDE 80 MG: 40 INJECTION, SUSPENSION INTRA-ARTICULAR; INTRAMUSCULAR at 10:20

## 2024-09-12 RX ADMIN — LIDOCAINE HYDROCHLORIDE 8 ML: 10 INJECTION, SOLUTION EPIDURAL; INFILTRATION; INTRACAUDAL; PERINEURAL at 10:20

## 2024-09-12 NOTE — PROGRESS NOTES
Subjective:     Patient ID: Krys Mayes is a 74 y.o. female.    Chief Complaint:  Follow-up DJD right knee  Corticosteroid injection in June 2024  Left knee pain, new issue to examiner  History of Present Illness  History of Present Illness    Krys Mayes returns to clinic today for follow-up of her right knee is received recently corticosteroid injection for treatment of greater trochanteric bursitis with significant symptom resolution she is also experiencing pain present at the right knee and new onset of pain at the left knee pain increases with ambulating long distances and standing for extended periods of time she has received steroid injections in the past at the right knee again with symptom improvement after prior injections.  Pain present along the medial compartment lateral joint line and anterior aspect.  Pain present at the left knee along the medial compartment to a lesser severity as compared to the right but pain with transitional activity symptoms seated standing attempting to walk, ambulating long distances and standing for extended time.  Denies any other concerns present.       Social History     Occupational History    Not on file   Tobacco Use    Smoking status: Never    Smokeless tobacco: Never    Tobacco comments:     Never smoked ever   Vaping Use    Vaping status: Never Used   Substance and Sexual Activity    Alcohol use: Yes     Comment: She uses alcohol once or twice a year.    Drug use: No    Sexual activity: Not Currently     Partners: Male     Birth control/protection: Post-menopausal      Past Medical History:   Diagnosis Date    Arthritis     Cholelithiasis taken out 1/14/91    Diverticulosis     Gastroesophageal reflux disease 01/21/2016    Hyperlipidemia     Hypertension     Kidney stone     recurrent, calcium oxalate    Menopausal disorder 1998    She went through menopause in 1998    Neuropathy     Obesity     PAF (paroxysmal atrial fibrillation)     in the setting  of urosepsis, 9/2016    Reflux esophagitis 08/19/2016    Sebaceous cyst of labia 12/20/2017    Sepsis due to urinary tract infection     Sigmoid diverticulitis 11/13/2017    Type 2 diabetes mellitus     Urinary tract infection     Visual impairment cataracts    Vitamin D deficiency 01/21/2016     Past Surgical History:   Procedure Laterality Date    ANKLE SURGERY      Ankle Repair / Description: ORif the left ankle in July 2010. Secondary to fall    BREAST BIOPSY Right     benign    BREAST SURGERY      biopsy     CATARACT EXTRACTION Right     CHOLECYSTECTOMY      COLONOSCOPY  2014    Done 2004 normal recheck in 10 years. Repeated February 2014 colonoscopy was normal and to be repeated in 10 years.    CYSTOSCOPY BLADDER STONE LITHOTRIPSY      FRACTURE SURGERY      HEMORRHOIDECTOMY      NEPHROLITHOTOMY Left 01/09/2017    Procedure: NEPHROLITHOTOMY PERCUTANEOUS SECOND LOOK WITH STENT PLACEMENT AND LASER LITHOTRIPSY;  Surgeon: Mati Villegas MD;  Location: [facility] OR;  Service:     OTHER SURGICAL HISTORY      Tubal Ligation    PERCUTANEOUS NEPHROSTOLITHOTOMY Left 12/2016    TUBAL ABDOMINAL LIGATION      URETEROSCOPY LASER LITHOTRIPSY WITH STENT INSERTION Left 12/06/2022    Procedure: LEFT URETEROSCOPY WITH LASER LITHOTRIPSY, STONE BASKET EXTRACTION, STENT PLACEMENT;  Surgeon: Mati Villegas MD;  Location: [facility] OR;  Service: Urology;  Laterality: Left;       Family History   Problem Relation Age of Onset    COPD Mother     Heart attack Father         acute myocardial infarction    Anxiety disorder Father     Depression Father     Heart attack Son     Kidney disease Maternal Grandmother         Born with 1 kidney    Breast cancer Neg Hx                Objective:  Physical Exam    General: No acute distress.  Eyes: conjunctiva clear; pupils equally round and reactive  ENT: external ears and nose atraumatic; oropharynx clear  CV: no peripheral edema  Resp: normal respiratory effort  Skin: no rashes or  "wounds; normal turgor  Psych: mood and affect appropriate; recent and remote memory intact    Vitals:    09/12/24 1014   Weight: 95.7 kg (211 lb)   Height: 167.6 cm (66\")         09/12/24  1014   Weight: 95.7 kg (211 lb)     Body mass index is 34.06 kg/m².      Ortho Exam     Physical Exam  Bilateral knees examined  Knee range of motion 0 to 125 degrees  Stable varus valgus stress at 0 degrees and 30 degrees  1+ anterior Lachman exam  Negative anterior posterior drawer exam  Positive crepitus throughout arc of motion  Positive medial Rylie's exam  Negative lateral Rylie's exam  Positive active patellar compression test  Quad strength 4 out of 5  Moderate to severe swelling right knee, moderate swelling left knee    Assessment:        1. Primary osteoarthritis of left knee    2. Primary osteoarthritis of right knee         Assessment & Plan      Plan:  1.  Discussed Medicare with patient.  We will proceed with corticosteroid injections bilateral knees.  Will plan to see her back in clinic 3-month interval and we can repeat the injections.  We will need to proceed with x-ray imaging left knee in future.  All questions answered.  - Large Joint Arthrocentesis: R knee on 9/12/2024 10:20 AM  Indications: pain  Details: 22 G needle, anterolateral approach  Medications: 80 mg triamcinolone acetonide 40 MG/ML; 8 mL lidocaine PF 1% 1 %  Outcome: tolerated well, no immediate complications  Procedure, treatment alternatives, risks and benefits explained, specific risks discussed. Consent was given by the patient. Immediately prior to procedure a time out was called to verify the correct patient, procedure, equipment, support staff and site/side marked as required. Patient was prepped and draped in the usual sterile fashion.       Large Joint Arthrocentesis: L knee  Date/Time: 9/12/2024 10:23 AM  Consent given by: patient  Site marked: site marked  Timeout: Immediately prior to procedure a time out was called to verify the " correct patient, procedure, equipment, support staff and site/side marked as required   Supporting Documentation  Indications: pain and joint swelling   Procedure Details  Location: knee - L knee  Preparation: Patient was prepped and draped in the usual sterile fashion  Needle size: 22 G  Approach: anterolateral  Medications administered: 80 mg triamcinolone acetonide 40 MG/ML; 8 mL lidocaine PF 1% 1 %  Patient tolerance: patient tolerated the procedure well with no immediate complications        Orders:  Orders Placed This Encounter   Procedures    - Large Joint Arthrocentesis: R knee    Large Joint Arthrocentesis: L knee     No orders of the defined types were placed in this encounter.      Dragon dictation utilized

## 2024-09-25 DIAGNOSIS — E55.9 VITAMIN D DEFICIENCY: ICD-10-CM

## 2024-09-25 DIAGNOSIS — E03.9 ACQUIRED HYPOTHYROIDISM: ICD-10-CM

## 2024-09-25 DIAGNOSIS — E78.2 MIXED HYPERLIPIDEMIA: Primary | ICD-10-CM

## 2024-09-25 DIAGNOSIS — R73.03 PREDIABETES: ICD-10-CM

## 2024-09-25 DIAGNOSIS — I10 ESSENTIAL HYPERTENSION: ICD-10-CM

## 2024-09-26 DIAGNOSIS — F41.9 ANXIETY: ICD-10-CM

## 2024-09-26 RX ORDER — RIVAROXABAN 15 MG/1
15 TABLET, FILM COATED ORAL DAILY
Qty: 90 TABLET | Refills: 0 | Status: SHIPPED | OUTPATIENT
Start: 2024-09-26

## 2024-09-26 RX ORDER — ALPRAZOLAM 1 MG
TABLET ORAL
Qty: 60 TABLET | Refills: 0 | OUTPATIENT
Start: 2024-09-26

## 2024-10-07 DIAGNOSIS — F41.9 ANXIETY: ICD-10-CM

## 2024-10-07 RX ORDER — ALPRAZOLAM 1 MG
TABLET ORAL
Qty: 60 TABLET | Refills: 0 | Status: SHIPPED | OUTPATIENT
Start: 2024-10-07

## 2024-10-08 ENCOUNTER — OFFICE VISIT (OUTPATIENT)
Dept: FAMILY MEDICINE CLINIC | Facility: CLINIC | Age: 74
End: 2024-10-08
Payer: MEDICARE

## 2024-10-08 VITALS
WEIGHT: 209 LBS | OXYGEN SATURATION: 98 % | DIASTOLIC BLOOD PRESSURE: 80 MMHG | HEIGHT: 66 IN | SYSTOLIC BLOOD PRESSURE: 124 MMHG | TEMPERATURE: 96.8 F | BODY MASS INDEX: 33.59 KG/M2 | HEART RATE: 54 BPM

## 2024-10-08 DIAGNOSIS — Z00.00 MEDICARE ANNUAL WELLNESS VISIT, SUBSEQUENT: Primary | ICD-10-CM

## 2024-10-08 DIAGNOSIS — Z79.01 ANTICOAGULANT LONG-TERM USE: ICD-10-CM

## 2024-10-08 DIAGNOSIS — F41.1 GAD (GENERALIZED ANXIETY DISORDER): ICD-10-CM

## 2024-10-08 DIAGNOSIS — E03.9 ACQUIRED HYPOTHYROIDISM: ICD-10-CM

## 2024-10-08 DIAGNOSIS — E78.2 MIXED HYPERLIPIDEMIA: ICD-10-CM

## 2024-10-08 DIAGNOSIS — R73.03 PREDIABETES: ICD-10-CM

## 2024-10-08 DIAGNOSIS — Z12.11 COLON CANCER SCREENING: ICD-10-CM

## 2024-10-08 DIAGNOSIS — R94.4 DECREASED GFR: ICD-10-CM

## 2024-10-08 DIAGNOSIS — Z12.31 ENCOUNTER FOR SCREENING MAMMOGRAM FOR MALIGNANT NEOPLASM OF BREAST: ICD-10-CM

## 2024-10-08 DIAGNOSIS — I10 ESSENTIAL HYPERTENSION: ICD-10-CM

## 2024-10-08 PROCEDURE — 1160F RVW MEDS BY RX/DR IN RCRD: CPT | Performed by: NURSE PRACTITIONER

## 2024-10-08 PROCEDURE — 3044F HG A1C LEVEL LT 7.0%: CPT | Performed by: NURSE PRACTITIONER

## 2024-10-08 PROCEDURE — 3079F DIAST BP 80-89 MM HG: CPT | Performed by: NURSE PRACTITIONER

## 2024-10-08 PROCEDURE — 99214 OFFICE O/P EST MOD 30 MIN: CPT | Performed by: NURSE PRACTITIONER

## 2024-10-08 PROCEDURE — 3074F SYST BP LT 130 MM HG: CPT | Performed by: NURSE PRACTITIONER

## 2024-10-08 PROCEDURE — G0439 PPPS, SUBSEQ VISIT: HCPCS | Performed by: NURSE PRACTITIONER

## 2024-10-08 PROCEDURE — 1126F AMNT PAIN NOTED NONE PRSNT: CPT | Performed by: NURSE PRACTITIONER

## 2024-10-08 PROCEDURE — 1159F MED LIST DOCD IN RCRD: CPT | Performed by: NURSE PRACTITIONER

## 2024-10-08 PROCEDURE — 1170F FXNL STATUS ASSESSED: CPT | Performed by: NURSE PRACTITIONER

## 2024-10-08 NOTE — PROGRESS NOTES
"The ABCs of the Annual Wellness Visit  Subsequent Medicare Wellness Visit    Subjective    Krys Mayes is a 74 y.o. female who presents for a Subsequent Medicare Wellness Visit.    The following portions of the patient's history were reviewed and   updated as appropriate: allergies, current medications, past family history, past medical history, past social history, past surgical history, and problem list.    Compared to one year ago, the patient feels her physical   health is worse. Starting to feel \"her age\".      Compared to one year ago, the patient feels her mental   health is the same.    Recent Hospitalizations:  She was not admitted to the hospital during the last year.       Current Medical Providers:  Patient Care Team:  Head, AGUSTIN Anglin as PCP - General (Nurse Practitioner)  Jeremy Orozco MD as Consulting Physician (Cardiology)  Joss Oro MD as Consulting Physician (Gastroenterology)  Catherine Brown APRN as Nurse Practitioner (Orthopedic Surgery)  Mati Villegas MD as Consulting Physician (Urology)  Ajith Valles DPM as Consulting Physician (Podiatry)  Lyle Orellana MD as Consulting Physician (Ophthalmology)  Luan Marie MD as Consulting Physician (Dermatology)    Outpatient Medications Prior to Visit   Medication Sig Dispense Refill    acetaminophen (TYLENOL) 650 MG 8 hr tablet Take 1 tablet by mouth Every 6 (Six) Hours As Needed. for pain      ALPRAZolam (XANAX) 1 MG tablet TAKE 1/2 TO 1 TABLET BY MOUTH TWICE A DAY AS NEEDED (Patient taking differently: TAKE 1/2 TO 1 TABLET BY MOUTH ONCE A DAY AS NEEDED) 60 tablet 0    ascorbic acid (VITAMIN C) 1000 MG tablet Take 2 tablets by mouth Daily.      Cholecalciferol 25 MCG (1000 UT) tablet Take 1 tablet by mouth Daily.      cyanocobalamin (VITAMIN B-12) 500 MCG tablet Take 1 tablet by mouth Daily.      gabapentin (NEURONTIN) 600 MG tablet Take 1 tablet by mouth every night at bedtime.      " levothyroxine (SYNTHROID, LEVOTHROID) 25 MCG tablet Take 1 tablet by mouth Every Morning. 90 tablet 0    losartan (COZAAR) 100 MG tablet Take 1 tablet by mouth Daily. 90 tablet 0    meclizine (ANTIVERT) 25 MG tablet Take 1 tablet by mouth Every 6 (Six) Hours As Needed for dizziness or Nausea. 30 tablet 0    Metoprolol Tartrate 75 MG tablet Take 1 tablet by mouth twice daily 180 tablet 0    Multiple Vitamin tablet Take 1 tablet by mouth Daily.      Omega-3 Fatty Acids (fish oil) 1000 MG capsule capsule Take 1 capsule by mouth Daily With Breakfast.      omeprazole OTC (PriLOSEC OTC) 20 MG EC tablet Take 1 tablet by mouth 2 (Two) Times a Day.      Probiotic Product (PROBIOTIC ADVANCED PO) Take 1 tablet by mouth Daily.      sertraline (ZOLOFT) 100 MG tablet Take 1 tablet by mouth once daily 90 tablet 0    simvastatin (ZOCOR) 80 MG tablet Take 1 tablet by mouth Every Evening. 90 tablet 0    triamcinolone (KENALOG) 0.1 % cream 1 Application As Needed.      Xarelto 15 MG tablet Take 1 tablet by mouth once daily 90 tablet 0     No facility-administered medications prior to visit.       No opioid medication identified on active medication list. I have reviewed chart for other potential  high risk medication/s and harmful drug interactions in the elderly.        Aspirin is not on active medication list.  Aspirin use is not indicated based on review of current medical condition/s. Risk of harm outweighs potential benefits.  .    Patient Active Problem List   Diagnosis    Elevated alanine aminotransferase (ALT) level    Mixed anxiety depressive disorder    Gastroesophageal reflux disease    Essential hypertension    Insomnia    Microalbuminuria    Obesity (BMI 30-39.9)    Proteinuria    Vitamin D deficiency    Postmenopause    History of fracture    Reflux esophagitis    Calculus of kidney    PAF (paroxysmal atrial fibrillation)    COLTON (generalized anxiety disorder)    Iron deficiency anemia    Prediabetes    Urine frequency     "Medicare annual wellness visit, subsequent    High risk medication use    Dermoid cyst of scalp    Hyperglycemia    Grief    Mixed hyperlipidemia    Long-term use of high-risk medication    Frequent UTI    Greater trochanteric bursitis of right hip    Chondromalacia, right knee    Post-traumatic arthritis of left ankle    Generalized weakness     Advance Care Planning   Advance Care Planning     Advance Directive is on file.  ACP discussion was held with the patient during this visit. Patient has an advance directive in EMR which is still valid.      Objective    Vitals:    10/08/24 1429   BP: 124/80   BP Location: Left arm   Patient Position: Sitting   Cuff Size: Adult   Pulse: 54   Temp: 96.8 °F (36 °C)   SpO2: 98%   Weight: 94.8 kg (209 lb)   Height: 167.6 cm (66\")   PainSc: 0-No pain     Estimated body mass index is 33.73 kg/m² as calculated from the following:    Height as of this encounter: 167.6 cm (66\").    Weight as of this encounter: 94.8 kg (209 lb).           Does the patient have evidence of cognitive impairment? No    Lab Results   Component Value Date    CHLPL 159 10/01/2024    TRIG 108 10/01/2024    HDL 76 (H) 10/01/2024    LDL 64 10/01/2024    VLDL 19 10/01/2024    HGBA1C 5.80 (H) 10/01/2024        HEALTH RISK ASSESSMENT    Smoking Status:  Social History     Tobacco Use   Smoking Status Never   Smokeless Tobacco Never   Tobacco Comments    Never smoked ever     Alcohol Consumption:  Social History     Substance and Sexual Activity   Alcohol Use Yes    Comment: She uses alcohol once or twice a year.     Fall Risk Screen:    JEREMY Fall Risk Assessment was completed, and patient is at HIGH risk for falls. Assessment completed on:10/8/2024    Depression Screening:      10/8/2024     2:38 PM   PHQ-2/PHQ-9 Depression Screening   Little Interest or Pleasure in Doing Things 0-->not at all   Feeling Down, Depressed or Hopeless 0-->not at all   PHQ-9: Brief Depression Severity Measure Score 0       Health " Habits and Functional and Cognitive Screening:      10/8/2024     2:38 PM   Functional & Cognitive Status   Do you have difficulty preparing food and eating? No   Do you have difficulty bathing yourself, getting dressed or grooming yourself? No   Do you have difficulty using the toilet? No   Do you have difficulty moving around from place to place? No   Do you have trouble with steps or getting out of a bed or a chair? No   Current Diet Unhealthy Diet   Dental Exam Up to date   Eye Exam Up to date   Exercise (times per week) 0 times per week   Current Exercises Include No Regular Exercise   Do you need help using the phone?  No   Are you deaf or do you have serious difficulty hearing?  No   Do you need help to go to places out of walking distance? No   Do you need help shopping? No   Do you need help preparing meals?  No   Do you need help with housework?  Yes   Do you need help with laundry? No   Do you need help taking your medications? No   Do you need help managing money? No   Do you ever drive or ride in a car without wearing a seat belt? No   Have you felt unusual stress, anger or loneliness in the last month? No   Who do you live with? Spouse   If you need help, do you have trouble finding someone available to you? No   Have you been bothered in the last four weeks by sexual problems? No   Do you have difficulty concentrating, remembering or making decisions? No       Age-appropriate Screening Schedule:  Refer to the list below for future screening recommendations based on patient's age, sex and/or medical conditions. Orders for these recommended tests are listed in the plan section. The patient has been provided with a written plan.    Health Maintenance   Topic Date Due    COLORECTAL CANCER SCREENING  02/26/2024    COVID-19 Vaccine (7 - 2023-24 season) 10/10/2024 (Originally 9/1/2024)    TDAP/TD VACCINES (2 - Td or Tdap) 04/10/2025 (Originally 11/21/2023)    BMI FOLLOWUP  01/16/2025    HEMOGLOBIN A1C   04/01/2025    LIPID PANEL  10/01/2025    ANNUAL WELLNESS VISIT  10/08/2025    DXA SCAN  10/18/2025    MAMMOGRAM  01/16/2026    HEPATITIS C SCREENING  Completed    RSV Vaccine - Adults  Completed    INFLUENZA VACCINE  Completed    Pneumococcal Vaccine 65+  Completed    ZOSTER VACCINE  Completed    DIABETIC FOOT EXAM  Discontinued    DIABETIC EYE EXAM  Discontinued    URINE MICROALBUMIN  Discontinued                  CMS Preventative Services Quick Reference  Risk Factors Identified During Encounter  Immunizations Discussed/Encouraged: Influenza, COVID19, and RSV (Respiratory Syncytial Virus)  Dental Screening Recommended  Vision Screening Recommended  The above risks/problems have been discussed with the patient.  Pertinent information has been shared with the patient in the After Visit Summary.  An After Visit Summary and PPPS were made available to the patient.    Follow Up:   Next Medicare Wellness visit to be scheduled in 1 year.       Additional E&M Note during same encounter follows:  Patient has multiple medical problems which are significant and separately identifiable that require additional work above and beyond the Medicare Wellness Visit.      Chief Complaint  Establish Care, Medicare Wellness-subsequent, Anxiety, Hypothyroidism, Hypertension, and Hyperlipidemia    Subjective        HPI  Krys Mayes is also being seen today for continued management concerning anxiety, hypothyroidism, hypertension, and hyperlipidemia.  Since she was last seen, she has been doing well.  Denies any new problems or concerns.  Hyperlipidemia: Managed with simvastatin 80 mg daily.  He also takes fish oil 1000 mg daily.  Anxiety depression: Managed with sertraline 100 mg daily.  She also takes Xanax 1/2 to 1 tablet daily as needed.  Last filled on September 4, 2024.  Hypothyroidism: Managed with levothyroxine 25 mcg daily.  Hypertension: Managed with losartan 100 mg daily.  Followed by cardiology for paroxysmal atrial  "fibrillation.  She is on Xarelto 50 mg daily and metoprolol 75 mg twice a day.  She is followed by podiatry due to neuropathy in her feet.  She takes gabapentin 600 mg at night.  States symptoms are not well-controlled.  However she also applies topical compound cream to feet which seems to help.     COLONOSCOPY - SCAN (02/26/2014)  DEXA Bone Density Axial (10/18/2023 10:46)    Mammo Screening Digital Tomosynthesis Bilateral With CAD (01/16/2024 09:02)    Objective   Vital Signs:  /80 (BP Location: Left arm, Patient Position: Sitting, Cuff Size: Adult)   Pulse 54   Temp 96.8 °F (36 °C)   Ht 167.6 cm (66\")   Wt 94.8 kg (209 lb)   SpO2 98%   BMI 33.73 kg/m²     Physical Exam  Vitals reviewed.   Constitutional:       General: She is not in acute distress.  HENT:      Head: Normocephalic and atraumatic.      Right Ear: Tympanic membrane normal.      Left Ear: Tympanic membrane normal.      Nose: No congestion.   Eyes:      Pupils: Pupils are equal, round, and reactive to light.   Cardiovascular:      Rate and Rhythm: Normal rate and regular rhythm.      Heart sounds: No murmur heard.  Pulmonary:      Effort: Pulmonary effort is normal.      Breath sounds: Normal breath sounds. No wheezing.   Abdominal:      Palpations: Abdomen is soft.   Musculoskeletal:      Cervical back: Normal range of motion.      Right lower leg: Edema present.      Left lower leg: Edema present.      Comments: Chronic dependent edema.  Ambulating with walker.   Lymphadenopathy:      Cervical: No cervical adenopathy.   Skin:     Findings: No rash.      Comments: Easy bruising to arms.   Neurological:      General: No focal deficit present.      Mental Status: She is alert and oriented to person, place, and time.   Psychiatric:         Mood and Affect: Mood normal.         Behavior: Behavior normal.          The following data was reviewed by: AGUSTIN Nick on 10/08/2024:  Common labs          2/27/2024    15:18 4/3/2024    10:01 " 10/1/2024    09:19   Common Labs   Glucose  116  104    BUN  16  21    Creatinine 1.10  1.21  1.08    Sodium  139  133    Potassium  5.0  5.0    Chloride  103  94    Calcium  9.8  9.3    Total Protein  6.9  6.4    Albumin  4.1  4.0    Total Bilirubin  0.3  0.5    Alkaline Phosphatase  79  58    AST (SGOT)  16  13    ALT (SGPT)  15  13    WBC  6.28  8.47    Hemoglobin  13.1  13.5    Hematocrit  42.5  42.3    Platelets  388  262    Total Cholesterol  174  159    Triglycerides  196  108    HDL Cholesterol  68  76    LDL Cholesterol   74  64    Hemoglobin A1C  6.00  5.80            CONTROLLED SUBSTANCE AGREEMENT - SCAN (04/11/2024)    Compliance Drug Analysis, Ur - (05/17/2024 15:17)    Labs stable.  No changes in medications.       Assessment and Plan   Diagnoses and all orders for this visit:    1. Medicare annual wellness visit, subsequent (Primary)    2. Essential hypertension  -     CBC (No Diff); Future  -     Comprehensive Metabolic Panel; Future  -     Lipid Panel With / Chol / HDL Ratio; Future    3. Mixed hyperlipidemia  -     Comprehensive Metabolic Panel; Future  -     Lipid Panel With / Chol / HDL Ratio; Future    4. Acquired hypothyroidism  -     TSH Rfx On Abnormal To Free T4; Future    5. Prediabetes  -     Comprehensive Metabolic Panel; Future  -     Hemoglobin A1c; Future    6. COLTON (generalized anxiety disorder)  -     CBC (No Diff); Future  -     Comprehensive Metabolic Panel; Future    7. Anticoagulant long-term use  -     CBC (No Diff); Future    8. Decreased GFR  -     Comprehensive Metabolic Panel; Future    9. Colon cancer screening  -     Ambulatory Referral For Screening Colonoscopy    10. Encounter for screening mammogram for malignant neoplasm of breast  -     Mammo Screening Digital Tomosynthesis Bilateral With CAD; Future    Other orders  -     Cancel: Ambulatory Referral For Screening Colonoscopy             Follow Up   Return in about 6 months (around 4/8/2025) for Recheck on HTN and  thyroid with fasting labs week before.  .  Patient was given instructions and counseling regarding her condition or for health maintenance advice. Please see specific information pulled into the AVS if appropriate.     The patient has read and signed the [facility] Controlled Substance Contract. I will continue to see patient for regular follow up appointments. They are well controlled on their medication.  SHAREE was reviewed and is compliant and will be updated at least every 3 months. The patient is aware of the potential for addiction and dependence.

## 2024-10-09 ENCOUNTER — HOSPITAL ENCOUNTER (EMERGENCY)
Facility: HOSPITAL | Age: 74
Discharge: HOME | End: 2024-10-09
Attending: INTERNAL MEDICINE | Admitting: INTERNAL MEDICINE
Payer: MEDICARE

## 2024-10-09 VITALS
HEIGHT: 66 IN | WEIGHT: 209 LBS | TEMPERATURE: 97.9 F | SYSTOLIC BLOOD PRESSURE: 121 MMHG | BODY MASS INDEX: 33.59 KG/M2 | OXYGEN SATURATION: 96 % | RESPIRATION RATE: 16 BRPM | DIASTOLIC BLOOD PRESSURE: 51 MMHG | HEART RATE: 55 BPM

## 2024-10-09 DIAGNOSIS — M54.12 CERVICAL RADICULOPATHY: Primary | ICD-10-CM

## 2024-10-09 LAB — TROPONIN T SERPL HS-MCNC: 11 NG/L

## 2024-10-09 PROCEDURE — 93005 ELECTROCARDIOGRAM TRACING: CPT | Performed by: INTERNAL MEDICINE

## 2024-10-09 PROCEDURE — 36415 COLL VENOUS BLD VENIPUNCTURE: CPT

## 2024-10-09 PROCEDURE — 99283 EMERGENCY DEPT VISIT LOW MDM: CPT | Performed by: INTERNAL MEDICINE

## 2024-10-09 PROCEDURE — 84484 ASSAY OF TROPONIN QUANT: CPT | Performed by: INTERNAL MEDICINE

## 2024-10-09 RX ORDER — LEVOTHYROXINE SODIUM 25 UG/1
25 TABLET ORAL
Qty: 90 TABLET | Refills: 1 | Status: SHIPPED | OUTPATIENT
Start: 2024-10-09

## 2024-10-09 NOTE — ED PROVIDER NOTES
Subjective right arm and neck pain    History of Present Illness  Patient presents to the ED for evaluation treatment of right arm and neck pain.  She is reporting onset of symptoms since 3-4 PM tonight.  The pain is dull and involves her right arm and radiates to the neck.  It is worse with movement.  She denies any acute injury or trauma.  Denies any associated chest pain or shortness of breath.  Given her family history of heart disease, she came to ED for evaluation.  She has no other shortness of breath, fevers, chills or cough.  She has history of atrial fibrillation and is on metoprolol and Xarelto.    History provided by:  Patient      Review of Systems   Constitutional:  Negative for activity change, appetite change and chills.   Respiratory:  Negative for cough, chest tightness and shortness of breath.    Cardiovascular:  Negative for chest pain and leg swelling.   Gastrointestinal:  Negative for abdominal pain.   Musculoskeletal:  Positive for arthralgias and neck pain.   Neurological:  Negative for dizziness.   Psychiatric/Behavioral:  Negative for agitation.        Past Medical History:   Diagnosis Date    Arthritis     Cholelithiasis taken out 1/14/91    Diverticulosis     Gastroesophageal reflux disease 01/21/2016    Hyperlipidemia     Hypertension     Kidney stone     recurrent, calcium oxalate    Menopausal disorder 1998    She went through menopause in 1998    Neuropathy     Obesity     PAF (paroxysmal atrial fibrillation)     in the setting of urosepsis, 9/2016    Reflux esophagitis 08/19/2016    Sebaceous cyst of labia 12/20/2017    Sepsis due to urinary tract infection     Sigmoid diverticulitis 11/13/2017    Type 2 diabetes mellitus     Urinary tract infection     Visual impairment cataracts    Vitamin D deficiency 01/21/2016       Allergies   Allergen Reactions    Morphine And Codeine GI Intolerance    Ciprofloxacin-Ciproflox Hcl Er Rash    Flomax [Tamsulosin Hcl] Rash    Hydrocodone Rash     Latex Rash    Lortab [Hydrocodone-Acetaminophen] Rash    Penicillins Rash    Phenergan [Promethazine Hcl] GI Intolerance    Sulfa Antibiotics Rash    Amoxicillin Rash    Tamsulosin Rash       Past Surgical History:   Procedure Laterality Date    ANKLE SURGERY      Ankle Repair / Description: ORif the left ankle in July 2010. Secondary to fall    BREAST BIOPSY Right     benign    BREAST SURGERY      biopsy     CATARACT EXTRACTION Right     CHOLECYSTECTOMY      COLONOSCOPY  2014    Done 2004 normal recheck in 10 years. Repeated February 2014 colonoscopy was normal and to be repeated in 10 years.    CYSTOSCOPY BLADDER STONE LITHOTRIPSY      FRACTURE SURGERY      HEMORRHOIDECTOMY      NEPHROLITHOTOMY Left 01/09/2017    Procedure: NEPHROLITHOTOMY PERCUTANEOUS SECOND LOOK WITH STENT PLACEMENT AND LASER LITHOTRIPSY;  Surgeon: Mati Villegas MD;  Location: [facility] OR;  Service:     OTHER SURGICAL HISTORY      Tubal Ligation    PERCUTANEOUS NEPHROSTOLITHOTOMY Left 12/2016    TUBAL ABDOMINAL LIGATION      URETEROSCOPY LASER LITHOTRIPSY WITH STENT INSERTION Left 12/06/2022    Procedure: LEFT URETEROSCOPY WITH LASER LITHOTRIPSY, STONE BASKET EXTRACTION, STENT PLACEMENT;  Surgeon: Mati Villegas MD;  Location: [facility] OR;  Service: Urology;  Laterality: Left;       Family History   Problem Relation Age of Onset    COPD Mother     Heart attack Father         acute myocardial infarction    Anxiety disorder Father     Depression Father     Heart attack Son     Kidney disease Maternal Grandmother         Born with 1 kidney    Breast cancer Neg Hx        Social History     Socioeconomic History    Marital status:    Tobacco Use    Smoking status: Never    Smokeless tobacco: Never    Tobacco comments:     Never smoked ever   Vaping Use    Vaping status: Never Used   Substance and Sexual Activity    Alcohol use: Yes     Comment: She uses alcohol once or twice a year.    Drug use: No    Sexual activity: Not  Currently     Partners: Male     Birth control/protection: Post-menopausal           Objective   Physical Exam  Vitals and nursing note reviewed.   Constitutional:       General: She is not in acute distress.     Appearance: Normal appearance. She is not toxic-appearing.   HENT:      Mouth/Throat:      Mouth: Mucous membranes are moist.   Eyes:      Extraocular Movements: Extraocular movements intact.      Conjunctiva/sclera: Conjunctivae normal.   Neck:      Comments: Right paraspinal neck tenderness, right trapezius tenderness, range of motion painful  Cardiovascular:      Rate and Rhythm: Normal rate and regular rhythm.      Heart sounds: Normal heart sounds.   Pulmonary:      Effort: Pulmonary effort is normal.      Breath sounds: Normal breath sounds.   Abdominal:      General: Bowel sounds are normal.      Palpations: Abdomen is soft.   Musculoskeletal:         General: Tenderness present. No swelling or deformity. Normal range of motion.      Cervical back: Normal range of motion and neck supple. Tenderness present.      Right lower leg: No edema.      Left lower leg: No edema.   Skin:     General: Skin is warm and dry.      Findings: No rash.   Neurological:      General: No focal deficit present.      Mental Status: She is alert and oriented to person, place, and time.   Psychiatric:         Mood and Affect: Mood normal.         Behavior: Behavior normal.         Procedures           ED Course  ED Course as of 10/09/24 2027   Wed Oct 09, 2024   2000 EKG: Sinus rhythm, rate 55, normal axis and intervals, no STEMI or ischemic changes [UU]   2020 HS Troponin T: 11 [UU]   2021 EKG is without acute changes and troponin is negative.  Her symptoms are more consistent with my clinical suspicion of cervical radiculopathy.  Patient has been offered Tylenol but she prefers to take her meds at home. [UU]      ED Course User Index  [UU] Danae Way MD                                             Medical Decision  Making  Patient is a 74-year-old female presenting to ED with arm and neck pain.  Differential diagnosis includes cervical radiculopathy, arthritis, DDD, ACS.  I have ordered and reviewed the relevant labs.  Troponin and EKG are both negative.  Patient has been offered Tylenol but she wants to take her meds at home.  She is being discharged to home in medically stable condition.     Amount and/or Complexity of Data Reviewed  Labs: ordered. Decision-making details documented in ED Course.  ECG/medicine tests: ordered. Decision-making details documented in ED Course.        Final diagnoses:   Cervical radiculopathy       ED Disposition  ED Disposition       ED Disposition   Discharge    Condition   Stable    Comment   --               Head, Pauline CLAY, APRN  [address] [id number] [phone]    In 1 week           Medication List      No changes were made to your prescriptions during this visit.            Danae Way MD  10/09/24 2027

## 2024-10-10 LAB
QT INTERVAL: 458 MS
QTC INTERVAL: 438 MS

## 2024-10-16 ENCOUNTER — APPOINTMENT (OUTPATIENT)
Dept: CT IMAGING | Facility: HOSPITAL | Age: 74
End: 2024-10-16
Payer: MEDICARE

## 2024-10-16 ENCOUNTER — APPOINTMENT (OUTPATIENT)
Dept: GENERAL RADIOLOGY | Facility: HOSPITAL | Age: 74
End: 2024-10-16
Payer: MEDICARE

## 2024-10-16 ENCOUNTER — HOSPITAL ENCOUNTER (EMERGENCY)
Facility: HOSPITAL | Age: 74
Discharge: HOME | End: 2024-10-16
Attending: STUDENT IN AN ORGANIZED HEALTH CARE EDUCATION/TRAINING PROGRAM
Payer: MEDICARE

## 2024-10-16 VITALS
TEMPERATURE: 97.8 F | BODY MASS INDEX: 33.59 KG/M2 | RESPIRATION RATE: 16 BRPM | OXYGEN SATURATION: 96 % | HEART RATE: 61 BPM | DIASTOLIC BLOOD PRESSURE: 65 MMHG | WEIGHT: 209 LBS | HEIGHT: 66 IN | SYSTOLIC BLOOD PRESSURE: 154 MMHG

## 2024-10-16 DIAGNOSIS — S09.90XA INJURY OF HEAD, INITIAL ENCOUNTER: Primary | ICD-10-CM

## 2024-10-16 PROCEDURE — 99284 EMERGENCY DEPT VISIT MOD MDM: CPT | Performed by: STUDENT IN AN ORGANIZED HEALTH CARE EDUCATION/TRAINING PROGRAM

## 2024-10-16 PROCEDURE — 72125 CT NECK SPINE W/O DYE: CPT

## 2024-10-16 PROCEDURE — 70450 CT HEAD/BRAIN W/O DYE: CPT

## 2024-10-16 PROCEDURE — 72170 X-RAY EXAM OF PELVIS: CPT

## 2024-10-16 NOTE — ED PROVIDER NOTES
Subjective   History of Present Illness see MDM    Review of Systems   Constitutional:  Negative for fever.   Respiratory:  Negative for shortness of breath.    Cardiovascular:  Negative for chest pain.   Gastrointestinal:  Negative for abdominal pain, nausea and vomiting.   Genitourinary:  Negative for dysuria.   Skin:  Negative for rash and wound.   Neurological:  Negative for weakness and numbness.       Past Medical History:   Diagnosis Date    Arthritis     Cholelithiasis taken out 1/14/91    Diverticulosis     Gastroesophageal reflux disease 01/21/2016    Hyperlipidemia     Hypertension     Kidney stone     recurrent, calcium oxalate    Menopausal disorder 1998    She went through menopause in 1998    Neuropathy     Obesity     PAF (paroxysmal atrial fibrillation)     in the setting of urosepsis, 9/2016    Reflux esophagitis 08/19/2016    Sebaceous cyst of labia 12/20/2017    Sepsis due to urinary tract infection     Sigmoid diverticulitis 11/13/2017    Type 2 diabetes mellitus     Urinary tract infection     Visual impairment cataracts    Vitamin D deficiency 01/21/2016       Allergies   Allergen Reactions    Morphine And Codeine GI Intolerance    Ciprofloxacin-Ciproflox Hcl Er Rash    Flomax [Tamsulosin Hcl] Rash    Hydrocodone Rash    Latex Rash    Lortab [Hydrocodone-Acetaminophen] Rash    Penicillins Rash    Phenergan [Promethazine Hcl] GI Intolerance    Sulfa Antibiotics Rash    Amoxicillin Rash    Tamsulosin Rash       Past Surgical History:   Procedure Laterality Date    ANKLE SURGERY      Ankle Repair / Description: ORif the left ankle in July 2010. Secondary to fall    BREAST BIOPSY Right     benign    BREAST SURGERY      biopsy     CATARACT EXTRACTION Right     CHOLECYSTECTOMY      COLONOSCOPY  2014    Done 2004 normal recheck in 10 years. Repeated February 2014 colonoscopy was normal and to be repeated in 10 years.    CYSTOSCOPY BLADDER STONE LITHOTRIPSY      FRACTURE SURGERY      HEMORRHOIDECTOMY   "    NEPHROLITHOTOMY Left 01/09/2017    Procedure: NEPHROLITHOTOMY PERCUTANEOUS SECOND LOOK WITH STENT PLACEMENT AND LASER LITHOTRIPSY;  Surgeon: Mati Villegas MD;  Location: [facility]  Service:     OTHER SURGICAL HISTORY      Tubal Ligation    PERCUTANEOUS NEPHROSTOLITHOTOMY Left 12/2016    TUBAL ABDOMINAL LIGATION      URETEROSCOPY LASER LITHOTRIPSY WITH STENT INSERTION Left 12/06/2022    Procedure: LEFT URETEROSCOPY WITH LASER LITHOTRIPSY, STONE BASKET EXTRACTION, STENT PLACEMENT;  Surgeon: Mati Villegas MD;  Location: [facility] OR;  Service: Urology;  Laterality: Left;       Family History   Problem Relation Age of Onset    COPD Mother     Heart attack Father         acute myocardial infarction    Anxiety disorder Father     Depression Father     Heart attack Son     Kidney disease Maternal Grandmother         Born with 1 kidney    Breast cancer Neg Hx        Social History     Socioeconomic History    Marital status:    Tobacco Use    Smoking status: Never    Smokeless tobacco: Never    Tobacco comments:     Never smoked ever   Vaping Use    Vaping status: Never Used   Substance and Sexual Activity    Alcohol use: Yes     Comment: She uses alcohol once or twice a year.    Drug use: No    Sexual activity: Not Currently     Partners: Male     Birth control/protection: Post-menopausal           Objective   Physical Exam      PRIMARY SURVEY  A: Airway patent & intact  B: Breath sounds clear & equal bilaterally  C: 2+ radial/DP pulses, no unstable bleeding  D: GCS 15, pupils PERRL, moves all four extremities    SECONDARY SURVEY  /65   Pulse 61   Temp 97.8 °F (36.6 °C)   Resp 16   Ht 167.6 cm (66\")   Wt 94.8 kg (208 lb 15.9 oz)   SpO2 96%   BMI 33.73 kg/m²   HEENT: Head atraumatic, dentition normal, no facial bony instability  Neck: Anterior neck atraumatic, no midline C-spine tenderness  Chest Wall: Clavicles atraumatic, no tenderness over chest wall, no contusions, no seatbelt " sign  Abd: Soft, nontender, nondistended abdomen, no contusions, no seatbelt sign  Pelvis: Stable, no tenderness over pelvis  Extremities:     RUE:  Nontender, no obvious deformities, FROM, no bruising or contusions, 2+ radial pulses, motor & sensory intact     LUE:  Nontender, no obvious deformities, FROM, small abrasion on the left elbow, 2+ radial pulses, motor & sensory intact     LLE:  Nontender, no obvious deformities, FROM, no bruising or contusions, motor & sensory intact     RLE:  Nontender, no obvious deformities, FROM, no bruising or contusions, motor & sensory intact  Neuro: Motor intact x4 extremities, sensory intact  Back: T/L spine without midline tenderness, no contusions, no paraspinal tenderness, normal rectal tone with no gross blood  Skin: + abrasions, no lacerations, no ecchymosis      Procedures           ED Course  ED Course as of 10/16/24 1227   Wed Oct 16, 2024   1152 CT Head Without Contrast  Impression:     No acute fractures or subluxations     Multilevel cervical spondylosis   [DL]   1152 CT Cervical Spine Without Contrast  Impression:     No acute fractures or subluxations     Multilevel cervical spondylosis   [DL]   1153 XR Pelvis 1 or 2 View  Impression:  No acute fracture or traumatic malalignment.   [DL]   1225 Patient able to ambulate with no difficulty. [DL]      ED Course User Index  [DL] Arsh Moreira MD                                             Medical Decision Making  This is a 74-year-old female patient, history of hyperlipidemia, hypertension, paroxysmal A-fib on Xarelto, diabetes type 2, who came to the emergency room by ambulance for a fall.  She said that she was walking to the car in her garage and she tripped on the ground.  She fell backward onto her head.  She did hit her head.  She did not pass out.  She was on the ground for about 10 minutes.  She was able to get up with EMS help.  She was able to ambulate to the ambulance.  She denies any pain.  She has a small  abrasion to the left elbow.  She states that she wants to get checked out because she is on Xarelto.    On trauma exam, the patient is very well-appearing.  She is under no acute distress.  She has normal neck range of motion.  She is alert, oriented x 4, conversing appropriately with no focal weakness or deficit.  Right hip with no swelling, redness, ecchymosis.  She is able to range bilateral hips, knees and ankles.  No leg swelling.  She has a small abrasion to the left elbow.  Palpation of the T and L-spine with no tenderness.  Palpation with chest and abdomen is also no tenderness.  No guarding in the abdomen.    Assessment: The patient is 74 years of age, very well-appearing.  Had a fall from tripping.  Hit her head and on Xarelto.  Will obtain CT scan of the head to rule out intracranial hemorrhage.  Considered C-spine injury as well so we will obtain CT C-spine although low suspicion for severe injury due to low mechanism.    She did fall onto her left hip and was on the ground for about 10 minutes.  I doubt rhabdomyolysis.  However, will obtain x-ray pelvis although low suspicion for hip fracture as the patient is not having any pain, has full range of motion of the hips and was able to ambulate with EMS.    I doubt rib fractures, intra-abdominal injury, T or L-spine injury, given very benign trauma exam.    Workup: CT head, CT C-spine  Tdap is already updated within the past 5 years per patient report.  No indication for repeat.    Amount and/or Complexity of Data Reviewed  Radiology: ordered.    Updates: CT head, CT C-spine, x-ray pelvis are unremarkable.  Patient was able to ambulate with no difficulties.  No altered mental status.  No headache.  The patient was discharged home in significantly improved condition with home construction and return precautions and she voices understanding and agreement to this plan.    Please note that portions of this note were completed with a voice recognition program.            Final diagnoses:   Injury of head, initial encounter       ED Disposition  ED Disposition       ED Disposition   Discharge    Condition   Stable    Comment   --               Head, Pauline N, APRN  [address] [facility] [address]  [phone]    In 1 day      [facility] EMERGENCY DEPARTMENT  [address] [facility] [place] [address] [phone]    If symptoms worsen         Medication List        Changed      ALPRAZolam 1 MG tablet  Commonly known as: XANAX  TAKE 1/2 TO 1 TABLET BY MOUTH TWICE A DAY AS NEEDED  What changed: additional instructions                 Arsh Moreira MD  10/16/24 3228

## 2024-10-16 NOTE — ED NOTES
Ambulated with pt around unit. Pt ambulates independently, with steady gait. Reports some soreness from fall but states this is manageable. Provider made aware.

## 2024-10-16 NOTE — DISCHARGE INSTRUCTIONS
Thank you for your visit to the Emergency Department.     It was a pleasure to take care of you in the emergency room today.     Today you were seen for head injury. We performed a thorough history and physical exam.  We obtained CT scan of your head, your neck and x-ray of your hip which did not show any acute abnormal findings.  We did not update your tetanus vaccination as your tetanus was reported by you within the past 5 years.  We think it is unlikely that you have a condition that requires further emergency treatment at this time.      Please return to the nearest ED or call 911 if you experience:    Worsening symptoms, severe pain, difficulty breathing, chest pain, fever that doesn't break with Tylenol or Motrin, uncontrolled vomiting or diarrhea that doesn't stop, passing out, lethargy (drowsiness, hard to wake up), numbness/tingling/weakness on one side, speech difficulty, cannot walk, or any concerns for limb/life threatening issues.    The Emergency Department is open 24 hours a day.     Please follow up with: your primary care doctor within 1-3 days.    In the meantime, please:  Take acetaminophen 650mg every 6-8 hours on a full stomach as needed for pain or fever.   Continue to take any other existing medications as prescribed.

## 2024-10-28 RX ORDER — METOPROLOL TARTRATE 75 MG/1
1 TABLET, FILM COATED ORAL 2 TIMES DAILY
Qty: 180 TABLET | Refills: 3 | Status: SHIPPED | OUTPATIENT
Start: 2024-10-28

## 2024-10-28 RX ORDER — LOSARTAN POTASSIUM 100 MG/1
100 TABLET ORAL DAILY
Qty: 90 TABLET | Refills: 0 | Status: SHIPPED | OUTPATIENT
Start: 2024-10-28

## 2024-10-28 RX ORDER — SERTRALINE HYDROCHLORIDE 100 MG/1
TABLET, FILM COATED ORAL
Qty: 90 TABLET | Refills: 1 | Status: SHIPPED | OUTPATIENT
Start: 2024-10-28

## 2024-10-28 NOTE — TELEPHONE ENCOUNTER
NOV 7/17/2025 (NM)  LOV 7/16/2024 (DONYK)    Plan:       1. Atrial Fibrillation and Atrial Flutter  Assessment   The patient has paroxysmal atrial fibrillation   The patient's CHADS2-VASc score is 5   A [id number]-DITx score of 2 or more is considered a high risk for a thromboembolic event   Rivaroxaban prescribed     Plan   Attempt to maintain sinus rhythm   Continue rivaroxaban for antithrombotic therapy, bleeding issues discussed   Continue beta blocker for rhythm control     Her GFR is usually 45-50, so she will remain on 15mg of rivaroxaban.     If her HR persistently stays in the 50s, we may have to de-escalate her BB dose.      2. Her PB is well controlled.

## 2024-10-30 ENCOUNTER — TELEPHONE (OUTPATIENT)
Dept: GASTROENTEROLOGY | Facility: CLINIC | Age: 74
End: 2024-10-30
Payer: MEDICARE

## 2024-11-08 ENCOUNTER — TELEPHONE (OUTPATIENT)
Dept: CARDIOLOGY | Facility: CLINIC | Age: 74
End: 2024-11-08
Payer: MEDICARE

## 2024-11-08 NOTE — TELEPHONE ENCOUNTER
Requesting Cardiac Clearance for Colonoscopy on 1/24/25 w/ Dr. Joss Oro.     LOV w/ you on 7/16/24  FU w/ MANI Clemens on 7/17/25    Anticoags: Xarelto 15mg    Please advise on Clearance and recommendations.    MOY Sena

## 2024-11-12 DIAGNOSIS — Z12.11 SCREENING FOR MALIGNANT NEOPLASM OF COLON: Primary | ICD-10-CM

## 2024-11-18 DIAGNOSIS — F41.9 ANXIETY: ICD-10-CM

## 2024-11-18 RX ORDER — ALPRAZOLAM 1 MG/1
TABLET ORAL
Qty: 60 TABLET | Refills: 0 | Status: SHIPPED | OUTPATIENT
Start: 2024-11-18

## 2024-11-26 ENCOUNTER — OFFICE VISIT (OUTPATIENT)
Dept: ORTHOPEDIC SURGERY | Facility: CLINIC | Age: 74
End: 2024-11-26
Payer: MEDICARE

## 2024-11-26 VITALS — BODY MASS INDEX: 33.43 KG/M2 | HEIGHT: 66 IN | WEIGHT: 208 LBS

## 2024-11-26 DIAGNOSIS — M17.11 PRIMARY OSTEOARTHRITIS OF RIGHT KNEE: Primary | ICD-10-CM

## 2024-11-26 NOTE — PROGRESS NOTES
Subjective:     Patient ID: Krys Mayes is a 74 y.o. female.    Chief Complaint:  Follow-up DJD right knee  Corticosteroid injection 9/12/2024  History of Present Illness  History of Present Illness  The patient returns to clinic today for follow-up of her right knee.    She received a corticosteroid injection approximately 2 months ago, which led to mild symptom improvement. However, she continues to experience pain along the medial compartment and anterior aspect, as well as the lateral joint line. Notably, she has noticed a reduction in swelling following the injection. She has no other concerns at this time.       Social History     Occupational History    Not on file   Tobacco Use    Smoking status: Never    Smokeless tobacco: Never    Tobacco comments:     Never smoked ever   Vaping Use    Vaping status: Never Used   Substance and Sexual Activity    Alcohol use: Yes     Comment: She uses alcohol once or twice a year.    Drug use: No    Sexual activity: Not Currently     Partners: Male     Birth control/protection: Post-menopausal      Past Medical History:   Diagnosis Date    Arthritis     Cholelithiasis taken out 1/14/91    Diverticulosis     Gastroesophageal reflux disease 01/21/2016    Hyperlipidemia     Hypertension     Kidney stone     recurrent, calcium oxalate    Menopausal disorder 1998    She went through menopause in 1998    Neuropathy     Obesity     PAF (paroxysmal atrial fibrillation)     in the setting of urosepsis, 9/2016    Reflux esophagitis 08/19/2016    Sebaceous cyst of labia 12/20/2017    Sepsis due to urinary tract infection     Sigmoid diverticulitis 11/13/2017    Type 2 diabetes mellitus     Urinary tract infection     Visual impairment cataracts    Vitamin D deficiency 01/21/2016     Past Surgical History:   Procedure Laterality Date    ANKLE SURGERY      Ankle Repair / Description: ORif the left ankle in July 2010. Secondary to fall    BREAST BIOPSY Right     benign    BREAST  "SURGERY      biopsy     CATARACT EXTRACTION Right     CHOLECYSTECTOMY      COLONOSCOPY  2014    Done 2004 normal recheck in 10 years. Repeated February 2014 colonoscopy was normal and to be repeated in 10 years.    CYSTOSCOPY BLADDER STONE LITHOTRIPSY      FRACTURE SURGERY      HEMORRHOIDECTOMY      NEPHROLITHOTOMY Left 01/09/2017    Procedure: NEPHROLITHOTOMY PERCUTANEOUS SECOND LOOK WITH STENT PLACEMENT AND LASER LITHOTRIPSY;  Surgeon: Mati Villegas MD;  Location: [facility] OR;  Service:     OTHER SURGICAL HISTORY      Tubal Ligation    PERCUTANEOUS NEPHROSTOLITHOTOMY Left 12/2016    TUBAL ABDOMINAL LIGATION      URETEROSCOPY LASER LITHOTRIPSY WITH STENT INSERTION Left 12/06/2022    Procedure: LEFT URETEROSCOPY WITH LASER LITHOTRIPSY, STONE BASKET EXTRACTION, STENT PLACEMENT;  Surgeon: Mati Villegas MD;  Location: [facility] OR;  Service: Urology;  Laterality: Left;       Family History   Problem Relation Age of Onset    COPD Mother     Heart attack Father         acute myocardial infarction    Anxiety disorder Father     Depression Father     Heart attack Son     Kidney disease Maternal Grandmother         Born with 1 kidney    Breast cancer Neg Hx                Objective:  Physical Exam    General: No acute distress.  Eyes: conjunctiva clear; pupils equally round and reactive  ENT: external ears and nose atraumatic; oropharynx clear  CV: no peripheral edema  Resp: normal respiratory effort  Skin: no rashes or wounds; normal turgor  Psych: mood and affect appropriate; recent and remote memory intact    Vitals:    11/26/24 1308   Weight: 94.3 kg (208 lb)   Height: 167.6 cm (66\")         11/26/24  1308   Weight: 94.3 kg (208 lb)     Body mass index is 33.57 kg/m².      Right Knee Exam     Tenderness   The patient is experiencing tenderness in the medial joint line and patella.    Range of Motion   Extension:  0   Right knee flexion: 120.     Tests   Rylie:  Medial - negative Lateral - " negative  Varus: negative Valgus: negative  Lachman:  Anterior - 1+        Other   Erythema: absent  Sensation: normal  Swelling: moderate             Physical Exam      Assessment:        1. Primary osteoarthritis of right knee         Assessment & Plan  1. Right knee pain.  She received a corticosteroid injection approximately 2 months ago, with mild symptom improvement and reduction of swelling. However, she continues to experience pain along the medial compartment, anterior aspect, and lateral joint line. Viscosupplementation injection for the right knee was administered during today's visit to see if it offers any symptom improvement. It was discussed that it may take 3 to 6 weeks before significant symptom improvement is noticed, as this works differently than the corticosteroid injection. If symptoms worsen or if she experiences swelling, she is advised to contact the clinic. Rest, ice, and elevation are recommended. She is encouraged to call with any questions or concerns. All questions were answered.      Large Joint Arthrocentesis: R knee  Date/Time: 11/26/2024 1:20 PM  Consent given by: patient  Site marked: site marked  Timeout: Immediately prior to procedure a time out was called to verify the correct patient, procedure, equipment, support staff and site/side marked as required   Supporting Documentation  Indications: pain and joint swelling   Procedure Details  Location: knee - R knee  Preparation: Patient was prepped and draped in the usual sterile fashion  Needle gauge: 21G.  Medications administered: 88 mg Hyaluronan 88 MG/4ML  Patient tolerance: patient tolerated the procedure well with no immediate complications        Orders:  Orders Placed This Encounter   Procedures    Large Joint Arthrocentesis: R knee     No orders of the defined types were placed in this encounter.           Patient or patient representative verbalized consent for the use of Ambient Listening during the visit with  Catherine HERNANDEZ  AGUSTIN Brown for chart documentation. 11/26/2024  20:24 EST

## 2024-12-10 DIAGNOSIS — E78.2 MIXED HYPERLIPIDEMIA: ICD-10-CM

## 2024-12-10 RX ORDER — SIMVASTATIN 80 MG
80 TABLET ORAL EVERY EVENING
Qty: 90 TABLET | Refills: 0 | Status: SHIPPED | OUTPATIENT
Start: 2024-12-10

## 2024-12-20 DIAGNOSIS — F41.9 ANXIETY: ICD-10-CM

## 2024-12-20 RX ORDER — ALPRAZOLAM 1 MG/1
TABLET ORAL
Qty: 60 TABLET | Refills: 0 | Status: SHIPPED | OUTPATIENT
Start: 2024-12-20

## 2025-01-03 RX ORDER — RIVAROXABAN 15 MG/1
15 TABLET, FILM COATED ORAL DAILY
Qty: 90 TABLET | Refills: 0 | Status: SHIPPED | OUTPATIENT
Start: 2025-01-03

## 2025-01-16 ENCOUNTER — HOSPITAL ENCOUNTER (OUTPATIENT)
Dept: CT IMAGING | Facility: HOSPITAL | Age: 75
Discharge: HOME | End: 2025-01-16
Admitting: NURSE PRACTITIONER
Payer: MEDICARE

## 2025-01-16 ENCOUNTER — OFFICE VISIT (OUTPATIENT)
Dept: FAMILY MEDICINE CLINIC | Facility: CLINIC | Age: 75
End: 2025-01-16
Payer: MEDICARE

## 2025-01-16 VITALS
SYSTOLIC BLOOD PRESSURE: 134 MMHG | BODY MASS INDEX: 33.75 KG/M2 | WEIGHT: 210 LBS | HEIGHT: 66 IN | HEART RATE: 78 BPM | OXYGEN SATURATION: 98 % | DIASTOLIC BLOOD PRESSURE: 80 MMHG | TEMPERATURE: 98 F

## 2025-01-16 DIAGNOSIS — R53.1 DECREASED STRENGTH, ENDURANCE, AND MOBILITY: ICD-10-CM

## 2025-01-16 DIAGNOSIS — G62.9 NEUROPATHY: ICD-10-CM

## 2025-01-16 DIAGNOSIS — R68.89 DECREASED STRENGTH, ENDURANCE, AND MOBILITY: ICD-10-CM

## 2025-01-16 DIAGNOSIS — Z74.09 DECREASED STRENGTH, ENDURANCE, AND MOBILITY: ICD-10-CM

## 2025-01-16 DIAGNOSIS — R29.6 FREQUENT FALLS: ICD-10-CM

## 2025-01-16 DIAGNOSIS — S09.90XA INJURY OF HEAD, INITIAL ENCOUNTER: ICD-10-CM

## 2025-01-16 DIAGNOSIS — R26.89 BALANCE PROBLEM: ICD-10-CM
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
## 2025-01-16 PROCEDURE — 3079F DIAST BP 80-89 MM HG: CPT | Performed by: NURSE PRACTITIONER

## 2025-01-16 PROCEDURE — 1126F AMNT PAIN NOTED NONE PRSNT: CPT | Performed by: NURSE PRACTITIONER

## 2025-01-16 PROCEDURE — 3075F SYST BP GE 130 - 139MM HG: CPT | Performed by: NURSE PRACTITIONER

## 2025-01-16 PROCEDURE — 70450 CT HEAD/BRAIN W/O DYE: CPT

## 2025-01-16 PROCEDURE — 99214 OFFICE O/P EST MOD 30 MIN: CPT | Performed by: NURSE PRACTITIONER

## 2025-01-16 NOTE — PROGRESS NOTES
Patient ID: Krys Mayes is a 75 y.o. female     Patient Care Team:  Head, AGUSTIN Anglin as PCP - General (Nurse Practitioner)  Jeremy Orozco MD as Consulting Physician (Cardiology)  Preethi, Joss De Jesus MD as Consulting Physician (Gastroenterology)  Catherine Brown APRN as Nurse Practitioner (Orthopedic Surgery)  Mati Villegas MD as Consulting Physician (Urology)  Ajith Valles DPM as Consulting Physician (Podiatry)  Lyle Orellana MD as Consulting Physician (Ophthalmology)  Luan Marie MD as Consulting Physician (Dermatology)    Subjective     Chief Complaint   Patient presents with    Balance Issues     Getting worse, weak in the legs, is not tripping. - has fallen at home twice, fell yesterday.        History of Present Illness      History of Present Illness  The patient is a 75-year-old female who presents for evaluation of balance issues, neuropathy, atrial fibrillation, and health maintenance. She is accompanied by her .    She continues to experience worsening balance issues, leading to an increased frequency of falls. She reported a recent fall that occurred last night while she was attempting to go to the bathroom. Despite not experiencing any lightheadedness or dizziness, she found herself on the floor without any recollection of tripping over an object. She sustained a scrape on her head during this incident, the cause of which remains uncertain. She has been utilizing a walker for mobility since her last fall and has plans to acquire another for use in the basement. She reports no headaches, dizziness, or lightheadedness. Her  noted that she was found lying on her side after the fall, unable to rise even after two hours. EMS was called.  Recommend to go to ER for evaluation however she refused and signed waiver. She has previously undergone physical therapy for leg strength and sciatica. Her  expressed concern about her inability to assist  herself in standing up after a fall. She has a history of falls, including one in October 2024 where she tripped over an expansion joint in her garage, resulting in a head injury that required ER visit and radiographic imaging. She also reported a fall in the bathroom a few weeks prior, which occurred while she was dressing. She has been under the care of Dr. Valles, a podiatrist, for neuropathy but has discontinued these visits due to dissatisfaction with the treatment. She reports a sensation of swelling in her toes and a lack of sensation in her toes prior to the falls. She has been prescribed gabapentin for her neuropathy, which aids in sleep but does not alleviate her symptoms. She has been on gabapentin for approximately one year and has discontinued the use of a topical cream containing gabapentin and lidocaine due to lack of efficacy.    Her atrial fibrillation is well controlled.  She is followed by cardiology and currently on Xarelto.    She is scheduled for a colonoscopy next Friday.     MEDICATIONS  Current: Xarelto, gabapentin    Results  CT Head Without Contrast (10/16/2024 11:37)       She denies any complaints of fever, chills, cough, chest pain, shortness of air, abdominal pain, nausea, or any other concerns.     The following portions of the patient's history were reviewed and updated as appropriate: allergies, current medications, past family history, past medical history, past social history, past surgical history and problem list.       ROS    Vitals:    01/16/25 1356   BP: 134/80   Pulse: 78   Temp: 98 °F (36.7 °C)   SpO2: 98%       Documented weights    01/16/25 1356   Weight: 95.3 kg (210 lb)     Body mass index is 33.89 kg/m².    Results for orders placed or performed during the hospital encounter of 10/09/24   Single High Sensitivity Troponin T    Collection Time: 10/09/24  7:48 PM    Specimen: Blood   Result Value Ref Range    HS Troponin T 11 <14 ng/L   ECG 12 Lead Chest Pain     Collection Time: 10/09/24  7:54 PM   Result Value Ref Range    QT Interval 458 ms    QTC Interval 438 ms     *Note: Due to a large number of results and/or encounters for the requested time period, some results have not been displayed. A complete set of results can be found in Results Review.           Objective     Physical Exam  Vitals reviewed.   Constitutional:       General: She is not in acute distress.  HENT:      Head: Normocephalic and atraumatic.      Comments: Abrasion to left forehead  Eyes:      Extraocular Movements: Extraocular movements intact.      Pupils: Pupils are equal, round, and reactive to light.   Cardiovascular:      Rate and Rhythm: Normal rate and regular rhythm.      Heart sounds: No murmur heard.  Pulmonary:      Effort: Pulmonary effort is normal.      Breath sounds: Normal breath sounds. No wheezing or rhonchi.   Musculoskeletal:      Cervical back: Normal range of motion. No tenderness.      Right lower leg: No edema.      Left lower leg: No edema.   Neurological:      Mental Status: She is alert and oriented to person, place, and time.      Gait: Gait abnormal (ambulating with walker).   Psychiatric:         Mood and Affect: Mood normal.         Physical Exam      Assessment & Plan     Assessment/Plan     Assessment & Plan  1. Balance issues.  She has been experiencing frequent falls, including a recent fall where she was unable to get up for 2 hours. She reports no lightheadedness, dizziness, or headaches associated with the falls. She is currently on Xarelto, which poses a risk for bleeding complications. A head CT will be ordered to rule out any potential head injury. She has been using a walker at home and is ordering another one for additional support. A referral to Dr. Shields, a podiatrist, will be made for further evaluation of her neuropathy, which may be contributing to her falls. Physical therapy will be initiated to improve her balance, strength, and endurance.     2.  Neuropathy.  She reports a sensation of swelling and lack of sensation in her toes, which may be contributing to her balance issues. She has been on gabapentin for about a year but reports it is not helping significantly. A referral to Dr. Shields will be made for further evaluation.      3. Atrial fibrillation.  Her heart rhythm is currently well-controlled and regular. She will continue her current management plan for atrial fibrillation.        Diagnoses and all orders for this visit:    1. Fall, initial encounter (Primary)  -     CT Head Without Contrast    2. Balance problem  -     CT Head Without Contrast  -     Ambulatory Referral to Physical Therapy for Evaluation & Treatment    3. Injury of head, initial encounter  -     CT Head Without Contrast    4. Neuropathy  -     Ambulatory Referral to Podiatry    5. Decreased strength, endurance, and mobility  -     Ambulatory Referral to Physical Therapy for Evaluation & Treatment    6. Frequent falls  -     Ambulatory Referral to Physical Therapy for Evaluation & Treatment          Follow Up:  No follow-ups on file.    In the meantime, instructed to contact us sooner for any problems or concerns.    Patient was given instructions and counseling regarding condition or for health maintenance advice.  Please see specific information pulled into the AVS if appropriate.      Patient or patient representative verbalized consent for the use of Ambient Listening during the visit with  AGUSTIN Nick for chart documentation. 1/16/2025  14:42 EST    AGUSTIN Nick  Family Medicine  [facility]  01/16/25  14:11 EST

## 2025-01-24 RX ORDER — LOSARTAN POTASSIUM 100 MG/1
100 TABLET ORAL DAILY
Qty: 90 TABLET | Refills: 0 | Status: SHIPPED | OUTPATIENT
Start: 2025-01-24

## 2025-01-26 DIAGNOSIS — F41.9 ANXIETY: ICD-10-CM

## 2025-01-27 RX ORDER — ALPRAZOLAM 1 MG/1
TABLET ORAL
Qty: 60 TABLET | Refills: 0 | Status: SHIPPED | OUTPATIENT
Start: 2025-01-27

## 2025-01-30 ENCOUNTER — OFFICE VISIT (OUTPATIENT)
Dept: ORTHOPEDIC SURGERY | Facility: CLINIC | Age: 75
End: 2025-01-30
Payer: MEDICARE

## 2025-01-30 VITALS — BODY MASS INDEX: 33.75 KG/M2 | HEIGHT: 66 IN | WEIGHT: 210 LBS

## 2025-01-30 DIAGNOSIS — M70.61 GREATER TROCHANTERIC BURSITIS OF RIGHT HIP: ICD-10-CM

## 2025-01-30 DIAGNOSIS — M17.11 PRIMARY OSTEOARTHRITIS OF RIGHT KNEE: Primary | ICD-10-CM

## 2025-01-30 RX ORDER — LIDOCAINE HYDROCHLORIDE 10 MG/ML
4 INJECTION, SOLUTION EPIDURAL; INFILTRATION; INTRACAUDAL; PERINEURAL
Status: COMPLETED | OUTPATIENT
Start: 2025-01-30 | End: 2025-01-30

## 2025-01-30 RX ORDER — GABAPENTIN 300 MG/1
CAPSULE ORAL
COMMUNITY
Start: 2025-01-21

## 2025-01-30 RX ORDER — TRIAMCINOLONE ACETONIDE 40 MG/ML
80 INJECTION, SUSPENSION INTRA-ARTICULAR; INTRAMUSCULAR
Status: COMPLETED | OUTPATIENT
Start: 2025-01-30 | End: 2025-01-30

## 2025-01-30 RX ORDER — LIDOCAINE HYDROCHLORIDE 10 MG/ML
8 INJECTION, SOLUTION EPIDURAL; INFILTRATION; INTRACAUDAL; PERINEURAL
Status: COMPLETED | OUTPATIENT
Start: 2025-01-30 | End: 2025-01-30

## 2025-01-30 RX ADMIN — LIDOCAINE HYDROCHLORIDE 4 ML: 10 INJECTION, SOLUTION EPIDURAL; INFILTRATION; INTRACAUDAL; PERINEURAL at 09:14

## 2025-01-30 RX ADMIN — LIDOCAINE HYDROCHLORIDE 8 ML: 10 INJECTION, SOLUTION EPIDURAL; INFILTRATION; INTRACAUDAL; PERINEURAL at 09:02

## 2025-01-30 RX ADMIN — TRIAMCINOLONE ACETONIDE 80 MG: 40 INJECTION, SUSPENSION INTRA-ARTICULAR; INTRAMUSCULAR at 09:02

## 2025-01-30 RX ADMIN — TRIAMCINOLONE ACETONIDE 80 MG: 40 INJECTION, SUSPENSION INTRA-ARTICULAR; INTRAMUSCULAR at 09:14

## 2025-01-30 NOTE — PROGRESS NOTES
Subjective:     Patient ID: Krys Mayes is a 75 y.o. female.    Chief Complaint:  DJD right knee  Visco injection 11/26/2024  History of Present Illness  History of Present Illness  The patient is a 75-year-old female who presents to the clinic today for evaluation of her right knee.    She received a viscosupplementation injection on 11/26/2024. She reports a popping sensation in her right knee, particularly when ascending stairs, walking long distances, or standing for extended periods. She does not experience significant pain but notes occasional swelling in the knee.    Additionally, she experiences pain in the lateral aspect of her hip, specifically along the greater trochanter, which intensifies with weight-bearing activities. She continues to use a rollator for mobility. There is no radiating pain to the groin. She reports no other concerns at present.       Social History     Occupational History    Not on file   Tobacco Use    Smoking status: Never     Passive exposure: Never    Smokeless tobacco: Never    Tobacco comments:     Never smoked ever   Vaping Use    Vaping status: Never Used   Substance and Sexual Activity    Alcohol use: Yes     Comment: She uses alcohol once or twice a year.    Drug use: No    Sexual activity: Not Currently     Partners: Male     Birth control/protection: Post-menopausal      Past Medical History:   Diagnosis Date    Arthritis     Cholelithiasis taken out 1/14/91    Diverticulosis     Gastroesophageal reflux disease 01/21/2016    Hyperlipidemia     Hypertension     Kidney stone     recurrent, calcium oxalate    Menopausal disorder 1998    She went through menopause in 1998    Neuropathy     Obesity     PAF (paroxysmal atrial fibrillation)     in the setting of urosepsis, 9/2016    Reflux esophagitis 08/19/2016    Sebaceous cyst of labia 12/20/2017    Sepsis due to urinary tract infection     Sigmoid diverticulitis 11/13/2017    Type 2 diabetes mellitus     Urinary tract  "infection     Visual impairment cataracts    Vitamin D deficiency 01/21/2016     Past Surgical History:   Procedure Laterality Date    ANKLE SURGERY      Ankle Repair / Description: ORif the left ankle in July 2010. Secondary to fall    BREAST BIOPSY Right     benign    BREAST SURGERY      biopsy     CATARACT EXTRACTION Right     CHOLECYSTECTOMY      COLONOSCOPY  2014    Done 2004 normal recheck in 10 years. Repeated February 2014 colonoscopy was normal and to be repeated in 10 years.    CYSTOSCOPY BLADDER STONE LITHOTRIPSY      FRACTURE SURGERY      HEMORRHOIDECTOMY      NEPHROLITHOTOMY Left 01/09/2017    Procedure: NEPHROLITHOTOMY PERCUTANEOUS SECOND LOOK WITH STENT PLACEMENT AND LASER LITHOTRIPSY;  Surgeon: Mati Villegas MD;  Location: [facility] OR;  Service:     OTHER SURGICAL HISTORY      Tubal Ligation    PERCUTANEOUS NEPHROSTOLITHOTOMY Left 12/2016    TUBAL ABDOMINAL LIGATION      URETEROSCOPY LASER LITHOTRIPSY WITH STENT INSERTION Left 12/06/2022    Procedure: LEFT URETEROSCOPY WITH LASER LITHOTRIPSY, STONE BASKET EXTRACTION, STENT PLACEMENT;  Surgeon: Mati Villegas MD;  Location: [facility] OR;  Service: Urology;  Laterality: Left;       Family History   Problem Relation Age of Onset    COPD Mother     Heart attack Father         acute myocardial infarction    Anxiety disorder Father     Depression Father     Heart attack Son     Kidney disease Maternal Grandmother         Born with 1 kidney    Breast cancer Neg Hx                Objective:  Physical Exam  General: No acute distress.  Eyes: conjunctiva clear; pupils equally round and reactive  ENT: external ears and nose atraumatic; oropharynx clear  CV: no peripheral edema  Resp: normal respiratory effort  Skin: no rashes or wounds; normal turgor  Psych: mood and affect appropriate; recent and remote memory intact    Vitals:    01/30/25 0842   Weight: 95.3 kg (210 lb)   Height: 167.6 cm (66\")         01/30/25  0842   Weight: 95.3 kg (210 lb) "     Body mass index is 33.89 kg/m².      Right Knee Exam     Tenderness   The patient is experiencing tenderness in the medial joint line and patella.    Range of Motion   Extension:  0   Right knee flexion: 125.     Tests   Rylie:  Medial - positive Lateral - negative  Varus: negative Valgus: negative  Lachman:  Anterior - 1+      Drawer:  Anterior - negative    Posterior - negative  Patellar apprehension: positive    Other   Erythema: absent  Sensation: normal  Pulse: present  Swelling: severe  Effusion: effusion present    Comments:  Positive crepitus throughout arc of motion            Physical Exam  The right knee has a range of motion from 0 to 125 degrees, stable to varus and valgus stress at zero to 30 degrees, moderate to severe swelling, minimal effusion, positive crepitus through arc of motion, positive active patellar compression test, negative medial and lateral Rylie's exam, and 1+ anterior Lachman exam. The right hip shows maximal tenderness along the greater trochanter, negative logroll exam, and negative Stinchfield exam.    Assessment:        1. Primary osteoarthritis of right knee         Assessment & Plan  1. Right knee pain.  She reports a popping sensation and swelling in the right knee, particularly with stairs, long-distance ambulation, and prolonged standing. Physical examination reveals moderate to severe swelling, minimal effusion, positive crepitus through arc of motion, and a positive active patellar compression test. A corticosteroid injection will be administered to alleviate the popping sensation and reduce swelling. She is advised to apply ice to the injection site on the same day. Symptom improvement may take up to a week. She is encouraged to call with any questions or concerns.    2. Greater trochanteric bursitis, right hip.  She experiences pain along the greater trochanter, especially with weight-bearing activities. Physical examination shows maximal tenderness along the  greater trochanter, negative logroll exam, and negative Stinchfield exam. Treatment for greater trochanteric bursitis will be initiated, which she has tolerated well in the past. She is advised to apply ice to the injection site on the same day. Symptom improvement may take up to a week. She is encouraged to call with any questions or concerns.    PROCEDURE  Viscosupplementation injection was administered on 11/26/2024.    A corticosteroid injection was administered today to the right knee.    Plan:    Large Joint Arthrocentesis: R knee  Date/Time: 1/30/2025 9:02 AM  Consent given by: patient  Site marked: site marked  Timeout: Immediately prior to procedure a time out was called to verify the correct patient, procedure, equipment, support staff and site/side marked as required   Procedure Details  Location: knee - R knee  Preparation: Patient was prepped and draped in the usual sterile fashion  Needle size: 22 G  Approach: anterolateral  Medications administered: 8 mL lidocaine PF 1% 1 %; 80 mg triamcinolone acetonide 40 MG/ML  Patient tolerance: patient tolerated the procedure well with no immediate complications      Large Joint Arthrocentesis: R greater trochanteric bursa  Date/Time: 1/30/2025 9:14 AM  Consent given by: patient  Site marked: site marked  Timeout: Immediately prior to procedure a time out was called to verify the correct patient, procedure, equipment, support staff and site/side marked as required   Supporting Documentation  Indications: pain   Procedure Details  Location: hip - R greater trochanteric bursa  Preparation: Patient was prepped and draped in the usual sterile fashion  Needle size: 22 G  Approach: lateral  Medications administered: 4 mL lidocaine PF 1% 1 %; 80 mg triamcinolone acetonide 40 MG/ML  Patient tolerance: patient tolerated the procedure well with no immediate complications      Orders:  Orders Placed This Encounter   Procedures    Large Joint Arthrocentesis: R knee     No  orders of the defined types were placed in this encounter.        Dragon dictation utilized          Patient or patient representative verbalized consent for the use of Ambient Listening during the visit with  AGUSTIN Jo for chart documentation. 1/30/2025  09:36 EST

## 2025-02-19 ENCOUNTER — HOSPITAL ENCOUNTER (OUTPATIENT)
Dept: MAMMOGRAPHY | Facility: HOSPITAL | Age: 75
Discharge: HOME | End: 2025-02-19
Admitting: NURSE PRACTITIONER
Payer: MEDICARE

## 2025-02-19 DIAGNOSIS — Z12.31 ENCOUNTER FOR SCREENING MAMMOGRAM FOR MALIGNANT NEOPLASM OF BREAST: ICD-10-CM

## 2025-02-19 PROCEDURE — 77067 SCR MAMMO BI INCL CAD: CPT | Performed by: RADIOLOGY

## 2025-02-19 PROCEDURE — 77067 SCR MAMMO BI INCL CAD: CPT

## 2025-02-19 PROCEDURE — 77063 BREAST TOMOSYNTHESIS BI: CPT

## 2025-02-19 PROCEDURE — 77063 BREAST TOMOSYNTHESIS BI: CPT | Performed by: RADIOLOGY

## 2025-02-24 ENCOUNTER — TREATMENT (OUTPATIENT)
Dept: PHYSICAL THERAPY | Facility: CLINIC | Age: 75
End: 2025-02-24
Payer: MEDICARE

## 2025-02-24 DIAGNOSIS — R26.89 BALANCE PROBLEM: ICD-10-CM

## 2025-02-24 DIAGNOSIS — Z91.81 AT HIGH RISK FOR FALLS: Primary | ICD-10-CM

## 2025-02-24 DIAGNOSIS — R29.898 LEG WEAKNESS, BILATERAL: ICD-10-CM

## 2025-02-24 PROCEDURE — 97161 PT EVAL LOW COMPLEX 20 MIN: CPT | Performed by: PHYSICAL THERAPIST

## 2025-02-24 PROCEDURE — 97110 THERAPEUTIC EXERCISES: CPT | Performed by: PHYSICAL THERAPIST

## 2025-02-24 PROCEDURE — 97530 THERAPEUTIC ACTIVITIES: CPT | Performed by: PHYSICAL THERAPIST

## 2025-02-24 NOTE — PROGRESS NOTES
"  Physical Therapy Initial Evaluation and Plan of Care    [address] [facility] [address]      Patient: Krys Mayes   : 1950  Diagnosis/ICD-10 Code:  At high risk for falls [Z91.81]  Referring practitioner: AGUSTIN Friedman  Date of Initial Visit: 2025  Today's Date:  2025  Patient seen for 1 sessions           Subjective Questionnaire: ABC: 2%      Subjective Evaluation    History of Present Illness  Mechanism of injury: Pt reports frequent falls with most severe one last November having to go to the hospital with hitting her head; getting into the car in her driveway. Has had multiple more falls at home where her  couldn't get her up; has to call EMS to help her up. Pt started using Rwx for balance in and outside of her home; has not fallen with use of Rwx. Has been diagnosed with neuropathy within the past year. Pt has had 2 shots in her R knee and R hip which helped with her pain but pain is still there. Pain is worse in the mornings. Pt reports \"creaking\" in the R knee especially in the mornings.     Hobbies: volunteers at [facility] on     Quality of life: good    Pain  Current pain ratin  Location: R leg  Quality: dull ache, tight and discomfort  Aggravating factors: sleeping    Social Support  Lives in: multiple-level home (has chair lift to basement where her bedroom and bathroom are located)  Lives with: spouse    Hand dominance: right    Treatments  Previous treatment: physical therapy and injection treatment  Patient Goals  Patient goals for therapy: decreased pain, improved balance, increased motion, increased strength, independence with ADLs/IADLs and return to sport/leisure activities  Patient goal: I want to be able to walk without falling         Past Medical History:   Diagnosis Date    Arthritis     Cholelithiasis taken out 91    Diverticulosis     Gastroesophageal reflux disease 2016    Hyperlipidemia     Hypertension     " Kidney stone     recurrent, calcium oxalate    Menopausal disorder 1998    She went through menopause in 1998    Neuropathy     Obesity     PAF (paroxysmal atrial fibrillation)     in the setting of urosepsis, 9/2016    Reflux esophagitis 08/19/2016    Sebaceous cyst of labia 12/20/2017    Sepsis due to urinary tract infection     Sigmoid diverticulitis 11/13/2017    Type 2 diabetes mellitus     Urinary tract infection     Visual impairment cataracts    Vitamin D deficiency 01/21/2016      Past Surgical History:   Procedure Laterality Date    ANKLE SURGERY      Ankle Repair / Description: ORif the left ankle in July 2010. Secondary to fall    BREAST BIOPSY Right     benign    CATARACT EXTRACTION Right     CHOLECYSTECTOMY      COLONOSCOPY  2014    Done 2004 normal recheck in 10 years. Repeated February 2014 colonoscopy was normal and to be repeated in 10 years.    CYSTOSCOPY BLADDER STONE LITHOTRIPSY      FRACTURE SURGERY      HEMORRHOIDECTOMY      NEPHROLITHOTOMY Left 01/09/2017    Procedure: NEPHROLITHOTOMY PERCUTANEOUS SECOND LOOK WITH STENT PLACEMENT AND LASER LITHOTRIPSY;  Surgeon: Mati Villegas MD;  Location: [facility];  Service:     OTHER SURGICAL HISTORY      Tubal Ligation    PERCUTANEOUS NEPHROSTOLITHOTOMY Left 12/2016    TUBAL ABDOMINAL LIGATION      URETEROSCOPY LASER LITHOTRIPSY WITH STENT INSERTION Left 12/06/2022    Procedure: LEFT URETEROSCOPY WITH LASER LITHOTRIPSY, STONE BASKET EXTRACTION, STENT PLACEMENT;  Surgeon: Mati Villegas MD;  Location: [facility] OR;  Service: Urology;  Laterality: Left;          Objective          Neurological Testing     Sensation     Hip   Left Hip   Intact: light touch    Right Hip   Intact: light touch    Additional Neurological Details  Pt denies any numbness/tingling BLE but does have some intermittent burning in her toes    Active Range of Motion   Left Hip   Flexion: 82 degrees with pain    Right Hip   Flexion: 76 degrees with pain  Left Knee    Flexion: 95 degrees     Right Knee   Flexion: 97 degrees     Strength/Myotome Testing     Left Hip   Planes of Motion   Flexion: 4  Abduction: 3  Adduction: 3    Right Hip   Planes of Motion   Flexion: 3-  Abduction: 3  Adduction: 3    Left Knee   Extension: 4+    Right Knee   Extension: 4+    Left Ankle/Foot   Dorsiflexion: 3-  Plantar flexion: 3+  Great toe extension: 3+    Right Ankle/Foot   Dorsiflexion: 3-  Plantar flexion: 3+  Great toe extension: 3+    Ambulation     Observational Gait   Gait: antalgic   Decreased left step length and right step length.     Additional Observational Gait Details  Trendelenburg RLE    Comments   TUG:   With rollator: 26.5 sec  Without AD: 18.5 sec    5x STS: 23.4 sec  Unable to stand from standard chair without use of arms    Functional Assessment     Comments  Balance    Eyes open on firm surface: 30 sec  Eyes open on foam surface: 30 sec, minimal sway  Eyes closed on firm surface: 30 sec moderate sway  Eyes closed on foam surface: 30 sec, moderate sway    Tandem stance:  LLE posterior: 4-5 sec  RLE posterior: 30 sec +    SLS  LLE: 1-2 sec  RLE: unable            Assessment & Plan       Assessment  Impairments: abnormal coordination, abnormal gait, abnormal or restricted ROM, activity intolerance, impaired balance, impaired physical strength, lacks appropriate home exercise program, pain with function and safety issue   Functional limitations: carrying objects, lifting, walking, pulling, pushing, uncomfortable because of pain, standing, reaching overhead and unable to perform repetitive tasks   Assessment details: Krys Mayes is a 75 y.o. year-old female referred to physical therapy for frequent falls and balance impairments. She presents with a stable clinical presentation.  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  Signs and  symptoms are consistent with physical therapy diagnosis of impaired balance, LE strength, ROM and pain limiting her functional mobility and ADLs requiring her to depend on rollator for support. Patient is appropriate for skilled physical therapy in order to reduce pain and increase ease with daily mobility.  Pt educated on anatomy, goal of interventions, possible causes of falls, and initial HEP.   Prognosis: good    Goals  Plan Goals: Goals  Plan Goals: STG In 4 weeks  - Pt to be independent/compliant with HEP without exacerbation of symptoms.  - Pt will improve B knee flexion to 110 degrees B to improve transfers and gait  - Pain not > 3/10 with ADLs and functional mobility around her home  - Pt to exhibit B hip flexion AROM to >90 degrees with tolerable pain to allow for improved walking tolerance        LTG In 8 weeks  - TUG testing < 13.5 sec to demonstrate age related norms and reduce risk of falling.   - Pt to score on ABC to at least 40% to demonstrate improved confidence with functional mobility  - Pt to exhibit >/= 4/5 strength throughout B hip, knee and ankle musculature to allow for normalized gait and standing > 10 min  - 5TSTS < 18 sec to demonstrate improved LE function as need for gait endurance  -SLS on RLE for at least 3 sec to improve balance and RLE WB tolerance           Plan  Therapy options: will be seen for skilled therapy services  Planned modality interventions: cryotherapy, thermotherapy (hydrocollator packs), traction, ultrasound, iontophoresis, dry needling and electrical stimulation/Russian stimulation  Planned therapy interventions: abdominal trunk stabilization, ADL retraining, balance/weight-bearing training, body mechanics training, flexibility, functional ROM exercises, gait training, home exercise program, IADL retraining, joint mobilization, manual therapy, neuromuscular re-education, postural training, soft tissue mobilization, spinal/joint mobilization, strengthening,  stretching, therapeutic activities and transfer training  Frequency: 2x week  Treatment plan discussed with: patient  Plan details: 2x per week for 12 weeks        Visit Diagnoses:    ICD-10-CM ICD-9-CM   1. At high risk for falls  Z91.81 V15.88   2. Balance problem  R26.89 781.99   3. Leg weakness, bilateral  R29.898 729.89       Timed:  Manual Therapy:    -     mins  83052;  Therapeutic Exercise:    20     mins  13023;     Neuromuscular Cem:    -    mins  88280;    Therapeutic Activity:     10     mins  08255;     Gait Training:      -     mins  99344;     Ultrasound:     -     mins  24484;    Electrical Stimulation:    -     mins  44939 ( );    Low Eval                       20      Mins  83805  Mod Eval                        -     Mins  14527  High Eval                       -     Mins  18851    Untimed:  Electrical Stimulation:    -     mins  57512 ( );  Mechanical Traction:    -     mins  53919;     Timed Treatment:   30   mins   Total Treatment:     55   mins    PT SIGNATURE: KOSTA Balderas license: [id number]  DATE TREATMENT INITIATED: 2/24/2025    Initial Certification  Certification Period: 5/25/2025  I certify that the therapy services are furnished while this patient is under my care.  The services outlined above are required by this patient, and will be reviewed every 90 days.     PHYSICIAN: Head, AGUSTIN Anglin      DATE:     Please sign and return via fax to [phone]. Thank you, [facility].

## 2025-02-26 ENCOUNTER — TREATMENT (OUTPATIENT)
Dept: PHYSICAL THERAPY | Facility: CLINIC | Age: 75
End: 2025-02-26
Payer: MEDICARE

## 2025-02-26 DIAGNOSIS — Z91.81 AT HIGH RISK FOR FALLS: Primary | ICD-10-CM

## 2025-02-26 DIAGNOSIS — R29.898 LEG WEAKNESS, BILATERAL: ICD-10-CM

## 2025-02-26 DIAGNOSIS — R26.89 BALANCE PROBLEM: ICD-10-CM

## 2025-02-26 NOTE — PROGRESS NOTES
Physical Therapy Daily Treatment Note  [facility]  [address] [place] [address]  [phone] (phone)  [phone] (fax)    Patient: Krys Mayes   : 1950  Diagnosis/ICD-10 Code:  At high risk for falls [Z91.81]  Referring practitioner: AGUSTIN Friedman  Date of Initial Visit: Type: THERAPY  Noted: 2025  Today's Date: 2025  Patient seen for 2 sessions       Krys Mayes reports: R knee was hurting me after last session but I am doing okay today.     Subjective     Objective   See Exercise, Manual, and Modality Logs for complete treatment.       Assessment/Plan  Subjectively, pt reports no increase of pain or discomfort with interventions performed today. Performed well with continued LE strengthening interventions with addition of nustep for increase LE strengthening and endurance. Continues to demonstrate difficulty standing on R LE. Continues to benefit from verbal/tactile cues to ensure proper form and technique for exercise performance.     Progress per Plan of Care           Manual Therapy:         mins  78771;  Therapeutic Exercise:    20     mins  08201;     Neuromuscular Cem:        mins  60046;    Therapeutic Activity:     10     mins  95876;     Gait Training:           mins  44948;     Ultrasound:          mins  70162;    Electrical Stimulation:         mins  42229;  Traction          mins 75627    Timed Treatment:   30   mins   Total Treatment:     30   mins    Marlene Hodges PTA  Physical Therapist Assistant [id number]

## 2025-03-04 ENCOUNTER — TREATMENT (OUTPATIENT)
Dept: PHYSICAL THERAPY | Facility: CLINIC | Age: 75
End: 2025-03-04
Payer: MEDICARE

## 2025-03-04 DIAGNOSIS — R26.89 BALANCE PROBLEM: ICD-10-CM

## 2025-03-04 DIAGNOSIS — R29.898 LEG WEAKNESS, BILATERAL: ICD-10-CM

## 2025-03-04 DIAGNOSIS — Z91.81 AT HIGH RISK FOR FALLS: Primary | ICD-10-CM

## 2025-03-04 NOTE — PROGRESS NOTES
Physical Therapy Daily Treatment Note      Patient: Krys Mayes   : 1950  Referring practitioner: AGUSTIN Friedman  Date of Initial Visit: Type: THERAPY  Noted: 2025  Today's Date:  3/4/2025  Patient seen for 3 sessions         Krys Mayes reports: she is doing well;  gets onto her when she isn't using her rollator. Denies any recent falls.               Objective   See Exercise, Manual, and Modality Logs for complete treatment.       Assessment/Plan  Pt able to progress LE strengthening exercises today with standing and introduce some static standing balance activities to decrease risk of falls. Pt requires SAE/close S for standing ax; noted more difficulty lifting LLE with increased pain standing single leg on LLE due to her knee arthritis. Pt needing cues for upright posture with sitting exercises with tendency go into posterior trunk lean with multiple cues to lean forward and keep feet on the floor; trunk awareness improved after seated forward flexion stretch on the ball. Continue to progress strength and ROM as tolerated to decrease risk of falls.        Progress per Plan of Care and Progress strengthening /stabilization /functional activity           Timed:  Manual Therapy:    -     mins  64230;  Therapeutic Exercise:    20     mins  84401;     Neuromuscular Cem:    -    mins  04638;    Therapeutic Activity:     10     mins  49062;     Gait Training:      -     mins  33470;     Ultrasound:     -     mins  23591;      Untimed:  Electrical Stimulation:    -     mins  31315 ( );  Mechanical Traction:    -     mins  42575;   Dry needling:      -     mins  73692/    Timed Treatment:   30   mins   Total Treatment:     35   mins  Jessica Copeland PT  Physical Therapist    License #: [id number]

## 2025-03-05 ENCOUNTER — HOSPITAL ENCOUNTER (OUTPATIENT)
Dept: CT IMAGING | Facility: HOSPITAL | Age: 75
Discharge: HOME | End: 2025-03-05
Admitting: UROLOGY
Payer: MEDICARE

## 2025-03-05 DIAGNOSIS — N20.0 CALCULUS, RENAL: ICD-10-CM

## 2025-03-05 PROCEDURE — 74176 CT ABD & PELVIS W/O CONTRAST: CPT

## 2025-03-07 ENCOUNTER — TREATMENT (OUTPATIENT)
Dept: PHYSICAL THERAPY | Facility: CLINIC | Age: 75
End: 2025-03-07
Payer: MEDICARE

## 2025-03-07 DIAGNOSIS — R29.898 LEG WEAKNESS, BILATERAL: ICD-10-CM

## 2025-03-07 DIAGNOSIS — Z91.81 AT HIGH RISK FOR FALLS: Primary | ICD-10-CM

## 2025-03-07 DIAGNOSIS — R26.89 BALANCE PROBLEM: ICD-10-CM

## 2025-03-07 NOTE — PROGRESS NOTES
Physical Therapy Daily Treatment Note  [facility]  [address] [place] [address]  [phone] (phone)  [phone] (fax)    Patient: Krys Mayes   : 1950  Diagnosis/ICD-10 Code:  At high risk for falls [Z91.81]  Referring practitioner: AGUSTIN Friedman  Date of Initial Visit: Type: THERAPY  Noted: 2025  Today's Date: 3/7/2025  Patient seen for 4 sessions       Krys Mayes reports: My back is hurting more today. Woke up that way yesterday.     Subjective     Objective   See Exercise, Manual, and Modality Logs for complete treatment.       Assessment/Plan  Subjectively, pt reports no increase of pain or discomfort with interventions performed today. Performed well with continued functional strengthening and stability interventions. Continues to demonstrate the most fatigue with hip flexion activities. Back felt better with exercise, especially with adding different directions to lumbar stretch. Continues to benefit from verbal/tactile cues to ensure proper form and technique for exercise performance.     Progress per Plan of Care           Manual Therapy:         mins  15511;  Therapeutic Exercise:    25     mins  63731;     Neuromuscular Cem:    10    mins  76435;    Therapeutic Activity:     10     mins  65232;     Gait Training:           mins  35387;     Ultrasound:          mins  96064;    Electrical Stimulation:         mins  88374;  Traction          mins 25396    Timed Treatment:   45   mins   Total Treatment:     55   mins    Marlene Hodges PTA  Physical Therapist Assistant [id number]

## 2025-03-10 DIAGNOSIS — F41.9 ANXIETY: ICD-10-CM

## 2025-03-10 DIAGNOSIS — E78.2 MIXED HYPERLIPIDEMIA: ICD-10-CM

## 2025-03-10 RX ORDER — ALPRAZOLAM 1 MG/1
TABLET ORAL
Qty: 60 TABLET | Refills: 0 | Status: SHIPPED | OUTPATIENT
Start: 2025-03-10

## 2025-03-10 RX ORDER — SIMVASTATIN 80 MG
80 TABLET ORAL EVERY EVENING
Qty: 90 TABLET | Refills: 0 | Status: SHIPPED | OUTPATIENT
Start: 2025-03-10

## 2025-03-12 ENCOUNTER — OFFICE VISIT (OUTPATIENT)
Dept: FAMILY MEDICINE CLINIC | Facility: CLINIC | Age: 75
End: 2025-03-12
Payer: MEDICARE

## 2025-03-12 ENCOUNTER — HOSPITAL ENCOUNTER (OUTPATIENT)
Dept: GENERAL RADIOLOGY | Facility: HOSPITAL | Age: 75
Discharge: HOME | End: 2025-03-12
Payer: MEDICARE

## 2025-03-12 ENCOUNTER — TRANSCRIBE ORDERS (OUTPATIENT)
Dept: CT IMAGING | Facility: HOSPITAL | Age: 75
End: 2025-03-12
Payer: MEDICARE

## 2025-03-12 VITALS
DIASTOLIC BLOOD PRESSURE: 76 MMHG | SYSTOLIC BLOOD PRESSURE: 110 MMHG | HEIGHT: 66 IN | BODY MASS INDEX: 32.83 KG/M2 | WEIGHT: 204.3 LBS | TEMPERATURE: 97.3 F | OXYGEN SATURATION: 96 % | HEART RATE: 83 BPM

## 2025-03-12 DIAGNOSIS — S99.912A INJURY OF LEFT ANKLE AND FOOT, INITIAL ENCOUNTER: Primary | ICD-10-CM

## 2025-03-12 DIAGNOSIS — S99.922A INJURY OF LEFT ANKLE AND FOOT, INITIAL ENCOUNTER: ICD-10-CM

## 2025-03-12 DIAGNOSIS — S99.922A INJURY OF LEFT ANKLE AND FOOT, INITIAL ENCOUNTER: Primary | ICD-10-CM

## 2025-03-12 DIAGNOSIS — S99.912A INJURY OF LEFT ANKLE AND FOOT, INITIAL ENCOUNTER: ICD-10-CM

## 2025-03-12 DIAGNOSIS — N20.0 CALCULUS, RENAL: Primary | ICD-10-CM

## 2025-03-12 PROCEDURE — 73610 X-RAY EXAM OF ANKLE: CPT

## 2025-03-12 PROCEDURE — 73630 X-RAY EXAM OF FOOT: CPT

## 2025-03-12 RX ORDER — RIVAROXABAN 15 MG/1
15 TABLET, FILM COATED ORAL DAILY
Qty: 90 TABLET | Refills: 2 | Status: SHIPPED | OUTPATIENT
Start: 2025-03-12

## 2025-03-12 NOTE — PROGRESS NOTES
"  Subjective   Krystor Mayes is a 75 y.o. female who is here for   Chief Complaint   Patient presents with    Fall   .     History of Present Illness     Danielle slipped getting out of her bed yesterday.  Landing awkwardly on her left foot and ankle.  She had a previous fracture of left foot and ankle and has had surgery.  Has plates and screws  Family called 911; EMS offered her a ride to the ER ;she declined  Today now her left foot and ankle hurts more now ,its also bruised.  She is on Xarelto blood thinner    The following portions of the patient's history were reviewed and updated as appropriate: allergies, current medications, past medical history, past social history, past surgical history, and problem list.    Review of Systems    Objective   Vitals:    03/12/25 1523   BP: 110/76   Pulse: 83   Temp: 97.3 °F (36.3 °C)   TempSrc: Infrared   SpO2: 96%   Weight: 92.7 kg (204 lb 4.8 oz)   Height: 167.6 cm (66\")      Physical Exam  Left ankle.  Previous surgical incision lateral aspect.  Looks to have lots of chronic soft tissue swelling  the anterior foot is tender to touch.  There is purple bruising down by the toes    XR Ankle 3+ View Left (03/12/2025 16:11)   XR Foot 3+ View Left (03/12/2025 16:10)       Good news on the reports.  Nothing fractured.  All of her old hardware looks like it is in place  Assessment & Plan   Diagnoses and all orders for this visit:    1. Injury of left ankle and foot, initial encounter (Primary)  -     XR Ankle 3+ View Left; Future  -     XR Foot 3+ View Left; Future    Reviewed x-ray reports no fracture  Patient reports she is able to walk on it without too much pain  Continue Tylenol for pain and ice packs as needed  There are no Patient Instructions on file for this visit.    There are no discontinued medications.     No follow-ups on file.    Dr. Lior Piña  [facility]  [place], [place]    "

## 2025-03-18 ENCOUNTER — TREATMENT (OUTPATIENT)
Dept: PHYSICAL THERAPY | Facility: CLINIC | Age: 75
End: 2025-03-18
Payer: MEDICARE

## 2025-03-18 DIAGNOSIS — Z91.81 AT HIGH RISK FOR FALLS: Primary | ICD-10-CM

## 2025-03-18 DIAGNOSIS — R26.89 BALANCE PROBLEM: ICD-10-CM

## 2025-03-18 DIAGNOSIS — R29.898 LEG WEAKNESS, BILATERAL: ICD-10-CM

## 2025-03-18 NOTE — PROGRESS NOTES
Physical Therapy Daily Treatment Note      Patient: Krys Mayes   : 1950  Referring practitioner: AGUSTIN Friedman  Date of Initial Visit: Type: THERAPY  Noted: 2025  Today's Date:  3/18/2025  Patient seen for 5 sessions         Krys Mayes reports: she fell out of bed last week due to her feet sliding out from under her; has a bed rail on the bed for support. LLE x rays clear. Then was vacuuming the patio when her  wasn't there and fell forward hitting her head on the floor; called EMS for help up when her  got home. 2 black eyes due her glasses. On top of all that her 15 year old cat .               Objective     L knee   Flexion: 100 degrees  Ext: 0 degrees    R knee   Flexion: 91 degrees  Ext: 0 degrees      See Exercise, Manual, and Modality Logs for complete treatment.       Assessment/Plan  Pt with 2 falls within the past 10 days; cleared by PCP next door with no LLE fracture but decreased B knee ROM and sig facial bruising with 2 black eyes. No progression of exercises today due to recent falls; discussed staying with rollator at all times especially when she is home alone for safety. Deferred static standing balance today with reported fatigue at end of session.        Progress per Plan of Care and Progress strengthening /stabilization /functional activity         Timed:  Manual Therapy:    -     mins  35141;  Therapeutic Exercise:    30     mins  18646;     Neuromuscular Cem:    -    mins  23269;    Therapeutic Activity:     15     mins  61204;     Gait Training:      -     mins  09821;     Ultrasound:     -     mins  10279;      Untimed:  Electrical Stimulation:    -     mins  49206 ( );  Mechanical Traction:    -     mins  93852;   Dry needling:      -     mins  59760/    Timed Treatment:   45   mins   Total Treatment:     45   mins  Jessica Copeland PT  Physical Therapist    License #: [id number]

## 2025-03-21 ENCOUNTER — TREATMENT (OUTPATIENT)
Dept: PHYSICAL THERAPY | Facility: CLINIC | Age: 75
End: 2025-03-21
Payer: MEDICARE

## 2025-03-21 DIAGNOSIS — R26.89 BALANCE PROBLEM: ICD-10-CM

## 2025-03-21 DIAGNOSIS — Z91.81 AT HIGH RISK FOR FALLS: Primary | ICD-10-CM

## 2025-03-21 DIAGNOSIS — R29.898 LEG WEAKNESS, BILATERAL: ICD-10-CM

## 2025-03-21 NOTE — PROGRESS NOTES
Physical Therapy Daily Treatment Note  [facility]  [address] [place] [address]  [phone] (phone)  [phone] (fax)    Patient: Krys Mayes   : 1950  Diagnosis/ICD-10 Code:  At high risk for falls [Z91.81]  Referring practitioner: AGUSTIN Friedman  Date of Initial Visit: Type: THERAPY  Noted: 2025  Today's Date: 3/21/2025  Patient seen for 6 sessions       Krys Mayes reports: Still pretty sore from fall last week. R lower back sore. Bruises are getting better.     Subjective     Objective   See Exercise, Manual, and Modality Logs for complete treatment.       Assessment/Plan  Subjectively, pt reports no increase of pain or discomfort with interventions performed today. Performed well with continued strengthening interventions as tolerated although still sore from fall. Continues to demonstrate slow controlled transitional movements. Continues to benefit from verbal/tactile cues to ensure proper form and technique for exercise performance.     Progress per Plan of Care           Manual Therapy:         mins  04180;  Therapeutic Exercise:    15     mins  16517;     Neuromuscular Cem:    8    mins  58288;    Therapeutic Activity:     15     mins  12791;     Gait Training:           mins  79951;     Ultrasound:          mins  44510;    Electrical Stimulation:         mins  03703;  Traction          mins 80555    Timed Treatment:   38   mins   Total Treatment:     50   mins    Marlene Hodges PTA  Physical Therapist Assistant [id number]

## 2025-03-25 ENCOUNTER — TREATMENT (OUTPATIENT)
Dept: PHYSICAL THERAPY | Facility: CLINIC | Age: 75
End: 2025-03-25
Payer: MEDICARE

## 2025-03-25 DIAGNOSIS — Z91.81 AT HIGH RISK FOR FALLS: Primary | ICD-10-CM

## 2025-03-25 DIAGNOSIS — R53.1 DECREASED STRENGTH, ENDURANCE, AND MOBILITY: Primary | ICD-10-CM

## 2025-03-25 DIAGNOSIS — R68.89 DECREASED STRENGTH, ENDURANCE, AND MOBILITY: Primary | ICD-10-CM

## 2025-03-25 DIAGNOSIS — R29.6 FREQUENT FALLS: ICD-10-CM

## 2025-03-25 DIAGNOSIS — M54.9 BILATERAL BACK PAIN, UNSPECIFIED BACK LOCATION, UNSPECIFIED CHRONICITY: ICD-10-CM

## 2025-03-25 DIAGNOSIS — R29.898 LEG WEAKNESS, BILATERAL: ICD-10-CM

## 2025-03-25 DIAGNOSIS — Z74.09 DECREASED STRENGTH, ENDURANCE, AND MOBILITY: Primary | ICD-10-CM

## 2025-03-25 DIAGNOSIS — R26.89 BALANCE PROBLEM: ICD-10-CM

## 2025-03-25 PROCEDURE — 97110 THERAPEUTIC EXERCISES: CPT | Performed by: PHYSICAL THERAPIST

## 2025-03-25 PROCEDURE — 97530 THERAPEUTIC ACTIVITIES: CPT | Performed by: PHYSICAL THERAPIST

## 2025-03-25 NOTE — PROGRESS NOTES
Re-Assessment / Re-Certification        Patient: Krys Mayes   : 1950  Diagnosis/ICD-10 Code:  At high risk for falls [Z91.81]  Referring practitioner: AGUSTIN Friedman  Date of Initial Visit: Type: THERAPY  Noted: 2025  Today's Date:  3/25/2025  Patient seen for 7 sessions      Subjective:   Krys Mayes reports: she has not fallen since last incident; facial bruising is improving.   Subjective Questionnaire: ABC: 18%  Clinical Progress: worse due to fall 2 weeks ago  Home Program Compliance: Yes  Treatment has included: therapeutic exercise, neuromuscular re-education, therapeutic activity, and gait training    Subjective   Objective     Active Range of Motion   Left Hip   Flexion: 75 degrees with pain     Right Hip   Flexion: 85 degrees with pain    Left Knee   Flexion: 112 degrees      Right Knee   Flexion: 106 degrees      Strength/Myotome Testing      Left Hip   Planes of Motion   Flexion: 4  Abduction: 3  Adduction: 3     Right Hip   Planes of Motion   Flexion: 3-  Abduction: 3  Adduction: 3     Left Knee   Extension: 4+     Right Knee   Extension: 4+     Left Ankle/Foot   Dorsiflexion: 3-  Plantar flexion: 3+  Great toe extension: 3+     Right Ankle/Foot   Dorsiflexion: 3-  Plantar flexion: 3+  Great toe extension: 3+     Ambulation      Observational Gait   Gait: antalgic   Decreased left step length and right step length.      Additional Observational Gait Details  Trendelenburg RLE     Comments   TUG:   With rollator: 29.5 sec     5x STS: 19.2 sec  Unable to stand from standard chair without use of arms     Functional Assessment      Comments  Balance     Eyes open on firm surface: 30 sec  Eyes open on foam surface: 30 sec, minimal sway  Eyes closed on firm surface: 30 sec moderate sway  Eyes closed on foam surface: 30 sec, moderate sway     Tandem stance:  LLE posterior: 22 sec  RLE posterior: 23 sec     SLS  LLE: unable  RLE: unable    Assessment & Plan        Assessment  Impairments: abnormal coordination, abnormal gait, abnormal or restricted ROM, activity intolerance, impaired balance, impaired physical strength, lacks appropriate home exercise program, pain with function and safety issue   Functional limitations: carrying objects, lifting, walking, pulling, pushing, uncomfortable because of pain, standing, reaching overhead and unable to perform repetitive tasks   Assessment details: Krys Mayes is a 75 y.o. year-old female referred to physical therapy for frequent falls and balance impairments. She presents with a stable clinical presentation.  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 Pt had to miss  a week of PT due to post-fall soreness. Improved knee and hip ROM but still impaired balance, LE strength, ROM and pain limiting her functional mobility and ADLs requiring her to depend on rollator for support. Patient is appropriate for skilled physical therapy in order to reduce pain and increase ease with daily mobility.  Pt educated on anatomy, goal of interventions, possible causes of falls, and initial HEP.   Prognosis: good  Prognosis details: Set back due to fall 2 weeks ago    Goals  Plan Goals: Goals  Plan Goals: STG In 4 weeks  - Pt to be independent/compliant with HEP without exacerbation of symptoms.-PROGRESSING  - Pt will improve B knee flexion to 110 degrees B to improve transfers and gait-PROGRESSING  - Pain not > 3/10 with ADLs and functional mobility around her home-PROGRESSING  - Pt to exhibit B hip flexion AROM to >90 degrees with tolerable pain to allow for improved walking tolerance-PROGRESSING        LTG In 8 weeks  - TUG testing < 13.5 sec to demonstrate age related norms and reduce risk of falling. -PROGRESSING  - Pt to score on ABC to at least 40% to  demonstrate improved confidence with functional mobility-PROGRESSING  - Pt to exhibit >/= 4/5 strength throughout B hip, knee and ankle musculature to allow for normalized gait and standing > 10 min-PROGRESSING  - 5TSTS < 18 sec to demonstrate improved LE function as need for gait endurance-PROGRESSING  -SLS on RLE for at least 3 sec to improve balance and RLE WB tolerance-PROGRESSING           Plan  Therapy options: will be seen for skilled therapy services  Planned modality interventions: cryotherapy, thermotherapy (hydrocollator packs), traction, ultrasound, iontophoresis, dry needling and electrical stimulation/Russian stimulation  Planned therapy interventions: abdominal trunk stabilization, ADL retraining, balance/weight-bearing training, body mechanics training, flexibility, functional ROM exercises, gait training, home exercise program, IADL retraining, joint mobilization, manual therapy, neuromuscular re-education, postural training, soft tissue mobilization, spinal/joint mobilization, strengthening, stretching, therapeutic activities and transfer training  Frequency: 2x week  Treatment plan discussed with: patient  Plan details: 2x per week for 10 weeks        Visit Diagnoses:    ICD-10-CM ICD-9-CM   1. At high risk for falls  Z91.81 V15.88   2. Balance problem  R26.89 781.99   3. Leg weakness, bilateral  R29.898 729.89         PT Signature: Jessica Copeland PT  [place] license: [id number]        Timed:  Manual Therapy:    -     mins  35078;  Therapeutic Exercise:    20     mins  56291;     Neuromuscular Cem:    -    mins  54472;    Therapeutic Activity:     15     mins  96717;     Gait Training:      -     mins  14798;     Ultrasound:     -     mins  62551;    Electrical Stimulation:    -     mins  18394 ( );    Untimed:  Electrical Stimulation:    -     mins  41970 ( );  Mechanical Traction:    -     mins  61131; \  Dry needling:      -     mins  77201/20561    Timed Treatment:   35   mins   Total  Treatment:     60   mins    Decreased units billed to account for divided time

## 2025-03-27 ENCOUNTER — APPOINTMENT (OUTPATIENT)
Dept: CT IMAGING | Facility: HOSPITAL | Age: 75
DRG: 177 | End: 2025-03-27
Payer: MEDICARE

## 2025-03-27 ENCOUNTER — HOSPITAL ENCOUNTER (EMERGENCY)
Facility: HOSPITAL | Age: 75
Discharge: HOME | DRG: 177 | End: 2025-03-27
Attending: EMERGENCY MEDICINE
Payer: MEDICARE

## 2025-03-27 VITALS
BODY MASS INDEX: 32.8 KG/M2 | SYSTOLIC BLOOD PRESSURE: 121 MMHG | TEMPERATURE: 98.3 F | HEIGHT: 67 IN | WEIGHT: 209 LBS | DIASTOLIC BLOOD PRESSURE: 63 MMHG | OXYGEN SATURATION: 95 % | HEART RATE: 97 BPM | RESPIRATION RATE: 16 BRPM

## 2025-03-27 DIAGNOSIS — Z86.59 MENTAL STATUS CHANGE RESOLVED: Primary | ICD-10-CM

## 2025-03-27 DIAGNOSIS — N30.00 ACUTE CYSTITIS WITHOUT HEMATURIA: ICD-10-CM

## 2025-03-27 LAB
ALBUMIN SERPL-MCNC: 3.7 G/DL (ref 3.5–5.2)
ALBUMIN/GLOB SERPL: 1.1 G/DL
ALP SERPL-CCNC: 72 U/L (ref 39–117)
ALT SERPL W P-5'-P-CCNC: 9 U/L (ref 1–33)
ANION GAP SERPL CALCULATED.3IONS-SCNC: 12.4 MMOL/L (ref 5–15)
AST SERPL-CCNC: 27 U/L (ref 1–32)
BACTERIA UR QL AUTO: ABNORMAL /HPF
BASOPHILS # BLD AUTO: 0.05 10*3/MM3 (ref 0–0.2)
BASOPHILS NFR BLD AUTO: 0.6 % (ref 0–1.5)
BILIRUB SERPL-MCNC: 0.4 MG/DL (ref 0–1.2)
BILIRUB UR QL STRIP: NEGATIVE
BUN SERPL-MCNC: 16 MG/DL (ref 8–23)
BUN/CREAT SERPL: 17.2 (ref 7–25)
CALCIUM SPEC-SCNC: 9.7 MG/DL (ref 8.6–10.5)
CHLORIDE SERPL-SCNC: 102 MMOL/L (ref 98–107)
CLARITY UR: ABNORMAL
CO2 SERPL-SCNC: 20.6 MMOL/L (ref 22–29)
COLOR UR: ABNORMAL
CREAT SERPL-MCNC: 0.93 MG/DL (ref 0.57–1)
DEPRECATED RDW RBC AUTO: 49.2 FL (ref 37–54)
EGFRCR SERPLBLD CKD-EPI 2021: 63.8 ML/MIN/1.73
EOSINOPHIL # BLD AUTO: 0.04 10*3/MM3 (ref 0–0.4)
EOSINOPHIL NFR BLD AUTO: 0.5 % (ref 0.3–6.2)
ERYTHROCYTE [DISTWIDTH] IN BLOOD BY AUTOMATED COUNT: 15.2 % (ref 12.3–15.4)
GLOBULIN UR ELPH-MCNC: 3.4 GM/DL
GLUCOSE SERPL-MCNC: 95 MG/DL (ref 65–99)
GLUCOSE UR STRIP-MCNC: NEGATIVE MG/DL
HCT VFR BLD AUTO: 48.9 % (ref 34–46.6)
HGB BLD-MCNC: 14.2 G/DL (ref 12–15.9)
HGB UR QL STRIP.AUTO: ABNORMAL
HOLD SPECIMEN: NORMAL
HOLD SPECIMEN: NORMAL
HYALINE CASTS UR QL AUTO: ABNORMAL /LPF
IMM GRANULOCYTES # BLD AUTO: 0.05 10*3/MM3 (ref 0–0.05)
IMM GRANULOCYTES NFR BLD AUTO: 0.6 % (ref 0–0.5)
KETONES UR QL STRIP: NEGATIVE
LEUKOCYTE ESTERASE UR QL STRIP.AUTO: ABNORMAL
LYMPHOCYTES # BLD AUTO: 0.43 10*3/MM3 (ref 0.7–3.1)
LYMPHOCYTES NFR BLD AUTO: 4.9 % (ref 19.6–45.3)
MCH RBC QN AUTO: 26.5 PG (ref 26.6–33)
MCHC RBC AUTO-ENTMCNC: 29 G/DL (ref 31.5–35.7)
MCV RBC AUTO: 91.2 FL (ref 79–97)
MONOCYTES # BLD AUTO: 0.95 10*3/MM3 (ref 0.1–0.9)
MONOCYTES NFR BLD AUTO: 10.8 % (ref 5–12)
NEUTROPHILS NFR BLD AUTO: 7.26 10*3/MM3 (ref 1.7–7)
NEUTROPHILS NFR BLD AUTO: 82.6 % (ref 42.7–76)
NITRITE UR QL STRIP: POSITIVE
NRBC BLD AUTO-RTO: 0 /100 WBC (ref 0–0.2)
PH UR STRIP.AUTO: 6 [PH] (ref 4.5–8)
PLATELET # BLD AUTO: 189 10*3/MM3 (ref 140–450)
PMV BLD AUTO: 9.7 FL (ref 6–12)
POTASSIUM SERPL-SCNC: 4.6 MMOL/L (ref 3.5–5.2)
PROT SERPL-MCNC: 7.1 G/DL (ref 6–8.5)
PROT UR QL STRIP: ABNORMAL
RBC # BLD AUTO: 5.36 10*6/MM3 (ref 3.77–5.28)
RBC # UR STRIP: ABNORMAL /HPF
REF LAB TEST METHOD: ABNORMAL
SODIUM SERPL-SCNC: 135 MMOL/L (ref 136–145)
SP GR UR STRIP: 1.01 (ref 1–1.03)
SQUAMOUS #/AREA URNS HPF: ABNORMAL /HPF
UROBILINOGEN UR QL STRIP: ABNORMAL
WBC # UR STRIP: ABNORMAL /HPF
WBC NRBC COR # BLD AUTO: 8.78 10*3/MM3 (ref 3.4–10.8)
WHOLE BLOOD HOLD SPECIMEN: NORMAL

## 2025-03-27 PROCEDURE — 87088 URINE BACTERIA CULTURE: CPT | Performed by: EMERGENCY MEDICINE

## 2025-03-27 PROCEDURE — 87086 URINE CULTURE/COLONY COUNT: CPT | Performed by: EMERGENCY MEDICINE

## 2025-03-27 PROCEDURE — 85025 COMPLETE CBC W/AUTO DIFF WBC: CPT | Performed by: EMERGENCY MEDICINE

## 2025-03-27 PROCEDURE — 87186 SC STD MICRODIL/AGAR DIL: CPT | Performed by: EMERGENCY MEDICINE

## 2025-03-27 PROCEDURE — 70450 CT HEAD/BRAIN W/O DYE: CPT

## 2025-03-27 PROCEDURE — 81001 URINALYSIS AUTO W/SCOPE: CPT | Performed by: EMERGENCY MEDICINE

## 2025-03-27 PROCEDURE — 99284 EMERGENCY DEPT VISIT MOD MDM: CPT | Performed by: EMERGENCY MEDICINE

## 2025-03-27 PROCEDURE — 80053 COMPREHEN METABOLIC PANEL: CPT | Performed by: EMERGENCY MEDICINE

## 2025-03-27 RX ORDER — SODIUM CHLORIDE 0.9 % (FLUSH) 0.9 %
10 SYRINGE (ML) INJECTION AS NEEDED
Status: DISCONTINUED | OUTPATIENT
Start: 2025-03-27 | End: 2025-03-27 | Stop reason: HOSPADM

## 2025-03-27 RX ORDER — NITROFURANTOIN 25; 75 MG/1; MG/1
100 CAPSULE ORAL EVERY 12 HOURS
Qty: 14 CAPSULE | Refills: 0 | Status: SHIPPED | OUTPATIENT
Start: 2025-03-27 | End: 2025-04-01 | Stop reason: HOSPADM

## 2025-03-27 NOTE — ED PROVIDER NOTES
Subjective   History of Present Illness  Patient presents via EMS who were called by patient's  due to concern about possible confusion.  Patient did suffer a fall a few weeks ago and was evaluated and is recovering well.  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  Patient said this morning she was getting out of bed and was having issues with her nightgown.  Patient kept asking for help from her  but he kept trying to tell her that what she was doing was incorrect.  Patient started getting frustrated and has been was concerned that she was confused.  Patient is otherwise been at baseline health and no recent fever, cough, weakness in her arms or legs, syncope, or sensory changes in her arms or legs.  No recent hospitalizations or surgeries and no medication changes.      Review of Systems   All other systems reviewed and are negative.      Past Medical History:   Diagnosis Date    Abnormal ECG 9/16    afib brought on by strong antibiotics    Arthritis     Cholelithiasis taken out 1/14/91    Clotting disorder blood thinner    Diverticulosis     Gastroesophageal reflux disease 01/21/2016    Hyperlipidemia     Hypertension     Kidney stone     recurrent, calcium oxalate    Menopausal disorder 1998    She went through menopause in 1998    Neuropathy     Obesity     PAF (paroxysmal atrial fibrillation)     in the setting of urosepsis, 9/2016    Reflux esophagitis 08/19/2016    Sebaceous cyst of labia 12/20/2017    Sepsis due to urinary tract infection     Sigmoid diverticulitis 11/13/2017    Type 2 diabetes mellitus     Urinary tract infection     Visual impairment cataracts    Vitamin D deficiency 01/21/2016       Allergies   Allergen Reactions    Morphine And Codeine GI Intolerance    Ciprofloxacin-Ciproflox Hcl Er Rash    Flomax [Tamsulosin Hcl] Rash    Hydrocodone Rash    Latex Rash    Lortab [Hydrocodone-Acetaminophen] Rash     Penicillins Rash    Phenergan [Promethazine Hcl] GI Intolerance    Sulfa Antibiotics Rash    Amoxicillin Rash    Tamsulosin Rash       Past Surgical History:   Procedure Laterality Date    ANKLE SURGERY      Ankle Repair / Description: ORif the left ankle in July 2010. Secondary to fall    BREAST BIOPSY Right     benign    CATARACT EXTRACTION Right     CHOLECYSTECTOMY      COLONOSCOPY  2014    Done 2004 normal recheck in 10 years. Repeated February 2014 colonoscopy was normal and to be repeated in 10 years.    CYSTOSCOPY BLADDER STONE LITHOTRIPSY      FRACTURE SURGERY      HEMORRHOIDECTOMY      NEPHROLITHOTOMY Left 01/09/2017    Procedure: NEPHROLITHOTOMY PERCUTANEOUS SECOND LOOK WITH STENT PLACEMENT AND LASER LITHOTRIPSY;  Surgeon: Mati Villegas MD;  Location: [facility] OR;  Service:     OTHER SURGICAL HISTORY      Tubal Ligation    PERCUTANEOUS NEPHROSTOLITHOTOMY Left 12/2016    TUBAL ABDOMINAL LIGATION      URETEROSCOPY LASER LITHOTRIPSY WITH STENT INSERTION Left 12/06/2022    Procedure: LEFT URETEROSCOPY WITH LASER LITHOTRIPSY, STONE BASKET EXTRACTION, STENT PLACEMENT;  Surgeon: Mati Villegas MD;  Location: [facility] OR;  Service: Urology;  Laterality: Left;       Family History   Problem Relation Age of Onset    COPD Mother     Heart attack Father         acute myocardial infarction    Anxiety disorder Father     Depression Father     Heart disease Father     Heart attack Son     Kidney disease Maternal Grandmother         Born with 1 kidney    Heart attack Paternal Uncle     Depression Sister     Heart failure Sister     Hearing loss Sister     Breast cancer Neg Hx        Social History     Socioeconomic History    Marital status:    Tobacco Use    Smoking status: Never     Passive exposure: Never    Smokeless tobacco: Never    Tobacco comments:     Never smoked ever   Vaping Use    Vaping status: Never Used   Substance and Sexual Activity    Alcohol use: Yes     Comment: She uses alcohol  once or twice a year.    Drug use: No    Sexual activity: Not Currently     Partners: Male     Birth control/protection: Post-menopausal           Objective   Physical Exam  Vitals and nursing note reviewed.   Constitutional:       Comments: Patient sitting in bed comfortably, talkative, friendly, no signs of distress.  Cooperative with exam.   HENT:      Head: Normocephalic.      Comments: Bilateral ecchymosis under eyes  Eyes:      Extraocular Movements: Extraocular movements intact.      Right eye: No nystagmus.      Left eye: No nystagmus.      Pupils: Pupils are equal, round, and reactive to light.   Cardiovascular:      Rate and Rhythm: Normal rate and regular rhythm.      Heart sounds: No murmur heard.  Pulmonary:      Effort: Pulmonary effort is normal.      Breath sounds: Normal breath sounds.   Abdominal:      General: There is no distension.      Palpations: Abdomen is soft.      Tenderness: There is no abdominal tenderness.   Musculoskeletal:         General: No swelling. Normal range of motion.      Cervical back: Normal range of motion and neck supple.   Skin:     General: Skin is warm and dry.      Capillary Refill: Capillary refill takes 2 to 3 seconds.   Neurological:      Mental Status: She is alert and oriented to person, place, and time.      GCS: GCS eye subscore is 4. GCS verbal subscore is 5. GCS motor subscore is 6.      Cranial Nerves: Cranial nerves 2-12 are intact. No dysarthria or facial asymmetry.      Sensory: Sensation is intact.      Motor: Motor function is intact.      Coordination: Coordination is intact. Romberg sign negative. Coordination normal. Finger-Nose-Finger Test normal.      Comments: No drift.   Psychiatric:         Mood and Affect: Mood normal.         Behavior: Behavior normal.         Procedures           ED Course  ED Course as of 03/29/25 1205   Thu Mar 27, 2025   0920 NIHSS - 0 [AW]   Sat Mar 29, 2025   0929 Patient's urine culture came back with resistance against  nitrofurantoin which was the antibiotic I prescribed so I changed it to Keflex.  I called the patient and she is doing well and very appreciative for the call. [AW]      ED Course User Index  [AW] Anuj Hicks MD                                                       Medical Decision Making  Ddx dementia, delirium, CVA, intercranial abnormality, electrolyte abnormality    CT Head Without Contrast  Result Date: 3/27/2025  1.No evidence for acute intracranial abnormality. Electronically Signed: aTye Ravi MD  3/27/2025 10:45 AM EDT  Workstation ID: [id number]    Labs Reviewed  COMPREHENSIVE METABOLIC PANEL - Abnormal; Notable for the following components:     Sodium                        135 (*)                CO2                           20.6 (*)            All other components within normal limits         Narrative: GFR Categories in Chronic Kidney Disease (CKD)                                      GFR Category          GFR (mL/min/1.73)    Interpretation                  G1                     90 or greater         Normal or high (1)                  G2                      60-89                Mild decrease (1)                  G3a                   45-59                Mild to moderate decrease                  G3b                   30-44                Moderate to severe decrease                  G4                    15-29                Severe decrease                  G5                    14 or less           Kidney failure                                          (1)In the absence of evidence of kidney disease, neither GFR category G1 or G2 fulfill the criteria for CKD.                                    eGFR calculation 2021 CKD-EPI creatinine equation, which does not include race as a factor  URINALYSIS W/ MICROSCOPIC IF INDICATED (NO CULTURE) - Abnormal; Notable for the following components:     Color, UA                     Other (*)               Appearance, UA                  (*)                   Blood, UA                     Large (3+) (*)               Protein, UA                     (*)                  Leuk Esterase, UA             Large (3+) (*)               Nitrite, UA                   Positive (*)            All other components within normal limits  CBC WITH AUTO DIFFERENTIAL - Abnormal; Notable for the following components:     RBC                           5.36 (*)               Hematocrit                    48.9 (*)               MCH                           26.5 (*)               MCHC                          29.0 (*)               Neutrophil %                  82.6 (*)               Lymphocyte %                  4.9 (*)                Immature Grans %              0.6 (*)                Neutrophils, Absolute         7.26 (*)               Lymphocytes, Absolute         0.43 (*)               Monocytes, Absolute           0.95 (*)            All other components within normal limits  RAINBOW DRAW         Narrative: The following orders were created for panel order [place] Draw.                  Procedure                               Abnormality         Status                                     ---------                               -----------         ------                                     Green Top (Gel)[[id number]]                                  Final result                               Lavender Top[[id number]]                                     Final result                                                 Please view results for these tests on the individual orders.  URINALYSIS, MICROSCOPIC ONLY  GREEN TOP  LAVENDER TOP  CBC AND DIFFERENTIAL    1128 Pt seen again prior to d/c.  Labs/Imaging reviewed and are unremarkable except patient has evidence of a urinary tract infection so we will treat with antibiotics and sent for culture..  Symptoms improved and pt feels better, vitals stable and pt. in NAD. Non-toxic. Comfortable. Ambulating without difficulty.  Tolerating po.   Relaxed breathing.  All questions personally answered at the bedside and all d/c instructions personally reviewed with pt.  Discussed the importance of close outpt. f/u and pt. understands this and agrees to do so.  Pt agrees to return to ED immediately for any new, persistent, or worsening symptoms.    EMR Dragon/Transcription disclaimer:  Much of this encounter note is an electronic transcription/translation of spoken language to printed text using the Dragon Dictation System       Problems Addressed:  Acute cystitis without hematuria: complicated acute illness or injury  Mental status change resolved: complicated acute illness or injury    Amount and/or Complexity of Data Reviewed  Labs: ordered.  Radiology: ordered.    Risk  Prescription drug management.        Final diagnoses:   Mental status change resolved   Acute cystitis without hematuria       ED Disposition  ED Disposition       ED Disposition   Discharge    Condition   Stable    Comment   --               Head, Pauline CLAY, APRN  [address] [id number] [phone]    In 3 days  If symptoms worsen         Medication List        New Prescriptions      cephalexin 500 MG capsule  Commonly known as: KEFLEX  Take 1 capsule by mouth Every 12 (Twelve) Hours for 7 days.     nitrofurantoin (macrocrystal-monohydrate) 100 MG capsule  Commonly known as: MACROBID  Take 1 capsule by mouth Every 12 (Twelve) Hours for 7 days.               Where to Get Your Medications        These medications were sent to [facility] [address] [facility] [address] [facility] [phone]  - [phone] [address] [address]      Phone: [phone]   cephalexin 500 MG capsule  nitrofurantoin (macrocrystal-monohydrate) 100 MG capsule            Anuj Hicks MD  03/27/25 1132       Anuj Hicks MD  03/29/25 1205

## 2025-03-28 ENCOUNTER — TELEPHONE (OUTPATIENT)
Dept: PHYSICAL THERAPY | Facility: CLINIC | Age: 75
End: 2025-03-28

## 2025-03-28 NOTE — TELEPHONE ENCOUNTER
"  Caller: Krys Mayes \"WYATT\"    Relationship: Self       What was the call regarding: WAS IN ER YESTER YESTERDAY WITH A UTI NOT UP TO COMING IN      ”    "

## 2025-03-29 ENCOUNTER — APPOINTMENT (OUTPATIENT)
Dept: CT IMAGING | Facility: HOSPITAL | Age: 75
End: 2025-03-29
Payer: MEDICARE

## 2025-03-29 ENCOUNTER — APPOINTMENT (OUTPATIENT)
Dept: GENERAL RADIOLOGY | Facility: HOSPITAL | Age: 75
End: 2025-03-29
Payer: MEDICARE

## 2025-03-29 ENCOUNTER — HOSPITAL ENCOUNTER (INPATIENT)
Facility: HOSPITAL | Age: 75
LOS: 2 days | Discharge: HOME HEALTH | End: 2025-04-01
Attending: EMERGENCY MEDICINE | Admitting: STUDENT IN AN ORGANIZED HEALTH CARE EDUCATION/TRAINING PROGRAM
Payer: MEDICARE

## 2025-03-29 DIAGNOSIS — U07.1 COVID-19 VIRUS INFECTION: ICD-10-CM

## 2025-03-29 DIAGNOSIS — E78.2 MIXED HYPERLIPIDEMIA: ICD-10-CM

## 2025-03-29 DIAGNOSIS — R41.82 ALTERED MENTAL STATUS, UNSPECIFIED ALTERED MENTAL STATUS TYPE: ICD-10-CM

## 2025-03-29 DIAGNOSIS — J18.9 COMMUNITY ACQUIRED BILATERAL LOWER LOBE PNEUMONIA: Primary | ICD-10-CM

## 2025-03-29 LAB
ALBUMIN SERPL-MCNC: 3.6 G/DL (ref 3.5–5.2)
ALBUMIN/GLOB SERPL: 1 G/DL
ALP SERPL-CCNC: 71 U/L (ref 39–117)
ALT SERPL W P-5'-P-CCNC: 14 U/L (ref 1–33)
ANION GAP SERPL CALCULATED.3IONS-SCNC: 15.7 MMOL/L (ref 5–15)
ARTERIAL PATENCY WRIST A: POSITIVE
AST SERPL-CCNC: 24 U/L (ref 1–32)
ATMOSPHERIC PRESS: 746.9 MMHG
B PARAPERT DNA SPEC QL NAA+PROBE: NOT DETECTED
B PERT DNA SPEC QL NAA+PROBE: NOT DETECTED
BACTERIA SPEC AEROBE CULT: ABNORMAL
BACTERIA UR QL AUTO: ABNORMAL /HPF
BASE EXCESS BLDA CALC-SCNC: 0.8 MMOL/L (ref 0–2)
BASOPHILS # BLD AUTO: 0.01 10*3/MM3 (ref 0–0.2)
BASOPHILS NFR BLD AUTO: 0.1 % (ref 0–1.5)
BDY SITE: ABNORMAL
BILIRUB SERPL-MCNC: 0.4 MG/DL (ref 0–1.2)
BILIRUB UR QL STRIP: NEGATIVE
BUN SERPL-MCNC: 19 MG/DL (ref 8–23)
BUN/CREAT SERPL: 19.2 (ref 7–25)
C PNEUM DNA NPH QL NAA+NON-PROBE: NOT DETECTED
CALCIUM SPEC-SCNC: 9.1 MG/DL (ref 8.6–10.5)
CHLORIDE SERPL-SCNC: 100 MMOL/L (ref 98–107)
CLARITY UR: ABNORMAL
CO2 SERPL-SCNC: 23.3 MMOL/L (ref 22–29)
COLOR UR: ABNORMAL
CREAT SERPL-MCNC: 0.99 MG/DL (ref 0.57–1)
D-LACTATE SERPL-SCNC: 1.6 MMOL/L (ref 0.5–2)
DEPRECATED RDW RBC AUTO: 39.7 FL (ref 37–54)
DEVICE COMMENT: ABNORMAL
EGFRCR SERPLBLD CKD-EPI 2021: 59.2 ML/MIN/1.73
EOSINOPHIL # BLD AUTO: 0 10*3/MM3 (ref 0–0.4)
EOSINOPHIL NFR BLD AUTO: 0 % (ref 0.3–6.2)
ERYTHROCYTE [DISTWIDTH] IN BLOOD BY AUTOMATED COUNT: 13.1 % (ref 12.3–15.4)
FLUAV SUBTYP SPEC NAA+PROBE: NOT DETECTED
FLUBV RNA ISLT QL NAA+PROBE: NOT DETECTED
GLOBULIN UR ELPH-MCNC: 3.5 GM/DL
GLUCOSE BLDC GLUCOMTR-MCNC: 130 MG/DL (ref 70–130)
GLUCOSE SERPL-MCNC: 132 MG/DL (ref 65–99)
GLUCOSE UR STRIP-MCNC: NEGATIVE MG/DL
HADV DNA SPEC NAA+PROBE: NOT DETECTED
HCO3 BLDA-SCNC: 23.7 MMOL/L (ref 22–28)
HCOV 229E RNA SPEC QL NAA+PROBE: NOT DETECTED
HCOV HKU1 RNA SPEC QL NAA+PROBE: NOT DETECTED
HCOV NL63 RNA SPEC QL NAA+PROBE: NOT DETECTED
HCOV OC43 RNA SPEC QL NAA+PROBE: NOT DETECTED
HCT VFR BLD AUTO: 43.6 % (ref 34–46.6)
HEMODILUTION: NO
HGB BLD-MCNC: 13.6 G/DL (ref 12–15.9)
HGB UR QL STRIP.AUTO: ABNORMAL
HMPV RNA NPH QL NAA+NON-PROBE: NOT DETECTED
HPIV1 RNA ISLT QL NAA+PROBE: NOT DETECTED
HPIV2 RNA SPEC QL NAA+PROBE: NOT DETECTED
HPIV3 RNA NPH QL NAA+PROBE: NOT DETECTED
HPIV4 P GENE NPH QL NAA+PROBE: NOT DETECTED
HYALINE CASTS UR QL AUTO: ABNORMAL /LPF
IMM GRANULOCYTES # BLD AUTO: 0.03 10*3/MM3 (ref 0–0.05)
IMM GRANULOCYTES NFR BLD AUTO: 0.3 % (ref 0–0.5)
INHALED O2 CONCENTRATION: 40 %
KETONES UR QL STRIP: NEGATIVE
LEUKOCYTE ESTERASE UR QL STRIP.AUTO: ABNORMAL
LYMPHOCYTES # BLD AUTO: 0.59 10*3/MM3 (ref 0.7–3.1)
LYMPHOCYTES NFR BLD AUTO: 6.7 % (ref 19.6–45.3)
M PNEUMO IGG SER IA-ACNC: NOT DETECTED
MCH RBC QN AUTO: 26.2 PG (ref 26.6–33)
MCHC RBC AUTO-ENTMCNC: 31.2 G/DL (ref 31.5–35.7)
MCV RBC AUTO: 83.8 FL (ref 79–97)
MODALITY: ABNORMAL
MONOCYTES # BLD AUTO: 0.33 10*3/MM3 (ref 0.1–0.9)
MONOCYTES NFR BLD AUTO: 3.8 % (ref 5–12)
NEUTROPHILS NFR BLD AUTO: 7.79 10*3/MM3 (ref 1.7–7)
NEUTROPHILS NFR BLD AUTO: 89.1 % (ref 42.7–76)
NITRITE UR QL STRIP: NEGATIVE
NRBC BLD AUTO-RTO: 0 /100 WBC (ref 0–0.2)
NT-PROBNP SERPL-MCNC: 2774 PG/ML (ref 0–1800)
O2 A-A PPRESDIFF RESPIRATORY: 0.3 MMHG
PCO2 BLDA: 32.6 MM HG (ref 35–45)
PH BLDA: 7.47 PH UNITS (ref 7.35–7.45)
PH UR STRIP.AUTO: <=5 [PH] (ref 5–8)
PLATELET # BLD AUTO: 231 10*3/MM3 (ref 140–450)
PMV BLD AUTO: 9.7 FL (ref 6–12)
PO2 BLD: 210 MM[HG] (ref 0–500)
PO2 BLDA: 83.9 MM HG (ref 80–100)
POTASSIUM SERPL-SCNC: 4.4 MMOL/L (ref 3.5–5.2)
PROCALCITONIN SERPL-MCNC: 0.11 NG/ML (ref 0–0.25)
PROT SERPL-MCNC: 7.1 G/DL (ref 6–8.5)
PROT UR QL STRIP: ABNORMAL
RBC # BLD AUTO: 5.2 10*6/MM3 (ref 3.77–5.28)
RBC # UR STRIP: ABNORMAL /HPF
REF LAB TEST METHOD: ABNORMAL
RHINOVIRUS RNA SPEC NAA+PROBE: NOT DETECTED
RSV RNA NPH QL NAA+NON-PROBE: NOT DETECTED
SAO2 % BLDCOA: 97 % (ref 92–98.5)
SARS-COV-2 RNA NPH QL NAA+NON-PROBE: DETECTED
SET MECH RESP RATE: 16
SODIUM SERPL-SCNC: 139 MMOL/L (ref 136–145)
SP GR UR STRIP: 1.02 (ref 1–1.03)
SQUAMOUS #/AREA URNS HPF: ABNORMAL /HPF
TOTAL RATE: 21 BREATHS/MINUTE
TROPONIN T SERPL HS-MCNC: 26 NG/L
UROBILINOGEN UR QL STRIP: ABNORMAL
VENTILATOR MODE: ABNORMAL
WBC # UR STRIP: ABNORMAL /HPF
WBC NRBC COR # BLD AUTO: 8.75 10*3/MM3 (ref 3.4–10.8)

## 2025-03-29 PROCEDURE — 87086 URINE CULTURE/COLONY COUNT: CPT | Performed by: EMERGENCY MEDICINE

## 2025-03-29 PROCEDURE — 87040 BLOOD CULTURE FOR BACTERIA: CPT | Performed by: EMERGENCY MEDICINE

## 2025-03-29 PROCEDURE — 80053 COMPREHEN METABOLIC PANEL: CPT | Performed by: EMERGENCY MEDICINE

## 2025-03-29 PROCEDURE — 71250 CT THORAX DX C-: CPT

## 2025-03-29 PROCEDURE — 83880 ASSAY OF NATRIURETIC PEPTIDE: CPT | Performed by: EMERGENCY MEDICINE

## 2025-03-29 PROCEDURE — 25010000002 CEFTRIAXONE PER 250 MG: Performed by: EMERGENCY MEDICINE

## 2025-03-29 PROCEDURE — 84484 ASSAY OF TROPONIN QUANT: CPT | Performed by: EMERGENCY MEDICINE

## 2025-03-29 PROCEDURE — 93005 ELECTROCARDIOGRAM TRACING: CPT | Performed by: EMERGENCY MEDICINE

## 2025-03-29 PROCEDURE — 70450 CT HEAD/BRAIN W/O DYE: CPT

## 2025-03-29 PROCEDURE — 99291 CRITICAL CARE FIRST HOUR: CPT

## 2025-03-29 PROCEDURE — 93010 ELECTROCARDIOGRAM REPORT: CPT | Performed by: INTERNAL MEDICINE

## 2025-03-29 PROCEDURE — P9612 CATHETERIZE FOR URINE SPEC: HCPCS

## 2025-03-29 PROCEDURE — 36600 WITHDRAWAL OF ARTERIAL BLOOD: CPT | Performed by: EMERGENCY MEDICINE

## 2025-03-29 PROCEDURE — 82948 REAGENT STRIP/BLOOD GLUCOSE: CPT

## 2025-03-29 PROCEDURE — 71045 X-RAY EXAM CHEST 1 VIEW: CPT

## 2025-03-29 PROCEDURE — 82803 BLOOD GASES ANY COMBINATION: CPT | Performed by: EMERGENCY MEDICINE

## 2025-03-29 PROCEDURE — 83605 ASSAY OF LACTIC ACID: CPT | Performed by: EMERGENCY MEDICINE

## 2025-03-29 PROCEDURE — 87449 NOS EACH ORGANISM AG IA: CPT | Performed by: STUDENT IN AN ORGANIZED HEALTH CARE EDUCATION/TRAINING PROGRAM

## 2025-03-29 PROCEDURE — 36415 COLL VENOUS BLD VENIPUNCTURE: CPT

## 2025-03-29 PROCEDURE — 84145 PROCALCITONIN (PCT): CPT | Performed by: EMERGENCY MEDICINE

## 2025-03-29 PROCEDURE — 85025 COMPLETE CBC W/AUTO DIFF WBC: CPT | Performed by: EMERGENCY MEDICINE

## 2025-03-29 PROCEDURE — 81001 URINALYSIS AUTO W/SCOPE: CPT | Performed by: EMERGENCY MEDICINE

## 2025-03-29 PROCEDURE — 74176 CT ABD & PELVIS W/O CONTRAST: CPT

## 2025-03-29 PROCEDURE — 0202U NFCT DS 22 TRGT SARS-COV-2: CPT | Performed by: EMERGENCY MEDICINE

## 2025-03-29 RX ORDER — CEPHALEXIN 500 MG/1
500 CAPSULE ORAL EVERY 12 HOURS
Qty: 14 CAPSULE | Refills: 0 | Status: SHIPPED | OUTPATIENT
Start: 2025-03-29 | End: 2025-04-01 | Stop reason: HOSPADM

## 2025-03-29 RX ORDER — ACETAMINOPHEN 650 MG/1
650 SUPPOSITORY RECTAL ONCE
Status: COMPLETED | OUTPATIENT
Start: 2025-03-29 | End: 2025-03-29

## 2025-03-29 RX ORDER — DEXAMETHASONE SODIUM PHOSPHATE 10 MG/ML
6 INJECTION, SOLUTION INTRA-ARTICULAR; INTRALESIONAL; INTRAMUSCULAR; INTRAVENOUS; SOFT TISSUE ONCE
Status: COMPLETED | OUTPATIENT
Start: 2025-03-29 | End: 2025-03-30

## 2025-03-29 RX ADMIN — CEFTRIAXONE 2000 MG: 2 INJECTION, POWDER, FOR SOLUTION INTRAMUSCULAR; INTRAVENOUS at 22:13

## 2025-03-29 RX ADMIN — ACETAMINOPHEN 650 MG: 650 SUPPOSITORY RECTAL at 21:22

## 2025-03-30 PROBLEM — U07.1 COVID-19 VIRUS DETECTED: Status: ACTIVE | Noted: 2025-03-30

## 2025-03-30 LAB
ALBUMIN SERPL-MCNC: 3.5 G/DL (ref 3.5–5.2)
ALBUMIN/GLOB SERPL: 1 G/DL
ALP SERPL-CCNC: 59 U/L (ref 39–117)
ALT SERPL W P-5'-P-CCNC: 15 U/L (ref 1–33)
ANION GAP SERPL CALCULATED.3IONS-SCNC: 13 MMOL/L (ref 5–15)
AST SERPL-CCNC: 22 U/L (ref 1–32)
BILIRUB SERPL-MCNC: 0.3 MG/DL (ref 0–1.2)
BUN SERPL-MCNC: 24 MG/DL (ref 8–23)
BUN/CREAT SERPL: 23.1 (ref 7–25)
CALCIUM SPEC-SCNC: 9.2 MG/DL (ref 8.6–10.5)
CHLORIDE SERPL-SCNC: 101 MMOL/L (ref 98–107)
CO2 SERPL-SCNC: 24 MMOL/L (ref 22–29)
CREAT SERPL-MCNC: 1.04 MG/DL (ref 0.57–1)
DEPRECATED RDW RBC AUTO: 40.5 FL (ref 37–54)
EGFRCR SERPLBLD CKD-EPI 2021: 55.8 ML/MIN/1.73
ERYTHROCYTE [DISTWIDTH] IN BLOOD BY AUTOMATED COUNT: 13.4 % (ref 12.3–15.4)
GEN 5 1HR TROPONIN T REFLEX: 28 NG/L
GLOBULIN UR ELPH-MCNC: 3.4 GM/DL
GLUCOSE SERPL-MCNC: 177 MG/DL (ref 65–99)
HCT VFR BLD AUTO: 43.9 % (ref 34–46.6)
HGB BLD-MCNC: 13.4 G/DL (ref 12–15.9)
L PNEUMO1 AG UR QL IA: NEGATIVE
MCH RBC QN AUTO: 25.7 PG (ref 26.6–33)
MCHC RBC AUTO-ENTMCNC: 30.5 G/DL (ref 31.5–35.7)
MCV RBC AUTO: 84.1 FL (ref 79–97)
PLATELET # BLD AUTO: 223 10*3/MM3 (ref 140–450)
PMV BLD AUTO: 9.6 FL (ref 6–12)
POTASSIUM SERPL-SCNC: 4.5 MMOL/L (ref 3.5–5.2)
PROT SERPL-MCNC: 6.9 G/DL (ref 6–8.5)
QT INTERVAL: 334 MS
QTC INTERVAL: 433 MS
RBC # BLD AUTO: 5.22 10*6/MM3 (ref 3.77–5.28)
S PNEUM AG SPEC QL LA: NEGATIVE
SODIUM SERPL-SCNC: 138 MMOL/L (ref 136–145)
TROPONIN T % DELTA: 8
TROPONIN T NUMERIC DELTA: 2 NG/L
WBC NRBC COR # BLD AUTO: 13.06 10*3/MM3 (ref 3.4–10.8)

## 2025-03-30 PROCEDURE — 25010000002 DEXAMETHASONE PER 1 MG: Performed by: EMERGENCY MEDICINE

## 2025-03-30 PROCEDURE — 36415 COLL VENOUS BLD VENIPUNCTURE: CPT | Performed by: NURSE PRACTITIONER

## 2025-03-30 PROCEDURE — 25010000002 ENOXAPARIN PER 10 MG: Performed by: NURSE PRACTITIONER

## 2025-03-30 PROCEDURE — 25010000002 CEFTRIAXONE PER 250 MG: Performed by: NURSE PRACTITIONER

## 2025-03-30 PROCEDURE — 25010000002 DEXAMETHASONE PER 1 MG: Performed by: NURSE PRACTITIONER

## 2025-03-30 PROCEDURE — 25010000002 ENOXAPARIN PER 10 MG: Performed by: STUDENT IN AN ORGANIZED HEALTH CARE EDUCATION/TRAINING PROGRAM

## 2025-03-30 PROCEDURE — 85027 COMPLETE CBC AUTOMATED: CPT | Performed by: NURSE PRACTITIONER

## 2025-03-30 PROCEDURE — 80053 COMPREHEN METABOLIC PANEL: CPT | Performed by: NURSE PRACTITIONER

## 2025-03-30 RX ORDER — DEXAMETHASONE SODIUM PHOSPHATE 10 MG/ML
6 INJECTION, SOLUTION INTRA-ARTICULAR; INTRALESIONAL; INTRAMUSCULAR; INTRAVENOUS; SOFT TISSUE DAILY
Status: DISCONTINUED | OUTPATIENT
Start: 2025-03-30 | End: 2025-04-01 | Stop reason: HOSPADM

## 2025-03-30 RX ORDER — DEXTROMETHORPHAN POLISTIREX 30 MG/5ML
60 SUSPENSION ORAL EVERY 12 HOURS PRN
Status: DISCONTINUED | OUTPATIENT
Start: 2025-03-30 | End: 2025-04-01 | Stop reason: HOSPADM

## 2025-03-30 RX ORDER — AMOXICILLIN 250 MG
2 CAPSULE ORAL 2 TIMES DAILY PRN
Status: DISCONTINUED | OUTPATIENT
Start: 2025-03-30 | End: 2025-04-01 | Stop reason: HOSPADM

## 2025-03-30 RX ORDER — SODIUM CHLORIDE 0.9 % (FLUSH) 0.9 %
10 SYRINGE (ML) INJECTION EVERY 12 HOURS SCHEDULED
Status: DISCONTINUED | OUTPATIENT
Start: 2025-03-30 | End: 2025-04-01 | Stop reason: HOSPADM

## 2025-03-30 RX ORDER — ENOXAPARIN SODIUM 100 MG/ML
1 INJECTION SUBCUTANEOUS EVERY 12 HOURS
Status: DISCONTINUED | OUTPATIENT
Start: 2025-03-30 | End: 2025-03-31

## 2025-03-30 RX ORDER — PANTOPRAZOLE SODIUM 40 MG/1
40 TABLET, DELAYED RELEASE ORAL
Status: DISCONTINUED | OUTPATIENT
Start: 2025-03-30 | End: 2025-04-01 | Stop reason: HOSPADM

## 2025-03-30 RX ORDER — FAMOTIDINE 20 MG/1
20 TABLET, FILM COATED ORAL 2 TIMES DAILY PRN
Status: DISCONTINUED | OUTPATIENT
Start: 2025-03-30 | End: 2025-04-01 | Stop reason: HOSPADM

## 2025-03-30 RX ORDER — GABAPENTIN 300 MG/1
600 CAPSULE ORAL NIGHTLY
Status: COMPLETED | OUTPATIENT
Start: 2025-03-30 | End: 2025-03-31

## 2025-03-30 RX ORDER — SODIUM CHLORIDE 0.9 % (FLUSH) 0.9 %
10 SYRINGE (ML) INJECTION AS NEEDED
Status: DISCONTINUED | OUTPATIENT
Start: 2025-03-30 | End: 2025-04-01 | Stop reason: HOSPADM

## 2025-03-30 RX ORDER — ENOXAPARIN SODIUM 100 MG/ML
40 INJECTION SUBCUTANEOUS DAILY
Status: DISCONTINUED | OUTPATIENT
Start: 2025-03-30 | End: 2025-03-30

## 2025-03-30 RX ORDER — ACETAMINOPHEN 650 MG/1
650 SUPPOSITORY RECTAL EVERY 4 HOURS PRN
Status: DISCONTINUED | OUTPATIENT
Start: 2025-03-30 | End: 2025-04-01 | Stop reason: HOSPADM

## 2025-03-30 RX ORDER — ONDANSETRON 2 MG/ML
4 INJECTION INTRAMUSCULAR; INTRAVENOUS EVERY 6 HOURS PRN
Status: DISCONTINUED | OUTPATIENT
Start: 2025-03-30 | End: 2025-04-01 | Stop reason: HOSPADM

## 2025-03-30 RX ORDER — ACETAMINOPHEN 325 MG/1
650 TABLET ORAL EVERY 4 HOURS PRN
Status: DISCONTINUED | OUTPATIENT
Start: 2025-03-30 | End: 2025-04-01 | Stop reason: HOSPADM

## 2025-03-30 RX ORDER — BISACODYL 5 MG/1
5 TABLET, DELAYED RELEASE ORAL DAILY PRN
Status: DISCONTINUED | OUTPATIENT
Start: 2025-03-30 | End: 2025-04-01 | Stop reason: HOSPADM

## 2025-03-30 RX ORDER — SODIUM CHLORIDE 9 MG/ML
40 INJECTION, SOLUTION INTRAVENOUS AS NEEDED
Status: DISCONTINUED | OUTPATIENT
Start: 2025-03-30 | End: 2025-04-01 | Stop reason: HOSPADM

## 2025-03-30 RX ORDER — SERTRALINE HYDROCHLORIDE 100 MG/1
100 TABLET, FILM COATED ORAL DAILY
Status: DISCONTINUED | OUTPATIENT
Start: 2025-03-30 | End: 2025-04-01 | Stop reason: HOSPADM

## 2025-03-30 RX ORDER — NITROGLYCERIN 0.4 MG/1
0.4 TABLET SUBLINGUAL
Status: DISCONTINUED | OUTPATIENT
Start: 2025-03-30 | End: 2025-04-01 | Stop reason: HOSPADM

## 2025-03-30 RX ORDER — ALPRAZOLAM 0.25 MG
0.25 TABLET ORAL ONCE AS NEEDED
Status: COMPLETED | OUTPATIENT
Start: 2025-03-30 | End: 2025-03-31

## 2025-03-30 RX ORDER — POLYETHYLENE GLYCOL 3350 17 G/17G
17 POWDER, FOR SOLUTION ORAL DAILY PRN
Status: DISCONTINUED | OUTPATIENT
Start: 2025-03-30 | End: 2025-04-01 | Stop reason: HOSPADM

## 2025-03-30 RX ORDER — ACETAMINOPHEN 160 MG/5ML
650 SOLUTION ORAL EVERY 4 HOURS PRN
Status: DISCONTINUED | OUTPATIENT
Start: 2025-03-30 | End: 2025-04-01 | Stop reason: HOSPADM

## 2025-03-30 RX ORDER — LEVOTHYROXINE SODIUM 25 UG/1
25 TABLET ORAL
Status: DISCONTINUED | OUTPATIENT
Start: 2025-03-30 | End: 2025-04-01 | Stop reason: HOSPADM

## 2025-03-30 RX ORDER — BISACODYL 10 MG
10 SUPPOSITORY, RECTAL RECTAL DAILY PRN
Status: DISCONTINUED | OUTPATIENT
Start: 2025-03-30 | End: 2025-04-01 | Stop reason: HOSPADM

## 2025-03-30 RX ADMIN — ENOXAPARIN SODIUM 40 MG: 100 INJECTION SUBCUTANEOUS at 10:06

## 2025-03-30 RX ADMIN — DEXAMETHASONE SODIUM PHOSPHATE 6 MG: 10 INJECTION INTRAMUSCULAR; INTRAVENOUS at 10:07

## 2025-03-30 RX ADMIN — SERTRALINE HYDROCHLORIDE 100 MG: 100 TABLET, FILM COATED ORAL at 10:22

## 2025-03-30 RX ADMIN — LEVOTHYROXINE SODIUM 25 MCG: 0.03 TABLET ORAL at 10:22

## 2025-03-30 RX ADMIN — CEFTRIAXONE SODIUM 1000 MG: 1 INJECTION, POWDER, FOR SOLUTION INTRAMUSCULAR; INTRAVENOUS at 10:22

## 2025-03-30 RX ADMIN — Medication 10 ML: at 03:58

## 2025-03-30 RX ADMIN — NIRMATRELVIR AND RITONAVIR 2 TABLET: KIT at 15:27

## 2025-03-30 RX ADMIN — PANTOPRAZOLE SODIUM 40 MG: 40 TABLET, DELAYED RELEASE ORAL at 10:22

## 2025-03-30 RX ADMIN — DEXAMETHASONE SODIUM PHOSPHATE 6 MG: 10 INJECTION INTRAMUSCULAR; INTRAVENOUS at 00:07

## 2025-03-30 RX ADMIN — ENOXAPARIN SODIUM 90 MG: 100 INJECTION SUBCUTANEOUS at 21:50

## 2025-03-30 RX ADMIN — Medication 10 ML: at 10:08

## 2025-03-30 RX ADMIN — Medication 10 ML: at 21:50

## 2025-03-30 NOTE — ED NOTES
.Nursing report ED to floor  Krys Mayes  75 y.o.  female    HPI :  HPI  Stated Reason for Visit: (S) PT PRESENTS TO THE ER FROM HOME VIA EMS - PT WENT TO [facility] YESTERDAY AND WAS DX WITH A UTI, WAS GIVEN ABT, TOOK FIRST DOSE TODAY.  SPOUSE STATED PT BECAME CONFUSED AND WEAK TODAY AND HE WAS UNABLE TO GET HER UP OUT OF HER CHAIR.  WHEN EMS ARRIVED PT'S O2 SATS WERE 84% ON RA.  PT IS NOT ON O2 AT HOME.  SHE HAS BILATERAL RALES NOTED,  PT HAS A FEVER, ANSWERS QUESTIONS BUT SLOW TO RESPOND.  History Obtained From: EMS, patient  Precipitating Event(s): recent illness  Onset of Symptoms: gradual, worsening  Duration (Days): 4  Medications/Treatments Administered by EMS Prior to Presentation: field intravenous catheter(s) inserted, oxygen, see EMS record  Medications/Treatments Prior to Arrival: none    Chief Complaint  Chief Complaint   Patient presents with    Altered Mental Status    Shortness of Breath       Admitting doctor:   Cirilo Rodrigues MD    Admitting diagnosis:   The primary encounter diagnosis was Community acquired bilateral lower lobe pneumonia. Diagnoses of COVID-19 virus infection and Altered mental status, unspecified altered mental status type were also pertinent to this visit.    Code status:   Current Code Status       Date Active Code Status Order ID Comments User Context       Prior            Allergies:   Morphine and codeine, Ciprofloxacin-ciproflox hcl er, Flomax [tamsulosin hcl], Hydrocodone, Latex, Lortab [hydrocodone-acetaminophen], Penicillins, Phenergan [promethazine hcl], Sulfa antibiotics, Amoxicillin, and Tamsulosin    Isolation:   Enhanced Droplet/Contact     Intake and Output    Intake/Output Summary (Last 24 hours) at 3/30/2025 0002  Last data filed at 3/29/2025 2300  Gross per 24 hour   Intake 100 ml   Output --   Net 100 ml       Weight:       03/29/25 2112   Weight: 93.6 kg (206 lb 5.6 oz)       Most recent vitals:   Vitals:    03/29/25 2112 03/29/25 2117 03/29/25  2208 03/29/25 2331   BP: 140/79  127/67 105/46   BP Location: Left arm      Patient Position: Lying      Pulse:   100 93   Resp:   20 20   Temp: (!) 102.3 °F (39.1 °C) (!) 104.1 °F (40.1 °C)  98.1 °F (36.7 °C)   TempSrc: Tympanic Rectal  Oral   SpO2:   94% 90%   Weight: 93.6 kg (206 lb 5.6 oz)      Height:           Active LDAs/IV Access:   Lines, Drains & Airways       Active LDAs       Name Placement date Placement time Site Days    Peripheral IV 03/29/25 2108 Left Antecubital 03/29/25 2108  Antecubital  less than 1    Peripheral IV 03/29/25 2112 Right Antecubital 03/29/25 2112  Antecubital  less than 1                    Labs (abnormal labs have a star):   Labs Reviewed   RESPIRATORY PANEL PCR W/ COVID-19 (SARS-COV-2), NP SWAB IN UTM/VTP, 2 HR TAT - Abnormal; Notable for the following components:       Result Value    COVID19 Detected (*)     All other components within normal limits    Narrative:     In the setting of a positive respiratory panel with a viral infection PLUS a negative procalcitonin without other underlying concern for bacterial infection, consider observing off antibiotics or discontinuation of antibiotics and continue supportive care. If the respiratory panel is positive for atypical bacterial infection (Bordetella pertussis, Chlamydophila pneumoniae, or Mycoplasma pneumoniae), consider antibiotic de-escalation to target atypical bacterial infection.   COMPREHENSIVE METABOLIC PANEL - Abnormal; Notable for the following components:    Glucose 132 (*)     Anion Gap 15.7 (*)     eGFR 59.2 (*)     All other components within normal limits    Narrative:     GFR Categories in Chronic Kidney Disease (CKD)      GFR Category          GFR (mL/min/1.73)    Interpretation  G1                     90 or greater         Normal or high (1)  G2                      60-89                Mild decrease (1)  G3a                   45-59                Mild to moderate decrease  G3b                   30-44                 Moderate to severe decrease  G4                    15-29                Severe decrease  G5                    14 or less           Kidney failure          (1)In the absence of evidence of kidney disease, neither GFR category G1 or G2 fulfill the criteria for CKD.    eGFR calculation 2021 CKD-EPI creatinine equation, which does not include race as a factor   URINALYSIS W/ CULTURE IF INDICATED - Abnormal; Notable for the following components:    Color, UA Dark Yellow (*)     Appearance, UA Cloudy (*)     Blood, UA Small (1+) (*)     Protein,  mg/dL (2+) (*)     Leuk Esterase, UA Small (1+) (*)     All other components within normal limits    Narrative:     In absence of clinical symptoms, the presence of pyuria, bacteria, and/or nitrites on the urinalysis result does not correlate with infection.   BNP (IN-HOUSE) - Abnormal; Notable for the following components:    proBNP 2,774.0 (*)     All other components within normal limits    Narrative:     This assay is used as an aid in the diagnosis of individuals suspected of having heart failure. It can be used as an aid in the diagnosis of acute decompensated heart failure (ADHF) in patients presenting with signs and symptoms of ADHF to the emergency department (ED). In addition, NT-proBNP of <300 pg/mL indicates ADHF is not likely.    Age Range Result Interpretation  NT-proBNP Concentration (pg/mL:      <50             Positive            >450                   Gray                 300-450                    Negative             <300    50-75           Positive            >900                  Gray                300-900                  Negative            <300      >75             Positive            >1800                  Gray                300-1800                  Negative            <300   BLOOD GAS, ARTERIAL - Abnormal; Notable for the following components:    pH, Arterial 7.469 (*)     pCO2, Arterial 32.6 (*)     All other components within normal  "limits   TROPONIN - Abnormal; Notable for the following components:    HS Troponin T 26 (*)     All other components within normal limits    Narrative:     High Sensitive Troponin T Reference Range:  <14.0 ng/L- Negative Female for AMI  <22.0 ng/L- Negative Male for AMI  >=14 - Abnormal Female indicating possible myocardial injury.  >=22 - Abnormal Male indicating possible myocardial injury.   Clinicians would have to utilize clinical acumen, EKG, Troponin, and serial changes to determine if it is an Acute Myocardial Infarction or myocardial injury due to an underlying chronic condition.        CBC WITH AUTO DIFFERENTIAL - Abnormal; Notable for the following components:    MCH 26.2 (*)     MCHC 31.2 (*)     Neutrophil % 89.1 (*)     Lymphocyte % 6.7 (*)     Monocyte % 3.8 (*)     Eosinophil % 0.0 (*)     Neutrophils, Absolute 7.79 (*)     Lymphocytes, Absolute 0.59 (*)     All other components within normal limits   URINALYSIS, MICROSCOPIC ONLY - Abnormal; Notable for the following components:    WBC, UA 21-50 (*)     All other components within normal limits   LACTIC ACID, PLASMA - Normal   PROCALCITONIN - Normal    Narrative:     As a Marker for Sepsis (Non-Neonates):    1. <0.5 ng/mL represents a low risk of severe sepsis and/or septic shock.  2. >2 ng/mL represents a high risk of severe sepsis and/or septic shock.    As a Marker for Lower Respiratory Tract Infections that require antibiotic therapy:    PCT on Admission    Antibiotic Therapy       6-12 Hrs later    >0.5                Strongly Recommended  >0.25 - <0.5        Recommended   0.1 - 0.25          Discouraged              Remeasure/reassess PCT  <0.1                Strongly Discouraged     Remeasure/reassess PCT    As 28 day mortality risk marker: \"Change in Procalcitonin Result\" (>80% or <=80%) if Day 0 (or Day 1) and Day 4 values are available. Refer to http://www.frentings-pct-calculator.com    Change in PCT <=80%  A decrease of PCT levels below or " equal to 80% defines a positive change in PCT test result representing a higher risk for 28-day all-cause mortality of patients diagnosed with severe sepsis for septic shock.    Change in PCT >80%  A decrease of PCT levels of more than 80% defines a negative change in PCT result representing a lower risk for 28-day all-cause mortality of patients diagnosed with severe sepsis or septic shock.      POCT GLUCOSE FINGERSTICK - Normal   BLOOD CULTURE   BLOOD CULTURE   URINE CULTURE   HIGH SENSITIVITIY TROPONIN T 1HR   CBC AND DIFFERENTIAL    Narrative:     The following orders were created for panel order CBC & Differential.  Procedure                               Abnormality         Status                     ---------                               -----------         ------                     CBC Auto Differential[[id number]]        Abnormal            Final result                 Please view results for these tests on the individual orders.       EKG:   ECG 12 Lead Altered Mental Status   Preliminary Result   HEART WVZN=113  bpm   RR Esahbfrp=111  ms   ME Interval=  ms   P Horizontal Axis=  deg   P Front Axis=  deg   QRSD Interval=85  ms   QT Fdtvwyim=144  ms   RZpT=660  ms   QRS Axis=31  deg   T Wave Axis=33  deg   - ABNORMAL ECG -   Atrial fibrillation   Date and Time of Study:2025-03-29 21:37:51          Meds given in ED:   Medications   dexAMETHasone (DECADRON) injection 6 mg (has no administration in time range)   cefTRIAXone (ROCEPHIN) 2,000 mg in sodium chloride 0.9 % 100 mL MBP (0 mg Intravenous Stopped 3/29/25 2300)   acetaminophen (TYLENOL) suppository 650 mg (650 mg Rectal Given 3/29/25 2122)       Imaging results:  XR Chest 1 View  Result Date: 3/29/2025  The patient's bilateral lower lobe pneumonia is better seen on earlier CT.  This report was finalized on 3/29/2025 11:36 PM by Dr. Cathleen Giles M.D on Workstation: [id number]      CT Chest Without Contrast Diagnostic  Result Date: 3/29/2025   1.  Bilateral lower lobe pneumonia. 2. Thick-walled appearance to the urinary bladder, with perivesical stranding, suggesting cystitis.  Radiation dose reduction techniques were utilized, including automated exposure control and exposure modulation based on body size.   This report was finalized on 3/29/2025 11:35 PM by Dr. Cathleen Giles M.D on Workstation: [id number]      CT Abdomen Pelvis Without Contrast  Result Date: 3/29/2025   1. Bilateral lower lobe pneumonia. 2. Thick-walled appearance to the urinary bladder, with perivesical stranding, suggesting cystitis.  Radiation dose reduction techniques were utilized, including automated exposure control and exposure modulation based on body size.   This report was finalized on 3/29/2025 11:35 PM by Dr. Cathleen Giles M.D on Workstation: [email or web address]      CT Head Without Contrast  Result Date: 3/29/2025   1. No acute intracranial findings. 2. Acute sinusitis.  Radiation dose reduction techniques were utilized, including automated exposure control and exposure modulation based on body size.   This report was finalized on 3/29/2025 11:26 PM by Dr. Cathleen Giles M.D on Workstation: [email or web address]        Ambulatory status:   - up as tolerated    Social issues:   Social History     Socioeconomic History    Marital status:    Tobacco Use    Smoking status: Never     Passive exposure: Never    Smokeless tobacco: Never    Tobacco comments:     Never smoked ever   Vaping Use    Vaping status: Never Used   Substance and Sexual Activity    Alcohol use: Yes     Comment: She uses alcohol once or twice a year.    Drug use: No    Sexual activity: Not Currently     Partners: Male     Birth control/protection: Post-menopausal       Peripheral Neurovascular  Peripheral Neurovascular (Adult)  Peripheral Neurovascular WDL: WDL    Neuro Cognitive  Neuro Cognitive (Adult)  Cognitive/Neuro/Behavioral WDL: WDL  Last Known Well Date: 03/29/25  Last Known Well Time:  1500    Learning  Learning Assessment  Learning Readiness and Ability: physical limitation noted  Education Provided  Person Taught: patient  Teaching Method: verbal instruction    Respiratory  Respiratory WDL  Respiratory WDL: .WDL except, rhythm/pattern  Breath Sounds  All Lung Fields Breath Sounds: All Fields  All Lung Fields Breath Sounds: crackles, coarse, Anterior:, Lateral:, Posterior:    Abdominal Pain       Pain Assessments  Pain (Adult)  (0-10) Pain Rating: Rest: 0  (0-10) Pain Rating: Activity: 0  Preferred Pain Scale: FACES (Carcamo-Baker FACES Pain Rating Scale)  FACES Pain Rating: Rest: 0-->no hurt  FACES Pain Rating: Activity: 0-->no hurt  Response to Pain Interventions: cognitive function improved    NIH Stroke Scale       Halle Yang RN  03/30/25 00:02 EDT

## 2025-03-30 NOTE — PROGRESS NOTES
[facility] Clinical Pharmacy Services: Dose Adjustment    Ceftriaxone has been appropriately dose adjusted based on our System P&T approved policy for BMI >30. Pharmacy will continue to monitor patient peripherally while in-house.     Catherine Meraz, PharmD  Clinical Pharmacist

## 2025-03-30 NOTE — ED PROVIDER NOTES
EMERGENCY DEPARTMENT ENCOUNTER    Room Number:  20/20  PCP: Mina, AGUSTIN Anglin  Historian: EMS      HPI:  Chief Complaint: Altered mental status  A complete HPI/ROS/PMH/PSH/SH/FH are unobtainable due to: None  Context: Krys Mayes is a 75 y.o. female who presents to the ED c/o altered mental status.  Patient has have history of fall and is on blood thinners.  Patient has not had head CT.  Patient was seen in emergency department 2 days ago for some increasing confusion.  Was felt to have urinary tract infection.  Patient apparently has had increasing confusion today.  EMS arrived and patient was able to answer questions but confused.  Patient was hypoxic.  Not responding to nonrebreather so was placed on noninvasive ventilation.  Patient presents and is able to open eyes and follow some commands.  Has no chest pain or abdominal pain.  Is had no vomiting.            PAST MEDICAL HISTORY  Active Ambulatory Problems     Diagnosis Date Noted    Elevated alanine aminotransferase (ALT) level 01/21/2016    Mixed anxiety depressive disorder 01/21/2016    Gastroesophageal reflux disease 01/21/2016    Essential hypertension 01/21/2016    Insomnia 01/21/2016    Microalbuminuria 01/21/2016    Obesity (BMI 30-39.9) 01/21/2016    Proteinuria 01/21/2016    Vitamin D deficiency 01/21/2016    Postmenopause 07/13/2016    History of fracture 07/13/2016    Reflux esophagitis 08/19/2016    Calculus of kidney 10/05/2016    PAF (paroxysmal atrial fibrillation)     COLTON (generalized anxiety disorder) 02/15/2017    Iron deficiency anemia 02/15/2017    Prediabetes 02/16/2017    Urine frequency 05/10/2017    Medicare annual wellness visit, subsequent 05/11/2017    High risk medication use 04/18/2018    Dermoid cyst of scalp 09/19/2018    Hyperglycemia 08/23/2019    Grief 02/27/2020    Mixed hyperlipidemia 02/27/2020    Long-term use of high-risk medication 02/27/2020    Frequent UTI 05/08/2020    Greater trochanteric bursitis of right  hip 04/19/2022    Chondromalacia, right knee 05/17/2022    Post-traumatic arthritis of left ankle 07/21/2022    Generalized weakness 12/09/2022    Screening for malignant neoplasm of colon 11/12/2024     Resolved Ambulatory Problems     Diagnosis Date Noted    Candidiasis 01/21/2016    Fall on same level from slipping, tripping or stumbling 01/21/2016    Motion sickness 01/21/2016    Neck pain 01/21/2016    Weight gain 01/21/2016    Weight loss 01/21/2016    Abnormal urine odor 07/13/2016    Sepsis due to urinary tract infection 09/23/2016    Pyelonephritis 10/05/2016    Abnormal blood creatinine level 10/27/2016    Dysuria 10/27/2016    Acute cystitis with hematuria 10/27/2016    Pyelonephritis 11/05/2016    Motion sickness 02/15/2017    Leukocytosis 02/15/2017    Urinary tract infection 02/16/2017    Need for pneumococcal vaccination 05/11/2017    Lower extremity pain, right 05/11/2017    Urinary tract infection with hematuria 10/18/2017    Sigmoid diverticulitis 11/13/2017    Sebaceous cyst of labia 12/20/2017    Impacted cerumen of left ear 04/19/2019    Dizziness 04/19/2019    Hospital discharge follow-up 12/14/2022    Unstable gait 01/16/2024     Past Medical History:   Diagnosis Date    Abnormal ECG 9/16    Arthritis     Cholelithiasis taken out 1/14/91    Clotting disorder blood thinner    Diverticulosis     Hyperlipidemia     Hypertension     Kidney stone     Menopausal disorder 1998    Neuropathy     Obesity     Type 2 diabetes mellitus     Visual impairment cataracts         PAST SURGICAL HISTORY  Past Surgical History:   Procedure Laterality Date    ANKLE SURGERY      Ankle Repair / Description: ORif the left ankle in July 2010. Secondary to fall    BREAST BIOPSY Right     benign    CATARACT EXTRACTION Right     CHOLECYSTECTOMY      COLONOSCOPY  2014    Done 2004 normal recheck in 10 years. Repeated February 2014 colonoscopy was normal and to be repeated in 10 years.    CYSTOSCOPY BLADDER STONE  LITHOTRIPSY      FRACTURE SURGERY      HEMORRHOIDECTOMY      NEPHROLITHOTOMY Left 01/09/2017    Procedure: NEPHROLITHOTOMY PERCUTANEOUS SECOND LOOK WITH STENT PLACEMENT AND LASER LITHOTRIPSY;  Surgeon: Mati Villegas MD;  Location: [facility] OR;  Service:     OTHER SURGICAL HISTORY      Tubal Ligation    PERCUTANEOUS NEPHROSTOLITHOTOMY Left 12/2016    TUBAL ABDOMINAL LIGATION      URETEROSCOPY LASER LITHOTRIPSY WITH STENT INSERTION Left 12/06/2022    Procedure: LEFT URETEROSCOPY WITH LASER LITHOTRIPSY, STONE BASKET EXTRACTION, STENT PLACEMENT;  Surgeon: Mati Villegas MD;  Location: [facility] OR;  Service: Urology;  Laterality: Left;         FAMILY HISTORY  Family History   Problem Relation Age of Onset    COPD Mother     Heart attack Father         acute myocardial infarction    Anxiety disorder Father     Depression Father     Heart disease Father     Heart attack Son     Kidney disease Maternal Grandmother         Born with 1 kidney    Heart attack Paternal Uncle     Depression Sister     Heart failure Sister     Hearing loss Sister     Breast cancer Neg Hx          SOCIAL HISTORY  Social History     Socioeconomic History    Marital status:    Tobacco Use    Smoking status: Never     Passive exposure: Never    Smokeless tobacco: Never    Tobacco comments:     Never smoked ever   Vaping Use    Vaping status: Never Used   Substance and Sexual Activity    Alcohol use: Yes     Comment: She uses alcohol once or twice a year.    Drug use: No    Sexual activity: Not Currently     Partners: Male     Birth control/protection: Post-menopausal         ALLERGIES  Morphine and codeine, Ciprofloxacin-ciproflox hcl er, Flomax [tamsulosin hcl], Hydrocodone, Latex, Lortab [hydrocodone-acetaminophen], Penicillins, Phenergan [promethazine hcl], Sulfa antibiotics, Amoxicillin, and Tamsulosin        REVIEW OF SYSTEMS  Review of Systems   Fever confusion      PHYSICAL EXAM  ED Triage Vitals   Temp Heart Rate Resp  BP SpO2   03/29/25 2109 03/29/25 2109 03/29/25 2109 03/29/25 2112 03/29/25 2109   99.1 °F (37.3 °C) (!) 153 20 140/79 92 %      Temp src Heart Rate Source Patient Position BP Location FiO2 (%)   03/29/25 2109 03/29/25 2109 03/29/25 2112 03/29/25 2112 --   Oral Monitor Lying Left arm        Physical Exam      GENERAL: no acute distress.  Follow some commands and answers some questions  HENT: nares patent  EYES: no scleral icterus  CV: regular rhythm, rapid rate  RESPIRATORY: normal effort.  Diminished bilaterally  ABDOMEN: soft  MUSCULOSKELETAL: no deformity  NEURO: alert, moves all extremities,   PSYCH:  calm, cooperative  SKIN: warm, dry    Vital signs and nursing notes reviewed.          LAB RESULTS  Recent Results (from the past 24 hours)   POC Glucose Once    Collection Time: 03/29/25  9:19 PM    Specimen: Blood   Result Value Ref Range    Glucose 130 70 - 130 mg/dL   Comprehensive Metabolic Panel    Collection Time: 03/29/25  9:21 PM    Specimen: Blood   Result Value Ref Range    Glucose 132 (H) 65 - 99 mg/dL    BUN 19 8 - 23 mg/dL    Creatinine 0.99 0.57 - 1.00 mg/dL    Sodium 139 136 - 145 mmol/L    Potassium 4.4 3.5 - 5.2 mmol/L    Chloride 100 98 - 107 mmol/L    CO2 23.3 22.0 - 29.0 mmol/L    Calcium 9.1 8.6 - 10.5 mg/dL    Total Protein 7.1 6.0 - 8.5 g/dL    Albumin 3.6 3.5 - 5.2 g/dL    ALT (SGPT) 14 1 - 33 U/L    AST (SGOT) 24 1 - 32 U/L    Alkaline Phosphatase 71 39 - 117 U/L    Total Bilirubin 0.4 0.0 - 1.2 mg/dL    Globulin 3.5 gm/dL    A/G Ratio 1.0 g/dL    BUN/Creatinine Ratio 19.2 7.0 - 25.0    Anion Gap 15.7 (H) 5.0 - 15.0 mmol/L    eGFR 59.2 (L) >60.0 mL/min/1.73   Lactic Acid, Plasma    Collection Time: 03/29/25  9:21 PM    Specimen: Blood   Result Value Ref Range    Lactate 1.6 0.5 - 2.0 mmol/L   Procalcitonin    Collection Time: 03/29/25  9:21 PM    Specimen: Blood   Result Value Ref Range    Procalcitonin 0.11 0.00 - 0.25 ng/mL   BNP    Collection Time: 03/29/25  9:21 PM    Specimen: Blood    Result Value Ref Range    proBNP 2,774.0 (H) 0.0 - 1,800.0 pg/mL   High Sensitivity Troponin T    Collection Time: 03/29/25  9:21 PM    Specimen: Blood   Result Value Ref Range    HS Troponin T 26 (H) <14 ng/L   CBC Auto Differential    Collection Time: 03/29/25  9:21 PM    Specimen: Blood   Result Value Ref Range    WBC 8.75 3.40 - 10.80 10*3/mm3    RBC 5.20 3.77 - 5.28 10*6/mm3    Hemoglobin 13.6 12.0 - 15.9 g/dL    Hematocrit 43.6 34.0 - 46.6 %    MCV 83.8 79.0 - 97.0 fL    MCH 26.2 (L) 26.6 - 33.0 pg    MCHC 31.2 (L) 31.5 - 35.7 g/dL    RDW 13.1 12.3 - 15.4 %    RDW-SD 39.7 37.0 - 54.0 fl    MPV 9.7 6.0 - 12.0 fL    Platelets 231 140 - 450 10*3/mm3    Neutrophil % 89.1 (H) 42.7 - 76.0 %    Lymphocyte % 6.7 (L) 19.6 - 45.3 %    Monocyte % 3.8 (L) 5.0 - 12.0 %    Eosinophil % 0.0 (L) 0.3 - 6.2 %    Basophil % 0.1 0.0 - 1.5 %    Immature Grans % 0.3 0.0 - 0.5 %    Neutrophils, Absolute 7.79 (H) 1.70 - 7.00 10*3/mm3    Lymphocytes, Absolute 0.59 (L) 0.70 - 3.10 10*3/mm3    Monocytes, Absolute 0.33 0.10 - 0.90 10*3/mm3    Eosinophils, Absolute 0.00 0.00 - 0.40 10*3/mm3    Basophils, Absolute 0.01 0.00 - 0.20 10*3/mm3    Immature Grans, Absolute 0.03 0.00 - 0.05 10*3/mm3    nRBC 0.0 0.0 - 0.2 /100 WBC   Respiratory Panel PCR w/COVID-19(SARS-CoV-2) LYNETTE/AMMON/STANLEY/PAD/COR/DOYLE In-House, NP Swab in UTM/VTM, 2 HR TAT - Swab, Nasopharynx    Collection Time: 03/29/25  9:23 PM    Specimen: Nasopharynx; Swab   Result Value Ref Range    ADENOVIRUS, PCR Not Detected Not Detected    Coronavirus 229E Not Detected Not Detected    Coronavirus HKU1 Not Detected Not Detected    Coronavirus NL63 Not Detected Not Detected    Coronavirus OC43 Not Detected Not Detected    COVID19 Detected (C) Not Detected - Ref. Range    Human Metapneumovirus Not Detected Not Detected    Human Rhinovirus/Enterovirus Not Detected Not Detected    Influenza A PCR Not Detected Not Detected    Influenza B PCR Not Detected Not Detected    Parainfluenza Virus 1  Not Detected Not Detected    Parainfluenza Virus 2 Not Detected Not Detected    Parainfluenza Virus 3 Not Detected Not Detected    Parainfluenza Virus 4 Not Detected Not Detected    RSV, PCR Not Detected Not Detected    Bordetella pertussis pcr Not Detected Not Detected    Bordetella parapertussis PCR Not Detected Not Detected    Chlamydophila pneumoniae PCR Not Detected Not Detected    Mycoplasma pneumo by PCR Not Detected Not Detected   ECG 12 Lead Altered Mental Status    Collection Time: 03/29/25  9:37 PM   Result Value Ref Range    QT Interval 334 ms    QTC Interval 433 ms   Blood Gas, Arterial -    Collection Time: 03/29/25  9:39 PM    Specimen: Arterial Blood   Result Value Ref Range    Site Right Radial     Billy's Test Positive     pH, Arterial 7.469 (H) 7.350 - 7.450 pH units    pCO2, Arterial 32.6 (L) 35.0 - 45.0 mm Hg    pO2, Arterial 83.9 80.0 - 100.0 mm Hg    HCO3, Arterial 23.7 22.0 - 28.0 mmol/L    Base Excess, Arterial 0.8 0.0 - 2.0 mmol/L    O2 Saturation, Arterial 97.0 92.0 - 98.5 %    A-a DO2 0.3 mmHg    Barometric Pressure for Blood Gas 746.9000 mmHg    Modality BiPap     FIO2 40 %    Ventilator Mode BiLevel     Set Mech Resp Rate 16     Rate 21 Breaths/minute    Hemodilution No     Device Comment 12/6 sat 95%     PO2/FIO2 210 0 - 500   Urinalysis With Culture If Indicated - Straight Cath    Collection Time: 03/29/25 10:08 PM    Specimen: Straight Cath; Urine   Result Value Ref Range    Color, UA Dark Yellow (A) Yellow, Straw    Appearance, UA Cloudy (A) Clear    pH, UA <=5.0 5.0 - 8.0    Specific Gravity, UA 1.022 1.005 - 1.030    Glucose, UA Negative Negative    Ketones, UA Negative Negative    Bilirubin, UA Negative Negative    Blood, UA Small (1+) (A) Negative    Protein,  mg/dL (2+) (A) Negative    Leuk Esterase, UA Small (1+) (A) Negative    Nitrite, UA Negative Negative    Urobilinogen, UA 1.0 E.U./dL 0.2 - 1.0 E.U./dL   Urinalysis, Microscopic Only - Straight Cath    Collection  Time: 03/29/25 10:08 PM    Specimen: Straight Cath; Urine   Result Value Ref Range    RBC, UA 0-2 None Seen, 0-2 /HPF    WBC, UA 21-50 (A) None Seen, 0-2 /HPF    Bacteria, UA None Seen None Seen /HPF    Squamous Epithelial Cells, UA 0-2 None Seen, 0-2 /HPF    Hyaline Casts, UA 7-12 None Seen /LPF    Methodology Automated Microscopy    High Sensitivity Troponin T 1Hr    Collection Time: 03/29/25 11:33 PM    Specimen: Blood   Result Value Ref Range    HS Troponin T 28 (H) <14 ng/L    Troponin T Numeric Delta 2 ng/L    Troponin T % Delta 8 Abnormal if >/= 20%       Ordered the above labs and reviewed the results.        RADIOLOGY  XR Chest 1 View  Result Date: 3/29/2025  SINGLE VIEW OF THE CHEST  HISTORY: Shortness of air  COMPARISON: December 8, 2022  FINDINGS: There is cardiomegaly. There is no vascular congestion. No pneumothorax or pleural effusion is seen. The patient's bilateral lower lobe pneumonia is better seen on earlier CT.      The patient's bilateral lower lobe pneumonia is better seen on earlier CT.  This report was finalized on 3/29/2025 11:36 PM by Dr. Cathleen Giles M.D on Workstation: [id number]      CT Chest Without Contrast Diagnostic  CT Chest Without Contrast Diagnostic, CT Abdomen Pelvis Without Contrast  Result Date: 3/29/2025  CT OF THE CHEST WITHOUT CONTRAST; CT OF THE ABDOMEN AND PELVIS WITHOUT CONTRAST  HISTORY: Fever  COMPARISON: February 27, 2024  TECHNIQUE: Axial CT imaging was obtained from the thoracic inlet through the symphysis pubis. No IV contrast was administered.  FINDINGS: CT OF THE CHEST: There is bilateral lower lobe consolidation, in keeping with pneumonia. 3 mm noncalcified pulmonary nodule is noted in the right lung apex. It was also present in October 2024, and is stable. A follow-up CT in October 2024 could be considered to demonstrate a 4-year of stability. The thyroid gland and trachea appear within normal limits. There is a small hiatal hernia. There are coronary  artery calcifications. Thoracic aorta is normal in caliber. There is enlargement of the main pulmonary artery, which can be seen in setting of pulmonary arterial hypertension. Mediastinal lymph nodes do not appear pathologically enlarged. No acute osseous abnormalities are seen.  CT OF THE ABDOMEN AND PELVIS: No suspicious hepatic lesions are seen. The spleen, adrenal glands, and pancreas are all normal. There is a duodenal diverticulum. Gallbladder is absent. There is no hydronephrosis. There is left renal atrophy. There are aortoiliac calcifications. Urinary bladder is thick walled, with perivesical stranding, suggesting cystitis. Uterus is retroflexed. There is no bowel obstruction. There is colonic diverticulosis. The appendix is normal. No acute osseous abnormalities are seen. There is lumbar scoliosis, with convexity to the left.       1. Bilateral lower lobe pneumonia. 2. Thick-walled appearance to the urinary bladder, with perivesical stranding, suggesting cystitis.  Radiation dose reduction techniques were utilized, including automated exposure control and exposure modulation based on body size.   This report was finalized on 3/29/2025 11:35 PM by Dr. Cathleen Giles M.D on Workstation: [id number]      CT Head Without Contrast  Result Date: 3/29/2025  CT OF THE HEAD WITHOUT CONTRAST  HISTORY: Altered mental status  COMPARISON: March 27, 2025  TECHNIQUE: Axial CT imaging was obtained through the brain. No IV contrast was administered.  FINDINGS: No acute intracranial hemorrhage is seen. There is atrophy. There is periventricular and deep white matter microangiopathic change. There is no midline shift or mass effect. No calvarial fracture is seen. There is mucosal thickening noted within the ethmoid sinuses, as well as air-fluid levels within the maxillary sinuses. This is new when compared to prior study, and suggest sinusitis. There is also some mucosal thickening noted within the sphenoid sinuses.        1. No acute intracranial findings. 2. Acute sinusitis.  Radiation dose reduction techniques were utilized, including automated exposure control and exposure modulation based on body size.   This report was finalized on 3/29/2025 11:26 PM by Dr. Cathleen Giles M.D on Workstation: [id number]        Ordered the above noted radiological studies.  CT head independent interpreted by me and shows no evidence of bleeding          PROCEDURES  Critical Care    Performed by: Chaz Toribio MD  Authorized by: Chaz Toribio MD    Critical care provider statement:     Critical care time (minutes):  35    Critical care time was exclusive of:  Separately billable procedures and treating other patients    Critical care was necessary to treat or prevent imminent or life-threatening deterioration of the following conditions:  Respiratory failure, sepsis and CNS failure or compromise    Critical care was time spent personally by me on the following activities:  Ordering and performing treatments and interventions, ordering and review of laboratory studies and discussions with consultants      EKG          EKG time: 2137  Rhythm/Rate: Atrial fibrillation 101  P waves and AR: Fib flutter waves  QRS, axis: Normal QRS  ST and T waves: Nonspecific ST-T wave    Interpreted Contemporaneously by me, independently viewed  Changed compared to prior 10/9/2024 and that the A-fib is new          MEDICATIONS GIVEN IN ER  Medications   cefTRIAXone (ROCEPHIN) 2,000 mg in sodium chloride 0.9 % 100 mL MBP (0 mg Intravenous Stopped 3/29/25 2300)   acetaminophen (TYLENOL) suppository 650 mg (650 mg Rectal Given 3/29/25 2122)   dexAMETHasone (DECADRON) injection 6 mg (6 mg Intravenous Given 3/30/25 0007)                   MEDICAL DECISION MAKING, PROGRESS, and CONSULTS    All labs have been independently reviewed by me.  All radiology studies have been reviewed by me and I have also reviewed the radiology report.   EKG's independently  viewed and interpreted by me.  Discussion below represents my analysis of pertinent findings related to patient's condition, differential diagnosis, treatment plan and final disposition.      Additional sources:  - Discussed/ obtained information from independent historians: None    - External (non-ED) record review: Epic reviewed patient seen 3/27/2025 at outside emergency department and diagnosed with mental status change    - Chronic or social conditions impacting care: None    - Shared decision making: None      Orders placed during this visit:  Orders Placed This Encounter   Procedures    Critical Care    Blood Culture - Blood,    Blood Culture - Blood,    Respiratory Panel PCR w/COVID-19(SARS-CoV-2) LYNETTE/AMMON/STANLEY/PAD/COR/DOYLE In-House, NP Swab in UTM/VTM, 2 HR TAT - Swab, Nasopharynx    Urine Culture - Urine,    XR Chest 1 View    CT Head Without Contrast    CT Chest Without Contrast Diagnostic    CT Abdomen Pelvis Without Contrast    Comprehensive Metabolic Panel    Lactic Acid, Plasma    Procalcitonin    Urinalysis With Culture If Indicated - Urine, Catheter    BNP    Blood Gas, Arterial -    High Sensitivity Troponin T    CBC Auto Differential    High Sensitivity Troponin T 1Hr    Urinalysis, Microscopic Only - Urine, Clean Catch    LHA (on-call MD unless specified) Details    POC Glucose Once    ECG 12 Lead Altered Mental Status    Initiate Observation Status    CBC & Differential         Additional orders considered but not ordered:  None        Differential diagnosis includes but is not limited to:    Pneumonia versus COVID-pneumonia versus toxic metabolic encephalopathy      Independent interpretation of labs, radiology studies, and discussions with consultants:  ED Course as of 03/30/25 0018   Sat Mar 29, 2025   1101 23:51 EDT  Patient presents for confusion.  Patient initially was on BiPAP.  We have been able to wean her off BiPAP.  Patient is on 2 L 90%.  She is COVID-positive and appears to have  bilateral pneumonia.  Has been given Rocephin and Decadron.  Patient will be admitted to the hospital.  Discussed with Danette BOBBY with A who will admit to Dr. Rodrigues [SL]      ED Course User Index  [SL] Chaz Toribio MD                 DIAGNOSIS  Final diagnoses:   Community acquired bilateral lower lobe pneumonia   COVID-19 virus infection   Altered mental status, unspecified altered mental status type         DISPOSITION  admit            Latest Documented Vital Signs:  As of 00:18 EDT  BP- 105/46 HR- 93 Temp- 98.1 °F (36.7 °C) (Oral) O2 sat- 90%              --    Please note that portions of this were completed with a voice recognition program.       Note Disclaimer: At [facility], we believe that sharing information builds trust and better relationships. You are receiving this note because you are receiving care at [facility] or recently visited. It is possible you will see health information before a provider has talked with you about it. This kind of information can be easy to misunderstand. To help you fully understand what it means for your health, we urge you to discuss this note with your provider.            Chaz Toribio MD  03/30/25 0018

## 2025-03-30 NOTE — PLAN OF CARE
Problem: Adult Inpatient Plan of Care  Goal: Plan of Care Review  Outcome: Progressing  Flowsheets (Taken 3/30/2025 1357)  Progress: no change  Outcome Evaluation: Alert and oriented, pleasant. O2 @ 2 LPM NC to maintain saturation. Taking PO well. Visiting with  via phone.  encouraged to Isolate at home as well. Rocephin as scheduled. Denies discomfort. Cough. Incentive spirometer provided.  Plan of Care Reviewed With: patient  Goal: Patient-Specific Goal (Individualized)  Outcome: Progressing  Goal: Absence of Hospital-Acquired Illness or Injury  Outcome: Progressing  Intervention: Identify and Manage Fall Risk  Description: Perform standard risk assessment on admission using a validated tool or comprehensive approach appropriate to the patient; reassess fall risk frequently, with change in status or transfer to another level of care.Communicate risk to interprofessional healthcare team; ensure fall risk visible cue.Determine need for increased observation, equipment and environmental modification, as well as use of supportive, nonskid footwear.Adjust safety measures to individual needs and identified risk factors.Reinforce the importance of active participation with fall risk prevention, safety, and physical activity with the patient and family.Perform regular intentional rounding to assess need for position change, pain assessment and personal needs, including assistance with toileting.  Recent Flowsheet Documentation  Taken 3/30/2025 1200 by Meredith Ramos, RN  Safety Promotion/Fall Prevention:   assistive device/personal items within reach   clutter free environment maintained   fall prevention program maintained   nonskid shoes/slippers when out of bed   room organization consistent   safety round/check completed  Taken 3/30/2025 1000 by Meredith Ramos, RN  Safety Promotion/Fall Prevention:   assistive device/personal items within reach   clutter free environment maintained   fall prevention  program maintained   nonskid shoes/slippers when out of bed   room organization consistent   safety round/check completed  Taken 3/30/2025 0755 by Meredith Ramos RN  Safety Promotion/Fall Prevention:   assistive device/personal items within reach   clutter free environment maintained   fall prevention program maintained   nonskid shoes/slippers when out of bed   room organization consistent   safety round/check completed  Intervention: Prevent Skin Injury  Description: Perform a screening for skin injury risk, such as pressure or moisture-associated skin damage on admission and at regular intervals throughout hospital stay.Keep all areas of skin (especially folds) clean and dry.Maintain adequate skin hydration.Relieve and redistribute pressure and protect bony prominences and skin at risk for injury; implement measures based on patient-specific risk factors.Match turning and repositioning schedule to clinical condition.Encourage weight shift frequently; assist with reposition if unable to complete independently.Float heels off bed; avoid pressure on the Achilles tendon.Keep skin free from extended contact with medical devices.Optimize nutrition and hydration.Encourage functional activity and mobility, as early as tolerated.Use aids (e.g., slide boards, mechanical lift) during transfer.  Recent Flowsheet Documentation  Taken 3/30/2025 1200 by Meredith Ramos, RN  Body Position:   neutral body alignment   neutral head position   position changed independently   weight shifting   sitting up in bed  Taken 3/30/2025 1000 by Meredith Ramos RN  Body Position:   right   tilted   neutral body alignment   neutral head position   position changed independently   weight shifting  Taken 3/30/2025 0755 by Meredith Ramos RN  Body Position:   position changed independently   weight shifting   neutral body alignment   neutral head position   sitting up in bed  Intervention: Prevent Infection  Description: Maintain skin and mucous  membrane integrity; promote hand, oral and pulmonary hygiene.Optimize fluid balance, nutrition, sleep and glycemic control to maximize infection resistance.Identify potential sources of infection early to prevent or mitigate progression of infection (e.g., wound, lines, devices).Evaluate ongoing need for invasive devices; remove promptly when no longer indicated.Review vaccination status.  Recent Flowsheet Documentation  Taken 3/30/2025 1200 by Meredith Ramos RN  Infection Prevention:   equipment surfaces disinfected   hand hygiene promoted   single patient room provided   personal protective equipment utilized  Taken 3/30/2025 1000 by Meredith Ramos RN  Infection Prevention:   environmental surveillance performed   equipment surfaces disinfected   hand hygiene promoted   single patient room provided  Taken 3/30/2025 0755 by Meredith Ramos RN  Infection Prevention:   equipment surfaces disinfected   hand hygiene promoted   single patient room provided   personal protective equipment utilized  Goal: Optimal Comfort and Wellbeing  Outcome: Progressing  Intervention: Provide Person-Centered Care  Description: Use a family-focused approach to care; encourage support system presence and participation.Develop trust and rapport by proactively providing information, encouraging questions, addressing concerns and offering reassurance.Acknowledge emotional response to hospitalization.Recognize and utilize personal coping strategies and strengths; develop goals via shared decision-making.Honor spiritual and cultural preferences.  Recent Flowsheet Documentation  Taken 3/30/2025 1200 by Meredith Ramos RN  Trust Relationship/Rapport:   care explained   emotional support provided   choices provided   empathic listening provided   questions answered   questions encouraged   thoughts/feelings acknowledged   reassurance provided  Taken 3/30/2025 0755 by Meredith Ramos RN  Trust Relationship/Rapport:   care explained   choices  provided   emotional support provided   empathic listening provided   questions answered   questions encouraged   thoughts/feelings acknowledged   reassurance provided  Goal: Readiness for Transition of Care  Outcome: Progressing     Problem: Fall Injury Risk  Goal: Absence of Fall and Fall-Related Injury  Outcome: Progressing  Intervention: Identify and Manage Contributors  Description: Develop a fall prevention plan, considering patient-centered interventions and family/caregiver involvement; identify and address patient's facilitators and barriers.Provide reorientation, appropriate sensory stimulation and routines with changes in mental status to decrease risk of fall.Promote use of personal vision and auditory aids.Assess assistance level required for safe and effective self-care; provide support as needed, such as toileting and mobilization. For age 65 and older, implement timed toileting with assistance.Encourage physical activity, such as performance of mobility and self-care at highest level of patient ability, multicomponent exercise program and provision of appropriate assistive devices.If fall occurs, assess the severity of injury; implement fall injury protocol. Determine the cause and revise fall injury prevention plan.Regularly review and advocate for medication adjustment to decrease fall risk; consider administration times, polypharmacy and age.Balance adequate pain management with potential for oversedation.  Recent Flowsheet Documentation  Taken 3/30/2025 1200 by Meredith Ramos, RN  Medication Review/Management: medications reviewed  Taken 3/30/2025 1000 by Meredith Ramos, RN  Medication Review/Management: medications reviewed  Taken 3/30/2025 0755 by Meredith Ramos, RN  Medication Review/Management: medications reviewed  Intervention: Promote Injury-Free Environment  Description: Provide a safe, barrier-free environment that encourages independent activity.Keep care area uncluttered and  well-lighted.Determine need for increased observation or monitoring.Avoid use of devices that minimize mobility, such as restraints or indwelling urinary catheter.  Recent Flowsheet Documentation  Taken 3/30/2025 1200 by Meredith Ramos, RN  Safety Promotion/Fall Prevention:   assistive device/personal items within reach   clutter free environment maintained   fall prevention program maintained   nonskid shoes/slippers when out of bed   room organization consistent   safety round/check completed  Taken 3/30/2025 1000 by Meredith Ramos, RN  Safety Promotion/Fall Prevention:   assistive device/personal items within reach   clutter free environment maintained   fall prevention program maintained   nonskid shoes/slippers when out of bed   room organization consistent   safety round/check completed  Taken 3/30/2025 0755 by Meredith Ramos, RN  Safety Promotion/Fall Prevention:   assistive device/personal items within reach   clutter free environment maintained   fall prevention program maintained   nonskid shoes/slippers when out of bed   room organization consistent   safety round/check completed     Problem: Skin Injury Risk Increased  Goal: Skin Health and Integrity  Outcome: Progressing  Intervention: Optimize Skin Protection  Description: Perform a full pressure injury risk assessment, as indicated by screening, upon admission to care unit.Reassess skin (full inspection and injury risk, including skin temperature, consistency and color) frequently (e.g., scheduled interval, with change in condition) to provide optimal early detection and prevention.Maintain adequate tissue perfusion (e.g., encourage fluid balance; avoid crossing legs, constrictive clothing or devices) to promote tissue oxygenation.Maintain head of bed at lowest degree of elevation tolerated, considering medical condition and other restrictions. Use positioning supports to prevent sliding and friction. Consider low friction textiles.Avoid positioning  onto an area that remains reddened or on bony prominences.Minimize incontinence and moisture (e.g., toileting schedule; moisture-wicking pad, diaper or incontinence collection device; skin moisture barrier).Cleanse skin promptly and gently, when soiled, utilizing a pH-balanced cleanser.Relieve and redistribute pressure (e.g., scheduled position changes, weight shifts, use of support surface, medical device repositioning, protective dressing application, use of positioning device, microclimate control, use of pressure-injury-monitorEncourage increased activity, such as sitting in a chair at the bedside or early mobilization, when able to tolerate. Avoid prolonged sitting.  Recent Flowsheet Documentation  Taken 3/30/2025 1200 by Meredith Ramos RN  Pressure Reduction Techniques: frequent weight shift encouraged  Head of Bed (HOB) Positioning: HOB at 45 degrees  Pressure Reduction Devices: pressure-redistributing mattress utilized  Taken 3/30/2025 1000 by Meredith Ramos, RN  Head of Bed (HOB) Positioning: HOB at 20 degrees  Taken 3/30/2025 0755 by Meredith Ramos, RN  Pressure Reduction Techniques: frequent weight shift encouraged  Head of Bed (HOB) Positioning: HOB at 30-45 degrees  Pressure Reduction Devices: pressure-redistributing mattress utilized   Goal Outcome Evaluation:  Plan of Care Reviewed With: patient        Progress: no change  Outcome Evaluation: Alert and oriented, pleasant. O2 @ 2 LPM NC to maintain saturation. Taking PO well. Visiting with  via phone.  encouraged to Isolate at home as well. Rocephin as scheduled. Denies discomfort. Cough. Incentive spirometer provided.

## 2025-03-30 NOTE — PROGRESS NOTES
"[facility] Clinical Pharmacy Services: Enoxaparin Consult    Krys Mayes has a pharmacy consult to dose prophylactic enoxaparin per Jono Van's request.     Indication: VTE Prophylaxis  Home Anticoagulation: Xarelto     Relevant clinical data and objective history reviewed:  75 y.o. female 168.9 cm (66.5\") 93.6 kg (206 lb 5.6 oz)   Body mass index is 32.81 kg/m².   Results from last 7 days   Lab Units 03/29/25  2121   PLATELETS 10*3/mm3 231     Estimated Creatinine Clearance: 56.2 mL/min (by C-G formula based on SCr of 0.99 mg/dL).    Assessment/Plan    Will start patient on 40mg subcutaneous every 24 hours, adjusted for renal function. Consult order will be discontinued but pharmacy will continue to follow.     Alannah Ren, PharmD  Clinical Pharmacist    "

## 2025-03-30 NOTE — H&P
Patient Name:  Krys Mayes  YOB: 1950  MRN:  [id number]  Admit Date:  3/29/2025  Patient Care Team:  Head, AGUSTIN Anglin as PCP - General (Nurse Practitioner)  Jeremy Orozco MD as Consulting Physician (Cardiology)  Preehti, Joss De Jesus MD as Consulting Physician (Gastroenterology)  Catherine Brown APRN as Nurse Practitioner (Orthopedic Surgery)  Mati Villegas MD as Consulting Physician (Urology)  Ajith Valles DPM as Consulting Physician (Podiatry)  Lyle Orellana MD as Consulting Physician (Ophthalmology)  Luan Marie MD as Consulting Physician (Dermatology)      Subjective   History Present Illness     Chief Complaint   Patient presents with    Altered Mental Status    Shortness of Breath       Ms. Mayes is a 75 y.o. female with a history of PAF, prediabetes, GERD, COLTON, HTN that presented last night to [facility] complaining of AMS.  She was alert for EMS but confused.  She was hypoxic and unresponsive to nonrebreather and ultimately was placed on noninvasive ventilation.  She was weaned down to 2 L oxygen nasal cannula with sats in the mid 90s.  She was febrile highest temp 104.1.  She was found to be COVID-positive with bilateral pneumonia.  She was started on Paxlovid, dexamethasone and IV Rocephin.    Also recently started on antibiotic for urinary tract infection seen in ED 2 days ago. Had recent fall with facial bruising, unsure exactly when this occurred. Denies losing consciousness.  She is alert and oriented to person and place.  Disoriented to year.  She reports shortness of breath and cough for couple of days.  No nausea or vomiting or abdominal pain.  No chest pain or palpitations.  She does say that she has A-fib.        Review of Systems   Constitutional:  Negative for activity change, fatigue and fever.   HENT:  Negative for congestion.    Cardiovascular:  Negative for palpitations and leg swelling.   Gastrointestinal:   Negative for abdominal pain, constipation, diarrhea and vomiting.   Genitourinary:  Negative for difficulty urinating.   Musculoskeletal:  Negative for arthralgias.        Personal History     Past Medical History:   Diagnosis Date    Abnormal ECG 9/16    afib brought on by strong antibiotics    Arthritis     Cholelithiasis taken out 1/14/91    Clotting disorder blood thinner    Diverticulosis     Gastroesophageal reflux disease 01/21/2016    Hyperlipidemia     Hypertension     Kidney stone     recurrent, calcium oxalate    Menopausal disorder 1998    She went through menopause in 1998    Neuropathy     Obesity     PAF (paroxysmal atrial fibrillation)     in the setting of urosepsis, 9/2016    Reflux esophagitis 08/19/2016    Sebaceous cyst of labia 12/20/2017    Sepsis due to urinary tract infection     Sigmoid diverticulitis 11/13/2017    Type 2 diabetes mellitus     Urinary tract infection     Visual impairment cataracts    Vitamin D deficiency 01/21/2016     Past Surgical History:   Procedure Laterality Date    ANKLE SURGERY      Ankle Repair / Description: ORif the left ankle in July 2010. Secondary to fall    BREAST BIOPSY Right     benign    CATARACT EXTRACTION Right     CHOLECYSTECTOMY      COLONOSCOPY  2014    Done 2004 normal recheck in 10 years. Repeated February 2014 colonoscopy was normal and to be repeated in 10 years.    CYSTOSCOPY BLADDER STONE LITHOTRIPSY      FRACTURE SURGERY      HEMORRHOIDECTOMY      NEPHROLITHOTOMY Left 01/09/2017    Procedure: NEPHROLITHOTOMY PERCUTANEOUS SECOND LOOK WITH STENT PLACEMENT AND LASER LITHOTRIPSY;  Surgeon: Mati Villegas MD;  Location: [facility];  Service:     OTHER SURGICAL HISTORY      Tubal Ligation    PERCUTANEOUS NEPHROSTOLITHOTOMY Left 12/2016    TUBAL ABDOMINAL LIGATION      URETEROSCOPY LASER LITHOTRIPSY WITH STENT INSERTION Left 12/06/2022    Procedure: LEFT URETEROSCOPY WITH LASER LITHOTRIPSY, STONE BASKET EXTRACTION, STENT PLACEMENT;   Surgeon: Mati Villegas MD;  Location: [facility];  Service: Urology;  Laterality: Left;     Family History   Problem Relation Age of Onset    COPD Mother     Heart attack Father         acute myocardial infarction    Anxiety disorder Father     Depression Father     Heart disease Father     Heart attack Son     Kidney disease Maternal Grandmother         Born with 1 kidney    Heart attack Paternal Uncle     Depression Sister     Heart failure Sister     Hearing loss Sister     Breast cancer Neg Hx      Social History     Tobacco Use    Smoking status: Never     Passive exposure: Never    Smokeless tobacco: Never    Tobacco comments:     Never smoked ever   Vaping Use    Vaping status: Never Used   Substance Use Topics    Alcohol use: Yes     Comment: She uses alcohol once or twice a year.    Drug use: No     No current facility-administered medications on file prior to encounter.     Current Outpatient Medications on File Prior to Encounter   Medication Sig Dispense Refill    acetaminophen (TYLENOL) 650 MG 8 hr tablet Take 1 tablet by mouth Every 6 (Six) Hours As Needed. for pain      ALPRAZolam (XANAX) 1 MG tablet TAKE 1/2 TO 1 (ONE-HALF TO ONE) TABLET BY MOUTH TWICE DAILY AS NEEDED 60 tablet 0    ascorbic acid (VITAMIN C) 1000 MG tablet Take 2 tablets by mouth Daily.      cephalexin (KEFLEX) 500 MG capsule Take 1 capsule by mouth Every 12 (Twelve) Hours for 7 days. 14 capsule 0    Cholecalciferol 25 MCG (1000 UT) tablet Take 1 tablet by mouth Daily.      cyanocobalamin (VITAMIN B-12) 500 MCG tablet Take 1 tablet by mouth Daily.      gabapentin (NEURONTIN) 300 MG capsule TAKE 1 CAPSULE BY MOUTH IN THE MORNING AND 2 AT BEDTIME      gabapentin (NEURONTIN) 600 MG tablet Take 1 tablet by mouth every night at bedtime.      levothyroxine (SYNTHROID, LEVOTHROID) 25 MCG tablet Take 1 tablet by mouth Every Morning. 90 tablet 1    losartan (COZAAR) 100 MG tablet Take 1 tablet by mouth once daily 90 tablet 0     Metoprolol Tartrate 75 MG tablet Take 1 tablet by mouth twice daily 180 tablet 3    Multiple Vitamin tablet Take 1 tablet by mouth Daily.      nitrofurantoin, macrocrystal-monohydrate, (MACROBID) 100 MG capsule Take 1 capsule by mouth Every 12 (Twelve) Hours for 7 days. 14 capsule 0    Omega-3 Fatty Acids (fish oil) 1000 MG capsule capsule Take 1 capsule by mouth Daily With Breakfast.      omeprazole OTC (PriLOSEC OTC) 20 MG EC tablet Take 1 tablet by mouth 2 (Two) Times a Day.      Probiotic Product (PROBIOTIC ADVANCED PO) Take 1 tablet by mouth Daily.      rivaroxaban (Xarelto) 15 MG tablet Take 1 tablet by mouth once daily 90 tablet 2    sertraline (ZOLOFT) 100 MG tablet Take 1 tablet by mouth once daily 90 tablet 1    simvastatin (ZOCOR) 80 MG tablet TAKE 1 TABLET BY MOUTH ONCE DAILY IN THE EVENING 90 tablet 0    meclizine (ANTIVERT) 25 MG tablet Take 1 tablet by mouth Every 6 (Six) Hours As Needed for dizziness or Nausea. (Patient not taking: Reported on 3/30/2025) 30 tablet 0    triamcinolone (KENALOG) 0.1 % cream 1 Application As Needed.       Allergies   Allergen Reactions    Morphine And Codeine GI Intolerance    Ciprofloxacin-Ciproflox Hcl Er Rash    Flomax [Tamsulosin Hcl] Rash    Hydrocodone Rash    Latex Rash    Lortab [Hydrocodone-Acetaminophen] Rash    Penicillins Rash    Phenergan [Promethazine Hcl] GI Intolerance    Sulfa Antibiotics Rash    Amoxicillin Rash    Tamsulosin Rash       Objective    Objective     Vital Signs  Temp:  [97.4 °F (36.3 °C)-104.1 °F (40.1 °C)] 98 °F (36.7 °C)  Heart Rate:  [] 83  Resp:  [17-20] 17  BP: ()/(46-79) 130/67  SpO2:  [90 %-97 %] 96 %  on  Flow (L/min) (Oxygen Therapy):  [2] 2;   Device (Oxygen Therapy): nasal cannula  Body mass index is 32.81 kg/m².    Physical Exam  Constitutional:       Appearance: She is not ill-appearing.      Comments: Elderly, overweight, chronically ill-appearing.  Prominent facial bruising and bruises to both forearms.  Alert,  interactive, no acute distress.   HENT:      Head: Normocephalic and atraumatic.   Eyes:      Comments: Wearing glasses, makes eye contact.   Cardiovascular:      Rate and Rhythm: Normal rate and regular rhythm.      Pulses: Normal pulses.      Heart sounds: Normal heart sounds. No murmur heard.  Pulmonary:      Effort: Pulmonary effort is normal. No respiratory distress.      Breath sounds: Wheezing present.      Comments: Diminished bases.  Some scattered wheezing in the right lobe.  Wearing 2 L of oxygen nasal cannula.  Congested nonproductive cough.  Abdominal:      General: Bowel sounds are normal. There is no distension.      Palpations: Abdomen is soft.      Tenderness: There is no abdominal tenderness.   Musculoskeletal:         General: No swelling. Normal range of motion.      Cervical back: Normal range of motion.   Skin:     General: Skin is warm and dry.      Findings: Bruising present.   Neurological:      General: No focal deficit present.      Mental Status: She is alert.         Results Review:  I reviewed the patient's new clinical results.      Lab Results (last 24 hours)       Procedure Component Value Units Date/Time    POC Glucose Once [[id number]]  (Normal) Collected: 03/29/25 2119    Specimen: Blood Updated: 03/29/25 2120     Glucose 130 mg/dL     CBC & Differential [[id number]]  (Abnormal) Collected: 03/29/25 2121    Specimen: Blood Updated: 03/29/25 2136    Narrative:      The following orders were created for panel order CBC & Differential.  Procedure                               Abnormality         Status                     ---------                               -----------         ------                     CBC Auto Differential[[id number]]        Abnormal            Final result                 Please view results for these tests on the individual orders.    Comprehensive Metabolic Panel [[id number]]  (Abnormal) Collected: 03/29/25 2121    Specimen: Blood Updated: 03/29/25 2159      Glucose 132 mg/dL      BUN 19 mg/dL      Creatinine 0.99 mg/dL      Sodium 139 mmol/L      Potassium 4.4 mmol/L      Chloride 100 mmol/L      CO2 23.3 mmol/L      Calcium 9.1 mg/dL      Total Protein 7.1 g/dL      Albumin 3.6 g/dL      ALT (SGPT) 14 U/L      AST (SGOT) 24 U/L      Alkaline Phosphatase 71 U/L      Total Bilirubin 0.4 mg/dL      Globulin 3.5 gm/dL      A/G Ratio 1.0 g/dL      BUN/Creatinine Ratio 19.2     Anion Gap 15.7 mmol/L      eGFR 59.2 mL/min/1.73     Narrative:      GFR Categories in Chronic Kidney Disease (CKD)      GFR Category          GFR (mL/min/1.73)    Interpretation  G1                     90 or greater         Normal or high (1)  G2                      60-89                Mild decrease (1)  G3a                   45-59                Mild to moderate decrease  G3b                   30-44                Moderate to severe decrease  G4                    15-29                Severe decrease  G5                    14 or less           Kidney failure          (1)In the absence of evidence of kidney disease, neither GFR category G1 or G2 fulfill the criteria for CKD.    eGFR calculation 2021 CKD-EPI creatinine equation, which does not include race as a factor    Lactic Acid, Plasma [[id number]]  (Normal) Collected: 03/29/25 2121    Specimen: Blood Updated: 03/29/25 2156     Lactate 1.6 mmol/L     Procalcitonin [[id number]]  (Normal) Collected: 03/29/25 2121    Specimen: Blood Updated: 03/29/25 2205     Procalcitonin 0.11 ng/mL     Narrative:      As a Marker for Sepsis (Non-Neonates):    1. <0.5 ng/mL represents a low risk of severe sepsis and/or septic shock.  2. >2 ng/mL represents a high risk of severe sepsis and/or septic shock.    As a Marker for Lower Respiratory Tract Infections that require antibiotic therapy:    PCT on Admission    Antibiotic Therapy       6-12 Hrs later    >0.5                Strongly Recommended  >0.25 - <0.5        Recommended   0.1 - 0.25          Discouraged     "          Remeasure/reassess PCT  <0.1                Strongly Discouraged     Remeasure/reassess PCT    As 28 day mortality risk marker: \"Change in Procalcitonin Result\" (>80% or <=80%) if Day 0 (or Day 1) and Day 4 values are available. Refer to http://[email or web address]-pct-[email or web address]    Change in PCT <=80%  A decrease of PCT levels below or equal to 80% defines a positive change in PCT test result representing a higher risk for 28-day all-cause mortality of patients diagnosed with severe sepsis for septic shock.    Change in PCT >80%  A decrease of PCT levels of more than 80% defines a negative change in PCT result representing a lower risk for 28-day all-cause mortality of patients diagnosed with severe sepsis or septic shock.       BNP [[id number]]  (Abnormal) Collected: 03/29/25 2121    Specimen: Blood Updated: 03/29/25 2205     proBNP 2,774.0 pg/mL     Narrative:      This assay is used as an aid in the diagnosis of individuals suspected of having heart failure. It can be used as an aid in the diagnosis of acute decompensated heart failure (ADHF) in patients presenting with signs and symptoms of ADHF to the emergency department (ED). In addition, NT-proBNP of <300 pg/mL indicates ADHF is not likely.    Age Range Result Interpretation  NT-proBNP Concentration (pg/mL:      <50             Positive            >450                   Gray                 300-450                    Negative             <300    50-75           Positive            >900                  Gray                300-900                  Negative            <300      >75             Positive            >1800                  Gray                300-1800                  Negative            <300    High Sensitivity Troponin T [[id number]]  (Abnormal) Collected: 03/29/25 2121    Specimen: Blood Updated: 03/29/25 2205     HS Troponin T 26 ng/L     Narrative:      High Sensitive Troponin T Reference Range:  <14.0 ng/L- Negative Female for AMI  <22.0 " ng/L- Negative Male for AMI  >=14 - Abnormal Female indicating possible myocardial injury.  >=22 - Abnormal Male indicating possible myocardial injury.   Clinicians would have to utilize clinical acumen, EKG, Troponin, and serial changes to determine if it is an Acute Myocardial Infarction or myocardial injury due to an underlying chronic condition.         CBC Auto Differential [[id number]]  (Abnormal) Collected: 03/29/25 2121    Specimen: Blood Updated: 03/29/25 2136     WBC 8.75 10*3/mm3      RBC 5.20 10*6/mm3      Hemoglobin 13.6 g/dL      Hematocrit 43.6 %      MCV 83.8 fL      MCH 26.2 pg      MCHC 31.2 g/dL      RDW 13.1 %      RDW-SD 39.7 fl      MPV 9.7 fL      Platelets 231 10*3/mm3      Neutrophil % 89.1 %      Lymphocyte % 6.7 %      Monocyte % 3.8 %      Eosinophil % 0.0 %      Basophil % 0.1 %      Immature Grans % 0.3 %      Neutrophils, Absolute 7.79 10*3/mm3      Lymphocytes, Absolute 0.59 10*3/mm3      Monocytes, Absolute 0.33 10*3/mm3      Eosinophils, Absolute 0.00 10*3/mm3      Basophils, Absolute 0.01 10*3/mm3      Immature Grans, Absolute 0.03 10*3/mm3      nRBC 0.0 /100 WBC     Respiratory Panel PCR w/COVID-19(SARS-CoV-2) LYNETTE/AMMON/STANLEY/PAD/COR/DOYLE In-House, NP Swab in UTM/VTM, 2 HR TAT - Swab, Nasopharynx [[id number]]  (Abnormal) Collected: 03/29/25 2123    Specimen: Swab from Nasopharynx Updated: 03/29/25 2232     ADENOVIRUS, PCR Not Detected     Coronavirus 229E Not Detected     Coronavirus HKU1 Not Detected     Coronavirus NL63 Not Detected     Coronavirus OC43 Not Detected     COVID19 Detected     Human Metapneumovirus Not Detected     Human Rhinovirus/Enterovirus Not Detected     Influenza A PCR Not Detected     Influenza B PCR Not Detected     Parainfluenza Virus 1 Not Detected     Parainfluenza Virus 2 Not Detected     Parainfluenza Virus 3 Not Detected     Parainfluenza Virus 4 Not Detected     RSV, PCR Not Detected     Bordetella pertussis pcr Not Detected     Bordetella parapertussis  PCR Not Detected     Chlamydophila pneumoniae PCR Not Detected     Mycoplasma pneumo by PCR Not Detected    Narrative:      In the setting of a positive respiratory panel with a viral infection PLUS a negative procalcitonin without other underlying concern for bacterial infection, consider observing off antibiotics or discontinuation of antibiotics and continue supportive care. If the respiratory panel is positive for atypical bacterial infection (Bordetella pertussis, Chlamydophila pneumoniae, or Mycoplasma pneumoniae), consider antibiotic de-escalation to target atypical bacterial infection.    Blood Gas, Arterial - [[id number]]  (Abnormal) Collected: 03/29/25 2139    Specimen: Arterial Blood Updated: 03/29/25 2143     Site Right Radial     Billy's Test Positive     pH, Arterial 7.469 pH units      pCO2, Arterial 32.6 mm Hg      pO2, Arterial 83.9 mm Hg      HCO3, Arterial 23.7 mmol/L      Base Excess, Arterial 0.8 mmol/L      Comment: Serial Number: [id number]:  [id number]        O2 Saturation, Arterial 97.0 %      A-a DO2 0.3 mmHg      Barometric Pressure for Blood Gas 746.9000 mmHg      Modality BiPap     FIO2 40 %      Ventilator Mode BiLevel     Set Mech Resp Rate 16     Rate 21 Breaths/minute      Hemodilution No     Device Comment 12/6 sat 95%     PO2/FIO2 210    Blood Culture - Blood, Arm, Right [[id number]] Collected: 03/29/25 2156    Specimen: Blood from Arm, Right Updated: 03/29/25 2220    Blood Culture - Blood, Arm, Left [[id number]] Collected: 03/29/25 2156    Specimen: Blood from Arm, Left Updated: 03/29/25 2220    Urinalysis With Culture If Indicated - Straight Cath [[id number]]  (Abnormal) Collected: 03/29/25 2208    Specimen: Urine from Straight Cath Updated: 03/29/25 2232     Color, UA Dark Yellow     Appearance, UA Cloudy     pH, UA <=5.0     Specific Gravity, UA 1.022     Glucose, UA Negative     Ketones, UA Negative     Bilirubin, UA Negative     Blood, UA Small (1+)     Protein,  mg/dL (2+)      Leuk Esterase, UA Small (1+)     Nitrite, UA Negative     Urobilinogen, UA 1.0 E.U./dL    Narrative:      In absence of clinical symptoms, the presence of pyuria, bacteria, and/or nitrites on the urinalysis result does not correlate with infection.    Urinalysis, Microscopic Only - Straight Cath [[id number]]  (Abnormal) Collected: 03/29/25 2208    Specimen: Urine from Straight Cath Updated: 03/29/25 2305     RBC, UA 0-2 /HPF      WBC, UA 21-50 /HPF      Bacteria, UA None Seen /HPF      Squamous Epithelial Cells, UA 0-2 /HPF      Hyaline Casts, UA 7-12 /LPF      Methodology Automated Microscopy    Urine Culture - Urine, Straight Cath [[id number]]  (Normal) Collected: 03/29/25 2208    Specimen: Urine from Straight Cath Updated: 03/30/25 1014     Urine Culture No growth    S. Pneumo Ag Urine or CSF - Urine, Straight Cath [[id number]]  (Normal) Collected: 03/29/25 2208    Specimen: Urine from Straight Cath Updated: 03/30/25 0748     Strep Pneumo Ag Negative    Legionella Antigen, Urine - Urine, Straight Cath [[id number]]  (Normal) Collected: 03/29/25 2208    Specimen: Urine from Straight Cath Updated: 03/30/25 0748     LEGIONELLA ANTIGEN, URINE Negative    High Sensitivity Troponin T 1Hr [[id number]]  (Abnormal) Collected: 03/29/25 2333    Specimen: Blood Updated: 03/30/25 0008     HS Troponin T 28 ng/L      Troponin T Numeric Delta 2 ng/L      Troponin T % Delta 8    Narrative:      High Sensitive Troponin T Reference Range:  <14.0 ng/L- Negative Female for AMI  <22.0 ng/L- Negative Male for AMI  >=14 - Abnormal Female indicating possible myocardial injury.  >=22 - Abnormal Male indicating possible myocardial injury.   Clinicians would have to utilize clinical acumen, EKG, Troponin, and serial changes to determine if it is an Acute Myocardial Infarction or myocardial injury due to an underlying chronic condition.         CBC (No Diff) [[id number]]  (Abnormal) Collected: 03/30/25 0754    Specimen: Blood Updated: 03/30/25  0820     WBC 13.06 10*3/mm3      RBC 5.22 10*6/mm3      Hemoglobin 13.4 g/dL      Hematocrit 43.9 %      MCV 84.1 fL      MCH 25.7 pg      MCHC 30.5 g/dL      RDW 13.4 %      RDW-SD 40.5 fl      MPV 9.6 fL      Platelets 223 10*3/mm3     Comprehensive Metabolic Panel [[id number]]  (Abnormal) Collected: 03/30/25 0754    Specimen: Blood Updated: 03/30/25 0839     Glucose 177 mg/dL      BUN 24 mg/dL      Creatinine 1.04 mg/dL      Sodium 138 mmol/L      Potassium 4.5 mmol/L      Chloride 101 mmol/L      CO2 24.0 mmol/L      Calcium 9.2 mg/dL      Total Protein 6.9 g/dL      Albumin 3.5 g/dL      ALT (SGPT) 15 U/L      AST (SGOT) 22 U/L      Alkaline Phosphatase 59 U/L      Total Bilirubin 0.3 mg/dL      Globulin 3.4 gm/dL      A/G Ratio 1.0 g/dL      BUN/Creatinine Ratio 23.1     Anion Gap 13.0 mmol/L      eGFR 55.8 mL/min/1.73     Narrative:      GFR Categories in Chronic Kidney Disease (CKD)      GFR Category          GFR (mL/min/1.73)    Interpretation  G1                     90 or greater         Normal or high (1)  G2                      60-89                Mild decrease (1)  G3a                   45-59                Mild to moderate decrease  G3b                   30-44                Moderate to severe decrease  G4                    15-29                Severe decrease  G5                    14 or less           Kidney failure          (1)In the absence of evidence of kidney disease, neither GFR category G1 or G2 fulfill the criteria for CKD.    eGFR calculation 2021 CKD-EPI creatinine equation, which does not include race as a factor            Imaging Results (Last 24 Hours)       Procedure Component Value Units Date/Time    XR Chest 1 View [[id number]] Collected: 03/29/25 2335     Updated: 03/29/25 2339    Narrative:      SINGLE VIEW OF THE CHEST     HISTORY: Shortness of air     COMPARISON: December 8, 2022     FINDINGS:  There is cardiomegaly. There is no vascular congestion. No pneumothorax  or pleural  effusion is seen. The patient's bilateral lower lobe  pneumonia is better seen on earlier CT.       Impression:      The patient's bilateral lower lobe pneumonia is better seen on earlier  CT.     This report was finalized on 3/29/2025 11:36 PM by Dr. Cathleen Giles M.D on Workstation: [id number]       CT Chest Without Contrast Diagnostic [[id number]] Collected: 03/29/25 2326     Updated: 03/29/25 2338    Narrative:      CT OF THE CHEST WITHOUT CONTRAST; CT OF THE ABDOMEN AND PELVIS WITHOUT  CONTRAST     HISTORY: Fever     COMPARISON: February 27, 2024     TECHNIQUE: Axial CT imaging was obtained from the thoracic inlet through  the symphysis pubis. No IV contrast was administered.     FINDINGS:  CT OF THE CHEST: There is bilateral lower lobe consolidation, in keeping  with pneumonia. 3 mm noncalcified pulmonary nodule is noted in the right  lung apex. It was also present in October 2024, and is stable. A  follow-up CT in October 2024 could be considered to demonstrate a 4-year  of stability. The thyroid gland and trachea appear within normal limits.  There is a small hiatal hernia. There are coronary artery  calcifications. Thoracic aorta is normal in caliber. There is  enlargement of the main pulmonary artery, which can be seen in setting  of pulmonary arterial hypertension. Mediastinal lymph nodes do not  appear pathologically enlarged. No acute osseous abnormalities are seen.     CT OF THE ABDOMEN AND PELVIS: No suspicious hepatic lesions are seen.  The spleen, adrenal glands, and pancreas are all normal. There is a  duodenal diverticulum. Gallbladder is absent. There is no  hydronephrosis. There is left renal atrophy. There are aortoiliac  calcifications. Urinary bladder is thick walled, with perivesical  stranding, suggesting cystitis. Uterus is retroflexed. There is no bowel  obstruction. There is colonic diverticulosis. The appendix is normal. No  acute osseous abnormalities are seen. There is lumbar  scoliosis, with  convexity to the left.       Impression:         1. Bilateral lower lobe pneumonia.  2. Thick-walled appearance to the urinary bladder, with perivesical  stranding, suggesting cystitis.     Radiation dose reduction techniques were utilized, including automated  exposure control and exposure modulation based on body size.        This report was finalized on 3/29/2025 11:35 PM by Dr. Cathleen Giles M.D on Workstation: [id number]       CT Abdomen Pelvis Without Contrast [[id number]] Collected: 03/29/25 2326     Updated: 03/29/25 2338    Narrative:      CT OF THE CHEST WITHOUT CONTRAST; CT OF THE ABDOMEN AND PELVIS WITHOUT  CONTRAST     HISTORY: Fever     COMPARISON: February 27, 2024     TECHNIQUE: Axial CT imaging was obtained from the thoracic inlet through  the symphysis pubis. No IV contrast was administered.     FINDINGS:  CT OF THE CHEST: There is bilateral lower lobe consolidation, in keeping  with pneumonia. 3 mm noncalcified pulmonary nodule is noted in the right  lung apex. It was also present in October 2024, and is stable. A  follow-up CT in October 2024 could be considered to demonstrate a 4-year  of stability. The thyroid gland and trachea appear within normal limits.  There is a small hiatal hernia. There are coronary artery  calcifications. Thoracic aorta is normal in caliber. There is  enlargement of the main pulmonary artery, which can be seen in setting  of pulmonary arterial hypertension. Mediastinal lymph nodes do not  appear pathologically enlarged. No acute osseous abnormalities are seen.     CT OF THE ABDOMEN AND PELVIS: No suspicious hepatic lesions are seen.  The spleen, adrenal glands, and pancreas are all normal. There is a  duodenal diverticulum. Gallbladder is absent. There is no  hydronephrosis. There is left renal atrophy. There are aortoiliac  calcifications. Urinary bladder is thick walled, with perivesical  stranding, suggesting cystitis. Uterus is retroflexed.  There is no bowel  obstruction. There is colonic diverticulosis. The appendix is normal. No  acute osseous abnormalities are seen. There is lumbar scoliosis, with  convexity to the left.       Impression:         1. Bilateral lower lobe pneumonia.  2. Thick-walled appearance to the urinary bladder, with perivesical  stranding, suggesting cystitis.     Radiation dose reduction techniques were utilized, including automated  exposure control and exposure modulation based on body size.        This report was finalized on 3/29/2025 11:35 PM by Dr. Cathleen Giles M.D on Workstation: [id number]       CT Head Without Contrast [[id number]] Collected: 03/29/25 2318     Updated: 03/29/25 2329    Narrative:      CT OF THE HEAD WITHOUT CONTRAST     HISTORY: Altered mental status     COMPARISON: March 27, 2025     TECHNIQUE: Axial CT imaging was obtained through the brain. No IV  contrast was administered.     FINDINGS:  No acute intracranial hemorrhage is seen. There is atrophy. There is  periventricular and deep white matter microangiopathic change. There is  no midline shift or mass effect. No calvarial fracture is seen. There is  mucosal thickening noted within the ethmoid sinuses, as well as  air-fluid levels within the maxillary sinuses. This is new when compared  to prior study, and suggest sinusitis. There is also some mucosal  thickening noted within the sphenoid sinuses.       Impression:         1. No acute intracranial findings.  2. Acute sinusitis.     Radiation dose reduction techniques were utilized, including automated  exposure control and exposure modulation based on body size.        This report was finalized on 3/29/2025 11:26 PM by Dr. Catlheen Giles M.D on Workstation: [id number]               Results for orders placed during the hospital encounter of 06/28/21    Adult Transthoracic Echo Complete W/ Cont if Necessary Per Protocol    Interpretation Summary  · Calculated left ventricular EF = 63.2%  Estimated left ventricular EF was in agreement with the calculated left ventricular EF. Left ventricular systolic function is normal. Normal left ventricular cavity size noted. Left ventricular wall thickness is consistent with borderline concentric hypertrophy. All left ventricular wall segments contract normally. Left ventricular diastolic function was indeterminate.  · The left atrial cavity is mild to moderately dilated.      ECG 12 Lead Altered Mental Status   Preliminary Result   HEART BGJH=840  bpm   RR Qclikjlo=586  ms   CO Interval=  ms   P Horizontal Axis=  deg   P Front Axis=  deg   QRSD Interval=85  ms   QT Zkvjsstd=811  ms   YJvM=238  ms   QRS Axis=31  deg   T Wave Axis=33  deg   - ABNORMAL ECG -   Atrial fibrillation   Date and Time of Study:2025-03-29 21:37:51      Telemetry Scan   Final Result      Telemetry Scan   Final Result           Assessment/Plan     Active Hospital Problems    Diagnosis  POA    **Bilateral pneumonia [J18.9]  Yes    COVID-19 virus detected [U07.1]  Yes    PAF (paroxysmal atrial fibrillation) [I48.0]  Yes    Obesity (BMI 30-39.9) [E66.9]  Yes    Essential hypertension [I10]  Yes      Resolved Hospital Problems   No resolved problems to display.     Ms. Valentine is a pleasant 75-year-old female presents with AMS, cough, shortness of breath and found with COVID-19 and bilateral pneumonia.  She is being treated with Rocephin, dexamethasone and Paxlovid.  Also with recent UTI had not yet completed antibiotic course.  Urine culture positive for E. coli greater than 100,000, sensitive to the Rocephin. Continue to monitor labs.  She overall appears stable.  Eliquis on hold while taking Paxlovid.  Pharmacy is dosing Lovenox for A-fib.  Consult PT/OT.  Titrate and wean oxygen to keep sats greater than 94%.  Pulmonology consult if worsening.  Further plans as clinical course progresses.      I discussed the patient's findings and my recommendations with patient.    VTE Prophylaxis -   Lovenox .  Code Status - Full code.       AGUSTIN Forbes  [facility]  03/30/25  15:44 EDT

## 2025-03-31 LAB
ALBUMIN SERPL-MCNC: 3.1 G/DL (ref 3.5–5.2)
ALBUMIN/GLOB SERPL: 0.9 G/DL
ALP SERPL-CCNC: 59 U/L (ref 39–117)
ALT SERPL W P-5'-P-CCNC: 13 U/L (ref 1–33)
ANION GAP SERPL CALCULATED.3IONS-SCNC: 8.8 MMOL/L (ref 5–15)
AST SERPL-CCNC: 19 U/L (ref 1–32)
BACTERIA SPEC AEROBE CULT: NO GROWTH
BILIRUB SERPL-MCNC: 0.3 MG/DL (ref 0–1.2)
BUN SERPL-MCNC: 25 MG/DL (ref 8–23)
BUN/CREAT SERPL: 31.3 (ref 7–25)
CALCIUM SPEC-SCNC: 9 MG/DL (ref 8.6–10.5)
CHLORIDE SERPL-SCNC: 101 MMOL/L (ref 98–107)
CO2 SERPL-SCNC: 23.2 MMOL/L (ref 22–29)
CREAT SERPL-MCNC: 0.8 MG/DL (ref 0.57–1)
EGFRCR SERPLBLD CKD-EPI 2021: 76.5 ML/MIN/1.73
GLOBULIN UR ELPH-MCNC: 3.5 GM/DL
GLUCOSE SERPL-MCNC: 137 MG/DL (ref 65–99)
POTASSIUM SERPL-SCNC: 3.6 MMOL/L (ref 3.5–5.2)
PROT SERPL-MCNC: 6.6 G/DL (ref 6–8.5)
SODIUM SERPL-SCNC: 133 MMOL/L (ref 136–145)

## 2025-03-31 PROCEDURE — 80053 COMPREHEN METABOLIC PANEL: CPT | Performed by: NURSE PRACTITIONER

## 2025-03-31 PROCEDURE — 25010000002 ENOXAPARIN PER 10 MG: Performed by: NURSE PRACTITIONER

## 2025-03-31 PROCEDURE — 97166 OT EVAL MOD COMPLEX 45 MIN: CPT

## 2025-03-31 PROCEDURE — 97530 THERAPEUTIC ACTIVITIES: CPT

## 2025-03-31 PROCEDURE — 25010000002 DEXAMETHASONE PER 1 MG: Performed by: NURSE PRACTITIONER

## 2025-03-31 PROCEDURE — 25010000002 CEFTRIAXONE PER 250 MG: Performed by: STUDENT IN AN ORGANIZED HEALTH CARE EDUCATION/TRAINING PROGRAM

## 2025-03-31 PROCEDURE — 97162 PT EVAL MOD COMPLEX 30 MIN: CPT

## 2025-03-31 RX ORDER — GABAPENTIN 300 MG/1
300 CAPSULE ORAL EVERY MORNING
Status: DISCONTINUED | OUTPATIENT
Start: 2025-04-01 | End: 2025-04-01 | Stop reason: HOSPADM

## 2025-03-31 RX ORDER — ALPRAZOLAM 0.5 MG
0.5 TABLET ORAL NIGHTLY PRN
Status: DISCONTINUED | OUTPATIENT
Start: 2025-03-31 | End: 2025-04-01 | Stop reason: HOSPADM

## 2025-03-31 RX ORDER — LOSARTAN POTASSIUM 100 MG/1
100 TABLET ORAL DAILY
Status: DISCONTINUED | OUTPATIENT
Start: 2025-04-01 | End: 2025-04-01 | Stop reason: HOSPADM

## 2025-03-31 RX ORDER — GABAPENTIN 300 MG/1
600 CAPSULE ORAL NIGHTLY
Status: DISCONTINUED | OUTPATIENT
Start: 2025-03-31 | End: 2025-04-01 | Stop reason: HOSPADM

## 2025-03-31 RX ADMIN — PANTOPRAZOLE SODIUM 40 MG: 40 TABLET, DELAYED RELEASE ORAL at 06:32

## 2025-03-31 RX ADMIN — DEXAMETHASONE SODIUM PHOSPHATE 6 MG: 10 INJECTION INTRAMUSCULAR; INTRAVENOUS at 09:44

## 2025-03-31 RX ADMIN — RIVAROXABAN 15 MG: 15 TABLET, FILM COATED ORAL at 18:58

## 2025-03-31 RX ADMIN — LEVOTHYROXINE SODIUM 25 MCG: 0.03 TABLET ORAL at 06:32

## 2025-03-31 RX ADMIN — GABAPENTIN 600 MG: 300 CAPSULE ORAL at 00:23

## 2025-03-31 RX ADMIN — SODIUM CHLORIDE 2000 MG: 9 INJECTION, SOLUTION INTRAVENOUS at 09:44

## 2025-03-31 RX ADMIN — DEXTROMETHORPHAN 60 MG: 30 SUSPENSION, EXTENDED RELEASE ORAL at 06:39

## 2025-03-31 RX ADMIN — METOPROLOL TARTRATE 75 MG: 25 TABLET, FILM COATED ORAL at 21:19

## 2025-03-31 RX ADMIN — SERTRALINE HYDROCHLORIDE 100 MG: 100 TABLET, FILM COATED ORAL at 09:44

## 2025-03-31 RX ADMIN — ALPRAZOLAM 0.25 MG: 0.25 TABLET ORAL at 00:24

## 2025-03-31 RX ADMIN — ENOXAPARIN SODIUM 90 MG: 100 INJECTION SUBCUTANEOUS at 09:44

## 2025-03-31 RX ADMIN — Medication 10 ML: at 20:17

## 2025-03-31 RX ADMIN — Medication 10 ML: at 09:45

## 2025-03-31 RX ADMIN — NIRMATRELVIR AND RITONAVIR 2 TABLET: KIT at 17:35

## 2025-03-31 RX ADMIN — GABAPENTIN 600 MG: 300 CAPSULE ORAL at 21:19

## 2025-03-31 RX ADMIN — NIRMATRELVIR AND RITONAVIR 2 TABLET: KIT at 09:44

## 2025-03-31 NOTE — THERAPY EVALUATION
Patient Name: Krys Mayes  : 1950    MRN: [id number]                              Today's Date: 3/31/2025       Admit Date: 3/29/2025    Visit Dx:     ICD-10-CM ICD-9-CM   1. Community acquired bilateral lower lobe pneumonia  J18.9 486   2. COVID-19 virus infection  U07.1 079.89   3. Altered mental status, unspecified altered mental status type  R41.82 780.97     Patient Active Problem List   Diagnosis    Elevated alanine aminotransferase (ALT) level    Mixed anxiety depressive disorder    Gastroesophageal reflux disease    Essential hypertension    Insomnia    Microalbuminuria    Obesity (BMI 30-39.9)    Proteinuria    Vitamin D deficiency    Postmenopause    History of fracture    Reflux esophagitis    Calculus of kidney    PAF (paroxysmal atrial fibrillation)    COLTON (generalized anxiety disorder)    Iron deficiency anemia    Prediabetes    Urine frequency    Medicare annual wellness visit, subsequent    High risk medication use    Dermoid cyst of scalp    Hyperglycemia    Grief    Mixed hyperlipidemia    Long-term use of high-risk medication    Frequent UTI    Greater trochanteric bursitis of right hip    Chondromalacia, right knee    Post-traumatic arthritis of left ankle    Generalized weakness    Screening for malignant neoplasm of colon    Bilateral pneumonia    COVID-19 virus detected     Past Medical History:   Diagnosis Date    Abnormal ECG     afib brought on by strong antibiotics    Arthritis     Cholelithiasis taken out 91    Clotting disorder blood thinner    Diverticulosis     Gastroesophageal reflux disease 2016    Hyperlipidemia     Hypertension     Kidney stone     recurrent, calcium oxalate    Menopausal disorder     She went through menopause in     Neuropathy     Obesity     PAF (paroxysmal atrial fibrillation)     in the setting of urosepsis, 2016    Reflux esophagitis 2016    Sebaceous cyst of labia 2017    Sepsis due to urinary tract  infection     Sigmoid diverticulitis 11/13/2017    Type 2 diabetes mellitus     Urinary tract infection     Visual impairment cataracts    Vitamin D deficiency 01/21/2016     Past Surgical History:   Procedure Laterality Date    ANKLE SURGERY      Ankle Repair / Description: ORif the left ankle in July 2010. Secondary to fall    BREAST BIOPSY Right     benign    CATARACT EXTRACTION Right     CHOLECYSTECTOMY      COLONOSCOPY  2014    Done 2004 normal recheck in 10 years. Repeated February 2014 colonoscopy was normal and to be repeated in 10 years.    CYSTOSCOPY BLADDER STONE LITHOTRIPSY      FRACTURE SURGERY      HEMORRHOIDECTOMY      NEPHROLITHOTOMY Left 01/09/2017    Procedure: NEPHROLITHOTOMY PERCUTANEOUS SECOND LOOK WITH STENT PLACEMENT AND LASER LITHOTRIPSY;  Surgeon: Mati Villegas MD;  Location: [facility];  Service:     OTHER SURGICAL HISTORY      Tubal Ligation    PERCUTANEOUS NEPHROSTOLITHOTOMY Left 12/2016    TUBAL ABDOMINAL LIGATION      URETEROSCOPY LASER LITHOTRIPSY WITH STENT INSERTION Left 12/06/2022    Procedure: LEFT URETEROSCOPY WITH LASER LITHOTRIPSY, STONE BASKET EXTRACTION, STENT PLACEMENT;  Surgeon: Mati Villegas MD;  Location: [facility] OR;  Service: Urology;  Laterality: Left;      General Information       Row Name 03/31/25 1040          Physical Therapy Time and Intention    Document Type evaluation  -ST     Mode of Treatment physical therapy;co-treatment;occupational therapy  two sets of skilled hands to safely and maximally progress mobility in addition to decreased activity tolerance  -ST       Row Name 03/31/25 1040          General Information    Patient Profile Reviewed yes  -ST     Prior Level of Function independent:;all household mobility  -ST     Existing Precautions/Restrictions fall  -ST       Row Name 03/31/25 1040          Living Environment    Current Living Arrangements home  -ST     People in Home spouse  -ST       Row Name 03/31/25 1040          Home Main  Entrance    Number of Stairs, Main Entrance three  -ST     Stair Railings, Main Entrance railings safe and in good condition  -ST       Row Name 03/31/25 1040          Stairs Within Home, Primary    Stairs Comment, Within Home, Primary has chair lift within home  -ST       Row Name 03/31/25 1040          Cognition    Orientation Status (Cognition) oriented x 3  -ST       Row Name 03/31/25 1040          Safety Issues/Impairments Affecting Functional Mobility    Safety Issues Affecting Function (Mobility) impulsivity  -ST     Impairments Affecting Function (Mobility) balance;endurance/activity tolerance;postural/trunk control;pain;strength  -ST     Comment, Safety Issues/Impairments (Mobility) gait belt, nonskid socks donned  -ST               User Key  (r) = Recorded By, (t) = Taken By, (c) = Cosigned By      Initials Name Provider Type    She Corado PT Physical Therapist                   Mobility       Row Name 03/31/25 1040          Bed Mobility    Bed Mobility supine-sit;sit-supine  -ST     Supine-Sit [place] (Bed Mobility) standby assist  -ST     Sit-Supine [place] (Bed Mobility) standby assist  -ST     Assistive Device (Bed Mobility) bed rails;head of bed elevated  -ST     Comment, (Bed Mobility) increased time to complete  -       Row Name 03/31/25 1040          Sit-Stand Transfer    Sit-Stand [place] (Transfers) verbal cues;contact guard  -ST     Assistive Device (Sit-Stand Transfers) walker, front-wheeled  -ST     Comment, (Sit-Stand Transfer) cues for hand placement  -ST       Row Name 03/31/25 1040          Gait/Stairs (Locomotion)    [place] Level (Gait) verbal cues;contact guard;1 person assist;1 person to manage equipment  -ST     Assistive Device (Gait) walker, front-wheeled  -ST     Distance in Feet (Gait) 30  -ST     Deviations/Abnormal Patterns (Gait) gait speed decreased;stride length decreased;viry decreased  -ST     Bilateral Gait Deviations forward flexed  posture;heel strike decreased  -ST     Comment, (Gait/Stairs) cues for safety awareness with turns, intermittent picking walker up  -ST               User Key  (r) = Recorded By, (t) = Taken By, (c) = Cosigned By      Initials Name Provider Type    ST She Moreno, PT Physical Therapist                   Obj/Interventions       Row Name 03/31/25 1041          Range of Motion Comprehensive    Comment, General Range of Motion bilat LE WFL  -ST       Row Name 03/31/25 1041          Strength Comprehensive (MMT)    Comment, General Manual Muscle Testing (MMT) Assessment bilat LE WFL - grossly 4-/5 bilat  -ST       Row Name 03/31/25 1041          Balance    Comment, Balance no overt LOB, mild unsteadiness noted  -ST               User Key  (r) = Recorded By, (t) = Taken By, (c) = Cosigned By      Initials Name Provider Type    ST She Moreno, PT Physical Therapist                   Goals/Plan       Row Name 03/31/25 1042          Bed Mobility Goal 1 (PT)    Activity/Assistive Device (Bed Mobility Goal 1, PT) bed mobility activities, all  -ST     [place] Level/Cues Needed (Bed Mobility Goal 1, PT) modified independence  -ST     Time Frame (Bed Mobility Goal 1, PT) 1 week  -ST     Progress/Outcomes (Bed Mobility Goal 1, PT) new goal  -       Row Name 03/31/25 1042          Transfer Goal 1 (PT)    Activity/Assistive Device (Transfer Goal 1, PT) sit-to-stand/stand-to-sit;bed-to-chair/chair-to-bed  -ST     [place] Level/Cues Needed (Transfer Goal 1, PT) supervision required  -ST     Time Frame (Transfer Goal 1, PT) 1 week  -ST     Progress/Outcome (Transfer Goal 1, PT) new goal  -       Row Name 03/31/25 1042          Gait Training Goal 1 (PT)    Activity/Assistive Device (Gait Training Goal 1, PT) gait (walking locomotion);assistive device use  -ST     [place] Level (Gait Training Goal 1, PT) standby assist  -ST     Distance (Gait Training Goal 1, PT) 75'  -ST     Time Frame (Gait Training  Goal 1, PT) 1 week  -ST     Progress/Outcome (Gait Training Goal 1, PT) new goal  -ST               User Key  (r) = Recorded By, (t) = Taken By, (c) = Cosigned By      Initials Name Provider Type    She Corado PT Physical Therapist                   Clinical Impression       Row Name 03/31/25 1042          Pain    Pretreatment Pain Rating 0/10 - no pain  -ST     Posttreatment Pain Rating 0/10 - no pain  -ST       Row Name 03/31/25 1042          Plan of Care Review    Plan of Care Reviewed With patient  -ST     Outcome Evaluation Pt is 74 y/o F admitted to [facility] on 3/30/35 with AMS, found to have COVID and bilat PNA. PMH: PAF, GERD, HTN, neuropathy. At baseline pt is from home with spouse, uses rollator for mobility and does report a few falls at home, most recently resulting in bruising on face. Pt currently demos SBA for bed mobility, CGA for transfers and room ambulation. Does not report SOA and sats WNL on RA throughout. VC at times for safety awareness. Pt demos mobility below baseline and therefore would benefit from acute skilled PT to address functional mobility deficits. Anticipate d/c home with assist and home health.  -ST       Row Name 03/31/25 1042          Therapy Assessment/Plan (PT)    Rehab Potential (PT) good  -ST     Criteria for Skilled Interventions Met (PT) yes  -ST     Therapy Frequency (PT) 5 times/wk  -ST     Predicted Duration of Therapy Intervention (PT) 1 week  -ST       Row Name 03/31/25 1042          Positioning and Restraints    Pre-Treatment Position in bed  -ST     Post Treatment Position bed  -ST     In Bed supine;call light within reach;encouraged to call for assist;exit alarm on  -ST               User Key  (r) = Recorded By, (t) = Taken By, (c) = Cosigned By      Initials Name Provider Type    She Corado PT Physical Therapist                   Outcome Measures       Row Name 03/31/25 1042 03/31/25 0808       How much help from another person do you  currently need...    Turning from your back to your side while in flat bed without using bedrails? 4  -ST 3  -PP    Moving from lying on back to sitting on the side of a flat bed without bedrails? 3  -ST 3  -PP    Moving to and from a bed to a chair (including a wheelchair)? 3  -ST 3  -PP    Standing up from a chair using your arms (e.g., wheelchair, bedside chair)? 3  -ST 3  -PP    Climbing 3-5 steps with a railing? 3  -ST 2  -PP    To walk in hospital room? 3  -ST 3  -PP    AM-PAC 6 Clicks Score (PT) 19  -ST 17  -PP    Highest Level of Mobility Goal 6 --> Walk 10 steps or more  -ST 5 --> Static standing  -PP      Row Name 03/31/25 1042          Functional Assessment    Outcome Measure Options AM-PAC 6 Clicks Basic Mobility (PT)  -ST               User Key  (r) = Recorded By, (t) = Taken By, (c) = Cosigned By      Initials Name Provider Type     Meredith Ramos, RN Registered Nurse     She Moreno PT Physical Therapist                                 Physical Therapy Education       Title: PT OT SLP Therapies (In Progress)       Topic: Physical Therapy (In Progress)       Point: Mobility training (Done)       Learning Progress Summary            Patient Acceptance, TB,E, VU,NR by  at 3/31/2025 1042                      Point: Home exercise program (Not Started)       Learner Progress:  Not documented in this visit.              Point: Body mechanics (Done)       Learning Progress Summary            Patient Acceptance, TB,E, VU,NR by  at 3/31/2025 1042                      Point: Precautions (Not Started)       Learner Progress:  Not documented in this visit.                              User Key       Initials Effective Dates Name Provider Type Discipline     09/22/22 -  She Moreno PT Physical Therapist PT                  PT Recommendation and Plan     Outcome Evaluation: Pt is 76 y/o F admitted to [facility] on 3/30/35 with AMS, found to have COVID and bilat PNA. PMH: PAF, GERD, HTN,  neuropathy. At baseline pt is from home with spouse, uses rollator for mobility and does report a few falls at home, most recently resulting in bruising on face. Pt currently demos SBA for bed mobility, CGA for transfers and room ambulation. Does not report SOA and sats WNL on RA throughout. VC at times for safety awareness. Pt demos mobility below baseline and therefore would benefit from acute skilled PT to address functional mobility deficits. Anticipate d/c home with assist and home health.     Time Calculation:         PT Charges       Row Name 03/31/25 1044             Time Calculation    Start Time 0852  -ST      Stop Time 0916  -ST      Time Calculation (min) 24 min  -ST      PT Received On 03/31/25  -ST      PT - Next Appointment 04/01/25  -ST      PT Goal Re-Cert Due Date 04/07/25  -ST         Time Calculation- PT    Total Timed Code Minutes- PT 14 minute(s)  -ST         Timed Charges    54066 - PT Therapeutic Activity Minutes 14  -ST         Total Minutes    Timed Charges Total Minutes 14  -ST       Total Minutes 14  -ST                User Key  (r) = Recorded By, (t) = Taken By, (c) = Cosigned By      Initials Name Provider Type    ST She Moreno, PT Physical Therapist                  Therapy Charges for Today       Code Description Service Date Service Provider Modifiers Qty    68956316209 HC PT THERAPEUTIC ACT EA 15 MIN 3/31/2025 She Moreno, PT GP 1    59577614621 HC PT EVAL MOD COMPLEXITY 2 3/31/2025 She Moreno PT GP 1            PT G-Codes  Outcome Measure Options: AM-PAC 6 Clicks Basic Mobility (PT)  AM-PAC 6 Clicks Score (PT): 19  PT Discharge Summary  Anticipated Discharge Disposition (PT): home with assist, home with home health    She Moreno PT  3/31/2025

## 2025-03-31 NOTE — PROGRESS NOTES
Name: Krys Mayes ADMIT: 3/29/2025   : 1950  PCP: Mina, AGUSTIN Anglin    MRN: [id number] LOS: 1 days   AGE/SEX: 75 y.o. female  ROOM: [facility]     Subjective   Subjective   Laying in bed, no issues overnight.    Objective   Objective   Vital Signs  Temp:  [97 °F (36.1 °C)-98 °F (36.7 °C)] 97 °F (36.1 °C)  Heart Rate:  [] 106  Resp:  [16-18] 18  BP: (120-143)/(67-88) 143/88  SpO2:  [96 %] 96 %  on  Flow (L/min) (Oxygen Therapy):  [2] 2;   Device (Oxygen Therapy): room air  Body mass index is 32.81 kg/m².  Physical Exam  Constitutional:       General: She is not in acute distress.     Appearance: She is ill-appearing.   HENT:      Head:      Comments: Bruising on face  Pulmonary:      Effort: Pulmonary effort is normal. No respiratory distress.      Breath sounds: No stridor.   Skin:     Coloration: Skin is not jaundiced.      Findings: No bruising.   Neurological:      General: No focal deficit present.      Mental Status: She is alert.   Psychiatric:         Mood and Affect: Mood normal.         Behavior: Behavior normal.         Results Review     I reviewed the patient's new clinical results.  Results from last 7 days   Lab Units 25  0754 25  0948   WBC 10*3/mm3 13.06* 8.75 8.78   HEMOGLOBIN g/dL 13.4 13.6 14.2   PLATELETS 10*3/mm3 223 231 189     Results from last 7 days   Lab Units 25  0420 25  0754 25  0948   SODIUM mmol/L 133* 138 139 135*   POTASSIUM mmol/L 3.6 4.5 4.4 4.6   CHLORIDE mmol/L 101 101 100 102   CO2 mmol/L 23.2 24.0 23.3 20.6*   BUN mg/dL 25* 24* 19 16   CREATININE mg/dL 0.80 1.04* 0.99 0.93   GLUCOSE mg/dL 137* 177* 132* 95   EGFR mL/min/1.73 76.5 55.8* 59.2* 63.8     Results from last 7 days   Lab Units 25  0420 25  0754 25  2121 25  0948   ALBUMIN g/dL 3.1* 3.5 3.6 3.7   BILIRUBIN mg/dL 0.3 0.3 0.4 0.4   ALK PHOS U/L 59 59 71 72   AST (SGOT) U/L 19 22 24 27   ALT (SGPT) U/L 13 15 14 9      Results from last 7 days   Lab Units 03/31/25  0420 03/30/25  0754 03/29/25  2121 03/27/25  0948   CALCIUM mg/dL 9.0 9.2 9.1 9.7   ALBUMIN g/dL 3.1* 3.5 3.6 3.7     Results from last 7 days   Lab Units 03/29/25  2121   PROCALCITONIN ng/mL 0.11   LACTATE mmol/L 1.6     Glucose   Date/Time Value Ref Range Status   03/29/2025 2119 130 70 - 130 mg/dL Final       XR Chest 1 View  Result Date: 3/29/2025  The patient's bilateral lower lobe pneumonia is better seen on earlier CT.  This report was finalized on 3/29/2025 11:36 PM by Dr. Cathleen Giles M.D on Workstation: [facility]      CT Chest Without Contrast Diagnostic  Result Date: 3/29/2025   1. Bilateral lower lobe pneumonia. 2. Thick-walled appearance to the urinary bladder, with perivesical stranding, suggesting cystitis.  Radiation dose reduction techniques were utilized, including automated exposure control and exposure modulation based on body size.   This report was finalized on 3/29/2025 11:35 PM by Dr. Cathleen Giles M.D on Workstation: [facility]      CT Abdomen Pelvis Without Contrast  Result Date: 3/29/2025   1. Bilateral lower lobe pneumonia. 2. Thick-walled appearance to the urinary bladder, with perivesical stranding, suggesting cystitis.  Radiation dose reduction techniques were utilized, including automated exposure control and exposure modulation based on body size.   This report was finalized on 3/29/2025 11:35 PM by Dr. Cathleen Giles M.D on Workstation: [facility]      CT Head Without Contrast  Result Date: 3/29/2025   1. No acute intracranial findings. 2. Acute sinusitis.  Radiation dose reduction techniques were utilized, including automated exposure control and exposure modulation based on body size.   This report was finalized on 3/29/2025 11:26 PM by Dr. Cathleen Giles M.D on Workstation: [facility]      Scheduled Medications  cefTRIAXone, 2,000 mg, Intravenous, Daily  dexAMETHasone, 6 mg, Intravenous, Daily  enoxaparin  sodium, 1 mg/kg, Subcutaneous, Q12H  levothyroxine, 25 mcg, Oral, Q AM  Nirmatrelvir&Ritonavir 150/100, 2 tablet, Oral, BID  pantoprazole, 40 mg, Oral, Q AM  sertraline, 100 mg, Oral, Daily  sodium chloride, 10 mL, Intravenous, Q12H    Infusions   Diet  Diet: Cardiac; Healthy Heart (2-3 Na+); Fluid Consistency: Thin (IDDSI 0)       Assessment/Plan     Active Hospital Problems    Diagnosis  POA    **Bilateral pneumonia [J18.9]  Yes    COVID-19 virus detected [U07.1]  Yes    PAF (paroxysmal atrial fibrillation) [I48.0]  Yes    Obesity (BMI 30-39.9) [E66.9]  Yes    Essential hypertension [I10]  Yes      Resolved Hospital Problems   No resolved problems to display.       75 y.o. female admitted with Bilateral pneumonia.      03/31/25  Will continue empiric Rocephin while awaiting final cultures.  Continue dexamethasone and Paxlovid.    Multifocal pneumonia  COVID-pneumonia  Acute hypoxic respiratory failure  Acute cystitis  -CT chest shows bilateral lower lobe pneumonia; thickened bladder wall consistent with cystitis  -Cultures NGTD  -Started on Rocephin  -Dexamethasone, Paxlovid for COVID     Hypothyroid  Synthroid     Depression/anxiety  Zoloft    Leukocytosis, from steroids    Flow (L/min) (Oxygen Therapy):  [2] 2    DVT prophylaxis: Therapeutic Lovenox  Discussed with patient.  Anticipated discharge home with , 1-2 days            Amos Baires MD  [facility]  03/31/25  09:21 EDT

## 2025-03-31 NOTE — PLAN OF CARE
Goal Outcome Evaluation:  Plan of Care Reviewed With: patient      Pt is 76 y/o F admitted to [facility] on 3/30/35 with AMS, found to have COVID and bilat PNA. PMH: PAF, GERD, HTN, neuropathy. At baseline pt is from home with spouse, uses rollator for mobility and does report a few falls at home, most recently resulting in bruising on face. Pt currently demos SBA for bed mobility, CGA for transfers and room ambulation. Does not report SOA and sats WNL on RA throughout. VC at times for safety awareness. Pt demos mobility below baseline and therefore would benefit from acute skilled PT to address functional mobility deficits. Anticipate d/c home with assist and home health.            Anticipated Discharge Disposition (PT): home with assist, home with home health

## 2025-03-31 NOTE — PLAN OF CARE
Goal Outcome Evaluation:  Plan of Care Reviewed With: patient        Progress: no change  Outcome Evaluation: 75 y.o. female admitted to [facility] with AMS.  Post work up reveals + Covid.  At baseline, patient lives with spouse at home (3 DAVID) Independent with ADLs and functional mobility (Rollator).  Patient reports she has had recent falls with noted bruising on face.  A&Ox3, BUE WFL, 3+/5.  Patient resting in bed upon arrival.  Patient able to transition to EOB with SBA, STS from EOB with CGAand performed household ambulation in room with CGA and Rw.  cuing for walker management and safety.  OOT would be beneficial to address underlying physical deficits and increase ADLs.  Anticipate patient d/c to home with HH assistnace.    Anticipated Discharge Disposition (OT): home with home health

## 2025-03-31 NOTE — PLAN OF CARE
Problem: Adult Inpatient Plan of Care  Goal: Plan of Care Review  Outcome: Progressing  Flowsheets  Taken 3/31/2025 1554 by Meredith Ramos, RN  Progress: improving  Outcome Evaluation: Alert and riented x 4, RA, Up to chair and BR with one assist. Taking PO. Awaiting culture results. Monitoring.  Taken 3/31/2025 1042 by She Moreno PT  Plan of Care Reviewed With: patient  Goal: Patient-Specific Goal (Individualized)  Outcome: Progressing  Goal: Absence of Hospital-Acquired Illness or Injury  Outcome: Progressing  Intervention: Identify and Manage Fall Risk  Description: Perform standard risk assessment on admission using a validated tool or comprehensive approach appropriate to the patient; reassess fall risk frequently, with change in status or transfer to another level of care.Communicate risk to interprofessional healthcare team; ensure fall risk visible cue.Determine need for increased observation, equipment and environmental modification, as well as use of supportive, nonskid footwear.Adjust safety measures to individual needs and identified risk factors.Reinforce the importance of active participation with fall risk prevention, safety, and physical activity with the patient and family.Perform regular intentional rounding to assess need for position change, pain assessment and personal needs, including assistance with toileting.  Recent Flowsheet Documentation  Taken 3/31/2025 1553 by Meredith Ramos, RN  Safety Promotion/Fall Prevention:   assistive device/personal items within reach   clutter free environment maintained   fall prevention program maintained   nonskid shoes/slippers when out of bed   room organization consistent   safety round/check completed  Taken 3/31/2025 1400 by Meredith Ramos, RN  Safety Promotion/Fall Prevention:   assistive device/personal items within reach   clutter free environment maintained   fall prevention program maintained   nonskid shoes/slippers when out of bed    room organization consistent   safety round/check completed  Taken 3/31/2025 1208 by Meredith Ramos, RN  Safety Promotion/Fall Prevention:   assistive device/personal items within reach   clutter free environment maintained   fall prevention program maintained   nonskid shoes/slippers when out of bed   room organization consistent   safety round/check completed  Taken 3/31/2025 1000 by Meredith Ramos RN  Safety Promotion/Fall Prevention:   assistive device/personal items within reach   clutter free environment maintained   fall prevention program maintained   nonskid shoes/slippers when out of bed   room organization consistent   safety round/check completed  Taken 3/31/2025 0808 by Meredith Ramos, RN  Safety Promotion/Fall Prevention:   assistive device/personal items within reach   clutter free environment maintained   fall prevention program maintained   nonskid shoes/slippers when out of bed   room organization consistent   safety round/check completed  Intervention: Prevent Skin Injury  Description: Perform a screening for skin injury risk, such as pressure or moisture-associated skin damage on admission and at regular intervals throughout hospital stay.Keep all areas of skin (especially folds) clean and dry.Maintain adequate skin hydration.Relieve and redistribute pressure and protect bony prominences and skin at risk for injury; implement measures based on patient-specific risk factors.Match turning and repositioning schedule to clinical condition.Encourage weight shift frequently; assist with reposition if unable to complete independently.Float heels off bed; avoid pressure on the Achilles tendon.Keep skin free from extended contact with medical devices.Optimize nutrition and hydration.Encourage functional activity and mobility, as early as tolerated.Use aids (e.g., slide boards, mechanical lift) during transfer.  Recent Flowsheet Documentation  Taken 3/31/2025 1553 by Meredith Ramos, RN  Body Position:    position changed independently   neutral body alignment   neutral head position   sitting up in bed  Taken 3/31/2025 1400 by Meredith Ramos RN  Body Position:   right   tilted   neutral body alignment   neutral head position   position changed independently  Taken 3/31/2025 1208 by Meredith Ramos RN  Body Position:   position changed independently   left   tilted   neutral body alignment   neutral head position  Taken 3/31/2025 1000 by Meredith Ramos RN  Body Position:   sitting up in bed   weight shifting   neutral body alignment   neutral head position   position changed independently   right   tilted  Taken 3/31/2025 0808 by Meredith Ramos RN  Body Position:   position changed independently   neutral body alignment   neutral head position   tilted   weight shifting   left  Skin Protection:   incontinence pads utilized   skin sealant/moisture barrier applied  Intervention: Prevent and Manage VTE (Venous Thromboembolism) Risk  Description: Assess for VTE (venous thromboembolism) risk.Promote early mobilization; encourage both active and passive leg exercises, if unable to ambulate.Initiate and maintain compression or other therapy, as indicated, based on identified risk in accordance with organizational protocol and provider order.Recognize the patient's individual risk for bleeding before initiating pharmacologic thromboprophylaxis.  Recent Flowsheet Documentation  Taken 3/31/2025 1400 by Meredith Ramos RN  VTE Prevention/Management:   bilateral   SCDs (sequential compression devices) on  Taken 3/31/2025 0808 by Meredith Ramos RN  VTE Prevention/Management:   bilateral   SCDs (sequential compression devices) on  Intervention: Prevent Infection  Description: Maintain skin and mucous membrane integrity; promote hand, oral and pulmonary hygiene.Optimize fluid balance, nutrition, sleep and glycemic control to maximize infection resistance.Identify potential sources of infection early to prevent or mitigate  progression of infection (e.g., wound, lines, devices).Evaluate ongoing need for invasive devices; remove promptly when no longer indicated.Review vaccination status.  Recent Flowsheet Documentation  Taken 3/31/2025 1553 by Meredith Ramos RN  Infection Prevention:   environmental surveillance performed   equipment surfaces disinfected   hand hygiene promoted   single patient room provided  Taken 3/31/2025 1400 by Meredith Ramos RN  Infection Prevention:   environmental surveillance performed   equipment surfaces disinfected   hand hygiene promoted   single patient room provided  Taken 3/31/2025 1208 by Meredith Ramos RN  Infection Prevention:   environmental surveillance performed   equipment surfaces disinfected   hand hygiene promoted   single patient room provided  Taken 3/31/2025 1000 by Meredith Ramos RN  Infection Prevention:   environmental surveillance performed   equipment surfaces disinfected   hand hygiene promoted   single patient room provided  Taken 3/31/2025 0808 by Meredith Ramos RN  Infection Prevention:   equipment surfaces disinfected   hand hygiene promoted   single patient room provided   personal protective equipment utilized  Goal: Optimal Comfort and Wellbeing  Outcome: Progressing  Intervention: Provide Person-Centered Care  Description: Use a family-focused approach to care; encourage support system presence and participation.Develop trust and rapport by proactively providing information, encouraging questions, addressing concerns and offering reassurance.Acknowledge emotional response to hospitalization.Recognize and utilize personal coping strategies and strengths; develop goals via shared decision-making.Honor spiritual and cultural preferences.  Recent Flowsheet Documentation  Taken 3/31/2025 1400 by Meredith Ramos RN  Trust Relationship/Rapport:   care explained   choices provided   emotional support provided   empathic listening provided   questions answered   questions  encouraged   reassurance provided   thoughts/feelings acknowledged  Taken 3/31/2025 0808 by Meredith Ramos, RN  Trust Relationship/Rapport:   care explained   choices provided   emotional support provided   empathic listening provided   questions answered   questions encouraged   thoughts/feelings acknowledged   reassurance provided  Goal: Readiness for Transition of Care  Outcome: Progressing     Problem: Fall Injury Risk  Goal: Absence of Fall and Fall-Related Injury  Outcome: Progressing  Intervention: Identify and Manage Contributors  Description: Develop a fall prevention plan, considering patient-centered interventions and family/caregiver involvement; identify and address patient's facilitators and barriers.Provide reorientation, appropriate sensory stimulation and routines with changes in mental status to decrease risk of fall.Promote use of personal vision and auditory aids.Assess assistance level required for safe and effective self-care; provide support as needed, such as toileting and mobilization. For age 65 and older, implement timed toileting with assistance.Encourage physical activity, such as performance of mobility and self-care at highest level of patient ability, multicomponent exercise program and provision of appropriate assistive devices.If fall occurs, assess the severity of injury; implement fall injury protocol. Determine the cause and revise fall injury prevention plan.Regularly review and advocate for medication adjustment to decrease fall risk; consider administration times, polypharmacy and age.Balance adequate pain management with potential for oversedation.  Recent Flowsheet Documentation  Taken 3/31/2025 1553 by Meredith Ramos, RN  Medication Review/Management: medications reviewed  Taken 3/31/2025 1400 by Meredith Ramos, RN  Medication Review/Management: medications reviewed  Taken 3/31/2025 1208 by Meredith Ramos, RN  Medication Review/Management: medications reviewed  Taken  3/31/2025 1000 by Meredith Ramos, RN  Medication Review/Management: medications reviewed  Taken 3/31/2025 0808 by Meredith Ramos RN  Medication Review/Management: medications reviewed  Intervention: Promote Injury-Free Environment  Description: Provide a safe, barrier-free environment that encourages independent activity.Keep care area uncluttered and well-lighted.Determine need for increased observation or monitoring.Avoid use of devices that minimize mobility, such as restraints or indwelling urinary catheter.  Recent Flowsheet Documentation  Taken 3/31/2025 1553 by Meredith Ramos, RN  Safety Promotion/Fall Prevention:   assistive device/personal items within reach   clutter free environment maintained   fall prevention program maintained   nonskid shoes/slippers when out of bed   room organization consistent   safety round/check completed  Taken 3/31/2025 1400 by Meredith Ramos, RN  Safety Promotion/Fall Prevention:   assistive device/personal items within reach   clutter free environment maintained   fall prevention program maintained   nonskid shoes/slippers when out of bed   room organization consistent   safety round/check completed  Taken 3/31/2025 1208 by Meredith Ramos, RN  Safety Promotion/Fall Prevention:   assistive device/personal items within reach   clutter free environment maintained   fall prevention program maintained   nonskid shoes/slippers when out of bed   room organization consistent   safety round/check completed  Taken 3/31/2025 1000 by Meredith Ramos, RN  Safety Promotion/Fall Prevention:   assistive device/personal items within reach   clutter free environment maintained   fall prevention program maintained   nonskid shoes/slippers when out of bed   room organization consistent   safety round/check completed  Taken 3/31/2025 0808 by Meredith Ramos, RN  Safety Promotion/Fall Prevention:   assistive device/personal items within reach   clutter free environment maintained   fall prevention  program maintained   nonskid shoes/slippers when out of bed   room organization consistent   safety round/check completed     Problem: Skin Injury Risk Increased  Goal: Skin Health and Integrity  Outcome: Progressing  Intervention: Optimize Skin Protection  Description: Perform a full pressure injury risk assessment, as indicated by screening, upon admission to care unit.Reassess skin (full inspection and injury risk, including skin temperature, consistency and color) frequently (e.g., scheduled interval, with change in condition) to provide optimal early detection and prevention.Maintain adequate tissue perfusion (e.g., encourage fluid balance; avoid crossing legs, constrictive clothing or devices) to promote tissue oxygenation.Maintain head of bed at lowest degree of elevation tolerated, considering medical condition and other restrictions. Use positioning supports to prevent sliding and friction. Consider low friction textiles.Avoid positioning onto an area that remains reddened or on bony prominences.Minimize incontinence and moisture (e.g., toileting schedule; moisture-wicking pad, diaper or incontinence collection device; skin moisture barrier).Cleanse skin promptly and gently, when soiled, utilizing a pH-balanced cleanser.Relieve and redistribute pressure (e.g., scheduled position changes, weight shifts, use of support surface, medical device repositioning, protective dressing application, use of positioning device, microclimate control, use of pressure-injury-monitorEncourage increased activity, such as sitting in a chair at the bedside or early mobilization, when able to tolerate. Avoid prolonged sitting.  Recent Flowsheet Documentation  Taken 3/31/2025 1553 by Meredith Ramos, RN  Head of Bed (HOB) Positioning: HOB at 30-45 degrees  Taken 3/31/2025 1400 by Meredith Ramos, RN  Pressure Reduction Techniques: frequent weight shift encouraged  Head of Bed (HOB) Positioning: HOB at 30-45 degrees  Pressure  Reduction Devices: pressure-redistributing mattress utilized  Taken 3/31/2025 1208 by Meredith Ramos, RN  Head of Bed ([facility]) Positioning: HOB at 20 degrees  Taken 3/31/2025 1000 by Meredith Ramos RN  Head of Bed ([facility]) Positioning: HOB at 30-45 degrees  Taken 3/31/2025 0808 by Meredith Ramos, RN  Pressure Reduction Techniques:   frequent weight shift encouraged   heels elevated off bed   weight shift assistance provided  Head of Bed ([facility]) Positioning: HOB at 20 degrees  Pressure Reduction Devices: pressure-redistributing mattress utilized  Skin Protection:   incontinence pads utilized   skin sealant/moisture barrier applied   Goal Outcome Evaluation:  Plan of Care Reviewed With: patient        Progress: improving  Outcome Evaluation: Alert and riented x 4, RA, Up to chair and BR with one assist. Taking PO. Awaiting culture results. Monitoring.

## 2025-03-31 NOTE — PLAN OF CARE
No new acute issues this shift, pt AAO x4.  No reports or complaints of chest pain, difficulty breathing, or shortness of breath.  Pt with productive cough with productive reddish sputum this AM.  Pt administered x1 dose of PRN cough syrup.  Pt restarted on home dose of Gabapentin last night by APRN and pt administered x1 dose of Xanax 0.25 with success.  Pt resting well in bed, no further acute issues, will continue to monitor and observe.     Goal Outcome Evaluation:  Plan of Care Reviewed With: patient        Progress: no change      Problem: Adult Inpatient Plan of Care  Goal: Plan of Care Review  Flowsheets (Taken 3/31/2025 4336)  Progress: no change  Plan of Care Reviewed With: patient     Problem: Adult Inpatient Plan of Care  Goal: Absence of Hospital-Acquired Illness or Injury  Outcome: Progressing     Problem: Fall Injury Risk  Goal: Absence of Fall and Fall-Related Injury  Outcome: Progressing     Problem: Skin Injury Risk Increased  Goal: Skin Health and Integrity  Outcome: Progressing

## 2025-03-31 NOTE — THERAPY EVALUATION
Patient Name: Krys Mayes  : 1950    MRN: [id number]                              Today's Date: 3/31/2025       Admit Date: 3/29/2025    Visit Dx:     ICD-10-CM ICD-9-CM   1. Community acquired bilateral lower lobe pneumonia  J18.9 486   2. COVID-19 virus infection  U07.1 079.89   3. Altered mental status, unspecified altered mental status type  R41.82 780.97     Patient Active Problem List   Diagnosis    Elevated alanine aminotransferase (ALT) level    Mixed anxiety depressive disorder    Gastroesophageal reflux disease    Essential hypertension    Insomnia    Microalbuminuria    Obesity (BMI 30-39.9)    Proteinuria    Vitamin D deficiency    Postmenopause    History of fracture    Reflux esophagitis    Calculus of kidney    PAF (paroxysmal atrial fibrillation)    COLTON (generalized anxiety disorder)    Iron deficiency anemia    Prediabetes    Urine frequency    Medicare annual wellness visit, subsequent    High risk medication use    Dermoid cyst of scalp    Hyperglycemia    Grief    Mixed hyperlipidemia    Long-term use of high-risk medication    Frequent UTI    Greater trochanteric bursitis of right hip    Chondromalacia, right knee    Post-traumatic arthritis of left ankle    Generalized weakness    Screening for malignant neoplasm of colon    Bilateral pneumonia    COVID-19 virus detected     Past Medical History:   Diagnosis Date    Abnormal ECG     afib brought on by strong antibiotics    Arthritis     Cholelithiasis taken out 91    Clotting disorder blood thinner    Diverticulosis     Gastroesophageal reflux disease 2016    Hyperlipidemia     Hypertension     Kidney stone     recurrent, calcium oxalate    Menopausal disorder     She went through menopause in     Neuropathy     Obesity     PAF (paroxysmal atrial fibrillation)     in the setting of urosepsis, 2016    Reflux esophagitis 2016    Sebaceous cyst of labia 2017    Sepsis due to urinary tract  infection     Sigmoid diverticulitis 11/13/2017    Type 2 diabetes mellitus     Urinary tract infection     Visual impairment cataracts    Vitamin D deficiency 01/21/2016     Past Surgical History:   Procedure Laterality Date    ANKLE SURGERY      Ankle Repair / Description: ORif the left ankle in July 2010. Secondary to fall    BREAST BIOPSY Right     benign    CATARACT EXTRACTION Right     CHOLECYSTECTOMY      COLONOSCOPY  2014    Done 2004 normal recheck in 10 years. Repeated February 2014 colonoscopy was normal and to be repeated in 10 years.    CYSTOSCOPY BLADDER STONE LITHOTRIPSY      FRACTURE SURGERY      HEMORRHOIDECTOMY      NEPHROLITHOTOMY Left 01/09/2017    Procedure: NEPHROLITHOTOMY PERCUTANEOUS SECOND LOOK WITH STENT PLACEMENT AND LASER LITHOTRIPSY;  Surgeon: Mati Villegas MD;  Location: [facility] OR;  Service:     OTHER SURGICAL HISTORY      Tubal Ligation    PERCUTANEOUS NEPHROSTOLITHOTOMY Left 12/2016    TUBAL ABDOMINAL LIGATION      URETEROSCOPY LASER LITHOTRIPSY WITH STENT INSERTION Left 12/06/2022    Procedure: LEFT URETEROSCOPY WITH LASER LITHOTRIPSY, STONE BASKET EXTRACTION, STENT PLACEMENT;  Surgeon: Mati Villegas MD;  Location: [facility] OR;  Service: Urology;  Laterality: Left;      General Information       Row Name 03/31/25 1135          OT Time and Intention    Document Type evaluation  -JR     Mode of Treatment occupational therapy;physical therapy;co-treatment  -JR       Row Name 03/31/25 1135          General Information    Patient Profile Reviewed yes  -JR     Prior Level of Function independent:;ADL's  -JR     Existing Precautions/Restrictions fall  -JR       Row Name 03/31/25 1135          Living Environment    Current Living Arrangements home  -JR     People in Home spouse  -JR       Row Name 03/31/25 1135          Home Main Entrance    Number of Stairs, Main Entrance three  -JR     Stair Railings, Main Entrance railings safe and in good condition  -JR       Row Name  03/31/25 1135          Cognition    Orientation Status (Cognition) oriented x 3  -       Row Name 03/31/25 1135          Safety Issues/Impairments Affecting Functional Mobility    Impairments Affecting Function (Mobility) balance;endurance/activity tolerance;postural/trunk control;pain;strength  -JR     Comment, Safety Issues/Impairments (Mobility) PT/OT cotreatment medically appropriate and necessary due to patient acuity level, to maximize therapeutic benefit due to impaired act tolerance, and for safety of patient and staff. Treatment focused on progression of care and goals established in POC.  -               User Key  (r) = Recorded By, (t) = Taken By, (c) = Cosigned By      Initials Name Provider Type    Zaire Wesley OT Occupational Therapist                     Mobility/ADL's       Row Name 03/31/25 1135          Bed Mobility    Bed Mobility supine-sit;sit-supine  -     Supine-Sit [place] (Bed Mobility) standby assist  -     Sit-Supine [place] (Bed Mobility) standby assist  -     Assistive Device (Bed Mobility) bed rails;head of bed elevated  -       Row Name 03/31/25 1135          Sit-Stand Transfer    Sit-Stand [place] (Transfers) verbal cues;contact guard  -     Assistive Device (Sit-Stand Transfers) walker, front-wheeled  -       Row Name 03/31/25 1135          Functional Mobility    Patient was able to Ambulate yes  -               User Key  (r) = Recorded By, (t) = Taken By, (c) = Cosigned By      Initials Name Provider Type    Zaire Wesley OT Occupational Therapist                   Obj/Interventions       Row Name 03/31/25 1136          Sensory Assessment (Somatosensory)    Sensory Assessment (Somatosensory) sensation intact  -       Row Name 03/31/25 1136          Vision Assessment/Intervention    Visual Impairment/Limitations WFL  -       Row Name 03/31/25 1136          Range of Motion Comprehensive    General Range of Motion bilateral upper extremity ROM  WFL  -JR     Comment, General Range of Motion BUE WFL  -JR       Row Name 03/31/25 1136          Strength Comprehensive (MMT)    General Manual Muscle Testing (MMT) Assessment upper extremity strength deficits identified  -JR     Comment, General Manual Muscle Testing (MMT) Assessment BUE 3+/5  -JR       Row Name 03/31/25 1136          Balance    Balance Assessment sitting static balance;sitting dynamic balance;standing static balance;standing dynamic balance  -JR     Static Sitting Balance contact guard  -JR     Dynamic Sitting Balance contact guard  -JR     Position, Sitting Balance sitting edge of bed  -JR     Static Standing Balance contact guard  -JR     Dynamic Standing Balance contact guard  -JR     Position/Device Used, Standing Balance supported  -JR               User Key  (r) = Recorded By, (t) = Taken By, (c) = Cosigned By      Initials Name Provider Type    Zaire Wesley OT Occupational Therapist                   Goals/Plan       Row Name 03/31/25 1142          Bed Mobility Goal 1 (OT)    Activity/Assistive Device (Bed Mobility Goal 1, OT) bed mobility activities, all  -JR     [place] Level/Cues Needed (Bed Mobility Goal 1, OT) modified independence  -JR     Time Frame (Bed Mobility Goal 1, OT) 2 weeks  -JR     Progress/Outcomes (Bed Mobility Goal 1, OT) new goal  -       Row Name 03/31/25 1142          Transfer Goal 1 (OT)    Activity/Assistive Device (Transfer Goal 1, OT) transfers, all  -JR     [place] Level/Cues Needed (Transfer Goal 1, OT) modified independence  -JR     Time Frame (Transfer Goal 1, OT) 2 weeks  -JR     Progress/Outcome (Transfer Goal 1, OT) new goal  -       Row Name 03/31/25 1142          Bathing Goal 1 (OT)    Activity/Device (Bathing Goal 1, OT) bathing skills, all  -JR     [place] Level/Cues Needed (Bathing Goal 1, OT) modified independence  -JR     Time Frame (Bathing Goal 1, OT) 2 weeks  -JR     Progress/Outcomes (Bathing Goal 1, OT) new goal  -        Row Name 03/31/25 1142          Dressing Goal 1 (OT)    Activity/Device (Dressing Goal 1, OT) dressing skills, all  -JR     [place]/Cues Needed (Dressing Goal 1, OT) modified independence  -JR     Time Frame (Dressing Goal 1, OT) 2 weeks  -JR     Progress/Outcome (Dressing Goal 1, OT) new goal  -JR       Row Name 03/31/25 1142          Toileting Goal 1 (OT)    Activity/Device (Toileting Goal 1, OT) toileting skills, all  -JR     [place] Level/Cues Needed (Toileting Goal 1, OT) modified independence  -JR     Time Frame (Toileting Goal 1, OT) 2 weeks  -JR     Progress/Outcome (Toileting Goal 1, OT) new goal  -JR       Row Name 03/31/25 1142          Grooming Goal 1 (OT)    Activity/Device (Grooming Goal 1, OT) grooming skills, all  -JR     [place] (Grooming Goal 1, OT) modified independence  -JR     Time Frame (Grooming Goal 1, OT) 2 weeks  -JR     Progress/Outcome (Grooming Goal 1, OT) new goal  -JR       Row Name 03/31/25 1142          Strength Goal 1 (OT)    Strength Goal 1 (OT) BUE 4+/5  -JR     Time Frame (Strength Goal 1, OT) short term goal (STG);2 weeks  -JR     Strategies/Barriers (Strength Goal 1, OT) Current BUE 3+/5  -JR       Row Name 03/31/25 1142          Therapy Assessment/Plan (OT)    Planned Therapy Interventions (OT) activity tolerance training;adaptive equipment training;BADL retraining;neuromuscular control/coordination retraining;functional balance retraining;occupation/activity based interventions;passive ROM/stretching;transfer/mobility retraining;strengthening exercise;ROM/therapeutic exercise  -JR               User Key  (r) = Recorded By, (t) = Taken By, (c) = Cosigned By      Initials Name Provider Type    Zaire Wesley, OT Occupational Therapist                   Clinical Impression       Row Name 03/31/25 1138          Pain Assessment    Pretreatment Pain Rating 0/10 - no pain  -JR     Posttreatment Pain Rating 0/10 - no pain  -JR       [facility] Name 03/31/25 1138          Plan  of Care Review    Plan of Care Reviewed With patient  -JR     Progress no change  -     Outcome Evaluation 75 y.o. female admitted to [facility] with AMS.  Post work up reveals + Covid.  At baseline, patient lives with spouse at home (3 DAVID) Independent with ADLs and functional mobility (Rollator).  Patient reports she has had recent falls with noted bruising on face.  A&Ox3, BUE WFL, 3+/5.  Patient resting in bed upon arrival.  Patient able to transition to EOB with SBA, STS from EOB with CGAand performed household ambulation in room with CGA and Rw.  cuing for walker management and safety.  OOT would be beneficial to address underlying physical deficits and increase ADLs.  Anticipate patient d/c to home with  assistnace.  -       Row Name 03/31/25 1138          Therapy Assessment/Plan (OT)    Rehab Potential (OT) good  -     Criteria for Skilled Therapeutic Interventions Met (OT) yes;skilled treatment is necessary  -     Therapy Frequency (OT) 5 times/wk  -       Row Name 03/31/25 1138          Therapy Plan Review/Discharge Plan (OT)    Anticipated Discharge Disposition (OT) home with home health  -       Row Name 03/31/25 1138          Vital Signs    O2 Delivery Pre Treatment room air  -JR     O2 Delivery Intra Treatment room air  -JR     O2 Delivery Post Treatment room air  -JR     Pre Patient Position Supine  -JR     Intra Patient Position Standing  -JR     Post Patient Position Supine  -JR       Row Name 03/31/25 1138          Positioning and Restraints    Pre-Treatment Position in bed  -JR     Post Treatment Position bed  -JR     In Bed notified nsg;call light within reach;encouraged to call for assist;exit alarm on  -JR               User Key  (r) = Recorded By, (t) = Taken By, (c) = Cosigned By      Initials Name Provider Type    Zaire Wesley, ELIO Occupational Therapist                   Outcome Measures       Row Name 03/31/25 1143          How much help from another is currently needed...     Putting on and taking off regular lower body clothing? 2  -JR     Bathing (including washing, rinsing, and drying) 3  -JR     Toileting (which includes using toilet bed pan or urinal) 3  -JR     Putting on and taking off regular upper body clothing 3  -JR     Taking care of personal grooming (such as brushing teeth) 3  -JR     Eating meals 3  -JR     AM-PAC 6 Clicks Score (OT) 17  -JR       Row Name 03/31/25 1042 03/31/25 0808       How much help from another person do you currently need...    Turning from your back to your side while in flat bed without using bedrails? 4  -ST 3  -PP    Moving from lying on back to sitting on the side of a flat bed without bedrails? 3  -ST 3  -PP    Moving to and from a bed to a chair (including a wheelchair)? 3  -ST 3  -PP    Standing up from a chair using your arms (e.g., wheelchair, bedside chair)? 3  -ST 3  -PP    Climbing 3-5 steps with a railing? 3  -ST 2  -PP    To walk in hospital room? 3  -ST 3  -PP    AM-PAC 6 Clicks Score (PT) 19  -ST 17  -PP    Highest Level of Mobility Goal 6 --> Walk 10 steps or more  -ST 5 --> Static standing  -PP      Row Name 03/31/25 1143          Modified Trip Scale    Modified Griffin Scale 3 - Moderate disability.  Requiring some help, but able to walk without assistance.  -       Row Name 03/31/25 1143 03/31/25 1042       Functional Assessment    Outcome Measure Options AM-PAC 6 Clicks Daily Activity (OT);Modified Griffin  -JR AM-PAC 6 Clicks Basic Mobility (PT)  -ST              User Key  (r) = Recorded By, (t) = Taken By, (c) = Cosigned By      Initials Name Provider Type    PP Meredith Ramos, RN Registered Nurse    She Corado, PT Physical Therapist    Zaire Wesley OT Occupational Therapist                    Occupational Therapy Education       Title: PT OT SLP Therapies (In Progress)       Topic: Occupational Therapy (Done)       Point: ADL training (Done)       Learning Progress Summary            Patient YRIS Snow VU by   at 3/31/2025 1144    Comment: Role of OT                      Point: Home exercise program (Done)       Learning Progress Summary            Patient YRIS Snow, VU by  at 3/31/2025 1144    Comment: Role of OT                      Point: Precautions (Done)       Learning Progress Summary            Patient YRIS Snow, VU by  at 3/31/2025 1144    Comment: Role of OT                      Point: Body mechanics (Done)       Learning Progress Summary            Patient YRIS Snow, VU by  at 3/31/2025 1144    Comment: Role of OT                                      User Key       Initials Effective Dates Name Provider Type Discipline     07/24/24 -  Zaire Saldana OT Occupational Therapist OT                  OT Recommendation and Plan  Planned Therapy Interventions (OT): activity tolerance training, adaptive equipment training, BADL retraining, neuromuscular control/coordination retraining, functional balance retraining, occupation/activity based interventions, passive ROM/stretching, transfer/mobility retraining, strengthening exercise, ROM/therapeutic exercise  Therapy Frequency (OT): 5 times/wk  Plan of Care Review  Plan of Care Reviewed With: patient  Progress: no change  Outcome Evaluation: 75 y.o. female admitted to [facility] with AMS.  Post work up reveals + Covid.  At baseline, patient lives with spouse at home (3 DAVID) Independent with ADLs and functional mobility (Rollator).  Patient reports she has had recent falls with noted bruising on face.  A&Ox3, BUE WFL, 3+/5.  Patient resting in bed upon arrival.  Patient able to transition to EOB with SBA, STS from EOB with CGAand performed household ambulation in room with CGA and Rw.  cuing for walker management and safety.  OOT would be beneficial to address underlying physical deficits and increase ADLs.  Anticipate patient d/c to home with HH assistnace.     Time Calculation:   Evaluation Complexity (OT)  Review Occupational Profile/Medical/Therapy History Complexity:  expanded/moderate complexity  Assessment, Occupational Performance/Identification of Deficit Complexity: 3-5 performance deficits  Clinical Decision Making Complexity (OT): detailed assessment/moderate complexity  Overall Complexity of Evaluation (OT): moderate complexity     Time Calculation- OT       Row Name 03/31/25 1144             Time Calculation- OT    OT Start Time 0856  -JR      OT Stop Time 0916  -JR      OT Time Calculation (min) 20 min  -JR      Total Timed Code Minutes- OT 10 minute(s)  -JR      OT Received On 03/31/25  -JR      OT - Next Appointment 04/01/25  -JR      OT Goal Re-Cert Due Date 04/14/25  -JR         Timed Charges    78443 - OT Therapeutic Activity Minutes 10  -JR         Untimed Charges    OT Eval/Re-eval Minutes 10  -JR         Total Minutes    Timed Charges Total Minutes 10  -JR      Untimed Charges Total Minutes 10  -JR       Total Minutes 20  -JR                User Key  (r) = Recorded By, (t) = Taken By, (c) = Cosigned By      Initials Name Provider Type    Zaire Wesley OT Occupational Therapist                  Therapy Charges for Today       Code Description Service Date Service Provider Modifiers Qty    63134210553 HC OT THERAPEUTIC ACT EA 15 MIN 3/31/2025 Zaire Saldana OT GO 1    87202558000 HC OT EVAL MOD COMPLEXITY 3 3/31/2025 Zaire Saldana OT GO 1                 Zaire Saldana OT  3/31/2025

## 2025-04-01 ENCOUNTER — READMISSION MANAGEMENT (OUTPATIENT)
Dept: CALL CENTER | Facility: HOSPITAL | Age: 75
End: 2025-04-01
Payer: MEDICARE

## 2025-04-01 VITALS
HEART RATE: 112 BPM | DIASTOLIC BLOOD PRESSURE: 71 MMHG | WEIGHT: 206.35 LBS | OXYGEN SATURATION: 94 % | SYSTOLIC BLOOD PRESSURE: 144 MMHG | TEMPERATURE: 97 F | RESPIRATION RATE: 16 BRPM | BODY MASS INDEX: 32.39 KG/M2 | HEIGHT: 67 IN

## 2025-04-01 PROBLEM — J15.69 PNEUMONIA DUE TO OTHER GRAM-NEGATIVE BACTERIA: Status: ACTIVE | Noted: 2025-04-01

## 2025-04-01 PROBLEM — D89.832 CYTOKINE RELEASE SYNDROME, GRADE 2: Status: ACTIVE | Noted: 2025-04-01

## 2025-04-01 LAB
ALBUMIN SERPL-MCNC: 3.3 G/DL (ref 3.5–5.2)
ALBUMIN/GLOB SERPL: 1.1 G/DL
ALP SERPL-CCNC: 61 U/L (ref 39–117)
ALT SERPL W P-5'-P-CCNC: 16 U/L (ref 1–33)
ANION GAP SERPL CALCULATED.3IONS-SCNC: 9.3 MMOL/L (ref 5–15)
AST SERPL-CCNC: 19 U/L (ref 1–32)
BASOPHILS # BLD AUTO: 0.01 10*3/MM3 (ref 0–0.2)
BASOPHILS NFR BLD AUTO: 0.1 % (ref 0–1.5)
BILIRUB SERPL-MCNC: 0.3 MG/DL (ref 0–1.2)
BUN SERPL-MCNC: 29 MG/DL (ref 8–23)
BUN/CREAT SERPL: 33 (ref 7–25)
CALCIUM SPEC-SCNC: 9.1 MG/DL (ref 8.6–10.5)
CHLORIDE SERPL-SCNC: 102 MMOL/L (ref 98–107)
CO2 SERPL-SCNC: 23.7 MMOL/L (ref 22–29)
CREAT SERPL-MCNC: 0.88 MG/DL (ref 0.57–1)
DEPRECATED RDW RBC AUTO: 42.6 FL (ref 37–54)
EGFRCR SERPLBLD CKD-EPI 2021: 68.2 ML/MIN/1.73
EOSINOPHIL # BLD AUTO: 0 10*3/MM3 (ref 0–0.4)
EOSINOPHIL NFR BLD AUTO: 0 % (ref 0.3–6.2)
ERYTHROCYTE [DISTWIDTH] IN BLOOD BY AUTOMATED COUNT: 13.9 % (ref 12.3–15.4)
GLOBULIN UR ELPH-MCNC: 3.1 GM/DL
GLUCOSE SERPL-MCNC: 117 MG/DL (ref 65–99)
HCT VFR BLD AUTO: 38.4 % (ref 34–46.6)
HGB BLD-MCNC: 12 G/DL (ref 12–15.9)
IMM GRANULOCYTES # BLD AUTO: 0.04 10*3/MM3 (ref 0–0.05)
IMM GRANULOCYTES NFR BLD AUTO: 0.4 % (ref 0–0.5)
LYMPHOCYTES # BLD AUTO: 0.56 10*3/MM3 (ref 0.7–3.1)
LYMPHOCYTES NFR BLD AUTO: 5.1 % (ref 19.6–45.3)
MCH RBC QN AUTO: 26.3 PG (ref 26.6–33)
MCHC RBC AUTO-ENTMCNC: 31.3 G/DL (ref 31.5–35.7)
MCV RBC AUTO: 84.2 FL (ref 79–97)
MONOCYTES # BLD AUTO: 0.42 10*3/MM3 (ref 0.1–0.9)
MONOCYTES NFR BLD AUTO: 3.8 % (ref 5–12)
NEUTROPHILS NFR BLD AUTO: 9.95 10*3/MM3 (ref 1.7–7)
NEUTROPHILS NFR BLD AUTO: 90.6 % (ref 42.7–76)
NRBC BLD AUTO-RTO: 0 /100 WBC (ref 0–0.2)
PLATELET # BLD AUTO: 256 10*3/MM3 (ref 140–450)
PMV BLD AUTO: 9.9 FL (ref 6–12)
POTASSIUM SERPL-SCNC: 4.1 MMOL/L (ref 3.5–5.2)
PROT SERPL-MCNC: 6.4 G/DL (ref 6–8.5)
RBC # BLD AUTO: 4.56 10*6/MM3 (ref 3.77–5.28)
SODIUM SERPL-SCNC: 135 MMOL/L (ref 136–145)
WBC NRBC COR # BLD AUTO: 10.98 10*3/MM3 (ref 3.4–10.8)

## 2025-04-01 PROCEDURE — 85025 COMPLETE CBC W/AUTO DIFF WBC: CPT | Performed by: STUDENT IN AN ORGANIZED HEALTH CARE EDUCATION/TRAINING PROGRAM

## 2025-04-01 PROCEDURE — 25010000002 CEFTRIAXONE PER 250 MG: Performed by: STUDENT IN AN ORGANIZED HEALTH CARE EDUCATION/TRAINING PROGRAM

## 2025-04-01 PROCEDURE — 25010000002 DEXAMETHASONE PER 1 MG: Performed by: NURSE PRACTITIONER

## 2025-04-01 PROCEDURE — 80053 COMPREHEN METABOLIC PANEL: CPT | Performed by: NURSE PRACTITIONER

## 2025-04-01 PROCEDURE — 36415 COLL VENOUS BLD VENIPUNCTURE: CPT | Performed by: NURSE PRACTITIONER

## 2025-04-01 RX ORDER — CEFPODOXIME PROXETIL 200 MG/1
200 TABLET, FILM COATED ORAL EVERY 12 HOURS
Qty: 8 TABLET | Refills: 0 | Status: SHIPPED | OUTPATIENT
Start: 2025-04-01 | End: 2025-04-05

## 2025-04-01 RX ORDER — CEFPODOXIME PROXETIL 200 MG/1
200 TABLET, FILM COATED ORAL EVERY 12 HOURS
Qty: 8 TABLET | Refills: 0 | Status: SHIPPED | OUTPATIENT
Start: 2025-04-01 | End: 2025-04-01

## 2025-04-01 RX ORDER — SIMVASTATIN 80 MG
80 TABLET ORAL EVERY EVENING
Qty: 90 TABLET | Refills: 0 | Status: SHIPPED | OUTPATIENT
Start: 2025-04-07

## 2025-04-01 RX ADMIN — DEXAMETHASONE SODIUM PHOSPHATE 6 MG: 10 INJECTION INTRAMUSCULAR; INTRAVENOUS at 09:20

## 2025-04-01 RX ADMIN — NIRMATRELVIR AND RITONAVIR 2 TABLET: KIT at 03:23

## 2025-04-01 RX ADMIN — GABAPENTIN 300 MG: 300 CAPSULE ORAL at 06:42

## 2025-04-01 RX ADMIN — METOPROLOL TARTRATE 75 MG: 25 TABLET, FILM COATED ORAL at 09:20

## 2025-04-01 RX ADMIN — PANTOPRAZOLE SODIUM 40 MG: 40 TABLET, DELAYED RELEASE ORAL at 06:42

## 2025-04-01 RX ADMIN — SODIUM CHLORIDE 2000 MG: 9 INJECTION, SOLUTION INTRAVENOUS at 09:21

## 2025-04-01 RX ADMIN — DEXTROMETHORPHAN 60 MG: 30 SUSPENSION, EXTENDED RELEASE ORAL at 09:20

## 2025-04-01 RX ADMIN — Medication 10 ML: at 09:21

## 2025-04-01 RX ADMIN — LEVOTHYROXINE SODIUM 25 MCG: 0.03 TABLET ORAL at 06:42

## 2025-04-01 RX ADMIN — LOSARTAN POTASSIUM 100 MG: 100 TABLET, FILM COATED ORAL at 09:20

## 2025-04-01 RX ADMIN — SERTRALINE HYDROCHLORIDE 100 MG: 100 TABLET, FILM COATED ORAL at 09:20

## 2025-04-01 NOTE — OUTREACH NOTE
Prep Survey      Flowsheet Row Responses   [facility] patient discharged from? [place]   Is LACE score < 7 ? No   Eligibility [facility]   Date of Admission 03/29/25   Date of Discharge 04/01/25   Discharge diagnosis Bilateral pneumonia   Does the patient have one of the following disease processes/diagnoses(primary or secondary)? Pneumonia   General alerts for this patient outpatient therapy at [facility]   Prep survey completed? Yes            Linnette GREGORIO - Registered Nurse

## 2025-04-01 NOTE — NURSING NOTE
Patient able to verbalize understanding of discharge instructions, medication changes and follow up importance. She requests OP therapy for strengthening, reports her  can drive her.

## 2025-04-01 NOTE — CASE MANAGEMENT/SOCIAL WORK
Continued Stay Note  [facility]     Patient Name: Krys Mayes  MRN: [id number]  Today's Date: 4/1/2025    Admit Date: 3/29/2025    Plan: Return home with spouse, outpatient therapy at [facility], transport via spouse   Discharge Plan       Row Name 04/01/25 1305       Plan    Final Discharge Disposition Code 01 - home or self-care    Final Note Pt DC home with spouse, outpatient therapy at [facility], transport via spouse. PAMELA Becker/CCP      Row Name 04/01/25 4128       Plan    Plan Return home with spouse, outpatient therapy at [facility], transport via spouse    Patient/Family in Agreement with Plan yes    Plan Comments CCP called pt's cell phone, due to pt's covid isolation, however, that number is actually pt's home phone. CCP SW pt's spouse, Joey Mayes, who reports pt & he primarily use their land line phone, & cell phones are just for emergencies. Joey report he is unsure of Danielle's cell phone number. CCP called pt's room phone & introduced self, role, & DC planning discussed. Face sheet information & pharmacy verified. Pt declines [facility]. Pt requests all prescriptions sent to [facility] in [facility]. CCP updated pt's selected Rx & selected no to [facility]. CCP called [facility] & talked to Leonora to update pt's request for no M2B prescriptions. Leonora confirmed correct [facility] & reports she will update the M2B Rx to change prescriptions. CCP updated Meredith/RN. Pt lives in house with spouse, Joey Mayes. Pt reports 3 DAVID & no steps inside.  Prior to admit, pt reports she drives, IADLs, & reports use of the following DME: rollator. Pt denies issues with affording or managing medications, affording food, or affording utilities. CCP reviewed PT evaluation & recommendation for .home with HH. Patient verbalized understanding & reports she wants to DC home with outpatient therapy. Pt declined need for HH.  Pt reports she has been to [facility] for  outpatient PT & would like to resume. CCP obtained order from Dr. Baires. NE plan return home with spouse, outpatient therapy at [facility], transport via spouse.  Denies any other needs/equipment. CCP will follow. PAMELA Becker/JACK             Expected Discharge Date and Time       Expected Discharge Date Expected Discharge Time    Apr 1, 2025    Wendy James RN

## 2025-04-01 NOTE — CASE MANAGEMENT/SOCIAL WORK
Discharge Planning Assessment  [facility]     Patient Name: Krys Mayes  MRN: [id number]  Today's Date: 4/1/2025    Admit Date: 3/29/2025    Plan: Return home with spouse, outpatient therapy at [facility], transport via spouse   Discharge Needs Assessment       Row Name 04/01/25 1130       Living Environment    People in Home spouse    Name(s) of People in Home Joey Mayes    Current Living Arrangements home    Potentially Unsafe Housing Conditions none    In the past 12 months has the electric, gas, oil, or water company threatened to shut off services in your home? No    Primary Care Provided by self    Provides Primary Care For no one    Family Caregiver if Needed spouse    Family Caregiver Names Joey Mayes    Able to Return to Prior Arrangements yes       Resource/Environmental Concerns    Resource/Environmental Concerns none    Transportation Concerns none       Transportation Needs    In the past 12 months, has lack of transportation kept you from medical appointments or from getting medications? no    In the past 12 months, has lack of transportation kept you from meetings, work, or from getting things needed for daily living? No       Food Insecurity    Within the past 12 months, you worried that your food would run out before you got the money to buy more. Never true    Within the past 12 months, the food you bought just didn't last and you didn't have money to get more. Never true       Transition Planning    Patient/Family Anticipates Transition to home with family    Patient/Family Anticipated Services at Transition outpatient care    Transportation Anticipated family or friend will provide       Discharge Needs Assessment    Readmission Within the Last 30 Days no previous admission in last 30 days    Equipment Currently Used at Home rollator    Concerns to be Addressed discharge planning    Anticipated Changes Related to Illness none    Equipment Needed After Discharge none  Pt  denied needs    Discharge Facility/Level of Care Needs other (see comments)  outpatient therapy    Provided Post Acute Provider List? N/A    Patient's Choice of Community Agency(s) [facility]                   Discharge Plan       Row Name 04/01/25 2733       Plan    Plan Return home with spouse, outpatient therapy at [facility], transport via spouse    Patient/Family in Agreement with Plan yes    Plan Comments CCP called pt's cell phone, due to pt's covid isolation, however, that number is actually pt's home phone. CCP SW pt's spouse, Joey Mayes, who reports pt & he primarily use their land line phone, & cell phones are just for emergencies. Joey report he is unsure of Danielle's cell phone number. CCP called pt's room phone & introduced self, role, & DC planning discussed. Face sheet information & pharmacy verified. Pt declines [facility]. Pt requests all prescriptions sent to [facility] [place] [facility]. CCP updated pt's selected Rx & selected no to [facility]. CCP called [facility] & talked to Leonora to update pt's request for no M2B prescriptions. Leonora confirmed correct [facility] & reports she will update the M2B Rx to change prescriptions. CCP updated Meredith/RN. Pt lives in house with spouse, Joey Mayes. Pt reports 3 DAVID & no steps inside.  Prior to admit, pt reports she drives, IADLs, & reports use of the following DME: rollator. Pt denies issues with affording or managing medications, affording food, or affording utilities. CCP reviewed PT evaluation & recommendation for .home with HH. Patient verbalized understanding & reports she wants to DC home with outpatient therapy. Pt declined need for HH.  Pt reports she has been to [facility] for outpatient PT & would like to resume. CCP obtained order from Dr. Baires. DC plan return home with spouse, outpatient therapy at [facility], transport via spouse.  Denies any other needs/equipment. CCP will follow. PAMELA Becker/JACK                   Selected Continued Care - Episodes Includes continued care and service providers with selected services from the active episodes listed below          Expected Discharge Date and Time       Expected Discharge Date Expected Discharge Time    Apr 1, 2025            Demographic Summary       Row Name 04/01/25 1130       General Information    Admission Type inpatient    Arrived From emergency department;home    Required Notices Provided Important Message from Medicare    Referral Source admission list    Reason for Consult discharge planning    Preferred Language English       Contact Information    Permission Granted to Share Info With ;family/designee                   Functional Status       Row Name 04/01/25 1130       Functional Status    Usual Activity Tolerance good    Current Activity Tolerance good       Assessment of Health Literacy    How often do you have someone help you read hospital materials? Never    How often do you have problems learning about your medical condition because of difficulty understanding written information? Never    How often do you have a problem understanding what is told to you about your medical condition? Never    How confident are you filling out medical forms by yourself? Extremely    Health Literacy Excellent       Functional Status, IADL    Medications independent    Meal Preparation independent    Housekeeping independent    Laundry independent    Shopping independent    If for any reason you need help with day-to-day activities such as bathing, preparing meals, shopping, managing finances, etc., do you get the help you need? I don't need any help       Mental Status    General Appearance WDL WDL       Mental Status Summary    Recent Changes in Mental Status/Cognitive Functioning no changes       Employment/    Employment Status retired                                                                                                                                                                                                   Wendy James RN

## 2025-04-01 NOTE — DISCHARGE SUMMARY
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        Admitting HPI     \"Ms. Mayes is a 75 y.o. female with a history of PAF, prediabetes, GERD, COLTON, HTN that presented last night to [facility] complaining of AMS.  She was alert for EMS but confused.  She was hypoxic and unresponsive to nonrebreather and ultimately was placed on noninvasive ventilation.  She was weaned down to 2 L oxygen nasal cannula with sats in the mid 90s.  She was febrile highest temp 104.1.  She was found to be COVID-positive with bilateral pneumonia.  She was started on Paxlovid, dexamethasone and IV Rocephin.     Also recently started on antibiotic for urinary tract infection seen in ED 2 days ago. Had recent fall with facial bruising, unsure exactly when this occurred. Denies losing consciousness.  She is alert and oriented to person and place.  Disoriented to year.  She reports shortness of breath and cough for couple of days.  No nausea or vomiting or abdominal pain.  No chest pain or palpitations.  She does say that she has A-fib.\"    Hospital Course     Pt admitted for dyspnea and confusion, found to be COVID-positive as well as with concerns for multifocal bacterial pneumonia.  She was treated with Paxlovid as well as IV Rocephin with vast improvement in her symptoms.  She " was weaned to room air.  Evaluated by PT with recommendations for home health, though patient prefers outpatient therapy.  Plan to transition to cefpodoxime to complete 7-day course for pneumonia as well as Paxlovid to finish 5-day course.  She is symptomatically improved and back to baseline.    Discharge Plan     Multifocal pneumonia  COVID-pneumonia  Acute hypoxic respiratory failure  Acute cystitis  -CT chest b/l lower lobe pneumonia; thickened bladder wall consistent with cystitis  -Cultures NGTD  -Dexamethasone, Paxlovid for COVID  -dc with cefpodoxime to completed 7d total for PNA     Hypothyroid  Synthroid     Depression/anxiety  Zoloft    Day of Discharge     Physical Exam:  Temp:  [97 °F (36.1 °C)-97.1 °F (36.2 °C)] 97 °F (36.1 °C)  Heart Rate:  [112] 112  Resp:  [16-18] 16  BP: (139-160)/(71-94) 144/71  Body mass index is 32.81 kg/m².  Physical Exam  Constitutional:       General: She is not in acute distress.     Appearance: She is ill-appearing.   HENT:      Head:      Comments: Bruising on face  Pulmonary:      Effort: Pulmonary effort is normal. No respiratory distress.      Breath sounds: No stridor.   Skin:     Coloration: Skin is not jaundiced.      Findings: No bruising.   Neurological:      General: No focal deficit present.      Mental Status: She is alert.   Psychiatric:         Mood and Affect: Mood normal.         Behavior: Behavior normal.   Consultants     Consult Orders (all) (From admission, onward)       Start     Ordered    03/30/25 0335  Inpatient Case Management  Consult  Once        Provider:  (Not yet assigned)    03/30/25 0336    03/29/25 2343  [facility] (on-call MD unless specified) Details  Once        Specialty:  Hospitalist  Provider:  (Not yet assigned)    03/29/25 2343                  Procedures     * Surgery not found *      Imaging Results (All)       Procedure Component Value Units Date/Time    XR Chest 1 View [[id number]] Collected: 03/29/25 2335     Updated:  03/29/25 2339    Narrative:      SINGLE VIEW OF THE CHEST     HISTORY: Shortness of air     COMPARISON: December 8, 2022     FINDINGS:  There is cardiomegaly. There is no vascular congestion. No pneumothorax  or pleural effusion is seen. The patient's bilateral lower lobe  pneumonia is better seen on earlier CT.       Impression:      The patient's bilateral lower lobe pneumonia is better seen on earlier  CT.     This report was finalized on 3/29/2025 11:36 PM by Dr. Cathleen Giles M.D on Workstation: [id number]       CT Chest Without Contrast Diagnostic [[id number]] Collected: 03/29/25 2326     Updated: 03/29/25 2338    Narrative:      CT OF THE CHEST WITHOUT CONTRAST; CT OF THE ABDOMEN AND PELVIS WITHOUT  CONTRAST     HISTORY: Fever     COMPARISON: February 27, 2024     TECHNIQUE: Axial CT imaging was obtained from the thoracic inlet through  the symphysis pubis. No IV contrast was administered.     FINDINGS:  CT OF THE CHEST: There is bilateral lower lobe consolidation, in keeping  with pneumonia. 3 mm noncalcified pulmonary nodule is noted in the right  lung apex. It was also present in October 2024, and is stable. A  follow-up CT in October 2024 could be considered to demonstrate a 4-year  of stability. The thyroid gland and trachea appear within normal limits.  There is a small hiatal hernia. There are coronary artery  calcifications. Thoracic aorta is normal in caliber. There is  enlargement of the main pulmonary artery, which can be seen in setting  of pulmonary arterial hypertension. Mediastinal lymph nodes do not  appear pathologically enlarged. No acute osseous abnormalities are seen.     CT OF THE ABDOMEN AND PELVIS: No suspicious hepatic lesions are seen.  The spleen, adrenal glands, and pancreas are all normal. There is a  duodenal diverticulum. Gallbladder is absent. There is no  hydronephrosis. There is left renal atrophy. There are aortoiliac  calcifications. Urinary bladder is thick walled,  with perivesical  stranding, suggesting cystitis. Uterus is retroflexed. There is no bowel  obstruction. There is colonic diverticulosis. The appendix is normal. No  acute osseous abnormalities are seen. There is lumbar scoliosis, with  convexity to the left.       Impression:         1. Bilateral lower lobe pneumonia.  2. Thick-walled appearance to the urinary bladder, with perivesical  stranding, suggesting cystitis.     Radiation dose reduction techniques were utilized, including automated  exposure control and exposure modulation based on body size.        This report was finalized on 3/29/2025 11:35 PM by Dr. Cathleen Giles M.D on Workstation: [id number]       CT Abdomen Pelvis Without Contrast [[id number]] Collected: 03/29/25 2326     Updated: 03/29/25 2338    Narrative:      CT OF THE CHEST WITHOUT CONTRAST; CT OF THE ABDOMEN AND PELVIS WITHOUT  CONTRAST     HISTORY: Fever     COMPARISON: February 27, 2024     TECHNIQUE: Axial CT imaging was obtained from the thoracic inlet through  the symphysis pubis. No IV contrast was administered.     FINDINGS:  CT OF THE CHEST: There is bilateral lower lobe consolidation, in keeping  with pneumonia. 3 mm noncalcified pulmonary nodule is noted in the right  lung apex. It was also present in October 2024, and is stable. A  follow-up CT in October 2024 could be considered to demonstrate a 4-year  of stability. The thyroid gland and trachea appear within normal limits.  There is a small hiatal hernia. There are coronary artery  calcifications. Thoracic aorta is normal in caliber. There is  enlargement of the main pulmonary artery, which can be seen in setting  of pulmonary arterial hypertension. Mediastinal lymph nodes do not  appear pathologically enlarged. No acute osseous abnormalities are seen.     CT OF THE ABDOMEN AND PELVIS: No suspicious hepatic lesions are seen.  The spleen, adrenal glands, and pancreas are all normal. There is a  duodenal diverticulum.  Gallbladder is absent. There is no  hydronephrosis. There is left renal atrophy. There are aortoiliac  calcifications. Urinary bladder is thick walled, with perivesical  stranding, suggesting cystitis. Uterus is retroflexed. There is no bowel  obstruction. There is colonic diverticulosis. The appendix is normal. No  acute osseous abnormalities are seen. There is lumbar scoliosis, with  convexity to the left.       Impression:         1. Bilateral lower lobe pneumonia.  2. Thick-walled appearance to the urinary bladder, with perivesical  stranding, suggesting cystitis.     Radiation dose reduction techniques were utilized, including automated  exposure control and exposure modulation based on body size.        This report was finalized on 3/29/2025 11:35 PM by Dr. Cathleen Giles M.D on Workstation: [id number]       CT Head Without Contrast [[id number]] Collected: 03/29/25 2318     Updated: 03/29/25 2329    Narrative:      CT OF THE HEAD WITHOUT CONTRAST     HISTORY: Altered mental status     COMPARISON: March 27, 2025     TECHNIQUE: Axial CT imaging was obtained through the brain. No IV  contrast was administered.     FINDINGS:  No acute intracranial hemorrhage is seen. There is atrophy. There is  periventricular and deep white matter microangiopathic change. There is  no midline shift or mass effect. No calvarial fracture is seen. There is  mucosal thickening noted within the ethmoid sinuses, as well as  air-fluid levels within the maxillary sinuses. This is new when compared  to prior study, and suggest sinusitis. There is also some mucosal  thickening noted within the sphenoid sinuses.       Impression:         1. No acute intracranial findings.  2. Acute sinusitis.     Radiation dose reduction techniques were utilized, including automated  exposure control and exposure modulation based on body size.        This report was finalized on 3/29/2025 11:26 PM by Dr. Cathleen Giles M.D on Workstation:  "[id number]               Results for orders placed during the hospital encounter of 06/28/21    Adult Transthoracic Echo Complete W/ Cont if Necessary Per Protocol    Interpretation Summary  · Calculated left ventricular EF = 63.2% Estimated left ventricular EF was in agreement with the calculated left ventricular EF. Left ventricular systolic function is normal. Normal left ventricular cavity size noted. Left ventricular wall thickness is consistent with borderline concentric hypertrophy. All left ventricular wall segments contract normally. Left ventricular diastolic function was indeterminate.  · The left atrial cavity is mild to moderately dilated.    Pertinent Labs     Results from last 7 days   Lab Units 04/01/25  0641 03/30/25 0754 03/29/25 2121 03/27/25  0948   WBC 10*3/mm3 10.98* 13.06* 8.75 8.78   HEMOGLOBIN g/dL 12.0 13.4 13.6 14.2   PLATELETS 10*3/mm3 256 223 231 189     Results from last 7 days   Lab Units 04/01/25  0641 03/31/25 0420 03/30/25 0754 03/29/25  2121   SODIUM mmol/L 135* 133* 138 139   POTASSIUM mmol/L 4.1 3.6 4.5 4.4   CHLORIDE mmol/L 102 101 101 100   CO2 mmol/L 23.7 23.2 24.0 23.3   BUN mg/dL 29* 25* 24* 19   CREATININE mg/dL 0.88 0.80 1.04* 0.99   GLUCOSE mg/dL 117* 137* 177* 132*   EGFR mL/min/1.73 68.2 76.5 55.8* 59.2*     Results from last 7 days   Lab Units 04/01/25  0641 03/31/25 0420 03/30/25 0754 03/29/25  2121   ALBUMIN g/dL 3.3* 3.1* 3.5 3.6   BILIRUBIN mg/dL 0.3 0.3 0.3 0.4   ALK PHOS U/L 61 59 59 71   AST (SGOT) U/L 19 19 22 24   ALT (SGPT) U/L 16 13 15 14     Results from last 7 days   Lab Units 04/01/25  0641 03/31/25 0420 03/30/25 0754 03/29/25  2121   CALCIUM mg/dL 9.1 9.0 9.2 9.1   ALBUMIN g/dL 3.3* 3.1* 3.5 3.6       Results from last 7 days   Lab Units 03/29/25  2333 03/29/25  2121   HSTROP T ng/L 28* 26*   PROBNP pg/mL  --  2,774.0*           Invalid input(s): \"LDLCALC\"  Results from last 7 days   Lab Units 03/29/25  2208 03/29/25  2156 03/27/25  1101 "   BLOODCX   --  No growth at 2 days  No growth at 2 days  --    URINECX  No growth  --  >100,000 CFU/mL Escherichia coli*     Results from last 7 days   Lab Units 03/29/25 2123   COVID19  Detected*       Test Results Pending at Discharge     Pending Labs       Order Current Status    Blood Culture - Blood, Arm, Left Preliminary result    Blood Culture - Blood, Arm, Right Preliminary result            Discharge Details        Discharge Medications        New Medications        Instructions Start Date   cefpodoxime 200 MG tablet  Commonly known as: VANTIN   200 mg, Oral, Every 12 Hours      Nirmatrelvir&Ritonavir 150/100 10 x 150 MG & 10 x 100MG tablet therapy pack tablet (for renal adjustment)  Commonly known as: PAXLOVID   2 tablets, Oral, 2 Times Daily, Discard the extra doses in pack.             Changes to Medications        Instructions Start Date   simvastatin 80 MG tablet  Commonly known as: ZOCOR  What changed: These instructions start on April 7, 2025. If you are unsure what to do until then, ask your doctor or other care provider.   80 mg, Oral, Every Evening   Start Date: April 7, 2025            Continue These Medications        Instructions Start Date   acetaminophen 650 MG 8 hr tablet  Commonly known as: TYLENOL   1 tablet, Every 6 Hours PRN      ALPRAZolam 1 MG tablet  Commonly known as: XANAX   TAKE 1/2 TO 1 (ONE-HALF TO ONE) TABLET BY MOUTH TWICE DAILY AS NEEDED      ascorbic acid 1000 MG tablet  Commonly known as: VITAMIN C   2,000 mg, Daily      cholecalciferol 25 MCG (1000 UT) tablet  Commonly known as: VITAMIN D3   1,000 Units, Daily      cyanocobalamin 500 MCG tablet  Commonly known as: VITAMIN B-12   500 mcg, Daily      fish oil 1000 MG capsule capsule   1,000 mg, Daily With Breakfast      gabapentin 600 MG tablet  Commonly known as: NEURONTIN   600 mg, Every Night at Bedtime      gabapentin 300 MG capsule  Commonly known as: NEURONTIN   TAKE 1 CAPSULE BY MOUTH IN THE MORNING AND 2 AT  BEDTIME      levothyroxine 25 MCG tablet  Commonly known as: SYNTHROID, LEVOTHROID   25 mcg, Oral, Every Early Morning      losartan 100 MG tablet  Commonly known as: COZAAR   100 mg, Oral, Daily      Metoprolol Tartrate 75 MG tablet   1 tablet, Oral, 2 Times Daily      multivitamin tablet tablet  Commonly known as: THERAGRAN   1 tablet, Daily      omeprazole OTC 20 MG EC tablet  Commonly known as: PriLOSEC OTC   20 mg, 2 Times Daily      PROBIOTIC ADVANCED PO   1 tablet, Daily      sertraline 100 MG tablet  Commonly known as: ZOLOFT   Take 1 tablet by mouth once daily      triamcinolone 0.1 % cream  Commonly known as: KENALOG   As Needed      Xarelto 15 MG tablet  Generic drug: rivaroxaban   15 mg, Oral, Daily             Stop These Medications      cephalexin 500 MG capsule  Commonly known as: KEFLEX     meclizine 25 MG tablet  Commonly known as: ANTIVERT     nitrofurantoin (macrocrystal-monohydrate) 100 MG capsule  Commonly known as: MACROBID              Allergies   Allergen Reactions    Morphine And Codeine GI Intolerance    Ciprofloxacin-Ciproflox Hcl Er Rash    Flomax [Tamsulosin Hcl] Rash    Hydrocodone Rash    Latex Rash    Lortab [Hydrocodone-Acetaminophen] Rash    Penicillins Rash    Phenergan [Promethazine Hcl] GI Intolerance    Sulfa Antibiotics Rash    Amoxicillin Rash    Tamsulosin Rash       Discharge Disposition:  Home-Health Care Svc      Discharge Diet:  Diet Order   Procedures    Diet: Cardiac; Healthy Heart (2-3 Na+); Fluid Consistency: Thin (IDDSI 0)       Discharge Activity:   Activity Instructions       Activity as Tolerated              CODE STATUS:    Code Status and Medical Interventions: No CPR (Do Not Attempt to Resuscitate); Limited Support; NO intubation (DNI)   Ordered at: 03/30/25 0905     Code Status (Patient has no pulse and is not breathing):    No CPR (Do Not Attempt to Resuscitate)     Medical Interventions (Patient has pulse or is breathing):    Limited Support     Medical  Intervention Limits:    NO intubation (DNI)       Future Appointments   Date Time Provider Department Center   4/8/2025 10:00 AM [facility]   4/15/2025 11:30 AM Pauline Enriquez APRN [facility]   4/29/2025  8:30 AM Catherine Brown APRN [facility]   7/17/2025  9:30 AM Sarath Cottrell PA-C [facility]  [facility]     Additional Instructions for the Follow-ups that You Need to Schedule       Ambulatory Referral to Physical Therapy for Evaluation & Treatment   As directed      Specialty needed: Evaluate and treat   Follow-up needed: Yes               Follow-up Information       Pauline Enriquez APRN .    Specialty: Nurse Practitioner  Why: If symptoms worsen AND  SCHEDULE 1-2 WEEK FOLLOW-UP APPOINTMENT  Contact information:  [address] [facility] [address]  [phone]                             Additional Instructions for the Follow-ups that You Need to Schedule       Ambulatory Referral to Physical Therapy for Evaluation & Treatment   As directed      Specialty needed: Evaluate and treat   Follow-up needed: Yes            Time Spent on Discharge:  Greater than 30 minutes spent on discharge management including final examination, discussion of hospital stay and patient education, preparation of records, medication reconciliation, follow up planning      Amos Baires MD  [facility]  04/01/25  09:00 EDT

## 2025-04-01 NOTE — PLAN OF CARE
Problem: Adult Inpatient Plan of Care  Goal: Plan of Care Review  Outcome: Progressing  Goal: Patient-Specific Goal (Individualized)  Outcome: Progressing  Goal: Absence of Hospital-Acquired Illness or Injury  Outcome: Progressing  Intervention: Identify and Manage Fall Risk  Recent Flowsheet Documentation  Taken 4/1/2025 0600 by Rashawn Haro, RN  Safety Promotion/Fall Prevention:   activity supervised   assistive device/personal items within reach   clutter free environment maintained   fall prevention program maintained   nonskid shoes/slippers when out of bed   room organization consistent   safety round/check completed  Taken 4/1/2025 0400 by Rashawn Haro, RN  Safety Promotion/Fall Prevention:   activity supervised   assistive device/personal items within reach   clutter free environment maintained   fall prevention program maintained   nonskid shoes/slippers when out of bed  Taken 4/1/2025 0200 by Rashawn Haro, RN  Safety Promotion/Fall Prevention:   activity supervised   assistive device/personal items within reach   clutter free environment maintained   fall prevention program maintained   nonskid shoes/slippers when out of bed   safety round/check completed   room organization consistent  Taken 4/1/2025 0000 by Rashawn Haro, RN  Safety Promotion/Fall Prevention:   activity supervised   assistive device/personal items within reach   clutter free environment maintained   fall prevention program maintained   nonskid shoes/slippers when out of bed   room organization consistent   safety round/check completed  Taken 3/31/2025 2200 by Rashawn Haro, RN  Safety Promotion/Fall Prevention:   activity supervised   assistive device/personal items within reach   clutter free environment maintained   fall prevention program maintained   nonskid shoes/slippers when out of bed   safety round/check completed   room organization consistent  Taken 3/31/2025 2020 by Rashawn Haro, RN  Safety Promotion/Fall  Prevention:   activity supervised   assistive device/personal items within reach   clutter free environment maintained   fall prevention program maintained   nonskid shoes/slippers when out of bed   room organization consistent   safety round/check completed  Intervention: Prevent Skin Injury  Recent Flowsheet Documentation  Taken 4/1/2025 0600 by Rashawn Haro RN  Body Position: position changed independently  Skin Protection: incontinence pads utilized  Taken 4/1/2025 0400 by Rashawn Haro RN  Body Position: position changed independently  Skin Protection: incontinence pads utilized  Taken 4/1/2025 0200 by Rashawn Haro RN  Body Position: position changed independently  Skin Protection: incontinence pads utilized  Taken 4/1/2025 0000 by Rashawn Haro RN  Body Position: position changed independently  Skin Protection: incontinence pads utilized  Taken 3/31/2025 2200 by Rashawn Haro RN  Body Position: position changed independently  Skin Protection: incontinence pads utilized  Taken 3/31/2025 2020 by Rashawn Haro RN  Body Position: position changed independently  Skin Protection: incontinence pads utilized  Intervention: Prevent Infection  Recent Flowsheet Documentation  Taken 4/1/2025 0600 by Rashawn Haro RN  Infection Prevention: environmental surveillance performed  Taken 4/1/2025 0400 by Rashawn Haro RN  Infection Prevention: environmental surveillance performed  Taken 4/1/2025 0200 by Rashawn Haro RN  Infection Prevention: environmental surveillance performed  Taken 4/1/2025 0000 by Rashawn Haro RN  Infection Prevention: environmental surveillance performed  Taken 3/31/2025 2200 by Rashawn Haro RN  Infection Prevention: environmental surveillance performed  Taken 3/31/2025 2020 by Rashawn Haro RN  Infection Prevention: environmental surveillance performed  Goal: Optimal Comfort and Wellbeing  Outcome: Progressing  Goal: Readiness for Transition of Care  Outcome:  Progressing     Problem: Fall Injury Risk  Goal: Absence of Fall and Fall-Related Injury  Outcome: Progressing  Intervention: Identify and Manage Contributors  Recent Flowsheet Documentation  Taken 3/31/2025 2020 by Rashawn Haro RN  Medication Review/Management: medications reviewed  Intervention: Promote Injury-Free Environment  Recent Flowsheet Documentation  Taken 4/1/2025 0600 by Rashawn Haro, RN  Safety Promotion/Fall Prevention:   activity supervised   assistive device/personal items within reach   clutter free environment maintained   fall prevention program maintained   nonskid shoes/slippers when out of bed   room organization consistent   safety round/check completed  Taken 4/1/2025 0400 by Rashawn Haro, RN  Safety Promotion/Fall Prevention:   activity supervised   assistive device/personal items within reach   clutter free environment maintained   fall prevention program maintained   nonskid shoes/slippers when out of bed  Taken 4/1/2025 0200 by Rashawn Haro, RN  Safety Promotion/Fall Prevention:   activity supervised   assistive device/personal items within reach   clutter free environment maintained   fall prevention program maintained   nonskid shoes/slippers when out of bed   safety round/check completed   room organization consistent  Taken 4/1/2025 0000 by Rashawn Haro, RN  Safety Promotion/Fall Prevention:   activity supervised   assistive device/personal items within reach   clutter free environment maintained   fall prevention program maintained   nonskid shoes/slippers when out of bed   room organization consistent   safety round/check completed  Taken 3/31/2025 2200 by Rashawn Haro, RN  Safety Promotion/Fall Prevention:   activity supervised   assistive device/personal items within reach   clutter free environment maintained   fall prevention program maintained   nonskid shoes/slippers when out of bed   safety round/check completed   room organization consistent  Taken  3/31/2025 2020 by Rashawn Haro RN  Safety Promotion/Fall Prevention:   activity supervised   assistive device/personal items within reach   clutter free environment maintained   fall prevention program maintained   nonskid shoes/slippers when out of bed   room organization consistent   safety round/check completed     Problem: Skin Injury Risk Increased  Goal: Skin Health and Integrity  Outcome: Progressing  Intervention: Optimize Skin Protection  Recent Flowsheet Documentation  Taken 4/1/2025 0600 by Rashawn Haro RN  Pressure Reduction Techniques:   frequent weight shift encouraged   sit time limited to 2 hours   weight shift assistance provided  Pressure Reduction Devices:   heel offloading device utilized   positioning supports utilized   pressure-redistributing mattress utilized  Skin Protection: incontinence pads utilized  Taken 4/1/2025 0400 by Rashawn Haro RN  Pressure Reduction Techniques:   frequent weight shift encouraged   weight shift assistance provided   sit time limited to 2 hours  Head of Bed (HOB) Positioning: HOB elevated  Pressure Reduction Devices:   heel offloading device utilized   positioning supports utilized   pressure-redistributing mattress utilized  Skin Protection: incontinence pads utilized  Taken 4/1/2025 0200 by Rashawn Haro RN  Activity Management: activity encouraged  Pressure Reduction Techniques:   frequent weight shift encouraged   weight shift assistance provided   sit time limited to 2 hours  Pressure Reduction Devices:   heel offloading device utilized   positioning supports utilized   pressure-redistributing mattress utilized  Skin Protection: incontinence pads utilized  Taken 4/1/2025 0000 by Rashawn Haro RN  Activity Management: activity encouraged  Pressure Reduction Techniques:   frequent weight shift encouraged   weight shift assistance provided   sit time limited to 2 hours  Pressure Reduction Devices:   heel offloading device utilized   positioning  supports utilized   pressure-redistributing mattress utilized  Skin Protection: incontinence pads utilized  Taken 3/31/2025 2200 by Rashawn Haro RN  Activity Management: activity encouraged  Pressure Reduction Techniques:   frequent weight shift encouraged   weight shift assistance provided   sit time limited to 2 hours  Pressure Reduction Devices:   heel offloading device utilized   positioning supports utilized   pressure-redistributing mattress utilized  Skin Protection: incontinence pads utilized  Taken 3/31/2025 2020 by Rashawn Haro RN  Activity Management: activity encouraged  Pressure Reduction Techniques:   frequent weight shift encouraged   sit time limited to 2 hours   weight shift assistance provided  Pressure Reduction Devices:   heel offloading device utilized   positioning supports utilized   pressure-redistributing mattress utilized  Skin Protection: incontinence pads utilized   Goal Outcome Evaluation:

## 2025-04-02 ENCOUNTER — TRANSITIONAL CARE MANAGEMENT TELEPHONE ENCOUNTER (OUTPATIENT)
Dept: CALL CENTER | Facility: HOSPITAL | Age: 75
End: 2025-04-02
Payer: MEDICARE

## 2025-04-02 NOTE — OUTREACH NOTE
Call Center TCM Note      Flowsheet Row Responses   [facility] [place] patient discharged from? [place]   Does the patient have one of the following disease processes/diagnoses(primary or secondary)? Pneumonia   TCM attempt successful? Yes   Call start time 1343   Call end time 1347   General alerts for this patient outpatient therapy at [facility]   Discharge diagnosis Bilateral pneumonia   Person spoke with today (if not patient) and relationship Patient   Medication alerts for this patient Annabella Leo   Meds reviewed with patient/caregiver? Yes   Is the patient having any side effects they believe may be caused by any medication additions or changes? No   Does the patient have all medications ordered at discharge? Yes   Prescription comments No questions or concerns with medications. Tolerating well.   Is the patient taking all medications as directed (includes completed medication regime)? Yes   Comments Patient has existing PCP appt on 4/15/25 at 11:30 AM with AGUSTIN Zhang. this appt falls within TCM timeframe for HOSPITAL f/u appt. Will message PCP office.   Does the patient have an appointment with their PCP within 7-14 days of discharge? Yes   Has home health visited the patient within 72 hours of discharge? N/A   Pulse Ox monitoring None   Psychosocial issues? No   Did the patient receive a copy of their discharge instructions? Yes   Nursing interventions Reviewed instructions with patient   What is the patient's perception of their health status since discharge? Improving   If the patient is a current smoker, are they able to teach back resources for cessation? Not a smoker   Is the patient/caregiver able to teach back the hierarchy of who to call/visit for symptoms/problems? PCP, Specialist, Home health nurse, Urgent Care, ED, 911 Yes   Is the patient/caregiver able to teach back signs and symptoms of worsening condition: Fever/chills, Shortness of breath, Chest pain   Is the  patient/caregiver able to teach back importance of completing antibiotic course of treatment? Yes   TCM call completed? Yes   Wrap up additional comments Patient has existing PCP appt on 4/15/25 at 11:30 AM with AGUSTIN Zhang. this appt falls within TCM timeframe for HOSPITAL f/u appt. Will message PCP office.   Call end time 1347   Would this patient benefit from a Referral to [facility] Social Work? No   Is the patient interested in additional calls from an ambulatory ? No            Shama BORREGO - Registered Nurse    4/2/2025, 13:48 EDT            Shama BORREGO - [place] Nurse

## 2025-04-03 ENCOUNTER — DOCUMENTATION (OUTPATIENT)
Dept: PHYSICAL THERAPY | Facility: CLINIC | Age: 75
End: 2025-04-03
Payer: MEDICARE

## 2025-04-03 DIAGNOSIS — R26.89 BALANCE PROBLEM: ICD-10-CM

## 2025-04-03 DIAGNOSIS — R29.898 LEG WEAKNESS, BILATERAL: ICD-10-CM

## 2025-04-03 DIAGNOSIS — Z91.81 AT HIGH RISK FOR FALLS: Primary | ICD-10-CM

## 2025-04-03 LAB
BACTERIA SPEC AEROBE CULT: NORMAL
BACTERIA SPEC AEROBE CULT: NORMAL

## 2025-04-03 NOTE — PROGRESS NOTES
Discharge Summary  Discharge Summary from Physical Therapy Report    Patient Information  Krys Mayes  1950    Dates  PT visit: 2/24/25-3/25/25  Number of Visits: 7     Discharge Status of Patient: Pt admitted to hospital with COVID/pneumonia    Goals: Partially Met    Goals  Plan Goals: Goals  Plan Goals: STG In 4 weeks  - Pt to be independent/compliant with HEP without exacerbation of symptoms.-PROGRESSING  - Pt will improve B knee flexion to 110 degrees B to improve transfers and gait-PROGRESSING  - Pain not > 3/10 with ADLs and functional mobility around her home-PROGRESSING  - Pt to exhibit B hip flexion AROM to >90 degrees with tolerable pain to allow for improved walking tolerance-PROGRESSING        LTG In 8 weeks  - TUG testing < 13.5 sec to demonstrate age related norms and reduce risk of falling. -PROGRESSING  - Pt to score on ABC to at least 40% to demonstrate improved confidence with functional mobility-PROGRESSING  - Pt to exhibit >/= 4/5 strength throughout B hip, knee and ankle musculature to allow for normalized gait and standing > 10 min-PROGRESSING  - 5TSTS < 18 sec to demonstrate improved LE function as need for gait endurance-PROGRESSING  -SLS on RLE for at least 3 sec to improve balance and RLE WB tolerance-PROGRESSING    Visit Diagnoses:    ICD-10-CM ICD-9-CM   1. At high risk for falls  Z91.81 V15.88   2. Balance problem  R26.89 781.99   3. Leg weakness, bilateral  R29.898 729.89       Discharge Plan: Future need for rehabilitation activities    Comments; inconsistent attendance due to hospitalization    Date of Discharge 4/3/25        Jessica Copeland, PT  Physical Therapist

## 2025-04-11 ENCOUNTER — READMISSION MANAGEMENT (OUTPATIENT)
Dept: CALL CENTER | Facility: HOSPITAL | Age: 75
End: 2025-04-11
Payer: MEDICARE

## 2025-04-11 NOTE — OUTREACH NOTE
COPD/PN Week 2 Survey      Flowsheet Row Responses   [facility] patient discharged from? [place]   Does the patient have one of the following disease processes/diagnoses(primary or secondary)? Pneumonia   Week 2 attempt successful? Yes   Call start time 1015   Call end time 1016   Discharge diagnosis Bilateral pneumonia   Meds reviewed with patient/caregiver? Yes   Is the patient having any side effects they believe may be caused by any medication additions or changes? No   Does the patient have all medications ordered at discharge? Yes   Is the patient taking all medications as directed (includes completed medication regime)? Yes   Does the patient have a primary care provider?  Yes   Does the patient have an appointment with their PCP or specialist within 7 days of discharge? Yes   Has the patient kept scheduled appointments due by today? N/A   Has home health visited the patient within 72 hours of discharge? N/A   Psychosocial issues? No   What is the patient's perception of their health status since discharge? Improving   Week 2 call completed? Yes   Graduated Yes   Wrap up additional comments Pt doing well and has f/u appt scheduled.   Call end time 1016            Emani SAEED - Registered Nurse

## 2025-04-15 ENCOUNTER — OFFICE VISIT (OUTPATIENT)
Dept: FAMILY MEDICINE CLINIC | Facility: CLINIC | Age: 75
End: 2025-04-15
Payer: MEDICARE

## 2025-04-15 VITALS
BODY MASS INDEX: 32.16 KG/M2 | TEMPERATURE: 96.5 F | HEIGHT: 67 IN | WEIGHT: 204.9 LBS | OXYGEN SATURATION: 98 % | DIASTOLIC BLOOD PRESSURE: 70 MMHG | HEART RATE: 75 BPM | SYSTOLIC BLOOD PRESSURE: 120 MMHG

## 2025-04-15 DIAGNOSIS — R68.89 DECREASED STRENGTH, ENDURANCE, AND MOBILITY: ICD-10-CM

## 2025-04-15 DIAGNOSIS — E78.2 MIXED HYPERLIPIDEMIA: ICD-10-CM

## 2025-04-15 DIAGNOSIS — R29.6 FREQUENT FALLS: ICD-10-CM

## 2025-04-15 DIAGNOSIS — E03.9 ACQUIRED HYPOTHYROIDISM: ICD-10-CM

## 2025-04-15 DIAGNOSIS — Z79.01 ANTICOAGULANT LONG-TERM USE: ICD-10-CM

## 2025-04-15 DIAGNOSIS — F41.9 ANXIETY: ICD-10-CM

## 2025-04-15 DIAGNOSIS — I10 ESSENTIAL HYPERTENSION: ICD-10-CM

## 2025-04-15 DIAGNOSIS — R73.03 PREDIABETES: ICD-10-CM

## 2025-04-15 DIAGNOSIS — F41.1 GAD (GENERALIZED ANXIETY DISORDER): ICD-10-CM

## 2025-04-15 DIAGNOSIS — Z74.09 DECREASED STRENGTH, ENDURANCE, AND MOBILITY: ICD-10-CM

## 2025-04-15 DIAGNOSIS — Z87.01 HISTORY OF PNEUMONIA: Primary | ICD-10-CM

## 2025-04-15 DIAGNOSIS — R53.1 DECREASED STRENGTH, ENDURANCE, AND MOBILITY: ICD-10-CM

## 2025-04-15 DIAGNOSIS — D50.9 IRON DEFICIENCY ANEMIA, UNSPECIFIED IRON DEFICIENCY ANEMIA TYPE: ICD-10-CM

## 2025-04-15 DIAGNOSIS — G62.9 NEUROPATHY: ICD-10-CM

## 2025-04-15 RX ORDER — LEVOTHYROXINE SODIUM 25 UG/1
25 TABLET ORAL
Qty: 90 TABLET | Refills: 1 | Status: SHIPPED | OUTPATIENT
Start: 2025-04-15

## 2025-04-15 RX ORDER — LOSARTAN POTASSIUM 100 MG/1
100 TABLET ORAL DAILY
Qty: 90 TABLET | Refills: 1 | Status: SHIPPED | OUTPATIENT
Start: 2025-04-15

## 2025-04-15 RX ORDER — SERTRALINE HYDROCHLORIDE 100 MG/1
100 TABLET, FILM COATED ORAL DAILY
Qty: 90 TABLET | Refills: 1 | Status: SHIPPED | OUTPATIENT
Start: 2025-04-15

## 2025-04-15 RX ORDER — ALPRAZOLAM 1 MG/1
TABLET ORAL
Qty: 60 TABLET | Refills: 0 | Status: SHIPPED | OUTPATIENT
Start: 2025-04-15

## 2025-04-21 ENCOUNTER — APPOINTMENT (OUTPATIENT)
Dept: GENERAL RADIOLOGY | Facility: HOSPITAL | Age: 75
End: 2025-04-21
Payer: MEDICARE

## 2025-04-21 ENCOUNTER — APPOINTMENT (OUTPATIENT)
Dept: CT IMAGING | Facility: HOSPITAL | Age: 75
End: 2025-04-21
Payer: MEDICARE

## 2025-04-21 ENCOUNTER — HOSPITAL ENCOUNTER (EMERGENCY)
Facility: HOSPITAL | Age: 75
Discharge: HOME | End: 2025-04-21
Attending: STUDENT IN AN ORGANIZED HEALTH CARE EDUCATION/TRAINING PROGRAM | Admitting: STUDENT IN AN ORGANIZED HEALTH CARE EDUCATION/TRAINING PROGRAM
Payer: MEDICARE

## 2025-04-21 VITALS
HEART RATE: 84 BPM | HEIGHT: 64 IN | SYSTOLIC BLOOD PRESSURE: 150 MMHG | TEMPERATURE: 97.9 F | WEIGHT: 200 LBS | DIASTOLIC BLOOD PRESSURE: 73 MMHG | BODY MASS INDEX: 34.15 KG/M2 | OXYGEN SATURATION: 96 % | RESPIRATION RATE: 18 BRPM

## 2025-04-21 DIAGNOSIS — R29.6 FREQUENT FALLS: ICD-10-CM

## 2025-04-21 DIAGNOSIS — S82.832A OTHER CLOSED FRACTURE OF PROXIMAL END OF LEFT FIBULA, INITIAL ENCOUNTER: Primary | ICD-10-CM

## 2025-04-21 DIAGNOSIS — M25.369 INSTABILITY OF KNEE JOINT, UNSPECIFIED LATERALITY: ICD-10-CM

## 2025-04-21 DIAGNOSIS — S09.90XA INJURY OF HEAD, INITIAL ENCOUNTER: ICD-10-CM

## 2025-04-21 LAB
ALBUMIN SERPL-MCNC: 3.5 G/DL (ref 3.5–5.2)
ALBUMIN/GLOB SERPL: 1.3 G/DL
ALP SERPL-CCNC: 66 U/L (ref 39–117)
ALT SERPL W P-5'-P-CCNC: 15 U/L (ref 1–33)
ANION GAP SERPL CALCULATED.3IONS-SCNC: 13.7 MMOL/L (ref 5–15)
AST SERPL-CCNC: 16 U/L (ref 1–32)
BASOPHILS # BLD AUTO: 0.03 10*3/MM3 (ref 0–0.2)
BASOPHILS NFR BLD AUTO: 0.4 % (ref 0–1.5)
BILIRUB SERPL-MCNC: 0.2 MG/DL (ref 0–1.2)
BUN SERPL-MCNC: 14 MG/DL (ref 8–23)
BUN/CREAT SERPL: 14.4 (ref 7–25)
CALCIUM SPEC-SCNC: 9.5 MG/DL (ref 8.6–10.5)
CHLORIDE SERPL-SCNC: 99 MMOL/L (ref 98–107)
CO2 SERPL-SCNC: 23.3 MMOL/L (ref 22–29)
CREAT SERPL-MCNC: 0.97 MG/DL (ref 0.57–1)
DEPRECATED RDW RBC AUTO: 48.2 FL (ref 37–54)
EGFRCR SERPLBLD CKD-EPI 2021: 60.7 ML/MIN/1.73
EOSINOPHIL # BLD AUTO: 0.19 10*3/MM3 (ref 0–0.4)
EOSINOPHIL NFR BLD AUTO: 2.8 % (ref 0.3–6.2)
ERYTHROCYTE [DISTWIDTH] IN BLOOD BY AUTOMATED COUNT: 15.7 % (ref 12.3–15.4)
GEN 5 1HR TROPONIN T REFLEX: 16 NG/L
GLOBULIN UR ELPH-MCNC: 2.7 GM/DL
GLUCOSE SERPL-MCNC: 107 MG/DL (ref 65–99)
HCT VFR BLD AUTO: 37.4 % (ref 34–46.6)
HGB BLD-MCNC: 11.7 G/DL (ref 12–15.9)
IMM GRANULOCYTES # BLD AUTO: 0.05 10*3/MM3 (ref 0–0.05)
IMM GRANULOCYTES NFR BLD AUTO: 0.7 % (ref 0–0.5)
LYMPHOCYTES # BLD AUTO: 0.91 10*3/MM3 (ref 0.7–3.1)
LYMPHOCYTES NFR BLD AUTO: 13.3 % (ref 19.6–45.3)
MCH RBC QN AUTO: 26.5 PG (ref 26.6–33)
MCHC RBC AUTO-ENTMCNC: 31.3 G/DL (ref 31.5–35.7)
MCV RBC AUTO: 84.8 FL (ref 79–97)
MONOCYTES # BLD AUTO: 0.75 10*3/MM3 (ref 0.1–0.9)
MONOCYTES NFR BLD AUTO: 11 % (ref 5–12)
NEUTROPHILS NFR BLD AUTO: 4.9 10*3/MM3 (ref 1.7–7)
NEUTROPHILS NFR BLD AUTO: 71.8 % (ref 42.7–76)
NRBC BLD AUTO-RTO: 0 /100 WBC (ref 0–0.2)
NT-PROBNP SERPL-MCNC: 2267 PG/ML (ref 0–1800)
PLATELET # BLD AUTO: 177 10*3/MM3 (ref 140–450)
PMV BLD AUTO: 9.3 FL (ref 6–12)
POTASSIUM SERPL-SCNC: 4.1 MMOL/L (ref 3.5–5.2)
PROT SERPL-MCNC: 6.2 G/DL (ref 6–8.5)
RBC # BLD AUTO: 4.41 10*6/MM3 (ref 3.77–5.28)
SODIUM SERPL-SCNC: 136 MMOL/L (ref 136–145)
TROPONIN T % DELTA: 7
TROPONIN T NUMERIC DELTA: 1 NG/L
TROPONIN T SERPL HS-MCNC: 15 NG/L
WBC NRBC COR # BLD AUTO: 6.83 10*3/MM3 (ref 3.4–10.8)

## 2025-04-21 PROCEDURE — 96360 HYDRATION IV INFUSION INIT: CPT

## 2025-04-21 PROCEDURE — 80053 COMPREHEN METABOLIC PANEL: CPT | Performed by: PHYSICIAN ASSISTANT

## 2025-04-21 PROCEDURE — 85025 COMPLETE CBC W/AUTO DIFF WBC: CPT | Performed by: PHYSICIAN ASSISTANT

## 2025-04-21 PROCEDURE — 71045 X-RAY EXAM CHEST 1 VIEW: CPT

## 2025-04-21 PROCEDURE — 25810000003 SODIUM CHLORIDE 0.9 % SOLUTION

## 2025-04-21 PROCEDURE — 72125 CT NECK SPINE W/O DYE: CPT

## 2025-04-21 PROCEDURE — 99284 EMERGENCY DEPT VISIT MOD MDM: CPT | Performed by: STUDENT IN AN ORGANIZED HEALTH CARE EDUCATION/TRAINING PROGRAM

## 2025-04-21 PROCEDURE — 73562 X-RAY EXAM OF KNEE 3: CPT

## 2025-04-21 PROCEDURE — 93010 ELECTROCARDIOGRAM REPORT: CPT | Performed by: INTERNAL MEDICINE

## 2025-04-21 PROCEDURE — 83880 ASSAY OF NATRIURETIC PEPTIDE: CPT | Performed by: PHYSICIAN ASSISTANT

## 2025-04-21 PROCEDURE — 84484 ASSAY OF TROPONIN QUANT: CPT | Performed by: PHYSICIAN ASSISTANT

## 2025-04-21 PROCEDURE — 93005 ELECTROCARDIOGRAM TRACING: CPT | Performed by: PHYSICIAN ASSISTANT

## 2025-04-21 PROCEDURE — 70450 CT HEAD/BRAIN W/O DYE: CPT

## 2025-04-21 RX ORDER — SODIUM CHLORIDE 0.9 % (FLUSH) 0.9 %
10 SYRINGE (ML) INJECTION AS NEEDED
Status: DISCONTINUED | OUTPATIENT
Start: 2025-04-21 | End: 2025-04-21 | Stop reason: HOSPADM

## 2025-04-21 RX ORDER — SODIUM CHLORIDE 9 MG/ML
INJECTION, SOLUTION INTRAVENOUS
Status: COMPLETED
Start: 2025-04-21 | End: 2025-04-21

## 2025-04-21 RX ADMIN — SODIUM CHLORIDE 500 ML: 0.9 INJECTION, SOLUTION INTRAVENOUS at 16:50

## 2025-04-21 NOTE — ED PROVIDER NOTES
Subjective   History of Present Illness  Patient is a 75-year-old female who lives at home with .  She is a history of frequently falls and has been says she probably falls twice a month.  She fell twice today.  She says her knees give out and buckle.  She thinks the left one is more responsible than the right.  She is on blood thinners for history of A-fib.  She sees Catherine Brown and has an appointment with her next Tuesday and is supposed to start rehab on May 1.  As for the fall today she did not pass out is not vomiting.  She has a sore spot on the back of her head.  She has a walker but was not using her walker when she fell either time.  She has chronic neck pain and does not think it is worse than usual.  She does not think she has any other injury but is concerned about her knees because they keep buckling.  She recently got discharged from the hospital when she has hypoxic with pneumonia and COVID.  She does not need home oxygen and does not have it at home.      Review of Systems   Constitutional:  Positive for fatigue.   Neurological:  Positive for weakness.   All other systems reviewed and are negative.      Past Medical History:   Diagnosis Date    Abnormal ECG 9/16    afib brought on by strong antibiotics    Arthritis     Cholelithiasis taken out 1/14/91    Clotting disorder blood thinner    Diverticulosis     Gastroesophageal reflux disease 01/21/2016    Hyperlipidemia     Hypertension     Kidney stone     recurrent, calcium oxalate    Menopausal disorder 1998    She went through menopause in 1998    Neuropathy     Obesity     PAF (paroxysmal atrial fibrillation)     in the setting of urosepsis, 9/2016    Reflux esophagitis 08/19/2016    Sebaceous cyst of labia 12/20/2017    Sepsis due to urinary tract infection     Sigmoid diverticulitis 11/13/2017    Type 2 diabetes mellitus     Urinary tract infection     Visual impairment cataracts    Vitamin D deficiency 01/21/2016       Allergies    Allergen Reactions    Morphine And Codeine GI Intolerance    Ciprofloxacin-Ciproflox Hcl Er Rash    Flomax [Tamsulosin Hcl] Rash    Hydrocodone Rash    Latex Rash    Lortab [Hydrocodone-Acetaminophen] Rash    Penicillins Rash    Phenergan [Promethazine Hcl] GI Intolerance    Sulfa Antibiotics Rash    Amoxicillin Rash    Tamsulosin Rash       Past Surgical History:   Procedure Laterality Date    ANKLE SURGERY      Ankle Repair / Description: ORif the left ankle in July 2010. Secondary to fall    BREAST BIOPSY Right     benign    CATARACT EXTRACTION Right     CHOLECYSTECTOMY      COLONOSCOPY  2014    Done 2004 normal recheck in 10 years. Repeated February 2014 colonoscopy was normal and to be repeated in 10 years.    CYSTOSCOPY BLADDER STONE LITHOTRIPSY      FRACTURE SURGERY      HEMORRHOIDECTOMY      NEPHROLITHOTOMY Left 01/09/2017    Procedure: NEPHROLITHOTOMY PERCUTANEOUS SECOND LOOK WITH STENT PLACEMENT AND LASER LITHOTRIPSY;  Surgeon: Mati Villegas MD;  Location: [facility] OR;  Service:     OTHER SURGICAL HISTORY      Tubal Ligation    PERCUTANEOUS NEPHROSTOLITHOTOMY Left 12/2016    TUBAL ABDOMINAL LIGATION      URETEROSCOPY LASER LITHOTRIPSY WITH STENT INSERTION Left 12/06/2022    Procedure: LEFT URETEROSCOPY WITH LASER LITHOTRIPSY, STONE BASKET EXTRACTION, STENT PLACEMENT;  Surgeon: Mati Villegas MD;  Location: [facility] OR;  Service: Urology;  Laterality: Left;       Family History   Problem Relation Age of Onset    COPD Mother     Heart attack Father         acute myocardial infarction    Anxiety disorder Father     Depression Father     Heart disease Father     Heart attack Son     Kidney disease Maternal Grandmother         Born with 1 kidney    Heart attack Paternal Uncle     Depression Sister     Heart failure Sister     Hearing loss Sister     Breast cancer Neg Hx        Social History     Socioeconomic History    Marital status:    Tobacco Use    Smoking status: Never     Passive  exposure: Never    Smokeless tobacco: Never    Tobacco comments:     Never smoked ever   Vaping Use    Vaping status: Never Used   Substance and Sexual Activity    Alcohol use: Yes     Comment: She uses alcohol once or twice a year.    Drug use: No    Sexual activity: Not Currently     Partners: Male     Birth control/protection: Post-menopausal           Objective   Physical Exam  Vitals reviewed.   Constitutional:       Appearance: Normal appearance. She is obese.   Eyes:      Conjunctiva/sclera: Conjunctivae normal.   Cardiovascular:      Rate and Rhythm: Normal rate and regular rhythm.      Pulses: Normal pulses.      Heart sounds: Normal heart sounds.   Pulmonary:      Effort: Pulmonary effort is normal. No respiratory distress.      Breath sounds: No wheezing.      Comments: Mild Rales at bases  Abdominal:      General: There is no distension.      Tenderness: There is no abdominal tenderness. There is no right CVA tenderness, left CVA tenderness or guarding.   Musculoskeletal:         General: No tenderness.      Right lower leg: Edema present.      Left lower leg: Edema present.   Skin:     General: Skin is warm and dry.   Neurological:      General: No focal deficit present.      Mental Status: She is alert and oriented to person, place, and time.   Psychiatric:         Mood and Affect: Mood normal.         Behavior: Behavior normal.         ECG 12 Lead      Date/Time: 4/21/2025 4:36 PM    Performed by: Tila Malik PA-C  Authorized by: Bay Malin DO  Interpreted by ED physician  Comparison: compared with previous ECG from 3/29/2025  Rhythm: atrial fibrillation  Rate: normal  BPM: 76  ST Segments: ST segments normal  Clinical impression: non-specific ECG  Comments: QTc 473               ED Course                                                       Medical Decision Making  Presentation patient has no complaints of pain.  She does have a sore area behind her head.  She has chronic neck pain that she  says is no worse than usual.  Not having back pain.  Says she did not get lightheaded or short of breath or have chest pain she has her knees just buckled out.  She falls frequently per  and says she probably falls twice a month.  She says she was not using her walker the 2 times she fell today.  She she says her knees go out but she thinks it is more the left.  I did order x-rays and there is a healing left proximal fibular fracture.  I reexamined patient and she has some tenderness in this area but she had not noticed it being painful before.  Workup is reassuring with elevation of BNP improved from her last.  Her chest x-ray is now clear.  X-ray of right knee and left knee both show osteoarthritis which I talked to patient about.  She sees Catherine Brown and has an appointment on Tuesday of next week.  She is also supposed to get physical therapy started on May 1.  I sent a text to Catherine Brown and asked if she can move the appointment up and told her about the fibular fracture.  I put her in a knee brace on the left side.  It feels better for her to have some bending and I would not put her in a knee immobilizer as I think that might be a fall risk for her.  She says she will start using her walker.  They have stairs at the house but they have the chair  to move them up and down.  Otherwise labs reassuring.  2 set troponin.  EKG shows A-fib which patient is anticoagulated with a history of A-fib.  She is medically cleared at this time and can return for any concerns.  Otherwise follow-up with Catherine Brown or from orthopedics.    ---------------------------               04/21/25                      1615         ---------------------------   BP:          135/64         Pulse:         84           Resp:          18           Temp:   97.9 °F (36.6 °C)   SpO2:          96%         ---------------------------    Labs Reviewed  COMPREHENSIVE METABOLIC PANEL - Abnormal; Notable for the  following components:     Glucose                       107 (*)             All other components within normal limits         Narrative: GFR Categories in Chronic Kidney Disease (CKD)                                      GFR Category          GFR (mL/min/1.73)    Interpretation                  G1                     90 or greater         Normal or high (1)                  G2                      60-89                Mild decrease (1)                  G3a                   45-59                Mild to moderate decrease                  G3b                   30-44                Moderate to severe decrease                  G4                    15-29                Severe decrease                  G5                    14 or less           Kidney failure                                          (1)In the absence of evidence of kidney disease, neither GFR category G1 or G2 fulfill the criteria for CKD.                                    eGFR calculation 2021 CKD-EPI creatinine equation, which does not include race as a factor  BNP (IN-HOUSE) - Abnormal; Notable for the following components:     proBNP                        2,267.0 (*)            All other components within normal limits         Narrative: This assay is used as an aid in the diagnosis of individuals suspected of having heart failure. It can be used as an aid in the diagnosis of acute decompensated heart failure (ADHF) in patients presenting with signs and symptoms of ADHF to the emergency department (ED). In addition, NT-proBNP of <300 pg/mL indicates ADHF is not likely.                                    Age Range Result Interpretation  NT-proBNP Concentration (pg/mL:                                                      <50             Positive            >450                                   Gray                 300-450                                    Negative             <300                                    50-75           Positive             >900                                  Nolan                300-900                                  Negative            <300                                                      >75             Positive            >1800                                  Nolan                300-1800                                  Negative            <300  TROPONIN - Abnormal; Notable for the following components:     HS Troponin T                 15 (*)              All other components within normal limits         Narrative: High Sensitive Troponin T Reference Range:                  <14.0 ng/L- Negative Female for AMI                  <22.0 ng/L- Negative Male for AMI                  >=14 - Abnormal Female indicating possible myocardial injury.                  >=22 - Abnormal Male indicating possible myocardial injury.                   Clinicians would have to utilize clinical acumen, EKG, Troponin, and serial changes to determine if it is an Acute Myocardial Infarction or myocardial injury due to an underlying chronic condition.                                       CBC WITH AUTO DIFFERENTIAL - Abnormal; Notable for the following components:     Hemoglobin                    11.7 (*)               MCH                           26.5 (*)               MCHC                          31.3 (*)               RDW                           15.7 (*)               Lymphocyte %                  13.3 (*)               Immature Grans %              0.7 (*)             All other components within normal limits  HIGH SENSITIVITIY TROPONIN T 1HR - Abnormal; Notable for the following components:     HS Troponin T                 16 (*)              All other components within normal limits         Narrative: High Sensitive Troponin T Reference Range:                  <14.0 ng/L- Negative Female for AMI                  <22.0 ng/L- Negative Male for AMI                  >=14 - Abnormal Female indicating possible myocardial injury.                   >=22 - Abnormal Male indicating possible myocardial injury.                   Clinicians would have to utilize clinical acumen, EKG, Troponin, and serial changes to determine if it is an Acute Myocardial Infarction or myocardial injury due to an underlying chronic condition.                                       CBC AND DIFFERENTIAL    CT Cervical Spine Without Contrast  Result Date: 4/21/2025  Impression: 1.No acute fracture nor bony destructive process. 2.Moderate cervical spondylosis. 3.Advanced right-sided osseous neuroforaminal stenosis at C5/6 and on the left at C6/7 with probable exiting nerve contact. Findings are similar to previous examination. Electronically Signed: Dewayne Parra MD  4/21/2025 6:01 PM EDT  Workstation ID: [id number]    CT Head Without Contrast  Result Date: 4/21/2025  Impression: No acute intracranial findings. Electronically Signed: Dewayne Parra MD  4/21/2025 5:59 PM EDT  Workstation ID: [id number]    XR Knee 3 View Left  Result Date: 4/21/2025  Impression: Subacute fracture of the proximal fibular shaft. Electronically Signed: Amos Schreiber MD  4/21/2025 5:58 PM EDT  Workstation ID: [id number]    XR Knee 3 View Right  Result Date: 4/21/2025  Impression: No acute fracture or dislocation. Electronically Signed: Zachary Baires MD  4/21/2025 5:57 PM EDT  Workstation ID: [id number]    XR Chest 1 View  Result Date: 4/21/2025  Impression: No acute cardiopulmonary abnormality is identified. Electronically Signed: Francesca Stephenson MD  4/21/2025 5:46 PM EDT  Workstation ID: [id number]        Problems Addressed:  Frequent falls: complicated acute illness or injury  Injury of head, initial encounter: complicated acute illness or injury  Instability of knee joint, unspecified laterality: complicated acute illness or injury  Other closed fracture of proximal end of left fibula, initial encounter: complicated acute illness or injury    Amount and/or Complexity of Data Reviewed  Labs: ordered.  Radiology:  ordered.  ECG/medicine tests: ordered and independent interpretation performed.    Risk  Prescription drug management.        Final diagnoses:   Frequent falls   Injury of head, initial encounter   Other closed fracture of proximal end of left fibula, initial encounter   Instability of knee joint, unspecified laterality       ED Disposition  ED Disposition       ED Disposition   Discharge    Condition   Stable    Comment   --               No follow-up provider specified.       Medication List      No changes were made to your prescriptions during this visit.            Tila Malik PA-C  04/21/25 [id number]

## 2025-04-22 LAB
QT INTERVAL: 420 MS
QTC INTERVAL: 473 MS

## 2025-04-23 ENCOUNTER — OFFICE VISIT (OUTPATIENT)
Dept: ORTHOPEDIC SURGERY | Facility: CLINIC | Age: 75
End: 2025-04-23
Payer: MEDICARE

## 2025-04-23 VITALS
HEART RATE: 68 BPM | WEIGHT: 200 LBS | BODY MASS INDEX: 34.15 KG/M2 | SYSTOLIC BLOOD PRESSURE: 146 MMHG | HEIGHT: 64 IN | DIASTOLIC BLOOD PRESSURE: 77 MMHG

## 2025-04-23 DIAGNOSIS — M17.11 PRIMARY OSTEOARTHRITIS OF RIGHT KNEE: ICD-10-CM

## 2025-04-23 DIAGNOSIS — M17.12 PRIMARY OSTEOARTHRITIS OF LEFT KNEE: Primary | ICD-10-CM

## 2025-04-23 NOTE — PROGRESS NOTES
Subjective: Osteoarthritis both knees     Patient ID: Krys Mayes is a 75 y.o. female.    Chief Complaint:    History of Present Illness 75-year-old female returns with both knees symptomatic.  Had a cortisone injection into the right knee in January by Catherine Brown and she states it helped for maybe 2 months she is now having pain in both knees making activities of daily living difficult if not possible.  Ambulating with a rolling walker cannot take anti-inflammatories as she is on Xarelto       Social History     Occupational History    Not on file   Tobacco Use    Smoking status: Never     Passive exposure: Never    Smokeless tobacco: Never    Tobacco comments:     Never smoked ever   Vaping Use    Vaping status: Never Used   Substance and Sexual Activity    Alcohol use: Yes     Comment: She uses alcohol once or twice a year.    Drug use: No    Sexual activity: Not Currently     Partners: Male     Birth control/protection: Post-menopausal      Review of Systems      Past Medical History:   Diagnosis Date    Abnormal ECG 9/16    afib brought on by strong antibiotics    Arthritis     Arthritis of neck     Cholelithiasis taken out 1/14/91    Clotting disorder blood thinner    Diverticulosis     Fracture of ankle     Fracture, tibia and fibula     Gastroesophageal reflux disease 01/21/2016    Hip arthrosis     Hyperlipidemia     Hypertension     Kidney stone     recurrent, calcium oxalate    Knee swelling     Menopausal disorder 1998    She went through menopause in 1998    Neuropathy     Obesity     PAF (paroxysmal atrial fibrillation)     in the setting of urosepsis, 9/2016    Reflux esophagitis 08/19/2016    Sebaceous cyst of labia 12/20/2017    Sepsis due to urinary tract infection     Sigmoid diverticulitis 11/13/2017    Type 2 diabetes mellitus     Urinary tract infection     Visual impairment cataracts    Vitamin D deficiency 01/21/2016     Past Surgical History:   Procedure Laterality Date    ANKLE  SURGERY      Ankle Repair / Description: ORif the left ankle in July 2010. Secondary to fall    BREAST BIOPSY Right     benign    CATARACT EXTRACTION Right     CHOLECYSTECTOMY      COLONOSCOPY  2014    Done 2004 normal recheck in 10 years. Repeated February 2014 colonoscopy was normal and to be repeated in 10 years.    CYSTOSCOPY BLADDER STONE LITHOTRIPSY      FRACTURE SURGERY      HEMORRHOIDECTOMY      NEPHROLITHOTOMY Left 01/09/2017    Procedure: NEPHROLITHOTOMY PERCUTANEOUS SECOND LOOK WITH STENT PLACEMENT AND LASER LITHOTRIPSY;  Surgeon: Mati Villegas MD;  Location: [facility] OR;  Service:     OTHER SURGICAL HISTORY      Tubal Ligation    PERCUTANEOUS NEPHROSTOLITHOTOMY Left 12/2016    TUBAL ABDOMINAL LIGATION      URETEROSCOPY LASER LITHOTRIPSY WITH STENT INSERTION Left 12/06/2022    Procedure: LEFT URETEROSCOPY WITH LASER LITHOTRIPSY, STONE BASKET EXTRACTION, STENT PLACEMENT;  Surgeon: Mati Villegas MD;  Location: [facility] OR;  Service: Urology;  Laterality: Left;     Family History   Problem Relation Age of Onset    COPD Mother     Heart attack Father         acute myocardial infarction    Anxiety disorder Father     Depression Father     Heart disease Father     Heart attack Son     Kidney disease Maternal Grandmother         Born with 1 kidney    Heart attack Paternal Uncle     Depression Sister     Heart failure Sister     Hearing loss Sister     Breast cancer Neg Hx          Objective:  Vitals:    04/23/25 1128   BP: 146/77   Pulse: 68         04/23/25  1128   Weight: 90.7 kg (200 lb)     Body mass index is 34.33 kg/m².           Ortho Exam   reviewed previous x-rays done on both knees which show tricompartmental changes.  The left knee shows an old fibular fracture in the proximal one third with callus formation.  She is alert and oriented x 3.  Not the knee she has an effusion and a cool to touch but there is pain with range of motion which is 0 to 125 degrees but there is no  instability.  No motor or sensory deficit the calf is nontender.    Assessment:    XR Knee 3 View Left  Result Date: 4/21/2025  Impression: Subacute fracture of the proximal fibular shaft. Electronically Signed: Amos Schreiber MD  4/21/2025 5:58 PM EDT  Workstation ID: [id number]    XR Knee 3 View Right  Result Date: 4/21/2025  Impression: No acute fracture or dislocation. Electronically Signed: Zachary Baires MD  4/21/2025 5:57 PM EDT  Workstation ID: [id number]          1. Primary osteoarthritis of left knee    2. Primary osteoarthritis of right knee           Plan: Reviewed treatment options with the patient going forward.  Cortisone offered relief but only for 2 months in that right knee.  Again she has changes in all 3 compartments of both knees so after reviewing treatment going forward we will going proceed with bilateral gel injections of both knees and that was accomplished after sterile prepping through the superolateral portal.  She will return to see Catherine in 6 weeks to  the efficacy of the gel injection.  Patient was in agreement.  Answered all questions  - Large Joint Arthrocentesis: bilateral knee on 4/23/2025 11:41 AM  Indications: pain  Details: 22 G needle, superolateral approach  Medications (Right): 88 mg Hyaluronan 88 MG/4ML  Medications (Left): 88 mg Hyaluronan 88 MG/4ML  Outcome: tolerated well, no immediate complications  Procedure, treatment alternatives, risks and benefits explained, specific risks discussed. Consent was given by the patient. Immediately prior to procedure a time out was called to verify the correct patient, procedure, equipment, support staff and site/side marked as required. Patient was prepped and draped in the usual sterile fashion.                    Work Status:    SHAREE query complete.    Orders:  Orders Placed This Encounter   Procedures    - Large Joint Arthrocentesis: bilateral knee       Medications:  No orders of the defined types were placed in this  encounter.      Followup:  Return in about 6 weeks (around 6/4/2025).          Dictated utilizing Dragon dictation

## 2025-04-24 ENCOUNTER — PATIENT OUTREACH (OUTPATIENT)
Dept: CASE MANAGEMENT | Facility: OTHER | Age: 75
End: 2025-04-24
Payer: MEDICARE

## 2025-04-24 NOTE — OUTREACH NOTE
"AMBULATORY CASE MANAGEMENT NOTE    Names and Relationships of Patient/Support Persons: Contact: Fernando Mayestor SANCHEZ \"WYATT\"; Relationship: Self -     Patient Outreach      REASON FOR FOLLOW UP:  Follow up after ED visit.     INTRODUCTION: Introduced self and role of ACM.        Patient Report:  She is doing well and getting stronger after her recent pneumonia. She was seen in the Ed due to a fall and fracture. She has followed up with both her PCP and ortho. She received some injections in her knees due to arthritis. She has follow ups scheduled. Denies needs at this time. 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 Encouraged to keep ACM number in case of issues.     Review of Instructions:  Continue PT for balance issue.     Next Steps Follow ups:        Josefa MCCOY  Ambulatory Case Management    4/24/2025, 15:32 EDT  "

## 2025-05-07 ENCOUNTER — OFFICE VISIT (OUTPATIENT)
Dept: ORTHOPEDIC SURGERY | Facility: CLINIC | Age: 75
End: 2025-05-07
Payer: MEDICARE

## 2025-05-07 VITALS
SYSTOLIC BLOOD PRESSURE: 139 MMHG | HEIGHT: 64 IN | DIASTOLIC BLOOD PRESSURE: 74 MMHG | HEART RATE: 66 BPM | BODY MASS INDEX: 34.72 KG/M2 | WEIGHT: 203.4 LBS

## 2025-05-07 DIAGNOSIS — M17.12 PRIMARY OSTEOARTHRITIS OF LEFT KNEE: Primary | ICD-10-CM

## 2025-05-07 DIAGNOSIS — M17.11 PRIMARY OSTEOARTHRITIS OF RIGHT KNEE: ICD-10-CM

## 2025-05-07 PROCEDURE — 3078F DIAST BP <80 MM HG: CPT | Performed by: ORTHOPAEDIC SURGERY

## 2025-05-07 PROCEDURE — 1159F MED LIST DOCD IN RCRD: CPT | Performed by: ORTHOPAEDIC SURGERY

## 2025-05-07 PROCEDURE — 99214 OFFICE O/P EST MOD 30 MIN: CPT | Performed by: ORTHOPAEDIC SURGERY

## 2025-05-07 PROCEDURE — 3075F SYST BP GE 130 - 139MM HG: CPT | Performed by: ORTHOPAEDIC SURGERY

## 2025-05-07 PROCEDURE — 1160F RVW MEDS BY RX/DR IN RCRD: CPT | Performed by: ORTHOPAEDIC SURGERY

## 2025-05-07 NOTE — PROGRESS NOTES
Subjective:     Patient ID: Krys Mayes is a 75 y.o. female.    Chief Complaint:  Bilateral knee pain, new patient to examiner    History of Present Illness  History of Present Illness  The patient presents to the clinic today for evaluation of bilateral knee pain, with the right knee being more significantly affected than the left. This pain has been present for several years but has notably worsened over the past 4 to 6 months.    She rates her pain as a 9 out of 10, describing it as grinding and aching in nature, and associated with swelling, clicking, popping, and episodes of the knee giving way. She now requires a walker for support. The pain is exacerbated by climbing stairs, rising from a seated position, and prolonged walking and weight-bearing activities. She reports no numbness or tingling in the bilateral lower extremities. She reports no hip or groin pain, fevers, chills, or sweats.    She has engaged in physical therapy and home exercises for strengthening, which have resulted in minimal improvement in her stability. She has received injections on multiple occasions, most recently on 04/23/2025 by Dr. Carter, which provided only temporary relief for about 1 to 2 weeks, particularly in her right knee.      SOCIAL HISTORY  Marital Status:   Exercise: Home exercises for strengthening     Social History     Occupational History    Not on file   Tobacco Use    Smoking status: Never     Passive exposure: Never    Smokeless tobacco: Never    Tobacco comments:     Never smoked ever   Vaping Use    Vaping status: Never Used   Substance and Sexual Activity    Alcohol use: Yes     Comment: She uses alcohol once or twice a year.    Drug use: No    Sexual activity: Not Currently     Partners: Male     Birth control/protection: Post-menopausal      Past Medical History:   Diagnosis Date    Abnormal ECG 9/16    afib brought on by strong antibiotics    Arthritis     Arthritis of neck     Cholelithiasis  taken out 1/14/91    Clotting disorder blood thinner    Diverticulosis     Fracture of ankle     Fracture, tibia and fibula     Gastroesophageal reflux disease 01/21/2016    Hip arthrosis     Hyperlipidemia     Hypertension     Kidney stone     recurrent, calcium oxalate    Knee swelling     Menopausal disorder 1998    She went through menopause in 1998    Neuropathy     Obesity     PAF (paroxysmal atrial fibrillation)     in the setting of urosepsis, 9/2016    Reflux esophagitis 08/19/2016    Sebaceous cyst of labia 12/20/2017    Sepsis due to urinary tract infection     Sigmoid diverticulitis 11/13/2017    Type 2 diabetes mellitus     Urinary tract infection     Visual impairment cataracts    Vitamin D deficiency 01/21/2016     Past Surgical History:   Procedure Laterality Date    ANKLE SURGERY      Ankle Repair / Description: ORif the left ankle in July 2010. Secondary to fall    BREAST BIOPSY Right     benign    CATARACT EXTRACTION Right     CHOLECYSTECTOMY      COLONOSCOPY  2014    Done 2004 normal recheck in 10 years. Repeated February 2014 colonoscopy was normal and to be repeated in 10 years.    CYSTOSCOPY BLADDER STONE LITHOTRIPSY      FRACTURE SURGERY      HEMORRHOIDECTOMY      NEPHROLITHOTOMY Left 01/09/2017    Procedure: NEPHROLITHOTOMY PERCUTANEOUS SECOND LOOK WITH STENT PLACEMENT AND LASER LITHOTRIPSY;  Surgeon: Mati Villegas MD;  Location: [facility] OR;  Service:     OTHER SURGICAL HISTORY      Tubal Ligation    PERCUTANEOUS NEPHROSTOLITHOTOMY Left 12/2016    TUBAL ABDOMINAL LIGATION      URETEROSCOPY LASER LITHOTRIPSY WITH STENT INSERTION Left 12/06/2022    Procedure: LEFT URETEROSCOPY WITH LASER LITHOTRIPSY, STONE BASKET EXTRACTION, STENT PLACEMENT;  Surgeon: Mati Villegas MD;  Location: [facility] OR;  Service: Urology;  Laterality: Left;       Family History   Problem Relation Age of Onset    COPD Mother     Heart attack Father         acute myocardial infarction    Anxiety disorder  "Father     Depression Father     Heart disease Father     Heart attack Son     Kidney disease Maternal Grandmother         Born with 1 kidney    Heart attack Paternal Uncle     Depression Sister     Heart failure Sister     Hearing loss Sister     Breast cancer Neg Hx          Review of Systems        Objective:  Vitals:    05/07/25 1055   BP: 139/74   Pulse: 66   Weight: 92.3 kg (203 lb 6.4 oz)   Height: 162.6 cm (64\")         05/07/25  1055   Weight: 92.3 kg (203 lb 6.4 oz)     Body mass index is 34.91 kg/m².  Physical Exam    Vital signs reviewed.   General: No acute distress, alert and oriented  Eyes: conjunctiva clear; pupils equally round and reactive  ENT: external ears and nose atraumatic; oropharynx clear  CV: no peripheral edema  Resp: normal respiratory effort  Skin: no rashes or wounds; normal turgor  Psych: mood and affect appropriate; recent and remote memory intact          Physical Exam  - Musculoskeletal:    - Right Knee:      - Active range of motion from 3 to 115 degrees      - Strength rated at 4 out of 5 in flexion and extension      - Moderate effusion      - Maximal tenderness upon palpation of the lateral joint line      - Slight valgus alignment      - Positive active patellar compression test      - Negative patellar apprehension test      - Stable to varus and valgus stress at 3 and 30 degrees      - Grade 1A Lachman test      - Negative anterior and posterior drawer tests    - Others:      - No hip pain on logroll sensory exam      - Negative straight leg raise in the right lower extremity      - 1+ dorsalis pedis pulse         Imaging:    XR Knee 3 View Right  Result Date: 4/21/2025  Impression: No acute fracture or dislocation. Electronically Signed: Zachary Baires MD  4/21/2025 5:57 PM EDT  Workstation ID: [id number]      Independent review of imaging of right knee x-rays including review of images and independent interpretation of results indicates advanced tricompartmental " osteoarthritis most significant in the lateral compartment with significant overall valgus alignment of the right knee appreciated with significant patellofemoral arthritic change noted as well.    Assessment:        1. Primary osteoarthritis of left knee    2. Primary osteoarthritis of right knee           Plan:          Assessment & Plan  Bilateral knee pain:    Significant bilateral knee pain, with the right knee more affected than the left. Pain is grinding and aching, rated 9 out of 10, associated with swelling, clicking, popping, and episodes of giving way. Exacerbated by climbing stairs, getting up from a seated position, prolonged walking, and weight-bearing activities. Minimal improvement with physical therapy and home exercises. Received an injection on 04/23/2025, which provided only 1 to 2 weeks of relief.    Given the significant increasing pain affecting daily life and associated falls due to instability from the painful stimulus from the knee, various treatment options were discussed, including observation, physical therapy, continued injections, and total knee arthroplasty. Opted for a right total knee arthroplasty.     I reviewed anatomy and a model of a total knee arthroplasty, as well as typical postoperative recovery of 6-12 months before maximal recovery, and possible need for rehabilitation stay after hospitalization. We also discussed risks, benefits, and alternatives of procedure with risks including but not limited to neurovascular damage, bleeding, infection, malalignment, chronic pian, failure of implants, osteolysis, loosening of implants, loss of motion, weakness, stiffness, instability, DVT, pulmonary embolus, death, stroke, complex regional pain syndrome, myocardial infarction, and need for additional procedures. Patient understood all these and had all questions answered before consenting to the procedure. No guarantees were given in regards to results from the surgery. We will have  patient medically optimized by their primary care physician and plan on proceeding with surgery at next available date.     An observation stay postoperatively and the use of an I- assist device are planned. Surgery will be scheduled for 07/23/2025 or later due to the recent intra-articular injection.    She denies any history of DVT or pulmonary embolus. She is prediabetic but not on any medications at this point in time. She has a prior history of atrial fibrillation with cardiac issues and is on Xarelto for anticoagulation.    Follow-up:  Surgery scheduled for 07/23/2025 or later.    Krys Mayes was in agreement with plan and had all questions answered.     Orders:  Orders Placed This Encounter   Procedures    Ambulatory Referral to Physical Therapy for Evaluation & Treatment       Medications:  No orders of the defined types were placed in this encounter.      Followup:  No follow-ups on file.    Diagnoses and all orders for this visit:    1. Primary osteoarthritis of left knee (Primary)    2. Primary osteoarthritis of right knee  -     Ambulatory Referral to Physical Therapy for Evaluation & Treatment                  Dictated utilizing Dragon dictation     Patient or patient representative verbalized consent for the use of Ambient Listening during the visit with  Victor Manuel White MD for chart documentation. 5/29/2025  11:23 EDT

## 2025-05-20 ENCOUNTER — TREATMENT (OUTPATIENT)
Dept: PHYSICAL THERAPY | Facility: CLINIC | Age: 75
End: 2025-05-20
Payer: MEDICARE

## 2025-05-20 DIAGNOSIS — R26.89 BALANCE PROBLEM: ICD-10-CM

## 2025-05-20 DIAGNOSIS — Z91.81 AT RISK FOR FALLS: ICD-10-CM

## 2025-05-20 DIAGNOSIS — G89.29 CHRONIC PAIN OF RIGHT KNEE: Primary | ICD-10-CM

## 2025-05-20 DIAGNOSIS — M25.561 CHRONIC PAIN OF RIGHT KNEE: Primary | ICD-10-CM

## 2025-05-20 NOTE — PROGRESS NOTES
Physical Therapy Initial Evaluation and Plan of Care    [address] [id number]      Patient: Krys Mayes   : 1950  Diagnosis/ICD-10 Code:  Chronic pain of right knee [M25.561, G89.29]  Referring practitioner: Victor Manuel Whtie MD  Date of Initial Visit: 2025  Today's Date:  2025  Patient seen for 1 sessions           Subjective Questionnaire:   LEFS:   ABC: 23%      Subjective Evaluation    History of Present Illness  Mechanism of injury: Pt reports frequent falls with most recent in mid-April; and was coming to PT but had to cancel due to hospitalization with COVID for 4-5 days. Saw ortho for R knee pain and got gel injection on 25. L knee bothers her too but not as bad as R knee.  She reports she has 2 rollators for upstairs and downstairs due to her impaired balance. She got stair lift installed about 6 months ago.       Patient Occupation: volunteers at hospital on  Quality of life: excellent    Pain  Current pain ratin  At worst pain rating: 10  Location: R knee  Quality: dull ache, discomfort, tight and throbbing  Alleviating factors: talked to  about using his old ice cooler.  Aggravating factors: stairs, standing, lifting, movement, ambulation, sleeping, squatting, repetitive movement and outstretched reach  Progression: worsening    Social Support  Lives in: multiple-level home (3 steps to enter with single HR; inside she has stair lift)  Lives with: spouse ( recently hospitalized)    Hand dominance: right    Treatments  Previous treatment: injection treatment and physical therapy (gel injection  by Dr. Stringer)  Patient Goals  Patient goals for therapy: decreased pain, improved balance, increased motion, increased strength, independence with ADLs/IADLs and return to sport/leisure activities           Past Medical History:   Diagnosis Date    Abnormal ECG     afib brought on by strong antibiotics    Arthritis      Arthritis of neck     Cholelithiasis taken out 1/14/91    Clotting disorder blood thinner    Diverticulosis     Fracture of ankle     Fracture, tibia and fibula     Gastroesophageal reflux disease 01/21/2016    Hip arthrosis     Hyperlipidemia     Hypertension     Kidney stone     recurrent, calcium oxalate    Knee swelling     Menopausal disorder 1998    She went through menopause in 1998    Neuropathy     Obesity     PAF (paroxysmal atrial fibrillation)     in the setting of urosepsis, 9/2016    Reflux esophagitis 08/19/2016    Sebaceous cyst of labia 12/20/2017    Sepsis due to urinary tract infection     Sigmoid diverticulitis 11/13/2017    Type 2 diabetes mellitus     Urinary tract infection     Visual impairment cataracts    Vitamin D deficiency 01/21/2016      Past Surgical History:   Procedure Laterality Date    ANKLE SURGERY      Ankle Repair / Description: ORif the left ankle in July 2010. Secondary to fall    BREAST BIOPSY Right     benign    CATARACT EXTRACTION Right     CHOLECYSTECTOMY      COLONOSCOPY  2014    Done 2004 normal recheck in 10 years. Repeated February 2014 colonoscopy was normal and to be repeated in 10 years.    CYSTOSCOPY BLADDER STONE LITHOTRIPSY      FRACTURE SURGERY      HEMORRHOIDECTOMY      NEPHROLITHOTOMY Left 01/09/2017    Procedure: NEPHROLITHOTOMY PERCUTANEOUS SECOND LOOK WITH STENT PLACEMENT AND LASER LITHOTRIPSY;  Surgeon: Mati Villegas MD;  Location: [facility];  Service:     OTHER SURGICAL HISTORY      Tubal Ligation    PERCUTANEOUS NEPHROSTOLITHOTOMY Left 12/2016    TUBAL ABDOMINAL LIGATION      URETEROSCOPY LASER LITHOTRIPSY WITH STENT INSERTION Left 12/06/2022    Procedure: LEFT URETEROSCOPY WITH LASER LITHOTRIPSY, STONE BASKET EXTRACTION, STENT PLACEMENT;  Surgeon: Mati Villegas MD;  Location: [facility] OR;  Service: Urology;  Laterality: Left;          Objective          Palpation   Left   Hypertonic in the distal biceps femoris, distal  "semimembranosus, distal semitendinosus, lateral gastrocnemius and medial gastrocnemius.   Tenderness of the rectus femoris, vastus lateralis and vastus medialis.     Right   Hypertonic in the distal biceps femoris, distal semimembranosus, distal semitendinosus, lateral gastrocnemius and medial gastrocnemius. Tenderness of the rectus femoris, vastus lateralis and vastus medialis.     Tenderness     Right Knee   Tenderness in the inferior fat pad, lateral joint line, LCL (distal), LCL (proximal), MCL (distal), MCL (proximal), medial joint line and patellar tendon.     Neurological Testing     Sensation     Knee   Left Knee   Intact: Light touch    Right Knee   Intact: light touch     Additional Neurological Details  Pt denies numbness/tingling BLE but reports B knee pops and clicks    Active Range of Motion   Left Hip   Flexion: 77 (seated march) degrees     Right Hip   Flexion: 75 (seated march) degrees   Left Knee   Flexion: 108 degrees with pain  Extension: 4 degrees with pain    Right Knee   Flexion: 102 degrees with pain  Extension: 7 degrees with pain    Strength/Myotome Testing     Left Hip   Planes of Motion   Flexion: 3- (seated and painful in back)  Abduction: 4 (seated)  Adduction: 4+ (seated)    Right Hip   Planes of Motion   Flexion: 3- (seated and painful in back)  Abduction: 4 (seated)  Adduction: 4+ (seated)    Left Knee   Flexion: 4-  Extension: 4    Right Knee   Flexion: 4-  Extension: 4    Swelling     Left Knee Girth Measurement (cm)   Joint line: 45 cm    Right Knee Girth Measurement (cm)   Joint line: 47 cm    Ambulation     Observational Gait     Additional Observational Gait Details  Shuffling gait with decreased step length and foot clearance with use of rollator    Functional Assessment     Forward Step Up 6\"   Left Leg  Pain.     Right Leg  Pain.     Single Leg Stance   Left: 0 seconds  Right: 0 seconds    Comments  Tandem stance  RLE posterior: 27 sec  LLE posterior: 30 sec    MOD CTSIB  EO " on floor: 30 sec with close S  EC on floor: 30 sec with close S  EO on foam: 30 sec with close S  EC on foam: 0 sec with SAE    TUG:   With rollator: 27.5 sec       5x STS: 24.1 sec  Unable to stand from standard chair without use of arms               Assessment & Plan       Assessment  Impairments: abnormal coordination, abnormal gait, abnormal or restricted ROM, activity intolerance, impaired balance, impaired physical strength, lacks appropriate home exercise program, pain with function and safety issue   Functional limitations: carrying objects, lifting, walking, pulling, pushing, uncomfortable because of pain, standing, reaching overhead and unable to perform repetitive tasks   Assessment details: Krys Mayes is a 75 y.o. year-old female referred to physical therapy for frequent falls and balance impairments along with chronic R knee pain. She presents with a stable clinical presentation.  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  Signs and symptoms are consistent with physical therapy diagnosis of impaired balance, LE strength, ROM and pain limiting her functional mobility and ADLs requiring her to depend on rollator for support. Patient is appropriate for skilled physical therapy in order to reduce pain and increase ease with daily mobility.  Pt educated on anatomy, goal of interventions, possible causes of falls, and initial HEP.   Prognosis: good    Goals  Plan Goals: Goals  Plan Goals: STG In 6 weeks  - Pt to be independent/compliant with HEP without exacerbation of symptoms.  - Pt will improve B knee flexion to 110 degrees B to improve transfers and gait  - Pt will improve TUG from 27.5 sec to  22 sec or less with rollator to improve safety and efficiency with ambulatory transfers around her  home  - Pt to exhibit B hip flexion AROM to >90 degrees with tolerable pain to allow for improved walking tolerance        LTG In 12 weeks  - Pt will demonstrate BLE tandem stance for up to 60 sec each leg without UE support   - Pt to score on ABC to at least 40% to demonstrate improved confidence with functional mobility  - Pt will be able to complete 3 steps ascending and descending with single HR independently  - 5TSTS < 18 sec to demonstrate improved LE function as need for gait endurance  -SLS on RLE for at least 3 sec to improve balance and RLE WB tolerance           Plan  Therapy options: will be seen for skilled therapy services  Planned modality interventions: cryotherapy, thermotherapy (hydrocollator packs), traction, ultrasound, iontophoresis, dry needling and electrical stimulation/Russian stimulation  Planned therapy interventions: abdominal trunk stabilization, ADL retraining, balance/weight-bearing training, body mechanics training, flexibility, functional ROM exercises, gait training, home exercise program, IADL retraining, joint mobilization, manual therapy, neuromuscular re-education, postural training, soft tissue mobilization, spinal/joint mobilization, strengthening, stretching, therapeutic activities and transfer training  Frequency: 2x week  Treatment plan discussed with: patient  Plan details: 2x per week for 12 weeks        Visit Diagnoses:    ICD-10-CM ICD-9-CM   1. Chronic pain of right knee  M25.561 719.46    G89.29 338.29   2. Balance problem  R26.89 781.99   3. At risk for falls  Z91.81 V15.88       Timed:  Manual Therapy:    -     mins  94675;  Therapeutic Exercise:    20     mins  50873;     Neuromuscular Cem:    -    mins  15608;    Therapeutic Activity:     10     mins  54380;     Gait Training:      -     mins  41803;     Ultrasound:     -     mins  77961;    Electrical Stimulation:    -     mins  13440 ( );    Low Eval                       -      Mins  38775  Mod Eval                         30     Mins  21811  High Eval                       -     Mins  21186    Untimed:  Electrical Stimulation:    -     mins  18764 ( );  Mechanical Traction:    -     mins  24405;     Timed Treatment:   30   mins   Total Treatment:     60   mins    PT SIGNATURE: KOSTA Balderas license: [id number]  DATE TREATMENT INITIATED: 5/20/2025    Initial Certification  Certification Period: 8/18/2025  I certify that the therapy services are furnished while this patient is under my care.  The services outlined above are required by this patient, and will be reviewed every 90 days.     PHYSICIAN: Victor Manuel White MD     DATE:     Please sign and return via fax to [phone]. Thank you, [facility].

## 2025-05-29 ENCOUNTER — TELEPHONE (OUTPATIENT)
Dept: ORTHOPEDIC SURGERY | Facility: CLINIC | Age: 75
End: 2025-05-29
Payer: MEDICARE

## 2025-05-29 PROBLEM — Z96.659 S/P TOTAL KNEE ARTHROPLASTY: Status: ACTIVE | Noted: 2025-05-29

## 2025-05-29 PROBLEM — M17.11 PRIMARY OSTEOARTHRITIS OF RIGHT KNEE: Status: ACTIVE | Noted: 2025-05-07

## 2025-05-29 RX ORDER — PREGABALIN 150 MG/1
150 CAPSULE ORAL ONCE
OUTPATIENT
Start: 2025-05-29 | End: 2025-05-29

## 2025-05-29 RX ORDER — ACETAMINOPHEN 325 MG/1
1000 TABLET ORAL ONCE
OUTPATIENT
Start: 2025-05-29 | End: 2025-05-29

## 2025-05-29 RX ORDER — MELOXICAM 7.5 MG/1
15 TABLET ORAL ONCE
OUTPATIENT
Start: 2025-05-29 | End: 2025-05-29

## 2025-05-30 ENCOUNTER — TREATMENT (OUTPATIENT)
Dept: PHYSICAL THERAPY | Facility: CLINIC | Age: 75
End: 2025-05-30
Payer: MEDICARE

## 2025-05-30 DIAGNOSIS — G89.29 CHRONIC PAIN OF RIGHT KNEE: Primary | ICD-10-CM

## 2025-05-30 DIAGNOSIS — R26.89 BALANCE PROBLEM: ICD-10-CM

## 2025-05-30 DIAGNOSIS — M25.561 CHRONIC PAIN OF RIGHT KNEE: Primary | ICD-10-CM

## 2025-05-30 DIAGNOSIS — Z91.81 AT RISK FOR FALLS: ICD-10-CM

## 2025-05-30 NOTE — PROGRESS NOTES
Physical Therapy Daily Treatment Note      Patient: Krys Mayes   : 1950  Referring practitioner: Victor Manuel White MD  Date of Initial Visit: Type: THERAPY  Noted: 2025  Today's Date:  2025  Patient seen for 2 sessions         Krys Mayes reports: she is having R knee replaced on .               Objective   See Exercise, Manual, and Modality Logs for complete treatment.       Assessment/Plan  EDU on anatomy, expectations after TKA, importance of ROM and strengthening prior to TKA. Pt able to progress to hip ABD vs TB, step ups, static balance and resisted rows today. Intermittent seated rest breaks as needed with standing exercises. Pt advised to ice her knee if it is sore after PT; denies in clinic. Stressed importance of staying with rollator to prevent falls.        Progress per Plan of Care and Progress strengthening /stabilization /functional activity           Timed:  Manual Therapy:    -     mins  36438;  Therapeutic Exercise:    20     mins  28512;     Neuromuscular Cem:    10    mins  55769;    Therapeutic Activity:     15     mins  10799;     Gait Training:      -     mins  18177;     Ultrasound:     -     mins  06177;      Untimed:  Electrical Stimulation:    -     mins  33710 ( );  Mechanical Traction:    -     mins  01798;   Dry needling:      -     mins  35697/    Timed Treatment:   45   mins   Total Treatment:     45   mins  Jessica Copeland PT  Physical Therapist    License #: [id number]

## 2025-06-01 ENCOUNTER — HOSPITAL ENCOUNTER (EMERGENCY)
Facility: HOSPITAL | Age: 75
Discharge: HOME | End: 2025-06-02
Attending: EMERGENCY MEDICINE | Admitting: EMERGENCY MEDICINE
Payer: MEDICARE

## 2025-06-01 ENCOUNTER — APPOINTMENT (OUTPATIENT)
Dept: GENERAL RADIOLOGY | Facility: HOSPITAL | Age: 75
End: 2025-06-01
Payer: MEDICARE

## 2025-06-01 VITALS
DIASTOLIC BLOOD PRESSURE: 78 MMHG | BODY MASS INDEX: 32.78 KG/M2 | HEART RATE: 74 BPM | SYSTOLIC BLOOD PRESSURE: 159 MMHG | TEMPERATURE: 97.8 F | HEIGHT: 66 IN | WEIGHT: 204 LBS | RESPIRATION RATE: 16 BRPM | OXYGEN SATURATION: 96 %

## 2025-06-01 DIAGNOSIS — R42 DIZZINESS: Primary | ICD-10-CM

## 2025-06-01 LAB
ALBUMIN SERPL-MCNC: 3.9 G/DL (ref 3.5–5.2)
ALBUMIN/GLOB SERPL: 1.3 G/DL
ALP SERPL-CCNC: 69 U/L (ref 39–117)
ALT SERPL W P-5'-P-CCNC: 11 U/L (ref 1–33)
ANION GAP SERPL CALCULATED.3IONS-SCNC: 12.7 MMOL/L (ref 5–15)
AST SERPL-CCNC: 20 U/L (ref 1–32)
BASOPHILS # BLD AUTO: 0.05 10*3/MM3 (ref 0–0.2)
BASOPHILS NFR BLD AUTO: 0.6 % (ref 0–1.5)
BILIRUB SERPL-MCNC: 0.4 MG/DL (ref 0–1.2)
BUN SERPL-MCNC: 14.4 MG/DL (ref 8–23)
BUN/CREAT SERPL: 16.9 (ref 7–25)
CALCIUM SPEC-SCNC: 9.4 MG/DL (ref 8.6–10.5)
CHLORIDE SERPL-SCNC: 106 MMOL/L (ref 98–107)
CO2 SERPL-SCNC: 23.3 MMOL/L (ref 22–29)
CREAT SERPL-MCNC: 0.85 MG/DL (ref 0.57–1)
DEPRECATED RDW RBC AUTO: 50.3 FL (ref 37–54)
EGFRCR SERPLBLD CKD-EPI 2021: 71.1 ML/MIN/1.73
EOSINOPHIL # BLD AUTO: 0.25 10*3/MM3 (ref 0–0.4)
EOSINOPHIL NFR BLD AUTO: 3.2 % (ref 0.3–6.2)
ERYTHROCYTE [DISTWIDTH] IN BLOOD BY AUTOMATED COUNT: 16.2 % (ref 12.3–15.4)
GEN 5 1HR TROPONIN T REFLEX: 11 NG/L
GLOBULIN UR ELPH-MCNC: 3.1 GM/DL
GLUCOSE SERPL-MCNC: 108 MG/DL (ref 65–99)
HCT VFR BLD AUTO: 37.8 % (ref 34–46.6)
HGB BLD-MCNC: 11.6 G/DL (ref 12–15.9)
HOLD SPECIMEN: NORMAL
HOLD SPECIMEN: NORMAL
IMM GRANULOCYTES # BLD AUTO: 0.04 10*3/MM3 (ref 0–0.05)
IMM GRANULOCYTES NFR BLD AUTO: 0.5 % (ref 0–0.5)
LIPASE SERPL-CCNC: 29 U/L (ref 13–60)
LYMPHOCYTES # BLD AUTO: 1.31 10*3/MM3 (ref 0.7–3.1)
LYMPHOCYTES NFR BLD AUTO: 16.8 % (ref 19.6–45.3)
MCH RBC QN AUTO: 26.2 PG (ref 26.6–33)
MCHC RBC AUTO-ENTMCNC: 30.7 G/DL (ref 31.5–35.7)
MCV RBC AUTO: 85.3 FL (ref 79–97)
MONOCYTES # BLD AUTO: 0.76 10*3/MM3 (ref 0.1–0.9)
MONOCYTES NFR BLD AUTO: 9.8 % (ref 5–12)
NEUTROPHILS NFR BLD AUTO: 5.38 10*3/MM3 (ref 1.7–7)
NEUTROPHILS NFR BLD AUTO: 69.1 % (ref 42.7–76)
NRBC BLD AUTO-RTO: 0 /100 WBC (ref 0–0.2)
PLATELET # BLD AUTO: 369 10*3/MM3 (ref 140–450)
PMV BLD AUTO: 8.9 FL (ref 6–12)
POTASSIUM SERPL-SCNC: 4 MMOL/L (ref 3.5–5.2)
PROT SERPL-MCNC: 7 G/DL (ref 6–8.5)
RBC # BLD AUTO: 4.43 10*6/MM3 (ref 3.77–5.28)
SODIUM SERPL-SCNC: 142 MMOL/L (ref 136–145)
TROPONIN T NUMERIC DELTA: -2 NG/L
TROPONIN T SERPL HS-MCNC: 13 NG/L
WBC NRBC COR # BLD AUTO: 7.79 10*3/MM3 (ref 3.4–10.8)
WHOLE BLOOD HOLD COAG: NORMAL
WHOLE BLOOD HOLD SPECIMEN: NORMAL

## 2025-06-01 PROCEDURE — 93005 ELECTROCARDIOGRAM TRACING: CPT | Performed by: EMERGENCY MEDICINE

## 2025-06-01 PROCEDURE — 85025 COMPLETE CBC W/AUTO DIFF WBC: CPT | Performed by: EMERGENCY MEDICINE

## 2025-06-01 PROCEDURE — 96360 HYDRATION IV INFUSION INIT: CPT

## 2025-06-01 PROCEDURE — 84484 ASSAY OF TROPONIN QUANT: CPT | Performed by: EMERGENCY MEDICINE

## 2025-06-01 PROCEDURE — 83690 ASSAY OF LIPASE: CPT | Performed by: EMERGENCY MEDICINE

## 2025-06-01 PROCEDURE — 99284 EMERGENCY DEPT VISIT MOD MDM: CPT | Performed by: EMERGENCY MEDICINE

## 2025-06-01 PROCEDURE — 25810000003 SODIUM CHLORIDE 0.9 % SOLUTION: Performed by: EMERGENCY MEDICINE

## 2025-06-01 PROCEDURE — 71045 X-RAY EXAM CHEST 1 VIEW: CPT

## 2025-06-01 PROCEDURE — 36415 COLL VENOUS BLD VENIPUNCTURE: CPT

## 2025-06-01 PROCEDURE — 80053 COMPREHEN METABOLIC PANEL: CPT | Performed by: EMERGENCY MEDICINE

## 2025-06-01 RX ORDER — SODIUM CHLORIDE 0.9 % (FLUSH) 0.9 %
10 SYRINGE (ML) INJECTION AS NEEDED
Status: DISCONTINUED | OUTPATIENT
Start: 2025-06-01 | End: 2025-06-02 | Stop reason: HOSPADM

## 2025-06-01 RX ADMIN — SODIUM CHLORIDE 1000 ML: 9 INJECTION, SOLUTION INTRAVENOUS at 22:24

## 2025-06-02 LAB
QT INTERVAL: 425 MS
QTC INTERVAL: 473 MS

## 2025-06-02 NOTE — ED PROVIDER NOTES
Subjective   History of Present Illness  74 yo F with PMHx htn, dm presents with 1-day h/o not feeling well.  Pt states she's been nauseated all day.  Pt with some dizziness, which she describes primarily as light-headedness.  No fevers, no vomiting, no chest pain, no abdominal pain.  Pt states that food makes symptoms worse.        Review of Systems   Constitutional:  Negative for fever.   Cardiovascular:  Negative for chest pain.   Gastrointestinal:  Positive for nausea. Negative for abdominal pain and vomiting.   Neurological:  Positive for dizziness and light-headedness.       Past Medical History:   Diagnosis Date    Abnormal ECG 9/16    afib brought on by strong antibiotics    Arthritis     Arthritis of neck     Cholelithiasis taken out 1/14/91    Clotting disorder blood thinner    Diverticulosis     Fracture of ankle     Fracture, tibia and fibula     Gastroesophageal reflux disease 01/21/2016    Hip arthrosis     Hyperlipidemia     Hypertension     Kidney stone     recurrent, calcium oxalate    Knee swelling     Menopausal disorder 1998    She went through menopause in 1998    Neuropathy     Obesity     PAF (paroxysmal atrial fibrillation)     in the setting of urosepsis, 9/2016    Reflux esophagitis 08/19/2016    Sebaceous cyst of labia 12/20/2017    Sepsis due to urinary tract infection     Sigmoid diverticulitis 11/13/2017    Type 2 diabetes mellitus     Urinary tract infection     Visual impairment cataracts    Vitamin D deficiency 01/21/2016       Allergies   Allergen Reactions    Morphine And Codeine GI Intolerance    Ciprofloxacin-Ciproflox Hcl Er Rash    Flomax [Tamsulosin Hcl] Rash    Hydrocodone Rash    Latex Rash    Lortab [Hydrocodone-Acetaminophen] Rash    Penicillins Rash    Phenergan [Promethazine Hcl] GI Intolerance    Sulfa Antibiotics Rash    Amoxicillin Rash    Tamsulosin Rash       Past Surgical History:   Procedure Laterality Date    ANKLE SURGERY      Ankle Repair / Description: ORif  the left ankle in July 2010. Secondary to fall    BREAST BIOPSY Right     benign    CATARACT EXTRACTION Right     CHOLECYSTECTOMY      COLONOSCOPY  2014    Done 2004 normal recheck in 10 years. Repeated February 2014 colonoscopy was normal and to be repeated in 10 years.    CYSTOSCOPY BLADDER STONE LITHOTRIPSY      FRACTURE SURGERY      HEMORRHOIDECTOMY      NEPHROLITHOTOMY Left 01/09/2017    Procedure: NEPHROLITHOTOMY PERCUTANEOUS SECOND LOOK WITH STENT PLACEMENT AND LASER LITHOTRIPSY;  Surgeon: Mati Villegas MD;  Location: [facility] OR;  Service:     OTHER SURGICAL HISTORY      Tubal Ligation    PERCUTANEOUS NEPHROSTOLITHOTOMY Left 12/2016    TUBAL ABDOMINAL LIGATION      URETEROSCOPY LASER LITHOTRIPSY WITH STENT INSERTION Left 12/06/2022    Procedure: LEFT URETEROSCOPY WITH LASER LITHOTRIPSY, STONE BASKET EXTRACTION, STENT PLACEMENT;  Surgeon: Mati Villegas MD;  Location: [facility] OR;  Service: Urology;  Laterality: Left;       Family History   Problem Relation Age of Onset    COPD Mother     Heart attack Father         acute myocardial infarction    Anxiety disorder Father     Depression Father     Heart disease Father     Heart attack Son     Kidney disease Maternal Grandmother         Born with 1 kidney    Heart attack Paternal Uncle     Depression Sister     Heart failure Sister     Hearing loss Sister     Breast cancer Neg Hx        Social History     Socioeconomic History    Marital status:    Tobacco Use    Smoking status: Never     Passive exposure: Never    Smokeless tobacco: Never    Tobacco comments:     Never smoked ever   Vaping Use    Vaping status: Never Used   Substance and Sexual Activity    Alcohol use: Yes     Comment: She uses alcohol once or twice a year.    Drug use: No    Sexual activity: Not Currently     Partners: Male     Birth control/protection: Post-menopausal           Objective   Physical Exam  Vitals and nursing note reviewed.   Constitutional:        Comments: Elderly, chronically ill-appearing   Pulmonary:      Effort: Pulmonary effort is normal. No respiratory distress.   Abdominal:      Tenderness: There is no abdominal tenderness.   Neurological:      General: No focal deficit present.         ECG 12 Lead      Date/Time: 6/1/2025 10:36 PM    Performed by: Zion Garrison MD  Authorized by: Zion Garrison MD  Interpreted by ED physician  Comparison: compared with previous ECG from 4/21/2025  Similar to previous ECG  Rhythm: atrial fibrillation  Rate: normal  BPM: 74  Conduction: conduction normal  Clinical impression: non-specific ECG  Comments: Non-specific ST, t-waves               ED Course                                                       Medical Decision Making  76 yo F with PMHx htn, dm presents with 1-day h/o nausea and dizziness.  No fevers, no vomiting, no abdominal pain, no chest pain.  Examination unremarkable.  Will evaluate for acute process.    23:50 EDT  Cxr and EKG ok to my read.  Trop x 2 neg.  Work-up otherwise unremarkable.  No other evidence of ACS, arrhythmia, pneumonia, pneumothorax, significant electrolyte abnormality, severe anemia, or other emergent medical condition clinically.  Pt doing ok, appears comfortable, seems stable for home care.  No indication for hospitalization or further emergent management in this context.  Discussed results and poc for dc home, symptom management, follow-up, return precautions.      Problems Addressed:  Dizziness: complicated acute illness or injury    Amount and/or Complexity of Data Reviewed  External Data Reviewed: labs, radiology, ECG and notes.  Labs: ordered.  Radiology: ordered and independent interpretation performed.  ECG/medicine tests: ordered and independent interpretation performed.    Risk  Prescription drug management.        Final diagnoses:   Dizziness       ED Disposition  ED Disposition       ED Disposition   Discharge    Condition   Stable    Comment   --               Head, Pauline  N, APRN  [address] [place] [address]  [phone]          [facility] EMERGENCY DEPARTMENT  [address] [address] [phone]    As needed         Medication List      No changes were made to your prescriptions during this visit.            Zion Garrison MD  06/01/25 1234

## 2025-06-02 NOTE — DISCHARGE INSTRUCTIONS
Your were evaluated for dizziness.  Your tests were ok.  Please continue to treat symptoms at home as needed.  You should follow up with your doctor.  Please return to the Emergency Department with any concerning symptoms.  Zion Garrison MD

## 2025-06-03 ENCOUNTER — PATIENT OUTREACH (OUTPATIENT)
Dept: CASE MANAGEMENT | Facility: OTHER | Age: 75
End: 2025-06-03
Payer: MEDICARE

## 2025-06-03 NOTE — OUTREACH NOTE
"AMBULATORY CASE MANAGEMENT NOTE    Names and Relationships of Patient/Support Persons: Contact: Krys Mayes \"WYATT\"; Relationship: Self -     Patient Outreach      REASON FOR FOLLOW UP:  ED follow up    INTRODUCTION: Introduced self and role of ACM.        Patient Report:  Patient was seen in ED for dizziness. She states that has improved. She did cancel ortho appointment due to other issues. Suggested contacting PCP for follow up after now having and admission and 2 ED visits. She states she will. Denies issues or concerns.    Review of Instructions:    Next Steps Follow ups:        Josefa MCCOY  Ambulatory Case Management    6/3/2025, 10:39 EDT  "

## 2025-06-05 DIAGNOSIS — R29.6 FREQUENT FALLS: Primary | ICD-10-CM

## 2025-06-12 ENCOUNTER — TREATMENT (OUTPATIENT)
Dept: PHYSICAL THERAPY | Facility: CLINIC | Age: 75
End: 2025-06-12
Payer: MEDICARE

## 2025-06-12 DIAGNOSIS — M25.561 CHRONIC PAIN OF RIGHT KNEE: Primary | ICD-10-CM

## 2025-06-12 DIAGNOSIS — G89.29 CHRONIC PAIN OF RIGHT KNEE: Primary | ICD-10-CM

## 2025-06-12 DIAGNOSIS — Z91.81 AT RISK FOR FALLS: ICD-10-CM

## 2025-06-12 DIAGNOSIS — R26.89 BALANCE PROBLEM: ICD-10-CM

## 2025-06-12 NOTE — PROGRESS NOTES
"Physical Therapy Daily Treatment Note    [facility]  [address] [place] [address]  [phone] (phone)     [phone] (fax)    Patient: Krys Mayes   : 1950  Referring practitioner: Victor Manuel White MD  Date of Initial Visit: Type: THERAPY  Noted: 2025  Today's Date:  2025  Patient seen for 3 sessions         Visit Diagnoses:    ICD-10-CM ICD-9-CM   1. Chronic pain of right knee  M25.561 719.46    G89.29 338.29   2. Balance problem  R26.89 781.99   3. At risk for falls  Z91.81 V15.88         Subjective:  Krys Mayes reports: Pt reports she is feeling \"so-so\" today. Pt states that some days are better than others but that her knee is bothering her a little more today.      Objective     See Exercise, Manual, and Modality Logs for complete treatment.     Assessment:  Pt able to complete UE/LE strength and mobility exercises but did show signs of fatigue and worsening of form with prolonged reps. Pt struggled with balance exercises and demonstrated left posterior lean while standing in tandem and with narrow feet. Pt does respond well to verbal cueing during exercise specifically with improvements of trunk posture. Pt will continue to benefit from skilled therapy to further improve her strength, mobility, and balance.      Plan:   Continue POC as indicated      Timed:  Manual Therapy:          mins  61678;  Therapeutic Exercise:    33  mins  72912;     Neuromuscular Cem:         mins  65020;    Therapeutic Activity:     16     mins  61173;     Gait Training:            mins  52784;     Ultrasound:           mins  98934;    Electrical Stimulation:          mins  42846 ( );  Iontophoresis           mins 79562;  Self Care            mins 29438    Untimed:  Electrical Stimulation:          mins  44115 ( );  Traction:           mins  04409;       Timed Treatment:    49   mins   Total Treatment:      49   mins      Jonny Urrutia PT  Physical " Therapist    License Number: [place] [id number]

## 2025-06-12 NOTE — PROGRESS NOTES
I have reviewed the notes, assessments, and/or procedures performed by Jonny Urrutia, I concur with his documentation of Danielle Marcano. Jessica Fritz, PT License #[id number]

## 2025-06-13 ENCOUNTER — OFFICE VISIT (OUTPATIENT)
Dept: FAMILY MEDICINE CLINIC | Facility: CLINIC | Age: 75
End: 2025-06-13
Payer: MEDICARE

## 2025-06-13 VITALS
HEIGHT: 66 IN | WEIGHT: 204 LBS | DIASTOLIC BLOOD PRESSURE: 80 MMHG | BODY MASS INDEX: 32.78 KG/M2 | TEMPERATURE: 98.6 F | HEART RATE: 92 BPM | OXYGEN SATURATION: 96 % | SYSTOLIC BLOOD PRESSURE: 124 MMHG

## 2025-06-13 DIAGNOSIS — R29.6 FREQUENT FALLS: Primary | ICD-10-CM

## 2025-06-13 DIAGNOSIS — R47.89 WORD FINDING DIFFICULTY: ICD-10-CM

## 2025-06-13 DIAGNOSIS — R41.0 CONFUSION: ICD-10-CM

## 2025-06-13 DIAGNOSIS — G47.00 INSOMNIA, UNSPECIFIED TYPE: ICD-10-CM

## 2025-06-13 DIAGNOSIS — F41.9 ANXIETY: ICD-10-CM

## 2025-06-13 PROCEDURE — 3079F DIAST BP 80-89 MM HG: CPT | Performed by: NURSE PRACTITIONER

## 2025-06-13 PROCEDURE — 99214 OFFICE O/P EST MOD 30 MIN: CPT | Performed by: NURSE PRACTITIONER

## 2025-06-13 PROCEDURE — 3074F SYST BP LT 130 MM HG: CPT | Performed by: NURSE PRACTITIONER

## 2025-06-13 PROCEDURE — 1125F AMNT PAIN NOTED PAIN PRSNT: CPT | Performed by: NURSE PRACTITIONER

## 2025-06-13 RX ORDER — ALPRAZOLAM 0.5 MG
0.5 TABLET ORAL 2 TIMES DAILY PRN
Qty: 60 TABLET | Refills: 0 | Status: SHIPPED | OUTPATIENT
Start: 2025-06-13

## 2025-06-13 NOTE — PROGRESS NOTES
Patient ID: Krys Mayes is a 75 y.o. female     Patient Care Team:  Head, AGUSTIN Anglin as PCP - General (Nurse Practitioner)  Jeremy Orozco MD as Consulting Physician (Cardiology)  PreethiJoss frost MD as Consulting Physician (Gastroenterology)  Catherine Brown APRN as Nurse Practitioner (Orthopedic Surgery)  Mati Villegas MD as Consulting Physician (Urology)  Ajith Valles DPM as Consulting Physician (Podiatry)  Lyle Orellana MD as Consulting Physician (Ophthalmology)  Luan Marie MD as Consulting Physician (Dermatology)    Subjective     Chief Complaint   Patient presents with    Fall    Memory Loss     Trouble with getting right words.     Anxiety       History of Present Illness      History of Present Illness  The patient presents for evaluation of frequent falls, neuropathy, and word-finding difficulties.    She has been experiencing recurrent falls and recurrent bruising, which she attributes to her Xarelto medication. Her balance has been compromised since January 2025, necessitating the use of a walker for mobility. She describes instances where she experiences sudden weakness in her ankles or feet, particularly when going to the bathroom, resulting in falls. Additionally, she mentions that her shoes feel excessively heavy at times. She has not experienced any falls since the acquisition of a hospital bed, although she finds it uncomfortable due to mattress.  Was delivered yesterday.  Was ordered due to original bed to high up off of floor making it difficult to get in and out of bed.  She is currently undergoing physical therapy due to recurrent falls.      She has been diagnosed with neuropathy and is under the care of a podiatrist, who has prescribed gabapentin. She reports that this medication provides intermittent relief. She does not believe that gabapentin is contributing to her increased fall risk as she has been on it prior to the onset of her  symptoms.    She is currently taking Xanax, half a tablet daily as needed, and an additional whole tablet at night to aid sleep. She has not tried any other sleep aids. She reports no headaches or changes in vision. She also reports no unilateral weakness or difficulty with speech, except for occasional word-finding difficulties. She has not consulted a neurologist for these issues. She has undergone an MRI in the past. Previous CT head stable.      She is scheduled to have her knee taken care of at the end of July 2025 because it is bone on bone.    Results  CT Cervical Spine Without Contrast (04/21/2025 17:38)    CT Head Without Contrast (04/21/2025 17:33)      Imaging   - Head CT: 04/2025, Stable       She denies any complaints of fever, chills, cough, chest pain, shortness of air, abdominal pain, nausea, or any other concerns.     The following portions of the patient's history were reviewed and updated as appropriate: allergies, current medications, past family history, past medical history, past social history, past surgical history and problem list.       ROS    Vitals:    06/13/25 1418   BP: 124/80   Pulse: 92   Temp: 98.6 °F (37 °C)   SpO2: 96%       Documented weights    06/13/25 1418   Weight: 92.5 kg (204 lb)     Body mass index is 32.93 kg/m².    Results for orders placed or performed during the hospital encounter of 06/01/25   Comprehensive Metabolic Panel    Collection Time: 06/01/25 10:22 PM    Specimen: Blood   Result Value Ref Range    Glucose 108 (H) 65 - 99 mg/dL    BUN 14.4 8.0 - 23.0 mg/dL    Creatinine 0.85 0.57 - 1.00 mg/dL    Sodium 142 136 - 145 mmol/L    Potassium 4.0 3.5 - 5.2 mmol/L    Chloride 106 98 - 107 mmol/L    CO2 23.3 22.0 - 29.0 mmol/L    Calcium 9.4 8.6 - 10.5 mg/dL    Total Protein 7.0 6.0 - 8.5 g/dL    Albumin 3.9 3.5 - 5.2 g/dL    ALT (SGPT) 11 1 - 33 U/L    AST (SGOT) 20 1 - 32 U/L    Alkaline Phosphatase 69 39 - 117 U/L    Total Bilirubin 0.4 0.0 - 1.2 mg/dL    Globulin  3.1 gm/dL    A/G Ratio 1.3 g/dL    BUN/Creatinine Ratio 16.9 7.0 - 25.0    Anion Gap 12.7 5.0 - 15.0 mmol/L    eGFR 71.1 >60.0 mL/min/1.73   High Sensitivity Troponin T    Collection Time: 06/01/25 10:22 PM    Specimen: Blood   Result Value Ref Range    HS Troponin T 13 <14 ng/L   Lipase    Collection Time: 06/01/25 10:22 PM    Specimen: Blood   Result Value Ref Range    Lipase 29 13 - 60 U/L   CBC Auto Differential    Collection Time: 06/01/25 10:22 PM    Specimen: Blood   Result Value Ref Range    WBC 7.79 3.40 - 10.80 10*3/mm3    RBC 4.43 3.77 - 5.28 10*6/mm3    Hemoglobin 11.6 (L) 12.0 - 15.9 g/dL    Hematocrit 37.8 34.0 - 46.6 %    MCV 85.3 79.0 - 97.0 fL    MCH 26.2 (L) 26.6 - 33.0 pg    MCHC 30.7 (L) 31.5 - 35.7 g/dL    RDW 16.2 (H) 12.3 - 15.4 %    RDW-SD 50.3 37.0 - 54.0 fl    MPV 8.9 6.0 - 12.0 fL    Platelets 369 140 - 450 10*3/mm3    Neutrophil % 69.1 42.7 - 76.0 %    Lymphocyte % 16.8 (L) 19.6 - 45.3 %    Monocyte % 9.8 5.0 - 12.0 %    Eosinophil % 3.2 0.3 - 6.2 %    Basophil % 0.6 0.0 - 1.5 %    Immature Grans % 0.5 0.0 - 0.5 %    Neutrophils, Absolute 5.38 1.70 - 7.00 10*3/mm3    Lymphocytes, Absolute 1.31 0.70 - 3.10 10*3/mm3    Monocytes, Absolute 0.76 0.10 - 0.90 10*3/mm3    Eosinophils, Absolute 0.25 0.00 - 0.40 10*3/mm3    Basophils, Absolute 0.05 0.00 - 0.20 10*3/mm3    Immature Grans, Absolute 0.04 0.00 - 0.05 10*3/mm3    nRBC 0.0 0.0 - 0.2 /100 WBC   Green Top (Gel)    Collection Time: 06/01/25 10:22 PM   Result Value Ref Range    Extra Tube Hold for add-ons.    Lavender Top    Collection Time: 06/01/25 10:22 PM   Result Value Ref Range    Extra Tube hold for add-on    Gold Top - SST    Collection Time: 06/01/25 10:22 PM   Result Value Ref Range    Extra Tube Hold for add-ons.    Light Blue Top    Collection Time: 06/01/25 10:22 PM   Result Value Ref Range    Extra Tube Hold for add-ons.    ECG 12 Lead Chest Pain    Collection Time: 06/01/25 10:22 PM   Result Value Ref Range    QT Interval 425  ms    QTC Interval 473 ms   High Sensitivity Troponin T 1Hr    Collection Time: 06/01/25 11:26 PM    Specimen: Blood   Result Value Ref Range    HS Troponin T 11 <14 ng/L    Troponin T Numeric Delta -2 Abnormal if >/=3 ng/L           Objective     Physical Exam  Vitals reviewed.   Constitutional:       General: She is not in acute distress.  HENT:      Head: Normocephalic and atraumatic.   Eyes:      Extraocular Movements: Extraocular movements intact.      Pupils: Pupils are equal, round, and reactive to light.   Cardiovascular:      Rate and Rhythm: Normal rate and regular rhythm.      Heart sounds: No murmur heard.  Pulmonary:      Effort: Pulmonary effort is normal.      Breath sounds: Normal breath sounds. No wheezing or rhonchi.   Musculoskeletal:      Cervical back: Normal range of motion.      Right lower leg: Edema present.      Left lower leg: Edema present.      Comments: Non-pitting edema lower extremities.     Skin:     Findings: Bruising (upper and lower extrmities due to falls.) present.   Neurological:      General: No focal deficit present.      Mental Status: She is alert and oriented to person, place, and time.      Cranial Nerves: No cranial nerve deficit.      Motor: No weakness.      Comments: Ambulating with rolling walker with steady gait   Psychiatric:         Mood and Affect: Mood normal.         Behavior: Behavior normal.       MMSE:  29/30.     Physical Exam      Assessment & Plan     Assessment/Plan     Assessment & Plan  1. Frequent falls.  - The patient has been experiencing frequent falls since January 2025, with weakness in her legs, especially when getting up to go to the bathroom.  - Increased pain and limited mobility.  - An MRI of the brain will be ordered to further investigate the cause of the falls.  - The patient will continue with physical therapy and has received a hospital bed to assist with mobility.    2. Neuropathy.  - Ongoing neuropathy is reported.  - Gabapentin  treatment is providing intermittent relief.  - Continued monitoring of neuropathy symptoms.  - The patient will maintain the current gabapentin regimen.    3. Word-finding difficulties.  - The patient reports worsening trouble with word finding over the last six months.  - Recent head CT was stable; dementia screening scored 29 out of 30.  - An MRI of the brain will be ordered for further evaluation.  - Xanax dosage will be reduced to 0.5 mg twice daily as needed to assess its impact on word-finding difficulties and falls.    4. Medication management.  - The patient is currently on Xarelto and gabapentin.  - Xanax dosage adjustment to 0.5 mg twice daily as needed.  - Monitoring for any changes in symptoms with the adjusted Xanax dosage.  - Consideration of referral to a neurologist if symptoms persist.    Diagnoses and all orders for this visit:    1. Frequent falls (Primary)  -     MRI Brain With & Without Contrast; Future    2. Anxiety  -     ALPRAZolam (Xanax) 0.5 MG tablet; Take 1 tablet by mouth 2 (Two) Times a Day As Needed for Anxiety or Sleep.  Dispense: 60 tablet; Refill: 0    3. Insomnia, unspecified type  -     ALPRAZolam (Xanax) 0.5 MG tablet; Take 1 tablet by mouth 2 (Two) Times a Day As Needed for Anxiety or Sleep.  Dispense: 60 tablet; Refill: 0    4. Word finding difficulty  -     MRI Brain With & Without Contrast; Future    5. Confusion  -     MRI Brain With & Without Contrast; Future          Follow Up:  Return if symptoms worsen or fail to improve, for Next scheduled follow up.    In the meantime, instructed to contact us sooner for any problems or concerns.    Patient was given instructions and counseling regarding condition or for health maintenance advice.  Please see specific information pulled into the AVS if appropriate.      Patient or patient representative verbalized consent for the use of Ambient Listening during the visit with  AGUSTIN Nick for chart documentation. 6/13/2025  16:33  EDT    Pauline PIEDRA Head, APRN  Family Medicine  [facility]  06/13/25  16:34 EDT

## 2025-06-15 DIAGNOSIS — E78.2 MIXED HYPERLIPIDEMIA: ICD-10-CM

## 2025-06-16 RX ORDER — SIMVASTATIN 80 MG
80 TABLET ORAL EVERY EVENING
Qty: 90 TABLET | Refills: 0 | Status: SHIPPED | OUTPATIENT
Start: 2025-06-16

## 2025-06-19 ENCOUNTER — TREATMENT (OUTPATIENT)
Dept: PHYSICAL THERAPY | Facility: CLINIC | Age: 75
End: 2025-06-19
Payer: MEDICARE

## 2025-06-19 DIAGNOSIS — Z91.81 AT RISK FOR FALLS: ICD-10-CM

## 2025-06-19 DIAGNOSIS — M25.561 CHRONIC PAIN OF RIGHT KNEE: Primary | ICD-10-CM

## 2025-06-19 DIAGNOSIS — G89.29 CHRONIC PAIN OF RIGHT KNEE: Primary | ICD-10-CM

## 2025-06-19 DIAGNOSIS — R26.89 BALANCE PROBLEM: ICD-10-CM

## 2025-06-19 NOTE — PROGRESS NOTES
"   Physical Therapy Progress Note/Re-Assessment      Patient: Krys Mayes   : 1950  Referring practitioner: Victor Manuel White MD  Date of Initial Visit: Type: THERAPY  Noted: 2025  Today's Date:  2025  Patient seen for 4 sessions         Krys Mayes reports: no recent falls; still using rollator for support at home and community.      ABC: 12%  LEFS: 34/80      Objective     Active Range of Motion   Left Hip   Flexion: 88 (seated march) degrees      Right Hip   Flexion: 79 (seated march) degrees     Left Knee   Flexion: 105 degrees with pain  Extension: 2 degrees with pain     Right Knee   Flexion: 100 degrees with pain  Extension: 2 degrees with pain     Strength/Myotome Testing      Left Hip   Planes of Motion   Flexion: 3 (seated)  Abduction: 4 (seated)  Adduction: 4+ (seated)     Right Hip   Planes of Motion   Flexion: 3- (seated and painful in back)  Abduction: 4 (seated)  Adduction: 4+ (seated)     Left Knee   Flexion: 4  Extension: 4     Right Knee   Flexion: 4-  Extension: 4-     Swelling      Left Knee Girth Measurement (cm)   Joint line: 44 cm     Right Knee Girth Measurement (cm)   Joint line: 47.5 cm     Ambulation      Observational Gait      Additional Observational Gait Details  Shuffling gait with decreased step length and foot clearance with use of rollator     Functional Assessment      Forward Step Up 6\"   Left Leg  Pain.      Right Leg  Pain.      Single Leg Stance   Left: 1-2 seconds  Right: 1-2 seconds     Comments  Tandem stance  RLE posterior: 36 sec  LLE posterior: 38 sec     MOD CTSIB  EO on floor: 30 sec with close S  EC on floor: 30 sec with close S  EO on foam: 30 sec with close S  EC on foam: 7 sec with need for HR/SAE     TUG:   With rollator: 15.8 sec     5x STS: 19.1 sec    Unable to stand from standard chair without use of arms    See Exercise, Manual, and Modality Logs for complete treatment.       Assessment & Plan       Assessment  Impairments: " abnormal coordination, abnormal gait, abnormal or restricted ROM, activity intolerance, impaired balance, impaired physical strength, lacks appropriate home exercise program, pain with function and safety issue   Functional limitations: carrying objects, lifting, walking, pulling, pushing, uncomfortable because of pain, standing, reaching overhead and unable to perform repetitive tasks   Assessment details: Krys Mayes is a 75 y.o. year-old female referred to physical therapy for frequent falls and balance impairments along with chronic R knee pain. She presents with a stable clinical presentation.  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  Pt with improving hip ROM, but decreased knee ROM and increased swelling likely due to arthritis, improving strength and balance with SLS and tandem balance. Improving endurance and activity tolerance. Patient is appropriate for skilled physical therapy in order to reduce pain and increase ease with daily mobility.  Pt educated on anatomy, goal of interventions, possible causes of falls, and initial HEP.   Prognosis: good  Prognosis details: R TKA on 7/29/25    Goals  Plan Goals: Goals  Plan Goals: STG In 6 weeks  - Pt to be independent/compliant with HEP without exacerbation of symptoms.-PROGRESSING  - Pt will improve B knee flexion to 110 degrees B to improve transfers and gait-REGRESSING  - Pt will improve TUG from 27.5 sec to  22 sec or less with rollator to improve safety and efficiency with ambulatory transfers around her home-MET  - Pt to exhibit B hip flexion AROM to >90 degrees with tolerable pain to allow for improved walking tolerance-PROGRESSING        LTG In 12 weeks  - Pt will demonstrate BLE tandem stance for up to 60 sec each leg without UE  support -PROGRESSING  - Pt to score on ABC to at least 40% to demonstrate improved confidence with functional mobility-PROGRESSING  - Pt will be able to complete 3 steps ascending and descending with single HR independently-PROGRESSING  - 5TSTS < 18 sec to demonstrate improved LE function as need for gait endurance-MET  -SLS on RLE for at least 3 sec to improve balance and RLE WB tolerance-PROGRESSING           Plan  Therapy options: will be seen for skilled therapy services  Planned modality interventions: cryotherapy, thermotherapy (hydrocollator packs), traction, ultrasound, iontophoresis, dry needling and electrical stimulation/Russian stimulation  Planned therapy interventions: abdominal trunk stabilization, ADL retraining, balance/weight-bearing training, body mechanics training, flexibility, functional ROM exercises, gait training, home exercise program, IADL retraining, joint mobilization, manual therapy, neuromuscular re-education, postural training, soft tissue mobilization, spinal/joint mobilization, strengthening, stretching, therapeutic activities and transfer training  Frequency: 2x week  Treatment plan discussed with: patient  Plan details: 2x per week for 8 weeks; having R TKA on 7/29/25           Timed:  Manual Therapy:    -     mins  08679;  Therapeutic Exercise:    25     mins  53564;     Neuromuscular Cem:    -    mins  01207;    Therapeutic Activity:     15     mins  03291;     Gait Training:      -     mins  86350;     Ultrasound:     -     mins  52976;      Untimed:  Electrical Stimulation:    -     mins  78228 ( );  Mechanical Traction:    -     mins  81885;   Dry needling:      -     mins  99971/20561    Timed Treatment:   40   mins   Total Treatment:     40   mins  Jessica Copeland PT  Physical Therapist    License #: [id number]

## 2025-06-26 ENCOUNTER — TREATMENT (OUTPATIENT)
Dept: PHYSICAL THERAPY | Facility: CLINIC | Age: 75
End: 2025-06-26
Payer: MEDICARE

## 2025-06-26 DIAGNOSIS — R26.89 BALANCE PROBLEM: ICD-10-CM

## 2025-06-26 DIAGNOSIS — Z91.81 AT RISK FOR FALLS: ICD-10-CM

## 2025-06-26 DIAGNOSIS — G89.29 CHRONIC PAIN OF RIGHT KNEE: Primary | ICD-10-CM

## 2025-06-26 DIAGNOSIS — M25.561 CHRONIC PAIN OF RIGHT KNEE: Primary | ICD-10-CM

## 2025-06-26 NOTE — PROGRESS NOTES
Physical Therapy Daily Treatment Note      Patient: Krys Mayes   : 1950  Referring practitioner: Victor Manuel White MD  Date of Initial Visit: Type: THERAPY  Noted: 2025  Today's Date:  2025  Patient seen for 5 sessions         Krys Mayes reports: no falls, waiting for her R knee replacement in July              Objective   See Exercise, Manual, and Modality Logs for complete treatment.       Assessment/Plan  Pt with improving balance and stability on her feet along with increased activity tolerance; needing assistance for upright posture with seated HS curls due to posterior trunk lean. Upcoming TKA at end of July; trying to improve knee ROM and strength to prepare for surgery. Advised to continue use of rollator for safety and to prevent falls.        Progress per Plan of Care and Progress strengthening /stabilization /functional activity           Timed:  Manual Therapy:    -     mins  59387;  Therapeutic Exercise:    20     mins  02353;     Neuromuscular Cem:    10    mins  82766;    Therapeutic Activity:     15     mins  30531;     Gait Training:      -     mins  19649;     Ultrasound:     -     mins  83701;      Untimed:  Electrical Stimulation:    -     mins  91686 ( );  Mechanical Traction:    -     mins  03368;   Dry needling:      -     mins  95002/    Timed Treatment:   45   mins   Total Treatment:     45   mins  Jessica Copeland PT  Physical Therapist    License #: [id number]

## 2025-07-03 ENCOUNTER — TREATMENT (OUTPATIENT)
Dept: PHYSICAL THERAPY | Facility: CLINIC | Age: 75
End: 2025-07-03
Payer: MEDICARE

## 2025-07-03 DIAGNOSIS — G89.29 CHRONIC PAIN OF RIGHT KNEE: Primary | ICD-10-CM

## 2025-07-03 DIAGNOSIS — Z91.81 AT RISK FOR FALLS: ICD-10-CM

## 2025-07-03 DIAGNOSIS — M25.561 CHRONIC PAIN OF RIGHT KNEE: Primary | ICD-10-CM

## 2025-07-03 DIAGNOSIS — R26.89 BALANCE PROBLEM: ICD-10-CM

## 2025-07-03 NOTE — PROGRESS NOTES
Physical Therapy Daily Treatment Note      Patient: Krys Mayes   : 1950  Referring practitioner: Victor Manuel White MD  Date of Initial Visit: Type: THERAPY  Noted: 2025  Today's Date:  7/3/2025  Patient seen for 6 sessions         Krys Mayes reports: she has been doing well; no falls, improving ease of functional mobility per pt and her               Objective   See Exercise, Manual, and Modality Logs for complete treatment.       Assessment/Plan  Pt with improving strength, ax tolerance/endurance and balance; able to complete short distance of gait without AD with S for safety. Still recommending rollator at this time for safety and decrease risk of falls until R TKA scheduled on 25. Pt able to progress reps of LAQ and HS curls and heel toe raises. Plan to add supine heel slides and bridges to prepare for TKA along with hip strengthening.        Progress per Plan of Care and Progress strengthening /stabilization /functional activity         Timed:  Manual Therapy:    -     mins  41637;  Therapeutic Exercise:    20     mins  64597;     Neuromuscular Cem:    -    mins  72895;    Therapeutic Activity:     10     mins  24137;     Gait Training:      -     mins  19964;     Ultrasound:     -     mins  06122;      Untimed:  Electrical Stimulation:    -     mins  51465 ( );  Mechanical Traction:    -     mins  48288;   Dry needling:      -     mins  15446/    Timed Treatment:   30   mins   Total Treatment:     30   mins  Jessica Copeland PT  Physical Therapist    License #: [id number]

## 2025-07-10 ENCOUNTER — TREATMENT (OUTPATIENT)
Dept: PHYSICAL THERAPY | Facility: CLINIC | Age: 75
End: 2025-07-10
Payer: MEDICARE

## 2025-07-10 ENCOUNTER — OFFICE VISIT (OUTPATIENT)
Dept: FAMILY MEDICINE CLINIC | Facility: CLINIC | Age: 75
End: 2025-07-10
Payer: MEDICARE

## 2025-07-10 VITALS
BODY MASS INDEX: 32.78 KG/M2 | TEMPERATURE: 98.7 F | DIASTOLIC BLOOD PRESSURE: 80 MMHG | OXYGEN SATURATION: 97 % | HEIGHT: 66 IN | SYSTOLIC BLOOD PRESSURE: 122 MMHG | HEART RATE: 85 BPM | WEIGHT: 204 LBS

## 2025-07-10 DIAGNOSIS — M25.561 CHRONIC PAIN OF RIGHT KNEE: Primary | ICD-10-CM

## 2025-07-10 DIAGNOSIS — Z91.81 AT RISK FOR FALLS: ICD-10-CM

## 2025-07-10 DIAGNOSIS — I10 ESSENTIAL HYPERTENSION: ICD-10-CM

## 2025-07-10 DIAGNOSIS — R26.89 BALANCE PROBLEM: ICD-10-CM

## 2025-07-10 DIAGNOSIS — G89.29 CHRONIC PAIN OF RIGHT KNEE: Primary | ICD-10-CM

## 2025-07-10 DIAGNOSIS — Z79.01 ANTICOAGULANT LONG-TERM USE: ICD-10-CM

## 2025-07-10 DIAGNOSIS — Z01.818 PREOPERATIVE CLEARANCE: Primary | ICD-10-CM

## 2025-07-10 DIAGNOSIS — I48.0 PAF (PAROXYSMAL ATRIAL FIBRILLATION): ICD-10-CM

## 2025-07-10 PROCEDURE — 97530 THERAPEUTIC ACTIVITIES: CPT | Performed by: PHYSICAL THERAPIST

## 2025-07-10 PROCEDURE — 97110 THERAPEUTIC EXERCISES: CPT | Performed by: PHYSICAL THERAPIST

## 2025-07-10 PROCEDURE — 97112 NEUROMUSCULAR REEDUCATION: CPT | Performed by: PHYSICAL THERAPIST

## 2025-07-10 NOTE — PROGRESS NOTES
Physical Therapy Daily Treatment Note    [facility]  [address] [place] [address]  [phone] (phone)  [phone] (fax)    Patient: Krys Mayes   : 1950  Diagnosis/ICD-10 Code:  Chronic pain of right knee [M25.561, G89.29]  Referring practitioner: Victor Manuel White MD  Date of Initial Visit: Type: THERAPY  Noted: 2025  Today's Date:  7/10/2025  Patient seen for 7 sessions           Subjective   Feeling like my balance and strength are getting a lot better. R knee is still pretty sore.     Objective     See Exercise, Manual, and Modality Logs for complete treatment.     Assessment/Plan  Provided SBA / CGA for narrow/tandem stance and balance on foam with head turns. Patient is comfortable walking short distances around the clinic without her rollator, but still utilizes it for longer distances. No pain noted with interventions today. TKA still planned for the end of the month.          Timed:    Manual Therapy:         mins  69871;  Therapeutic Exercise:    20     mins  80835;     Neuromuscular Cem:    10    mins  89137;    Therapeutic Activity:     10     mins  02415;     Gait Training:           mins  43668;     Ultrasound:          mins  94368;    Electrical Stimulation:         mins  89459 ( );  Iontophoresis         mins 83454;  Aquatic Therapy         mins 58751;  Self Care                           mins 40325     Untimed:  Electrical Stimulation:         mins  61469 ( );  Traction:         mins  32086;   Dry Needling   (1-2 muscles)       mins  (Self-pay)  Dry Needling (3-4 muscles)        mins  (Self-pay)  Dry Needling Trial          mins DRYNDLTRIAL  (No Charge)    Timed Treatment:   40   mins   Total Treatment:     40   mins    Linnette De Guzman PT  Physical Therapist    [place] License:[id number]

## 2025-07-10 NOTE — PROGRESS NOTES
Patient ID: Krys Mayes is a 75 y.o. female     Patient Care Team:  Head, AGUSTIN Anglin as PCP - General (Nurse Practitioner)  Jeremy Orozco MD as Consulting Physician (Cardiology)  Joss Oro MD as Consulting Physician (Gastroenterology)  Catherine Brown APRN as Nurse Practitioner (Orthopedic Surgery)  Mati Villegas MD as Consulting Physician (Urology)  Ajith Valles DPM as Consulting Physician (Podiatry)  Lyle Orellana MD as Consulting Physician (Ophthalmology)  Luan Marie MD as Consulting Physician (Dermatology)    Subjective     Chief Complaint   Patient presents with    Surgical Clearance     Total r knee 7/29        History of Present Illness    Progress Notes by Victor Manuel White MD (05/07/2025 11:00)    History of Present Illness  The patient presents for preclearance for right total knee replacement surgery.    She is scheduled for a total right knee replacement on 07/29/2025. She reports persistent pain in her right knee, which is particularly noticeable when she attempts to move it after lying down for some time. She has been participating in physical therapy, initially focused on balance but now also addressing her knee issues. She notes an improvement in her balance and has not experienced any recent falls. She is able to get in and out of bed without difficulty.     She is currently on Xarelto and is awaiting instructions from her cardiologist regarding when to discontinue this medication prior to her surgery. Has an appointment with cardiology tomorrow.  She was previously on alprazolam, but her  discontinued it. She reports no issues since stopping the medication.    Results         She denies any complaints of fever, chills, cough, chest pain, shortness of air, abdominal pain, nausea, or any other concerns.     The following portions of the patient's history were reviewed and updated as appropriate: allergies, current medications, past  family history, past medical history, past social history, past surgical history and problem list.       ROS    Vitals:    07/10/25 1512   BP: 122/80   Pulse: 85   Temp: 98.7 °F (37.1 °C)   SpO2: 97%       Documented weights    07/10/25 1512   Weight: 92.5 kg (204 lb)     Body mass index is 32.93 kg/m².    Results for orders placed or performed during the hospital encounter of 06/01/25   Comprehensive Metabolic Panel    Collection Time: 06/01/25 10:22 PM    Specimen: Blood   Result Value Ref Range    Glucose 108 (H) 65 - 99 mg/dL    BUN 14.4 8.0 - 23.0 mg/dL    Creatinine 0.85 0.57 - 1.00 mg/dL    Sodium 142 136 - 145 mmol/L    Potassium 4.0 3.5 - 5.2 mmol/L    Chloride 106 98 - 107 mmol/L    CO2 23.3 22.0 - 29.0 mmol/L    Calcium 9.4 8.6 - 10.5 mg/dL    Total Protein 7.0 6.0 - 8.5 g/dL    Albumin 3.9 3.5 - 5.2 g/dL    ALT (SGPT) 11 1 - 33 U/L    AST (SGOT) 20 1 - 32 U/L    Alkaline Phosphatase 69 39 - 117 U/L    Total Bilirubin 0.4 0.0 - 1.2 mg/dL    Globulin 3.1 gm/dL    A/G Ratio 1.3 g/dL    BUN/Creatinine Ratio 16.9 7.0 - 25.0    Anion Gap 12.7 5.0 - 15.0 mmol/L    eGFR 71.1 >60.0 mL/min/1.73   High Sensitivity Troponin T    Collection Time: 06/01/25 10:22 PM    Specimen: Blood   Result Value Ref Range    HS Troponin T 13 <14 ng/L   Lipase    Collection Time: 06/01/25 10:22 PM    Specimen: Blood   Result Value Ref Range    Lipase 29 13 - 60 U/L   CBC Auto Differential    Collection Time: 06/01/25 10:22 PM    Specimen: Blood   Result Value Ref Range    WBC 7.79 3.40 - 10.80 10*3/mm3    RBC 4.43 3.77 - 5.28 10*6/mm3    Hemoglobin 11.6 (L) 12.0 - 15.9 g/dL    Hematocrit 37.8 34.0 - 46.6 %    MCV 85.3 79.0 - 97.0 fL    MCH 26.2 (L) 26.6 - 33.0 pg    MCHC 30.7 (L) 31.5 - 35.7 g/dL    RDW 16.2 (H) 12.3 - 15.4 %    RDW-SD 50.3 37.0 - 54.0 fl    MPV 8.9 6.0 - 12.0 fL    Platelets 369 140 - 450 10*3/mm3    Neutrophil % 69.1 42.7 - 76.0 %    Lymphocyte % 16.8 (L) 19.6 - 45.3 %    Monocyte % 9.8 5.0 - 12.0 %    Eosinophil  % 3.2 0.3 - 6.2 %    Basophil % 0.6 0.0 - 1.5 %    Immature Grans % 0.5 0.0 - 0.5 %    Neutrophils, Absolute 5.38 1.70 - 7.00 10*3/mm3    Lymphocytes, Absolute 1.31 0.70 - 3.10 10*3/mm3    Monocytes, Absolute 0.76 0.10 - 0.90 10*3/mm3    Eosinophils, Absolute 0.25 0.00 - 0.40 10*3/mm3    Basophils, Absolute 0.05 0.00 - 0.20 10*3/mm3    Immature Grans, Absolute 0.04 0.00 - 0.05 10*3/mm3    nRBC 0.0 0.0 - 0.2 /100 WBC   Green Top (Gel)    Collection Time: 06/01/25 10:22 PM   Result Value Ref Range    Extra Tube Hold for add-ons.    Lavender Top    Collection Time: 06/01/25 10:22 PM   Result Value Ref Range    Extra Tube hold for add-on    Gold Top - SST    Collection Time: 06/01/25 10:22 PM   Result Value Ref Range    Extra Tube Hold for add-ons.    Light Blue Top    Collection Time: 06/01/25 10:22 PM   Result Value Ref Range    Extra Tube Hold for add-ons.    ECG 12 Lead Chest Pain    Collection Time: 06/01/25 10:22 PM   Result Value Ref Range    QT Interval 425 ms    QTC Interval 473 ms   High Sensitivity Troponin T 1Hr    Collection Time: 06/01/25 11:26 PM    Specimen: Blood   Result Value Ref Range    HS Troponin T 11 <14 ng/L    Troponin T Numeric Delta -2 Abnormal if >/=3 ng/L           Objective     Physical Exam  Vitals reviewed.   Constitutional:       General: She is not in acute distress.  HENT:      Head: Normocephalic and atraumatic.   Cardiovascular:      Rate and Rhythm: Normal rate. Rhythm irregularly irregular.      Heart sounds: No murmur heard.  Pulmonary:      Effort: Pulmonary effort is normal.      Breath sounds: Normal breath sounds. No wheezing or rhonchi.   Abdominal:      Palpations: Abdomen is soft.   Musculoskeletal:      Right knee: Decreased range of motion. Tenderness present.      Comments: Trace edema   Neurological:      General: No focal deficit present.      Mental Status: She is alert and oriented to person, place, and time.      Comments: Ambulating with walker.     Psychiatric:          Mood and Affect: Mood normal.         Behavior: Behavior normal.         Physical Exam      Assessment & Plan     Assessment/Plan     Assessment & Plan  1. Preoperative clearance for right total knee replacement.  - Scheduled for total right knee replacement on 07/29/2025.  - Cardiac and pulmonary functions are satisfactory, with normal blood pressure and heart rate readings.  - Currently undergoing physical therapy for balance and knee strength.  - Preadmission testing scheduled for 07/15/2025 and MRI on 07/17/2025. Results will be reviewed upon receipt. If stable, a letter of clearance will be sent to the surgeon.    2. Medication management.  - Previously taking alprazolam 1 mg, reduced to 0.5 mg twice daily in 06/2025.  -  has stopped giving her the medication; she reports doing well without it.  - Advised to continue without alprazolam unless symptoms necessitate reintroduction. If needed, a lower dose of 0.25 mg daily as needed can be considered.    Follow-up  - Follow-up scheduled for 10/2025.    Diagnoses and all orders for this visit:    1. Preoperative clearance (Primary)    2. Anticoagulant long-term use    3. PAF (paroxysmal atrial fibrillation)    4. Essential hypertension          Follow Up:  Return if symptoms worsen or fail to improve, for Next scheduled follow up.    In the meantime, instructed to contact us sooner for any problems or concerns.    Patient was given instructions and counseling regarding condition or for health maintenance advice.  Please see specific information pulled into the AVS if appropriate.      Patient or patient representative verbalized consent for the use of Ambient Listening during the visit with  AGUSTIN Nick for chart documentation. 7/11/2025  12:37 EDT    AGUSTIN Nick  Family Medicine  [facility]  07/11/25  12:37 EDT

## 2025-07-11 ENCOUNTER — OFFICE VISIT (OUTPATIENT)
Age: 75
End: 2025-07-11
Payer: MEDICARE

## 2025-07-11 VITALS
BODY MASS INDEX: 32.62 KG/M2 | HEART RATE: 72 BPM | SYSTOLIC BLOOD PRESSURE: 110 MMHG | OXYGEN SATURATION: 92 % | DIASTOLIC BLOOD PRESSURE: 74 MMHG | HEIGHT: 66 IN | WEIGHT: 203 LBS

## 2025-07-11 DIAGNOSIS — I48.0 PAF (PAROXYSMAL ATRIAL FIBRILLATION): Primary | ICD-10-CM

## 2025-07-11 DIAGNOSIS — I10 ESSENTIAL HYPERTENSION: ICD-10-CM

## 2025-07-11 PROCEDURE — 99214 OFFICE O/P EST MOD 30 MIN: CPT | Performed by: PHYSICIAN ASSISTANT

## 2025-07-11 PROCEDURE — 3078F DIAST BP <80 MM HG: CPT | Performed by: PHYSICIAN ASSISTANT

## 2025-07-11 PROCEDURE — 1159F MED LIST DOCD IN RCRD: CPT | Performed by: PHYSICIAN ASSISTANT

## 2025-07-11 PROCEDURE — 3074F SYST BP LT 130 MM HG: CPT | Performed by: PHYSICIAN ASSISTANT

## 2025-07-11 PROCEDURE — 1160F RVW MEDS BY RX/DR IN RCRD: CPT | Performed by: PHYSICIAN ASSISTANT

## 2025-07-11 NOTE — PROGRESS NOTES
"    CARDIOLOGY        Patient Name: Krys Mayes  :1950  Age: 75 y.o.  Primary Cardiologist: Jeremy Orozco MD  Encounter Provider:  Sarath Cottrell PA-C    Date of Service: 25            CHIEF COMPLAINT / REASON FOR OFFICE VISIT     Follow-up      HISTORY OF PRESENT ILLNESS       HPI  Krys Mayes is a 75 y.o. female who presents today for 1 year follow-up.     Pt has a  history significant for paroxysmal atrial fibrillation and hypertension.  Presents for 1 year follow-up.  She was seen in office on 2024 where she was doing well.  She had rare palpitations.  She had some mild chronic exertional dyspnea but not at rest.  She had no orthopnea or leg swelling.  She denied any chest pain, lightness, or syncope.    Patient was doing well since her last office visit.  She was admitted on 3/29/2025 through 2025 for altered mental status and shortness of breath.  She was found to have COVID along with bilateral pneumonia.  She was treated with Paxlovid, dexamethasone, and IV Rocephin.  She was sent home with home health along with p.o. antibiotics and Paxlovid.    She has not had any issues since her hospitalization.  She denies any chest pain, chest pressure, shortness of air, palpitations, edema to her legs, orthopnea.  No bleeding issues being on Xarelto.  She ambulates with a walker.  She has no issues doing her normal daily activities.  She is scheduled for knee replacement with Dr. White on the  of this month.    The following portions of the patient's history were reviewed and updated as appropriate: allergies, current medications, past family history, past medical history, past social history, past surgical history and problem list.      VITAL SIGNS     Visit Vitals  /74 (BP Location: Left arm, Patient Position: Sitting, Cuff Size: Adult)   Pulse 72   Ht 167.6 cm (66\")   Wt 92.1 kg (203 lb)   SpO2 92%   BMI 32.77 kg/m²       @[email or web address]  Wt Readings from " Last 3 Encounters:   07/11/25 92.1 kg (203 lb)   07/10/25 92.5 kg (204 lb)   06/13/25 92.5 kg (204 lb)     Body mass index is 32.77 kg/m².        PHYSICAL EXAMINATION     Constitutional:       General: Awake. Not in acute distress.     Appearance: Not in distress.   Pulmonary:      Effort: Pulmonary effort is normal.      Breath sounds: Normal breath sounds.   Cardiovascular:      Normal rate. Regularly irregular rhythm.      Murmurs: There is no murmur.   Edema:     Pretibial: bilateral trace edema of the pretibial area.  Skin:     General: Skin is warm.   Neurological:      Mental Status: Alert.   Psychiatric:         Behavior: Behavior is cooperative.           REVIEWED DATA     Procedures    Cardiac Procedures:    Transthoracic echo on 6/28/2021  Calculated left ventricular EF = 63.2% Estimated left ventricular EF was in agreement with the calculated left ventricular EF. Left ventricular systolic function is normal. Normal left ventricular cavity size noted. Left ventricular wall thickness is consistent with borderline concentric hypertrophy. All left ventricular wall segments contract normally. Left ventricular diastolic function was indeterminate.  The left atrial cavity is mild to moderately dilated.    Lipid Panel          10/1/2024    09:19 4/8/2025    10:05 5/22/2025    11:21   Lipid Panel   Total Cholesterol 159  246  138    Triglycerides 108  328  199    HDL Cholesterol 76  73  54    VLDL Cholesterol 19  56  32    LDL Cholesterol  64  117  52        Lab Results   Component Value Date     06/01/2025     05/22/2025    K 4.0 06/01/2025    K 4.6 05/22/2025     06/01/2025     05/22/2025    CO2 23.3 06/01/2025    CO2 28.2 05/22/2025    BUN 14.4 06/01/2025    BUN 11 05/22/2025    CREATININE 0.85 06/01/2025    CREATININE 0.92 05/22/2025    EGFRIFNONA 52 (L) 08/27/2021    EGFRIFNONA 50 (L) 02/25/2021    EGFRIFAFRI 62 08/27/2021    EGFRIFAFRI 58 (L) 02/25/2021    GLUCOSE 108 (H) 06/01/2025     GLUCOSE 95 05/22/2025    CALCIUM 9.4 06/01/2025    CALCIUM 9.4 05/22/2025    ALBUMIN 3.9 06/01/2025    ALBUMIN 3.8 05/22/2025    BILITOT 0.4 06/01/2025    BILITOT 0.3 05/22/2025    AST 20 06/01/2025    AST 16 05/22/2025    ALT 11 06/01/2025    ALT 11 05/22/2025     Lab Results   Component Value Date    WBC 7.79 06/01/2025    WBC 8.72 05/22/2025    HGB 11.6 (L) 06/01/2025    HGB 11.9 (L) 05/22/2025    HCT 37.8 06/01/2025    HCT 37.9 05/22/2025    MCV 85.3 06/01/2025    MCV 84.4 05/22/2025     06/01/2025     05/22/2025     Lab Results   Component Value Date    PROBNP 2,267.0 (H) 04/21/2025    PROBNP 2,774.0 (H) 03/29/2025     Lab Results   Component Value Date    TROPONINT 11 06/01/2025     Lab Results   Component Value Date    TSH 2.140 04/08/2025    TSH 1.670 10/01/2024             ASSESSMENT & PLAN     Diagnoses and all orders for this visit:    1. PAF (paroxysmal atrial fibrillation) (Primary)    2. Essential hypertension      1. Atrial Fibrillation and Atrial Flutter  Assessment   The patient has paroxysmal atrial fibrillation   The patient's CHADS2-VASc score is 5   A [id number] score of 2 or more is considered a high risk for a thromboembolic event   Rivaroxaban prescribed     Plan   Attempt to maintain sinus rhythm   Continue rivaroxaban for antithrombotic therapy, bleeding issues discussed   Continue beta blocker for rhythm control     Her GFR is usually 45-50, so she will remain on 15mg of rivaroxaban.     If her HR persistently stays in the 50s, we may have to de-escalate her BB dose.      2. Her PB is well controlled.      Patient overall is doing well.  I spoke to her about holding her Xarelto for 2 days prior to procedure and to resume it once cleared by surgeon to resume it postprocedure.  I will get a echocardiogram for clearance.  She has not had one since 2021.  I reviewed her EKG performed on 6/1/2025.  I will arrange her to have follow-up with Dr. Orozco in 6 months.       Return  in about 6 months (around 1/11/2026) for Dr. Orozco.    Future Appointments         Provider Department Center    7/15/2025 10:00 AM BH LAG PAT 2 [facility] CORTES PREADMISSION T LAG    7/15/2025 11:00 AM JOINT CLASS BH LAG OP PHYS THPY [facility] LA GRANGE OP PHYS THPY LAG    7/17/2025 10:30 AM (Arrive by 10:15 AM) LAG MRI 1 [facility] MRI LAG    7/18/2025 2:00 PM Linnette De Guzman, PT [facility] PHYSICAL THERAPY LYNETTE    7/24/2025 12:00 PM Victor Manuel White MD [facility] ORTHOPEDICS L[place]NETTE    7/24/2025 2:00 PM Linnette De Guzman, PT [facility] PHYSICAL THERAPY [place]NETTE    8/11/2025 3:00 PM Jonny Urrutia, PT [facility] PHYSICAL THERAPY LYNETTE    8/13/2025 10:15 AM (Arrive by 10:00 AM) Catherine Brown APROBED [facility] ORTHOPEDICS LAG    10/14/2025 9:30 AM (Arrive by 9:15 AM) [facility]    10/21/2025 1:15 PM (Arrive by 1:00 PM) Head, Pauline CLAY, [facility] PRIMARY CARE L[place]NETTE    1/16/2026 10:00 AM (Arrive by 9:45 AM) Sherice Cramer MD [facility] CARDIOLOGY LAG                MEDICATIONS         Discharge Medications            Accurate as of July 11, 2025 10:34 AM. If you have any questions, ask your nurse or doctor.                Continue These Medications        Instructions Start Date   acetaminophen 650 MG 8 hr tablet  Commonly known as: TYLENOL   1 tablet, Every 6 Hours PRN      ALPRAZolam 0.5 MG tablet  Commonly known as: Xanax   0.5 mg, Oral, 2 Times Daily PRN      ascorbic acid 1000 MG tablet  Commonly known as: VITAMIN C   2,000 mg, Daily      cholecalciferol 25 MCG (1000 UT) tablet  Commonly known as: VITAMIN D3   1,000 Units, Daily      cyanocobalamin 500 MCG tablet  Commonly known as: VITAMIN B-12   500 mcg, Daily      fish oil 1000 MG capsule capsule   1,000 mg, Daily With Breakfast      gabapentin 600 MG tablet  Commonly known as: NEURONTIN   600 mg, Every Night  at Bedtime      gabapentin 300 MG capsule  Commonly known as: NEURONTIN       levothyroxine 25 MCG tablet  Commonly known as: SYNTHROID, LEVOTHROID   25 mcg, Oral, Every Early Morning      losartan 100 MG tablet  Commonly known as: COZAAR   100 mg, Oral, Daily      Metoprolol Tartrate 75 MG tablet   1 tablet, Oral, 2 Times Daily      multivitamin tablet tablet  Commonly known as: THERAGRAN   1 tablet, Daily      omeprazole OTC 20 MG EC tablet  Commonly known as: PriLOSEC OTC   20 mg, 2 Times Daily      PROBIOTIC ADVANCED PO   1 tablet, Daily      sertraline 100 MG tablet  Commonly known as: ZOLOFT   100 mg, Oral, Daily      simvastatin 80 MG tablet  Commonly known as: ZOCOR   80 mg, Oral, Every Evening      triamcinolone 0.1 % cream  Commonly known as: KENALOG   As Needed      Xarelto 15 MG tablet  Generic drug: rivaroxaban   15 mg, Oral, Daily                   **Dragon Disclaimer:   Much of this encounter note is an electronic transcription/translation of spoken language to printed text. The electronic translation of spoken language may permit erroneous, or at times, nonsensical words or phrases to be inadvertently transcribed. Although I have reviewed the note for such errors, some may still exist.

## 2025-07-15 ENCOUNTER — HOSPITAL ENCOUNTER (OUTPATIENT)
Dept: PHYSICAL THERAPY | Facility: HOSPITAL | Age: 75
Setting detail: THERAPIES SERIES
Discharge: HOME | End: 2025-07-15
Payer: MEDICARE

## 2025-07-15 ENCOUNTER — PRE-ADMISSION TESTING (OUTPATIENT)
Dept: PREADMISSION TESTING | Facility: HOSPITAL | Age: 75
End: 2025-07-15
Payer: MEDICARE

## 2025-07-15 VITALS
RESPIRATION RATE: 18 BRPM | DIASTOLIC BLOOD PRESSURE: 84 MMHG | OXYGEN SATURATION: 97 % | HEIGHT: 66 IN | WEIGHT: 198.5 LBS | BODY MASS INDEX: 31.9 KG/M2 | SYSTOLIC BLOOD PRESSURE: 132 MMHG | HEART RATE: 75 BPM

## 2025-07-15 DIAGNOSIS — M17.11 PRIMARY OSTEOARTHRITIS OF RIGHT KNEE: ICD-10-CM

## 2025-07-15 LAB
ABO GROUP BLD: NORMAL
ABO GROUP BLD: NORMAL
ANION GAP SERPL CALCULATED.3IONS-SCNC: 10.8 MMOL/L (ref 5–15)
APTT PPP: 36.7 SECONDS (ref 24.3–38.1)
BACTERIA UR QL AUTO: ABNORMAL /HPF
BASOPHILS # BLD AUTO: 0.05 10*3/MM3 (ref 0–0.2)
BASOPHILS NFR BLD AUTO: 0.7 % (ref 0–1.5)
BILIRUB UR QL STRIP: NEGATIVE
BLD GP AB SCN SERPL QL: NEGATIVE
BUN SERPL-MCNC: 16.3 MG/DL (ref 8–23)
BUN/CREAT SERPL: 18.7 (ref 7–25)
CALCIUM SPEC-SCNC: 9.6 MG/DL (ref 8.6–10.5)
CHLORIDE SERPL-SCNC: 103 MMOL/L (ref 98–107)
CLARITY UR: ABNORMAL
CO2 SERPL-SCNC: 23.2 MMOL/L (ref 22–29)
COLOR UR: ABNORMAL
CREAT SERPL-MCNC: 0.87 MG/DL (ref 0.57–1)
DEPRECATED RDW RBC AUTO: 46.5 FL (ref 37–54)
EGFRCR SERPLBLD CKD-EPI 2021: 69.1 ML/MIN/1.73
EOSINOPHIL # BLD AUTO: 0.16 10*3/MM3 (ref 0–0.4)
EOSINOPHIL NFR BLD AUTO: 2.2 % (ref 0.3–6.2)
ERYTHROCYTE [DISTWIDTH] IN BLOOD BY AUTOMATED COUNT: 14.8 % (ref 12.3–15.4)
GLUCOSE SERPL-MCNC: 104 MG/DL (ref 65–99)
GLUCOSE UR STRIP-MCNC: NEGATIVE MG/DL
HCT VFR BLD AUTO: 38.3 % (ref 34–46.6)
HGB BLD-MCNC: 11.7 G/DL (ref 12–15.9)
HGB UR QL STRIP.AUTO: ABNORMAL
HYALINE CASTS UR QL AUTO: ABNORMAL /LPF
IMM GRANULOCYTES # BLD AUTO: 0.02 10*3/MM3 (ref 0–0.05)
IMM GRANULOCYTES NFR BLD AUTO: 0.3 % (ref 0–0.5)
INR PPP: 1.85 (ref 0.9–1.1)
KETONES UR QL STRIP: NEGATIVE
LEUKOCYTE ESTERASE UR QL STRIP.AUTO: ABNORMAL
LYMPHOCYTES # BLD AUTO: 0.86 10*3/MM3 (ref 0.7–3.1)
LYMPHOCYTES NFR BLD AUTO: 11.9 % (ref 19.6–45.3)
MCH RBC QN AUTO: 26.4 PG (ref 26.6–33)
MCHC RBC AUTO-ENTMCNC: 30.5 G/DL (ref 31.5–35.7)
MCV RBC AUTO: 86.5 FL (ref 79–97)
MONOCYTES # BLD AUTO: 0.65 10*3/MM3 (ref 0.1–0.9)
MONOCYTES NFR BLD AUTO: 9 % (ref 5–12)
NEUTROPHILS NFR BLD AUTO: 5.47 10*3/MM3 (ref 1.7–7)
NEUTROPHILS NFR BLD AUTO: 75.9 % (ref 42.7–76)
NITRITE UR QL STRIP: POSITIVE
NRBC BLD AUTO-RTO: 0 /100 WBC (ref 0–0.2)
PH UR STRIP.AUTO: 5.5 [PH] (ref 4.5–8)
PLATELET # BLD AUTO: 298 10*3/MM3 (ref 140–450)
PMV BLD AUTO: 9.6 FL (ref 6–12)
POTASSIUM SERPL-SCNC: 4.5 MMOL/L (ref 3.5–5.2)
PROT UR QL STRIP: ABNORMAL
PROTHROMBIN TIME: 21.9 SECONDS (ref 12.1–15)
RBC # BLD AUTO: 4.43 10*6/MM3 (ref 3.77–5.28)
RBC # UR STRIP: ABNORMAL /HPF
REF LAB TEST METHOD: ABNORMAL
RH BLD: POSITIVE
RH BLD: POSITIVE
SODIUM SERPL-SCNC: 137 MMOL/L (ref 136–145)
SP GR UR STRIP: 1.02 (ref 1–1.03)
SQUAMOUS #/AREA URNS HPF: ABNORMAL /HPF
T&S EXPIRATION DATE: NORMAL
UROBILINOGEN UR QL STRIP: ABNORMAL
WBC # UR STRIP: ABNORMAL /HPF
WBC NRBC COR # BLD AUTO: 7.21 10*3/MM3 (ref 3.4–10.8)

## 2025-07-15 PROCEDURE — 87186 SC STD MICRODIL/AGAR DIL: CPT

## 2025-07-15 PROCEDURE — 80048 BASIC METABOLIC PNL TOTAL CA: CPT

## 2025-07-15 PROCEDURE — 86901 BLOOD TYPING SEROLOGIC RH(D): CPT | Performed by: ORTHOPAEDIC SURGERY

## 2025-07-15 PROCEDURE — 81001 URINALYSIS AUTO W/SCOPE: CPT

## 2025-07-15 PROCEDURE — 87077 CULTURE AEROBIC IDENTIFY: CPT

## 2025-07-15 PROCEDURE — 86900 BLOOD TYPING SEROLOGIC ABO: CPT | Performed by: ORTHOPAEDIC SURGERY

## 2025-07-15 PROCEDURE — 87086 URINE CULTURE/COLONY COUNT: CPT

## 2025-07-15 PROCEDURE — 86900 BLOOD TYPING SEROLOGIC ABO: CPT

## 2025-07-15 PROCEDURE — 85025 COMPLETE CBC W/AUTO DIFF WBC: CPT

## 2025-07-15 PROCEDURE — 86850 RBC ANTIBODY SCREEN: CPT | Performed by: ORTHOPAEDIC SURGERY

## 2025-07-15 PROCEDURE — 36415 COLL VENOUS BLD VENIPUNCTURE: CPT

## 2025-07-15 PROCEDURE — 85610 PROTHROMBIN TIME: CPT

## 2025-07-15 PROCEDURE — 87081 CULTURE SCREEN ONLY: CPT

## 2025-07-15 PROCEDURE — 86901 BLOOD TYPING SEROLOGIC RH(D): CPT

## 2025-07-15 PROCEDURE — 85730 THROMBOPLASTIN TIME PARTIAL: CPT

## 2025-07-15 RX ORDER — ALPRAZOLAM 1 MG/1
1 TABLET ORAL 2 TIMES DAILY PRN
COMMUNITY

## 2025-07-15 NOTE — CASE MANAGEMENT/SOCIAL WORK
Continued Stay Note  [facility]     Patient Name: Krys Mayes  MRN: [id number]  Today's Date: 7/15/2025    Admit Date: (Not on file)        Discharge Plan       Row Name 07/15/25 8545       Plan    Plan Comments CM spoke with Desirae with [facility] and she states they can accept for in home PT with start date of 7/31/25. CM will follow.      Row Name 07/15/25 1417       Plan    Plan Comments CM spoke with patient today to arrange any needed equipment and discuss physical therapy for her surgery with Dr. White on 7/29/25. Patient states she has a rolling walker and BSC at home and does not anticipate needing any other equipment at discharge. We then discussed PT and she states she would like to do two weeks of home health PT and the go to [facility] afterwards. She states she has no preference and is agreeable for  to arrange this service. She had no other questions. CM entered referral into [facility] for [facility] and left  for Desirae regarding referral. CM will follow.                   Discharge Codes    No documentation.                       Alley Almendarez, RN

## 2025-07-15 NOTE — CASE MANAGEMENT/SOCIAL WORK
Continued Stay Note  [facility]     Patient Name: Krys Mayes  MRN: [id number]  Today's Date: 7/15/2025    Admit Date: (Not on file)        Discharge Plan       Row Name 07/15/25 1417       Plan    Plan Comments CM spoke with patient today to arrange any needed equipment and discuss physical therapy for her surgery with Dr. White on 7/29/25. Patient states she has a rolling walker and BSC at home and does not anticipate needing any other equipment at discharge. We then discussed PT and she states she would like to do two weeks of home health PT and the go to [facility] afterwards. She states she has no preference and is agreeable for  to arrange this service. She had no other questions. CM entered referral into [facility] for [facility] and left  for Desirae regarding referral. CM will follow.                   Discharge Codes    No documentation.                       Alley Almendarez RN

## 2025-07-15 NOTE — DISCHARGE INSTRUCTIONS
PRE-ADMISSION TESTING INSTRUCTIONS FOR ADULTS    Take these medications the morning of surgery with a small sip of water: GABAPENTIN, SERTRALINE, METOPROLOL, LEVOTHYROXINE, PRILOSEC, AND ALPRAZOLAM    HOLD XARELTO PER YOUR PRESCRIBING MD RECOMMENDATION.    Do not take any insulin or diabetes medications the morning of surgery.    No aspirin, advil, aleve, ibuprofen, naproxen, diet pills, decongestants, or herbal/vitamins for a week prior to surgery.       Tylenol/Acetaminophen is okay to take if needed.    General Instructions:    DO NOT EAT SOLID FOOD AFTER MIDNIGHT THE NIGHT BEFORE SURGERY. No gum, mints, or hard candy after midnight the night before surgery.  You may drink clear liquids the day of surgery up until 2 hours before your arrival time.  Clear liquids are liquids you can see through. Nothing RED in color.    Plain water    Sports drinks      Gelatin (Jell-O)  Fruit juices without pulp such as white grape juice and apple juice  Popsicles that contain no fruit or yogurt  Tea or coffee (no cream or milk added)    It is beneficial for you to have a clear drink that contains carbohydrates 2 hours before your arrival time.  We suggest a 20 ounce bottle of Gatorade or Powerade for non-diabetic patients or a 20 ounce bottle of Gatorade Zero or Powerade Zero for diabetic patients.     Patients who avoid smoking, chewing tobacco and alcohol for 4 weeks prior to surgery have a reduced risk of post-operative complications.  If at all possible, quit smoking as many days before surgery as you can.    Do not smoke, use chewing tobacco or drink alcohol the day of surgery    Bring your C-PAP/ BI-PAP machine if you use one.  Wear clean comfortable clothes.  Do not wear contact lenses, lotion, deodorant, or make-up.  Bring a case for your glasses if applicable. You may brush your teeth the morning of surgery.  You may wear dentures/partials, do not put adhesive/glue on them.  Leave all other jewelry and valuables at  home.      Preventing a Surgical Site Infection:    Shower the night before and on the morning of surgery using the chlorhexidine soap you were given.  Use a clean washcloth with the soap.  Place clean sheets on your bed after showering the night before surgery. Do not use the CHG soap on your hair, face, or private areas. Wash your body gently for five (5) minutes. Do not scrub your skin.  Dry with a clean towel and dress in clean clothing.  Do not shave the surgical area for 10 days-2 weeks prior to surgery  because the razor can irritate skin and make it easier to develop an infection.  Make sure you, your family, and all healthcare providers clean their hands with soap and water or an alcohol based hand  before caring for you or your wound.      Day of surgery:    Your surgeon’s office will advise you of your arrival time for the day of surgery.    Upon arrival, a Pre-op nurse and Anesthesia provider will review your health history, obtain vital signs, and answer questions you may have. The anesthesia provider will also discuss the type of anesthesia that will be needed for your procedure, which may include general anesthesia. The only belongings needed at this time will be your home medications and if applicable your C-PAP/BI-PAP machine.  If you are staying overnight your family can leave the rest of your belongings in the car and bring them to your room later.  A Pre-op nurse will start an IV and you may receive medication in preparation for surgery, including something to help you relax.  Your family will be able to see you in the Pre-op area.  While you are in surgery your family should notify the waiting room  if they leave the waiting room area and provide a contact phone number.    IF you have any questions, you can call the Pre-Admission Department at [phone] or your surgeon's office.  Notify your surgeon if  you become sick, have a fever, productive cough, or cannot be here  the day of surgery    Please be aware that surgery does come with discomfort.  We want to make every effort to control your discomfort so please discuss any uncontrolled symptoms with your nurse.   Your doctor will most likely have prescribed pain medications.      If you are going home after surgery, you will receive individualized written care instructions before being discharged.  A responsible adult (over the age of 18) must drive you to and from the hospital on the day of your surgery and stay with you for 24 hours after anesthesia.    If you are staying overnight following surgery, you will be transported to your hospital room following the recovery period.  [facility] has all private rooms.    You may receive a survey regarding the care you received. Your feedback is very important and will be used to collect the necessary data to help us to continue to provide excellent care.     Deductibles and co-payments are collected on the day of service. Please be prepared to pay the required co-pay, deductible or deposit on the day of service as defined by your plan.

## 2025-07-15 NOTE — DISCHARGE PLACEMENT REQUEST
"Krys Mckinnon M \"WYATT\" (75 y.o. Female)       Date of Birth   1950    Social Security Number       Address   [address] [place], [place] [address] [id number]    Home Phone   [phone]    MRN   [id number]       [employer]   Lutheran    Marital Status                               Admission Date       Admission Type   Elective    Admitting Provider   Victor Manuel White MD    Attending Provider   Victor Manuel White MD    Department, Room/Bed   [facility], --/--       Discharge Date       Discharge Disposition       Discharge Destination                                 Attending Provider: Vcitor Manuel White MD    Allergies: Morphine And Codeine, Ciprofloxacin-ciproflox Hcl Er, Flomax [Tamsulosin Hcl], Hydrocodone, Latex, Lortab [Hydrocodone-acetaminophen], Penicillins, Phenergan [Promethazine Hcl], Sulfa Antibiotics, Amoxicillin, Tamsulosin    Isolation: None   Infection: None   Code Status: Prior    Ht: 167.6 cm (66\")   Wt: 90 kg (198 lb 8 oz)    Admission Cmt: None   Principal Problem: Primary osteoarthritis of right knee [M17.11]                   Active Insurance as of 7/29/2025       Primary Coverage       Payor Plan Insurance Group Employer/Plan Group    MEDICARE MEDICARE A & B        Payor Plan Address Payor Plan Phone Number Payor Plan Fax Number Effective Dates    [address] [phone]  1/1/2014 - None Entered    [facility] [id number]         Subscriber Name Subscriber Birth Date Member ID       KRYS MCKINNON 1950 [id number]               Secondary Coverage       Payor Plan Insurance Group Employer/Plan Group    [employer] [place] [employer] [place]        Payor Plan Address Payor Plan Phone Number Payor Plan Fax Number Effective Dates    [address] [employer] [place] [address] [phone]  1/1/2014 - None Entered    [facility] [id number]         Subscriber Name Subscriber Birth Date Member ID       KRYS MCKINNON 1950 [id number]                     Emergency Contacts       "  (Rel.) Home Phone Work Phone Mobile Phone    Joey Mayes (Spouse) [phone] -- [phone]    Nisa Jennings (Sister) [phone] -- [phone]    Migdalia Villegas (Relative) [phone] -- [phone]

## 2025-07-15 NOTE — PAT
Pt here for PAT visit.  Pre-op tests completed, chg soap given, and instructions reviewed.  Instructed clears until 2 hrs prior to arrival time, voiced understanding. ERAS handout provided to the patient and educated on. Patient is aware and in agreement with his treatment plan.

## 2025-07-16 LAB — MRSA SPEC QL CULT: NORMAL

## 2025-07-17 ENCOUNTER — HOSPITAL ENCOUNTER (OUTPATIENT)
Dept: MRI IMAGING | Facility: HOSPITAL | Age: 75
Discharge: HOME | End: 2025-07-17
Admitting: NURSE PRACTITIONER
Payer: MEDICARE

## 2025-07-17 DIAGNOSIS — R47.89 WORD FINDING DIFFICULTY: ICD-10-CM

## 2025-07-17 DIAGNOSIS — R29.6 FREQUENT FALLS: ICD-10-CM

## 2025-07-17 DIAGNOSIS — R41.0 CONFUSION: ICD-10-CM

## 2025-07-17 LAB — BACTERIA SPEC AEROBE CULT: ABNORMAL

## 2025-07-17 PROCEDURE — 70553 MRI BRAIN STEM W/O & W/DYE: CPT

## 2025-07-17 PROCEDURE — 25510000001 GADOPICLENOL 0.5 MMOL/ML SOLUTION: Performed by: NURSE PRACTITIONER

## 2025-07-17 PROCEDURE — A9573 GADOPICLENOL 0.5 MMOL/ML SOLUTION: HCPCS | Performed by: NURSE PRACTITIONER

## 2025-07-17 RX ADMIN — GADOPICLENOL 8 ML: 485.1 INJECTION INTRAVENOUS at 10:22

## 2025-07-18 ENCOUNTER — TREATMENT (OUTPATIENT)
Dept: PHYSICAL THERAPY | Facility: CLINIC | Age: 75
End: 2025-07-18
Payer: MEDICARE

## 2025-07-18 ENCOUNTER — HOSPITAL ENCOUNTER (OUTPATIENT)
Dept: CARDIOLOGY | Facility: HOSPITAL | Age: 75
Discharge: HOME | End: 2025-07-18
Payer: MEDICARE

## 2025-07-18 VITALS
HEIGHT: 66 IN | BODY MASS INDEX: 31.89 KG/M2 | SYSTOLIC BLOOD PRESSURE: 142 MMHG | DIASTOLIC BLOOD PRESSURE: 68 MMHG | WEIGHT: 198.41 LBS | HEART RATE: 75 BPM

## 2025-07-18 DIAGNOSIS — I48.0 PAF (PAROXYSMAL ATRIAL FIBRILLATION): ICD-10-CM

## 2025-07-18 DIAGNOSIS — Z91.81 AT RISK FOR FALLS: Primary | ICD-10-CM

## 2025-07-18 DIAGNOSIS — R26.89 BALANCE PROBLEM: ICD-10-CM

## 2025-07-18 DIAGNOSIS — I10 ESSENTIAL HYPERTENSION: ICD-10-CM

## 2025-07-18 DIAGNOSIS — M25.561 CHRONIC PAIN OF RIGHT KNEE: ICD-10-CM

## 2025-07-18 DIAGNOSIS — G89.29 CHRONIC PAIN OF RIGHT KNEE: ICD-10-CM

## 2025-07-18 LAB
AORTIC DIMENSIONLESS INDEX: 0.78 (DI)
AV MEAN PRESS GRAD SYS DOP V1V2: 4 MMHG
AV VMAX SYS DOP: 134 CM/SEC
BH CV ECHO MEAS - ACS: 1.98 CM
BH CV ECHO MEAS - AI P1/2T: 358.4 MSEC
BH CV ECHO MEAS - AO MAX PG: 7.2 MMHG
BH CV ECHO MEAS - AO ROOT DIAM: 2.9 CM
BH CV ECHO MEAS - AO V2 VTI: 26 CM
BH CV ECHO MEAS - AVA(I,D): 2.25 CM2
BH CV ECHO MEAS - EDV(CUBED): 132.7 ML
BH CV ECHO MEAS - EDV(MOD-SP2): 78 ML
BH CV ECHO MEAS - EDV(MOD-SP4): 57 ML
BH CV ECHO MEAS - EF(MOD-SP2): 65.4 %
BH CV ECHO MEAS - EF(MOD-SP4): 50.9 %
BH CV ECHO MEAS - ESV(CUBED): 56.9 ML
BH CV ECHO MEAS - ESV(MOD-SP2): 27 ML
BH CV ECHO MEAS - ESV(MOD-SP4): 28 ML
BH CV ECHO MEAS - FS: 24.6 %
BH CV ECHO MEAS - IVS/LVPW: 1 CM
BH CV ECHO MEAS - IVSD: 0.8 CM
BH CV ECHO MEAS - LAT PEAK E' VEL: 16.2 CM/SEC
BH CV ECHO MEAS - LV DIASTOLIC VOL/BSA (35-75): 28.7 CM2
BH CV ECHO MEAS - LV MASS(C)D: 140.5 GRAMS
BH CV ECHO MEAS - LV MAX PG: 8.2 MMHG
BH CV ECHO MEAS - LV MEAN PG: 4 MMHG
BH CV ECHO MEAS - LV SYSTOLIC VOL/BSA (12-30): 14.1 CM2
BH CV ECHO MEAS - LV V1 MAX: 143 CM/SEC
BH CV ECHO MEAS - LV V1 VTI: 20.3 CM
BH CV ECHO MEAS - LVIDD: 5.1 CM
BH CV ECHO MEAS - LVIDS: 3.8 CM
BH CV ECHO MEAS - LVOT AREA: 2.9 CM2
BH CV ECHO MEAS - LVOT DIAM: 1.92 CM
BH CV ECHO MEAS - LVPWD: 0.8 CM
BH CV ECHO MEAS - MED PEAK E' VEL: 10.2 CM/SEC
BH CV ECHO MEAS - MR MAX PG: 92.9 MMHG
BH CV ECHO MEAS - MR MAX VEL: 481.8 CM/SEC
BH CV ECHO MEAS - MV DEC SLOPE: 390.2 CM/SEC2
BH CV ECHO MEAS - MV MAX PG: 4.2 MMHG
BH CV ECHO MEAS - MV MEAN PG: 1.4 MMHG
BH CV ECHO MEAS - MV P1/2T: 83 MSEC
BH CV ECHO MEAS - MV V2 VTI: 19.1 CM
BH CV ECHO MEAS - MVA(P1/2T): 2.7 CM2
BH CV ECHO MEAS - MVA(VTI): 3.1 CM2
BH CV ECHO MEAS - PA ACC TIME: 0.06 SEC
BH CV ECHO MEAS - PA V2 MAX: 97.2 CM/SEC
BH CV ECHO MEAS - PI END-D VEL: 128.3 CM/SEC
BH CV ECHO MEAS - QP/QS: 1
BH CV ECHO MEAS - RAP SYSTOLE: 3 MMHG
BH CV ECHO MEAS - RV MAX PG: 2 MMHG
BH CV ECHO MEAS - RV V1 MAX: 70.7 CM/SEC
BH CV ECHO MEAS - RV V1 VTI: 10.7 CM
BH CV ECHO MEAS - RVOT DIAM: 2.6 CM
BH CV ECHO MEAS - RVSP: 39 MMHG
BH CV ECHO MEAS - SV(LVOT): 58.6 ML
BH CV ECHO MEAS - SV(MOD-SP2): 51 ML
BH CV ECHO MEAS - SV(MOD-SP4): 29 ML
BH CV ECHO MEAS - SV(RVOT): 58.5 ML
BH CV ECHO MEAS - SVI(LVOT): 29.5 ML/M2
BH CV ECHO MEAS - SVI(MOD-SP2): 25.7 ML/M2
BH CV ECHO MEAS - SVI(MOD-SP4): 14.6 ML/M2
BH CV ECHO MEAS - TAPSE (>1.6): 3.4 CM
BH CV ECHO MEAS - TR MAX PG: 36 MMHG
BH CV ECHO MEAS - TR MAX VEL: 300 CM/SEC
BH CV ECHO SHUNT ASSESSMENT PERFORMED (HIDDEN SCRIPTING): 1
BH CV XLRA - RV BASE: 4.2 CM
BH CV XLRA - RV LENGTH: 7.5 CM
BH CV XLRA - RV MID: 3.8 CM
BH CV XLRA - TDI S': 13.3 CM/SEC
LV EF BIPLANE MOD: 57.8 %
SINUS: 3 CM
STJ: 2.43 CM

## 2025-07-18 PROCEDURE — 97530 THERAPEUTIC ACTIVITIES: CPT | Performed by: PHYSICAL THERAPIST

## 2025-07-18 PROCEDURE — 93306 TTE W/DOPPLER COMPLETE: CPT

## 2025-07-18 PROCEDURE — 97164 PT RE-EVAL EST PLAN CARE: CPT | Performed by: PHYSICAL THERAPIST

## 2025-07-18 PROCEDURE — 97112 NEUROMUSCULAR REEDUCATION: CPT | Performed by: PHYSICAL THERAPIST

## 2025-07-18 PROCEDURE — 25510000001 PERFLUTREN 6.52 MG/ML SUSPENSION 2 ML VIAL: Performed by: PHYSICIAN ASSISTANT

## 2025-07-18 PROCEDURE — 97110 THERAPEUTIC EXERCISES: CPT | Performed by: PHYSICAL THERAPIST

## 2025-07-18 RX ADMIN — SODIUM CHLORIDE 2 ML: 9 INJECTION INTRAMUSCULAR; INTRAVENOUS; SUBCUTANEOUS at 12:25

## 2025-07-18 NOTE — PROGRESS NOTES
Physical Therapy Recertification     [facility]  [address] [place] [address]  [phone] (phone)  [phone] (fax)    Patient: Krys Mayes   : 1950  Diagnosis/ICD-10 Code:  At risk for falls [Z91.81]  Referring practitioner: Victor Manuel White MD  Date of Initial Visit: 2025  Today's Date:  2025  Patient seen for 8 sessions           Clinical Progress: improved  Home Program Compliance: Yes  ABC: 45%      Subjective   Danielle reports she is feeling much more confident in her balance.     Objective     Active Range of Motion   Left Hip   Flexion: 83 (seated march) degrees      Right Hip   Flexion: 86 (seated march) degrees      Left Knee   Flexion: 110 degrees  Extension: 2 degrees      Right Knee   Flexion: 110 degrees   Extension: 2 degrees     Strength/Myotome Testing      Left Hip   Planes of Motion   Flexion: 3+ (seated)  Abduction: 4 (seated)  Adduction: 4+ (seated)     Right Hip   Planes of Motion   Flexion: 3+ (seated)  Abduction: 4 (seated)  Adduction: 4+ (seated)     Left Knee   Flexion: 4+  Extension: 4+     Right Knee   Flexion: 4+  Extension: 4+     Swelling      Left Knee Girth Measurement (cm)   Joint line: 44 cm     Right Knee Girth Measurement (cm)   Joint line: 47.5 cm     Ambulation      Observational Gait      Additional Observational Gait Details  Shuffling gait with decreased step length and foot clearance with use of rollator        Single Leg Stance   Left: 1 second  Right: 1 second     5x STS: 12.5 sec     Unable to stand from standard chair without use of arms     See Exercise, Manual, and Modality Logs for complete treatment.          Assessment/Plan  Impairments: abnormal coordination, abnormal gait, abnormal or restricted ROM, activity intolerance, impaired balance, impaired physical strength, lacks appropriate home exercise program, pain with function and safety issue   Functional limitations: carrying objects, lifting,  walking, pulling, pushing, uncomfortable because of pain, standing, reaching overhead and unable to perform repetitive tasks   Krys Mayes has been seen for 8 physical therapy sessions for  frequent falls and balance impairments along with chronic R knee pain.  Progress to physical therapy goals is good. Danielle reports a significant improvement in her confidence with balance, as evidenced by the ABC scale. She also demonstrates good improvement in B LE strength with MMT and increased B knee flexion AROM.  She will benefit from continued skilled physical therapy to address remaining impairments and functional limitations.    Prognosis: good  Prognosis details: R TKA on 7/29/25     Goals  Plan Goals: Goals  Plan Goals: STG In 6 weeks  - Pt to be independent/compliant with HEP without exacerbation of symptoms.-MET  - Pt will improve B knee flexion to 110 degrees B to improve transfers and gait-MET  - Pt will improve TUG from 27.5 sec to  22 sec or less with rollator to improve safety and efficiency with ambulatory transfers around her home-MET  - Pt to exhibit B hip flexion AROM to >90 degrees with tolerable pain to allow for improved walking tolerance-ONGOING        LTG In 12 weeks  - Pt will demonstrate BLE tandem stance for up to 60 sec each leg without UE support -ONGOING  - Pt to score on ABC to at least 40% to demonstrate improved confidence with functional mobility-MET  - Pt will be able to complete 3 steps ascending and descending with single HR independently-ONGOING, using both rails  - 5TSTS < 18 sec to demonstrate improved LE function as need for gait endurance-MET  -SLS on RLE for at least 3 sec to improve balance and RLE WB tolerance-ONGOING, unable to attempt for <1 sec              Plan  Therapy options: will be seen for skilled therapy services  Planned modality interventions: cryotherapy, thermotherapy (hydrocollator packs), traction, ultrasound, iontophoresis, dry needling and electrical  stimulation/Maltese stimulation  Planned therapy interventions: abdominal trunk stabilization, ADL retraining, balance/weight-bearing training, body mechanics training, flexibility, functional ROM exercises, gait training, home exercise program, IADL retraining, joint mobilization, manual therapy, neuromuscular re-education, postural training, soft tissue mobilization, spinal/joint mobilization, strengthening, stretching, therapeutic activities and transfer training  Frequency: 2x week  Treatment plan discussed with: patient  Plan details: 2x per week for 3-4 weeks; having R TKA on 7/29/25    Timed:  Manual Therapy:         mins  10895;  Therapeutic Exercise:    20     mins  41639;     Neuromuscular Cem:    10    mins  84376;    Therapeutic Activity:     10     mins  34157;     Gait Training:           mins  19705;     Ultrasound:          mins  58195;    Iontophoresis         mins 04780    Untimed:  Electrical Stimulation:         mins  04313 ( );  Traction:         mins  25676;   Dry Needling   (1-2 muscles)       mins 56703 (Self-pay)  Dry Needling (3-4 muscles)        mins 15937 (Self-pay)  Dry Needling Trial          mins DRYNDLTRIAL  (No Charge)  Re Eval    10   mins 37893     Timed Treatment:   40   mins   Total Treatment:     50   mins    PT SIGNATURE: Linnette De Guzman PT     [place] PT License Number: [id number]    Electronically signed by Linnette De Guzman PT, 07/18/25, 1:49 PM EDT]    DATE TREATMENT INITIATED: 7/18/2025    90 Day Recertification  Certification Period: 10/16/2025  I certify that the therapy services are furnished while this patient is under my care.  The services outlined above are required by this patient, and will be reviewed every 90 days.     PHYSICIAN: Victor Manuel White MD   NPI: [id number]                                         DATE:

## 2025-07-22 ENCOUNTER — TELEPHONE (OUTPATIENT)
Dept: CARDIOLOGY | Facility: CLINIC | Age: 75
End: 2025-07-22
Payer: MEDICARE

## 2025-07-24 ENCOUNTER — TELEMEDICINE (OUTPATIENT)
Dept: ORTHOPEDIC SURGERY | Facility: CLINIC | Age: 75
End: 2025-07-24
Payer: MEDICARE

## 2025-07-24 ENCOUNTER — TREATMENT (OUTPATIENT)
Dept: PHYSICAL THERAPY | Facility: CLINIC | Age: 75
End: 2025-07-24
Payer: MEDICARE

## 2025-07-24 ENCOUNTER — RESULTS FOLLOW-UP (OUTPATIENT)
Age: 75
End: 2025-07-24
Payer: MEDICARE

## 2025-07-24 DIAGNOSIS — R26.89 BALANCE PROBLEM: ICD-10-CM

## 2025-07-24 DIAGNOSIS — I48.0 PAF (PAROXYSMAL ATRIAL FIBRILLATION): Primary | ICD-10-CM

## 2025-07-24 DIAGNOSIS — I10 ESSENTIAL HYPERTENSION: ICD-10-CM

## 2025-07-24 DIAGNOSIS — M25.561 CHRONIC PAIN OF RIGHT KNEE: ICD-10-CM

## 2025-07-24 DIAGNOSIS — Z91.81 AT RISK FOR FALLS: Primary | ICD-10-CM

## 2025-07-24 DIAGNOSIS — G89.29 CHRONIC PAIN OF RIGHT KNEE: ICD-10-CM

## 2025-07-24 PROCEDURE — 97110 THERAPEUTIC EXERCISES: CPT | Performed by: PHYSICAL THERAPIST

## 2025-07-24 PROCEDURE — 97530 THERAPEUTIC ACTIVITIES: CPT | Performed by: PHYSICAL THERAPIST

## 2025-07-24 RX ORDER — FUROSEMIDE 20 MG/1
20 TABLET ORAL DAILY
Qty: 30 TABLET | Refills: 11 | Status: SHIPPED | OUTPATIENT
Start: 2025-07-24

## 2025-07-24 NOTE — PROGRESS NOTES
Physical Therapy Daily Treatment Note    [facility]  [address] [place] [address]  [phone] (phone)  [phone] (fax)    Patient: Krys Mayes   : 1950  Diagnosis/ICD-10 Code:  At risk for falls [Z91.81]  Referring practitioner: Victor Manuel White MD  Date of Initial Visit: Type: THERAPY  Noted: 2025  Today's Date:  2025  Patient seen for 9 sessions           Subjective   Patient reports she felt pretty worn out after the last PT session.     Objective     See Exercise, Manual, and Modality Logs for complete treatment.     Assessment/Plan  Danielle has significantly more difficulty performing standing hip abduction kicks with her L LE compared to her R LE. She also complains of some isolated pain in her L glute med during seated hip abduction. She required mod A to maintain balance with horizontal head turns, but only min A with vertical turns. Added SAQs to further increase B quad strength in preparation for R TKA surgery.         Timed:    Manual Therapy:         mins  14767;  Therapeutic Exercise:    30     mins  12600;     Neuromuscular Cem:        mins  27460;    Therapeutic Activity:     10     mins  07158;     Gait Training:           mins  81078;     Ultrasound:          mins  23590;    Electrical Stimulation:         mins  88160 ( );  Iontophoresis         mins 42706;  Aquatic Therapy         mins 71864;  Self Care                           mins 82236     Untimed:  Electrical Stimulation:         mins  35123 (MC );  Traction:         mins  21965;   Dry Needling   (1-2 muscles)       mins  (Self-pay)  Dry Needling (3-4 muscles)        mins  (Self-pay)  Dry Needling Trial          mins DRYNDLTRIAL  (No Charge)    Timed Treatment:   40   mins   Total Treatment:     40   mins    Linnette De Guzman PT  Physical Therapist    [place] License:[id number]

## 2025-07-24 NOTE — PROGRESS NOTES
History & Physical       Patient: Krys Mayes    YOB: 1950    Medical Record Number: [id number]    Surgeon:  Dr. Victor Manuel White    Chief Complaints: right knee pain, DJD    Subjective:  This problem is not new to this examiner.     History of Present Illness: 75 y.o. female presents with right knee pain, DJD. Onset of symptoms was years ago and has been progressively worsening despite more conservative treatment measures.  Symptoms are associated with ability to move, exercise, and perform activities of daily living.  Symptoms are aggravated by weight bearing and ROM necessary for activities of daily living.   Symptoms improve with rest, ice and elevation only minimally.      Allergies:   Allergies   Allergen Reactions    Morphine And Codeine GI Intolerance    Ciprofloxacin-Ciproflox Hcl Er Rash    Flomax [Tamsulosin Hcl] Rash    Hydrocodone Rash    Latex Rash    Lortab [Hydrocodone-Acetaminophen] Rash    Penicillins Rash    Phenergan [Promethazine Hcl] GI Intolerance    Sulfa Antibiotics Rash    Amoxicillin Rash    Tamsulosin Rash       Medications:   Home Medications:  Current Outpatient Medications on File Prior to Visit   Medication Sig    acetaminophen (TYLENOL) 650 MG 8 hr tablet Take 1 tablet by mouth Every 6 (Six) Hours As Needed. for pain    ALPRAZolam (XANAX) 1 MG tablet Take 1 tablet by mouth 2 (Two) Times a Day As Needed for Anxiety.    ascorbic acid (VITAMIN C) 1000 MG tablet Take 2 tablets by mouth Daily.    Cholecalciferol 25 MCG (1000 UT) tablet Take 1 tablet by mouth Daily. (Patient not taking: Reported on 7/15/2025)    cyanocobalamin (VITAMIN B-12) 500 MCG tablet Take 1 tablet by mouth Daily.    gabapentin (NEURONTIN) 300 MG capsule     gabapentin (NEURONTIN) 600 MG tablet Take 1 tablet by mouth every night at bedtime.    levothyroxine (SYNTHROID, LEVOTHROID) 25 MCG tablet Take 1 tablet by mouth Every Morning.    losartan (COZAAR) 100 MG tablet Take 1 tablet by mouth Daily.     Metoprolol Tartrate 75 MG tablet Take 1 tablet by mouth twice daily    Multiple Vitamin tablet Take 1 tablet by mouth Daily.    Omega-3 Fatty Acids (fish oil) 1000 MG capsule capsule Take 1 capsule by mouth Daily With Breakfast.    omeprazole OTC (PriLOSEC OTC) 20 MG EC tablet Take 1 tablet by mouth 2 (Two) Times a Day.    Probiotic Product (PROBIOTIC ADVANCED PO) Take 1 tablet by mouth Daily.    rivaroxaban (Xarelto) 15 MG tablet Take 1 tablet by mouth once daily    sertraline (ZOLOFT) 100 MG tablet Take 1 tablet by mouth Daily.    simvastatin (ZOCOR) 80 MG tablet TAKE 1 TABLET BY MOUTH ONCE DAILY IN THE EVENING    triamcinolone (KENALOG) 0.1 % cream 1 Application As Needed.     No current facility-administered medications on file prior to visit.     Current Medications:  Scheduled Meds:  Continuous Infusions:No current facility-administered medications for this visit.    PRN Meds:.    I have reviewed the patient's medical history in detail and updated the computerized patient record.  Review and summarization of old records include:    Past Medical History:   Diagnosis Date    Abnormal ECG 9/16    afib brought on by strong antibiotics    Arthritis     Arthritis of neck     Bursitis of hip     Cataract     Cholelithiasis taken out 1/14/91    Clotting disorder blood thinner    Depression     Disease of thyroid gland     Diverticulosis     Elevated cholesterol     Fracture of ankle     Fracture, tibia and fibula     Gastroesophageal reflux disease 01/21/2016    Hip arthrosis     Hyperlipidemia     Hypertension     Kidney stone     recurrent, calcium oxalate    Knee swelling     Menopausal disorder 1998    She went through menopause in 1998    Neuropathy     Obesity     PAF (paroxysmal atrial fibrillation)     in the setting of urosepsis, 9/2016    Pneumonia     Reflux esophagitis 08/19/2016    Sebaceous cyst of labia 12/20/2017    Sepsis due to urinary tract infection     Sigmoid diverticulitis 11/13/2017    Type  2 diabetes mellitus     Urinary tract infection     Visual impairment cataracts    Vitamin D deficiency 01/21/2016        Past Surgical History:   Procedure Laterality Date    ANKLE OPEN REDUCTION INTERNAL FIXATION  07/2010    ANKLE SURGERY      Ankle Repair / Description: ORif the left ankle in July 2010. Secondary to fall    BREAST BIOPSY Right     benign    CATARACT EXTRACTION Right     CHOLECYSTECTOMY      COLONOSCOPY  2014    Done 2004 normal recheck in 10 years. Repeated February 2014 colonoscopy was normal and to be repeated in 10 years.    CYSTOSCOPY BLADDER STONE LITHOTRIPSY      FRACTURE SURGERY Left     ANKLE    HEMORRHOIDECTOMY      NEPHROLITHOTOMY Left 01/09/2017    Procedure: NEPHROLITHOTOMY PERCUTANEOUS SECOND LOOK WITH STENT PLACEMENT AND LASER LITHOTRIPSY;  Surgeon: Mati Villegas MD;  Location: [facility] OR;  Service:     OTHER SURGICAL HISTORY      Tubal Ligation    PERCUTANEOUS NEPHROSTOLITHOTOMY Left 12/2016    TUBAL ABDOMINAL LIGATION      URETEROSCOPY LASER LITHOTRIPSY WITH STENT INSERTION Left 12/06/2022    Procedure: LEFT URETEROSCOPY WITH LASER LITHOTRIPSY, STONE BASKET EXTRACTION, STENT PLACEMENT;  Surgeon: Mati Villegas MD;  Location: [facility] OR;  Service: Urology;  Laterality: Left;        Social History     Occupational History    Not on file   Tobacco Use    Smoking status: Never     Passive exposure: Never    Smokeless tobacco: Never    Tobacco comments:     Never smoked ever   Vaping Use    Vaping status: Never Used   Substance and Sexual Activity    Alcohol use: Yes     Comment: She uses alcohol once or twice a year.    Drug use: No    Sexual activity: Not Currently     Partners: Male     Birth control/protection: Post-menopausal      Social History     Social History Narrative    Not on file        Family History   Problem Relation Age of Onset    COPD Mother     Heart attack Father         acute myocardial infarction    Anxiety disorder Father     Depression Father      Heart disease Father     Depression Sister     Heart failure Sister     Hearing loss Sister     Heart attack Son     Heart attack Paternal Uncle     Kidney disease Maternal Grandmother         Born with 1 kidney    Breast cancer Neg Hx     Malig Hyperthermia Neg Hx        ROS: 14 point review of systems was performed and was negative except for documented findings in HPI and today's encounter.     Allergies:   Allergies   Allergen Reactions    Morphine And Codeine GI Intolerance    Ciprofloxacin-Ciproflox Hcl Er Rash    Flomax [Tamsulosin Hcl] Rash    Hydrocodone Rash    Latex Rash    Lortab [Hydrocodone-Acetaminophen] Rash    Penicillins Rash    Phenergan [Promethazine Hcl] GI Intolerance    Sulfa Antibiotics Rash    Amoxicillin Rash    Tamsulosin Rash     Constitutional:  Denies fever, shaking or chills   Eyes:  Denies change in visual acuity   HENT:  Denies nasal congestion or sore throat   Respiratory:  Denies cough or shortness of breath   Cardiovascular:  Denies chest pain or severe LE edema   GI:  Denies abdominal pain, nausea, vomiting, bloody stools or diarrhea   Musculoskeletal:  Denies numbness tingling or loss of motor function except as outlined above in history of present illness.  : Denies painful urination or hematuria  Integument:  Denies rash, lesion or ulceration   Neurologic:  Denies headache or focal weakness  Endocrine:  Denies lymphadenopathy  Psych:  Denies confusion or change in mental status   Hem:  Denies active bleeding    Physical Exam: 75 y.o. female  There is no height or weight on file to calculate BMI.  There were no vitals filed for this visit.  Vital signs reviewed.   General Appearance:    Alert, cooperative, in no acute distress                  Eyes: conjunctivae clear  ENT: external ears and nose atraumatic  CV: no peripheral edema  Resp: normal respiratory effort  Skin: no rashes or wounds; normal turgor  Psych: mood and affect appropriate  Lymph: no nodes  appreciated  Neuro: gross sensation intact  Vascular:  Palpable peripheral pulse in noted extremity  Musculoskeletal Extremities:   - Right Knee:  - Active range of motion from 3 to 115 degrees  - Strength rated at 4 out of 5 in flexion and extension  - Moderate effusion  - Maximal tenderness upon palpation of the lateral joint line  - Slight valgus alignment  - Positive active patellar compression test  - Negative patellar apprehension test  - Stable to varus and valgus stress at 3 and 30 degrees  - Grade 1A Lachman test  - Negative anterior and posterior drawer tests     Debilities/Disabilities Identified: None      Diagnostic Tests:  Hospital Outpatient Visit on 07/18/2025   Component Date Value Ref Range Status    EF(MOD-bp) 07/18/2025 57.8  % Final    LVIDd 07/18/2025 5.1  cm Final    LVIDs 07/18/2025 3.8  cm Final    IVSd 07/18/2025 0.80  cm Final    LVPWd 07/18/2025 0.80  cm Final    FS 07/18/2025 24.6  % Final    IVS/LVPW 07/18/2025 1.00  cm Final    ESV(cubed) 07/18/2025 56.9  ml Final    LV Sys Vol (BSA corrected) 07/18/2025 14.1  cm2 Final    EDV(cubed) 07/18/2025 132.7  ml Final    LV Barcenas Vol (BSA corrected) 07/18/2025 28.7  cm2 Final    LV mass(C)d 07/18/2025 140.5  grams Final    LVOT area 07/18/2025 2.9  cm2 Final    LVOT diam 07/18/2025 1.92  cm Final    EDV(MOD-sp2) 07/18/2025 78.0  ml Final    EDV(MOD-sp4) 07/18/2025 57.0  ml Final    ESV(MOD-sp2) 07/18/2025 27.0  ml Final    ESV(MOD-sp4) 07/18/2025 28.0  ml Final    SV(MOD-sp2) 07/18/2025 51.0  ml Final    SV(MOD-sp4) 07/18/2025 29.0  ml Final    SVi(MOD-SP2) 07/18/2025 25.7  ml/m2 Final    SVi(MOD-SP4) 07/18/2025 14.6  ml/m2 Final    SVi (LVOT) 07/18/2025 29.5  ml/m2 Final    EF(MOD-sp2) 07/18/2025 65.4  % Final    EF(MOD-sp4) 07/18/2025 50.9  % Final    Med [employer] 07/18/2025 10.2  cm/sec Final    Lat [employer] 07/18/2025 16.2  cm/sec Final    TR max [employer] 07/18/2025 300.0  cm/sec Final    SV(LVOT) 07/18/2025 58.6  ml Final    SV(RVOT)  07/18/2025 58.5  ml Final    Qp/Qs 07/18/2025 1.00   Final    RV Base 07/18/2025 4.2  cm Final    RV Mid 07/18/2025 3.8  cm Final    RV Length 07/18/2025 7.5  cm Final    TAPSE (>1.6) 07/18/2025 3.4  cm Final    RV S' 07/18/2025 13.3  cm/sec Final    LV V1 max 07/18/2025 143.0  cm/sec Final    LV V1 max PG 07/18/2025 8.2  mmHg Final    LV V1 mean PG 07/18/2025 4.0  mmHg Final    LV V1 VTI 07/18/2025 20.3  cm Final    Ao pk lucien 07/18/2025 134.0  cm/sec Final    Ao max PG 07/18/2025 7.2  mmHg Final    Ao mean PG 07/18/2025 4.0  mmHg Final    Ao V2 VTI 07/18/2025 26.0  cm Final    TATYANA(I,D) 07/18/2025 2.25  cm2 Final    Dimensionless Index 07/18/2025 0.78  (DI) Final    AI P1/2t 07/18/2025 358.4  msec Final    MV max PG 07/18/2025 4.2  mmHg Final    MV mean PG 07/18/2025 1.40  mmHg Final    MV V2 VTI 07/18/2025 19.1  cm Final    MV P1/2t 07/18/2025 83.0  msec Final    MVA(P1/2t) 07/18/2025 2.7  cm2 Final    MVA(VTI) 07/18/2025 3.1  cm2 Final    MV dec slope 07/18/2025 390.2  cm/sec2 Final    MR max lucien 07/18/2025 481.8  cm/sec Final    MR max PG 07/18/2025 92.9  mmHg Final    TR max PG 07/18/2025 36.0  mmHg Final    RVOT diam 07/18/2025 2.6  cm Final    RV V1 max PG 07/18/2025 2.00  mmHg Final    RV V1 max 07/18/2025 70.7  cm/sec Final    RV V1 VTI 07/18/2025 10.7  cm Final    PA V2 max 07/18/2025 97.2  cm/sec Final    PA acc time 07/18/2025 0.06  sec Final    PI end-d lucien 07/18/2025 128.3  cm/sec Final    Ao root diam 07/18/2025 2.9  cm Final    ACS 07/18/2025 1.98  cm Final    Sinus 07/18/2025 3.0  cm Final    STJ 07/18/2025 2.43  cm Final    BH CV ECHO SHUNT ASSESSMENT PERFOR* 07/18/2025 1   Final    RVSP(TR) 07/18/2025 39  mmHg Final    RAP systole 07/18/2025 3  mmHg Final   Pre-Admission Testing on 07/15/2025   Component Date Value Ref Range Status    MRSA Screen Cx 07/15/2025 No Methicillin Resistant Staphylococcus aureus isolated   Final    Glucose 07/15/2025 104 (H)  65 - 99 mg/dL Final    BUN 07/15/2025 16.3   8.0 - 23.0 mg/dL Final    Creatinine 07/15/2025 0.87  0.57 - 1.00 mg/dL Final    Sodium 07/15/2025 137  136 - 145 mmol/L Final    Potassium 07/15/2025 4.5  3.5 - 5.2 mmol/L Final    Chloride 07/15/2025 103  98 - 107 mmol/L Final    CO2 07/15/2025 23.2  22.0 - 29.0 mmol/L Final    Calcium 07/15/2025 9.6  8.6 - 10.5 mg/dL Final    BUN/Creatinine Ratio 07/15/2025 18.7  7.0 - 25.0 Final    Anion Gap 07/15/2025 10.8  5.0 - 15.0 mmol/L Final    eGFR 07/15/2025 69.1  >60.0 mL/min/1.73 Final    Protime 07/15/2025 21.9 (H)  12.1 - 15.0 Seconds Final    INR 07/15/2025 1.85 (H)  0.90 - 1.10 Final    PTT 07/15/2025 36.7  24.3 - 38.1 seconds Final    Color, UA 07/15/2025 Dark Yellow (A)  Yellow, Straw Final    Appearance, UA 07/15/2025 Cloudy (A)  Clear Final    pH, UA 07/15/2025 5.5  4.5 - 8.0 Final    Specific Gravity, UA 07/15/2025 1.020  1.003 - 1.030 Final    Glucose, UA 07/15/2025 Negative  Negative Final    Ketones, UA 07/15/2025 Negative  Negative Final    Bilirubin, UA 07/15/2025 Negative  Negative Final    Blood, UA 07/15/2025 Moderate (2+) (A)  Negative Final    Protein, UA 07/15/2025 30 mg/dL (1+) (A)  Negative Final    Leuk Esterase, UA 07/15/2025 Small (1+) (A)  Negative Final    Nitrite, UA 07/15/2025 Positive (A)  Negative Final    Urobilinogen, UA 07/15/2025 0.2 E.U./dL  0.2 - 1.0 E.U./dL Final    WBC 07/15/2025 7.21  3.40 - 10.80 10*3/mm3 Final    RBC 07/15/2025 4.43  3.77 - 5.28 10*6/mm3 Final    Hemoglobin 07/15/2025 11.7 (L)  12.0 - 15.9 g/dL Final    Hematocrit 07/15/2025 38.3  34.0 - 46.6 % Final    MCV 07/15/2025 86.5  79.0 - 97.0 fL Final    MCH 07/15/2025 26.4 (L)  26.6 - 33.0 pg Final    MCHC 07/15/2025 30.5 (L)  31.5 - 35.7 g/dL Final    RDW 07/15/2025 14.8  12.3 - 15.4 % Final    RDW-SD 07/15/2025 46.5  37.0 - 54.0 fl Final    MPV 07/15/2025 9.6  6.0 - 12.0 fL Final    Platelets 07/15/2025 298  140 - 450 10*3/mm3 Final    Neutrophil % 07/15/2025 75.9  42.7 - 76.0 % Final    Lymphocyte % 07/15/2025  11.9 (L)  19.6 - 45.3 % Final    Monocyte % 07/15/2025 9.0  5.0 - 12.0 % Final    Eosinophil % 07/15/2025 2.2  0.3 - 6.2 % Final    Basophil % 07/15/2025 0.7  0.0 - 1.5 % Final    Immature Grans % 07/15/2025 0.3  0.0 - 0.5 % Final    Neutrophils, Absolute 07/15/2025 5.47  1.70 - 7.00 10*3/mm3 Final    Lymphocytes, Absolute 07/15/2025 0.86  0.70 - 3.10 10*3/mm3 Final    Monocytes, Absolute 07/15/2025 0.65  0.10 - 0.90 10*3/mm3 Final    Eosinophils, Absolute 07/15/2025 0.16  0.00 - 0.40 10*3/mm3 Final    Basophils, Absolute 07/15/2025 0.05  0.00 - 0.20 10*3/mm3 Final    Immature Grans, Absolute 07/15/2025 0.02  0.00 - 0.05 10*3/mm3 Final    nRBC 07/15/2025 0.0  0.0 - 0.2 /100 WBC Final    ABO Type 07/15/2025 A   Final    RH type 07/15/2025 Positive   Final    Antibody Screen 07/15/2025 Negative   Final    T&S Expiration Date 07/15/2025 7/29/2025 11:59:00 PM   Final    RBC, UA 07/15/2025 11-20 (A)  None Seen, 0-2 /HPF Final    WBC, UA 07/15/2025 6-10 (A)  None Seen, 0-2 /HPF Final    Bacteria, UA 07/15/2025 1+ (A)  None Seen /HPF Final    Squamous Epithelial Cells, UA 07/15/2025 3-6 (A)  None Seen, 0-2 /HPF Final    Hyaline Casts, UA 07/15/2025 None Seen  None Seen /LPF Final    Methodology 07/15/2025 Manual Light Microscopy   Final    Urine Culture 07/15/2025 >100,000 CFU/mL Klebsiella pneumoniae ssp pneumoniae (A)   Final    ABO Type 07/15/2025 A   Final    RH type 07/15/2025 Positive   Final     Adult Transthoracic Echo Complete W/ Cont if Necessary Per Protocol  Result Date: 7/18/2025  Narrative:   Left ventricular systolic function is normal. Calculated left ventricular EF = 57.8%   Left ventricular diastolic function was indeterminate.   The right ventricular cavity is mildly dilated.   The left atrial cavity is severely dilated.   Saline test results are negative.   The right atrial cavity is severely dilated.   Estimated right ventricular systolic pressure from tricuspid regurgitation is mildly elevated (35-45  mmHg). Calculated right ventricular systolic pressure from tricuspid regurgitation is 39 mmHg.     MRI Brain With & Without Contrast  Result Date: 7/17/2025  Narrative: MRI BRAIN W WO CONTRAST Date of Exam: 7/17/2025 9:54 AM EDT Indication: Confusion and frequent falls.  Comparison: 4/21/2025 CT head Technique:  Routine multiplanar/multisequence sequence images of the brain were obtained before and after the uneventful administration of Vueway. Findings: Diffusion weighted imaging shows no evidence of acute or subacute infarct. Subcortical and periventricular white matter hyperintensities are consistent with moderate chronic small vessel ischemic disease. There is unchanged ventricular volume. No evidence of acute cranial hemorrhage, suspicious mass, abnormal postcontrast enhancement, midline shift, or extra-axial fluid collection. The basal cisterns are patent. There is normal enhancement of the intracranial vessels. No evidence of acute fracture or suspicious bone lesion. Paranasal sinuses and mastoid air cells are predominantly well aerated. Orbits and included soft tissues show no acute abnormality.     Impression: Impression: Chronic and age-related changes of the brain. No evidence of acute intracranial hemorrhage, mass, abnormal postcontrast enhancement, or acute infarct. Electronically Signed: Brian Patrick MD  7/17/2025 11:00 AM EDT  Workstation ID: [id number]      Assessment:  Right knee pain, DJD     Plan:  I reviewed anatomy of a total joint arthroplasty in laymen's terms, as well as typical postoperative recovery and possibly 6-12 months for maximal recovery, and possible need for rehabilitation stay after hospitalization. We also discussed risks, benefits, alternatives, and limitations of procedure with risks including but not limited to neurovascular damage, bleeding, infection, malalignment, chronic pain, failure of implants, osteolysis, loosening of implants, loss of motion, weakness, stiffness,  instability, DVT, pulmonary embolus, death, stroke, complex regional pain syndrome, myocardial infarction, and need for additional procedures. No guarantees were given regarding results of surgery.      Krys Mayes was given the opportunity to ask and have all questions answered today.  The patient voiced understanding of the risks, benefits, and alternative forms of treatment that were discussed and the patient consents to proceed with surgery.     Patient's blood clot history is negative.    Plan for DVT prophylaxis is Xarelto-or at baseline    Patient's MRSA infection history is negative.    Patient's skin infection history is negative.    Discharge Plan: POD 1-2 to home    Date: 7/24/2025    Dictated utilizing Dragon dictation

## 2025-07-28 ENCOUNTER — ANESTHESIA EVENT (OUTPATIENT)
Dept: PERIOP | Facility: HOSPITAL | Age: 75
End: 2025-07-28
Payer: MEDICARE

## 2025-07-29 ENCOUNTER — APPOINTMENT (OUTPATIENT)
Dept: ULTRASOUND IMAGING | Facility: HOSPITAL | Age: 75
End: 2025-07-29
Payer: MEDICARE

## 2025-07-29 ENCOUNTER — HOSPITAL ENCOUNTER (OUTPATIENT)
Facility: HOSPITAL | Age: 75
Setting detail: OBSERVATION
Discharge: HOME | End: 2025-07-30
Attending: ORTHOPAEDIC SURGERY | Admitting: NURSE ANESTHETIST, CERTIFIED REGISTERED
Payer: MEDICARE

## 2025-07-29 ENCOUNTER — ANESTHESIA (OUTPATIENT)
Dept: PERIOP | Facility: HOSPITAL | Age: 75
End: 2025-07-29
Payer: MEDICARE

## 2025-07-29 ENCOUNTER — APPOINTMENT (OUTPATIENT)
Dept: GENERAL RADIOLOGY | Facility: HOSPITAL | Age: 75
End: 2025-07-29
Payer: MEDICARE

## 2025-07-29 DIAGNOSIS — M17.11 PRIMARY OSTEOARTHRITIS OF RIGHT KNEE: ICD-10-CM

## 2025-07-29 DIAGNOSIS — Z96.651 STATUS POST TOTAL RIGHT KNEE REPLACEMENT: Primary | ICD-10-CM

## 2025-07-29 DIAGNOSIS — I48.0 PAF (PAROXYSMAL ATRIAL FIBRILLATION): ICD-10-CM

## 2025-07-29 DIAGNOSIS — I10 ESSENTIAL HYPERTENSION: ICD-10-CM

## 2025-07-29 LAB
GLUCOSE BLDC GLUCOMTR-MCNC: 101 MG/DL (ref 70–130)
POTASSIUM SERPL-SCNC: 4 MMOL/L (ref 3.5–5.2)

## 2025-07-29 PROCEDURE — C1713 ANCHOR/SCREW BN/BN,TIS/BN: HCPCS | Performed by: ORTHOPAEDIC SURGERY

## 2025-07-29 PROCEDURE — 63710000001 MELOXICAM 7.5 MG TABLET: Performed by: ORTHOPAEDIC SURGERY

## 2025-07-29 PROCEDURE — C1776 JOINT DEVICE (IMPLANTABLE): HCPCS | Performed by: ORTHOPAEDIC SURGERY

## 2025-07-29 PROCEDURE — 63710000001 VITAMIN B-12 500 MCG TABLET: Performed by: ORTHOPAEDIC SURGERY

## 2025-07-29 PROCEDURE — 27447 TOTAL KNEE ARTHROPLASTY: CPT | Performed by: ORTHOPAEDIC SURGERY

## 2025-07-29 PROCEDURE — 25010000002 CEFAZOLIN PER 500 MG: Performed by: ORTHOPAEDIC SURGERY

## 2025-07-29 PROCEDURE — 25010000002 VANCOMYCIN 1.5 G RECONSTITUTED SOLUTION 1 EACH VIAL: Performed by: ORTHOPAEDIC SURGERY

## 2025-07-29 PROCEDURE — 63710000001 OXYCODONE 5 MG TABLET: Performed by: ORTHOPAEDIC SURGERY

## 2025-07-29 PROCEDURE — 82948 REAGENT STRIP/BLOOD GLUCOSE: CPT

## 2025-07-29 PROCEDURE — A9270 NON-COVERED ITEM OR SERVICE: HCPCS | Performed by: ORTHOPAEDIC SURGERY

## 2025-07-29 PROCEDURE — G0378 HOSPITAL OBSERVATION PER HR: HCPCS

## 2025-07-29 PROCEDURE — 25010000002 LIDOCAINE 2% SOLUTION: Performed by: NURSE ANESTHETIST, CERTIFIED REGISTERED

## 2025-07-29 PROCEDURE — 63710000001 VITAMIN C 500 MG TABLET: Performed by: ORTHOPAEDIC SURGERY

## 2025-07-29 PROCEDURE — 25010000002 VANCOMYCIN 1 G RECONSTITUTED SOLUTION: Performed by: ORTHOPAEDIC SURGERY

## 2025-07-29 PROCEDURE — 25810000003 SODIUM CHLORIDE 0.9 % SOLUTION 500 ML FLEX CONT: Performed by: ORTHOPAEDIC SURGERY

## 2025-07-29 PROCEDURE — 63710000001 METOPROLOL TARTRATE 50 MG TABLET: Performed by: ORTHOPAEDIC SURGERY

## 2025-07-29 PROCEDURE — 25010000002 BUPIVACAINE (PF) 0.25 % SOLUTION: Performed by: NURSE ANESTHETIST, CERTIFIED REGISTERED

## 2025-07-29 PROCEDURE — 25010000002 VANCOMYCIN HCL 1.25 G RECONSTITUTED SOLUTION 1 EACH VIAL: Performed by: ORTHOPAEDIC SURGERY

## 2025-07-29 PROCEDURE — 63710000001 ATORVASTATIN 40 MG TABLET: Performed by: ORTHOPAEDIC SURGERY

## 2025-07-29 PROCEDURE — 25010000002 MIDAZOLAM PER 1MG: Performed by: NURSE ANESTHETIST, CERTIFIED REGISTERED

## 2025-07-29 PROCEDURE — 27599 UNLISTED PX FEMUR/KNEE: CPT | Performed by: ORTHOPAEDIC SURGERY

## 2025-07-29 PROCEDURE — 63710000001 PREGABALIN 75 MG CAPSULE: Performed by: ORTHOPAEDIC SURGERY

## 2025-07-29 PROCEDURE — 25810000003 SODIUM CHLORIDE 0.9 % SOLUTION 250 ML FLEX CONT: Performed by: ORTHOPAEDIC SURGERY

## 2025-07-29 PROCEDURE — 63710000001 METOPROLOL TARTRATE 25 MG TABLET: Performed by: ORTHOPAEDIC SURGERY

## 2025-07-29 PROCEDURE — 25010000002 FAMOTIDINE 10 MG/ML SOLUTION: Performed by: NURSE ANESTHETIST, CERTIFIED REGISTERED

## 2025-07-29 PROCEDURE — 63710000001 ACETAMINOPHEN 325 MG TABLET: Performed by: ORTHOPAEDIC SURGERY

## 2025-07-29 PROCEDURE — 25010000002 BUPIVACAINE (PF) 0.5 % SOLUTION: Performed by: NURSE ANESTHETIST, CERTIFIED REGISTERED

## 2025-07-29 PROCEDURE — 25010000002 BUPIVACAINE LIPOSOME 1.3 % SUSPENSION: Performed by: NURSE ANESTHETIST, CERTIFIED REGISTERED

## 2025-07-29 PROCEDURE — 63710000001 SENNOSIDES-DOCUSATE 8.6-50 MG TABLET: Performed by: ORTHOPAEDIC SURGERY

## 2025-07-29 PROCEDURE — 73560 X-RAY EXAM OF KNEE 1 OR 2: CPT

## 2025-07-29 PROCEDURE — 25010000002 BUPIVACAINE 0.5 % SOLUTION: Performed by: NURSE ANESTHETIST, CERTIFIED REGISTERED

## 2025-07-29 PROCEDURE — 25010000002 ONDANSETRON PER 1 MG: Performed by: NURSE ANESTHETIST, CERTIFIED REGISTERED

## 2025-07-29 PROCEDURE — 25810000003 LACTATED RINGERS PER 1000 ML: Performed by: NURSE ANESTHETIST, CERTIFIED REGISTERED

## 2025-07-29 PROCEDURE — 25010000002 DEXAMETHASONE PER 1 MG: Performed by: NURSE ANESTHETIST, CERTIFIED REGISTERED

## 2025-07-29 PROCEDURE — 63710000001 PANTOPRAZOLE 40 MG TABLET DELAYED-RELEASE: Performed by: ORTHOPAEDIC SURGERY

## 2025-07-29 PROCEDURE — 63710000001 ACETAMINOPHEN 500 MG TABLET: Performed by: ORTHOPAEDIC SURGERY

## 2025-07-29 PROCEDURE — 25010000002 PROPOFOL 10 MG/ML EMULSION: Performed by: NURSE ANESTHETIST, CERTIFIED REGISTERED

## 2025-07-29 PROCEDURE — L1830 KO IMMOB CANVAS LONG PRE OTS: HCPCS | Performed by: ORTHOPAEDIC SURGERY

## 2025-07-29 PROCEDURE — 94761 N-INVAS EAR/PLS OXIMETRY MLT: CPT

## 2025-07-29 PROCEDURE — 84132 ASSAY OF SERUM POTASSIUM: CPT | Performed by: NURSE ANESTHETIST, CERTIFIED REGISTERED

## 2025-07-29 DEVICE — KNOTLESS TISSUE CONTROL DEVICE, UNDYED UNIDIRECTIONAL (ANTIBACTERIAL) SYNTHETIC ABSORBABLE DEVICE
Type: IMPLANTABLE DEVICE | Site: KNEE | Status: FUNCTIONAL
Brand: STRATAFIX

## 2025-07-29 DEVICE — CAP TOTL KN CMT PRIMARY: Type: IMPLANTABLE DEVICE | Site: KNEE | Status: FUNCTIONAL

## 2025-07-29 DEVICE — IMPLANTABLE DEVICE
Type: IMPLANTABLE DEVICE | Site: KNEE | Status: FUNCTIONAL
Brand: REFOBACIN® BONE CEMENT R

## 2025-07-29 DEVICE — IMPLANTABLE DEVICE
Type: IMPLANTABLE DEVICE | Site: KNEE | Status: FUNCTIONAL
Brand: PERSONA® VIVACIT-E®

## 2025-07-29 DEVICE — IMPLANTABLE DEVICE
Type: IMPLANTABLE DEVICE | Site: KNEE | Status: FUNCTIONAL
Brand: PERSONA®

## 2025-07-29 DEVICE — IMPLANTABLE DEVICE
Type: IMPLANTABLE DEVICE | Site: KNEE | Status: FUNCTIONAL
Brand: PERSONA® NATURAL TIBIA®

## 2025-07-29 DEVICE — CAP EXT STEM KN UPCHRG: Type: IMPLANTABLE DEVICE | Site: KNEE | Status: FUNCTIONAL

## 2025-07-29 DEVICE — VIOLET ANTIBACTERIAL POLYDIOXANONE, KNOTLESS TISSUE CONTROL DEVICE
Type: IMPLANTABLE DEVICE | Site: KNEE | Status: FUNCTIONAL
Brand: STRATAFIX

## 2025-07-29 RX ORDER — PANTOPRAZOLE SODIUM 40 MG/1
40 TABLET, DELAYED RELEASE ORAL NIGHTLY
Status: DISCONTINUED | OUTPATIENT
Start: 2025-07-29 | End: 2025-07-30 | Stop reason: HOSPADM

## 2025-07-29 RX ORDER — ONDANSETRON 2 MG/ML
4 INJECTION INTRAMUSCULAR; INTRAVENOUS EVERY 6 HOURS PRN
Status: DISCONTINUED | OUTPATIENT
Start: 2025-07-29 | End: 2025-07-30 | Stop reason: HOSPADM

## 2025-07-29 RX ORDER — AMOXICILLIN 250 MG
1 CAPSULE ORAL 2 TIMES DAILY
Status: DISCONTINUED | OUTPATIENT
Start: 2025-07-29 | End: 2025-07-30 | Stop reason: HOSPADM

## 2025-07-29 RX ORDER — ACETAMINOPHEN 325 MG/1
950 TABLET ORAL 3 TIMES DAILY
Status: DISCONTINUED | OUTPATIENT
Start: 2025-07-29 | End: 2025-07-30 | Stop reason: HOSPADM

## 2025-07-29 RX ORDER — ATORVASTATIN CALCIUM 40 MG/1
40 TABLET, FILM COATED ORAL NIGHTLY
Status: DISCONTINUED | OUTPATIENT
Start: 2025-07-29 | End: 2025-07-30 | Stop reason: HOSPADM

## 2025-07-29 RX ORDER — ACETAMINOPHEN 500 MG
1000 TABLET ORAL ONCE
Status: COMPLETED | OUTPATIENT
Start: 2025-07-29 | End: 2025-07-29

## 2025-07-29 RX ORDER — SODIUM CHLORIDE 0.9 % (FLUSH) 0.9 %
10 SYRINGE (ML) INJECTION AS NEEDED
Status: DISCONTINUED | OUTPATIENT
Start: 2025-07-29 | End: 2025-07-29 | Stop reason: HOSPADM

## 2025-07-29 RX ORDER — BUPIVACAINE HYDROCHLORIDE 5 MG/ML
INJECTION, SOLUTION EPIDURAL; INTRACAUDAL; PERINEURAL
Status: COMPLETED | OUTPATIENT
Start: 2025-07-29 | End: 2025-07-29

## 2025-07-29 RX ORDER — PROPOFOL 10 MG/ML
VIAL (ML) INTRAVENOUS CONTINUOUS PRN
Status: DISCONTINUED | OUTPATIENT
Start: 2025-07-29 | End: 2025-07-29 | Stop reason: SURG

## 2025-07-29 RX ORDER — OXYCODONE HYDROCHLORIDE 5 MG/1
10 TABLET ORAL EVERY 4 HOURS PRN
Status: DISCONTINUED | OUTPATIENT
Start: 2025-07-29 | End: 2025-07-30 | Stop reason: HOSPADM

## 2025-07-29 RX ORDER — PROPOFOL 10 MG/ML
VIAL (ML) INTRAVENOUS AS NEEDED
Status: DISCONTINUED | OUTPATIENT
Start: 2025-07-29 | End: 2025-07-29

## 2025-07-29 RX ORDER — PREGABALIN 75 MG/1
150 CAPSULE ORAL ONCE
Status: COMPLETED | OUTPATIENT
Start: 2025-07-29 | End: 2025-07-29

## 2025-07-29 RX ORDER — SODIUM CHLORIDE 0.9 % (FLUSH) 0.9 %
10 SYRINGE (ML) INJECTION EVERY 12 HOURS SCHEDULED
Status: DISCONTINUED | OUTPATIENT
Start: 2025-07-29 | End: 2025-07-29 | Stop reason: HOSPADM

## 2025-07-29 RX ORDER — LIDOCAINE HYDROCHLORIDE 20 MG/ML
INJECTION, SOLUTION INFILTRATION; PERINEURAL AS NEEDED
Status: DISCONTINUED | OUTPATIENT
Start: 2025-07-29 | End: 2025-07-29 | Stop reason: SURG

## 2025-07-29 RX ORDER — TRANEXAMIC ACID 10 MG/ML
INJECTION, SOLUTION INTRAVENOUS AS NEEDED
Status: DISCONTINUED | OUTPATIENT
Start: 2025-07-29 | End: 2025-07-29 | Stop reason: SURG

## 2025-07-29 RX ORDER — ONDANSETRON 2 MG/ML
4 INJECTION INTRAMUSCULAR; INTRAVENOUS ONCE AS NEEDED
Status: COMPLETED | OUTPATIENT
Start: 2025-07-29 | End: 2025-07-29

## 2025-07-29 RX ORDER — ALPRAZOLAM 0.5 MG
0.5 TABLET ORAL 2 TIMES DAILY PRN
Status: DISCONTINUED | OUTPATIENT
Start: 2025-07-29 | End: 2025-07-30 | Stop reason: HOSPADM

## 2025-07-29 RX ORDER — OXYCODONE HYDROCHLORIDE 5 MG/1
5 TABLET ORAL EVERY 4 HOURS PRN
Status: DISCONTINUED | OUTPATIENT
Start: 2025-07-29 | End: 2025-07-30 | Stop reason: HOSPADM

## 2025-07-29 RX ORDER — ALPRAZOLAM 0.5 MG
1 TABLET ORAL 2 TIMES DAILY PRN
Status: DISCONTINUED | OUTPATIENT
Start: 2025-07-29 | End: 2025-07-29

## 2025-07-29 RX ORDER — DEXMEDETOMIDINE HYDROCHLORIDE 100 UG/ML
INJECTION, SOLUTION INTRAVENOUS AS NEEDED
Status: DISCONTINUED | OUTPATIENT
Start: 2025-07-29 | End: 2025-07-29 | Stop reason: SURG

## 2025-07-29 RX ORDER — SODIUM CHLORIDE, SODIUM LACTATE, POTASSIUM CHLORIDE, CALCIUM CHLORIDE 600; 310; 30; 20 MG/100ML; MG/100ML; MG/100ML; MG/100ML
9 INJECTION, SOLUTION INTRAVENOUS CONTINUOUS PRN
Status: DISCONTINUED | OUTPATIENT
Start: 2025-07-29 | End: 2025-07-29 | Stop reason: HOSPADM

## 2025-07-29 RX ORDER — ONDANSETRON 2 MG/ML
4 INJECTION INTRAMUSCULAR; INTRAVENOUS ONCE AS NEEDED
Status: DISCONTINUED | OUTPATIENT
Start: 2025-07-29 | End: 2025-07-29 | Stop reason: HOSPADM

## 2025-07-29 RX ORDER — VANCOMYCIN HYDROCHLORIDE 1 G/20ML
INJECTION, POWDER, LYOPHILIZED, FOR SOLUTION INTRAVENOUS AS NEEDED
Status: DISCONTINUED | OUTPATIENT
Start: 2025-07-29 | End: 2025-07-29 | Stop reason: HOSPADM

## 2025-07-29 RX ORDER — MIDAZOLAM HYDROCHLORIDE 2 MG/2ML
0.5 INJECTION, SOLUTION INTRAMUSCULAR; INTRAVENOUS
Status: DISCONTINUED | OUTPATIENT
Start: 2025-07-29 | End: 2025-07-29 | Stop reason: HOSPADM

## 2025-07-29 RX ORDER — BUPIVACAINE HYDROCHLORIDE 2.5 MG/ML
INJECTION, SOLUTION EPIDURAL; INFILTRATION; INTRACAUDAL; PERINEURAL
Status: COMPLETED | OUTPATIENT
Start: 2025-07-29 | End: 2025-07-29

## 2025-07-29 RX ORDER — FAMOTIDINE 10 MG/ML
20 INJECTION, SOLUTION INTRAVENOUS
Status: COMPLETED | OUTPATIENT
Start: 2025-07-29 | End: 2025-07-29

## 2025-07-29 RX ORDER — MULTIVITAMIN WITH IRON
500 TABLET ORAL NIGHTLY
Status: DISCONTINUED | OUTPATIENT
Start: 2025-07-29 | End: 2025-07-30 | Stop reason: HOSPADM

## 2025-07-29 RX ORDER — FENTANYL CITRATE 50 UG/ML
25 INJECTION, SOLUTION INTRAMUSCULAR; INTRAVENOUS
Status: DISCONTINUED | OUTPATIENT
Start: 2025-07-29 | End: 2025-07-29 | Stop reason: HOSPADM

## 2025-07-29 RX ORDER — NALOXONE HCL 0.4 MG/ML
0.4 VIAL (ML) INJECTION
Status: DISCONTINUED | OUTPATIENT
Start: 2025-07-29 | End: 2025-07-30 | Stop reason: HOSPADM

## 2025-07-29 RX ORDER — DEXAMETHASONE SODIUM PHOSPHATE 4 MG/ML
8 INJECTION, SOLUTION INTRA-ARTICULAR; INTRALESIONAL; INTRAMUSCULAR; INTRAVENOUS; SOFT TISSUE ONCE AS NEEDED
Status: COMPLETED | OUTPATIENT
Start: 2025-07-29 | End: 2025-07-29

## 2025-07-29 RX ORDER — SODIUM CHLORIDE, SODIUM LACTATE, POTASSIUM CHLORIDE, CALCIUM CHLORIDE 600; 310; 30; 20 MG/100ML; MG/100ML; MG/100ML; MG/100ML
100 INJECTION, SOLUTION INTRAVENOUS ONCE
Status: DISCONTINUED | OUTPATIENT
Start: 2025-07-29 | End: 2025-07-29 | Stop reason: HOSPADM

## 2025-07-29 RX ORDER — ASCORBIC ACID 500 MG
2000 TABLET ORAL DAILY
Status: DISCONTINUED | OUTPATIENT
Start: 2025-07-29 | End: 2025-07-30 | Stop reason: HOSPADM

## 2025-07-29 RX ORDER — ONDANSETRON 4 MG/1
4 TABLET, ORALLY DISINTEGRATING ORAL EVERY 6 HOURS PRN
Status: DISCONTINUED | OUTPATIENT
Start: 2025-07-29 | End: 2025-07-30 | Stop reason: HOSPADM

## 2025-07-29 RX ORDER — BUPIVACAINE HYDROCHLORIDE 5 MG/ML
INJECTION, SOLUTION PERINEURAL
Status: COMPLETED | OUTPATIENT
Start: 2025-07-29 | End: 2025-07-29

## 2025-07-29 RX ORDER — LEVOTHYROXINE SODIUM 25 UG/1
25 TABLET ORAL
Status: DISCONTINUED | OUTPATIENT
Start: 2025-07-30 | End: 2025-07-30 | Stop reason: HOSPADM

## 2025-07-29 RX ORDER — GABAPENTIN 300 MG/1
300 CAPSULE ORAL DAILY
Status: DISCONTINUED | OUTPATIENT
Start: 2025-07-30 | End: 2025-07-30 | Stop reason: HOSPADM

## 2025-07-29 RX ORDER — MELOXICAM 7.5 MG/1
15 TABLET ORAL ONCE
Status: COMPLETED | OUTPATIENT
Start: 2025-07-29 | End: 2025-07-29

## 2025-07-29 RX ORDER — MAGNESIUM HYDROXIDE 1200 MG/15ML
LIQUID ORAL AS NEEDED
Status: DISCONTINUED | OUTPATIENT
Start: 2025-07-29 | End: 2025-07-29 | Stop reason: HOSPADM

## 2025-07-29 RX ADMIN — SODIUM CHLORIDE, POTASSIUM CHLORIDE, SODIUM LACTATE AND CALCIUM CHLORIDE 9 ML/HR: 600; 310; 30; 20 INJECTION, SOLUTION INTRAVENOUS at 09:14

## 2025-07-29 RX ADMIN — ATORVASTATIN CALCIUM 40 MG: 40 TABLET, FILM COATED ORAL at 21:35

## 2025-07-29 RX ADMIN — MIDAZOLAM HYDROCHLORIDE 0.5 MG: 1 INJECTION, SOLUTION INTRAMUSCULAR; INTRAVENOUS at 09:49

## 2025-07-29 RX ADMIN — LIDOCAINE HYDROCHLORIDE 100 MG: 20 INJECTION, SOLUTION INFILTRATION; PERINEURAL at 10:42

## 2025-07-29 RX ADMIN — BUPIVACAINE HYDROCHLORIDE 10 ML: 2.5 INJECTION, SOLUTION EPIDURAL; INFILTRATION; INTRACAUDAL; PERINEURAL at 10:01

## 2025-07-29 RX ADMIN — ACETAMINOPHEN 975 MG: 325 TABLET, FILM COATED ORAL at 21:35

## 2025-07-29 RX ADMIN — BUPIVACAINE 5 ML: 13.3 INJECTION, SUSPENSION, LIPOSOMAL INFILTRATION at 10:01

## 2025-07-29 RX ADMIN — ACETAMINOPHEN 975 MG: 325 TABLET, FILM COATED ORAL at 16:04

## 2025-07-29 RX ADMIN — BUPIVACAINE 10 ML: 13.3 INJECTION, SUSPENSION, LIPOSOMAL INFILTRATION at 09:55

## 2025-07-29 RX ADMIN — METOPROLOL TARTRATE 75 MG: 25 TABLET, FILM COATED ORAL at 21:38

## 2025-07-29 RX ADMIN — PREGABALIN 150 MG: 75 CAPSULE ORAL at 09:32

## 2025-07-29 RX ADMIN — BUPIVACAINE HYDROCHLORIDE 1.8 ML: 5 INJECTION, SOLUTION PERINEURAL at 10:28

## 2025-07-29 RX ADMIN — Medication 500 MCG: at 21:35

## 2025-07-29 RX ADMIN — ACETAMINOPHEN 1000 MG: 500 TABLET, FILM COATED ORAL at 09:32

## 2025-07-29 RX ADMIN — PROPOFOL 25 MCG/KG/MIN: 10 INJECTION, EMULSION INTRAVENOUS at 10:45

## 2025-07-29 RX ADMIN — OXYCODONE 10 MG: 5 TABLET ORAL at 18:16

## 2025-07-29 RX ADMIN — OXYCODONE HYDROCHLORIDE AND ACETAMINOPHEN 2000 MG: 500 TABLET ORAL at 16:04

## 2025-07-29 RX ADMIN — CEFAZOLIN 2000 MG: 2 INJECTION, POWDER, FOR SOLUTION INTRAVENOUS at 10:49

## 2025-07-29 RX ADMIN — TRANEXAMIC ACID 1000 MG: 10 INJECTION, SOLUTION INTRAVENOUS at 12:37

## 2025-07-29 RX ADMIN — DEXAMETHASONE SODIUM PHOSPHATE 8 MG: 4 INJECTION, SOLUTION INTRA-ARTICULAR; INTRALESIONAL; INTRAMUSCULAR; INTRAVENOUS; SOFT TISSUE at 09:34

## 2025-07-29 RX ADMIN — MELOXICAM 15 MG: 7.5 TABLET ORAL at 09:32

## 2025-07-29 RX ADMIN — DEXMEDETOMIDINE 25 MCG: 100 INJECTION, SOLUTION, CONCENTRATE INTRAVENOUS at 09:58

## 2025-07-29 RX ADMIN — BUPIVACAINE HYDROCHLORIDE 10 ML: 5 INJECTION, SOLUTION EPIDURAL; INTRACAUDAL; PERINEURAL at 09:55

## 2025-07-29 RX ADMIN — VANCOMYCIN HYDROCHLORIDE 1500 MG: 1.5 INJECTION, POWDER, LYOPHILIZED, FOR SOLUTION INTRAVENOUS at 21:36

## 2025-07-29 RX ADMIN — TRANEXAMIC ACID 1000 MG: 10 INJECTION, SOLUTION INTRAVENOUS at 11:15

## 2025-07-29 RX ADMIN — ONDANSETRON 4 MG: 2 INJECTION INTRAMUSCULAR; INTRAVENOUS at 09:34

## 2025-07-29 RX ADMIN — VANCOMYCIN HYDROCHLORIDE 1250 MG: 1.25 INJECTION, POWDER, LYOPHILIZED, FOR SOLUTION INTRAVENOUS at 09:14

## 2025-07-29 RX ADMIN — PANTOPRAZOLE SODIUM 40 MG: 40 TABLET, DELAYED RELEASE ORAL at 21:35

## 2025-07-29 RX ADMIN — BUPIVACAINE HYDROCHLORIDE 10 ML: 2.5 INJECTION, SOLUTION EPIDURAL; INFILTRATION; INTRACAUDAL; PERINEURAL at 09:58

## 2025-07-29 RX ADMIN — SENNOSIDES, DOCUSATE SODIUM 1 TABLET: 50; 8.6 TABLET, FILM COATED ORAL at 21:34

## 2025-07-29 RX ADMIN — OXYCODONE 10 MG: 5 TABLET ORAL at 23:16

## 2025-07-29 RX ADMIN — SODIUM CHLORIDE 2000 MG: 9 INJECTION, SOLUTION INTRAVENOUS at 18:18

## 2025-07-29 RX ADMIN — FAMOTIDINE 20 MG: 10 INJECTION INTRAVENOUS at 09:34

## 2025-07-29 NOTE — SIGNIFICANT NOTE
07/29/25 1434   OTHER   Discipline physical therapist   Rehab Time/Intention   Session Not Performed other (see comments)  (PT: Attempted PT evaluation. Patient with B LE numbness with L great toe flexion only. PT evaluation not safe at this time due to numbness and lack of movement in B LE's. Will check back tomorrow morning.)

## 2025-07-29 NOTE — SIGNIFICANT NOTE
07/29/25 1432   OTHER   Discipline occupational therapist   Rehab Time/Intention   Session Not Performed other (see comments)  (OT evaluation attempted however patient with decreased motor control/sensation following surgical block. Will attempt again in AM.)

## 2025-07-29 NOTE — H&P
History & Physical       Patient: Krys Mayes    YOB: 1950    Medical Record Number: [id number]    Surgeon:  Dr. Victor Manuel White    Chief Complaints: right knee pain, DJD    Subjective:  This problem is not new to this examiner.     History of Present Illness: 75 y.o. female presents with right knee pain, DJD. Onset of symptoms was years ago and has been progressively worsening despite more conservative treatment measures.  Symptoms are associated with ability to move, exercise, and perform activities of daily living.  Symptoms are aggravated by weight bearing and ROM necessary for activities of daily living.   Symptoms improve with rest, ice and elevation only minimally.      Allergies:   Allergies   Allergen Reactions    Morphine And Codeine GI Intolerance    Ciprofloxacin-Ciproflox Hcl Er Rash    Flomax [Tamsulosin Hcl] Rash    Hydrocodone Rash    Latex Rash    Lortab [Hydrocodone-Acetaminophen] Rash    Penicillins Rash    Phenergan [Promethazine Hcl] GI Intolerance    Sulfa Antibiotics Rash    Amoxicillin Rash    Tamsulosin Rash       Medications:   Home Medications:  No current facility-administered medications on file prior to encounter.     Current Outpatient Medications on File Prior to Encounter   Medication Sig    acetaminophen (TYLENOL) 650 MG 8 hr tablet Take 1 tablet by mouth Every 6 (Six) Hours As Needed. for pain    ascorbic acid (VITAMIN C) 1000 MG tablet Take 2 tablets by mouth Daily.    Cholecalciferol 25 MCG (1000 UT) tablet Take 1 tablet by mouth Daily.    cyanocobalamin (VITAMIN B-12) 500 MCG tablet Take 1 tablet by mouth Daily.    gabapentin (NEURONTIN) 300 MG capsule     levothyroxine (SYNTHROID, LEVOTHROID) 25 MCG tablet Take 1 tablet by mouth Every Morning.    losartan (COZAAR) 100 MG tablet Take 1 tablet by mouth Daily.    Metoprolol Tartrate 75 MG tablet Take 1 tablet by mouth twice daily    Multiple Vitamin tablet Take 1 tablet by mouth Daily.    Omega-3 Fatty Acids  (fish oil) 1000 MG capsule capsule Take 1 capsule by mouth Daily With Breakfast.    omeprazole OTC (PriLOSEC OTC) 20 MG EC tablet Take 1 tablet by mouth 2 (Two) Times a Day.    Probiotic Product (PROBIOTIC ADVANCED PO) Take 1 tablet by mouth Daily.    sertraline (ZOLOFT) 100 MG tablet Take 1 tablet by mouth Daily.    triamcinolone (KENALOG) 0.1 % cream 1 Application As Needed.    gabapentin (NEURONTIN) 600 MG tablet Take 1 tablet by mouth every night at bedtime.    rivaroxaban (Xarelto) 15 MG tablet Take 1 tablet by mouth once daily     Current Medications:  Scheduled Meds:acetaminophen, 1,000 mg, Oral, Once  ceFAZolin, 2,000 mg, Intravenous, Once  ethyl alcohol, 2 Swab, Nasal, Once  famotidine, 20 mg, Intravenous, 60 Min Pre-Op  meloxicam, 15 mg, Oral, Once  pregabalin, 150 mg, Oral, Once  sodium chloride, 10 mL, Intravenous, Q12H  vancomycin, 15 mg/kg, Intravenous, Once      Continuous Infusions:lactated ringers, 9 mL/hr, Last Rate: 9 mL/hr (07/29/25 0914)      PRN Meds:.  dexAMETHasone    lactated ringers    midazolam    ondansetron    sodium chloride    I have reviewed the patient's medical history in detail and updated the computerized patient record.  Review and summarization of old records include:    Past Medical History:   Diagnosis Date    Abnormal ECG 9/16    afib brought on by strong antibiotics    Arthritis     Arthritis of neck     Bursitis of hip     Cataract     Cholelithiasis taken out 1/14/91    Clotting disorder blood thinner    Depression     Disease of thyroid gland     Diverticulosis     Elevated cholesterol     Fracture of ankle     Fracture, tibia and fibula     Gastroesophageal reflux disease 01/21/2016    Hip arthrosis     Hyperlipidemia     Hypertension     Kidney stone     recurrent, calcium oxalate    Knee swelling     Menopausal disorder 1998    She went through menopause in 1998    Neuropathy     Obesity     PAF (paroxysmal atrial fibrillation)     in the setting of urosepsis, 9/2016     Pneumonia     Reflux esophagitis 08/19/2016    Sebaceous cyst of labia 12/20/2017    Sepsis due to urinary tract infection     Sigmoid diverticulitis 11/13/2017    Type 2 diabetes mellitus     Urinary tract infection     Visual impairment cataracts    Vitamin D deficiency 01/21/2016        Past Surgical History:   Procedure Laterality Date    ANKLE OPEN REDUCTION INTERNAL FIXATION  07/2010    ANKLE SURGERY      Ankle Repair / Description: ORif the left ankle in July 2010. Secondary to fall    BREAST BIOPSY Right     benign    CATARACT EXTRACTION Right     CHOLECYSTECTOMY      COLONOSCOPY  2014    Done 2004 normal recheck in 10 years. Repeated February 2014 colonoscopy was normal and to be repeated in 10 years.    CYSTOSCOPY BLADDER STONE LITHOTRIPSY      FRACTURE SURGERY Left     ANKLE    HEMORRHOIDECTOMY      NEPHROLITHOTOMY Left 01/09/2017    Procedure: NEPHROLITHOTOMY PERCUTANEOUS SECOND LOOK WITH STENT PLACEMENT AND LASER LITHOTRIPSY;  Surgeon: Mati Villegas MD;  Location: [facility];  Service:     OTHER SURGICAL HISTORY      Tubal Ligation    PERCUTANEOUS NEPHROSTOLITHOTOMY Left 12/2016    TUBAL ABDOMINAL LIGATION      URETEROSCOPY LASER LITHOTRIPSY WITH STENT INSERTION Left 12/06/2022    Procedure: LEFT URETEROSCOPY WITH LASER LITHOTRIPSY, STONE BASKET EXTRACTION, STENT PLACEMENT;  Surgeon: Mati Villegas MD;  Location: [facility];  Service: Urology;  Laterality: Left;        Social History     Occupational History    Not on file   Tobacco Use    Smoking status: Never     Passive exposure: Never    Smokeless tobacco: Never    Tobacco comments:     Never smoked ever   Vaping Use    Vaping status: Never Used   Substance and Sexual Activity    Alcohol use: Yes     Comment: She uses alcohol once or twice a year.    Drug use: No    Sexual activity: Not Currently     Partners: Male     Birth control/protection: Post-menopausal      Social History     Social History Narrative    Not on file         Family History   Problem Relation Age of Onset    COPD Mother     Heart attack Father         acute myocardial infarction    Anxiety disorder Father     Depression Father     Heart disease Father     Depression Sister     Heart failure Sister     Hearing loss Sister     Heart attack Son     Heart attack Paternal Uncle     Kidney disease Maternal Grandmother         Born with 1 kidney    Breast cancer Neg Hx     Malig Hyperthermia Neg Hx        ROS: 14 point review of systems was performed and was negative except for documented findings in HPI and today's encounter.     Allergies:   Allergies   Allergen Reactions    Morphine And Codeine GI Intolerance    Ciprofloxacin-Ciproflox Hcl Er Rash    Flomax [Tamsulosin Hcl] Rash    Hydrocodone Rash    Latex Rash    Lortab [Hydrocodone-Acetaminophen] Rash    Penicillins Rash    Phenergan [Promethazine Hcl] GI Intolerance    Sulfa Antibiotics Rash    Amoxicillin Rash    Tamsulosin Rash     Constitutional:  Denies fever, shaking or chills   Eyes:  Denies change in visual acuity   HENT:  Denies nasal congestion or sore throat   Respiratory:  Denies cough or shortness of breath   Cardiovascular:  Denies chest pain or severe LE edema   GI:  Denies abdominal pain, nausea, vomiting, bloody stools or diarrhea   Musculoskeletal:  Denies numbness tingling or loss of motor function except as outlined above in history of present illness.  : Denies painful urination or hematuria  Integument:  Denies rash, lesion or ulceration   Neurologic:  Denies headache or focal weakness  Endocrine:  Denies lymphadenopathy  Psych:  Denies confusion or change in mental status   Hem:  Denies active bleeding    Physical Exam: 75 y.o. female  Body mass index is 33.24 kg/m².  Vitals:    07/29/25 0903   BP: (!) 143/101   Pulse: 66   Resp: 17   Temp:    SpO2: 91%     Vital signs reviewed.   General Appearance:    Alert, cooperative, in no acute distress                  Eyes: conjunctivae clear  ENT:  external ears and nose atraumatic  CV: no peripheral edema  Resp: normal respiratory effort  Skin: no rashes or wounds; normal turgor  Psych: mood and affect appropriate  Lymph: no nodes appreciated  Neuro: gross sensation intact  Vascular:  Palpable peripheral pulse in noted extremity  Musculoskeletal Extremities:   - Right Knee:  - Active range of motion from 3 to 115 degrees  - Strength rated at 4 out of 5 in flexion and extension  - Moderate effusion  - Maximal tenderness upon palpation of the lateral joint line  - Slight valgus alignment  - Positive active patellar compression test  - Negative patellar apprehension test  - Stable to varus and valgus stress at 3 and 30 degrees  - Grade 1A Lachman test  - Negative anterior and posterior drawer tests     Debilities/Disabilities Identified: None      Diagnostic Tests:  Admission on 07/29/2025   Component Date Value Ref Range Status    Potassium 07/29/2025 4.0  3.5 - 5.2 mmol/L Final    Glucose 07/29/2025 101  70 - 130 mg/dL Final   Hospital Outpatient Visit on 07/18/2025   Component Date Value Ref Range Status    EF(MOD-bp) 07/18/2025 57.8  % Final    LVIDd 07/18/2025 5.1  cm Final    LVIDs 07/18/2025 3.8  cm Final    IVSd 07/18/2025 0.80  cm Final    LVPWd 07/18/2025 0.80  cm Final    FS 07/18/2025 24.6  % Final    IVS/LVPW 07/18/2025 1.00  cm Final    ESV(cubed) 07/18/2025 56.9  ml Final    LV Sys Vol (BSA corrected) 07/18/2025 14.1  cm2 Final    EDV(cubed) 07/18/2025 132.7  ml Final    LV Barcenas Vol (BSA corrected) 07/18/2025 28.7  cm2 Final    LV mass(C)d 07/18/2025 140.5  grams Final    LVOT area 07/18/2025 2.9  cm2 Final    LVOT diam 07/18/2025 1.92  cm Final    EDV(MOD-sp2) 07/18/2025 78.0  ml Final    EDV(MOD-sp4) 07/18/2025 57.0  ml Final    ESV(MOD-sp2) 07/18/2025 27.0  ml Final    ESV(MOD-sp4) 07/18/2025 28.0  ml Final    SV(MOD-sp2) 07/18/2025 51.0  ml Final    SV(MOD-sp4) 07/18/2025 29.0  ml Final    SVi(MOD-SP2) 07/18/2025 25.7  ml/m2 Final     SVi(MOD-SP4) 07/18/2025 14.6  ml/m2 Final    SVi (LVOT) 07/18/2025 29.5  ml/m2 Final    EF(MOD-sp2) 07/18/2025 65.4  % Final    EF(MOD-sp4) 07/18/2025 50.9  % Final    Med [employer] 07/18/2025 10.2  cm/sec Final    Lat [employer] 07/18/2025 16.2  cm/sec Final    TR max [employer] 07/18/2025 300.0  cm/sec Final    SV(LVOT) 07/18/2025 58.6  ml Final    SV(RVOT) 07/18/2025 58.5  ml Final    Qp/Qs 07/18/2025 1.00   Final    RV Base 07/18/2025 4.2  cm Final    RV Mid 07/18/2025 3.8  cm Final    RV Length 07/18/2025 7.5  cm Final    TAPSE (>1.6) 07/18/2025 3.4  cm Final    RV S' 07/18/2025 13.3  cm/sec Final    LV V1 max 07/18/2025 143.0  cm/sec Final    LV V1 max PG 07/18/2025 8.2  mmHg Final    LV V1 mean PG 07/18/2025 4.0  mmHg Final    LV V1 VTI 07/18/2025 20.3  cm Final    Ao pk [employer] 07/18/2025 134.0  cm/sec Final    Ao max PG 07/18/2025 7.2  mmHg Final    Ao mean PG 07/18/2025 4.0  mmHg Final    Ao V2 VTI 07/18/2025 26.0  cm Final    TATYANA(I,D) 07/18/2025 2.25  cm2 Final    Dimensionless Index 07/18/2025 0.78  (DI) Final    AI P1/2t 07/18/2025 358.4  msec Final    MV max PG 07/18/2025 4.2  mmHg Final    MV mean PG 07/18/2025 1.40  mmHg Final    MV V2 VTI 07/18/2025 19.1  cm Final    MV P1/2t 07/18/2025 83.0  msec Final    MVA(P1/2t) 07/18/2025 2.7  cm2 Final    MVA(VTI) 07/18/2025 3.1  cm2 Final    MV dec slope 07/18/2025 390.2  cm/sec2 Final    MR max [place] 07/18/2025 481.8  cm/sec Final    MR max PG 07/18/2025 92.9  mmHg Final    TR max PG 07/18/2025 36.0  mmHg Final    RVOT diam 07/18/2025 2.6  cm Final    RV V1 max PG 07/18/2025 2.00  mmHg Final    RV V1 max 07/18/2025 70.7  cm/sec Final    RV V1 VTI 07/18/2025 10.7  cm Final    PA V2 max 07/18/2025 97.2  cm/sec Final    PA acc time 07/18/2025 0.06  sec Final    PI end-d k[employer] 07/18/2025 128.3  cm/sec Final    Ao root diam 07/18/2025 2.9  cm Final    ACS 07/18/2025 1.98  cm Final    Sinus 07/18/2025 3.0  cm Final    STJ 07/18/2025 2.43  cm Final    BH CV ECHO SHUNT  ASSESSMENT PERFOR* 07/18/2025 1   Final    RVSP(TR) 07/18/2025 39  mmHg Final    RAP systole 07/18/2025 3  mmHg Final     US Sonosite Portable  Result Date: 7/29/2025  Narrative: This procedure was auto-finalized with no dictation required.    Adult Transthoracic Echo Complete W/ Cont if Necessary Per Protocol  Result Date: 7/18/2025  Narrative:   Left ventricular systolic function is normal. Calculated left ventricular EF = 57.8%   Left ventricular diastolic function was indeterminate.   The right ventricular cavity is mildly dilated.   The left atrial cavity is severely dilated.   Saline test results are negative.   The right atrial cavity is severely dilated.   Estimated right ventricular systolic pressure from tricuspid regurgitation is mildly elevated (35-45 mmHg). Calculated right ventricular systolic pressure from tricuspid regurgitation is 39 mmHg.     MRI Brain With & Without Contrast  Result Date: 7/17/2025  Narrative: MRI BRAIN W WO CONTRAST Date of Exam: 7/17/2025 9:54 AM EDT Indication: Confusion and frequent falls.  Comparison: 4/21/2025 CT head Technique:  Routine multiplanar/multisequence sequence images of the brain were obtained before and after the uneventful administration of Vueway. Findings: Diffusion weighted imaging shows no evidence of acute or subacute infarct. Subcortical and periventricular white matter hyperintensities are consistent with moderate chronic small vessel ischemic disease. There is unchanged ventricular volume. No evidence of acute cranial hemorrhage, suspicious mass, abnormal postcontrast enhancement, midline shift, or extra-axial fluid collection. The basal cisterns are patent. There is normal enhancement of the intracranial vessels. No evidence of acute fracture or suspicious bone lesion. Paranasal sinuses and mastoid air cells are predominantly well aerated. Orbits and included soft tissues show no acute abnormality.     Impression: Impression: Chronic and age-related  changes of the brain. No evidence of acute intracranial hemorrhage, mass, abnormal postcontrast enhancement, or acute infarct. Electronically Signed: Brian Patrick MD  7/17/2025 11:00 AM EDT  Workstation ID: [id number]      Assessment:  Right knee pain, DJD     Plan:  I reviewed anatomy of a total joint arthroplasty in laymen's terms, as well as typical postoperative recovery and possibly 6-12 months for maximal recovery, and possible need for rehabilitation stay after hospitalization. We also discussed risks, benefits, alternatives, and limitations of procedure with risks including but not limited to neurovascular damage, bleeding, infection, malalignment, chronic pain, failure of implants, osteolysis, loosening of implants, loss of motion, weakness, stiffness, instability, DVT, pulmonary embolus, death, stroke, complex regional pain syndrome, myocardial infarction, and need for additional procedures. No guarantees were given regarding results of surgery.      Krys Mayes was given the opportunity to ask and have all questions answered today.  The patient voiced understanding of the risks, benefits, and alternative forms of treatment that were discussed and the patient consents to proceed with surgery.     Patient's blood clot history is negative.    Plan for DVT prophylaxis is Xarelto-or at baseline    Patient's MRSA infection history is negative.    Patient's skin infection history is negative.    Discharge Plan: POD 1-2 to home    Date: 7/29/2025    Dictated utilizing Dragon dictation

## 2025-07-29 NOTE — ANESTHESIA PROCEDURE NOTES
Peripheral Block    Pre-sedation assessment completed: 7/29/2025 9:47 AM    Patient reassessed immediately prior to procedure    Patient location during procedure: pre-op  Start time: 7/29/2025 9:57 AM  Stop time: 7/29/2025 9:58 AM  Reason for block: at surgeon's request and post-op pain management  Performed by  CRNA/CAA: Cirilo Joyce CRNA  Preanesthetic Checklist  Completed: patient identified, IV checked, site marked, risks and benefits discussed, surgical consent, monitors and equipment checked, pre-op evaluation and timeout performed  Prep:  Pt Position: supine  Sterile barriers:cap, gloves, mask and washed/disinfected hands  Prep: ChloraPrep  Patient monitoring: blood pressure monitoring, continuous pulse oximetry and EKG  Procedure    Sedation: yes  Performed under: local infiltration  Guidance:ultrasound guided    ULTRASOUND INTERPRETATION.  Using ultrasound guidance a 21 G gauge needle was placed in close proximity to the nerve, at which point, under ultrasound guidance anesthetic was injected in the area of the nerve and spread of the anesthesia was seen on ultrasound in close proximity thereto.  There were no abnormalities seen on ultrasound; a digital image was taken; and the patient tolerated the procedure with no complications. Images:still images obtained, printed/placed on chart    Laterality:right  Block Type:iPack  Injection Technique:single-shot  Needle Type:echogenic  Needle Gauge:21 G  Resistance on Injection: none    Medications Used: bupivacaine PF (MARCAINE) injection 0.25% - Injection   10 mL - 7/29/2025 9:58:00 AM      Post Assessment  Injection Assessment: negative aspiration for heme, no paresthesia on injection and incremental injection  Patient Tolerance:comfortable throughout block  Complications:no  Performed by: Cirilo Joyce CRNA

## 2025-07-29 NOTE — ANESTHESIA PROCEDURE NOTES
Peripheral Block    Pre-sedation assessment completed: 7/29/2025 9:47 AM    Patient reassessed immediately prior to procedure    Patient location during procedure: pre-op  Start time: 7/29/2025 9:53 AM  Stop time: 7/29/2025 9:55 AM  Reason for block: at surgeon's request and post-op pain management  Performed by  CRNA/CAA: Cirilo Joyce CRNA  Preanesthetic Checklist  Completed: patient identified, IV checked, site marked, risks and benefits discussed, surgical consent, monitors and equipment checked, pre-op evaluation and timeout performed  Prep:  Pt Position: supine  Sterile barriers:cap, gloves, mask and washed/disinfected hands  Prep: ChloraPrep  Patient monitoring: blood pressure monitoring, continuous pulse oximetry and EKG  Procedure    Sedation: yes  Performed under: local infiltration  Guidance:ultrasound guided    ULTRASOUND INTERPRETATION.  Using ultrasound guidance a 21 G gauge needle was placed in close proximity to the nerve, at which point, under ultrasound guidance anesthetic was injected in the area of the nerve and spread of the anesthesia was seen on ultrasound in close proximity thereto.  There were no abnormalities seen on ultrasound; a digital image was taken; and the patient tolerated the procedure with no complications. Images:still images obtained, printed/placed on chart    Laterality:right  Block Type:adductor canal block  Injection Technique:single-shot  Needle Type:echogenic  Needle Gauge:21 G  Resistance on Injection: none    Medications Used: bupivacaine PF (MARCAINE) injection 0.5% - Injection   10 mL - 7/29/2025 9:55:00 AM      Post Assessment  Injection Assessment: negative aspiration for heme, no paresthesia on injection and incremental injection  Patient Tolerance:comfortable throughout block  Complications:no  Performed by: Cirilo Joyce CRNA

## 2025-07-29 NOTE — PLAN OF CARE
Goal Outcome Evaluation:  Plan of Care Reviewed With: patient        Progress: improving  Outcome Evaluation: Pt VSS, POD#0 R total knee, surgical dsg c/d/i, ice wrap, immobilizer when ambulating.  IV antibiotics. Pt denies pain, reports pain controlled with current regimen, see emar, flowsheets.  Pt denies n/v, reports tolerating diet.  brought walker from home.  PT saw pt today, see PT note.  Pt resting at this time.

## 2025-07-29 NOTE — ANESTHESIA PROCEDURE NOTES
Spinal Block    Pre-sedation assessment completed: 7/29/2025 9:47 AM    Patient reassessed immediately prior to procedure    Patient location during procedure: pre-op  Start Time: 7/29/2025 10:25 AM  Stop Time: 7/29/2025 10:28 AM  Indication:at surgeon's request and post-op pain management  Performed By  CRNA/CAA: Cirilo Joyce CRNASRNA: Ariel Villegas SRNA  Preanesthetic Checklist  Completed: patient identified, IV checked, site marked, risks and benefits discussed, surgical consent, monitors and equipment checked, pre-op evaluation and timeout performed  Spinal Block Prep:  Patient Position:sitting  Sterile Tech:cap, gloves, mask and sterile barriers  Prep:Chloraprep  Patient Monitoring:blood pressure monitoring, continuous pulse oximetry and EKG    Spinal Block Procedure  Approach:midline  Guidance:landmark technique and palpation technique  Location:L3-L4  Needle Type:Sprotte  Needle Gauge:25 G  Placement of Spinal needle event:cerebrospinal fluid aspirated  Paresthesia: no  Fluid Appearance:clear  Medications: bupivacaine (MARCAINE) injection 0.5% - Other   1.8 mL - 7/29/2025 10:28:00 AM   Post Assessment  Patient Tolerance:patient tolerated the procedure well with no apparent complications  Complications no  Additional Notes  1% lidocaine 3 ml for infiltration  0.5% bupivacaine given intrathecally

## 2025-07-29 NOTE — ANESTHESIA POSTPROCEDURE EVALUATION
Patient: Krys Mayes    Procedure Summary       Date: 07/29/25 Room / Location:  [facility] 3 / [facility]    Anesthesia Start: 1035 Anesthesia Stop: 1254    Procedure: TOTAL KNEE ARTHROPLASTY AND ALL ASSOCIATED PROCEDURES (Right: Knee) Diagnosis:       Primary osteoarthritis of right knee      (Primary osteoarthritis of right knee [M17.11])    Surgeons: Victor Manuel White MD Provider: Jolanta De La Torre CRNA    Anesthesia Type: MAC, spinal, regional ASA Status: 3            Anesthesia Type: MAC, spinal, regional    Vitals  Vitals Value Taken Time   /57 07/29/25 13:55   Temp     Pulse 66 07/29/25 13:58   Resp 17 07/29/25 13:55   SpO2 92 % 07/29/25 13:58   Vitals shown include unfiled device data.        Post Anesthesia Care and Evaluation    Patient location during evaluation: PACU  Patient participation: complete - patient participated  Level of consciousness: awake and alert  Pain score: 0  Pain management: adequate    Airway patency: patent  Anesthetic complications: No anesthetic complications  PONV Status: none  Cardiovascular status: acceptable  Respiratory status: acceptable  Hydration status: acceptable

## 2025-07-29 NOTE — OP NOTE
Date of Surgery: 7/29/2025    Preoperative diagnosis: right knee osteoarthritis    Postoperative diagnosis: right knee osteoarthritis    Procedure:  1.right total knee arthroplasty  2. Intraoperative use of kinetic knee balance sensor for implant stability during total knee arthroplasty    Surgical Approach: Knee Medial Parapatellar         Surgeon: Christopher Carter.: Assistant: Eulogio Rojas, GENEVIEVE was responsible for performing the following activities: Retraction, Irrigation, Closing, and Placing Dressing and their skilled assistance was necessary for the success of this case.    Anesthesia: MAC, spinal, regional    Estimated blood loss: <500ml    Fluids: per anesthesia    Specimens: None    Complications: None    Drains: None    Tourniquet time: Tourniquet Times:     Inflated: 7/29/2025 11:14 AM     Deflated: 7/29/2025 12:21 PM    Implants used: Juliette Persona total knee arthroplasty system with a size 35 patella, size D tibia with 30 mm stem extension, size 9 narrow cruciate retaining femoral component , 10 mm highly cross-linked medial congruent polyethylene insert, antibiotic cement    Examination under anesthesia: right knee passive range of motion 2-105°, varus alignment, no open wounds lacerations or abrasions over knee, stable to varus and valgus stress at 2 and 30°, 2+ dorsalis pedis pulse.    Indications for procedure: Patient is a pleasant 75 y.o. female who has had significant pain to right knee over the last several years, has failed conservative treatment with intra-articular injections, bracing, home exercise program, activity modification, anti-inflammatory medications. Has had persistent pain limiting activities of daily living and even has had pain at rest. We discussed treatment options and patient wished to proceed with above-mentioned surgery. Was explained details of procedure as well as risks benefits and alternatives as documented on history and physical and had all questions answered. No  guarantees were given in regards to results of the surgery.    Details of procedure: Patient was seen, evaluated, and cleared for surgery by anesthesia. Had been medically optimized by primary care physician. Patient was met in the preoperative hold area, operative site was marked, consent was reviewed, history and physical was updated, and preoperative labs were reviewed. Regional block and spinal were placed per anesthesia and patient was taken to the operating room and placed in supine position on a regular OR table. Examination under anesthesia was carried out this time. Nonsterile tourniquet was then applied to right lower extremity. Patient was secured to the table with a waist strap and all bony prominences were well-padded. right lower extremity was then sterilely prepped and draped in standard fashion.  Formal timeout was completed including confirmation of history and physical, operative consent, operative site, patient identification number, and preoperative antibiotics administration. right leg was then exsanguinated and tourniquet inflated to 300 mmHg. Procedure was then begun with longitudinal incision over the anterior aspect of the right knee through skin and subcutaneous tissue with 15 blade. Minimal subcutaneous flaps were developed at this point in time, and standard medial parapatellar arthrotomy was then created at this point. Portion of suprapatellar and infrapatellar fat pad were resected this point in time. Medial tibial peel was carried out in order to allow for further exposure the knee. Medial and lateral meniscus were resected this time and ACL was transected.  Patella was then everted and measured with a caliper. Patellar reamer was then used to create initial retropatellar cut followed by completion of cut with a oscillating saw in standard fashion and use of rongeur. Patella was sized as a 35 and patella guard was placed at this time.  Attention was then turned to the distal femur with  the knee being brought into a flexed position.  Reference pin for I-Assist navigational system was placed in the distal femur in-line with Whitesides line in retrograde fashion.  Navigational distal femoral cutting block was positioned at this time as well and calibrated with multiple planes of knee and hip motion carried out to set reference positions for navigation device.  Navigational device was then adjusted to 2 degrees valgus cut on distal femur and 3 degrees flexion cut on distal femur.  Distal femoral cutting block was pinned into position at this site, navigational device removed. Depth of the resection was checked with a sickle and noted to be appropriate. Additional pin was placed for security of the cutting block and oscillating saw was then used to create a distal femoral cut in standard fashion while collateral ligaments were protected with Z retractors. Bone was removed and measured at this time.  Cut validation was completed with navigational device at this point time as well confirming appropriate cut consistent with set parameters as noted above. Pins and cutting block were removed as well. Contents of the notch were then resected including the ACL, PCL, and posterior horns of medial and lateral meniscus. Posterior retractor was then placed and tibia was subluxated anteriorly.   Attention was then turned to the proximal tibia. Additional release of lateral tissues was completed at this time to allow for appropriate visualization of the proximal tibia articular surface. I-Assist navigational device was then pinned in position over the tibial tubercle with 3 pins at this time, external guide was placed in line with the tibial crest and center of the ankle joint and clamped into position over the ankle while proximal guide pins were impacted into the top of the tibia.  Navigational pods were then calibrated with range of motion of the hip and once noted to be appropriate, external guide from tibia  was removed.  Adjustments were made to the navigational device to place the cut in neutral varus and 4 degrees posterior slope.  Tibial cutting block resection depth was set with a sickle, block was pinned into position at this time, and alignment assessed with the drop yanick noted to be in line with tibial crest, medial third of tibial tubercle, and center of the ankle joint.  Oscillating saw was then used to complete the proximal tibial cut while collateral ligaments were protected with Z retractors. Bone fragments were removed and measured.  Tibial cut was then validated with validation device from navigational system and confirmed to be within reasonable position of above-mentioned set parameters for the proximal tibia cut. Knee was brought into full extension with extension gap being checked with spacer block at this time and noted be acceptable with knee brought into full extension, stable medial and lateral collateral stability at full extension.  Navigational guide was then removed at this point time.   Attention was then returned to the distal femur with a femoral sizing guide being placed, transverse epicondylar axis and Whitesides line being assessed and used to determine appropriate external rotation of 5 degrees.  Femoral sizing guide was secured to the distal femur with 2 screws, sizing caliper was adjusted to the anterior femur and femur was sized at a 9.  2 drill holes were made through the sizing guide which was then removed including 2 screws. Size 9 femoral cutting block was then impacted onto the distal femoral cut surface and pinned into position. Sickle was used to check the anterior cut and there was no evidence of anticipated notching. Collateral ligaments were protected with Z retractors while anterior, posterior, and chamfer cuts were created in standard fashion with oscillating saw. Femoral cutting block was removed at this time as well as bone fragments. Posterior capsule was stripped at  this time. Size 9 femoral trial was placed. Size D tibial baseplate trial with 10 mm polyethylene trial was inserted. Knee was then ranged from 0-125°, good varus and valgus stability at full extension and 30° as well as throughout mid flexion with good AP translation at 90° of flexion noted.  Patella was everted at this point in time, 35 mm patella reaming guide was placed and lug holes were drilled for patella at this point. Patella trial was placed and patella was noted to track in midline with no evidence of subluxation. Knee was ranged from full extension to full flexion and tibial rotation was noted with midline of tibial trial being marked with Bovie electrocautery at the proximal tibia. Femoral and patellar and tibial trials were removed at this point in time and tibia was subluxated anteriorly with posterior retractor. Tibial trial baseplate was placed once again, position confirmed with drop yanick as well as with previous marking for tibial rotation and assessing for bony coverage of implant. Once position of tibial baseplate was noted to be appropriate, 2 pins were placed at this time, and reamer and punch were then used through the tibial baseplate.  All trial components were once again removed at this time and pulsatile lavage was used to thoroughly clean all bony cut surfaces as well as subcutaneous tissue. Final implants were opened on the back table this time. Cement was prepared on the back table and was applied to the cut surfaces of the distal femur, proximal tibia, and patella as well as to the backside of the implants. Tibial component was placed first followed by distal femur followed by patella. Extraneous cement was removed with freer at this time. Once all implants were in position knee was brought into full extension with axial compression to allow for cement to cure fully.  Once cement was completely hardened, trial polyethylene insert was assessed, range of motion of knee from 0-125° was  achieved with good varus and valgus stability throughout range of motion and good AP translation at 90° of flexion. Thus a 10 mm polyethylene insert was opened on the back table, inserted and locked in appropriately into the tibial baseplate. Knee was thoroughly irrigated with a second evaluation for any additional bone fragments or cement particles. Knee was then thoroughly irrigated with Betadine solution followed by pulsatile lavage. 1 g of vancomycin powder was added to deep and subcutaneous tissue at this time.  Attention was then turned to closure of the wound with running self locking #2 suture ×1, 2-0 Vicryls in inverted fashion for deep subcutaneous closure, 3-0 running self locking strata-fix suture, and glue and mesh for skin. Wound was dressed with Telfa, 4 x 4 gauze, web roll, ABD pad, Ace wrap, and patient was placed in knee immobilizer and given ice pack. At the end of procedure all lap, needle and sponge counts were correct ×2. Patient had brisk cap refill all digits right lower extremity, compartments are soft and easily compressible at the end of the procedure.    Disposition: Patient was taken to recovery room in stable condition. Will be admitted for pain control and initiation of therapy, weight-bear as tolerated right lower extremity, DVT prophylaxis will be started on postoperative day #1 with Xarelto at baseline dose. Results of the procedure were discussed immediately postoperatively with patient's family and they had all questions answered at that time.

## 2025-07-29 NOTE — DISCHARGE INSTRUCTIONS
Total Knee Replacement Discharge Instructions:    I. ACTIVITIES:  1. Exercises:  Complete exercise program as taught by the hospital physical therapist 2 times per day  Exercise program will be advanced by the physical therapist  During the day be up ambulating every 2 hours (while awake) for short distances  Complete the ankle pump exercises at least 10 times per hour (while awake)  Elevate legs most of the day the first week post operatively and thereafter elevate legs when in bed and for at least 30 minutes during the day. Caution must be taken to avoid pillow placement under the bend of the knee as this can lead to flexion contractures of the knee.  Use cold packs 20-30 minutes approximately 5 times per day. This should be done before and after completing your exercises and at any time you are experiencing pain/ stiffness in your operative extremity.  Apply 6 inch ace wraps to operative extremity from ankle to groin. Please keep in place at all times except when bathing.      2. Activities of Daily Living:  No tub baths, hot tubs, or swimming pools for 4 weeks  May shower on post-operative day #3- Do not scrub or rub around the incision or mesh. No soap or alcohol to incision, just let water run over wound.         II. RESTRICTIONS  Do not cross legs or kneel  Your surgeon will discuss with you when you will be able to drive again.  Weight bearing as tolerated  First week stay inside on even terrain. May go up and down stairs one stair at a time utilizing the hand rail  After one week, you may venture outside.     III. PRECAUTIONS:  Everyone that comes near you should wash their hands  No elective dental, genital-urinary, or colon procedures or surgical procedures for 12 weeks after surgery unless absolutely necessary.   If dental work or surgical procedure is deemed absolutely necessary, you will need to contact your surgeon as you will need to take antibiotics 1 hour prior to any dental work (including teeth  cleanings).  Please discuss with your surgeon prophylactic antibiotics as the length of time this intervention will be necessary for you varies with each patient’s health history and situation.  Avoid sick people. If you must be around someone who is ill, they should wear a mask.  Avoid visits to the Emergency Room or Urgent Care unless you are having a life              threatening event.     IV. INCISION CARE:  Wash your hands prior to dressing changes  Incision and knee should be covered with an ACE wrap daily for compression. No creams or ointments to the incision  Do not touch or pick at the incision  Check incision every day and notify surgeon immediately if any of the following signs or symptoms are noted:  Increase in redness  Increase in swelling around the incision and of the entire extremity  Increase in pain  Drainage oozing from the incision  Pulling apart of the edges of the incision  Increase in overall body temperature (greater than 100.5 degrees)  You will be given further instructions on removal of the glue and mesh over your incision at your first follow up visit at the office.     V. MEDICATIONS:   1. Anticoagulants: You will be discharged on an anticoagulant, typically either Aspirin or Eliquis. This is a prophylactic medication that helps prevent blood clots during your post-operative period. The type and length of dosage varies based on your individual needs, procedure performed, and surgeon’s preference.  While taking the anticoagulant, you should avoid taking any additional aspirin, ibuprofen (Advil or Motrin), Aleve (Naprosyn) or other non-steroidal anti-inflammatory medications.   Notify surgeon immediately if any araceli bleeding is noted in the urine, stool, emesis, or from the nose or the incision. Blood in the stool will often appear as black rather than red. Blood in urine may appear as pink. Blood in emesis may appear as brown/black like coffee grounds.  You will need to apply pressure  for longer periods of time to any cuts or abrasions to stop bleeding  Avoid alcohol while taking anticoagulants    2. Stool Softeners: You will be at greater risk of constipation after surgery due to being less mobile and the pain medications.   Take stool softeners as instructed by your surgeon while on pain medications. Over the counter Colace 100 mg 1-2 capsules twice daily.   If stools become too loose or too frequent, please decreases the dosage or stop the stool softener.  If constipation occurs despite use of stool softeners, you are to continue the stool softeners and add a laxative (Milk of Magnesia 1 ounce daily as needed)  Drink plenty of fluids, and eat fruits and vegetables during your recovery time    3. Pain Medications utilized after surgery are narcotics and the law requires that the following information be given to all patients that are prescribed narcotics:  CLASSIFICATION: Pain medications are called Opioids and are narcotics  LEGALITIES: It is illegal to share narcotics with others and to drive within 24 hours of taking narcotics  POTENTIAL SIDE EFFECTS: Potential side effects of opioids include: nausea, vomiting, itching, dizziness, drowsiness, dry mouth, constipation, and difficulty urinating.  POTENTIAL ADVERSE EFFECTS:   Opioid tolerance can develop with use of pain medications and this simply means that it requires more and more of the medication to control pain; however, this is seen more in patients that use opioids for longer periods of time.  Opioid dependence can develop with use of Opioids and this simply means that to stop the medication can cause withdrawal symptoms; however, this is seen with patients that use Opioids for longer periods of time.  Opioid addiction can develop with use of Opioids and the incidence of this is very unlikely in patients who take the medications as ordered and stop the medications as instructed.  Opioid overdose can be dangerous, but is unlikely when  the medication is taken as ordered and stopped when ordered. It is important not to mix opioids with alcohol or with and type of sedative such as Benadryl as this can lead to over sedation and respiratory difficulty.  DOSAGE:   Pain medications will need to be taken consistently for the first week to decrease pain and promote adequate pain relief and participation in physical therapy.  After the initial surgical pain begins to resolve, you may begin to decrease the pain medication. By the end of 6 weeks, you should be off of pain medications.  Refills will not be given by the office during evening hours, on weekends, or after 12 weeks post-op.  To seek refills on pain medications during the initial 6 week post-operative period, you must call the office 48 hours in advance to request the refill. The office will then notify you when to  the prescription. DO NOT wait until you are out of the medication to request a refill.    VI. FOLLOW-UP VISITS:  You will need to follow up in the office with Catherine Brown Nurse Practitioner in 2 weeks. Please call  [phone]  to schedule this appointment.  You will need to follow up with your primary care physician within 4 weeks.  If you have any concerns or suspected complications prior to your follow up visit, please call your surgeons office. Do not wait until your appointment time if you suspect complications. These will need to be addressed in the office promptly.

## 2025-07-29 NOTE — ANESTHESIA PREPROCEDURE EVALUATION
Anesthesia Evaluation     Patient summary reviewed and Nursing notes reviewed   no history of anesthetic complications:   NPO Solid Status: > 8 hours  NPO Liquid Status: > 2 hours           Airway   Mallampati: II  TM distance: <3 FB  Neck ROM: full  No difficulty expected  Dental - normal exam     Pulmonary - negative pulmonary ROS and normal exam    breath sounds clear to auscultation  (-) shortness of breath    ROS comment: snores  Cardiovascular   Exercise tolerance: poor (<4 METS)    ECG reviewed  Patient on routine beta blocker and Beta blocker given within 24 hours of surgery  Rhythm: irregular  Rate: normal    (+) hypertension, dysrhythmias Atrial Fib, hyperlipidemia  (-) angina      Neuro/Psych  (+) numbness (BLE feet), psychiatric history Anxiety  GI/Hepatic/Renal/Endo    (+) obesity, GERD well controlled, renal disease- stones, diabetes mellitus type 2, thyroid problem hypothyroidism    Musculoskeletal     (+) joint swelling (right knee)  Abdominal   (+) obese   Substance History - negative use     OB/GYN negative ob/gyn ROS         Other   arthritis,     ROS/Med Hx Other: Xarelto last 7/24/25                Anesthesia Plan    ASA 3     MAC, spinal and regional     intravenous induction     Anesthetic plan, risks, benefits, and alternatives have been provided, discussed and informed consent has been obtained with: patient.  Pre-procedure education provided  Use of blood products discussed with patient  Consented to blood products.    Plan discussed with CRNA.      CODE STATUS:

## 2025-07-30 ENCOUNTER — READMISSION MANAGEMENT (OUTPATIENT)
Dept: CALL CENTER | Facility: HOSPITAL | Age: 75
End: 2025-07-30
Payer: MEDICARE

## 2025-07-30 VITALS
RESPIRATION RATE: 18 BRPM | DIASTOLIC BLOOD PRESSURE: 56 MMHG | OXYGEN SATURATION: 90 % | HEIGHT: 66 IN | BODY MASS INDEX: 32.85 KG/M2 | WEIGHT: 204.4 LBS | HEART RATE: 58 BPM | TEMPERATURE: 98.2 F | SYSTOLIC BLOOD PRESSURE: 109 MMHG

## 2025-07-30 LAB
ANION GAP SERPL CALCULATED.3IONS-SCNC: 11.9 MMOL/L (ref 5–15)
BASOPHILS # BLD AUTO: 0.02 10*3/MM3 (ref 0–0.2)
BASOPHILS NFR BLD AUTO: 0.2 % (ref 0–1.5)
BUN SERPL-MCNC: 13.1 MG/DL (ref 8–23)
BUN/CREAT SERPL: 13 (ref 7–25)
CALCIUM SPEC-SCNC: 8.8 MG/DL (ref 8.6–10.5)
CHLORIDE SERPL-SCNC: 103 MMOL/L (ref 98–107)
CO2 SERPL-SCNC: 21.1 MMOL/L (ref 22–29)
CREAT SERPL-MCNC: 1.01 MG/DL (ref 0.57–1)
DEPRECATED RDW RBC AUTO: 44.3 FL (ref 37–54)
EGFRCR SERPLBLD CKD-EPI 2021: 57.8 ML/MIN/1.73
EOSINOPHIL # BLD AUTO: 0 10*3/MM3 (ref 0–0.4)
EOSINOPHIL NFR BLD AUTO: 0 % (ref 0.3–6.2)
ERYTHROCYTE [DISTWIDTH] IN BLOOD BY AUTOMATED COUNT: 14.5 % (ref 12.3–15.4)
GLUCOSE SERPL-MCNC: 142 MG/DL (ref 65–99)
HCT VFR BLD AUTO: 34.5 % (ref 34–46.6)
HGB BLD-MCNC: 10.6 G/DL (ref 12–15.9)
IMM GRANULOCYTES # BLD AUTO: 0.05 10*3/MM3 (ref 0–0.05)
IMM GRANULOCYTES NFR BLD AUTO: 0.5 % (ref 0–0.5)
LYMPHOCYTES # BLD AUTO: 0.47 10*3/MM3 (ref 0.7–3.1)
LYMPHOCYTES NFR BLD AUTO: 4.3 % (ref 19.6–45.3)
MCH RBC QN AUTO: 26 PG (ref 26.6–33)
MCHC RBC AUTO-ENTMCNC: 30.7 G/DL (ref 31.5–35.7)
MCV RBC AUTO: 84.8 FL (ref 79–97)
MONOCYTES # BLD AUTO: 0.64 10*3/MM3 (ref 0.1–0.9)
MONOCYTES NFR BLD AUTO: 5.9 % (ref 5–12)
NEUTROPHILS NFR BLD AUTO: 89.1 % (ref 42.7–76)
NEUTROPHILS NFR BLD AUTO: 9.71 10*3/MM3 (ref 1.7–7)
NRBC BLD AUTO-RTO: 0 /100 WBC (ref 0–0.2)
PLATELET # BLD AUTO: 278 10*3/MM3 (ref 140–450)
PMV BLD AUTO: 10.1 FL (ref 6–12)
POTASSIUM SERPL-SCNC: 4.4 MMOL/L (ref 3.5–5.2)
RBC # BLD AUTO: 4.07 10*6/MM3 (ref 3.77–5.28)
SODIUM SERPL-SCNC: 136 MMOL/L (ref 136–145)
WBC NRBC COR # BLD AUTO: 10.89 10*3/MM3 (ref 3.4–10.8)

## 2025-07-30 PROCEDURE — 63710000001 ACETAMINOPHEN 325 MG TABLET: Performed by: ORTHOPAEDIC SURGERY

## 2025-07-30 PROCEDURE — A9270 NON-COVERED ITEM OR SERVICE: HCPCS | Performed by: ORTHOPAEDIC SURGERY

## 2025-07-30 PROCEDURE — 63710000001 GABAPENTIN 300 MG CAPSULE: Performed by: ORTHOPAEDIC SURGERY

## 2025-07-30 PROCEDURE — 97165 OT EVAL LOW COMPLEX 30 MIN: CPT

## 2025-07-30 PROCEDURE — 63710000001 LEVOTHYROXINE 25 MCG TABLET: Performed by: ORTHOPAEDIC SURGERY

## 2025-07-30 PROCEDURE — 85025 COMPLETE CBC W/AUTO DIFF WBC: CPT | Performed by: ORTHOPAEDIC SURGERY

## 2025-07-30 PROCEDURE — 63710000001 METOPROLOL TARTRATE 25 MG TABLET: Performed by: ORTHOPAEDIC SURGERY

## 2025-07-30 PROCEDURE — 63710000001 SENNOSIDES-DOCUSATE 8.6-50 MG TABLET: Performed by: ORTHOPAEDIC SURGERY

## 2025-07-30 PROCEDURE — 63710000001 METOPROLOL TARTRATE 50 MG TABLET: Performed by: ORTHOPAEDIC SURGERY

## 2025-07-30 PROCEDURE — 63710000001 ALPRAZOLAM 0.5 MG TABLET: Performed by: ORTHOPAEDIC SURGERY

## 2025-07-30 PROCEDURE — G0378 HOSPITAL OBSERVATION PER HR: HCPCS

## 2025-07-30 PROCEDURE — 63710000001 VITAMIN C 500 MG TABLET: Performed by: ORTHOPAEDIC SURGERY

## 2025-07-30 PROCEDURE — 97161 PT EVAL LOW COMPLEX 20 MIN: CPT

## 2025-07-30 PROCEDURE — 25010000002 CEFAZOLIN PER 500 MG: Performed by: ORTHOPAEDIC SURGERY

## 2025-07-30 PROCEDURE — 80048 BASIC METABOLIC PNL TOTAL CA: CPT | Performed by: ORTHOPAEDIC SURGERY

## 2025-07-30 PROCEDURE — 94761 N-INVAS EAR/PLS OXIMETRY MLT: CPT

## 2025-07-30 PROCEDURE — 63710000001 OXYCODONE 5 MG TABLET: Performed by: ORTHOPAEDIC SURGERY

## 2025-07-30 PROCEDURE — 63710000001 SERTRALINE 50 MG TABLET: Performed by: ORTHOPAEDIC SURGERY

## 2025-07-30 PROCEDURE — 63710000001 RIVAROXABAN 15 MG TABLET: Performed by: ORTHOPAEDIC SURGERY

## 2025-07-30 RX ORDER — AMOXICILLIN 250 MG
1 CAPSULE ORAL 2 TIMES DAILY
Qty: 60 TABLET | Refills: 1 | Status: SHIPPED | OUTPATIENT
Start: 2025-07-30

## 2025-07-30 RX ORDER — ONDANSETRON 4 MG/1
4 TABLET, ORALLY DISINTEGRATING ORAL EVERY 8 HOURS PRN
Qty: 20 TABLET | Refills: 0 | Status: SHIPPED | OUTPATIENT
Start: 2025-07-30

## 2025-07-30 RX ORDER — FUROSEMIDE 20 MG/1
20 TABLET ORAL DAILY
Qty: 30 TABLET | Refills: 0 | Status: SHIPPED | OUTPATIENT
Start: 2025-07-30

## 2025-07-30 RX ORDER — OXYCODONE AND ACETAMINOPHEN 5; 325 MG/1; MG/1
1 TABLET ORAL EVERY 4 HOURS PRN
Qty: 42 TABLET | Refills: 0 | Status: SHIPPED | OUTPATIENT
Start: 2025-07-30

## 2025-07-30 RX ADMIN — LEVOTHYROXINE SODIUM 25 MCG: 0.03 TABLET ORAL at 05:35

## 2025-07-30 RX ADMIN — ALPRAZOLAM 0.5 MG: 0.5 TABLET ORAL at 10:52

## 2025-07-30 RX ADMIN — OXYCODONE HYDROCHLORIDE AND ACETAMINOPHEN 2000 MG: 500 TABLET ORAL at 09:11

## 2025-07-30 RX ADMIN — SENNOSIDES, DOCUSATE SODIUM 1 TABLET: 50; 8.6 TABLET, FILM COATED ORAL at 09:10

## 2025-07-30 RX ADMIN — SODIUM CHLORIDE 2000 MG: 9 INJECTION, SOLUTION INTRAVENOUS at 04:23

## 2025-07-30 RX ADMIN — ACETAMINOPHEN 975 MG: 325 TABLET, FILM COATED ORAL at 08:59

## 2025-07-30 RX ADMIN — OXYCODONE 10 MG: 5 TABLET ORAL at 09:17

## 2025-07-30 RX ADMIN — GABAPENTIN 300 MG: 300 CAPSULE ORAL at 09:10

## 2025-07-30 RX ADMIN — RIVAROXABAN 15 MG: 15 TABLET, FILM COATED ORAL at 09:17

## 2025-07-30 RX ADMIN — METOPROLOL TARTRATE 75 MG: 25 TABLET, FILM COATED ORAL at 09:10

## 2025-07-30 RX ADMIN — SERTRALINE HYDROCHLORIDE 100 MG: 50 TABLET, FILM COATED ORAL at 09:10

## 2025-07-30 NOTE — OUTREACH NOTE
Prep Survey      Flowsheet Row Responses   [facility] facility patient discharged from? [place]   Is LACE score < 7 ? Yes   Eligibility [facility]   Date of Admission 07/29/25   Date of Discharge 07/30/25   Discharge Disposition Home-Health Care Sv   Discharge diagnosis total joint arthroplasty   Does the patient have one of the following disease processes/diagnoses(primary or secondary)? Total Joint Replacement   Does the patient have Home health ordered? Yes   What is the [facility] health agency?  [facility]    Is there a DME ordered? No   Prep survey completed? Yes            TANGELA ROSE - Registered Nurse

## 2025-07-30 NOTE — PLAN OF CARE
Problem: Adult Inpatient Plan of Care  Goal: Plan of Care Review  Recent Flowsheet Documentation  Taken 7/30/2025 0804 by Mike Quezada, OTR  Outcome Evaluation: Occupational therapy evaluation completed. Pt s/p right TKA. Pt managed bed mobility with supervision and functional transfers and mobility with SBA using a rolling walker for support. Pt needs min assist for lower body adl's. Education was provided regarding compensatory strategies for lower body adl management s/p TKA. Pt reports no concerns for adl management with adaptive equipment available and support from spouse. Plan is for HH services for a few weeks then transition to outpatient physical therapy at [facility]. No further OT services recommended.

## 2025-07-30 NOTE — PLAN OF CARE
Goal Outcome Evaluation:  Plan of Care Reviewed With: patient           Outcome Evaluation: Physical therapy evaluation complete.  patient performs supine to sit with SBA and sit to stand wtih SBA.  Patient performs gait with rolling walker x100 feet, SBA.  Patient manages direction changes without difficulty or balance loss.  patient performs 10 reps of LE HEP, written handout provided.  Patient reports no concerns regarding return home at this time, anticipate home health PT at discharge.  No further skilled PT needs in acute care setting.    Anticipated Discharge Disposition (PT): home with home health

## 2025-07-30 NOTE — DISCHARGE SUMMARY
Orthopedic Discharge Summary      Patient: Krys Mayes      YOB: 1950    Medical Record Number: [id number]    Attending Physician: Victor Manuel White MD  Consulting Physician(s):   Date of Admission: 7/29/2025  8:26 AM  Date of Discharge: 07/30/2025        Primary osteoarthritis of right knee    S/P total knee arthroplasty     Status Post: [place] ARTHRP KNE CONDYLE&PLATU MEDIAL&LAT COMPARTMENTS [78280] (TOTAL KNEE ARTHROPLASTY AND ALL ASSOCIATED PROCEDURES)      Allergies   Allergen Reactions    Morphine And Codeine GI Intolerance    Ciprofloxacin-Ciproflox Hcl Er Rash    Flomax [Tamsulosin Hcl] Rash    Hydrocodone Rash    Latex Rash    Lortab [Hydrocodone-Acetaminophen] Rash    Penicillins Rash    Phenergan [Promethazine Hcl] GI Intolerance    Sulfa Antibiotics Rash    Amoxicillin Rash    Tamsulosin Rash       Current Medications:     Discharge Medications        New Medications        Instructions Start Date   naloxone 4 MG/0.1ML nasal spray  Commonly known as: NARCAN   Call 911. Don't prime. [place] in 1 nostril for overdose. Repeat in 2-3 minutes in other nostril if no or minimal breathing/responsiveness.      ondansetron ODT 4 MG disintegrating tablet  Commonly known as: ZOFRAN-ODT   4 mg, Oral, Every 8 Hours PRN      oxyCODONE-acetaminophen 5-325 MG per tablet  Commonly known as: PERCOCET   1 tablet, Oral, Every 4 Hours PRN      sennosides-docusate 8.6-50 MG per tablet  Commonly known as: PERICOLACE   1 tablet, Oral, 2 Times Daily             Changes to Medications        Instructions Start Date   furosemide 20 MG tablet  Commonly known as: LASIX  What changed: additional instructions   20 mg, Oral, Daily, Start 5 days after surgery      Xarelto 15 MG tablet  Generic drug: rivaroxaban   15 mg, Oral, Daily             Continue These Medications        Instructions Start Date   acetaminophen 650 MG 8 hr tablet  Commonly known as: TYLENOL   1 tablet, Every 6 Hours PRN      ALPRAZolam 0.5 MG  tablet  Commonly known as: XANAX   0.5 mg, Oral, 2 Times Daily PRN      ascorbic acid 1000 MG tablet  Commonly known as: VITAMIN C   2,000 mg, Daily      cholecalciferol 25 MCG (1000 UT) tablet  Commonly known as: VITAMIN D3   1,000 Units, Daily      cyanocobalamin 500 MCG tablet  Commonly known as: VITAMIN B-12   500 mcg, Daily      fish oil 1000 MG capsule capsule   1,000 mg, Daily With Breakfast      gabapentin 600 MG tablet  Commonly known as: NEURONTIN   600 mg, Every Night at Bedtime      gabapentin 300 MG capsule  Commonly known as: NEURONTIN   Take 1 capsule by mouth Every Morning.      levothyroxine 25 MCG tablet  Commonly known as: SYNTHROID, LEVOTHROID   25 mcg, Oral, Every Early Morning      losartan 100 MG tablet  Commonly known as: COZAAR   100 mg, Oral, Daily      Metoprolol Tartrate 75 MG tablet   1 tablet, Oral, 2 Times Daily      multivitamin tablet tablet  Commonly known as: THERAGRAN   1 tablet, Daily      omeprazole OTC 20 MG EC tablet  Commonly known as: PriLOSEC OTC   20 mg, 2 Times Daily      PROBIOTIC ADVANCED PO   1 tablet, Daily      sertraline 100 MG tablet  Commonly known as: ZOLOFT   100 mg, Oral, Daily      simvastatin 80 MG tablet  Commonly known as: ZOCOR   80 mg, Oral, Every Evening      triamcinolone 0.1 % cream  Commonly known as: KENALOG   As Needed                   Past Medical History:   Diagnosis Date    Abnormal ECG 9/16    afib brought on by strong antibiotics    Arthritis     Arthritis of neck     Bursitis of hip     Cataract     Cholelithiasis taken out 1/14/91    Clotting disorder blood thinner    Depression     Disease of thyroid gland     Diverticulosis     Elevated cholesterol     Fracture of ankle     Fracture, tibia and fibula     Gastroesophageal reflux disease 01/21/2016    Hip arthrosis     Hyperlipidemia     Hypertension     Kidney stone     recurrent, calcium oxalate    Knee swelling     Menopausal disorder 1998    She went through menopause in 1998     Neuropathy     Obesity     PAF (paroxysmal atrial fibrillation)     in the setting of urosepsis, 9/2016    Pneumonia     Reflux esophagitis 08/19/2016    Sebaceous cyst of labia 12/20/2017    Sepsis due to urinary tract infection     Sigmoid diverticulitis 11/13/2017    Type 2 diabetes mellitus     Urinary tract infection     Visual impairment cataracts    Vitamin D deficiency 01/21/2016     Past Surgical History:   Procedure Laterality Date    ANKLE OPEN REDUCTION INTERNAL FIXATION  07/2010    ANKLE SURGERY      Ankle Repair / Description: ORif the left ankle in July 2010. Secondary to fall    BREAST BIOPSY Right     benign    CATARACT EXTRACTION Right     CHOLECYSTECTOMY      COLONOSCOPY  2014    Done 2004 normal recheck in 10 years. Repeated February 2014 colonoscopy was normal and to be repeated in 10 years.    CYSTOSCOPY BLADDER STONE LITHOTRIPSY      FRACTURE SURGERY Left     ANKLE    HEMORRHOIDECTOMY      NEPHROLITHOTOMY Left 01/09/2017    Procedure: NEPHROLITHOTOMY PERCUTANEOUS SECOND LOOK WITH STENT PLACEMENT AND LASER LITHOTRIPSY;  Surgeon: Mati Villegas MD;  Location: [facility];  Service:     OTHER SURGICAL HISTORY      Tubal Ligation    PERCUTANEOUS NEPHROSTOLITHOTOMY Left 12/2016    TUBAL ABDOMINAL LIGATION      URETEROSCOPY LASER LITHOTRIPSY WITH STENT INSERTION Left 12/06/2022    Procedure: LEFT URETEROSCOPY WITH LASER LITHOTRIPSY, STONE BASKET EXTRACTION, STENT PLACEMENT;  Surgeon: Mati Villegas MD;  Location: [facility];  Service: Urology;  Laterality: Left;     Social History     Occupational History    Not on file   Tobacco Use    Smoking status: Never     Passive exposure: Never    Smokeless tobacco: Never    Tobacco comments:     Never smoked ever   Vaping Use    Vaping status: Never Used   Substance and Sexual Activity    Alcohol use: Yes     Comment: She uses alcohol once or twice a year.    Drug use: No    Sexual activity: Not Currently     Partners: Male     Birth  control/protection: Post-menopausal      Social History     Social History Narrative    Not on file     Family History   Problem Relation Age of Onset    COPD Mother     Heart attack Father         acute myocardial infarction    Anxiety disorder Father     Depression Father     Heart disease Father     Depression Sister     Heart failure Sister     Hearing loss Sister     Heart attack Son     Heart attack Paternal Uncle     Kidney disease Maternal Grandmother         Born with 1 kidney    Breast cancer Neg Hx     Malig Hyperthermia Neg Hx          Physical Exam: 75 y.o. female  General Appearance:    Alert, cooperative, in no acute distress                      Vitals:    07/30/25 0748 07/30/25 0859 07/30/25 0938 07/30/25 1135   BP: 131/69   109/56   BP Location:       Patient Position: Lying   Lying   Pulse: 60 61  58   Resp: 18 18  18   Temp: 97.4 °F (36.3 °C)   98.2 °F (36.8 °C)   TempSrc: Oral   Oral   SpO2: 97% 93% 95% 90%   Weight:       Height:            Head:    Normocephalic, without obvious abnormality, atraumatic   Eyes:            Lids and lashes normal, conjunctivae and sclerae normal, no   icterus, no pallor, corneas clear, PERRLA   Ears:    Ears appear intact with no abnormalities noted   Throat:   No oral lesions, no thrush, oral mucosa moist   Neck:   No adenopathy, supple, trachea midline, no thyromegaly, no    carotid bruit, no JVD   Back:     No kyphosis present, no scoliosis present, no skin lesions,       erythema or scars, no tenderness to percussion or                   palpation,   range of motion normal   Lungs:     Clear to auscultation,respirations regular, even and                   unlabored    Heart:    Regular rhythm and normal rate, normal S1 and S2, no            murmur, no gallop, no rub, no click   Chest Wall:    No abnormalities observed   Abdomen:     Normal bowel sounds, no masses, no organomegaly, soft        non-tender, non-distended, no guarding, no rebound                  tenderness   Rectal:     Deferred   Extremities:   Incision intact without signs or symptoms of infection.               Neurovascular status remains intact to operative extremity.      Moves all extremities well, no edema, no cyanosis, no              redness   Pulses:   Pulses palpable and equal bilaterally   Skin:   No bleeding, bruising or rash   Lymph nodes:   No palpable adenopathy   Neurologic:   Cranial nerves 2 - 12 grossly intact, sensation intact, DTR        present and equal bilaterally           Hospital Course:  75 y.o. female admitted to [facility] to services of Victor Manuel White MD with Primary osteoarthritis of right knee [M17.11]  S/P total knee arthroplasty [Z96.659] on 7/29/2025 and underwent [place] ARTHRP KNE CONDYLE&PLATU MEDIAL&LAT COMPARTMENTS [80714] (TOTAL KNEE ARTHROPLASTY AND ALL ASSOCIATED PROCEDURES)  Per Victor Manuel White MD. Antibiotic and VTE prophylaxis were per [facility] protocols. Post-operatively the patient transferred to the post-operative floor where the patient underwent mobilization therapy that included active as well as passive ROM exercises. Opioids were titrated to achieve appropriate pain management to allow for participation in mobilization exercises. Vital signs are now stable. The incision is intact without signs or symptoms of infection. Operative extremity neurovascular status remains intact.   Appropriate education re: incision care, activity levels, medications, and follow up visits was completed and all questions were answered. The patient is now deemed stable for discharge to Home.      DIAGNOSTIC TESTS:     Admission on 07/29/2025   Component Date Value Ref Range Status    Potassium 07/29/2025 4.0  3.5 - 5.2 mmol/L Final    Glucose 07/29/2025 101  70 - 130 mg/dL Final    Glucose 07/30/2025 142 (H)  65 - 99 mg/dL Final    BUN 07/30/2025 13.1  8.0 - 23.0 mg/dL Final    Creatinine 07/30/2025 1.01 (H)  0.57 - 1.00 mg/dL Final    Sodium 07/30/2025 136  136 - 145  mmol/L Final    Potassium 07/30/2025 4.4  3.5 - 5.2 mmol/L Final    Chloride 07/30/2025 103  98 - 107 mmol/L Final    CO2 07/30/2025 21.1 (L)  22.0 - 29.0 mmol/L Final    Calcium 07/30/2025 8.8  8.6 - 10.5 mg/dL Final    BUN/Creatinine Ratio 07/30/2025 13.0  7.0 - 25.0 Final    Anion Gap 07/30/2025 11.9  5.0 - 15.0 mmol/L Final    eGFR 07/30/2025 57.8 (L)  >60.0 mL/min/1.73 Final    WBC 07/30/2025 10.89 (H)  3.40 - 10.80 10*3/mm3 Final    RBC 07/30/2025 4.07  3.77 - 5.28 10*6/mm3 Final    Hemoglobin 07/30/2025 10.6 (L)  12.0 - 15.9 g/dL Final    Hematocrit 07/30/2025 34.5  34.0 - 46.6 % Final    MCV 07/30/2025 84.8  79.0 - 97.0 fL Final    MCH 07/30/2025 26.0 (L)  26.6 - 33.0 pg Final    MCHC 07/30/2025 30.7 (L)  31.5 - 35.7 g/dL Final    RDW 07/30/2025 14.5  12.3 - 15.4 % Final    RDW-SD 07/30/2025 44.3  37.0 - 54.0 fl Final    MPV 07/30/2025 10.1  6.0 - 12.0 fL Final    Platelets 07/30/2025 278  140 - 450 10*3/mm3 Final    Neutrophil % 07/30/2025 89.1 (H)  42.7 - 76.0 % Final    Lymphocyte % 07/30/2025 4.3 (L)  19.6 - 45.3 % Final    Monocyte % 07/30/2025 5.9  5.0 - 12.0 % Final    Eosinophil % 07/30/2025 0.0 (L)  0.3 - 6.2 % Final    Basophil % 07/30/2025 0.2  0.0 - 1.5 % Final    Immature Grans % 07/30/2025 0.5  0.0 - 0.5 % Final    Neutrophils, Absolute 07/30/2025 9.71 (H)  1.70 - 7.00 10*3/mm3 Final    Lymphocytes, Absolute 07/30/2025 0.47 (L)  0.70 - 3.10 10*3/mm3 Final    Monocytes, Absolute 07/30/2025 0.64  0.10 - 0.90 10*3/mm3 Final    Eosinophils, Absolute 07/30/2025 0.00  0.00 - 0.40 10*3/mm3 Final    Basophils, Absolute 07/30/2025 0.02  0.00 - 0.20 10*3/mm3 Final    Immature Grans, Absolute 07/30/2025 0.05  0.00 - 0.05 10*3/mm3 Final    nRBC 07/30/2025 0.0  0.0 - 0.2 /100 WBC Final       XR Knee 1 or 2 View Right  Result Date: 7/29/2025  Narrative: XR KNEE 1 OR 2 VW RIGHT Date of Exam: 7/29/2025 1:09 PM EDT Indication: JOINT REPLACEMENT, KNEE postop total knee Post-op knee arthroplasty Comparison: Right  knee radiograph 4/21/2025 Findings: No acute fracture or malalignment. Interval right knee arthroplasty without radiographic evidence of immediate complication. Surrounding soft tissue edema and subcutaneous emphysema, favored postoperative.     Impression: Impression: Interval right knee arthroplasty without radiographic evidence of immediate complication. Electronically Signed: Zachary Baires MD  7/29/2025 1:33 PM EDT  Workstation ID: [id number]    Peripheral Block  Result Date: 7/29/2025  Narrative: Cirilo Joyce CRNA     7/29/2025 10:37 AM Peripheral Block Pre-sedation assessment completed: 7/29/2025 9:47 AM Patient reassessed immediately prior to procedure Patient location during procedure: pre-op Start time: 7/29/2025 9:59 AM Stop time: 7/29/2025 10:01 AM Reason for block: at surgeon's request and post-op pain management Performed by CRNA/CAA: Cirilo Joyce CRNA Preanesthetic Checklist Completed: patient identified, IV checked, site marked, risks and benefits discussed, surgical consent, monitors and equipment checked, pre-op evaluation and timeout performed Prep: Pt Position: supine Sterile barriers:cap, gloves, mask and washed/disinfected hands Prep: ChloraPrep Patient monitoring: blood pressure monitoring, continuous pulse oximetry and EKG Procedure Sedation: yes Performed under: local infiltration Guidance:ultrasound guided ULTRASOUND INTERPRETATION.  Using ultrasound guidance a 21 G gauge needle was placed in close proximity to the nerve, at which point, under ultrasound guidance anesthetic was injected in the area of the nerve and spread of the anesthesia was seen on ultrasound in close proximity thereto.  There were no abnormalities seen on ultrasound; a digital image was taken; and the patient tolerated the procedure with no complications. Images:still images obtained, printed/placed on chart Laterality:right Block Type:ALEJANDRO Injection Technique:single-shot Needle Type:echogenic Needle  Gauge:21 G Resistance on Injection: none Medications Used: bupivacaine PF (MARCAINE) injection 0.25% - Injection  10 mL - 7/29/2025 10:01:00 AM Post Assessment Injection Assessment: negative aspiration for heme, no paresthesia on injection and incremental injection Patient Tolerance:comfortable throughout block Complications:no Performed by: Cirilo Joyce CRNA     Peripheral Block  Result Date: 7/29/2025  Narrative: Cirilo Joyce CRNA     7/29/2025 10:36 AM Peripheral Block Pre-sedation assessment completed: 7/29/2025 9:47 AM Patient reassessed immediately prior to procedure Patient location during procedure: pre-op Start time: 7/29/2025 9:57 AM Stop time: 7/29/2025 9:58 AM Reason for block: at surgeon's request and post-op pain management Performed by CRNA/CAA: Cirilo Joyce CRNA Preanesthetic Checklist Completed: patient identified, IV checked, site marked, risks and benefits discussed, surgical consent, monitors and equipment checked, pre-op evaluation and timeout performed Prep: Pt Position: supine Sterile barriers:cap, gloves, mask and washed/disinfected hands Prep: ChloraPrep Patient monitoring: blood pressure monitoring, continuous pulse oximetry and EKG Procedure Sedation: yes Performed under: local infiltration Guidance:ultrasound guided ULTRASOUND INTERPRETATION.  Using ultrasound guidance a 21 G gauge needle was placed in close proximity to the nerve, at which point, under ultrasound guidance anesthetic was injected in the area of the nerve and spread of the anesthesia was seen on ultrasound in close proximity thereto.  There were no abnormalities seen on ultrasound; a digital image was taken; and the patient tolerated the procedure with no complications. Images:still images obtained, printed/placed on chart Laterality:right Block Type:iPack Injection Technique:single-shot Needle Type:echogenic Needle Gauge:21 G Resistance on Injection: none Medications Used: bupivacaine PF  (MARCAINE) injection 0.25% - Injection  10 mL - 7/29/2025 9:58:00 AM Post Assessment Injection Assessment: negative aspiration for heme, no paresthesia on injection and incremental injection Patient Tolerance:comfortable throughout block Complications:no Performed by: Cirilo Joyce CRNA     Peripheral Block  Result Date: 7/29/2025  Narrative: Cirilo Joyce CRNA     7/29/2025 10:36 AM Peripheral Block Pre-sedation assessment completed: 7/29/2025 9:47 AM Patient reassessed immediately prior to procedure Patient location during procedure: pre-op Start time: 7/29/2025 9:53 AM Stop time: 7/29/2025 9:55 AM Reason for block: at surgeon's request and post-op pain management Performed by CRNA/CAA: Cirilo Joyce CRNA Preanesthetic Checklist Completed: patient identified, IV checked, site marked, risks and benefits discussed, surgical consent, monitors and equipment checked, pre-op evaluation and timeout performed Prep: Pt Position: supine Sterile barriers:cap, gloves, mask and washed/disinfected hands Prep: ChloraPrep Patient monitoring: blood pressure monitoring, continuous pulse oximetry and EKG Procedure Sedation: yes Performed under: local infiltration Guidance:ultrasound guided ULTRASOUND INTERPRETATION.  Using ultrasound guidance a 21 G gauge needle was placed in close proximity to the nerve, at which point, under ultrasound guidance anesthetic was injected in the area of the nerve and spread of the anesthesia was seen on ultrasound in close proximity thereto.  There were no abnormalities seen on ultrasound; a digital image was taken; and the patient tolerated the procedure with no complications. Images:still images obtained, printed/placed on chart Laterality:right Block Type:adductor canal block Injection Technique:single-shot Needle Type:echogenic Needle Gauge:21 G Resistance on Injection: none Medications Used: bupivacaine PF (MARCAINE) injection 0.5% - Injection  10 mL - 7/29/2025 9:55:00 AM  Post Assessment Injection Assessment: negative aspiration for heme, no paresthesia on injection and incremental injection Patient Tolerance:comfortable throughout block Complications:no Performed by: Cirilo Joyce CRNA     US Sonosite Portable  Result Date: 7/29/2025  Narrative: This procedure was auto-finalized with no dictation required.    Adult Transthoracic Echo Complete W/ Cont if Necessary Per Protocol  Result Date: 7/18/2025  Narrative:   Left ventricular systolic function is normal. Calculated left ventricular EF = 57.8%   Left ventricular diastolic function was indeterminate.   The right ventricular cavity is mildly dilated.   The left atrial cavity is severely dilated.   Saline test results are negative.   The right atrial cavity is severely dilated.   Estimated right ventricular systolic pressure from tricuspid regurgitation is mildly elevated (35-45 mmHg). Calculated right ventricular systolic pressure from tricuspid regurgitation is 39 mmHg.     MRI Brain With & Without Contrast  Result Date: 7/17/2025  Narrative: MRI BRAIN W WO CONTRAST Date of Exam: 7/17/2025 9:54 AM EDT Indication: Confusion and frequent falls.  Comparison: 4/21/2025 CT head Technique:  Routine multiplanar/multisequence sequence images of the brain were obtained before and after the uneventful administration of Vueway. Findings: Diffusion weighted imaging shows no evidence of acute or subacute infarct. Subcortical and periventricular white matter hyperintensities are consistent with moderate chronic small vessel ischemic disease. There is unchanged ventricular volume. No evidence of acute cranial hemorrhage, suspicious mass, abnormal postcontrast enhancement, midline shift, or extra-axial fluid collection. The basal cisterns are patent. There is normal enhancement of the intracranial vessels. No evidence of acute fracture or suspicious bone lesion. Paranasal sinuses and mastoid air cells are predominantly well aerated.  Orbits and included soft tissues show no acute abnormality.     Impression: Impression: Chronic and age-related changes of the brain. No evidence of acute intracranial hemorrhage, mass, abnormal postcontrast enhancement, or acute infarct. Electronically Signed: Brian Patrick MD  7/17/2025 11:00 AM EDT  Workstation ID: [id number]      Discharge and Follow up Instructions:   Home health PT x 2 weeks followed by outpatient PT  Follow-up in Ortho office in 2 weeks  Xarelto home dose DVT prophylaxis      Date: 7/30/2025    AGUSTIN Jo

## 2025-07-30 NOTE — THERAPY DISCHARGE NOTE
Acute Care - Occupational Therapy Discharge  [facility]    Patient Name: Krys Mayes  : 1950    MRN: [id number]                              Today's Date: 2025       Admit Date: 2025    Visit Dx:     ICD-10-CM ICD-9-CM   1. Primary osteoarthritis of right knee  M17.11 715.16     Patient Active Problem List   Diagnosis    Elevated alanine aminotransferase (ALT) level    Mixed anxiety depressive disorder    Gastroesophageal reflux disease    Essential hypertension    Insomnia    Microalbuminuria    Obesity (BMI 30-39.9)    Proteinuria    Vitamin D deficiency    Postmenopause    History of fracture    Reflux esophagitis    Calculus of kidney    PAF (paroxysmal atrial fibrillation)    COLTON (generalized anxiety disorder)    Iron deficiency anemia    Prediabetes    Urine frequency    Medicare annual wellness visit, subsequent    High risk medication use    Dermoid cyst of scalp    Hyperglycemia    Grief    Mixed hyperlipidemia    Long-term use of high-risk medication    Frequent UTI    Greater trochanteric bursitis of right hip    Chondromalacia, right knee    Post-traumatic arthritis of left ankle    Generalized weakness    Screening for malignant neoplasm of colon    Bilateral pneumonia    COVID-19 virus detected    Cytokine release syndrome, grade 2    Pneumonia due to other gram-negative bacteria    Primary osteoarthritis of right knee    S/P total knee arthroplasty     Past Medical History:   Diagnosis Date    Abnormal ECG     afib brought on by strong antibiotics    Arthritis     Arthritis of neck     Bursitis of hip     Cataract     Cholelithiasis taken out 89    Clotting disorder blood thinner    Depression     Disease of thyroid gland     Diverticulosis     Elevated cholesterol     Fracture of ankle     Fracture, tibia and fibula     Gastroesophageal reflux disease 2016    Hip arthrosis     Hyperlipidemia     Hypertension     Kidney stone     recurrent, calcium oxalate     Knee swelling     Menopausal disorder 1998    She went through menopause in 1998    Neuropathy     Obesity     PAF (paroxysmal atrial fibrillation)     in the setting of urosepsis, 9/2016    Pneumonia     Reflux esophagitis 08/19/2016    Sebaceous cyst of labia 12/20/2017    Sepsis due to urinary tract infection     Sigmoid diverticulitis 11/13/2017    Type 2 diabetes mellitus     Urinary tract infection     Visual impairment cataracts    Vitamin D deficiency 01/21/2016     Past Surgical History:   Procedure Laterality Date    ANKLE OPEN REDUCTION INTERNAL FIXATION  07/2010    ANKLE SURGERY      Ankle Repair / Description: ORif the left ankle in July 2010. Secondary to fall    BREAST BIOPSY Right     benign    CATARACT EXTRACTION Right     CHOLECYSTECTOMY      COLONOSCOPY  2014    Done 2004 normal recheck in 10 years. Repeated February 2014 colonoscopy was normal and to be repeated in 10 years.    CYSTOSCOPY BLADDER STONE LITHOTRIPSY      FRACTURE SURGERY Left     ANKLE    HEMORRHOIDECTOMY      NEPHROLITHOTOMY Left 01/09/2017    Procedure: NEPHROLITHOTOMY PERCUTANEOUS SECOND LOOK WITH STENT PLACEMENT AND LASER LITHOTRIPSY;  Surgeon: Mati Villegas MD;  Location: [facility];  Service:     OTHER SURGICAL HISTORY      Tubal Ligation    PERCUTANEOUS NEPHROSTOLITHOTOMY Left 12/2016    TUBAL ABDOMINAL LIGATION      URETEROSCOPY LASER LITHOTRIPSY WITH STENT INSERTION Left 12/06/2022    Procedure: LEFT URETEROSCOPY WITH LASER LITHOTRIPSY, STONE BASKET EXTRACTION, STENT PLACEMENT;  Surgeon: Mati Villegas MD;  Location: [facility];  Service: Urology;  Laterality: Left;      General Information       Row Name 07/30/25 0822          OT Time and Intention    Subjective Information no complaints  -SD     Document Type discharge evaluation/summary  -SD     Mode of Treatment occupational therapy  -SD     Patient Effort good  -SD       Row Name 07/30/25 0822          General Information    Patient Profile Reviewed  yes  Pt s/p right TKA. Pt lives with spouse.  -SD     Prior Level of Function independent:;all household mobility;min assist:;ADL's  pt uses a rollator for mobility. She states her spouse assists her as needed with adl's  -SD     Existing Precautions/Restrictions fall  -SD     Barriers to Rehab none identified  -SD       Row Name 07/30/25 0822          Occupational Profile    Reason for Services/Referral (Occupational Profile) decreased adl's s/p right TKA  -SD     Successful Occupations (Occupational Profile) I with mobility at home using a rolling walker. She actively performs adl's, yet she states her spouse does assist her at times.  -SD     Occupational History/Life Experiences (Occupational Profile) pt volunteers at formerly [employer]  -SD     Environmental Supports and Barriers (Occupational Profile) spouse at home. pt plans to have  services for a few weeks before going to outpt therapy in [place]  -SD     Patient Goals (Occupational Profile) go home  -SD       Row Name 07/30/25 0822          Living Environment    Current Living Arrangements home  -SD     People in Home spouse  -SD       Row Name 07/30/25 0822          Home Main Entrance    Number of Stairs, Main Entrance three  3 through garage. Pt has another entrance with need to manage stairs -SD     Stair Railings, Main Entrance railings on both sides of stairs;other (see comments)  -SD       Row Name 07/30/25 0822          Stairs Within Home, Primary    Stairs, Within Home, Primary 2 story home. stair lift available to manage stairs in home  -SD       Row Name 07/30/25 0822          Cognition    Orientation Status (Cognition) oriented x 4  -SD               User Key  (r) = Recorded By, (t) = Taken By, (c) = Cosigned By      Initials Name Provider Type    Mike Mandujano OTR Occupational Therapist                   Mobility/ADL's       Row Name 07/30/25 0829          Bed Mobility    Bed Mobility supine-sit  -SD     Supine-Sit [place] (Bed Mobility)  standby assist  -SD     Assistive Device (Bed Mobility) head of bed elevated;bed rails  -SD       Row Name 07/30/25 0829          Transfers    Transfers sit-stand transfer;stand-sit transfer  -SD       Row Name 07/30/25 0829          Sit-Stand Transfer    Sit-Stand [place] (Transfers) standby assist  -SD     Assistive Device (Sit-Stand Transfers) walker, front-wheeled  -SD       Row Name 07/30/25 0829          Functional Mobility    Functional Mobility- Ind. Level standby assist  -SD     Functional Mobility- Device walker, front-wheeled  -SD     Functional Mobility-Distance (Feet) 100  -SD       Row Name 07/30/25 0829          Activities of Daily Living    BADL Assessment/Intervention bathing;lower body dressing  -SD       Row Name 07/30/25 0829          Bathing Assessment/Intervention    [place] Level (Bathing) lower body;minimum assist (75% patient effort)  -SD     Comment, (Bathing) Education provided regarding compensatory strategies for lower body adl management s/p TKA. Pt reports no concerns with adaptive equipment available and support from spouse.  -SD       Row Name 07/30/25 0829          Lower Body Dressing Assessment/Training    [place] Level (Lower Body Dressing) lower body dressing skills;minimum assist (75% patient effort)  -SD     Comment, (Lower Body Dressing) Education provided regarding compensatory strategies for lower body adl management s/p TKA. Pt reports no concerns with adaptive equipment available and support from spouse.  -SD               User Key  (r) = Recorded By, (t) = Taken By, (c) = Cosigned By      Initials Name Provider Type    SD Mike Quezada OTR Occupational Therapist                   Obj/Interventions       Row Name 07/30/25 0832          Sensory Assessment (Somatosensory)    Sensory Assessment (Somatosensory) other (see comments)  no c/o sensory issues  -SD       Row Name 07/30/25 0832          Range of Motion Comprehensive    Comment, General Range of  Motion BUE rom functional for tasks during evaluation  -SD       Row Name 07/30/25 0832          Strength Comprehensive (MMT)    Comment, General Manual Muscle Testing (MMT) Assessment BUE strength functional for tasks during evaluation  -SD       Row Name 07/30/25 0832          Balance    Comment, Balance supervision with sitting balance and SBA for standing balance using a rolling walker for support  -SD               User Key  (r) = Recorded By, (t) = Taken By, (c) = Cosigned By      Initials Name Provider Type    Mike Mandujano OTR Occupational Therapist                   Goals/Plan    No documentation.                  Clinical Impression       Row Name 07/30/25 0840 07/30/25 0833       Pain Assessment    Pretreatment Pain Rating 7/10  -SD 7/10  -SD    Posttreatment Pain Rating --  pt reported pain level was better after activity. Pain level not rated.  -SD --  pt reported pain level was better after activity. Pain level not rated.  -SD    Pain Location -- knee  -SD    Pain Side/Orientation -- right  -SD    Pain Management Interventions -- exercise or physical activity utilized  -SD    Response to Pain Interventions -- activity participation with decreased pain  -SD      Row Name 07/30/25 0833          Plan of Care Review    Plan of Care Reviewed With patient  -SD     Outcome Evaluation Occupational therapy evaluation completed. Pt s/p right TKA. Pt managed bed mobility with supervision and functional transfers and mobility x 100 ft with SBA using a rolling walker for support. Pt needs min assist for lower body adl's. Education was provided regarding compensatory strategies for lower body adl management s/p TKA. Pt reports no concerns for adl management with adaptive equipment available and support from spouse. Plan is for HH services for a few weeks then transition to outpatient physical therapy at [facility]. No further OT services recommended.  -SD       Row Name 07/30/25 0833          Therapy  Assessment/Plan (OT)    Patient/Family Therapy Goal Statement (OT) go home.  -SD     Criteria for Skilled Therapeutic Interventions Met (OT) other (see comments)  Anticipate discharge from facility today. Education provided regarding compensatory strategies for lower body adl management s/p TKA. Pt reports no concerns for discharge.  -SD     Therapy Frequency (OT) evaluation only  -SD       Row Name 07/30/25 0833          Therapy Plan Review/Discharge Plan (OT)    Anticipated Discharge Disposition (OT) home with home health;home with assist  -SD       Row Name 07/30/25 0833          Positioning and Restraints    Pre-Treatment Position in bed  -SD     Post Treatment Position chair  -SD     In Chair sitting;call light within reach;with PT  -SD               User Key  (r) = Recorded By, (t) = Taken By, (c) = Cosigned By      Initials Name Provider Type    SD Mike Quezada, OTR Occupational Therapist                   Outcome Measures       Row Name 07/30/25 0839          How much help from another is currently needed...    Putting on and taking off regular lower body clothing? 3  -SD     Bathing (including washing, rinsing, and drying) 3  -SD     Toileting (which includes using toilet bed pan or urinal) 3  -SD     Putting on and taking off regular upper body clothing 4  -SD     Taking care of personal grooming (such as brushing teeth) 4  -SD     Eating meals 4  -SD     AM-PAC 6 Clicks Score (OT) 21  -SD       Row Name 07/29/25 2134          How much help from another person do you currently need...    Turning from your back to your side while in flat bed without using bedrails? 3  -SB     Moving from lying on back to sitting on the side of a flat bed without bedrails? 3  -SB     Moving to and from a bed to a chair (including a wheelchair)? 2  -SB     Standing up from a chair using your arms (e.g., wheelchair, bedside chair)? 2  -SB     Climbing 3-5 steps with a railing? 2  -SB     To walk in hospital room? 2  -SB      AM-PAC 6 Clicks Score (PT) 14  -SB       Row Name 07/30/25 0839          Functional Assessment    Outcome Measure Options AM-PAC 6 Clicks Daily Activity (OT)  -SD               User Key  (r) = Recorded By, (t) = Taken By, (c) = Cosigned By      Initials Name Provider Type    SD Mike Quezada OTR Occupational Therapist    She Green RN Registered Nurse                  Occupational Therapy Education       Title: PT OT SLP Therapies (Resolved)       Topic: Occupational Therapy (Resolved)       Point: ADL training (Resolved)       Learning Progress Summary            Patient Acceptance, E, VU by SD at 7/30/2025 0839    Comment: Education provided regarding compensatory strategies for lower body adl management s/p TKA and safety with bed mobility, transfers and mobility. Pt reports no concerns for adl's with adaptive equipment available and support from spouse.                                      User Key       Initials Effective Dates Name Provider Type Discipline    SD 06/16/21 -  Mike Quezada OTR Occupational Therapist OT                  OT Recommendation and Plan  Therapy Frequency (OT): evaluation only  Plan of Care Review  Plan of Care Reviewed With: patient  Outcome Evaluation: Occupational therapy evaluation completed. Pt s/p right TKA. Pt managed bed mobility with supervision and functional transfers and mobility with SBA using a rolling walker for support. Pt needs min assist for lower body adl's. Education was provided regarding compensatory strategies for lower body adl management s/p TKA. Pt reports no concerns for adl management with adaptive equipment available and support from spouse. Plan is for HH services for a few weeks then transition to outpatient physical therapy at [facility]. No further OT services recommended.     Time Calculation:   Evaluation Complexity (OT)  Review Occupational Profile/Medical/Therapy History Complexity: brief/low complexity  Assessment,  Occupational Performance/Identification of Deficit Complexity: 1-3 performance deficits  Clinical Decision Making Complexity (OT): problem focused assessment/low complexity  Overall Complexity of Evaluation (OT): low complexity     Time Calculation- OT       Row Name 07/30/25 0845             Time Calculation- OT    OT Start Time 0800  -SD      OT Stop Time 0823  -SD      OT Time Calculation (min) 23 min  -SD         Untimed Charges    OT Eval/Re-eval Minutes 23  -SD         Total Minutes    Untimed Charges Total Minutes 23  -SD       Total Minutes 23  -SD                User Key  (r) = Recorded By, (t) = Taken By, (c) = Cosigned By      Initials Name Provider Type    SD Mike Quezada OTR Occupational Therapist                  Therapy Charges for Today       Code Description Service Date Service Provider Modifiers Qty    [id number]  OT EVAL LOW COMPLEXITY 2 7/30/2025 Mike Quezada OTR GO 1               OT Discharge Summary  Anticipated Discharge Disposition (OT): home with home health, home with assist  Reason for Discharge: Discharge from facility    JOLEEN Flowers  7/30/2025

## 2025-07-30 NOTE — PLAN OF CARE
Goal Outcome Evaluation:           Progress: improving  Outcome Evaluation: pt vss, surgical drsg c/d/i, immobilizer when ambulating, IV antibiotics, pt only reports pain overnight, walks to bathroom with 1 assist and walker

## 2025-07-30 NOTE — PROGRESS NOTES
POD# 1 s/p right TKA    Subjective:Patient states pain controlled overnight, denies fevers chills or sweats overnight, denies any residual numbness or tingling to operative extremity.  No calf pain or swelling.    Objective:  Vitals:    07/29/25 2319 07/30/25 0016 07/30/25 0356 07/30/25 0748   BP: 121/58  133/76 131/69   BP Location: Left arm  Left arm    Patient Position: Lying  Lying Lying   Pulse: 76 86 65 60   Resp: 16 14 16 18   Temp: 96.6 °F (35.9 °C)  96.9 °F (36.1 °C) 97.4 °F (36.3 °C)   TempSrc: Axillary  Temporal Oral   SpO2: 90% 92% 91% 97%   Weight:       Height:           Results from last 7 days   Lab Units 07/30/25  0416   WBC 10*3/mm3 10.89*   HEMOGLOBIN g/dL 10.6*   HEMATOCRIT % 34.5   PLATELETS 10*3/mm3 278       Results from last 7 days   Lab Units 07/30/25  0416 07/29/25  0848   SODIUM mmol/L 136  --    POTASSIUM mmol/L 4.4 4.0   CHLORIDE mmol/L 103  --    CO2 mmol/L 21.1*  --    BUN mg/dL 13.1  --    CREATININE mg/dL 1.01*  --    GLUCOSE mg/dL 142*  --    CALCIUM mg/dL 8.8  --        Exam:    Right knee- dressing clean, dry, intact   Flex and extend toes and ankle   No calf pain, negative Homans sign   Positive sensation light touch right foot   Brisk cap refill all digits, palpable dorsalis pedis pulse    Impression: s/p right TKA    Plan:  1. PT/OT- weight-bear as tolerated, ambulation, range of motion  2. Pain control-  oxycodone, Tylenol  3. DVT prophylaxis-Xarelto home dose  4. Wound care-leave mesh and glue in place until follow-up visit  5. Disposition- home with home health once medically stable and cleared by PT

## 2025-07-30 NOTE — PLAN OF CARE
Goal Outcome Evaluation:  Plan of Care Reviewed With: patient, spouse        Progress: improving  Outcome Evaluation: Discharge education, see D/C navigator.  Discharge teaching to pt and , both verbalize understanding of discharge education. Pt to travel home with  via personal car. Pt is waiting on [facility] to deliver home meds to room.

## 2025-07-30 NOTE — CASE MANAGEMENT/SOCIAL WORK
Case Management Discharge Note      Final Note: home with [facility]         Selected Continued Care - Discharged on 7/30/2025 Admission date: 7/29/2025 - Discharge disposition: Home or Self Care      Destination    No services have been selected for the patient.                Durable Medical Equipment    No services have been selected for the patient.                Dialysis/Infusion    No services have been selected for the patient.                Home Medical Care       Service Provider Services Address Phone Fax Patient Preferred    [facility] [address] [place] [id number] [phone] --              Therapy    No services have been selected for the patient.                Community Resources    No services have been selected for the patient.                Community & DME    No services have been selected for the patient.                         Final Discharge Disposition Code: 06 - home with home health care

## 2025-07-30 NOTE — THERAPY DISCHARGE NOTE
Patient Name: Krys Mayes  : 1950    MRN: [id number]                              Today's Date: 2025       Admit Date: 2025    Visit Dx:     ICD-10-CM ICD-9-CM   1. Primary osteoarthritis of right knee  M17.11 715.16     Patient Active Problem List   Diagnosis    Elevated alanine aminotransferase (ALT) level    Mixed anxiety depressive disorder    Gastroesophageal reflux disease    Essential hypertension    Insomnia    Microalbuminuria    Obesity (BMI 30-39.9)    Proteinuria    Vitamin D deficiency    Postmenopause    History of fracture    Reflux esophagitis    Calculus of kidney    PAF (paroxysmal atrial fibrillation)    COLTON (generalized anxiety disorder)    Iron deficiency anemia    Prediabetes    Urine frequency    Medicare annual wellness visit, subsequent    High risk medication use    Dermoid cyst of scalp    Hyperglycemia    Grief    Mixed hyperlipidemia    Long-term use of high-risk medication    Frequent UTI    Greater trochanteric bursitis of right hip    Chondromalacia, right knee    Post-traumatic arthritis of left ankle    Generalized weakness    Screening for malignant neoplasm of colon    Bilateral pneumonia    COVID-19 virus detected    Cytokine release syndrome, grade 2    Pneumonia due to other gram-negative bacteria    Primary osteoarthritis of right knee    S/P total knee arthroplasty     Past Medical History:   Diagnosis Date    Abnormal ECG     afib brought on by strong antibiotics    Arthritis     Arthritis of neck     Bursitis of hip     Cataract     Cholelithiasis taken out 91    Clotting disorder blood thinner    Depression     Disease of thyroid gland     Diverticulosis     Elevated cholesterol     Fracture of ankle     Fracture, tibia and fibula     Gastroesophageal reflux disease 2016    Hip arthrosis     Hyperlipidemia     Hypertension     Kidney stone     recurrent, calcium oxalate    Knee swelling     Menopausal disorder     She went  through menopause in 1998    Neuropathy     Obesity     PAF (paroxysmal atrial fibrillation)     in the setting of urosepsis, 9/2016    Pneumonia     Reflux esophagitis 08/19/2016    Sebaceous cyst of labia 12/20/2017    Sepsis due to urinary tract infection     Sigmoid diverticulitis 11/13/2017    Type 2 diabetes mellitus     Urinary tract infection     Visual impairment cataracts    Vitamin D deficiency 01/21/2016     Past Surgical History:   Procedure Laterality Date    ANKLE OPEN REDUCTION INTERNAL FIXATION  07/2010    ANKLE SURGERY      Ankle Repair / Description: ORif the left ankle in July 2010. Secondary to fall    BREAST BIOPSY Right     benign    CATARACT EXTRACTION Right     CHOLECYSTECTOMY      COLONOSCOPY  2014    Done 2004 normal recheck in 10 years. Repeated February 2014 colonoscopy was normal and to be repeated in 10 years.    CYSTOSCOPY BLADDER STONE LITHOTRIPSY      FRACTURE SURGERY Left     ANKLE    HEMORRHOIDECTOMY      NEPHROLITHOTOMY Left 01/09/2017    Procedure: NEPHROLITHOTOMY PERCUTANEOUS SECOND LOOK WITH STENT PLACEMENT AND LASER LITHOTRIPSY;  Surgeon: Mati Villegas MD;  Location: [facility];  Service:     OTHER SURGICAL HISTORY      Tubal Ligation    PERCUTANEOUS NEPHROSTOLITHOTOMY Left 12/2016    TUBAL ABDOMINAL LIGATION      URETEROSCOPY LASER LITHOTRIPSY WITH STENT INSERTION Left 12/06/2022    Procedure: LEFT URETEROSCOPY WITH LASER LITHOTRIPSY, STONE BASKET EXTRACTION, STENT PLACEMENT;  Surgeon: Mati Villegas MD;  Location: [facility] OR;  Service: Urology;  Laterality: Left;      General Information       Row Name 07/30/25 0800          Physical Therapy Time and Intention    Document Type discharge evaluation/summary  -     Mode of Treatment physical therapy  -       Row Name 07/30/25 0800          General Information    Patient Profile Reviewed yes  pt s/p right TKA, WBAT  -JW     Prior Level of Function independent:;all household mobility;community mobility  pt  states she uses a rollator for mobility at home  -     Existing Precautions/Restrictions fall  -     Barriers to Rehab none identified  -       Row Name 07/30/25 0800          Living Environment    Current Living Arrangements home  -     People in Home spouse  -       Row Name 07/30/25 0800          Home Main Entrance    Number of Stairs, Main Entrance three  3 through garage  -     Stair Railings, Main Entrance railings on both sides of stairs  -       Row Name 07/30/25 0800          Stairs Within Home, Primary    Stairs, Within Home, Primary 2 story house, stair lift available to manage stairs in the home  -       Row Name 07/30/25 0800          Cognition    Orientation Status (Cognition) oriented x 4  -               User Key  (r) = Recorded By, (t) = Taken By, (c) = Cosigned By      Initials Name Provider Type     Stephanie Nelson, PT Physical Therapist                   Mobility       Row Name 07/30/25 0800          Bed Mobility    Bed Mobility supine-sit  -     Supine-Sit [place] (Bed Mobility) standby assist  -     Assistive Device (Bed Mobility) bed rails;head of bed elevated  -       Row Name 07/30/25 0800          Transfers    Comment, (Transfers) cues for hand placement  -       Row Name 07/30/25 0800          Sit-Stand Transfer    Sit-Stand [place] (Transfers) standby assist  -     Assistive Device (Sit-Stand Transfers) walker, front-wheeled  -       Row Name 07/30/25 0800          Gait/Stairs (Locomotion)    [place] Level (Gait) standby assist  -     Assistive Device (Gait) walker, front-wheeled  -     Distance in Feet (Gait) 100  -     Deviations/Abnormal Patterns (Gait) viry decreased  -     Bilateral Gait Deviations forward flexed posture  -     Comment, (Gait/Stairs) pt manages direction changes without difficulty.  she declines formal stair training  -       Row Name 07/30/25 0800          Mobility    Extremity Weight-bearing Status right  lower extremity  -     Right Lower Extremity (Weight-bearing Status) weight-bearing as tolerated (WBAT)  -               User Key  (r) = Recorded By, (t) = Taken By, (c) = Cosigned By      Initials Name Provider Type    Stephanie Mariscal PT Physical Therapist                   Obj/Interventions       Row Name 07/30/25 0800          Range of Motion Comprehensive    Comment, General Range of Motion left LE WFL, right LE not formally tested  -[facility] 07/30/25 0800          Strength Comprehensive (MMT)    Comment, General Manual Muscle Testing (MMT) Assessment left LE strength functional within task, right LE not formally tested  -[facility] 07/30/25 0800          Motor Skills    Therapeutic Exercise --  pt performs 10 reps of LE HEP, written handout provided.  pt requires assistance for SLR  -JW       Row Name 07/30/25 0800          Balance    Comment, Balance SBA for standing balance with walker  -JW       Row Name 07/30/25 0800          Sensory Assessment (Somatosensory)    Sensory Assessment pt reports no deficits in sensation  -               User Key  (r) = Recorded By, (t) = Taken By, (c) = Cosigned By      Initials Name Provider Type    Stephanie Mariscal PT Physical Therapist                   Goals/Plan    No documentation.                  Clinical Impression       Row Name 07/30/25 00          Pain    Pretreatment Pain Rating 7/10  -     Posttreatment Pain Rating --  reports pain improved, does not rate  -     Pain Location knee  -     Pain Side/Orientation right  -     Pain Management Interventions cold applied  -JW     Response to Pain Interventions activity participation with decreased pain  -[facility] 07/30/25 0800          Plan of Care Review    Plan of Care Reviewed With patient  -     Outcome Evaluation Physical therapy evaluation complete.  patient performs supine to sit with SBA and sit to stand wtih SBA.  Patient performs gait with rolling walker x100  "feet, SBA.  Patient manages direction changes without difficulty or balance loss.  patient performs 10 reps of LE HEP, written handout provided.  Patient reports no concerns regarding return home at this time, anticipate home health PT at discharge.  No further skilled PT needs in acute care setting.  -JW       Row Name 07/30/25 0800          Therapy Assessment/Plan (PT)    Patient/Family Therapy Goals Statement (PT) \"go home\"  -JW     Criteria for Skilled Interventions Met (PT) no problems identified which require skilled intervention  -JW     Therapy Frequency (PT) evaluation only  -JW       Row Name 07/30/25 0800          Positioning and Restraints    Pre-Treatment Position in bed  -JW     Post Treatment Position chair  -JW     In Chair reclined;call light within reach;encouraged to call for assist  -JW               User Key  (r) = Recorded By, (t) = Taken By, (c) = Cosigned By      Initials Name Provider Type    Stephanie Mariscal, PT Physical Therapist                   Outcome Measures       Row Name 07/30/25 0800 07/29/25 2133       How much help from another person do you currently need...    Turning from your back to your side while in flat bed without using bedrails? 3  -JW 3  -SB    Moving from lying on back to sitting on the side of a flat bed without bedrails? 3  -JW 3  -SB    Moving to and from a bed to a chair (including a wheelchair)? 3  -JW 2  -SB    Standing up from a chair using your arms (e.g., wheelchair, bedside chair)? 3  -JW 2  -SB    Climbing 3-5 steps with a railing? 3  -JW 2  -SB    To walk in hospital room? 3  -JW 2  -SB    AM-PAC 6 Clicks Score (PT) 18  -JW 14  -SB    Highest Level of Mobility Goal Walk 10 Steps or More-6  -JW Move to Chair/Commode-4  -SB      Row Name 07/30/25 0800          PADD    Diagnosis 1  -JW     Gender 1  -JW     Age Group 1  -JW     Gait Distance 0  -JW     Assist Level 2  -JW     Home Support 3  -JW     PADD Score 7  -JW     Patient Preference home with home " health  -     Prediction by PADD Score extended rehabilitation  -       Row Name 07/30/25 0839 07/30/25 0800       Functional Assessment    Outcome Measure Options AM-PAC 6 Clicks Daily Activity (OT)  -SD AM-PAC 6 Clicks Basic Mobility (PT);PADD  -              User Key  (r) = Recorded By, (t) = Taken By, (c) = Cosigned By      Initials Name Provider Type    SD Mike Quezada, OTR Occupational Therapist    Stephanie Mariscal, PT Physical Therapist    She Green, RN Registered Nurse                  Physical Therapy Education       Title: PT OT SLP Therapies (Done)       Topic: Physical Therapy (Done)       Point: Mobility training (Done)       Learning Progress Summary            Patient Acceptance, E,TB, VU by JENNIFER at 7/30/2025 0853                      Point: Home exercise program (Done)       Learning Progress Summary            Patient Acceptance, E,TB, VU by  at 7/30/2025 0853                                      User Key       Initials Effective Dates Name Provider Type Discipline     06/16/21 -  Stephanie Nelson, PT Physical Therapist PT                  PT Recommendation and Plan     Outcome Evaluation: Physical therapy evaluation complete.  patient performs supine to sit with SBA and sit to stand wtih SBA.  Patient performs gait with rolling walker x100 feet, SBA.  Patient manages direction changes without difficulty or balance loss.  patient performs 10 reps of LE HEP, written handout provided.  Patient reports no concerns regarding return home at this time, anticipate home health PT at discharge.  No further skilled PT needs in acute care setting.     Time Calculation:   PT Evaluation Complexity  History, PT Evaluation Complexity: 1-2 personal factors and/or comorbidities  Examination of Body Systems (PT Eval Complexity): 1-2 elements  Clinical Presentation (PT Evaluation Complexity): stable  Clinical Decision Making (PT Evaluation Complexity): low complexity  Overall Complexity (PT  Evaluation Complexity): low complexity     PT Charges       Row Name 07/30/25 0854             Time Calculation    Start Time 0800  -      Stop Time 0825  -      Time Calculation (min) 25 min  -      PT Received On 07/30/25  -                User Key  (r) = Recorded By, (t) = Taken By, (c) = Cosigned By      Initials Name Provider Type    Stephanie Mariscal, PT Physical Therapist                  Therapy Charges for Today       Code Description Service Date Service Provider Modifiers Qty    [id number] HC PT EVAL LOW COMPLEXITY 2 7/30/2025 Stephanie Nelson, PT GP 1            PT G-Codes  Outcome Measure Options: AM-PAC 6 Clicks Daily Activity (OT)  AM-PAC 6 Clicks Score (PT): 18  AM-PAC 6 Clicks Score (OT): 21    PT Discharge Summary  Anticipated Discharge Disposition (PT): home with home health    Stephanie Nelson, KOSTA  7/30/2025

## 2025-07-30 NOTE — CASE MANAGEMENT/SOCIAL WORK
Continued Stay Note  ISMAEL David     Patient Name: Krys Mayes  MRN: [id number]  Today's Date: 7/30/2025    Admit Date: 7/29/2025    Plan: Plan home/[facility]   Discharge Plan       Row Name 07/30/25 1045       Plan    Plan Plan home/[facility]    Patient/Family in Agreement with Plan yes    Plan Comments Follow up visit with patient, she is sitting up in recliner. She confirms she has RW/BSC at home and is agreeable to [facility] following her for home PT. Anticipate dc home today. CM will continue to follow.                   Discharge Codes    No documentation.                 Expected Discharge Date and Time       Expected Discharge Date Expected Discharge Time    Jul 30, 2025               Colby Crawford RN

## 2025-07-31 ENCOUNTER — TRANSITIONAL CARE MANAGEMENT TELEPHONE ENCOUNTER (OUTPATIENT)
Dept: CALL CENTER | Facility: HOSPITAL | Age: 75
End: 2025-07-31
Payer: MEDICARE

## 2025-07-31 NOTE — OUTREACH NOTE
Call [facility] TCM Note      Flowsheet Row Responses   [facility] patient discharged from? [place]   Does the patient have one of the following disease processes/diagnoses(primary or secondary)? Total Joint Replacement   Joint surgery performed? Knee   TCM attempt successful? Yes   Call start time 0903   Call end time 0907   Has the patient been back in either the hospital or Emergency Department since discharge? No   Discharge diagnosis Total Right Knee Arthroplasty   Does the patient have all medications related to this admission filled (includes all antibiotics, pain medications, etc.) Yes   Is the patient taking all medications as directed (includes completed medication regime)? Yes   Is the patient able to teach back alternate methods of pain control? Ice, Reposition, Correct alignment, Short, frequent activity, Knee-elevation/no pillow under knee   Comments Hospital follow up appt with PCP AGUSTIN Zhang for 8/1   Does the patient have an appointment with their PCP within 7-14 days of discharge? Yes   What is the Home health agency?  [facility]   Has home health visited the patient within 72 hours of discharge? Call prior to 72 hours   Home health comments states  is on the way to see her now   Psychosocial issues? No   Has the patient began therapy sessions (either in the home or as an out patient)? No   Does the patient have a wound vac in place? N/A   Has the patient fallen since discharge? No   Did the patient receive a copy of their discharge instructions? Yes   Nursing interventions Reviewed instructions with patient   What is the patient's perception of their functional status since discharge? Improving   Is the patient able to teach back signs and symptoms of infection? Shortness of breath or chest pain, Changes in mobility, Severe discomfort or pain, Blisters around incision, Increased swelling or redness around incision (not associated with surgical edema), Incisional drainage, Temp >100.4  for 24h or longer   Is the patient able to teach back how to prevent infection? Check incision daily   If the patient is a current smoker, are they able to teach back resources for cessation? Not a smoker   Is the patient/caregiver able to teach back the hierarchy of who to call/visit for symptoms/problems? PCP, Specialist, Home health nurse, Urgent Care, ED, 911 Yes   TCM call completed? Yes   Wrap up additional comments Doing well, denies any questions or concerns, states  is coming to see her today, confirmed her upcoming follow up appts including hospital follow up appt with PCP for 8/1.   Call end time 0907   Would this patient benefit from a Referral to [facility] Social Work? No   Is the patient interested in additional calls from an ambulatory ? No            Radha NAPOLES - Registered Nurse    7/31/2025, 09:07 EDT

## 2025-08-03 ENCOUNTER — APPOINTMENT (OUTPATIENT)
Dept: GENERAL RADIOLOGY | Facility: HOSPITAL | Age: 75
End: 2025-08-03
Payer: MEDICARE

## 2025-08-03 ENCOUNTER — HOSPITAL ENCOUNTER (OUTPATIENT)
Facility: HOSPITAL | Age: 75
Setting detail: OBSERVATION
Discharge: HOME HEALTH | End: 2025-08-04
Attending: EMERGENCY MEDICINE | Admitting: HOSPITALIST
Payer: MEDICARE

## 2025-08-03 ENCOUNTER — APPOINTMENT (OUTPATIENT)
Dept: CT IMAGING | Facility: HOSPITAL | Age: 75
End: 2025-08-03
Payer: MEDICARE

## 2025-08-03 DIAGNOSIS — R06.00 DYSPNEA, UNSPECIFIED TYPE: ICD-10-CM

## 2025-08-03 DIAGNOSIS — T50.905A ADVERSE EFFECT OF DRUG, INITIAL ENCOUNTER: ICD-10-CM

## 2025-08-03 DIAGNOSIS — F13.931 BENZODIAZEPINE WITHDRAWAL WITH DELIRIUM: ICD-10-CM

## 2025-08-03 DIAGNOSIS — I50.9 ACUTE CONGESTIVE HEART FAILURE, UNSPECIFIED HEART FAILURE TYPE: Primary | ICD-10-CM

## 2025-08-03 LAB
ALBUMIN SERPL-MCNC: 3.5 G/DL (ref 3.5–5.2)
ALBUMIN/GLOB SERPL: 1.3 G/DL
ALP SERPL-CCNC: 64 U/L (ref 39–117)
ALT SERPL W P-5'-P-CCNC: 11 U/L (ref 1–33)
ANION GAP SERPL CALCULATED.3IONS-SCNC: 10.7 MMOL/L (ref 5–15)
AST SERPL-CCNC: 24 U/L (ref 1–32)
BASOPHILS # BLD AUTO: 0.03 10*3/MM3 (ref 0–0.2)
BASOPHILS NFR BLD AUTO: 0.3 % (ref 0–1.5)
BILIRUB SERPL-MCNC: 0.6 MG/DL (ref 0–1.2)
BUN SERPL-MCNC: 11.4 MG/DL (ref 8–23)
BUN/CREAT SERPL: 15.6 (ref 7–25)
CALCIUM SPEC-SCNC: 9.1 MG/DL (ref 8.6–10.5)
CHLORIDE SERPL-SCNC: 104 MMOL/L (ref 98–107)
CO2 SERPL-SCNC: 26.3 MMOL/L (ref 22–29)
CREAT SERPL-MCNC: 0.73 MG/DL (ref 0.57–1)
DEPRECATED RDW RBC AUTO: 45.7 FL (ref 37–54)
EGFRCR SERPLBLD CKD-EPI 2021: 85.4 ML/MIN/1.73
EOSINOPHIL # BLD AUTO: 0.24 10*3/MM3 (ref 0–0.4)
EOSINOPHIL NFR BLD AUTO: 2.7 % (ref 0.3–6.2)
ERYTHROCYTE [DISTWIDTH] IN BLOOD BY AUTOMATED COUNT: 14.4 % (ref 12.3–15.4)
FLUAV RNA RESP QL NAA+PROBE: NOT DETECTED
FLUBV RNA RESP QL NAA+PROBE: NOT DETECTED
GLOBULIN UR ELPH-MCNC: 2.7 GM/DL
GLUCOSE SERPL-MCNC: 119 MG/DL (ref 65–99)
HBA1C MFR BLD: 5.58 % (ref 4.8–5.6)
HCT VFR BLD AUTO: 34.6 % (ref 34–46.6)
HGB BLD-MCNC: 10.5 G/DL (ref 12–15.9)
IMM GRANULOCYTES # BLD AUTO: 0.02 10*3/MM3 (ref 0–0.05)
IMM GRANULOCYTES NFR BLD AUTO: 0.2 % (ref 0–0.5)
LYMPHOCYTES # BLD AUTO: 0.71 10*3/MM3 (ref 0.7–3.1)
LYMPHOCYTES NFR BLD AUTO: 8.1 % (ref 19.6–45.3)
MCH RBC QN AUTO: 25.9 PG (ref 26.6–33)
MCHC RBC AUTO-ENTMCNC: 30.3 G/DL (ref 31.5–35.7)
MCV RBC AUTO: 85.4 FL (ref 79–97)
MONOCYTES # BLD AUTO: 0.8 10*3/MM3 (ref 0.1–0.9)
MONOCYTES NFR BLD AUTO: 9.1 % (ref 5–12)
NEUTROPHILS NFR BLD AUTO: 7 10*3/MM3 (ref 1.7–7)
NEUTROPHILS NFR BLD AUTO: 79.6 % (ref 42.7–76)
NT-PROBNP SERPL-MCNC: 9661 PG/ML (ref 0–1800)
PLATELET # BLD AUTO: 278 10*3/MM3 (ref 140–450)
PMV BLD AUTO: 9.2 FL (ref 6–12)
POTASSIUM SERPL-SCNC: 4.2 MMOL/L (ref 3.5–5.2)
PROT SERPL-MCNC: 6.2 G/DL (ref 6–8.5)
QT INTERVAL: 430 MS
QTC INTERVAL: 514 MS
RBC # BLD AUTO: 4.05 10*6/MM3 (ref 3.77–5.28)
SARS-COV-2 RNA RESP QL NAA+PROBE: NOT DETECTED
SODIUM SERPL-SCNC: 141 MMOL/L (ref 136–145)
TROPONIN T SERPL HS-MCNC: 11 NG/L
TSH SERPL DL<=0.05 MIU/L-ACNC: 2.7 UIU/ML (ref 0.27–4.2)
WBC NRBC COR # BLD AUTO: 8.8 10*3/MM3 (ref 3.4–10.8)

## 2025-08-03 PROCEDURE — G0378 HOSPITAL OBSERVATION PER HR: HCPCS

## 2025-08-03 PROCEDURE — 96375 TX/PRO/DX INJ NEW DRUG ADDON: CPT

## 2025-08-03 PROCEDURE — 25010000002 DIAZEPAM PER 5 MG: Performed by: HOSPITALIST

## 2025-08-03 PROCEDURE — 71045 X-RAY EXAM CHEST 1 VIEW: CPT

## 2025-08-03 PROCEDURE — 80053 COMPREHEN METABOLIC PANEL: CPT | Performed by: EMERGENCY MEDICINE

## 2025-08-03 PROCEDURE — 25010000002 ONDANSETRON PER 1 MG: Performed by: EMERGENCY MEDICINE

## 2025-08-03 PROCEDURE — 25010000002 NALOXONE PER 1 MG: Performed by: HOSPITALIST

## 2025-08-03 PROCEDURE — 87636 SARSCOV2 & INF A&B AMP PRB: CPT | Performed by: EMERGENCY MEDICINE

## 2025-08-03 PROCEDURE — 83036 HEMOGLOBIN GLYCOSYLATED A1C: CPT | Performed by: HOSPITALIST

## 2025-08-03 PROCEDURE — 83880 ASSAY OF NATRIURETIC PEPTIDE: CPT | Performed by: EMERGENCY MEDICINE

## 2025-08-03 PROCEDURE — 85025 COMPLETE CBC W/AUTO DIFF WBC: CPT | Performed by: EMERGENCY MEDICINE

## 2025-08-03 PROCEDURE — 99222 1ST HOSP IP/OBS MODERATE 55: CPT | Performed by: HOSPITALIST

## 2025-08-03 PROCEDURE — 25010000002 FUROSEMIDE PER 20 MG: Performed by: EMERGENCY MEDICINE

## 2025-08-03 PROCEDURE — 84443 ASSAY THYROID STIM HORMONE: CPT | Performed by: HOSPITALIST

## 2025-08-03 PROCEDURE — 70450 CT HEAD/BRAIN W/O DYE: CPT

## 2025-08-03 PROCEDURE — 96374 THER/PROPH/DIAG INJ IV PUSH: CPT

## 2025-08-03 PROCEDURE — 99285 EMERGENCY DEPT VISIT HI MDM: CPT | Performed by: EMERGENCY MEDICINE

## 2025-08-03 PROCEDURE — 93005 ELECTROCARDIOGRAM TRACING: CPT | Performed by: EMERGENCY MEDICINE

## 2025-08-03 PROCEDURE — 84484 ASSAY OF TROPONIN QUANT: CPT | Performed by: HOSPITALIST

## 2025-08-03 PROCEDURE — 25010000002 KETOROLAC TROMETHAMINE PER 15 MG: Performed by: EMERGENCY MEDICINE

## 2025-08-03 RX ORDER — MULTIVITAMIN WITH IRON
500 TABLET ORAL DAILY
Status: DISCONTINUED | OUTPATIENT
Start: 2025-08-03 | End: 2025-08-04 | Stop reason: HOSPADM

## 2025-08-03 RX ORDER — LEVOTHYROXINE SODIUM 25 UG/1
25 TABLET ORAL
Status: DISCONTINUED | OUTPATIENT
Start: 2025-08-04 | End: 2025-08-04 | Stop reason: HOSPADM

## 2025-08-03 RX ORDER — ASCORBIC ACID 500 MG
2000 TABLET ORAL DAILY
Status: DISCONTINUED | OUTPATIENT
Start: 2025-08-03 | End: 2025-08-04 | Stop reason: HOSPADM

## 2025-08-03 RX ORDER — NITROGLYCERIN 0.4 MG/1
0.4 TABLET SUBLINGUAL
Status: DISCONTINUED | OUTPATIENT
Start: 2025-08-03 | End: 2025-08-04 | Stop reason: HOSPADM

## 2025-08-03 RX ORDER — ONDANSETRON 4 MG/1
4 TABLET, ORALLY DISINTEGRATING ORAL EVERY 8 HOURS PRN
Status: DISCONTINUED | OUTPATIENT
Start: 2025-08-03 | End: 2025-08-04 | Stop reason: HOSPADM

## 2025-08-03 RX ORDER — DIAZEPAM 10 MG/2ML
10 INJECTION, SOLUTION INTRAMUSCULAR; INTRAVENOUS EVERY 4 HOURS PRN
Status: DISCONTINUED | OUTPATIENT
Start: 2025-08-03 | End: 2025-08-03

## 2025-08-03 RX ORDER — ALPRAZOLAM 0.5 MG
0.5 TABLET ORAL 2 TIMES DAILY PRN
Status: DISCONTINUED | OUTPATIENT
Start: 2025-08-03 | End: 2025-08-03

## 2025-08-03 RX ORDER — L.ACID,PARA/B.BIFIDUM/S.THERM 8B CELL
1 CAPSULE ORAL DAILY
Status: DISCONTINUED | OUTPATIENT
Start: 2025-08-03 | End: 2025-08-04 | Stop reason: HOSPADM

## 2025-08-03 RX ORDER — ATORVASTATIN CALCIUM 40 MG/1
40 TABLET, FILM COATED ORAL DAILY
Status: DISCONTINUED | OUTPATIENT
Start: 2025-08-03 | End: 2025-08-04 | Stop reason: HOSPADM

## 2025-08-03 RX ORDER — DIAZEPAM 10 MG/2ML
5 INJECTION, SOLUTION INTRAMUSCULAR; INTRAVENOUS ONCE
Status: DISCONTINUED | OUTPATIENT
Start: 2025-08-03 | End: 2025-08-04

## 2025-08-03 RX ORDER — ACETAMINOPHEN 500 MG
500 TABLET ORAL EVERY 6 HOURS PRN
Status: DISCONTINUED | OUTPATIENT
Start: 2025-08-03 | End: 2025-08-04 | Stop reason: HOSPADM

## 2025-08-03 RX ORDER — CHOLECALCIFEROL (VITAMIN D3) 25 MCG
1000 TABLET ORAL DAILY
Status: DISCONTINUED | OUTPATIENT
Start: 2025-08-03 | End: 2025-08-04 | Stop reason: HOSPADM

## 2025-08-03 RX ORDER — FUROSEMIDE 10 MG/ML
80 INJECTION INTRAMUSCULAR; INTRAVENOUS ONCE
Status: COMPLETED | OUTPATIENT
Start: 2025-08-03 | End: 2025-08-03

## 2025-08-03 RX ORDER — ONDANSETRON 2 MG/ML
4 INJECTION INTRAMUSCULAR; INTRAVENOUS ONCE
Status: COMPLETED | OUTPATIENT
Start: 2025-08-03 | End: 2025-08-03

## 2025-08-03 RX ORDER — SODIUM CHLORIDE 0.9 % (FLUSH) 0.9 %
10 SYRINGE (ML) INJECTION AS NEEDED
Status: DISCONTINUED | OUTPATIENT
Start: 2025-08-03 | End: 2025-08-04 | Stop reason: HOSPADM

## 2025-08-03 RX ORDER — PANTOPRAZOLE SODIUM 40 MG/1
40 TABLET, DELAYED RELEASE ORAL
Status: DISCONTINUED | OUTPATIENT
Start: 2025-08-04 | End: 2025-08-04 | Stop reason: HOSPADM

## 2025-08-03 RX ORDER — GABAPENTIN 600 MG/1
600 TABLET ORAL
COMMUNITY
End: 2025-08-04 | Stop reason: HOSPADM

## 2025-08-03 RX ORDER — NALOXONE HCL 0.4 MG/ML
0.4 VIAL (ML) INJECTION ONCE
Status: COMPLETED | OUTPATIENT
Start: 2025-08-03 | End: 2025-08-03

## 2025-08-03 RX ORDER — AMOXICILLIN 250 MG
1 CAPSULE ORAL 2 TIMES DAILY
Status: DISCONTINUED | OUTPATIENT
Start: 2025-08-03 | End: 2025-08-04 | Stop reason: HOSPADM

## 2025-08-03 RX ORDER — KETOROLAC TROMETHAMINE 30 MG/ML
15 INJECTION, SOLUTION INTRAMUSCULAR; INTRAVENOUS ONCE
Status: COMPLETED | OUTPATIENT
Start: 2025-08-03 | End: 2025-08-03

## 2025-08-03 RX ORDER — ALPRAZOLAM 0.25 MG
0.25 TABLET ORAL 2 TIMES DAILY
Status: DISCONTINUED | OUTPATIENT
Start: 2025-08-03 | End: 2025-08-04 | Stop reason: HOSPADM

## 2025-08-03 RX ADMIN — OXYCODONE HYDROCHLORIDE AND ACETAMINOPHEN 2000 MG: 500 TABLET ORAL at 14:25

## 2025-08-03 RX ADMIN — NALOXONE HYDROCHLORIDE 0.4 MG: 0.4 INJECTION, SOLUTION INTRAMUSCULAR; INTRAVENOUS; SUBCUTANEOUS at 13:53

## 2025-08-03 RX ADMIN — ALPRAZOLAM 0.25 MG: 0.25 TABLET ORAL at 20:36

## 2025-08-03 RX ADMIN — FUROSEMIDE 80 MG: 10 INJECTION, SOLUTION INTRAMUSCULAR; INTRAVENOUS at 10:07

## 2025-08-03 RX ADMIN — Medication 500 MCG: at 14:25

## 2025-08-03 RX ADMIN — Medication 1000 UNITS: at 14:25

## 2025-08-03 RX ADMIN — ACETAMINOPHEN 500 MG: 500 TABLET, FILM COATED ORAL at 15:58

## 2025-08-03 RX ADMIN — RIVAROXABAN 15 MG: 15 TABLET, FILM COATED ORAL at 17:36

## 2025-08-03 RX ADMIN — ATORVASTATIN CALCIUM 40 MG: 40 TABLET, FILM COATED ORAL at 14:28

## 2025-08-03 RX ADMIN — DIAZEPAM 5 MG: 10 INJECTION, SOLUTION INTRAMUSCULAR; INTRAVENOUS at 14:47

## 2025-08-03 RX ADMIN — SERTRALINE HYDROCHLORIDE 100 MG: 50 TABLET, FILM COATED ORAL at 14:28

## 2025-08-03 RX ADMIN — Medication 1 CAPSULE: at 14:28

## 2025-08-03 RX ADMIN — METOPROLOL TARTRATE 75 MG: 25 TABLET, FILM COATED ORAL at 20:36

## 2025-08-03 RX ADMIN — KETOROLAC TROMETHAMINE 15 MG: 30 INJECTION, SOLUTION INTRAMUSCULAR at 10:54

## 2025-08-03 RX ADMIN — SENNOSIDES AND DOCUSATE SODIUM 1 TABLET: 50; 8.6 TABLET ORAL at 20:36

## 2025-08-03 RX ADMIN — ONDANSETRON 4 MG: 2 INJECTION, SOLUTION INTRAMUSCULAR; INTRAVENOUS at 10:18

## 2025-08-04 ENCOUNTER — READMISSION MANAGEMENT (OUTPATIENT)
Dept: CALL CENTER | Facility: HOSPITAL | Age: 75
End: 2025-08-04
Payer: MEDICARE

## 2025-08-04 VITALS
DIASTOLIC BLOOD PRESSURE: 85 MMHG | RESPIRATION RATE: 18 BRPM | TEMPERATURE: 97.3 F | BODY MASS INDEX: 34.49 KG/M2 | HEART RATE: 86 BPM | SYSTOLIC BLOOD PRESSURE: 162 MMHG | HEIGHT: 65 IN | WEIGHT: 207.01 LBS | OXYGEN SATURATION: 94 %

## 2025-08-04 LAB
ALBUMIN SERPL-MCNC: 3.3 G/DL (ref 3.5–5.2)
ALBUMIN/GLOB SERPL: 1.2 G/DL
ALP SERPL-CCNC: 64 U/L (ref 39–117)
ALT SERPL W P-5'-P-CCNC: 13 U/L (ref 1–33)
ANION GAP SERPL CALCULATED.3IONS-SCNC: 12.3 MMOL/L (ref 5–15)
AST SERPL-CCNC: 18 U/L (ref 1–32)
BASOPHILS # BLD AUTO: 0.06 10*3/MM3 (ref 0–0.2)
BASOPHILS NFR BLD AUTO: 0.8 % (ref 0–1.5)
BILIRUB SERPL-MCNC: 0.6 MG/DL (ref 0–1.2)
BUN SERPL-MCNC: 14.5 MG/DL (ref 8–23)
BUN/CREAT SERPL: 16.9 (ref 7–25)
CALCIUM SPEC-SCNC: 9.1 MG/DL (ref 8.6–10.5)
CHLORIDE SERPL-SCNC: 100 MMOL/L (ref 98–107)
CO2 SERPL-SCNC: 27.7 MMOL/L (ref 22–29)
CREAT SERPL-MCNC: 0.86 MG/DL (ref 0.57–1)
DEPRECATED RDW RBC AUTO: 45.1 FL (ref 37–54)
EGFRCR SERPLBLD CKD-EPI 2021: 70.1 ML/MIN/1.73
EOSINOPHIL # BLD AUTO: 0.43 10*3/MM3 (ref 0–0.4)
EOSINOPHIL NFR BLD AUTO: 5.9 % (ref 0.3–6.2)
ERYTHROCYTE [DISTWIDTH] IN BLOOD BY AUTOMATED COUNT: 14.9 % (ref 12.3–15.4)
GLOBULIN UR ELPH-MCNC: 2.8 GM/DL
GLUCOSE SERPL-MCNC: 111 MG/DL (ref 65–99)
HCT VFR BLD AUTO: 32.1 % (ref 34–46.6)
HGB BLD-MCNC: 10 G/DL (ref 12–15.9)
IMM GRANULOCYTES # BLD AUTO: 0.03 10*3/MM3 (ref 0–0.05)
IMM GRANULOCYTES NFR BLD AUTO: 0.4 % (ref 0–0.5)
LYMPHOCYTES # BLD AUTO: 0.91 10*3/MM3 (ref 0.7–3.1)
LYMPHOCYTES NFR BLD AUTO: 12.4 % (ref 19.6–45.3)
MCH RBC QN AUTO: 25.9 PG (ref 26.6–33)
MCHC RBC AUTO-ENTMCNC: 31.2 G/DL (ref 31.5–35.7)
MCV RBC AUTO: 83.2 FL (ref 79–97)
MONOCYTES # BLD AUTO: 0.81 10*3/MM3 (ref 0.1–0.9)
MONOCYTES NFR BLD AUTO: 11.1 % (ref 5–12)
NEUTROPHILS NFR BLD AUTO: 5.07 10*3/MM3 (ref 1.7–7)
NEUTROPHILS NFR BLD AUTO: 69.4 % (ref 42.7–76)
NRBC BLD AUTO-RTO: 0 /100 WBC (ref 0–0.2)
PLATELET # BLD AUTO: 278 10*3/MM3 (ref 140–450)
PMV BLD AUTO: 9.7 FL (ref 6–12)
POTASSIUM SERPL-SCNC: 3.9 MMOL/L (ref 3.5–5.2)
PROT SERPL-MCNC: 6.1 G/DL (ref 6–8.5)
RBC # BLD AUTO: 3.86 10*6/MM3 (ref 3.77–5.28)
SODIUM SERPL-SCNC: 140 MMOL/L (ref 136–145)
WBC NRBC COR # BLD AUTO: 7.31 10*3/MM3 (ref 3.4–10.8)

## 2025-08-04 PROCEDURE — 80053 COMPREHEN METABOLIC PANEL: CPT | Performed by: HOSPITALIST

## 2025-08-04 PROCEDURE — 85025 COMPLETE CBC W/AUTO DIFF WBC: CPT | Performed by: HOSPITALIST

## 2025-08-04 PROCEDURE — 63710000001 ONDANSETRON ODT 4 MG TABLET DISPERSIBLE: Performed by: HOSPITALIST

## 2025-08-04 PROCEDURE — 97161 PT EVAL LOW COMPLEX 20 MIN: CPT

## 2025-08-04 PROCEDURE — G0378 HOSPITAL OBSERVATION PER HR: HCPCS

## 2025-08-04 RX ORDER — ALPRAZOLAM 0.25 MG
0.25 TABLET ORAL EVERY 12 HOURS PRN
Qty: 20 TABLET | Refills: 0 | Status: SHIPPED | OUTPATIENT
Start: 2025-08-04

## 2025-08-04 RX ADMIN — ATORVASTATIN CALCIUM 40 MG: 40 TABLET, FILM COATED ORAL at 09:33

## 2025-08-04 RX ADMIN — ONDANSETRON 4 MG: 4 TABLET, ORALLY DISINTEGRATING ORAL at 09:36

## 2025-08-04 RX ADMIN — Medication 500 MCG: at 09:32

## 2025-08-04 RX ADMIN — METOPROLOL TARTRATE 75 MG: 25 TABLET, FILM COATED ORAL at 09:32

## 2025-08-04 RX ADMIN — ACETAMINOPHEN 500 MG: 500 TABLET, FILM COATED ORAL at 01:45

## 2025-08-04 RX ADMIN — ALPRAZOLAM 0.25 MG: 0.25 TABLET ORAL at 09:32

## 2025-08-04 RX ADMIN — Medication 1 CAPSULE: at 09:33

## 2025-08-04 RX ADMIN — OXYCODONE HYDROCHLORIDE AND ACETAMINOPHEN 2000 MG: 500 TABLET ORAL at 09:33

## 2025-08-04 RX ADMIN — SERTRALINE HYDROCHLORIDE 100 MG: 50 TABLET, FILM COATED ORAL at 09:33

## 2025-08-04 RX ADMIN — Medication 1000 UNITS: at 09:33

## 2025-08-04 RX ADMIN — SENNOSIDES AND DOCUSATE SODIUM 1 TABLET: 50; 8.6 TABLET ORAL at 09:33

## 2025-08-05 ENCOUNTER — TRANSITIONAL CARE MANAGEMENT TELEPHONE ENCOUNTER (OUTPATIENT)
Dept: CALL CENTER | Facility: HOSPITAL | Age: 75
End: 2025-08-05
Payer: MEDICARE

## 2025-08-05 ENCOUNTER — OFFICE VISIT (OUTPATIENT)
Dept: FAMILY MEDICINE CLINIC | Facility: CLINIC | Age: 75
End: 2025-08-05
Payer: MEDICARE

## 2025-08-05 VITALS
OXYGEN SATURATION: 98 % | BODY MASS INDEX: 34.45 KG/M2 | HEART RATE: 96 BPM | SYSTOLIC BLOOD PRESSURE: 134 MMHG | DIASTOLIC BLOOD PRESSURE: 82 MMHG | HEIGHT: 65 IN

## 2025-08-05 DIAGNOSIS — Z09 HOSPITAL DISCHARGE FOLLOW-UP: Primary | ICD-10-CM

## 2025-08-05 DIAGNOSIS — Z96.651 STATUS POST TOTAL RIGHT KNEE REPLACEMENT: ICD-10-CM

## 2025-08-05 DIAGNOSIS — S51.812A SKIN TEAR OF LEFT FOREARM WITHOUT COMPLICATION, INITIAL ENCOUNTER: ICD-10-CM

## 2025-08-05 DIAGNOSIS — G89.18 PAIN AT SURGICAL SITE: ICD-10-CM

## 2025-08-05 DIAGNOSIS — F41.9 ANXIETY: ICD-10-CM

## 2025-08-05 DIAGNOSIS — G62.9 NEUROPATHY: ICD-10-CM

## 2025-08-11 ENCOUNTER — READMISSION MANAGEMENT (OUTPATIENT)
Dept: CALL CENTER | Facility: HOSPITAL | Age: 75
End: 2025-08-11
Payer: MEDICARE

## 2025-08-11 ENCOUNTER — TREATMENT (OUTPATIENT)
Dept: PHYSICAL THERAPY | Facility: CLINIC | Age: 75
End: 2025-08-11
Payer: MEDICARE

## 2025-08-11 DIAGNOSIS — Z96.651 STATUS POST TOTAL RIGHT KNEE REPLACEMENT: Primary | ICD-10-CM

## 2025-08-11 DIAGNOSIS — R29.898 WEAKNESS OF RIGHT LEG: ICD-10-CM

## 2025-08-11 DIAGNOSIS — M25.661 DECREASED RANGE OF MOTION (ROM) OF RIGHT KNEE: ICD-10-CM

## 2025-08-11 DIAGNOSIS — Z74.09 IMPAIRED FUNCTIONAL MOBILITY AND ACTIVITY TOLERANCE: ICD-10-CM

## 2025-08-11 PROCEDURE — 97161 PT EVAL LOW COMPLEX 20 MIN: CPT

## 2025-08-11 PROCEDURE — 97110 THERAPEUTIC EXERCISES: CPT

## 2025-08-11 PROCEDURE — 97535 SELF CARE MNGMENT TRAINING: CPT

## 2025-08-13 ENCOUNTER — OFFICE VISIT (OUTPATIENT)
Dept: ORTHOPEDIC SURGERY | Facility: CLINIC | Age: 75
End: 2025-08-13
Payer: MEDICARE

## 2025-08-13 ENCOUNTER — TREATMENT (OUTPATIENT)
Dept: PHYSICAL THERAPY | Facility: CLINIC | Age: 75
End: 2025-08-13
Payer: MEDICARE

## 2025-08-13 VITALS — BODY MASS INDEX: 34.49 KG/M2 | HEIGHT: 65 IN | WEIGHT: 207 LBS

## 2025-08-13 DIAGNOSIS — Z96.651 STATUS POST TOTAL RIGHT KNEE REPLACEMENT: Primary | ICD-10-CM

## 2025-08-13 DIAGNOSIS — M25.561 CHRONIC PAIN OF RIGHT KNEE: ICD-10-CM

## 2025-08-13 DIAGNOSIS — G89.29 CHRONIC PAIN OF RIGHT KNEE: ICD-10-CM

## 2025-08-13 DIAGNOSIS — M25.661 DECREASED RANGE OF MOTION (ROM) OF RIGHT KNEE: ICD-10-CM

## 2025-08-13 DIAGNOSIS — R29.898 WEAKNESS OF RIGHT LEG: ICD-10-CM

## 2025-08-13 DIAGNOSIS — Z91.81 AT RISK FOR FALLS: ICD-10-CM

## 2025-08-13 DIAGNOSIS — R26.89 BALANCE PROBLEM: ICD-10-CM

## 2025-08-13 DIAGNOSIS — Z74.09 IMPAIRED FUNCTIONAL MOBILITY AND ACTIVITY TOLERANCE: ICD-10-CM

## 2025-08-13 PROCEDURE — 97112 NEUROMUSCULAR REEDUCATION: CPT | Performed by: PHYSICAL THERAPIST

## 2025-08-13 PROCEDURE — 97530 THERAPEUTIC ACTIVITIES: CPT | Performed by: PHYSICAL THERAPIST

## 2025-08-13 PROCEDURE — 97110 THERAPEUTIC EXERCISES: CPT | Performed by: PHYSICAL THERAPIST

## 2025-08-15 ENCOUNTER — TREATMENT (OUTPATIENT)
Dept: PHYSICAL THERAPY | Facility: CLINIC | Age: 75
End: 2025-08-15
Payer: MEDICARE

## 2025-08-15 DIAGNOSIS — Z91.81 AT RISK FOR FALLS: ICD-10-CM

## 2025-08-15 DIAGNOSIS — R26.89 BALANCE PROBLEM: ICD-10-CM

## 2025-08-15 DIAGNOSIS — G89.29 CHRONIC PAIN OF RIGHT KNEE: ICD-10-CM

## 2025-08-15 DIAGNOSIS — R29.898 WEAKNESS OF RIGHT LEG: ICD-10-CM

## 2025-08-15 DIAGNOSIS — Z96.651 STATUS POST TOTAL RIGHT KNEE REPLACEMENT: Primary | ICD-10-CM

## 2025-08-15 DIAGNOSIS — Z74.09 IMPAIRED FUNCTIONAL MOBILITY AND ACTIVITY TOLERANCE: ICD-10-CM

## 2025-08-15 DIAGNOSIS — M25.661 DECREASED RANGE OF MOTION (ROM) OF RIGHT KNEE: ICD-10-CM

## 2025-08-15 DIAGNOSIS — M25.561 CHRONIC PAIN OF RIGHT KNEE: ICD-10-CM

## 2025-08-18 ENCOUNTER — TELEPHONE (OUTPATIENT)
Dept: PHYSICAL THERAPY | Facility: CLINIC | Age: 75
End: 2025-08-18

## 2025-08-22 DIAGNOSIS — F41.9 ANXIETY: Primary | ICD-10-CM

## 2025-08-22 DIAGNOSIS — G47.00 INSOMNIA, UNSPECIFIED TYPE: ICD-10-CM

## 2025-08-22 RX ORDER — ALPRAZOLAM 0.5 MG
0.5 TABLET ORAL 2 TIMES DAILY PRN
Qty: 60 TABLET | Refills: 0 | Status: SHIPPED | OUTPATIENT
Start: 2025-08-22

## 2025-08-25 ENCOUNTER — TREATMENT (OUTPATIENT)
Dept: PHYSICAL THERAPY | Facility: CLINIC | Age: 75
End: 2025-08-25
Payer: MEDICARE

## 2025-08-25 DIAGNOSIS — Z74.09 IMPAIRED FUNCTIONAL MOBILITY AND ACTIVITY TOLERANCE: ICD-10-CM

## 2025-08-25 DIAGNOSIS — R29.898 WEAKNESS OF RIGHT LEG: ICD-10-CM

## 2025-08-25 DIAGNOSIS — Z96.651 STATUS POST TOTAL RIGHT KNEE REPLACEMENT: Primary | ICD-10-CM

## 2025-08-25 DIAGNOSIS — M25.661 DECREASED RANGE OF MOTION (ROM) OF RIGHT KNEE: ICD-10-CM

## 2025-08-25 PROCEDURE — 97112 NEUROMUSCULAR REEDUCATION: CPT

## 2025-08-25 PROCEDURE — 97110 THERAPEUTIC EXERCISES: CPT

## 2025-08-25 PROCEDURE — 97530 THERAPEUTIC ACTIVITIES: CPT

## 2025-08-27 ENCOUNTER — TREATMENT (OUTPATIENT)
Dept: PHYSICAL THERAPY | Facility: CLINIC | Age: 75
End: 2025-08-27
Payer: MEDICARE

## 2025-08-27 DIAGNOSIS — Z96.651 STATUS POST TOTAL RIGHT KNEE REPLACEMENT: Primary | ICD-10-CM

## 2025-08-27 DIAGNOSIS — Z74.09 IMPAIRED FUNCTIONAL MOBILITY AND ACTIVITY TOLERANCE: ICD-10-CM

## 2025-08-27 DIAGNOSIS — M25.661 DECREASED RANGE OF MOTION (ROM) OF RIGHT KNEE: ICD-10-CM

## 2025-08-27 DIAGNOSIS — R29.898 WEAKNESS OF RIGHT LEG: ICD-10-CM

## 2025-08-29 ENCOUNTER — TREATMENT (OUTPATIENT)
Dept: PHYSICAL THERAPY | Facility: CLINIC | Age: 75
End: 2025-08-29
Payer: MEDICARE

## 2025-08-29 DIAGNOSIS — G89.29 CHRONIC PAIN OF RIGHT KNEE: ICD-10-CM

## 2025-08-29 DIAGNOSIS — Z91.81 AT RISK FOR FALLS: ICD-10-CM

## 2025-08-29 DIAGNOSIS — Z96.651 STATUS POST TOTAL RIGHT KNEE REPLACEMENT: Primary | ICD-10-CM

## 2025-08-29 DIAGNOSIS — M25.561 CHRONIC PAIN OF RIGHT KNEE: ICD-10-CM

## 2025-08-29 DIAGNOSIS — R26.89 BALANCE PROBLEM: ICD-10-CM

## 2025-08-29 DIAGNOSIS — R29.898 WEAKNESS OF RIGHT LEG: ICD-10-CM

## 2025-08-29 DIAGNOSIS — M25.661 DECREASED RANGE OF MOTION (ROM) OF RIGHT KNEE: ICD-10-CM

## 2025-08-29 DIAGNOSIS — Z74.09 IMPAIRED FUNCTIONAL MOBILITY AND ACTIVITY TOLERANCE: ICD-10-CM

## (undated) DEVICE — IMMOB KN 3PNL DLX CANVS 19IN BLU

## (undated) DEVICE — SPNG GZ WOVN 4X4IN 12PLY 10/BX STRL

## (undated) DEVICE — 3M™ DURAPORE™ SURGICAL TAPE 2 INCHES X 10YARDS (5.0CM X 9.1M) 6ROLLS/CARTON 10CARTONS/CASE 1538-2: Brand: 3M™ DURAPORE™

## (undated) DEVICE — PK TOTL 90

## (undated) DEVICE — INTENDED USE FOR SURGICAL MARKING ON INTACT SKIN, ALSO PROVIDES A PERMANENT METHOD OF IDENTIFYING OBJECTS IN THE OPERATING ROOM: Brand: WRITESITE® REGULAR TIP SKIN MARKER

## (undated) DEVICE — MAT FLR ABSORBENT LG 4FT 10 2.5FT

## (undated) DEVICE — SEAL PRT BIOP ADJ STRL

## (undated) DEVICE — DRP SURG U/DRP INVISISHIELD PA 48X52IN

## (undated) DEVICE — NITINOL STONE RETRIEVAL BASKET: Brand: ZERO TIP

## (undated) DEVICE — SYS IRR PUMP SGL ACTN VAC SYR 10CC

## (undated) DEVICE — APPL CHLORAPREP W/TINT 26ML ORNG

## (undated) DEVICE — FIBR LASR HOLMIUM ACCUMAX 200 1PT USE

## (undated) DEVICE — TUBING, SUCTION, 1/4" X 20', STRAIGHT: Brand: MEDLINE INDUSTRIES, INC.

## (undated) DEVICE — LOU CYSTO: Brand: MEDLINE INDUSTRIES, INC.

## (undated) DEVICE — Device

## (undated) DEVICE — SOL IRR H2O BO 1000ML STRL

## (undated) DEVICE — GOWN,NON-REINFORCED,SIRUS,SET IN SLV,XL: Brand: MEDLINE

## (undated) DEVICE — GLV SURG SENSICARE PF POLYISPRN SZ8 LF

## (undated) DEVICE — 1016 S-DRAPE IRRIG POUCH 10/BOX: Brand: STERI-DRAPE™

## (undated) DEVICE — 1010 S-DRAPE TOWEL DRAPE 10/BX: Brand: STERI-DRAPE™

## (undated) DEVICE — ST IRR TUR 2NDARY 2SPIKE 26IN LF

## (undated) DEVICE — SYS SKIN EXOFIN WND CLS 4X22CM

## (undated) DEVICE — CAP GUIDE PSI/SIGNATURE/I-ASSIST

## (undated) DEVICE — APPL CHLORAPREP HI/LITE 26ML ORNG

## (undated) DEVICE — TRANSPOSAL ULTRAFLEX DUO/QUAD ULTRA CART MANIFOLD

## (undated) DEVICE — SYS IRR SURGIPHOR A/MIC RTU BO PVPI 450ML STRL

## (undated) DEVICE — KT MIX CMT PALAMIX/UNO VAC WO/CMT

## (undated) DEVICE — BNDG,ELSTC,MATRIX,STRL,4"X5YD,LF,HOOK&LP: Brand: MEDLINE

## (undated) DEVICE — WEBRIL* CAST PADDING: Brand: DEROYAL

## (undated) DEVICE — INTENDED FOR TISSUE SEPARATION, AND OTHER PROCEDURES THAT REQUIRE A SHARP SURGICAL BLADE TO PUNCTURE OR CUT.: Brand: BARD-PARKER ® STAINLESS STEEL BLADES

## (undated) DEVICE — SOL ISO/ALC RUB 70PCT 4OZ

## (undated) DEVICE — GLV SURG SENSICARE PI PF LF 7 GRN STRL

## (undated) DEVICE — PUMP PAIN AUTOFUSER AUTO SELCT NOBOLUS 1TO14ML/HR 550ML DISP

## (undated) DEVICE — GLV SURG BIOGEL LTX PF 7

## (undated) DEVICE — YANKAUER,BULB TIP,W/O VENT,RIGID,STERILE: Brand: MEDLINE

## (undated) DEVICE — PAD,ABDOMINAL,8"X10",ST,LF: Brand: MEDLINE

## (undated) DEVICE — DUAL CUT SAGITTAL BLADE

## (undated) DEVICE — ST IRR CYSTO W/SPK 77IN LF

## (undated) DEVICE — CATH URETRL FLXITP POLLACK STD 5F 70CM

## (undated) DEVICE — 4-PORT MANIFOLD: Brand: NEPTUNE 2

## (undated) DEVICE — PAD,NON-ADHERENT,3X8,STERILE,LF,1/PK: Brand: MEDLINE

## (undated) DEVICE — DRP PERC PROC

## (undated) DEVICE — PK CYSTO 90

## (undated) DEVICE — GLV SURG SENSICARE PI LF PF 7.0

## (undated) DEVICE — IMPLANTABLE DEVICE
Type: IMPLANTABLE DEVICE | Site: KNEE | Status: NON-FUNCTIONAL
Brand: PERSONA™
Removed: 2025-07-29

## (undated) DEVICE — SYR LUERLOK 20CC

## (undated) DEVICE — GLV SURG SENSICARE W/ALOE PF LF 7 STRL

## (undated) DEVICE — 3M™ STERI-STRIP™ COMPOUND BENZOIN TINCTURE 40 BAGS/CARTON 4 CARTONS/CASE C1544: Brand: 3M™ STERI-STRIP™

## (undated) DEVICE — NITINOL WIRE WITH HYDROPHILIC TIP: Brand: SENSOR

## (undated) DEVICE — DISPOSABLE TOURNIQUET CUFF 34"X4", 1-LINE, BLUE, STERILE, 1EA/PK, 10PK/CS: Brand: ASP MEDICAL

## (undated) DEVICE — SUT VIC 2/0 CT1 CR8 18IN J839D

## (undated) DEVICE — FIBR LASR HOLMIUM SINGLEFLEX200 FLT/TP DISP

## (undated) DEVICE — WRAP KN COMPR COLD/THERP

## (undated) DEVICE — FOAM BUMP ROUND LARGE: Brand: MEDLINE INDUSTRIES, INC.

## (undated) DEVICE — GW ZIPWIRE STD J TP .035IN 150CM

## (undated) DEVICE — TRANSFER SET 3": Brand: MEDLINE INDUSTRIES, INC.

## (undated) DEVICE — FRAZIER SUCTION INSTRUMENT 10 FR W/CONTROL VENT & OBTURATOR: Brand: FRAZIER

## (undated) DEVICE — DECANTER BAG 9": Brand: MEDLINE INDUSTRIES, INC.

## (undated) DEVICE — SCRW HEX CORT HD 3.5X38MM
Type: IMPLANTABLE DEVICE | Site: KNEE | Status: NON-FUNCTIONAL
Removed: 2025-07-29

## (undated) DEVICE — TRAP SPECI TRANSPOSAL SYS

## (undated) DEVICE — PRT BIOP SEALS

## (undated) DEVICE — ANTIBACTERIAL UNDYED BRAIDED (POLYGLACTIN 910), SYNTHETIC ABSORBABLE SUTURE: Brand: COATED VICRYL